# Patient Record
Sex: FEMALE | Race: WHITE | NOT HISPANIC OR LATINO | Employment: UNEMPLOYED | URBAN - METROPOLITAN AREA
[De-identification: names, ages, dates, MRNs, and addresses within clinical notes are randomized per-mention and may not be internally consistent; named-entity substitution may affect disease eponyms.]

---

## 2018-02-13 ENCOUNTER — APPOINTMENT (OUTPATIENT)
Dept: RADIOLOGY | Facility: MEDICAL CENTER | Age: 58
DRG: 853 | End: 2018-02-13
Attending: EMERGENCY MEDICINE
Payer: MEDICARE

## 2018-02-13 ENCOUNTER — RESOLUTE PROFESSIONAL BILLING HOSPITAL PROF FEE (OUTPATIENT)
Dept: HOSPITALIST | Facility: MEDICAL CENTER | Age: 58
End: 2018-02-13
Payer: COMMERCIAL

## 2018-02-13 ENCOUNTER — HOSPITAL ENCOUNTER (INPATIENT)
Facility: MEDICAL CENTER | Age: 58
LOS: 14 days | DRG: 853 | End: 2018-02-27
Attending: EMERGENCY MEDICINE | Admitting: HOSPITALIST
Payer: MEDICARE

## 2018-02-13 ENCOUNTER — APPOINTMENT (OUTPATIENT)
Dept: RADIOLOGY | Facility: MEDICAL CENTER | Age: 58
DRG: 853 | End: 2018-02-13
Attending: UROLOGY
Payer: MEDICARE

## 2018-02-13 DIAGNOSIS — A41.9 SEPTIC SHOCK (HCC): ICD-10-CM

## 2018-02-13 DIAGNOSIS — R65.21 SEPTIC SHOCK (HCC): ICD-10-CM

## 2018-02-13 DIAGNOSIS — N20.1 URETERAL STONE: ICD-10-CM

## 2018-02-13 DIAGNOSIS — N12 PYELONEPHRITIS: ICD-10-CM

## 2018-02-13 PROBLEM — E87.20 METABOLIC ACIDOSIS: Status: ACTIVE | Noted: 2018-02-13

## 2018-02-13 PROBLEM — M79.89 ARM SWELLING: Status: ACTIVE | Noted: 2018-02-13

## 2018-02-13 PROBLEM — N17.9 AKI (ACUTE KIDNEY INJURY) (HCC): Status: ACTIVE | Noted: 2018-02-13

## 2018-02-13 PROBLEM — N13.2 HYDRONEPHROSIS WITH URINARY OBSTRUCTION DUE TO RENAL CALCULUS: Status: ACTIVE | Noted: 2018-02-13

## 2018-02-13 PROBLEM — G93.40 ENCEPHALOPATHY ACUTE: Status: ACTIVE | Noted: 2018-02-13

## 2018-02-13 PROBLEM — E87.1 HYPONATREMIA: Status: ACTIVE | Noted: 2018-02-13

## 2018-02-13 LAB
ALBUMIN SERPL BCP-MCNC: 2.5 G/DL (ref 3.2–4.9)
ALBUMIN/GLOB SERPL: 0.7 G/DL
ALP SERPL-CCNC: 116 U/L (ref 30–99)
ALT SERPL-CCNC: 18 U/L (ref 2–50)
AMORPH CRY #/AREA URNS HPF: PRESENT /HPF
ANION GAP SERPL CALC-SCNC: 12 MMOL/L (ref 0–11.9)
APPEARANCE UR: CLEAR
AST SERPL-CCNC: 40 U/L (ref 12–45)
BACTERIA #/AREA URNS HPF: NEGATIVE /HPF
BASE EXCESS BLDA CALC-SCNC: -12 MMOL/L (ref -4–3)
BASOPHILS # BLD AUTO: 0 % (ref 0–1.8)
BASOPHILS # BLD: 0 K/UL (ref 0–0.12)
BILIRUB SERPL-MCNC: 0.5 MG/DL (ref 0.1–1.5)
BILIRUB UR QL STRIP.AUTO: NEGATIVE
BODY TEMPERATURE: ABNORMAL CENTIGRADE
BUN SERPL-MCNC: 26 MG/DL (ref 8–22)
CALCIUM SERPL-MCNC: 7.1 MG/DL (ref 8.5–10.5)
CHLORIDE SERPL-SCNC: 110 MMOL/L (ref 96–112)
CO2 SERPL-SCNC: 17 MMOL/L (ref 20–33)
COLOR UR: ABNORMAL
CREAT SERPL-MCNC: 1.56 MG/DL (ref 0.5–1.4)
EOSINOPHIL # BLD AUTO: 0 K/UL (ref 0–0.51)
EOSINOPHIL NFR BLD: 0 % (ref 0–6.9)
EPI CELLS #/AREA URNS HPF: ABNORMAL /HPF
ERYTHROCYTE [DISTWIDTH] IN BLOOD BY AUTOMATED COUNT: 46.5 FL (ref 35.9–50)
GLOBULIN SER CALC-MCNC: 3.4 G/DL (ref 1.9–3.5)
GLUCOSE SERPL-MCNC: 84 MG/DL (ref 65–99)
GLUCOSE UR STRIP.AUTO-MCNC: NEGATIVE MG/DL
HCO3 BLDA-SCNC: 14 MMOL/L (ref 17–25)
HCT VFR BLD AUTO: 31.7 % (ref 37–47)
HGB BLD-MCNC: 10.9 G/DL (ref 12–16)
HYALINE CASTS #/AREA URNS LPF: ABNORMAL /LPF
INHALED O2 FLOW RATE: 5 L/MIN (ref 2–10)
KETONES UR STRIP.AUTO-MCNC: ABNORMAL MG/DL
LACTATE BLD-SCNC: 1.8 MMOL/L (ref 0.5–2)
LACTATE BLD-SCNC: 2.4 MMOL/L (ref 0.5–2)
LEUKOCYTE ESTERASE UR QL STRIP.AUTO: ABNORMAL
LYMPHOCYTES # BLD AUTO: 1.69 K/UL (ref 1–4.8)
LYMPHOCYTES NFR BLD: 7 % (ref 22–41)
MANUAL DIFF BLD: ABNORMAL
MCH RBC QN AUTO: 29.7 PG (ref 27–33)
MCHC RBC AUTO-ENTMCNC: 34.4 G/DL (ref 33.6–35)
MCV RBC AUTO: 86.4 FL (ref 81.4–97.8)
METAMYELOCYTES NFR BLD MANUAL: 9.6 %
MICRO URNS: ABNORMAL
MONOCYTES # BLD AUTO: 1.04 K/UL (ref 0–0.85)
MONOCYTES NFR BLD AUTO: 4.3 % (ref 0–13.4)
MORPHOLOGY BLD-IMP: NORMAL
MYELOCYTES NFR BLD MANUAL: 2.6 %
NEUTROPHILS # BLD AUTO: 18.03 K/UL (ref 2–7.15)
NEUTROPHILS NFR BLD: 64.4 % (ref 44–72)
NEUTS BAND NFR BLD MANUAL: 10.4 % (ref 0–10)
NITRITE UR QL STRIP.AUTO: NEGATIVE
NRBC # BLD AUTO: 0 K/UL
NRBC BLD-RTO: 0 /100 WBC
PCO2 BLDA: 30.9 MMHG (ref 26–37)
PH BLDA: 7.26 [PH] (ref 7.4–7.5)
PH UR STRIP.AUTO: 5 [PH]
PLATELET # BLD AUTO: 185 K/UL (ref 164–446)
PLATELET BLD QL SMEAR: NORMAL
PMV BLD AUTO: 11.6 FL (ref 9–12.9)
PO2 BLDA: 60.9 MMHG (ref 64–87)
POTASSIUM SERPL-SCNC: 2.8 MMOL/L (ref 3.6–5.5)
PROMYELOCYTES NFR BLD MANUAL: 1.7 %
PROT SERPL-MCNC: 5.9 G/DL (ref 6–8.2)
PROT UR QL STRIP: 100 MG/DL
RBC # BLD AUTO: 3.67 M/UL (ref 4.2–5.4)
RBC # URNS HPF: ABNORMAL /HPF
RBC BLD AUTO: PRESENT
RBC UR QL AUTO: ABNORMAL
SAO2 % BLDA: 87.4 % (ref 93–99)
SODIUM SERPL-SCNC: 139 MMOL/L (ref 135–145)
SP GR UR STRIP.AUTO: 1.02
TOXIC GRANULES BLD QL SMEAR: SLIGHT
UROBILINOGEN UR STRIP.AUTO-MCNC: 1 MG/DL
WBC # BLD AUTO: 24.1 K/UL (ref 4.8–10.8)
WBC #/AREA URNS HPF: ABNORMAL /HPF

## 2018-02-13 PROCEDURE — 83605 ASSAY OF LACTIC ACID: CPT

## 2018-02-13 PROCEDURE — 99291 CRITICAL CARE FIRST HOUR: CPT

## 2018-02-13 PROCEDURE — 87040 BLOOD CULTURE FOR BACTERIA: CPT

## 2018-02-13 PROCEDURE — 700101 HCHG RX REV CODE 250

## 2018-02-13 PROCEDURE — C1769 GUIDE WIRE: HCPCS | Performed by: UROLOGY

## 2018-02-13 PROCEDURE — 160028 HCHG SURGERY MINUTES - 1ST 30 MINS LEVEL 3: Performed by: UROLOGY

## 2018-02-13 PROCEDURE — 96375 TX/PRO/DX INJ NEW DRUG ADDON: CPT

## 2018-02-13 PROCEDURE — 74176 CT ABD & PELVIS W/O CONTRAST: CPT

## 2018-02-13 PROCEDURE — 70450 CT HEAD/BRAIN W/O DYE: CPT

## 2018-02-13 PROCEDURE — C1751 CATH, INF, PER/CENT/MIDLINE: HCPCS

## 2018-02-13 PROCEDURE — 3E043XZ INTRODUCTION OF VASOPRESSOR INTO CENTRAL VEIN, PERCUTANEOUS APPROACH: ICD-10-PCS | Performed by: EMERGENCY MEDICINE

## 2018-02-13 PROCEDURE — 81001 URINALYSIS AUTO W/SCOPE: CPT

## 2018-02-13 PROCEDURE — 99223 1ST HOSP IP/OBS HIGH 75: CPT | Performed by: HOSPITALIST

## 2018-02-13 PROCEDURE — BT1DYZZ FLUOROSCOPY OF RIGHT KIDNEY, URETER AND BLADDER USING OTHER CONTRAST: ICD-10-PCS | Performed by: UROLOGY

## 2018-02-13 PROCEDURE — C2617 STENT, NON-COR, TEM W/O DEL: HCPCS | Performed by: UROLOGY

## 2018-02-13 PROCEDURE — 96366 THER/PROPH/DIAG IV INF ADDON: CPT

## 2018-02-13 PROCEDURE — 500880 HCHG PACK, CYSTO W/SEP LEGGINGS: Performed by: UROLOGY

## 2018-02-13 PROCEDURE — 93971 EXTREMITY STUDY: CPT

## 2018-02-13 PROCEDURE — 700111 HCHG RX REV CODE 636 W/ 250 OVERRIDE (IP): Performed by: EMERGENCY MEDICINE

## 2018-02-13 PROCEDURE — 82803 BLOOD GASES ANY COMBINATION: CPT

## 2018-02-13 PROCEDURE — 96365 THER/PROPH/DIAG IV INF INIT: CPT

## 2018-02-13 PROCEDURE — 36556 INSERT NON-TUNNEL CV CATH: CPT

## 2018-02-13 PROCEDURE — A9270 NON-COVERED ITEM OR SERVICE: HCPCS | Performed by: HOSPITALIST

## 2018-02-13 PROCEDURE — 501329 HCHG SET, CYSTO IRRIG Y TUR: Performed by: UROLOGY

## 2018-02-13 PROCEDURE — 160009 HCHG ANES TIME/MIN: Performed by: UROLOGY

## 2018-02-13 PROCEDURE — 96368 THER/DIAG CONCURRENT INF: CPT

## 2018-02-13 PROCEDURE — 160035 HCHG PACU - 1ST 60 MINS PHASE I: Performed by: UROLOGY

## 2018-02-13 PROCEDURE — 87077 CULTURE AEROBIC IDENTIFY: CPT | Mod: 91

## 2018-02-13 PROCEDURE — 700102 HCHG RX REV CODE 250 W/ 637 OVERRIDE(OP): Performed by: HOSPITALIST

## 2018-02-13 PROCEDURE — B548ZZA ULTRASONOGRAPHY OF SUPERIOR VENA CAVA, GUIDANCE: ICD-10-PCS | Performed by: EMERGENCY MEDICINE

## 2018-02-13 PROCEDURE — 0T768DZ DILATION OF RIGHT URETER WITH INTRALUMINAL DEVICE, VIA NATURAL OR ARTIFICIAL OPENING ENDOSCOPIC: ICD-10-PCS | Performed by: UROLOGY

## 2018-02-13 PROCEDURE — 85027 COMPLETE CBC AUTOMATED: CPT

## 2018-02-13 PROCEDURE — 02HV33Z INSERTION OF INFUSION DEVICE INTO SUPERIOR VENA CAVA, PERCUTANEOUS APPROACH: ICD-10-PCS | Performed by: EMERGENCY MEDICINE

## 2018-02-13 PROCEDURE — 36415 COLL VENOUS BLD VENIPUNCTURE: CPT

## 2018-02-13 PROCEDURE — 87186 SC STD MICRODIL/AGAR DIL: CPT

## 2018-02-13 PROCEDURE — 71045 X-RAY EXAM CHEST 1 VIEW: CPT

## 2018-02-13 PROCEDURE — 770022 HCHG ROOM/CARE - ICU (200)

## 2018-02-13 PROCEDURE — 700111 HCHG RX REV CODE 636 W/ 250 OVERRIDE (IP): Performed by: HOSPITALIST

## 2018-02-13 PROCEDURE — 700105 HCHG RX REV CODE 258: Performed by: EMERGENCY MEDICINE

## 2018-02-13 PROCEDURE — 700111 HCHG RX REV CODE 636 W/ 250 OVERRIDE (IP)

## 2018-02-13 PROCEDURE — 304561 HCHG STAT O2

## 2018-02-13 PROCEDURE — 160002 HCHG RECOVERY MINUTES (STAT): Performed by: UROLOGY

## 2018-02-13 PROCEDURE — 87086 URINE CULTURE/COLONY COUNT: CPT

## 2018-02-13 PROCEDURE — 80053 COMPREHEN METABOLIC PANEL: CPT

## 2018-02-13 PROCEDURE — 160039 HCHG SURGERY MINUTES - EA ADDL 1 MIN LEVEL 3: Performed by: UROLOGY

## 2018-02-13 PROCEDURE — 85007 BL SMEAR W/DIFF WBC COUNT: CPT

## 2018-02-13 PROCEDURE — 700101 HCHG RX REV CODE 250: Performed by: EMERGENCY MEDICINE

## 2018-02-13 PROCEDURE — 160048 HCHG OR STATISTICAL LEVEL 1-5: Performed by: UROLOGY

## 2018-02-13 PROCEDURE — 700105 HCHG RX REV CODE 258: Performed by: HOSPITALIST

## 2018-02-13 DEVICE — STENT UROLOGICAL POLARIS 6X24  ULTRA: Type: IMPLANTABLE DEVICE | Status: FUNCTIONAL

## 2018-02-13 RX ORDER — FENTANYL 100 UG/1
1 PATCH TRANSDERMAL
Status: ON HOLD | COMMUNITY
End: 2019-07-05

## 2018-02-13 RX ORDER — SODIUM CHLORIDE 9 MG/ML
INJECTION, SOLUTION INTRAVENOUS CONTINUOUS
Status: DISCONTINUED | OUTPATIENT
Start: 2018-02-13 | End: 2018-02-13

## 2018-02-13 RX ORDER — FENTANYL 100 UG/1
1 PATCH TRANSDERMAL
Status: DISCONTINUED | OUTPATIENT
Start: 2018-02-13 | End: 2018-02-13

## 2018-02-13 RX ORDER — FOLIC ACID 1 MG/1
2 TABLET ORAL DAILY
Status: DISCONTINUED | OUTPATIENT
Start: 2018-02-13 | End: 2018-02-27 | Stop reason: HOSPADM

## 2018-02-13 RX ORDER — PREGABALIN 150 MG/1
150 CAPSULE ORAL 3 TIMES DAILY
COMMUNITY

## 2018-02-13 RX ORDER — MAGNESIUM HYDROXIDE 1200 MG/15ML
LIQUID ORAL
Status: DISCONTINUED | OUTPATIENT
Start: 2018-02-13 | End: 2018-02-13 | Stop reason: HOSPADM

## 2018-02-13 RX ORDER — HYDROCODONE BITARTRATE AND ACETAMINOPHEN 10; 325 MG/1; MG/1
1 TABLET ORAL EVERY 4 HOURS PRN
Status: ON HOLD | COMMUNITY
End: 2018-02-27

## 2018-02-13 RX ORDER — MORPHINE SULFATE 4 MG/ML
2 INJECTION, SOLUTION INTRAMUSCULAR; INTRAVENOUS
Status: DISCONTINUED | OUTPATIENT
Start: 2018-02-13 | End: 2018-02-15

## 2018-02-13 RX ORDER — DULOXETIN HYDROCHLORIDE 60 MG/1
60 CAPSULE, DELAYED RELEASE ORAL 2 TIMES DAILY
COMMUNITY

## 2018-02-13 RX ORDER — DULOXETIN HYDROCHLORIDE 30 MG/1
60 CAPSULE, DELAYED RELEASE ORAL 2 TIMES DAILY
Status: DISCONTINUED | OUTPATIENT
Start: 2018-02-13 | End: 2018-02-27 | Stop reason: HOSPADM

## 2018-02-13 RX ORDER — PREGABALIN 150 MG/1
150 CAPSULE ORAL 2 TIMES DAILY
Status: DISCONTINUED | OUTPATIENT
Start: 2018-02-13 | End: 2018-02-27 | Stop reason: HOSPADM

## 2018-02-13 RX ORDER — FLUOXETINE HYDROCHLORIDE 20 MG/1
20 CAPSULE ORAL DAILY
Status: ON HOLD | COMMUNITY
End: 2024-01-26

## 2018-02-13 RX ORDER — SODIUM CHLORIDE 9 MG/ML
500 INJECTION, SOLUTION INTRAVENOUS
Status: DISCONTINUED | OUTPATIENT
Start: 2018-02-13 | End: 2018-02-20

## 2018-02-13 RX ORDER — POLYETHYLENE GLYCOL 3350 17 G/17G
1 POWDER, FOR SOLUTION ORAL
Status: DISCONTINUED | OUTPATIENT
Start: 2018-02-13 | End: 2018-02-18

## 2018-02-13 RX ORDER — FOLIC ACID 1 MG/1
2 TABLET ORAL DAILY
Status: ON HOLD | COMMUNITY
End: 2024-01-26

## 2018-02-13 RX ORDER — PROMETHAZINE HYDROCHLORIDE 25 MG/1
12.5-25 SUPPOSITORY RECTAL EVERY 4 HOURS PRN
Status: DISCONTINUED | OUTPATIENT
Start: 2018-02-13 | End: 2018-02-27 | Stop reason: HOSPADM

## 2018-02-13 RX ORDER — SODIUM CHLORIDE 9 MG/ML
INJECTION, SOLUTION INTRAVENOUS CONTINUOUS
Status: DISCONTINUED | OUTPATIENT
Start: 2018-02-13 | End: 2018-02-18 | Stop reason: ALTCHOICE

## 2018-02-13 RX ORDER — ONDANSETRON 2 MG/ML
4 INJECTION INTRAMUSCULAR; INTRAVENOUS EVERY 4 HOURS PRN
Status: DISCONTINUED | OUTPATIENT
Start: 2018-02-13 | End: 2018-02-27 | Stop reason: HOSPADM

## 2018-02-13 RX ORDER — FLUOXETINE HYDROCHLORIDE 20 MG/1
20 CAPSULE ORAL DAILY
Status: DISCONTINUED | OUTPATIENT
Start: 2018-02-13 | End: 2018-02-27 | Stop reason: HOSPADM

## 2018-02-13 RX ORDER — AMOXICILLIN 250 MG
2 CAPSULE ORAL 2 TIMES DAILY
Status: DISCONTINUED | OUTPATIENT
Start: 2018-02-13 | End: 2018-02-18

## 2018-02-13 RX ORDER — BISACODYL 10 MG
10 SUPPOSITORY, RECTAL RECTAL
Status: DISCONTINUED | OUTPATIENT
Start: 2018-02-13 | End: 2018-02-18

## 2018-02-13 RX ORDER — POTASSIUM CHLORIDE 14.9 MG/ML
20 INJECTION INTRAVENOUS ONCE
Status: COMPLETED | OUTPATIENT
Start: 2018-02-13 | End: 2018-02-13

## 2018-02-13 RX ORDER — GABAPENTIN 300 MG/1
300 CAPSULE ORAL 3 TIMES DAILY
Status: ON HOLD | COMMUNITY
End: 2018-02-27

## 2018-02-13 RX ORDER — PROMETHAZINE HYDROCHLORIDE 25 MG/1
12.5-25 TABLET ORAL EVERY 4 HOURS PRN
Status: DISCONTINUED | OUTPATIENT
Start: 2018-02-13 | End: 2018-02-27 | Stop reason: HOSPADM

## 2018-02-13 RX ORDER — ONDANSETRON 4 MG/1
4 TABLET, ORALLY DISINTEGRATING ORAL EVERY 4 HOURS PRN
Status: DISCONTINUED | OUTPATIENT
Start: 2018-02-13 | End: 2018-02-27 | Stop reason: HOSPADM

## 2018-02-13 RX ADMIN — NOREPINEPHRINE BITARTRATE 4 MCG/MIN: 1 INJECTION INTRAVENOUS at 05:00

## 2018-02-13 RX ADMIN — VANCOMYCIN HYDROCHLORIDE 2600 MG: 100 INJECTION, POWDER, LYOPHILIZED, FOR SOLUTION INTRAVENOUS at 05:05

## 2018-02-13 RX ADMIN — VANCOMYCIN HYDROCHLORIDE 1600 MG: 100 INJECTION, POWDER, LYOPHILIZED, FOR SOLUTION INTRAVENOUS at 16:48

## 2018-02-13 RX ADMIN — PREGABALIN 150 MG: 150 CAPSULE ORAL at 21:01

## 2018-02-13 RX ADMIN — POTASSIUM CHLORIDE 20 MEQ: 200 INJECTION, SOLUTION INTRAVENOUS at 09:55

## 2018-02-13 RX ADMIN — TAZOBACTAM SODIUM AND PIPERACILLIN SODIUM 4.5 G: 500; 4 INJECTION, SOLUTION INTRAVENOUS at 05:01

## 2018-02-13 RX ADMIN — STANDARDIZED SENNA CONCENTRATE AND DOCUSATE SODIUM 2 TABLET: 8.6; 5 TABLET, FILM COATED ORAL at 21:01

## 2018-02-13 RX ADMIN — MORPHINE SULFATE 2 MG: 4 INJECTION INTRAVENOUS at 21:24

## 2018-02-13 RX ADMIN — DULOXETINE HYDROCHLORIDE 60 MG: 60 CAPSULE, DELAYED RELEASE ORAL at 21:01

## 2018-02-13 RX ADMIN — SODIUM CHLORIDE: 9 INJECTION, SOLUTION INTRAVENOUS at 06:22

## 2018-02-13 RX ADMIN — TAZOBACTAM SODIUM AND PIPERACILLIN SODIUM 3.38 G: 375; 3 INJECTION, SOLUTION INTRAVENOUS at 10:44

## 2018-02-13 RX ADMIN — SODIUM CHLORIDE: 9 INJECTION, SOLUTION INTRAVENOUS at 11:15

## 2018-02-13 RX ADMIN — SODIUM CHLORIDE: 9 INJECTION, SOLUTION INTRAVENOUS at 09:54

## 2018-02-13 RX ADMIN — FENTANYL CITRATE 50 MCG: 50 INJECTION, SOLUTION INTRAMUSCULAR; INTRAVENOUS at 08:51

## 2018-02-13 RX ADMIN — FENTANYL CITRATE 50 MCG: 50 INJECTION, SOLUTION INTRAMUSCULAR; INTRAVENOUS at 09:04

## 2018-02-13 RX ADMIN — ONDANSETRON 4 MG: 2 INJECTION INTRAMUSCULAR; INTRAVENOUS at 21:52

## 2018-02-13 ASSESSMENT — LIFESTYLE VARIABLES
EVER_SMOKED: YES
EVER HAD A DRINK FIRST THING IN THE MORNING TO STEADY YOUR NERVES TO GET RID OF A HANGOVER: NO
DO YOU DRINK ALCOHOL: NO
TOTAL SCORE: 0
EVER FELT BAD OR GUILTY ABOUT YOUR DRINKING: NO
ALCOHOL_USE: YES
HAVE YOU EVER FELT YOU SHOULD CUT DOWN ON YOUR DRINKING: NO
AVERAGE NUMBER OF DAYS PER WEEK YOU HAVE A DRINK CONTAINING ALCOHOL: 0
EVER_SMOKED: NEVER
TOTAL SCORE: 0
ON A TYPICAL DAY WHEN YOU DRINK ALCOHOL HOW MANY DRINKS DO YOU HAVE: .5
CONSUMPTION TOTAL: NEGATIVE
HOW MANY TIMES IN THE PAST YEAR HAVE YOU HAD 5 OR MORE DRINKS IN A DAY: 0
HAVE PEOPLE ANNOYED YOU BY CRITICIZING YOUR DRINKING: NO
TOTAL SCORE: 0

## 2018-02-13 ASSESSMENT — PAIN SCALES - GENERAL
PAINLEVEL_OUTOF10: 1
PAINLEVEL_OUTOF10: ASSUMED PAIN PRESENT
PAINLEVEL_OUTOF10: 0
PAINLEVEL_OUTOF10: ASSUMED PAIN PRESENT
PAINLEVEL_OUTOF10: 0
PAINLEVEL_OUTOF10: 4
PAINLEVEL_OUTOF10: 0

## 2018-02-13 ASSESSMENT — COPD QUESTIONNAIRES
DURING THE PAST 4 WEEKS HOW MUCH DID YOU FEEL SHORT OF BREATH: NONE/LITTLE OF THE TIME
COPD SCREENING SCORE: 1
HAVE YOU SMOKED AT LEAST 100 CIGARETTES IN YOUR ENTIRE LIFE: NO/DON'T KNOW
DO YOU EVER COUGH UP ANY MUCUS OR PHLEGM?: NO/ONLY WITH OCCASIONAL COLDS OR INFECTIONS

## 2018-02-13 ASSESSMENT — PATIENT HEALTH QUESTIONNAIRE - PHQ9
1. LITTLE INTEREST OR PLEASURE IN DOING THINGS: NOT AT ALL
2. FEELING DOWN, DEPRESSED, IRRITABLE, OR HOPELESS: NOT AT ALL
SUM OF ALL RESPONSES TO PHQ QUESTIONS 1-9: 0
SUM OF ALL RESPONSES TO PHQ9 QUESTIONS 1 AND 2: 0

## 2018-02-13 NOTE — ED NOTES
Pt remains with aloc, no change since in er. Pt drowsy,  at bedside. Olmedo draining without difficulty. ivs infusing without difficulty. No redness or swelling noted to iv sites. resp equal and unlabored. Mild resp distress improved and pt remains on 4L o2 nc at this time. Pt and family deny further needs. Will continue to monitor. Vs improving.

## 2018-02-13 NOTE — CONSULTS
UROLOGY Consult Note:    Monico Killian  Date & Time note created:    2/13/2018   7:40 AM     Referring MD:  Dr. Quezada    Patient ID:   Name:             Nydia Finch     YOB: 1960  Age:                 57 y.o.  female   MRN:               [id number]                                                             Reason for Consult:      Sepsis, right ureteral stone, severe right hydronephrosis and pyuria.    History of Present Illness:    Patient was admitted to an outside hospital over the weekend. She was diagnosed as having constipation and was treated for that. She was discharged home. He became increasingly ill at home and developed abdominal pain. She was eventually transferred to the [facility] ER. An abdominal CT demonstrated a 9 mm mid right ureteral obstructing stone with significant right hydronephrosis. Urinalysis demonstrates pyuria. She is hypotensive and currently on pressors. Presumably the urine is the source of her sepsis, complicated by an obstructing ureteral calculus.    Review of Systems:      Constitutional: Denies fevers, Denies weight changes  Eyes: Denies changes in vision, no eye pain  Ears/Nose/Throat/Mouth: Denies nasal congestion or sore throat   Cardiovascular: no chest pain, no palpitations   Respiratory: no shortness of breath , Denies cough  Gastrointestinal/Hepatic: + abdominal pain, No nausea, vomiting, diarrhea, constipation or GI bleeding   Genitourinary: See CC  Musculoskeletal/Rheum: Denies  joint pain and swelling, no edema  Skin: Denies rash  Neurological: Denies headache, confusion, memory loss or focal weakness/parasthesias  Psychiatric: denies mood disorder   Endocrine: Coleen thyroid problems  Heme/Oncology/Lymph Nodes: Denies enlarged lymph nodes, denies brusing or known bleeding disorder  All other systems were reviewed and are negative (AMA/CMS criteria)                Past Medical History:   Past Medical History:   Diagnosis Date   • Brain aneurysm    •  "Chronic pain    • St[place] ([facility]      Active Hospital Problems    Diagnosis   • Septic shock (CMS-HCC) [A41.9, R65.21]   • Pyelonephritis [N12]   • Ureteral stone [N20.1]   • Hydronephrosis with urinary obstruction due to renal calculus [N13.2]   • Arm swelling [M79.89]   • Hyponatremia [E87.1]   • CHERYL (acute kidney injury) (CMS-HCC) [N17.9]   • Metabolic acidosis [E87.2]   • Encephalopathy acute [G93.40]       Past Surgical History:  Past Surgical History:   Procedure Laterality Date   • OTHER NEUROLOGICAL SURG         Hospital Medications:    Current Facility-Administered Medications:   •  NS infusion, , Intravenous, Continuous, Jay Quezada M.D., Last Rate: 150 mL/hr at 02/13/18 0622  •  vancomycin 2,600 mg in  mL IVPB, 25 mg/kg, Intravenous, Once, Jay Quezada M.D., Last Rate: 166.7 mL/hr at 02/13/18 0505, 2,600 mg at 02/13/18 0505  •  potassium chloride in water (KCL) ivpb **Administer in ICU only** 20 mEq, 20 mEq, Intravenous, Once, Jay Quezada M.D.    Current Outpatient Medications:  No prescriptions prior to admission.       Medication Allergy:  No Known Allergies    Family History:  History reviewed. No pertinent family history.    Social History:  Social History     Social History   • Marital status:      Spouse name: N/A   • Number of children: N/A   • Years of education: N/A     Occupational History   • Not on file.     Social History Main Topics   • Smoking status: Never Smoker   • Smokeless tobacco: Never Used   • Alcohol use No   • Drug use: No   • Sexual activity: Not on file     Other Topics Concern   • Not on file     Social History Narrative   • No narrative on file         Physical Exam:  Vitals/ General Appearance:   Weight/BMI: Body mass index is 34.97 kg/m².  Blood pressure 131/77, pulse (!) 104, temperature 37.3 °C (99.1 °F), resp. rate 16, height 1.727 m (5' 8\"), weight 104.3 kg (230 lb), SpO2 99 %.  Vitals:    02/13/18 0555 02/13/18 0625 02/13/18 0645 02/13/18 " 0737   BP:    131/77   Pulse:  98 (!) 102 (!) 104   Resp: 17 15 14 16   Temp:    37.3 °C (99.1 °F)   SpO2:  96% 99% 99%   Weight:       Height:         Oxygen Therapy:  Pulse Oximetry: 99 %, O2 (LPM): 4, O2 Delivery: Nasal Cannula    Constitutional:   Obese, Well developed, Well nourished, + acute distress  HENMT:  Normocephalic, Atraumatic, Oropharynx moist mucous membranes, No oral exudates, Nose normal.  No thyromegaly.  Eyes:  EOMI, Conjunctiva normal, No discharge.  Neck:  Normal range of motion, No cervical tenderness,  no JVD.  Cardiovascular:  Normal heart rate, Normal rhythm, No murmurs, No rubs, No gallops.   Extremitites with intact distal pulses, no cyanosis, or edema.  Lungs:  Normal breath sounds, breath sounds clear to auscultation bilaterally,  no crackles, no wheezing.   Abdomen: Bowel sounds normal, Soft, No tenderness, No guarding, No rebound, No masses, No hepatosplenomegaly.  : Deferred  Skin: Warm, Dry, No erythema, No rash, no induration.  Neurologic: Alert & oriented x 3, Rt hemiparesis, cranial nerves II through X are grossly intact.  Psychiatric: Affect normal, Judgment normal, Mood normal.      MDM (Data Review):     Records reviewed and summarized in current documentation    Lab Data Review:  Recent Results (from the past 24 hour(s))   Lactic acid (lactate)    Collection Time: 02/13/18  4:12 AM   Result Value Ref Range    Lactic Acid 2.4 (H) 0.5 - 2.0 mmol/L   CBC WITH DIFFERENTIAL    Collection Time: 02/13/18  4:12 AM   Result Value Ref Range    WBC 24.1 (H) 4.8 - 10.8 K/uL    RBC 3.67 (L) 4.20 - 5.40 M/uL    Hemoglobin 10.9 (L) 12.0 - 16.0 g/dL    Hematocrit 31.7 (L) 37.0 - 47.0 %    MCV 86.4 81.4 - 97.8 fL    MCH 29.7 27.0 - 33.0 pg    MCHC 34.4 33.6 - 35.0 g/dL    RDW 46.5 35.9 - 50.0 fL    Platelet Count 185 164 - 446 K/uL    MPV 11.6 9.0 - 12.9 fL    Neutrophils-Polys 64.40 44.00 - 72.00 %    Lymphocytes 7.00 (L) 22.00 - 41.00 %    Monocytes 4.30 0.00 - 13.40 %    Eosinophils 0.00  0.00 - 6.90 %    Basophils 0.00 0.00 - 1.80 %    Nucleated RBC 0.00 /100 WBC    Neutrophils (Absolute) 18.03 (H) 2.00 - 7.15 K/uL    Lymphs (Absolute) 1.69 1.00 - 4.80 K/uL    Monos (Absolute) 1.04 (H) 0.00 - 0.85 K/uL    Eos (Absolute) 0.00 0.00 - 0.51 K/uL    Baso (Absolute) 0.00 0.00 - 0.12 K/uL    NRBC (Absolute) 0.00 K/uL   COMP METABOLIC PANEL    Collection Time: 02/13/18  4:12 AM   Result Value Ref Range    Sodium 139 135 - 145 mmol/L    Potassium 2.8 (L) 3.6 - 5.5 mmol/L    Chloride 110 96 - 112 mmol/L    Co2 17 (L) 20 - 33 mmol/L    Anion Gap 12.0 (H) 0.0 - 11.9    Glucose 84 65 - 99 mg/dL    Bun 26 (H) 8 - 22 mg/dL    Creatinine 1.56 (H) 0.50 - 1.40 mg/dL    Calcium 7.1 (L) 8.5 - 10.5 mg/dL    AST(SGOT) 40 12 - 45 U/L    ALT(SGPT) 18 2 - 50 U/L    Alkaline Phosphatase 116 (H) 30 - 99 U/L    Total Bilirubin 0.5 0.1 - 1.5 mg/dL    Albumin 2.5 (L) 3.2 - 4.9 g/dL    Total Protein 5.9 (L) 6.0 - 8.2 g/dL    Globulin 3.4 1.9 - 3.5 g/dL    A-G Ratio 0.7 g/dL   ESTIMATED GFR    Collection Time: 02/13/18  4:12 AM   Result Value Ref Range    GFR If  41 (A) >60 mL/min/1.73 m 2    GFR If Non  34 (A) >60 mL/min/1.73 m 2   DIFFERENTIAL MANUAL    Collection Time: 02/13/18  4:12 AM   Result Value Ref Range    Bands-Stabs 10.40 (H) 0.00 - 10.00 %    Metamyelocytes 9.60 %    Myelocytes 2.60 %    Progranulocytes 1.70 %    Manual Diff Status PERFORMED    PERIPHERAL SMEAR REVIEW    Collection Time: 02/13/18  4:12 AM   Result Value Ref Range    Peripheral Smear Review see below    PLATELET ESTIMATE    Collection Time: 02/13/18  4:12 AM   Result Value Ref Range    Plt Estimation Normal    MORPHOLOGY    Collection Time: 02/13/18  4:12 AM   Result Value Ref Range    RBC Morphology Present     Toxic Gran Slight    URINALYSIS    Collection Time: 02/13/18  5:10 AM   Result Value Ref Range    Color DK Yellow     Character Clear     Specific Gravity 1.020 <1.035    Ph 5.0 5.0 - 8.0    Glucose Negative  Negative mg/dL    Ketones Trace (A) Negative mg/dL    Protein 100 (A) Negative mg/dL    Bilirubin Negative Negative    Urobilinogen, Urine 1.0 Negative    Nitrite Negative Negative    Leukocyte Esterase Trace (A) Negative    Occult Blood Small (A) Negative    Micro Urine Req Microscopic    URINE MICROSCOPIC (W/UA)    Collection Time: 02/13/18  5:10 AM   Result Value Ref Range    WBC 2-5 /hpf    RBC 2-5 (A) /hpf    Bacteria Negative None /hpf    Epithelial Cells Few /hpf    Amorphous Crystal Present /hpf    Hyaline Cast 6-10 (A) /lpf   Lactic acid (lactate)    Collection Time: 02/13/18  6:20 AM   Result Value Ref Range    Lactic Acid 1.8 0.5 - 2.0 mmol/L       Imaging/Procedures Review:    Reviewed    MDM (Assessment and Plan):     Active Hospital Problems    Diagnosis   • Septic shock (CMS-HCC) [A41.9, R65.21]   • Pyelonephritis [N12]   • Ureteral stone [N20.1]   • Hydronephrosis with urinary obstruction due to renal calculus [N13.2]   • Arm swelling [M79.89]   • Hyponatremia [E87.1]   • CHERYL (acute kidney injury) (CMS-HCC) [N17.9]   • Metabolic acidosis [E87.2]   • Encephalopathy acute [G93.40]     Plan  Emergent placement of a right ureteral stent

## 2018-02-13 NOTE — ASSESSMENT & PLAN NOTE
Resolved with placement of stent.  Monitor. Due to stent, does have a longer course of antibiotic therapy for pyelonephritis.

## 2018-02-13 NOTE — PROGRESS NOTES
"Pharmacy Kinetics 57 y.o. female on vancomycin day # 1 2018    Vancomycin New Start   Received vancomycin 2600 mg iv x 1 this morning at 0505  Other antibiotics: piperacillin/tazobactam 10.125 g CIVI    Indication for Treatment: Pyelonephritis    Pertinent history per medical record: Admitted on 2018 from [facility] for ALOC.  She was diagnosed with UTI at the [facility] and given levofloxacin.  Transferred to [facility] for management of sepsis.  She has a right ureteral stone causing obstructive pyelonephritis.  Urology was consulted and emergently placed a right ureteral sten on  with noted purulent drainage from the stent.  She received Zosyn and vancomycin in the ED, which have been continued on admit.      Allergies: Patient has no known allergies.     List concerns for renal function: obesity (BMI 34.97 kg/m 2), elevated BUN/SCr, SIRS with elevated lactic acid on admit (improved), low albumin    Pertinent cultures to date:   Blood and urine cultures in process    Imagin/13 CT-abd/pelvis:  Obstructing 7 mm calculus in the mid right ureter with severe right hydronephrosis.    Recent Labs      18   0412   WBC  24.1*   NEUTSPOLYS  64.40   BANDSSTABS  10.40*     Recent Labs      18   0412   BUN  26*   CREATININE  1.56*   ALBUMIN  2.5*     Intake/Output Summary (Last 24 hours) at 18 0956  Last data filed at 18 0930   Gross per 24 hour   Intake             1000 ml   Output              500 ml   Net              500 ml      Blood pressure 131/77, pulse (!) 120, temperature 37.2 °C (99 °F), resp. rate (!) 24, height 1.727 m (5' 8\"), weight 104.3 kg (230 lb), SpO2 100 %. Temp (24hrs), Av.9 °C (98.5 °F), Min:36.2 °C (97.2 °F), Max:37.3 °C (99.1 °F)      A/P   1. Vancomycin dose change: vancomycin 1600 mg iv x 1 at 1700  2. Next vancomycin level:  at 0500  3. Goal trough: 12-16 mcg/mL  4. Assessment: suspect renal function to begin to improve with placement of the stent and " antibiotic therapy.  Patient is at risk for accumulation.  5. Plan:  Will continue pulse dosing.  Ordered a 15 mg/kg pulse dose to be given 12-hrs after the loading dose with a 12-hr follow up level to assess distribution and clearance.    Darlyn Mosqueda, Pharm.D., BCPS

## 2018-02-13 NOTE — PROGRESS NOTES
"I examined the patient 2/13/2018 11:45 AM  Vital Signs:/77   Pulse (!) 107   Temp 37.2 °C (99 °F)   Resp 15   Ht 1.727 m (5' 8\")   Wt 104.3 kg (230 lb)   SpO2 100%   BMI 34.97 kg/m²   Cardiac examination significant for Tachycardia  Pulmonary examination significant for Clear lung fileds  Capillary refill is brisk  Peripheral Pulse is 2+   Skin is pale   Patient is now in ICU she is s/p right ureter stent placement as per report there was pus coming out after stent placement will continue ivf, she is off levophed now, continue iv atb f/u culture report. Discussed with ICU nurse and informed ICU hospitalist. Continue close monitoring lactic acid is back to normal will recheck labs.   "

## 2018-02-13 NOTE — PROGRESS NOTES
Pt arrived to S124 from PACU. Pt lethargic, AOx1. 5L oxymask, VSS, levophed paused. Report received from PACU RN.

## 2018-02-13 NOTE — DISCHARGE PLANNING
Care Transition Team Assessment    SW met at bedside with pt's spouse (Marbin) to obtain the information used in this assessment. Pt's spouse stated that they live at [address] [place] [address] in [place], [place] in a single story home. [facility] in [place]. Pt's  assists pt with getting dressed, bathing and cooking her meals. Pt is ambulatory around the house but uses an electric wheelchair when she leaves the home. Pt's spouse stated that a couple of months ago his 91 year old father moved in and he has been care taking for him as well. He stated that pt and himself have family that live nearby that are good support and are able to assist. Pt's  stated that pt had an aneurysm and a stroke in 1994 and has had at least six surgeries since then. Pt's  stated that pt is a DNR. SW provided emotional support to pt's spouse as he stated that he has a lot on his plate right now and is trying to work full time. He has no concerns about paying for medications and denies any mh or substance use for pt.    Plan: Pt's d/c plan is unknown at this time. SW to remain available to assist.     Information Source  Orientation : Disoriented to Event, Disoriented to Time  Information Given By: (P) Spouse  Informant's Name: (BRICE) Marbin  Who is responsible for making decisions for patient? : Legal next of kin  Name(s) of Primary Decision Maker:  (Marbin)    Readmission Evaluation  Is this a readmission?: No    Elopement Risk  Legal Hold: No  Ambulatory or Self Mobile in Wheelchair: No-Not an Elopement Risk    Interdisciplinary Discharge Planning  Does Admitting Nurse Feel This Could be a Complex Discharge?: (P) No  Primary Care Physician: (P) Dr. Griffin  Lives with - Patient's Self Care Capacity: (P) Spouse  Patient or legal guardian wants to designate a caregiver (see row info): (P) No  Support Systems: (P) Spouse / Significant Other, Family Member(s)  Housing / Facility: (P) 1 Story  House  Do You Take your Prescribed Medications Regularly: (P) Yes  Able to Return to Previous ADL's: (P) Yes  Mobility Issues: (P) Yes  Prior Services: (P) Home With Outpatient Therapy, Skilled Home Health Services  Patient Expects to be Discharged to:: (P) home  Assistance Needed: (P) No  Durable Medical Equipment: (P)  (cane/wheelchair)    Discharge Preparedness  What is your plan after discharge?: Uncertain - pending medical team collaboration, Home with help, Skilled nursing facility, Home health care  What are your discharge supports?: Child, Sibling, Spouse  Prior Functional Level: Needs Assist with Activities of Daily Living, Independent with Medication Management, Uses Wheelchair  Difficulity with ADLs: Bathing, Toileting, Walking  Difficulity with IADLs: Cooking, Driving, Laundry, Shopping    Functional Assesment  Prior Functional Level: Needs Assist with Activities of Daily Living, Independent with Medication Management, Uses Wheelchair    Finances  Financial Barriers to Discharge: No  Prescription Coverage: Yes    Vision / Hearing Impairment  Vision Impairment : (P) No  Hearing Impairment : (P) No     Advance Directive  Advance Directive?: POLST     Psychological Assessment  History of Substance Abuse: None  History of Psychiatric Problems: No  Non-compliant with Treatment: No  Newly Diagnosed Illness: No    Discharge Risks or Barriers  Discharge risks or barriers?: No    Anticipated Discharge Information  Anticipated discharge disposition: Discharge needs currently unknown, HHC, Home, SNF

## 2018-02-13 NOTE — ED PROVIDER NOTES
"ED Provider Note    CHIEF COMPLAINT  Chief Complaint   Patient presents with   • ALOC       HPI  Nydia Finch is a 57 y.o. female who presents to the emergency room with altered mental status. Arrives as a transfer from outside hospital. Patient presented to the other hospital yesterday with abdominal pain. She was diagnosed with constipation. Observed in the hospital overnight and given enemas. And felt improved upon discharge. After going home yesterday she became confused and felt warm. She wasn't acting like herself and therefore the  took her back to the hospital. She was found to have a fever of 103. She was hypotensive and given 5 L of fluid. She was diagnosed with UTI and given Levaquin intravenously. She was transferred here for higher level of care. She is unable to provide much history. She has not had cough, congestion or runny nose. No vomiting. She has had bowel movements since enemas administered    REVIEW OF SYSTEMS  As per HPI, otherwise a 10 point review of systems is negative    PAST MEDICAL HISTORY  Chronic right-sided neurologic deficits, constipation, chronic pain, brain aneurysm status post rupture    SOCIAL HISTORY  Social History   Substance Use Topics   • Smoking status: Never Smoker   • Smokeless tobacco: Never Used   • Alcohol use No       SURGICAL HISTORY  Cholecystitis, appendicitis, gastric bypass, knee surgeries    ALLERGIES  No Known Allergies    PHYSICAL EXAM  VITAL SIGNS: BP (!) 83/52   Pulse 98   Temp 37.1 °C (98.8 °F)   Resp 15   Ht 1.727 m (5' 8\")   Wt 104.3 kg (230 lb)   SpO2 96%   BMI 34.97 kg/m²    Constitutional: She is somnolent but arousable. Mildly confused  HENT:  Atraumatic, Normocephalic.Oropharynx dry mucus membranes, Nose normal inspection.   Eyes: Normal inspection  Neck: Supple  Cardiovascular: Tachycardic heart rate, Normal rhythm.  Symmetric peripheral pulses.   Thorax & Lungs: No respiratory distress, crackles at both bases  Abdomen: Bowel sounds " normal, soft, non-distended, nontender, no mass  Skin: Mild redness over the lateral right arm  Back: No tenderness, No CVA tenderness.   Extremities: Pedal edema bilaterally. Slight edema of the right arm. Spasticity of the right upper and right lower extremity.  Neurologic: Confused, but arousable. Follows commands. Answers simple questions. Right-sided neurologic deficits reported chronic by the .      RADIOLOGY/PROCEDURES  CT-ABDOMEN-PELVIS W/O   Final Result         1. Obstructing 7 mm calculus in the mid right ureter with severe right hydronephrosis.      2. Postsurgical change from gastric bypass. Hiatal hernia.      3. Small right pleural effusion. Bibasilar opacities, likely atelectasis.      CT-HEAD W/O   Final Result         1. No evidence of acute intracranial hemorrhage or mass lesion.      2. Postsurgical change and encephalomalacia in the left frontal lobe.         DX-CHEST-PORTABLE (1 VIEW)   Final Result         Right central venous catheter with tip in the right atrium.      DX-CHEST-PORTABLE (1 VIEW)   Final Result         1. Mild diffuse interstitial prominence could relate to pulmonary edema or viral infection.         Imaging is interpreted by radiologist    Central Line Placement Procedure Note  Indication: vascular access    Consent: The patient provided verbal consent for this procedure.    Procedure: The patient was positioned appropriately and the skin over the right internal jugular vein was prepped with chlorhexidine. Local anesthesia was obtained by infiltration using 1% Lidocaine without epinephrine.  Ultrasound was used to identify the internal jugular vein. A large bore needle was inserted.  A guide wire was then inserted into the vein through the needle. A triple lumen catheter was then inserted into the vessel over the guide wire using the Seldinger technique.  All ports showed good, free flowing blood return and were flushed with saline solution.  The catheter was then  securely fastened to the skin with sutures and covered with a sterile dressing.  A post procedure X-ray was ordered and showed good line position.    The patient tolerated the procedure well.    Complications: None          Labs:  Results for orders placed or performed during the hospital encounter of 02/13/18   Lactic acid (lactate)   Result Value Ref Range    Lactic Acid 2.4 (H) 0.5 - 2.0 mmol/L   Lactic acid (lactate)   Result Value Ref Range    Lactic Acid 1.8 0.5 - 2.0 mmol/L   CBC WITH DIFFERENTIAL   Result Value Ref Range    WBC 24.1 (H) 4.8 - 10.8 K/uL    RBC 3.67 (L) 4.20 - 5.40 M/uL    Hemoglobin 10.9 (L) 12.0 - 16.0 g/dL    Hematocrit 31.7 (L) 37.0 - 47.0 %    MCV 86.4 81.4 - 97.8 fL    MCH 29.7 27.0 - 33.0 pg    MCHC 34.4 33.6 - 35.0 g/dL    RDW 46.5 35.9 - 50.0 fL    Platelet Count 185 164 - 446 K/uL    MPV 11.6 9.0 - 12.9 fL    Neutrophils-Polys 64.40 44.00 - 72.00 %    Lymphocytes 7.00 (L) 22.00 - 41.00 %    Monocytes 4.30 0.00 - 13.40 %    Eosinophils 0.00 0.00 - 6.90 %    Basophils 0.00 0.00 - 1.80 %    Nucleated RBC 0.00 /100 WBC    Neutrophils (Absolute) 18.03 (H) 2.00 - 7.15 K/uL    Lymphs (Absolute) 1.69 1.00 - 4.80 K/uL    Monos (Absolute) 1.04 (H) 0.00 - 0.85 K/uL    Eos (Absolute) 0.00 0.00 - 0.51 K/uL    Baso (Absolute) 0.00 0.00 - 0.12 K/uL    NRBC (Absolute) 0.00 K/uL   COMP METABOLIC PANEL   Result Value Ref Range    Sodium 139 135 - 145 mmol/L    Potassium 2.8 (L) 3.6 - 5.5 mmol/L    Chloride 110 96 - 112 mmol/L    Co2 17 (L) 20 - 33 mmol/L    Anion Gap 12.0 (H) 0.0 - 11.9    Glucose 84 65 - 99 mg/dL    Bun 26 (H) 8 - 22 mg/dL    Creatinine 1.56 (H) 0.50 - 1.40 mg/dL    Calcium 7.1 (L) 8.5 - 10.5 mg/dL    AST(SGOT) 40 12 - 45 U/L    ALT(SGPT) 18 2 - 50 U/L    Alkaline Phosphatase 116 (H) 30 - 99 U/L    Total Bilirubin 0.5 0.1 - 1.5 mg/dL    Albumin 2.5 (L) 3.2 - 4.9 g/dL    Total Protein 5.9 (L) 6.0 - 8.2 g/dL    Globulin 3.4 1.9 - 3.5 g/dL    A-G Ratio 0.7 g/dL   URINALYSIS   Result  Value Ref Range    Color DK Yellow     Character Clear     Specific Gravity 1.020 <1.035    Ph 5.0 5.0 - 8.0    Glucose Negative Negative mg/dL    Ketones Trace (A) Negative mg/dL    Protein 100 (A) Negative mg/dL    Bilirubin Negative Negative    Urobilinogen, Urine 1.0 Negative    Nitrite Negative Negative    Leukocyte Esterase Trace (A) Negative    Occult Blood Small (A) Negative    Micro Urine Req Microscopic    ESTIMATED GFR   Result Value Ref Range    GFR If  41 (A) >60 mL/min/1.73 m 2    GFR If Non  34 (A) >60 mL/min/1.73 m 2   URINE MICROSCOPIC (W/UA)   Result Value Ref Range    WBC 2-5 /hpf    RBC 2-5 (A) /hpf    Bacteria Negative None /hpf    Epithelial Cells Few /hpf    Amorphous Crystal Present /hpf    Hyaline Cast 6-10 (A) /lpf   DIFFERENTIAL MANUAL   Result Value Ref Range    Bands-Stabs 10.40 (H) 0.00 - 10.00 %    Metamyelocytes 9.60 %    Myelocytes 2.60 %    Progranulocytes 1.70 %    Manual Diff Status PERFORMED    PERIPHERAL SMEAR REVIEW   Result Value Ref Range    Peripheral Smear Review see below    PLATELET ESTIMATE   Result Value Ref Range    Plt Estimation Normal    MORPHOLOGY   Result Value Ref Range    RBC Morphology Present     Toxic Gran Slight            COURSE & MEDICAL DECISION MAKING  Patient presents to the ER hypotensive, tachycardic, febrile with possible urinary tract infection. She is confused. Symptoms complex consistent with septic shock. Data was reviewed from outside facility. Obtained repeat data. She has received 5 L of fluid and Levaquin prior to arrival. Started on saline infusion. I ordered Levophed. Given reports of complaints of abdominal pain with constipation previously. Differential includes intra-abdominal infection, perforated viscus, urinary tract infection with pyelonephritis, cellulitis right upper extremity. Empirically ordered vancomycin and Zosyn. Ordered CT scans. Placed a right internal jugular central line.    Data returned  with leukocytosis and bandemia. Has renal injury. Has electrolyte disturbances. Urinalysis is not terribly impressive, but she does have a 7 mm ureteral stone. I'm worried about obstructive pyelonephritis.    Consulted Dr. Killian, urology will see the patient. Plan is to go to the OR for stent.    Page the hospitalist for admission at 7 AM.    Patient is critically ill.    Patient referred to primary provider for blood pressure management    FINAL IMPRESSION  1. Septic shock  2. Ureteral stone, suspected obstructive pyelonephritis  3. Right internal jugular central venous catheter insertion by me    CRITICAL CARE TIME 40 minutes  There was a very real possibility of deterioration of the patient's condition.  This patient required the highest level of care.  I provided critical care services which included: review of the medical record, treatment orders, ordering and reviewing test results, frequent reevaluation of the patient's condition and response to treatment, as well as discussing the case with appropriate personnel and various consultants. The critical care time associated with the care of this patient is exclusive of any procedures or specific interventions.        This dictation was created using voice recognition software. The accuracy of the dictation is limited to the abilities of the software.  The nursing notes were reviewed and certain aspects of this information were incorporated into this note.      Electronically signed by: Jay Quezada, 2/13/2018 6:47 AM

## 2018-02-13 NOTE — H&P
Hospital Medicine History and Physical    Date of Service  2/13/2018    Chief Complaint  Chief Complaint   Patient presents with   • ALOC       History of Presenting Illness  56 yo female with pmh of brain aneurysm, chronic pain and CVA is coming today from OSH due to AMS she was transferred from [facility] ER patient was doing ok until Sunday as per , she went to OSH yesterday c/o abdominal pain was diagnosed with constipation kept for the night and since she was doing better she was d/c'ed home at home she started to get confused and with fever, she went back to hospital where she was found to be febrile 103 and hypotensive as per records she has received 5L of ivf bolus was diagnosed with UTI received levaquin and was transferred to [facility] for further treatment at this time she is still confused and most of the history comes from  ERP and records.   Here in the ER she had CT head that did not show any acute finding, her bp was in the 83/52 after bolus she got central line placed and she is started on iv levophed,  had CT abdomen that showed right ureter stone 7mm she was started on iv zosyn and [facility] urologist was consulted and patient is to go to OR for possible stent placement/stone removal, patient c/o right sided swelling, she has pascal cath, her bp is improved, still a bit tachy, no fever here.     Primary Care Physician  Pcp Pt States None    Consultants  urologist    Code Status  Full code    Review of Systems  Review of Systems   Unable to perform ROS: Acuity of condition          Past Medical History  Past Medical History:   Diagnosis Date   • Brain aneurysm    • Chronic pain    • Stroke (CMS-HCC)        Surgical History  Past Surgical History:   Procedure Laterality Date   • OTHER NEUROLOGICAL SURG         Medications  Duloxetine  Fentanyl patch  Gabapentin  Hydrocodone/acetaminophen    Family History  History reviewed. No pertinent family history.     Social History  Social History    Substance Use Topics   • Smoking status: Never Smoker   • Smokeless tobacco: Never Used   • Alcohol use No       Allergies  No Known Allergies     Physical Exam  Laboratory   Hemodynamics  Temp (24hrs), Av.2 °C (99 °F), Min:37.1 °C (98.8 °F), Max:37.3 °C (99.1 °F)   Temperature: 37.3 °C (99.1 °F), Monitored Temp: 37.2 °C (99 °F)  Pulse  Av.6  Min: 98  Max: 115 Heart Rate (Monitored): (!) 107  Blood Pressure: 131/77, NIBP: 117/71      Respiratory      Respiration: 16, Pulse Oximetry: 99 %     Work Of Breathing / Effort: Mild;Increased Work of Breathing       Physical Exam   Constitutional: She appears distressed.   HENT:   Head: Normocephalic.   Mouth/Throat: No oropharyngeal exudate.   Eyes: Conjunctivae are normal. No scleral icterus.   Neck: Neck supple. No JVD present.   Cardiovascular: Regular rhythm and normal heart sounds.    No murmur heard.  tachy   Pulmonary/Chest: Effort normal and breath sounds normal. No stridor. No respiratory distress. She has no wheezes.   diminished breaths sounds.    Abdominal: Soft. Bowel sounds are normal. She exhibits no distension. There is no tenderness. There is no rebound.   Musculoskeletal: Normal range of motion. She exhibits edema (right arm swelling. left arm stable. ). She exhibits no tenderness.   Lymphadenopathy:     She has no cervical adenopathy.   Neurological:   Awake.  Lethargic  Follows some commands  Left sided weakness old.   Left hand is constricted. Old.     Skin: She is diaphoretic. No erythema.   Psychiatric:   Can't eval due to acute illness.    Nursing note and vitals reviewed.      Recent Labs      18   0412   WBC  24.1*   RBC  3.67*   HEMOGLOBIN  10.9*   HEMATOCRIT  31.7*   MCV  86.4   MCH  29.7   MCHC  34.4   RDW  46.5   PLATELETCT  185   MPV  11.6     Recent Labs      18   0412   SODIUM  139   POTASSIUM  2.8*   CHLORIDE  110   CO2  17*   GLUCOSE  84   BUN  26*   CREATININE  1.56*   CALCIUM  7.1*     Recent Labs      18    0412   ALTSGPT  18   ASTSGOT  40   ALKPHOSPHAT  116*   TBILIRUBIN  0.5   GLUCOSE  84                 No results found for: TROPONINI    Imaging  CT head no acute findings  CT abdomen 7 mm right side ureter stone.    Assessment/Plan     I anticipate this patient will require at least two midnights for appropriate medical management, necessitating inpatient admission.    * Septic shock (CMS-HCC)- (present on admission)   Assessment & Plan    Patient with possible septic shock she was started on levophed in the ER with 150cc/h ivf started on broad spectrum iv atb zosyn and vanco, patient will be taken to OR for cystoscopy and stone removal/stent placement by urologist admit to ICU, f/u urine, blood cx. Lactic acid is trending down.         Encephalopathy acute- (present on admission)   Assessment & Plan    Metabolic/toxic encephalopathy due to dehydration and sepsis, CT head is neg, continue treatment for sepsis and dehydration.         Metabolic acidosis- (present on admission)   Assessment & Plan    Due to sepsis, mild lactic acid elevation continue trending lactic acid levels.         CHERYL (acute kidney injury) (CMS-HCC)- (present on admission)   Assessment & Plan    Due to dehydration and sepsis continue ivf and f/u bmp strict I&O'S.         Hyponatremia- (present on admission)   Assessment & Plan    Possible due to dehydration continue ivf for now.         Arm swelling- (present on admission)   Assessment & Plan    Right, will get US to assess for DVT.         Hydronephrosis with urinary obstruction due to renal calculus- (present on admission)   Assessment & Plan    7mm right sided ureter stone, OR for stent placement/stone removal. Urologist consulted.         Ureteral stone- (present on admission)   Assessment & Plan    Going to OR for stone removal/stent placement urologist consulted by ERP.         Pyelonephritis- (present on admission)   Assessment & Plan    Has leukocytosis of 24,000, CT abdomen showed  severe hydronephrosis with obstructive right sided ureter stone. On zosyn and vanco for now.             VTE prophylaxis: heparin.

## 2018-02-13 NOTE — OR SURGEON
Immediate Post OP Note    PreOp Diagnosis: Sepsis, right hydronephrosis, 7 mm obstructing right midureteral stone    PostOp Diagnosis: Same and pyonephrosis right kidney     Procedure(s):  CYSTOSCOPY STENT PLACEMENT - Wound Class: Dirty or Infected    Surgeon(s):  Monico Killian M.D.    Anesthesiologist/Type of Anesthesia:  Anesthesiologist: Steve Hanna M.D./General    Surgical Staff:  Circulator: Madhavi Garcia R.N.  Scrub Person: Lubna Bridges    Specimens:  * No specimens in log *    Estimated Blood Loss: 0    Findings: Pus draining from the right kidney post stent placement.    Complications: none        2/13/2018 8:40 AM Monico Killian

## 2018-02-13 NOTE — ED TRIAGE NOTES
Pt transferred from [facility] ER with reports of ALOC since Sunday. Pt sent with possible sepis. Pt dx'd with UTI tonight, not tx'd. Pt reportedly febrile, tachy, tachypenic, and ALOC. Redness and swelling noted to r arm as well. Pt has hx of cva. Last known well Sunday at 1300 per  at bedside. Pt given 5L ns bolus pta. Pt still hypotensive on arrival.

## 2018-02-13 NOTE — ED NOTES
Pt report to francesca godinez at this time in surgery. Pt to go to or for cystoscopy and kidney stone removal. Surgery will send transport.

## 2018-02-13 NOTE — CARE PLAN
Problem: Infection  Goal: Will remain free from infection  Outcome: PROGRESSING SLOWER THAN EXPECTED  Pt on continuous Zosyn for UTI    Problem: Skin Integrity  Goal: Risk for impaired skin integrity will decrease  Outcome: PROGRESSING AS EXPECTED  Pt repositioned Q2 hours. Skin assessed every shift. Pillows used to float heels.

## 2018-02-13 NOTE — PROGRESS NOTES
Med rec partially comp[lete per  bedside.   cannot recall 2 missing medications as well as strengths of other medications.   I will contact [facility] when they open  Allergies reviewed

## 2018-02-14 PROBLEM — D72.829 LEUKOCYTOSIS: Status: ACTIVE | Noted: 2018-02-14

## 2018-02-14 PROBLEM — N20.1 URETERAL STONE: Status: RESOLVED | Noted: 2018-02-13 | Resolved: 2018-02-14

## 2018-02-14 PROBLEM — R78.81 GRAM-NEGATIVE BACTEREMIA: Status: ACTIVE | Noted: 2018-02-14

## 2018-02-14 PROBLEM — G89.29 CHRONIC PAIN: Status: ACTIVE | Noted: 2018-02-14

## 2018-02-14 PROBLEM — D69.6 THROMBOCYTOPENIA (HCC): Status: ACTIVE | Noted: 2018-02-14

## 2018-02-14 PROBLEM — Z86.73 HISTORY OF COMPLETED STROKE: Status: ACTIVE | Noted: 2018-02-14

## 2018-02-14 LAB
ANION GAP SERPL CALC-SCNC: 9 MMOL/L (ref 0–11.9)
BUN SERPL-MCNC: 21 MG/DL (ref 8–22)
CALCIUM SERPL-MCNC: 7.9 MG/DL (ref 8.5–10.5)
CHLORIDE SERPL-SCNC: 116 MMOL/L (ref 96–112)
CHOLEST SERPL-MCNC: 110 MG/DL (ref 100–199)
CO2 SERPL-SCNC: 18 MMOL/L (ref 20–33)
CREAT SERPL-MCNC: 0.88 MG/DL (ref 0.5–1.4)
EKG IMPRESSION: NORMAL
ERYTHROCYTE [DISTWIDTH] IN BLOOD BY AUTOMATED COUNT: 50.3 FL (ref 35.9–50)
EST. AVERAGE GLUCOSE BLD GHB EST-MCNC: 131 MG/DL
GLUCOSE SERPL-MCNC: 86 MG/DL (ref 65–99)
HBA1C MFR BLD: 6.2 % (ref 0–5.6)
HCT VFR BLD AUTO: 34.2 % (ref 37–47)
HDLC SERPL-MCNC: 6 MG/DL
HGB BLD-MCNC: 11.4 G/DL (ref 12–16)
LDLC SERPL CALC-MCNC: 70 MG/DL
MAGNESIUM SERPL-MCNC: 1.8 MG/DL (ref 1.5–2.5)
MCH RBC QN AUTO: 29.2 PG (ref 27–33)
MCHC RBC AUTO-ENTMCNC: 33.3 G/DL (ref 33.6–35)
MCV RBC AUTO: 87.5 FL (ref 81.4–97.8)
PLATELET # BLD AUTO: 150 K/UL (ref 164–446)
PMV BLD AUTO: 11.7 FL (ref 9–12.9)
POTASSIUM SERPL-SCNC: 3.6 MMOL/L (ref 3.6–5.5)
RBC # BLD AUTO: 3.91 M/UL (ref 4.2–5.4)
SODIUM SERPL-SCNC: 143 MMOL/L (ref 135–145)
TRIGL SERPL-MCNC: 171 MG/DL (ref 0–149)
VANCOMYCIN SERPL-MCNC: 26.8 UG/ML
WBC # BLD AUTO: 32.4 K/UL (ref 4.8–10.8)

## 2018-02-14 PROCEDURE — G8988 SELF CARE GOAL STATUS: HCPCS | Mod: CK

## 2018-02-14 PROCEDURE — 97162 PT EVAL MOD COMPLEX 30 MIN: CPT

## 2018-02-14 PROCEDURE — 83036 HEMOGLOBIN GLYCOSYLATED A1C: CPT

## 2018-02-14 PROCEDURE — 97167 OT EVAL HIGH COMPLEX 60 MIN: CPT

## 2018-02-14 PROCEDURE — 92610 EVALUATE SWALLOWING FUNCTION: CPT

## 2018-02-14 PROCEDURE — G8987 SELF CARE CURRENT STATUS: HCPCS | Mod: CL

## 2018-02-14 PROCEDURE — 99233 SBSQ HOSP IP/OBS HIGH 50: CPT | Performed by: HOSPITALIST

## 2018-02-14 PROCEDURE — G8979 MOBILITY GOAL STATUS: HCPCS | Mod: CJ

## 2018-02-14 PROCEDURE — 700111 HCHG RX REV CODE 636 W/ 250 OVERRIDE (IP): Performed by: INTERNAL MEDICINE

## 2018-02-14 PROCEDURE — G8996 SWALLOW CURRENT STATUS: HCPCS | Mod: CL

## 2018-02-14 PROCEDURE — 85027 COMPLETE CBC AUTOMATED: CPT

## 2018-02-14 PROCEDURE — A6250 SKIN SEAL PROTECT MOISTURIZR: HCPCS | Performed by: HOSPITALIST

## 2018-02-14 PROCEDURE — 83735 ASSAY OF MAGNESIUM: CPT

## 2018-02-14 PROCEDURE — 93005 ELECTROCARDIOGRAM TRACING: CPT | Performed by: INTERNAL MEDICINE

## 2018-02-14 PROCEDURE — 700111 HCHG RX REV CODE 636 W/ 250 OVERRIDE (IP): Performed by: HOSPITALIST

## 2018-02-14 PROCEDURE — 700105 HCHG RX REV CODE 258: Performed by: HOSPITALIST

## 2018-02-14 PROCEDURE — 770022 HCHG ROOM/CARE - ICU (200)

## 2018-02-14 PROCEDURE — G8997 SWALLOW GOAL STATUS: HCPCS | Mod: CJ

## 2018-02-14 PROCEDURE — 80048 BASIC METABOLIC PNL TOTAL CA: CPT

## 2018-02-14 PROCEDURE — 80061 LIPID PANEL: CPT

## 2018-02-14 PROCEDURE — 80202 ASSAY OF VANCOMYCIN: CPT

## 2018-02-14 PROCEDURE — G8978 MOBILITY CURRENT STATUS: HCPCS | Mod: CM

## 2018-02-14 PROCEDURE — 93010 ELECTROCARDIOGRAM REPORT: CPT | Performed by: INTERNAL MEDICINE

## 2018-02-14 RX ORDER — GABAPENTIN 300 MG/1
300 CAPSULE ORAL 3 TIMES DAILY
Status: DISCONTINUED | OUTPATIENT
Start: 2018-02-14 | End: 2018-02-23

## 2018-02-14 RX ORDER — MAGNESIUM SULFATE HEPTAHYDRATE 40 MG/ML
2 INJECTION, SOLUTION INTRAVENOUS ONCE
Status: COMPLETED | OUTPATIENT
Start: 2018-02-14 | End: 2018-02-14

## 2018-02-14 RX ADMIN — ENOXAPARIN SODIUM 40 MG: 100 INJECTION SUBCUTANEOUS at 08:57

## 2018-02-14 RX ADMIN — MAGNESIUM SULFATE IN WATER 2 G: 40 INJECTION, SOLUTION INTRAVENOUS at 08:57

## 2018-02-14 RX ADMIN — MORPHINE SULFATE 2 MG: 4 INJECTION INTRAVENOUS at 19:37

## 2018-02-14 RX ADMIN — SODIUM CHLORIDE: 9 INJECTION, SOLUTION INTRAVENOUS at 21:27

## 2018-02-14 RX ADMIN — SODIUM CHLORIDE: 9 INJECTION, SOLUTION INTRAVENOUS at 08:53

## 2018-02-14 RX ADMIN — MORPHINE SULFATE 2 MG: 4 INJECTION INTRAVENOUS at 10:08

## 2018-02-14 RX ADMIN — SODIUM CHLORIDE: 9 INJECTION, SOLUTION INTRAVENOUS at 10:50

## 2018-02-14 ASSESSMENT — ACTIVITIES OF DAILY LIVING (ADL): TOILETING: UNABLE TO DETERMINE AT THIS TIME

## 2018-02-14 ASSESSMENT — COGNITIVE AND FUNCTIONAL STATUS - GENERAL
SUGGESTED CMS G CODE MODIFIER MOBILITY: CN
EATING MEALS: A LITTLE
DRESSING REGULAR UPPER BODY CLOTHING: A LOT
SUGGESTED CMS G CODE MODIFIER DAILY ACTIVITY: CL
TURNING FROM BACK TO SIDE WHILE IN FLAT BAD: UNABLE
STANDING UP FROM CHAIR USING ARMS: TOTAL
MOVING FROM LYING ON BACK TO SITTING ON SIDE OF FLAT BED: UNABLE
WALKING IN HOSPITAL ROOM: TOTAL
CLIMB 3 TO 5 STEPS WITH RAILING: TOTAL
TOILETING: A LOT
DAILY ACTIVITIY SCORE: 13
DRESSING REGULAR LOWER BODY CLOTHING: TOTAL
MOBILITY SCORE: 6
HELP NEEDED FOR BATHING: A LOT
MOVING TO AND FROM BED TO CHAIR: UNABLE
PERSONAL GROOMING: A LITTLE

## 2018-02-14 ASSESSMENT — PAIN SCALES - GENERAL
PAINLEVEL_OUTOF10: 0

## 2018-02-14 ASSESSMENT — GAIT ASSESSMENTS: GAIT LEVEL OF ASSIST: UNABLE TO PARTICIPATE

## 2018-02-14 ASSESSMENT — PATIENT HEALTH QUESTIONNAIRE - PHQ9
SUM OF ALL RESPONSES TO PHQ9 QUESTIONS 1 AND 2: 0
1. LITTLE INTEREST OR PLEASURE IN DOING THINGS: NOT AT ALL
2. FEELING DOWN, DEPRESSED, IRRITABLE, OR HOPELESS: NOT AT ALL
SUM OF ALL RESPONSES TO PHQ QUESTIONS 1-9: 0

## 2018-02-14 NOTE — THERAPY
"Physical Therapy Evaluation completed.   Bed Mobility:  Supine to Sit: Maximal Assist x2  Transfers: Sit to Stand: Unable to Participate  Gait: Level Of Assist: Unable to Participate with No Equipment Needed       Plan of Care: Will benefit from Physical Therapy 3 times per week  Discharge Recommendations: Equipment: Will Continue to Assess for Equipment Needs. Post-acute therapy Discharge to a transitional care facility for continued skilled therapy services.    Pt presents with decreased functional mobility most related to R sided deficits from old CVA including hypotonic R LE/UE. She was able to maintain upright, unsupported sitting at SBA but duration was limited by onset of pain in neck. Pt inconsistently followed single step commands and appeared to struggle with open-ended questions. She will benefit from further acute skilled PT services to improve functional mobility and may require placement pending her progression and determination of prior level.     See \"Rehab Therapy-Acute\" Patient Summary Report for complete documentation.     "

## 2018-02-14 NOTE — PROGRESS NOTES
Brent from [facility] called with a positive blood culture for a gram negative rods.  Gram negative rods repeated back to Brent.  Night shift RN informed of positive results.

## 2018-02-14 NOTE — THERAPY
"Speech Language Therapy Clinical Swallow Evaluation completed.    Functional Status: Patient was seen on this date for a clinical swallow evaluation following PT/OT evaluation. Per RN, family reporting patient's current mentation is not baseline. Upon entry, patient grimacing and but was unable to specify pain level. RN was notified. Patient was AAO to self only. Speech whispered in quality and unable to phonate with cue. She followed most directives for oral motor examination which revealed reduced lingual and labial coordination. Volitional cough was weak. PO trials of ice chips, NTL (via tsp and cup), and 5 small tastes of pudding (2/2 oral aversion) were given this session. Patient presented with s/sx consistent with moderate oropharyngeal dysphagia as seen by oral holding/delayed initiation of swallow of up to 5 seconds with NTL and up to 22 seconds with pureed textures and 2-3 swallows to clear bolus. Minimal amounts of PO were given 2/2 patient declining further trials. Patient intermittently closing eyes during session requiring verbal cues x4 to maintain wakefulness.     Recommendations - Diet: At this time, patient is presenting with s/sx consistent with moderate oropharyngeal dysphagia and is at high risk for aspiration 2/2 AMS and lethargy. There is also concern patient would meet nutritional needs via strict PO diet given limited participation this session. Recommend continue NPO with reassessment tomorrow, if appropriate. OK for 3-5 single ice chips an hour to reduce xerostomia.                             Strategies: To be determined                             Medication Administration: Non-oral source     Plan of Care: Will benefit from Speech Therapy 3 times per week    Post-Acute Therapy: Discharge to a transitional care facility for continued skilled therapy services.    See \"Rehab Therapy-Acute\" Patient Summary Report for complete documentation. Thank you for the consult.         "

## 2018-02-14 NOTE — ASSESSMENT & PLAN NOTE
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  Chronic and stable.  PT ongoing.  SLP evaluation for dietary changes.

## 2018-02-14 NOTE — THERAPY
"Occupational Therapy Evaluation completed.   Functional Status:  Max A for supine<>Sit, Min A for grooming while seated, limited awareness of R side   Plan of Care: Will benefit from Occupational Therapy 3 times per week  Discharge Recommendations:  Equipment: Will Continue to Assess for Equipment Needs. Post-acute therapy: See assessment below.     See \"Rehab Therapy-Acute\" Patient Summary Report for complete documentation.    57 y.o. Female admitted for sepsis PMH includes previous brain aneurysm with hemiplegia in 1994 and chronic pain. Pt. Was unable to provide database at this time, unclear what PLOF was prior to admission. Will continue to monitor and work with discharge planning team as pt progresses.          "

## 2018-02-14 NOTE — CARE PLAN
"Problem: Pain Management  Goal: Pain level will decrease to patient's comfort goal  Outcome: PROGRESSING AS EXPECTED  Used nurse pain scale, patient shook head \"no\" when asked if in pain, non pharm measures in place, will medicate per MAR/orders  if needed.     Problem: Safety  Goal: Will remain free from injury  Outcome: PROGRESSING AS EXPECTED  Bed alarm on, bed in lowest, locked position, call light within reach, educated on fall risk and interventions.     Problem: Venous Thromboembolism (VTW)/Deep Vein Thrombosis (DVT) Prevention:  Goal: Patient will participate in Venous Thrombosis (VTE)/Deep Vein Thrombosis (DVT)Prevention Measures  Outcome: PROGRESSING AS EXPECTED  scds on/in use, vte risk documented, ROM performed, will assist with mobility.       "

## 2018-02-14 NOTE — CONSULTS
Pulmonary & Critical Care Consult Note    DATE OF CONSULTATION:  2/13/2018     REFERRING PHYSICIAN:  Jairo Silva M.D.     CONSULTANT:  David Pillai DO     REASON FOR CONSULTATION:  Septic shock     HISTORY OF PRESENT ILLNESS: Ms. Finch is a 57-year-old female with past medical history brain aneurysm, stroke, chronic pain who was admitted on 2/13/18 from an outside hospital for a urinary tract infection and septic shock. A CT of her abdomen demonstrated a 7 mm right ureteral calculus with severe obstructing hydro-nephrosis. She was taken to the OR for cystoscopy with stent placement, intraoperatively was found to have a large amount of purulent fluid draining from the right kidney and was hypotensive requiring vasopressors. She arrives in the ICU on vasopressors and critically ill. At this time she complains of only flank pain however is somewhat confused.     PAST MEDICAL HISTORY:  Past Medical History:   Diagnosis Date   • Brain aneurysm    • Chronic pain    • Stroke (CMS-HCC)         PAST SURGICAL HISTORY:   Past Surgical History:   Procedure Laterality Date   • CYSTOSCOPY STENT PLACEMENT  2/13/2018    Procedure: CYSTOSCOPY STENT PLACEMENT;  Surgeon: Monico Killian M.D.;  Location: SURGERY [facility];  Service: Urology   • OTHER NEUROLOGICAL SURG          ALLERGIES:  Patient has no known allergies.     MEDICATIONS PRIOR TO ADMISSION:  No current facility-administered medications on file prior to encounter.      No current outpatient prescriptions on file prior to encounter.       SOCIAL HISTORY:   Social History     Social History   • Marital status:      Spouse name: N/A   • Number of children: N/A   • Years of education: N/A     Occupational History   • Not on file.     Social History Main Topics   • Smoking status: Never Smoker   • Smokeless tobacco: Never Used   • Alcohol use No   • Drug use: No   • Sexual activity: Not on file     Other Topics Concern   • Not on file     Social History  "Narrative   • No narrative on file       FAMILY HISTORY:  History reviewed. No pertinent family history.     REVIEW OF SYSTEMS:   Limited secondary to confusion and acuity of condition     PHYSICAL EXAMINATION:  /77   Pulse 83   Temp 37.2 °C (99 °F)   Resp 14   Ht 1.727 m (5' 8\")   Wt 98.6 kg (217 lb 6 oz)   SpO2 96%   Breastfeeding? No   BMI 33.05 kg/m²   GENERAL: Well-nourished, well-developed, age-appropriate appearing female in obvious distress  HEENT: Normocephalic, atraumatic, PERRL, EOMI, external ears normal, external nose normal, dry mucous membranes.  NECK: Supple, no JVD  PULM: Clear to auscultation bilaterally  CVS: Regular rate and rhythm  ABDOMEN: Soft, mildly tender, bowel sounds normal  EXTREMITIES: Left upper extremity contracture, bilateral upper extremity edema.  SKIN: Warm, pink, diaphoretic  NEURO: Somnolent, confused    LABORATORY DATA:    Lab Results   Component Value Date/Time    WBC 24.1 (H) 02/13/2018 04:12 AM    RBC 3.67 (L) 02/13/2018 04:12 AM    HEMOGLOBIN 10.9 (L) 02/13/2018 04:12 AM    HEMATOCRIT 31.7 (L) 02/13/2018 04:12 AM    MCV 86.4 02/13/2018 04:12 AM    MCH 29.7 02/13/2018 04:12 AM    MCHC 34.4 02/13/2018 04:12 AM    MPV 11.6 02/13/2018 04:12 AM    NEUTSPOLYS 64.40 02/13/2018 04:12 AM    LYMPHOCYTES 7.00 (L) 02/13/2018 04:12 AM    MONOCYTES 4.30 02/13/2018 04:12 AM    EOSINOPHILS 0.00 02/13/2018 04:12 AM    BASOPHILS 0.00 02/13/2018 04:12 AM      Lab Results   Component Value Date/Time    SODIUM 139 02/13/2018 04:12 AM    POTASSIUM 2.8 (L) 02/13/2018 04:12 AM    CHLORIDE 110 02/13/2018 04:12 AM    CO2 17 (L) 02/13/2018 04:12 AM    GLUCOSE 84 02/13/2018 04:12 AM    BUN 26 (H) 02/13/2018 04:12 AM    CREATININE 1.56 (H) 02/13/2018 04:12 AM      No results found for: PROTHROMBTM, INR      IMAGING:   CXR (personally reviewed)  UE VENOUS DUPLEX         DX-PORTABLE FLUOROSCOPY < 1 HOUR Is the patient pregnant? No   Final Result      Intraoperative image as above described. "      CT-ABDOMEN-PELVIS W/O   Final Result         1. Obstructing 7 mm calculus in the mid right ureter with severe right hydronephrosis.      2. Postsurgical change from gastric bypass. Hiatal hernia.      3. Small right pleural effusion. Bibasilar opacities, likely atelectasis.      CT-HEAD W/O   Final Result         1. No evidence of acute intracranial hemorrhage or mass lesion.      2. Postsurgical change and encephalomalacia in the left frontal lobe.         DX-CHEST-PORTABLE (1 VIEW)   Final Result         Right central venous catheter with tip in the right atrium.      DX-CHEST-PORTABLE (1 VIEW)   Final Result         1. Mild diffuse interstitial prominence could relate to pulmonary edema or viral infection.         ASSESSMENT/PLAN:  Septic shock   - sepsis protocol   - source targeted antibiotics: zosyn, vancomycin. source controlled by urology   - 30 mL/kg crystalloid bolus provided   - PRN IVF bolus to maintain MAP >65 mmHg   - Pressors if needed to maintain MAP >65 mmHg   - blood, respiratory and urine cultures   - lactate every 4 hours until normalized or downtrending    Gram-negative ever Bacteremia   - Urinary source, continue antibiotics    Obstructing right ureteral calculus   - Status post stent placement by urology   - monitor renal function    Pyelonephritis   - Continue antibiotics    Acute encephalopathy   - Likely toxic metabolic given sepsis   - CT of the brain negative    Acute injury   - Center to pyelonephritis and obstructive uropathy   - IVF   - renal dose meds, avoid nephrotoxins    Lactic acidosis/anion gap metabolic acidosis   - Continue crystalloid fluid resuscitation    Anemia   - Mild, monitor    Mild Protein calorie malnutrition   - RD consult    Prophylaxis: SCDs    Patient is critically ill at this time.  I have spent 40 minutes examining this patient, all lab data, x-ray, and discussion with RN, RT, hospitalist. Critical care time: 40 min. No time overlap. Procedures not included  in time. Thank you for asking me to consult on the patient.  I appreciate the opportunity to assist in their care and will follow along closely with you.    David Pillai, DO  Critical Care Medicine    This dictation was created using voice recognition software. The accuracy of the dictation is limited to the abilities of the software. Errors of grammar and possibly content are to be expected.

## 2018-02-14 NOTE — CARE PLAN
Problem: Pain Management  Goal: Pain level will decrease to patient's comfort goal    Intervention: Follow pain managment plan developed in collaboration with patient and Interdisciplinary Team  Patient pain assessed Qhour and as needed. Patient repositioned and medicated per Dr order. Will continue to monitor.      Problem: Mobility  Goal: Risk for activity intolerance will decrease    Intervention: Assess and monitor signs of activity intolerance  Pt has right sided weakness. PT/OT worked with her today for mobility. Pain management needed for mobility.

## 2018-02-14 NOTE — PROGRESS NOTES
[facility] Hospitalist Progress Note    Date of Service: 2018    Chief Complaint  57 y.o. female admitted 2018 with altered mental status    Interval Problem Update  Ms. Finch has a history of brain aneurysm with hemiplegia since  that was found to have a fever of 103 and UTI with hydronephrosis. She was transferred from [facility] to [facility] and underwent cystoscopy and ureteral stent placement by Dr. Killian. She has been admitted to the ICU for IV fluids and IV antibiotics. Her IV pressors have been turned off. Her  is at bedside and we discussed her condition including her home situation. Her nurse notes that she had a short run of tachycardia 150-200 range that was asymptomatic. She had a swallow eval and did not pass.  Rhythm strips reviewed.  Consultants/Specialty  Critical Care. I discussed her condition with Dr. Pillai today.    Disposition  ICU        Review of Systems   Unable to perform ROS: Mental acuity      Physical Exam  Laboratory/Imaging   Hemodynamics  No data recorded.   Monitored Temp: 36.2 °C (97.2 °F)  Pulse  Av.4  Min: 65  Max: 121 Heart Rate (Monitored): 86  Arterial BP: 138/113, NIBP: 145/89      Respiratory      Respiration: (!) 11, Pulse Oximetry: 92 %, O2 Daily Delivery Respiratory : Room Air with O2 Available     Work Of Breathing / Effort: Mild;Shallow       Fluids    Intake/Output Summary (Last 24 hours) at 18 1312  Last data filed at 18 1200   Gross per 24 hour   Intake          3445.45 ml   Output             2100 ml   Net          1345.45 ml       Nutrition  Orders Placed This Encounter   Procedures   • DIET NPO     Standing Status:   Standing     Number of Occurrences:   1     Order Specific Question:   Restrict to:     Answer:   Strict [1]     Comments:   pending swallow eval     Physical Exam   Constitutional: No distress.   HENT:   Head: Normocephalic and atraumatic.   Eyes: No scleral icterus.   Neck:   Right IJ central line   Cardiovascular:  Normal rate and regular rhythm.    No murmur heard.  Pulmonary/Chest: Effort normal. No respiratory distress. She has no wheezes.   Abdominal: Soft. She exhibits no distension.   Genitourinary:   Genitourinary Comments: Olmedo catheter   Musculoskeletal: She exhibits edema.   Right arm swelling   Neurological:   Right upper arm paralysis. Slight movement of the right foot.   Skin: Skin is warm and dry. She is not diaphoretic. There is pallor.   Nursing note and vitals reviewed.      Recent Labs      02/13/18 0412  02/14/18   0445   WBC  24.1*  32.4*   RBC  3.67*  3.91*   HEMOGLOBIN  10.9*  11.4*   HEMATOCRIT  31.7*  34.2*   MCV  86.4  87.5   MCH  29.7  29.2   MCHC  34.4  33.3*   RDW  46.5  50.3*   PLATELETCT  185  150*   MPV  11.6  11.7     Recent Labs      02/13/18   0412  02/14/18   0445   SODIUM  139  143   POTASSIUM  2.8*  3.6   CHLORIDE  110  116*   CO2  17*  18*   GLUCOSE  84  86   BUN  26*  21   CREATININE  1.56*  0.88   CALCIUM  7.1*  7.9*             Recent Labs      02/14/18   0445   TRIGLYCERIDE  171*   HDL  6*   LDL  70          Assessment/Plan     * Septic shock (CMS-HCC)- (present on admission)   Assessment & Plan    The source of the septic shock is pyelonephritis.  Aggressive IV fluids and required IV Levophed    Blood cultures are positive for gram negative rods.  The organ system failure associated with this disease process is the cardiovascular system as is evidenced by the need for intravenous pressors.        Hydronephrosis with urinary obstruction due to renal calculus- (present on admission)   Assessment & Plan    7mm right sided ureter stone s/p removal 2/13         Pyelonephritis- (present on admission)   Assessment & Plan    Has leukocytosis of 24,000 on admit and up to 32 today  CT abdomen showed severe hydronephrosis with obstructive right sided ureter stone. On zosyn and vanco for now.         History of completed stroke- (present on admission)   Assessment & Plan    Hx brain aneurysm  in 1994 with resultant right hemiparesis        Chronic pain- (present on admission)   Assessment & Plan    Restarted home meds.        Encephalopathy acute- (present on admission)   Assessment & Plan    Metabolic/toxic encephalopathy due to dehydration and sepsis, CT head is neg, continue treatment for sepsis and dehydration.         Metabolic acidosis- (present on admission)   Assessment & Plan    Due to sepsis, mild lactic acid elevation continue trending lactic acid levels.         CHERYL (acute kidney injury) (CMS-HCC)- (present on admission)   Assessment & Plan    Due to dehydration and sepsis   IV fluids  BMP for the morning ordered.        Hyponatremia- (present on admission)   Assessment & Plan    IV fluids        Arm swelling- (present on admission)   Assessment & Plan    U/s negative for DVT.           Quality-Core Measures   Reviewed items::  Labs reviewed and Medications reviewed  Olmedo catheter::  Urinary Tract Retention or Urinary Tract Obstruction  DVT prophylaxis pharmacological::  Enoxaparin (Lovenox)

## 2018-02-14 NOTE — PROGRESS NOTES
"Pulmonary Critical Care Progress Note        Chief Complaint: sepsis    History of Present Illness: \"Ms. Finch is a 57-year-old female with past medical history brain aneurysm, stroke, chronic pain who was admitted on 2/13/18 from an outside hospital for a urinary tract infection and septic shock. A CT of her abdomen demonstrated a 7 mm right ureteral calculus with severe obstructing hydro-nephrosis. She was taken to the OR for cystoscopy with stent placement, intraoperatively was found to have a large amount of purulent fluid draining from the right kidney and was hypotensive requiring vasopressors. She arrives in the ICU on vasopressors and critically ill. At this time she complains of only flank pain however is somewhat confused.\"     Review of Systems   Unable to perform ROS: Mental acuity       Interval Events:  24 hour interval history reviewed   - no acute events overnight   - Neuro: somnolent and repeatative   - HR: 80s-90s   - BP: levo off   - GI: Speech pending   - UOP: 900 mL overnight   - Olmedo: yes   - Tm: 38   - Lines: CVC, PIV   - PPx: GI not indicated, DVT SCDs    PFSH:  No change.    Physical Exam   Constitutional: She is well-developed, well-nourished, and in no distress. No distress.   HENT:   Head: Normocephalic and atraumatic.   Right Ear: External ear normal.   Left Ear: External ear normal.   Mouth/Throat: Oropharynx is clear and moist.   Eyes: Conjunctivae and EOM are normal. Pupils are equal, round, and reactive to light.   Neck: Neck supple. No JVD present. No tracheal deviation present.   Cardiovascular: Normal rate, regular rhythm and normal heart sounds.    No murmur heard.  Pulmonary/Chest: Effort normal and breath sounds normal. No respiratory distress.   Abdominal: Soft. Bowel sounds are normal. She exhibits no distension. There is no tenderness.   Musculoskeletal: Normal range of motion. She exhibits no edema or tenderness.   contractured LUE   Neurological: She is alert. No cranial " nerve deficit. She exhibits normal muscle tone.   Confused, repetitively asks questions   Skin: Skin is warm and dry. No rash noted.   Psychiatric: Affect and judgment normal.   Nursing note and vitals reviewed.      Respiratory:     Pulse Oximetry: 93 %  Chest Tube Drains:  ImagingAvailable data reviewed   Recent Labs      02/13/18   0904   [id number]  7.26*   [id number]  30.9   [id number]  60.9*   [id number]  87.4*   ARTHCO3  14*   ARTBE  -12*       HemoDynamics:  Pulse: 84, Heart Rate (Monitored): 84  Arterial BP: (!) 252/250, NIBP: 131/74     Imaging: Available data reviewed        Neuro:  GCS Total [place] Coma Score: 13  Imaging: Available data reviewed    Fluids:  Intake/Output       02/12/18 0700 - 02/13/18 0659 (Not Admitted) 02/13/18 0700 - 02/14/18 0659 02/14/18 0700 - 02/15/18 0659      0271-7661 4728-9082 Total 0372-9939 8765-4284 Total 6660-9486 0129-5427 Total       Intake    P.O.  --  -- --  120  100 220  --  -- --    P.O. -- -- -- 120 100 220 -- -- --    I.V.  --  -- --  2391.9  1449.6 3841.5  --  -- --    Crystalloid Intake -- -- -- 800 -- 800 -- -- --    Norepinephrine Volume -- -- -- 96.3 -- 96.3 -- -- --    IV Piggyback Volume (IV Piggyback) -- -- -- 350 -- 350 -- -- --    IV Volume (IV Lactated Ringers) -- -- -- 200 -- 200 -- -- --    IV Volume (Zosyn) -- -- -- 145.6 249.6 395.2 -- -- --    IV Volume (NS) -- -- -- 800 1200 2000 -- -- --    Total Intake -- -- -- 2511.9 1549.6 4061.5 -- -- --       Output    Urine  --  -- --  [phone]  --  -- --    Indwelling Cathether -- -- -- 9886 562 2317 -- -- --    Total Output -- -- -- 9232 386 7293 -- -- --       Net I/O     -- -- -- 786.9 699.6 1486.5 -- -- --        Weight: 99 kg (218 lb 4.1 oz)  Recent Labs      02/13/18   0412  02/14/18   0445   SODIUM  139  143   POTASSIUM  2.8*  3.6   CHLORIDE  110  116*   CO2  17*  18*   BUN  26*  21   CREATININE  1.56*  0.88   MAGNESIUM   --   1.8   CALCIUM  7.1*  7.9*       GI/Nutrition:  Imaging: Available data  reviewed  taking PO  Liver Function  Recent Labs      18   ALTSGPT  18   --    ASTSGOT  40   --    ALKPHOSPHAT  116*   --    TBILIRUBIN  0.5   --    GLUCOSE  84  86       Heme:  Recent Labs      18   RBC  3.67*  3.91*   HEMOGLOBIN  10.9*  11.4*   HEMATOCRIT  31.7*  34.2*   PLATELETCT  185  150*       Infectious Disease:  Monitored Temp  Av.8 °C (98.3 °F)  Min: 36.3 °C (97.3 °F)  Max: 38 °C (100.4 °F)  Temp  Av.7 °C (98.1 °F)  Min: 36.2 °C (97.2 °F)  Max: 37.2 °C (99 °F)  Micro: antibiotics reviewed and cultures reviewed  Recent Labs      18   WBC  24.1*  32.4*   NEUTSPOLYS  64.40   --    LYMPHOCYTES  7.00*   --    MONOCYTES  4.30   --    EOSINOPHILS  0.00   --    BASOPHILS  0.00   --    ASTSGOT  40   --    ALTSGPT  18   --    ALKPHOSPHAT  116*   --    TBILIRUBIN  0.5   --      Current Facility-Administered Medications   Medication Dose Frequency Provider Last Rate Last Dose   • folic acid (FOLVITE) tablet 2 mg  2 mg DAILY Jairo Silva M.D.   Stopped at 18   • pregabalin (LYRICA) capsule 150 mg  150 mg BID Jairo Silva M.D.   150 mg at 18   • senna-docusate (PERICOLACE or SENOKOT S) 8.6-50 MG per tablet 2 Tab  2 Tab BID Jairo Silva M.D.   2 Tab at 18    And   • polyethylene glycol/lytes (MIRALAX) PACKET 1 Packet  1 Packet QDAY PRN Jairo Silva M.D.        And   • magnesium hydroxide (MILK OF MAGNESIA) suspension 30 mL  30 mL QDAY PRN Jairo Silva M.D.        And   • bisacodyl (DULCOLAX) suppository 10 mg  10 mg QDAY PRN Jairo Silva M.D.       • Respiratory Care per Protocol   Continuous RT Jairo Silva M.D.       • NS (BOLUS) infusion 500 mL  500 mL Once PRN Jairo Silva M.D.       • NS infusion   Continuous Jairo Silva M.D. 100 mL/hr at 18 0954     • ondansetron (ZOFRAN) syringe/vial injection 4  mg  4 mg Q4HRS PRN Jairo Silva M.D.   4 mg at 02/13/18 2152   • ondansetron (ZOFRAN ODT) dispertab 4 mg  4 mg Q4HRS PRN Jairo Silva M.D.       • promethazine (PHENERGAN) tablet 12.5-25 mg  12.5-25 mg Q4HRS PRN Jairo Silva M.D.       • promethazine (PHENERGAN) suppository 12.5-25 mg  12.5-25 mg Q4HRS PRN Jairo Silva M.D.       • prochlorperazine (COMPAZINE) injection 5-10 mg  5-10 mg Q4HRS PRN Jairo Silva M.D.       • FLUoxetine (PROZAC) capsule 20 mg  20 mg DAILY Jairo Silva M.D.   Stopped at 02/13/18 0945   • DULoxetine (CYMBALTA) capsule 60 mg  60 mg BID Jairo Silva M.D.   60 mg at 02/13/18 2101   • piperacillin-tazobactam (ZOSYN) 10.125 g in  mL infusion   Continuous Abx Jairo Silva M.D. 20.83 mL/hr at 02/13/18 1115     • MD ALERT... vancomycin per pharmacy protocol   pharmacy to dose Jairo Silva M.D.       • morphine (pf) 4 mg/ml injection 2 mg  2 mg Q3HRS PRN Jairo Silva M.D.   2 mg at 02/13/18 2124   • menthol (HALLS) lozenge 1 Lozenge  1 Lozenge Q HOUR PRN David Pillai Jr., D.O.       • sore throat spray (CHLORASEPTIC) 1 Spray  1 Spray Q HOUR PRN David Pillai Jr., D.O.         Last reviewed on 2/13/2018  8:37 AM by Jesus Alberto Diehl    Quality  Measures:  Core Measures & Quality Metrics    Problems/Plan:  Septic shock              - GNR - urinary source              - source targeted antibiotics: zosyn. source controlled by urology              - Pressors if needed to maintain MAP >65 mmHg              - blood, respiratory and urine cultures     Gram-negative ever Bacteremia              - Urinary source, continue antibiotics   - pending sensitivity     Obstructing right ureteral calculus              - Status post stent placement by urology              - monitor renal function     Pyelonephritis              - Continue antibiotics     Acute encephalopathy              - Likely toxic metabolic  given sepsis              - CT of the brain negative     Acute injury              - Secondary to pyelonephritis and obstructive uropathy              - resolved     Lactic acidosis/anion gap metabolic acidosis              - resolved     Anemia              - Mild, monitor     Mild Protein calorie malnutrition              - RD consult     Prophylaxis: SCDs    Discussed patient condition and risk of morbidity and/or mortality with Family, RN, Pharmacy, Charge nurse / hot rounds, Patient and hospitalist.    The patient remains critically ill.  Critical care time = 32 minutes in directly providing and coordinating critical care and extensive data review.  No time overlap and excludes procedures.

## 2018-02-15 ENCOUNTER — APPOINTMENT (OUTPATIENT)
Dept: RADIOLOGY | Facility: MEDICAL CENTER | Age: 58
DRG: 853 | End: 2018-02-15
Attending: INTERNAL MEDICINE
Payer: MEDICARE

## 2018-02-15 LAB
ANION GAP SERPL CALC-SCNC: 11 MMOL/L (ref 0–11.9)
BACTERIA BLD CULT: ABNORMAL
BACTERIA UR CULT: NORMAL
BASOPHILS # BLD AUTO: 0 % (ref 0–1.8)
BASOPHILS # BLD: 0 K/UL (ref 0–0.12)
BUN SERPL-MCNC: 18 MG/DL (ref 8–22)
CALCIUM SERPL-MCNC: 8.1 MG/DL (ref 8.5–10.5)
CHLORIDE SERPL-SCNC: 117 MMOL/L (ref 96–112)
CO2 SERPL-SCNC: 16 MMOL/L (ref 20–33)
CREAT SERPL-MCNC: 0.53 MG/DL (ref 0.5–1.4)
EOSINOPHIL # BLD AUTO: 0 K/UL (ref 0–0.51)
EOSINOPHIL NFR BLD: 0 % (ref 0–6.9)
ERYTHROCYTE [DISTWIDTH] IN BLOOD BY AUTOMATED COUNT: 49.9 FL (ref 35.9–50)
GLUCOSE SERPL-MCNC: 83 MG/DL (ref 65–99)
HCT VFR BLD AUTO: 31.6 % (ref 37–47)
HGB BLD-MCNC: 11.1 G/DL (ref 12–16)
LYMPHOCYTES # BLD AUTO: 0.86 K/UL (ref 1–4.8)
LYMPHOCYTES NFR BLD: 3.5 % (ref 22–41)
MANUAL DIFF BLD: NORMAL
MCH RBC QN AUTO: 30.5 PG (ref 27–33)
MCHC RBC AUTO-ENTMCNC: 35.1 G/DL (ref 33.6–35)
MCV RBC AUTO: 86.8 FL (ref 81.4–97.8)
MONOCYTES # BLD AUTO: 0.22 K/UL (ref 0–0.85)
MONOCYTES NFR BLD AUTO: 0.9 % (ref 0–13.4)
MORPHOLOGY BLD-IMP: NORMAL
NEUTROPHILS # BLD AUTO: 23.52 K/UL (ref 2–7.15)
NEUTROPHILS NFR BLD: 93 % (ref 44–72)
NEUTS BAND NFR BLD MANUAL: 2.6 % (ref 0–10)
NRBC # BLD AUTO: 0.02 K/UL
NRBC BLD-RTO: 0.1 /100 WBC
PLATELET # BLD AUTO: 131 K/UL (ref 164–446)
PLATELET BLD QL SMEAR: NORMAL
PMV BLD AUTO: 11.9 FL (ref 9–12.9)
POTASSIUM SERPL-SCNC: 3.4 MMOL/L (ref 3.6–5.5)
RBC # BLD AUTO: 3.64 M/UL (ref 4.2–5.4)
RBC BLD AUTO: NORMAL
SIGNIFICANT IND 70042: ABNORMAL
SIGNIFICANT IND 70042: ABNORMAL
SIGNIFICANT IND 70042: NORMAL
SITE SITE: ABNORMAL
SITE SITE: ABNORMAL
SITE SITE: NORMAL
SODIUM SERPL-SCNC: 144 MMOL/L (ref 135–145)
SOURCE SOURCE: ABNORMAL
SOURCE SOURCE: ABNORMAL
SOURCE SOURCE: NORMAL
WBC # BLD AUTO: 24.6 K/UL (ref 4.8–10.8)

## 2018-02-15 PROCEDURE — 770020 HCHG ROOM/CARE - TELE (206)

## 2018-02-15 PROCEDURE — 700111 HCHG RX REV CODE 636 W/ 250 OVERRIDE (IP): Performed by: INTERNAL MEDICINE

## 2018-02-15 PROCEDURE — 80048 BASIC METABOLIC PNL TOTAL CA: CPT

## 2018-02-15 PROCEDURE — 700105 HCHG RX REV CODE 258: Performed by: HOSPITALIST

## 2018-02-15 PROCEDURE — 92526 ORAL FUNCTION THERAPY: CPT

## 2018-02-15 PROCEDURE — 700111 HCHG RX REV CODE 636 W/ 250 OVERRIDE (IP): Performed by: HOSPITALIST

## 2018-02-15 PROCEDURE — A9270 NON-COVERED ITEM OR SERVICE: HCPCS | Performed by: INTERNAL MEDICINE

## 2018-02-15 PROCEDURE — 700105 HCHG RX REV CODE 258: Performed by: INTERNAL MEDICINE

## 2018-02-15 PROCEDURE — 700101 HCHG RX REV CODE 250: Performed by: INTERNAL MEDICINE

## 2018-02-15 PROCEDURE — 85027 COMPLETE CBC AUTOMATED: CPT

## 2018-02-15 PROCEDURE — 99233 SBSQ HOSP IP/OBS HIGH 50: CPT | Performed by: HOSPITALIST

## 2018-02-15 PROCEDURE — 85007 BL SMEAR W/DIFF WBC COUNT: CPT

## 2018-02-15 PROCEDURE — 700102 HCHG RX REV CODE 250 W/ 637 OVERRIDE(OP): Performed by: INTERNAL MEDICINE

## 2018-02-15 RX ORDER — MIDAZOLAM HYDROCHLORIDE 1 MG/ML
2 INJECTION INTRAMUSCULAR; INTRAVENOUS ONCE
Status: COMPLETED | OUTPATIENT
Start: 2018-02-15 | End: 2018-02-15

## 2018-02-15 RX ORDER — OXYCODONE HYDROCHLORIDE 5 MG/1
5 TABLET ORAL EVERY 4 HOURS PRN
Status: DISCONTINUED | OUTPATIENT
Start: 2018-02-15 | End: 2018-02-27 | Stop reason: HOSPADM

## 2018-02-15 RX ORDER — MORPHINE SULFATE 10 MG/ML
2-6 INJECTION, SOLUTION INTRAMUSCULAR; INTRAVENOUS
Status: DISCONTINUED | OUTPATIENT
Start: 2018-02-15 | End: 2018-02-17

## 2018-02-15 RX ORDER — HYDRALAZINE HYDROCHLORIDE 20 MG/ML
10 INJECTION INTRAMUSCULAR; INTRAVENOUS EVERY 4 HOURS PRN
Status: DISCONTINUED | OUTPATIENT
Start: 2018-02-15 | End: 2018-02-27 | Stop reason: HOSPADM

## 2018-02-15 RX ORDER — FENTANYL 100 UG/1
1 PATCH TRANSDERMAL
Status: DISCONTINUED | OUTPATIENT
Start: 2018-02-15 | End: 2018-02-27 | Stop reason: HOSPADM

## 2018-02-15 RX ORDER — LABETALOL HYDROCHLORIDE 5 MG/ML
10 INJECTION, SOLUTION INTRAVENOUS EVERY 4 HOURS PRN
Status: DISCONTINUED | OUTPATIENT
Start: 2018-02-15 | End: 2018-02-27 | Stop reason: HOSPADM

## 2018-02-15 RX ORDER — MORPHINE SULFATE 4 MG/ML
2-4 INJECTION, SOLUTION INTRAMUSCULAR; INTRAVENOUS
Status: DISCONTINUED | OUTPATIENT
Start: 2018-02-15 | End: 2018-02-15

## 2018-02-15 RX ADMIN — FENTANYL 1 PATCH: 100 PATCH, EXTENDED RELEASE TRANSDERMAL at 21:00

## 2018-02-15 RX ADMIN — MORPHINE SULFATE 2 MG: 4 INJECTION INTRAVENOUS at 02:38

## 2018-02-15 RX ADMIN — SODIUM CHLORIDE: 9 INJECTION, SOLUTION INTRAVENOUS at 12:33

## 2018-02-15 RX ADMIN — POTASSIUM CHLORIDE 40 MEQ: 2 INJECTION, SOLUTION, CONCENTRATE INTRAVENOUS at 12:31

## 2018-02-15 RX ADMIN — CEFTRIAXONE 2 G: 2 INJECTION, POWDER, FOR SOLUTION INTRAMUSCULAR; INTRAVENOUS at 12:32

## 2018-02-15 RX ADMIN — MORPHINE SULFATE 2 MG: 4 INJECTION INTRAVENOUS at 10:15

## 2018-02-15 RX ADMIN — MORPHINE SULFATE 6 MG: 10 INJECTION INTRAVENOUS at 23:01

## 2018-02-15 RX ADMIN — MORPHINE SULFATE 2 MG: 4 INJECTION INTRAVENOUS at 07:25

## 2018-02-15 RX ADMIN — MORPHINE SULFATE 6 MG: 10 INJECTION INTRAVENOUS at 17:00

## 2018-02-15 RX ADMIN — LABETALOL HYDROCHLORIDE 10 MG: 5 INJECTION, SOLUTION INTRAVENOUS at 22:15

## 2018-02-15 RX ADMIN — MIDAZOLAM HYDROCHLORIDE 2 MG: 1 INJECTION, SOLUTION INTRAMUSCULAR; INTRAVENOUS at 12:35

## 2018-02-15 RX ADMIN — MIDAZOLAM HYDROCHLORIDE 2 MG: 1 INJECTION, SOLUTION INTRAMUSCULAR; INTRAVENOUS at 11:00

## 2018-02-15 RX ADMIN — MORPHINE SULFATE 6 MG: 10 INJECTION INTRAVENOUS at 20:20

## 2018-02-15 ASSESSMENT — PAIN SCALES - GENERAL
PAINLEVEL_OUTOF10: 9
PAINLEVEL_OUTOF10: 9
PAINLEVEL_OUTOF10: 6
PAINLEVEL_OUTOF10: 4
PAINLEVEL_OUTOF10: 5
PAINLEVEL_OUTOF10: 8
PAINLEVEL_OUTOF10: 8

## 2018-02-15 NOTE — CARE PLAN
Problem: Respiratory:  Goal: Respiratory status will improve  Outcome: PROGRESSING AS EXPECTED      Problem: Fluid Volume:  Goal: Will maintain balanced intake and output  Outcome: PROGRESSING AS EXPECTED

## 2018-02-15 NOTE — THERAPY
"Speech Language Therapy dysphagia treatment completed.     Functional Status: Vocal quality declined compared to initial swallow evaluation which is concerning for adequate adduction of VF for functional swallow. She was AAOx0, unable to state name. SO at bedside who reported current mentation/status is not baseline for patient. Per SO, patient was on a regular diet prior to this hospital admit. Presentation of PO trials were given and consisted of 3 ice chips, 3 oz NTL, and 2 oz pudding. Patient presented with s/sx consistent with moderate oropharyngeal dysphagia as seen by delayed swallow trigger of 4-8 seconds, tongue pumping/protrusion with laryngeal bobbing, 2-3 swallows to clear bolus, intermittent grimacing when swallowing, and weak laryngeal elevation and hyolaryngeal excursion. S/sx concerning for aspiration were observed and were characterized by: increase WOB in 100% of all trials, immediate coughing x1 with pudding, and immediate and persistent coughing in 1/3 trials of ice chips.     Recommendations: At this time, patient remains at high risk for aspiration given s/sx concerning for aspiration, AMS, generalized weakness, and compromised respiratory status with PO. Recommend NPO with alternative source of nutrition. SO at bedside verbalized understanding and was agreeable to frantz, at this time.     Plan of Care: Will benefit from Speech Therapy 3 times per week    Post-Acute Therapy: Discharge to a transitional care facility for continued skilled therapy services.    See \"Rehab Therapy-Acute\" Patient Summary Report for complete documentation.     "

## 2018-02-15 NOTE — PROGRESS NOTES
"Pulmonary Critical Care Progress Note        Chief Complaint: sepsis    History of Present Illness: \"Ms. Finch is a 57-year-old female with past medical history brain aneurysm, stroke, chronic pain who was admitted on 2/13/18 from an outside hospital for a urinary tract infection and septic shock. A CT of her abdomen demonstrated a 7 mm right ureteral calculus with severe obstructing hydro-nephrosis. She was taken to the OR for cystoscopy with stent placement, intraoperatively was found to have a large amount of purulent fluid draining from the right kidney and was hypotensive requiring vasopressors. She arrives in the ICU on vasopressors and critically ill. At this time she complains of only flank pain however is somewhat confused.\"     Review of Systems   Unable to perform ROS: Mental acuity     Interval Events:  24 hour interval history reviewed   - No acute events overnight, art line not functioning   - Neuro: Still confused, more alert   - HR: 60s-90s systolic, PACs   - BP: 140s-160s systolic   - GI: failed swallow, needs cortrak   - UOP: adequate   - Olmedo: yes   - Tm: 37   - Lines: RIJ CVC   - PPx: GI not indicated, DVT SCDs   - off Oxygen    Yesterdays Events   - no acute events overnight   - Neuro: somnolent and repeatative   - HR: 80s-90s   - BP: levo off   - GI: Speech pending   - UOP: 900 mL overnight   - Olmedo: yes   - Tm: 38   - Lines: CVC, PIV   - PPx: GI not indicated, DVT SCDs    PFSH:  No change.    Physical Exam   Constitutional: She is well-developed, well-nourished, and in no distress. No distress.   HENT:   Head: Normocephalic and atraumatic.   Right Ear: External ear normal.   Left Ear: External ear normal.   Mouth/Throat: Oropharynx is clear and moist.   Eyes: Conjunctivae and EOM are normal. Pupils are equal, round, and reactive to light.   Neck: Neck supple. No JVD present. No tracheal deviation present.   Cardiovascular: Normal rate, regular rhythm and normal heart sounds.    No murmur " heard.  Pulmonary/Chest: Effort normal and breath sounds normal. No respiratory distress.   Abdominal: Soft. Bowel sounds are normal. She exhibits no distension. There is no tenderness.   Musculoskeletal: Normal range of motion. She exhibits no edema or tenderness.   contractured LUE   Neurological: She is alert. No cranial nerve deficit. She exhibits normal muscle tone.   Confused, repetitively asks questions,  provides most information   Skin: Skin is warm and dry. No rash noted.   Psychiatric: Affect and judgment normal.   Nursing note and vitals reviewed.      Respiratory:     Pulse Oximetry: 95 %  Chest Tube Drains:  ImagingAvailable data reviewed   Recent Labs      02/13/18   0904   [id number]  7.26*   [id number]  30.9   [id number]  60.9*   [id number]  87.4*   ARTHCO3  14*   ARTBE  -12*       HemoDynamics:  Pulse: 85, Heart Rate (Monitored): 87  Arterial BP: 138/72, NIBP: (!) 169/79 (falsely elevated-pt. only allows BP to be taken on RLE)     Imaging: Available data reviewed        Neuro:  GCS Total Tanana Coma Score: 14  Imaging: Available data reviewed    Fluids:  Intake/Output       02/13/18 0700 - 02/14/18 0659 02/14/18 0700 - 02/15/18 0659 02/15/18 0700 - 02/16/18 0659      1176-4867 7971-3427 Total 0007-6956 2771-1336 Total 7234-1162 2011-6045 Total       Intake    P.O.  120  100 220  0  -- 0  --  -- --    P.O. 120 100 220 0 -- 0 -- -- --    I.V.  2391.9  1449.6 3841.5  1525.9  1208 2733.9  --  -- --    Crystalloid Intake 800 -- 800 -- -- -- -- -- --    Magnesium Sulfate Volume -- -- -- 76.3 -- 76.3 -- -- --    Norepinephrine Volume 96.3 -- 96.3 -- -- -- -- -- --    IV Piggyback Volume (IV Piggyback) 350 -- 350 -- -- -- -- -- --    IV Volume (IV Lactated Ringers) 200 -- 200 -- -- -- -- -- --    IV Volume (Zosyn) 145.6 249.6 395.2 249.6 208 457.6 -- -- --    IV Volume (NS) 800 1200 2000 1200 1000 2200 -- -- --    Total Intake 2511.9 1549.6 4061.5 1525.9 1208 2733.9 -- -- --       Output    Urine  1725   850 2575  1000  740 1740  --  -- --    Indwelling Cathether 4730 202 0179 8571 021 0087 -- -- --    Total Output 3990 311 1718 6998 688 6623 -- -- --       Net I/O     786.9 699.6 1486.5 525.9 468 993.9 -- -- --           Recent Labs      18   SODIUM  139  143   POTASSIUM  2.8*  3.6   CHLORIDE  110  116*   CO2  17*  18*   BUN  26*  21   CREATININE  1.56*  0.88   MAGNESIUM   --   1.8   CALCIUM  7.1*  7.9*       GI/Nutrition:  Imaging: Available data reviewed  taking PO  Liver Function  Recent Labs      18   ALTSGPT  18   --    ASTSGOT  40   --    ALKPHOSPHAT  116*   --    TBILIRUBIN  0.5   --    GLUCOSE  84  86       Heme:  Recent Labs      02/13/18   0412  02/14/18   0445  02/15/18   0522   RBC  3.67*  3.91*  3.64*   HEMOGLOBIN  10.9*  11.4*  11.1*   HEMATOCRIT  31.7*  34.2*  31.6*   PLATELETCT  185  150*  131*       Infectious Disease:  Monitored Temp  Av.6 °C (97.8 °F)  Min: 36.2 °C (97.2 °F)  Max: 36.8 °C (98.2 °F)  Micro: antibiotics reviewed and cultures reviewed  Recent Labs      02/13/18   0412  02/14/18   0445  02/15/18   0522   WBC  24.1*  32.4*  24.6*   NEUTSPOLYS  64.40   --    --    LYMPHOCYTES  7.00*   --    --    MONOCYTES  4.30   --    --    EOSINOPHILS  0.00   --    --    BASOPHILS  0.00   --    --    ASTSGOT  40   --    --    ALTSGPT  18   --    --    ALKPHOSPHAT  116*   --    --    TBILIRUBIN  0.5   --    --      Current Facility-Administered Medications   Medication Dose Frequency Provider Last Rate Last Dose   • enoxaparin (LOVENOX) inj 40 mg  40 mg DAILY David Pillai Jr., D.O.   40 mg at 18 0857   • gabapentin (NEURONTIN) capsule 300 mg  300 mg TID Alberto Singh M.D.   Stopped at 18 1500   • folic acid (FOLVITE) tablet 2 mg  2 mg DAILY Jairo Silva M.D.   Stopped at 18 0945   • pregabalin (LYRICA) capsule 150 mg  150 mg BID Jairo Silva M.D.   Stopped at 18 0900   • senna-docusate  (PERICOLACE or SENOKOT S) 8.6-50 MG per tablet 2 Tab  2 Tab BID Jairo Silva M.D.   Stopped at 02/14/18 0900    And   • polyethylene glycol/lytes (MIRALAX) PACKET 1 Packet  1 Packet QDAY PRN Jairo Silva M.D.        And   • magnesium hydroxide (MILK OF MAGNESIA) suspension 30 mL  30 mL QDAY PRN Jairo Silva M.D.        And   • bisacodyl (DULCOLAX) suppository 10 mg  10 mg QDAY PRN Jairo Silva M.D.       • Respiratory Care per Protocol   Continuous RT Jairo Silva M.D.       • NS (BOLUS) infusion 500 mL  500 mL Once PRN Jairo Silva M.D.       • NS infusion   Continuous Jairo Silva M.D. 100 mL/hr at 02/14/18 2127     • ondansetron (ZOFRAN) syringe/vial injection 4 mg  4 mg Q4HRS PRN Jairo Silva M.D.   4 mg at 02/13/18 2152   • ondansetron (ZOFRAN ODT) dispertab 4 mg  4 mg Q4HRS PRN Jairo Silva M.D.       • promethazine (PHENERGAN) tablet 12.5-25 mg  12.5-25 mg Q4HRS PRN Jairo Silva M.D.       • promethazine (PHENERGAN) suppository 12.5-25 mg  12.5-25 mg Q4HRS PRN Jairo Silva M.D.       • prochlorperazine (COMPAZINE) injection 5-10 mg  5-10 mg Q4HRS PRN Jairo Silva M.D.       • FLUoxetine (PROZAC) capsule 20 mg  20 mg DAILY Jairo Silva M.D.   Stopped at 02/13/18 0945   • DULoxetine (CYMBALTA) capsule 60 mg  60 mg BID Jairo Silva M.D.   Stopped at 02/14/18 0900   • piperacillin-tazobactam (ZOSYN) 10.125 g in  mL infusion   Continuous Abx Jairo Silva M.D. 20.83 mL/hr at 02/14/18 1900     • morphine (pf) 4 mg/ml injection 2 mg  2 mg Q3HRS PRN Jairo Silva M.D.   2 mg at 02/15/18 0238   • menthol (HALLS) lozenge 1 Lozenge  1 Lozenge Q HOUR PRN David Pillai Jr., D.O.       • sore throat spray (CHLORASEPTIC) 1 Spray  1 Spray Q HOUR PRN David Pillai Jr., D.O.         Last reviewed on 2/13/2018  8:37 AM by Jesus Alberto Diehl    Quality  Measures:  Core  Measures & Quality Metrics    Problems/Plan:  Septic shock              - GNR - urinary source              - source targeted antibiotics: zosyn. source controlled by urology              - Pressors if needed to maintain MAP >65 mmHg              - blood, respiratory and urine cultures     Gram-negative ever Bacteremia              - Urinary source, continue antibiotics   - pending sensitivity     Obstructing right ureteral calculus              - Status post stent placement by urology              - monitor renal function     Pyelonephritis              - Continue antibiotics     Acute encephalopathy              - Likely toxic metabolic given sepsis   - improving              - CT of the brain negative     Acute injury              - Secondary to pyelonephritis and obstructive uropathy              - resolved     Lactic acidosis/anion gap metabolic acidosis              - resolved     Anemia              - Mild, monitor     Chronic pain   - restart home meds   - no enteric access currently, PRN IV opiates    Mild Protein calorie malnutrition              - RD consult   - Need enteric access - GI consult     Prophylaxis: SCDs    Discussed patient condition and risk of morbidity and/or mortality with Family, RN, Pharmacy, Charge nurse / hot rounds, Patient and hospitalist.    Stable to transfer patient out of ICU today. [facility] will sign off at transfer. Please call with questions.

## 2018-02-15 NOTE — CARE PLAN
Problem: Pain Management  Goal: Pain level will decrease to patient's comfort goal    Intervention: Follow pain managment plan developed in collaboration with patient and Interdisciplinary Team  Pt complaining of 8/10 pain, medicated as per MD orders (see MAR). Provided relaxation techniques, rest, and repositioning the pt.       Problem: Knowledge Deficit  Goal: Knowledge of disease process/condition, treatment plan, diagnostic tests, and medications will improve    Intervention: Assess knowledge level of disease process/condition, treatment plan, diagnostic tests, and medications  Pt educated on POC and goals. Pt verbalized understanding, answered all questions, no further needs at this time, pt resting comfortably.

## 2018-02-16 PROBLEM — R13.10 DYSPHAGIA: Status: ACTIVE | Noted: 2018-02-16

## 2018-02-16 LAB
ANION GAP SERPL CALC-SCNC: 14 MMOL/L (ref 0–11.9)
BASOPHILS # BLD AUTO: 0.8 % (ref 0–1.8)
BASOPHILS # BLD: 0.15 K/UL (ref 0–0.12)
BUN SERPL-MCNC: 13 MG/DL (ref 8–22)
CALCIUM SERPL-MCNC: 8.5 MG/DL (ref 8.5–10.5)
CHLORIDE SERPL-SCNC: 111 MMOL/L (ref 96–112)
CO2 SERPL-SCNC: 16 MMOL/L (ref 20–33)
CREAT SERPL-MCNC: 0.49 MG/DL (ref 0.5–1.4)
EOSINOPHIL # BLD AUTO: 0.01 K/UL (ref 0–0.51)
EOSINOPHIL NFR BLD: 0.1 % (ref 0–6.9)
ERYTHROCYTE [DISTWIDTH] IN BLOOD BY AUTOMATED COUNT: 47.5 FL (ref 35.9–50)
GLUCOSE SERPL-MCNC: 126 MG/DL (ref 65–99)
HCT VFR BLD AUTO: 35.1 % (ref 37–47)
HGB BLD-MCNC: 11.8 G/DL (ref 12–16)
IMM GRANULOCYTES # BLD AUTO: 0.31 K/UL (ref 0–0.11)
IMM GRANULOCYTES NFR BLD AUTO: 1.7 % (ref 0–0.9)
LYMPHOCYTES # BLD AUTO: 1.47 K/UL (ref 1–4.8)
LYMPHOCYTES NFR BLD: 8.2 % (ref 22–41)
MAGNESIUM SERPL-MCNC: 1.4 MG/DL (ref 1.5–2.5)
MAGNESIUM SERPL-MCNC: 1.4 MG/DL (ref 1.5–2.5)
MCH RBC QN AUTO: 28.7 PG (ref 27–33)
MCHC RBC AUTO-ENTMCNC: 33.6 G/DL (ref 33.6–35)
MCV RBC AUTO: 85.4 FL (ref 81.4–97.8)
MONOCYTES # BLD AUTO: 1.07 K/UL (ref 0–0.85)
MONOCYTES NFR BLD AUTO: 6 % (ref 0–13.4)
NEUTROPHILS # BLD AUTO: 14.81 K/UL (ref 2–7.15)
NEUTROPHILS NFR BLD: 83.2 % (ref 44–72)
NRBC # BLD AUTO: 0.02 K/UL
NRBC BLD-RTO: 0.1 /100 WBC
PHOSPHATE SERPL-MCNC: 1.4 MG/DL (ref 2.5–4.5)
PHOSPHATE SERPL-MCNC: 2.4 MG/DL (ref 2.5–4.5)
PLATELET # BLD AUTO: 167 K/UL (ref 164–446)
PMV BLD AUTO: 12.1 FL (ref 9–12.9)
POTASSIUM SERPL-SCNC: 2.6 MMOL/L (ref 3.6–5.5)
POTASSIUM SERPL-SCNC: 2.7 MMOL/L (ref 3.6–5.5)
POTASSIUM SERPL-SCNC: 2.9 MMOL/L (ref 3.6–5.5)
RBC # BLD AUTO: 4.11 M/UL (ref 4.2–5.4)
SODIUM SERPL-SCNC: 141 MMOL/L (ref 135–145)
WBC # BLD AUTO: 17.8 K/UL (ref 4.8–10.8)

## 2018-02-16 PROCEDURE — 700105 HCHG RX REV CODE 258: Performed by: HOSPITALIST

## 2018-02-16 PROCEDURE — 700105 HCHG RX REV CODE 258

## 2018-02-16 PROCEDURE — 302146: Performed by: INTERNAL MEDICINE

## 2018-02-16 PROCEDURE — 85025 COMPLETE CBC W/AUTO DIFF WBC: CPT

## 2018-02-16 PROCEDURE — 84100 ASSAY OF PHOSPHORUS: CPT

## 2018-02-16 PROCEDURE — 770020 HCHG ROOM/CARE - TELE (206)

## 2018-02-16 PROCEDURE — 84132 ASSAY OF SERUM POTASSIUM: CPT | Mod: 91

## 2018-02-16 PROCEDURE — 83735 ASSAY OF MAGNESIUM: CPT | Mod: 91

## 2018-02-16 PROCEDURE — 99233 SBSQ HOSP IP/OBS HIGH 50: CPT | Performed by: HOSPITALIST

## 2018-02-16 PROCEDURE — 92526 ORAL FUNCTION THERAPY: CPT

## 2018-02-16 PROCEDURE — 700105 HCHG RX REV CODE 258: Performed by: INTERNAL MEDICINE

## 2018-02-16 PROCEDURE — 700101 HCHG RX REV CODE 250: Performed by: INTERNAL MEDICINE

## 2018-02-16 PROCEDURE — 36415 COLL VENOUS BLD VENIPUNCTURE: CPT

## 2018-02-16 PROCEDURE — 700111 HCHG RX REV CODE 636 W/ 250 OVERRIDE (IP): Performed by: INTERNAL MEDICINE

## 2018-02-16 PROCEDURE — 80048 BASIC METABOLIC PNL TOTAL CA: CPT

## 2018-02-16 RX ORDER — MAGNESIUM SULFATE HEPTAHYDRATE 40 MG/ML
4 INJECTION, SOLUTION INTRAVENOUS ONCE
Status: COMPLETED | OUTPATIENT
Start: 2018-02-16 | End: 2018-02-16

## 2018-02-16 RX ORDER — SODIUM CHLORIDE 9 MG/ML
INJECTION, SOLUTION INTRAVENOUS
Status: COMPLETED
Start: 2018-02-16 | End: 2018-02-16

## 2018-02-16 RX ADMIN — MORPHINE SULFATE 6 MG: 10 INJECTION INTRAVENOUS at 17:00

## 2018-02-16 RX ADMIN — SODIUM CHLORIDE: 9 INJECTION, SOLUTION INTRAVENOUS at 19:45

## 2018-02-16 RX ADMIN — MAGNESIUM SULFATE IN WATER 4 G: 40 INJECTION, SOLUTION INTRAVENOUS at 19:36

## 2018-02-16 RX ADMIN — MORPHINE SULFATE 6 MG: 10 INJECTION INTRAVENOUS at 12:41

## 2018-02-16 RX ADMIN — POTASSIUM CHLORIDE 40 MEQ: 2 INJECTION, SOLUTION, CONCENTRATE INTRAVENOUS at 11:46

## 2018-02-16 RX ADMIN — SODIUM CHLORIDE: 9 INJECTION, SOLUTION INTRAVENOUS at 03:30

## 2018-02-16 RX ADMIN — CEFTRIAXONE 2 G: 2 INJECTION, POWDER, FOR SOLUTION INTRAMUSCULAR; INTRAVENOUS at 08:43

## 2018-02-16 RX ADMIN — ENOXAPARIN SODIUM 40 MG: 100 INJECTION SUBCUTANEOUS at 08:42

## 2018-02-16 RX ADMIN — SODIUM CHLORIDE: 9 INJECTION, SOLUTION INTRAVENOUS at 12:41

## 2018-02-16 RX ADMIN — POTASSIUM PHOSPHATE, MONOBASIC AND POTASSIUM PHOSPHATE, DIBASIC 30 MMOL: 224; 236 INJECTION, SOLUTION INTRAVENOUS at 18:17

## 2018-02-16 RX ADMIN — MORPHINE SULFATE 6 MG: 10 INJECTION INTRAVENOUS at 02:54

## 2018-02-16 RX ADMIN — SODIUM CHLORIDE: 9 INJECTION, SOLUTION INTRAVENOUS at 20:06

## 2018-02-16 RX ADMIN — MORPHINE SULFATE 6 MG: 10 INJECTION INTRAVENOUS at 08:14

## 2018-02-16 ASSESSMENT — PAIN SCALES - GENERAL
PAINLEVEL_OUTOF10: 0
PAINLEVEL_OUTOF10: ASSUMED PAIN PRESENT
PAINLEVEL_OUTOF10: 0
PAINLEVEL_OUTOF10: ASSUMED PAIN PRESENT

## 2018-02-16 NOTE — PROGRESS NOTES
2 RN skin check  Skin breakdown from index finger pushing on palm of the hand.  Redness and flaky skin under right breast.  Redness and skin tear under right pannus.  IAD to peritoneum.

## 2018-02-16 NOTE — PROGRESS NOTES
Pt on unit. Tele monitor on. Patient c/o pain, will administer pain med. On 1 L O2 NC. Fall precautions in place. Family at bedside.

## 2018-02-16 NOTE — PROGRESS NOTES
[facility] Hospitalist Progress Note    Date of Service: 2018    Chief Complaint  57 y.o. female admitted 2018 with altered mental status    Interval Problem Update  Ms. Finch has a history of brain aneurysm with hemiplegia since  that was found to have a fever of 103 and UTI with hydronephrosis. She was transferred from [facility] to [facility] and underwent cystoscopy and ureteral stent placement by Dr. Killian. She has been admitted to the ICU for IV fluids and IV antibiotics. Her IV pressors have been turned off.     The patient has difficulty communicating and difficulty understanding her speech  Indicates generalized pain, not able to localize it  Deny shortness of breath, she is on a facemask  Attempts to place core trak were unsuccessful  Afebrile, tolerating antibiotics, no rrash  Blood pressure stable, has not required pressors    Consultants/Specialty  Critical care  Urology  Gastroenterology    Disposition  Okay to transfer out of ICU      Review of Systems   Unable to perform ROS: Mental acuity      Physical Exam  Laboratory/Imaging   Hemodynamics  Temp (24hrs), Av °C (98.6 °F), Min:36.8 °C (98.2 °F), Max:37.2 °C (99 °F)   Temperature: 37 °C (98.6 °F), Monitored Temp: 37.2 °C (99 °F)  Pulse  Av.7  Min: 53  Max: 121 Heart Rate (Monitored): 95  Arterial BP: 150/80, NIBP: 158/94      Respiratory      Respiration: (!) 28, Pulse Oximetry: 94 %, O2 Daily Delivery Respiratory : Silicone Nasal Cannula     Work Of Breathing / Effort: Mild;Shallow       Fluids    Intake/Output Summary (Last 24 hours) at 18 0857  Last data filed at 18 0600   Gross per 24 hour   Intake             2200 ml   Output              725 ml   Net             1475 ml       Nutrition  Orders Placed This Encounter   Procedures   • DIET NPO     Standing Status:   Standing     Number of Occurrences:   1     Order Specific Question:   Restrict to:     Answer:   Strict [1]     Comments:   pending swallow eval     Physical  Exam   Constitutional: She appears well-nourished. No distress.   HENT:   Head: Normocephalic and atraumatic.   Right Ear: External ear normal.   Left Ear: External ear normal.   Eyes: Conjunctivae and EOM are normal. No scleral icterus.   Neck: Normal range of motion. Neck supple.   Right IJ central line   Cardiovascular: Normal rate and regular rhythm.    No murmur heard.  Pulmonary/Chest: Effort normal. No respiratory distress. She has no wheezes.   Abdominal: Soft. She exhibits distension. There is no tenderness. There is no rebound and no guarding.   Genitourinary:   Genitourinary Comments: Olmedo catheter   Musculoskeletal: She exhibits edema.   Right arm swelling   Neurological:   Awake, difficult to understand her speech  Right side is weak   Skin: Skin is warm and dry. There is pallor.   Nursing note and vitals reviewed.      Recent Labs      02/14/18   0445  02/15/18   0522  02/16/18   0825   WBC  32.4*  24.6*  17.8*   RBC  3.91*  3.64*  4.11*   HEMOGLOBIN  11.4*  11.1*  11.8*   HEMATOCRIT  34.2*  31.6*  35.1*   MCV  87.5  86.8  85.4   MCH  29.2  30.5  28.7   MCHC  33.3*  35.1*  33.6   RDW  50.3*  49.9  47.5   PLATELETCT  150*  131*  167   MPV  11.7  11.9  12.1     Recent Labs      02/14/18   0445  02/15/18   0522   SODIUM  143  144   POTASSIUM  3.6  3.4*   CHLORIDE  116*  117*   CO2  18*  16*   GLUCOSE  86  83   BUN  21  18   CREATININE  0.88  0.53   CALCIUM  7.9*  8.1*             Recent Labs      02/14/18   0445   TRIGLYCERIDE  171*   HDL  6*   LDL  70          Assessment/Plan     * Pyelonephritis- (present on admission)   Assessment & Plan    Hemodynamics have improved, leukocytosis trending down  Continue ceftriaxone for E. coli pyelonephritis        Dysphagia- (present on admission)   Assessment & Plan    Speech reevaluation today  May need to have a feeding tube placed by endoscopy        Gram-negative bacteremia- (present on admission)   Assessment & Plan    E. coli, continue ceftriaxone         Hydronephrosis with urinary obstruction due to renal calculus- (present on admission)   Assessment & Plan    7mm right sided ureter stone s/p removal 2/13         History of completed stroke- (present on admission)   Assessment & Plan    Hx brain aneurysm in 1994 with resultant right hemiparesis        Chronic pain- (present on admission)   Assessment & Plan    Restarted home meds.        Encephalopathy acute- (present on admission)   Assessment & Plan    Metabolic/toxic encephalopathy due to dehydration and sepsis, CT head is neg, continue treatment for sepsis and dehydration.         Metabolic acidosis- (present on admission)   Assessment & Plan    Urosepsis, improved        CHERYL (acute kidney injury) (CMS-HCC)- (present on admission)   Assessment & Plan    Due to dehydration and sepsis   IV fluids  Renal function has recovered        Arm swelling- (present on admission)   Assessment & Plan    U/s negative for DVT.         Thrombocytopenia (CMS-HCC)- (present on admission)   Assessment & Plan    Likely sepsis, platelet count is recovered        Hyponatremia- (present on admission)   Assessment & Plan    Has resolved with IV fluids        Septic shock (CMS-HCC)- (present on admission)   Assessment & Plan    Present on admission  Septic shock is now resolved          Quality-Core Measures   Reviewed items::  Labs reviewed and Medications reviewed  Olmedo catheter::  Urinary Tract Retention or Urinary Tract Obstruction  DVT prophylaxis pharmacological::  Enoxaparin (Lovenox)  Antibiotics:  Treating active infection/contamination beyond 24 hours perioperative coverage  Assessed for rehabilitation services:  Patient was assess for and/or received rehabilitation services during this hospitalization

## 2018-02-16 NOTE — PROGRESS NOTES
"Pulmonary Critical Care Progress Note        Chief Complaint: sepsis    History of Present Illness: \"Ms. Finch is a 57-year-old female with past medical history brain aneurysm, stroke, chronic pain who was admitted on 2/13/18 from an outside hospital for a urinary tract infection and septic shock. A CT of her abdomen demonstrated a 7 mm right ureteral calculus with severe obstructing hydro-nephrosis. She was taken to the OR for cystoscopy with stent placement, intraoperatively was found to have a large amount of purulent fluid draining from the right kidney and was hypotensive requiring vasopressors. She arrives in the ICU on vasopressors and critically ill. At this time she complains of only flank pain however is somewhat confused.\"     Review of Systems   Unable to perform ROS: Mental acuity     Interval Events:  24 hour interval history reviewed   - No acute events overnight   - Neuro: AO to self, follows   - HR: 80-100s systolic   - BP: labetalol x1   - GI: NPO as cortrak unable to be placed   - UOP: incontinent   - Olmedo: no   - Tm: afeb   - Lines: PIV, CVC   - PPx: GI NPO, DVT lovenox   - RA    Yesterdays Events   - No acute events overnight, art line not functioning   - Neuro: Still confused, more alert   - HR: 60s-90s systolic, PACs   - BP: 140s-160s systolic   - GI: failed swallow, needs cortrak   - UOP: adequate   - Olmedo: yes   - Tm: 37   - Lines: RIJ CVC   - PPx: GI not indicated, DVT SCDs   - off Oxygen    PFSH:  No change.    Physical Exam   Constitutional: She is well-developed, well-nourished, and in no distress. No distress.   HENT:   Head: Normocephalic and atraumatic.   Right Ear: External ear normal.   Left Ear: External ear normal.   Mouth/Throat: Oropharynx is clear and moist.   Eyes: Conjunctivae and EOM are normal. Pupils are equal, round, and reactive to light.   Neck: Neck supple. No JVD present. No tracheal deviation present.   Cardiovascular: Normal rate, regular rhythm and normal heart " sounds.    No murmur heard.  Pulmonary/Chest: Effort normal and breath sounds normal. No respiratory distress.   Abdominal: Soft. Bowel sounds are normal. She exhibits no distension. There is no tenderness.   Musculoskeletal: Normal range of motion. She exhibits no edema or tenderness.   contractured LUE   Neurological: She is alert. No cranial nerve deficit. She exhibits normal muscle tone.   Confused, repetitively asks questions,  provides most information   Skin: Skin is warm and dry. No rash noted.   Psychiatric: Affect and judgment normal.   Nursing note and vitals reviewed.      Respiratory:     Pulse Oximetry: 93 %  Chest Tube Drains:  ImagingAvailable data reviewed   Recent Labs      02/13/18   0904   WQHIE08Z  7.26*   TQKUTT145E  30.9   TQVEM850B  60.9*   TJAJ0SOD  87.4*   ARTHCO3  14*   ARTBE  -12*       HemoDynamics:  Pulse: 75, Heart Rate (Monitored): 76  Arterial BP: 150/80, NIBP: 148/82     Imaging: Available data reviewed        Neuro:  GCS Total Caliente Coma Score: 14  Imaging: Available data reviewed    Fluids:  Intake/Output       02/14/18 0700 - 02/15/18 0659 02/15/18 0700 - 02/16/18 0659 02/16/18 0700 - 02/17/18 0659      2034-8447 1565-6495 Total 8641-8157 3890-0490 Total 3092-7652 6934-0914 Total       Intake    P.O.  0  -- 0  --  -- --  --  -- --    P.O. 0 -- 0 -- -- -- -- -- --    I.V.  1525.9  1449.6 2975.5  1200  1200 2400  --  -- --    Magnesium Sulfate Volume 76.3 -- 76.3 -- -- -- -- -- --    IV Volume (Zosyn) 249.6 249.6 499.2 -- -- -- -- -- --    IV Volume (NS) 1200 1200 2400 1200 1200 2400 -- -- --    Total Intake 1525.9 1449.6 2975.5 1200 1200 2400 -- -- --       Output    Urine  1000  1350 2350  875  -- 875  --  -- --    Number of Times Incontinent of Urine -- -- -- 3 x 5 x 8 x -- -- --    Indwelling Cathether 1000 1350 2350 875 -- 875 -- -- --    Stool  --  -- --  --  -- --  --  -- --    Number of Times Stooled -- -- -- 1 x -- 1 x -- -- --    Total Output 1000 1350 2350 87  -- 875 -- -- --       Net I/O     525.9 99.6 625.5 325 1200 1525 -- -- --        Weight: 100.1 kg (220 lb 10.9 oz)  Recent Labs      02/14/18   0445  02/15/18   0522   SODIUM  143  144   POTASSIUM  3.6  3.4*   CHLORIDE  116*  117*   CO2  18*  16*   BUN  21  18   CREATININE  0.88  0.53   MAGNESIUM  1.8   --    CALCIUM  7.9*  8.1*       GI/Nutrition:  Imaging: Available data reviewed  taking PO  Liver Function  Recent Labs      02/14/18   0445  02/15/18   0522   GLUCOSE  86  83       Heme:  Recent Labs      02/14/18   0445  02/15/18   0522   RBC  3.91*  3.64*   HEMOGLOBIN  11.4*  11.1*   HEMATOCRIT  34.2*  31.6*   PLATELETCT  150*  131*       Infectious Disease:  Monitored Temp  Av.3 °C (99.1 °F)  Min: 37.2 °C (99 °F)  Max: 37.4 °C (99.3 °F)  Temp  Av °C (98.6 °F)  Min: 36.8 °C (98.2 °F)  Max: 37.2 °C (99 °F)  Micro: antibiotics reviewed and cultures reviewed  Recent Labs      02/14/18   0445  02/15/18   0522   WBC  32.4*  24.6*   NEUTSPOLYS   --   93.00*   LYMPHOCYTES   --   3.50*   MONOCYTES   --   0.90   EOSINOPHILS   --   0.00   BASOPHILS   --   0.00     Current Facility-Administered Medications   Medication Dose Frequency Provider Last Rate Last Dose   • oxyCODONE immediate-release (ROXICODONE) tablet 5 mg  5 mg Q4HRS PRN Roly Mathew M.D.       • cefTRIAXone (ROCEPHIN) syringe 2 g  2 g Q24HRS David Pillai Jr., D.O.   2 g at 02/15/18 1232   • morphine (pf) 10 mg/ml 10 MG/ML injection 2-6 mg  2-6 mg Q2HRS PRN David Pillai Jr., D.O.   6 mg at 18 0254   • fentaNYL (DURAGESIC) 100 MCG/HR 1 Patch  1 Patch Q72HRS David Pillai Jr., D.O.   1 Patch at 02/15/18 2100   • labetalol (NORMODYNE,TRANDATE) injection 10 mg  10 mg Q4HRS PRN Cordell Owens M.D.   10 mg at 02/15/18 2215   • hydrALAZINE (APRESOLINE) injection 10 mg  10 mg Q4HRS PRN Cordell Owens M.D.       • enoxaparin (LOVENOX) inj 40 mg  40 mg DAILY David Pillai Jr., D.O.   Stopped at 02/15/18 0900   • gabapentin (NEURONTIN)  capsule 300 mg  300 mg TID Alberto Singh M.D.   Stopped at 02/14/18 1500   • folic acid (FOLVITE) tablet 2 mg  2 mg DAILY Jairo Silva M.D.   Stopped at 02/13/18 0945   • pregabalin (LYRICA) capsule 150 mg  150 mg BID Jairo Silva M.D.   Stopped at 02/14/18 0900   • senna-docusate (PERICOLACE or SENOKOT S) 8.6-50 MG per tablet 2 Tab  2 Tab BID Jairo Silva M.D.   Stopped at 02/14/18 0900    And   • polyethylene glycol/lytes (MIRALAX) PACKET 1 Packet  1 Packet QDAY PRN Jairo Silva M.D.        And   • magnesium hydroxide (MILK OF MAGNESIA) suspension 30 mL  30 mL QDAY PRN Jairo Silva M.D.        And   • bisacodyl (DULCOLAX) suppository 10 mg  10 mg QDAY PRN Jairo Silva M.D.       • Respiratory Care per Protocol   Continuous RT Jairo Silva M.D.       • NS (BOLUS) infusion 500 mL  500 mL Once PRN Jairo Silva M.D.       • NS infusion   Continuous Jairo Silva M.D. 100 mL/hr at 02/16/18 0330     • ondansetron (ZOFRAN) syringe/vial injection 4 mg  4 mg Q4HRS PRN Jairo Silva M.D.   4 mg at 02/13/18 2152   • ondansetron (ZOFRAN ODT) dispertab 4 mg  4 mg Q4HRS PRN Jairo Silva M.D.       • promethazine (PHENERGAN) tablet 12.5-25 mg  12.5-25 mg Q4HRS PRN Jairo Silva M.D.       • promethazine (PHENERGAN) suppository 12.5-25 mg  12.5-25 mg Q4HRS PRN Jairo Silva M.D.       • prochlorperazine (COMPAZINE) injection 5-10 mg  5-10 mg Q4HRS PRN Jairo Silva M.D.       • FLUoxetine (PROZAC) capsule 20 mg  20 mg DAILY Jairo Silva M.D.   Stopped at 02/13/18 0945   • DULoxetine (CYMBALTA) capsule 60 mg  60 mg BID Jairo Silva M.D.   Stopped at 02/14/18 0900   • menthol (HALLS) lozenge 1 Lozenge  1 Lozenge Q HOUR PRN David Pillai Jr., D.O.       • sore throat spray (CHLORASEPTIC) 1 Spray  1 Spray Q HOUR PRN David Pillai Jr., D.O.         Last reviewed on 2/13/2018  8:37 AM by  Lynette Chisholm [facility]    Quality  Measures:  Core Measures & Quality Metrics    Problems/Plan:  Septic shock              -pansensitive E. coli- urinary source              - source targeted antibiotics: zosyn   - source controlled by urology               - blood, respiratory and urine cultures     Gram-negative ever Bacteremia              - Urinary source, continue antibiotics   - pending sensitivity     Obstructing right ureteral calculus              - Status post stent placement by urology              - monitor renal function     Pyelonephritis              - Continue antibiotics     Acute encephalopathy              - Likely toxic metabolic given sepsis   - improving              - CT of the brain negative     Acute injury              - Secondary to pyelonephritis and obstructive uropathy              - resolved     Lactic acidosis/anion gap metabolic acidosis              - resolved     Anemia              - Mild, monitor     Chronic pain   - restart home meds   - no enteric access currently, PRN IV opiates    Dysphagia   - GI to place feeding access tube due to abnormal anatomy    Mild Protein calorie malnutrition              - RD consult   - Need enteric access - GI consult    Hypokalemia/hypomagnesemia   - Replete     Prophylaxis: SCDs    Discussed patient condition and risk of morbidity and/or mortality with Family, RN, Pharmacy, Charge nurse / hot rounds, Patient and hospitalist.    Stable to transfer patient out of ICU today. [facility] will sign off at transfer. Please call with questions.

## 2018-02-16 NOTE — PROGRESS NOTES
[facility] Hospitalist Progress Note    Date of Service: 2/15/2018    Chief Complaint  57 y.o. female admitted 2018 with altered mental status    Interval Problem Update  Ms. Finch has a history of brain aneurysm with hemiplegia since  that was found to have a fever of 103 and UTI with hydronephrosis. She was transferred from [facility] to [facility] and underwent cystoscopy and ureteral stent placement by Dr. Killian. She has been admitted to the ICU for IV fluids and IV antibiotics. Her IV pressors have been turned off.     The patient has difficulty communicating and difficulty understanding her speech  She indicates that she has generalized pain, so is not localized to her abdomen or flank  Pressures up and turn off her blood pressure is stable and she is actually hypertensive  Nothing by mouth, bedside RN to place Cordran      Consultants/Specialty  Critical care    Disposition  Okay to transfer out of ICU      Review of Systems   Unable to perform ROS: Mental acuity      Physical Exam  Laboratory/Imaging   Hemodynamics  No data recorded.   Monitored Temp: 37.2 °C (99 °F)  Pulse  Av.6  Min: 53  Max: 121 Heart Rate (Monitored): 90  Arterial BP: 150/80, NIBP: 147/92      Respiratory      Respiration: 20, Pulse Oximetry: 95 %, O2 Daily Delivery Respiratory : Silicone Nasal Cannula     Work Of Breathing / Effort: Mild;Shallow       Fluids    Intake/Output Summary (Last 24 hours) at 02/15/18 1817  Last data filed at 02/15/18 1600   Gross per 24 hour   Intake           2449.6 ml   Output             2225 ml   Net            224.6 ml       Nutrition  Orders Placed This Encounter   Procedures   • DIET NPO     Standing Status:   Standing     Number of Occurrences:   1     Order Specific Question:   Restrict to:     Answer:   Strict [1]     Comments:   pending swallow eval     Physical Exam   Constitutional: She appears well-nourished. No distress.   HENT:   Head: Normocephalic and atraumatic.   Eyes: Conjunctivae are  normal. Right eye exhibits no discharge. Left eye exhibits no discharge.   Neck:   Right IJ central line   Cardiovascular: Normal rate and regular rhythm.    No murmur heard.  Pulmonary/Chest: Effort normal. No respiratory distress.   Abdominal: Soft. She exhibits no distension. There is no tenderness. There is no rebound.   Genitourinary:   Genitourinary Comments: Olmedo catheter   Musculoskeletal: She exhibits edema.   Right arm swelling   Neurological:   Patient speaks a little, difficult to understand  Right upper arm paralysis. Slight movement of the right foot.   Skin: Skin is warm and dry. There is pallor.   Nursing note and vitals reviewed.      Recent Labs      02/13/18   0412  02/14/18   0445  02/15/18   0522   WBC  24.1*  32.4*  24.6*   RBC  3.67*  3.91*  3.64*   HEMOGLOBIN  10.9*  11.4*  11.1*   HEMATOCRIT  31.7*  34.2*  31.6*   MCV  86.4  87.5  86.8   MCH  29.7  29.2  30.5   MCHC  34.4  33.3*  35.1*   RDW  46.5  50.3*  49.9   PLATELETCT  185  150*  131*   MPV  11.6  11.7  11.9     Recent Labs      02/13/18   0412  02/14/18   0445  02/15/18   0522   SODIUM  139  143  144   POTASSIUM  2.8*  3.6  3.4*   CHLORIDE  110  116*  117*   CO2  17*  18*  16*   GLUCOSE  84  86  83   BUN  26*  21  18   CREATININE  1.56*  0.88  0.53   CALCIUM  7.1*  7.9*  8.1*             Recent Labs      02/14/18 0445   TRIGLYCERIDE  171*   HDL  6*   LDL  70          Assessment/Plan     * Pyelonephritis- (present on admission)   Assessment & Plan    Hemodynamics have improved, leukocytosis trending down  Continue ceftriaxone for E. coli pyelonephritis        Hydronephrosis with urinary obstruction due to renal calculus- (present on admission)   Assessment & Plan    7mm right sided ureter stone s/p removal 2/13         Septic shock (CMS-[facility])- (present on admission)   Assessment & Plan    Present on admission  The source of the septic shock is pyelonephritis.  No longer requires pressor support  Continue antibiotics         Gram-negative bacteremia- (present on admission)   Assessment & Plan    E. coli, continue ceftriaxone        History of completed stroke- (present on admission)   Assessment & Plan    Hx brain aneurysm in 1994 with resultant right hemiparesis        Chronic pain- (present on admission)   Assessment & Plan    Restarted home meds.        Encephalopathy acute- (present on admission)   Assessment & Plan    Metabolic/toxic encephalopathy due to dehydration and sepsis, CT head is neg, continue treatment for sepsis and dehydration.         Metabolic acidosis- (present on admission)   Assessment & Plan    Due to sepsis, mild lactic acid elevation continue trending lactic acid levels.         CHERYL (acute kidney injury) (CMS-HCC)- (present on admission)   Assessment & Plan    Due to dehydration and sepsis   IV fluids  Renal function has recovered        Hyponatremia- (present on admission)   Assessment & Plan    IV fluids        Arm swelling- (present on admission)   Assessment & Plan    U/s negative for DVT.           Quality-Core Measures   Reviewed items::  Labs reviewed and Medications reviewed  Olmedo catheter::  Urinary Tract Retention or Urinary Tract Obstruction  DVT prophylaxis pharmacological::  Enoxaparin (Lovenox)

## 2018-02-16 NOTE — CARE PLAN
Problem: Pain Management  Goal: Pain level will decrease to patient's comfort goal  Pharmacologic and nonpharmacologic interventions implemented.     Problem: Safety  Goal: Will remain free from injury  Safety precautions in place

## 2018-02-16 NOTE — PROGRESS NOTES
Urology Progress Note    Author: George Veras Date & Time created: 2018   1:46 PM     Interval Events:  Pt. Seen and examined.    Had stent placed for sepsis, 9 mm right ureteral obstructing stone.  Doing better. WBC improving.  Olmedo removed yesterday.  Incontinence, but is emptying bladder.      Review of Systems   Unable to perform ROS: Mental acuity     Hemodynamics:  Temp (24hrs), Av.8 °C (98.3 °F), Min:35.9 °C (96.7 °F), Max:37.2 °C (99 °F)  Temperature: 37.1 °C (98.7 °F), Monitored Temp: 37.2 °C (99 °F)  Pulse  Av.6  Min: 53  Max: 121Heart Rate (Monitored): 91  Blood Pressure: 156/104, NIBP: 155/82     Respiratory:    Respiration: (!) 39 (RN notified), Pulse Oximetry: 94 %     Work Of Breathing / Effort: Shallow;Mild;Tachypnea     Neuro:  GCS Total Marianne Coma Score: 14     Fluids:    Intake/Output Summary (Last 24 hours) at 18 1346  Last data filed at 18 1200   Gross per 24 hour   Intake             2750 ml   Output              400 ml   Net             2350 ml     Weight: 100.1 kg (220 lb 10.9 oz)  Current Diet Order   Procedures   • DIET NPO     Physical Exam  Labs:  Recent Results (from the past 24 hour(s))   CBC WITH DIFFERENTIAL    Collection Time: 18  8:25 AM   Result Value Ref Range    WBC 17.8 (H) 4.8 - 10.8 K/uL    RBC 4.11 (L) 4.20 - 5.40 M/uL    Hemoglobin 11.8 (L) 12.0 - 16.0 g/dL    Hematocrit 35.1 (L) 37.0 - 47.0 %    MCV 85.4 81.4 - 97.8 fL    MCH 28.7 27.0 - 33.0 pg    MCHC 33.6 33.6 - 35.0 g/dL    RDW 47.5 35.9 - 50.0 fL    Platelet Count 167 164 - 446 K/uL    MPV 12.1 9.0 - 12.9 fL    Neutrophils-Polys 83.20 (H) 44.00 - 72.00 %    Lymphocytes 8.20 (L) 22.00 - 41.00 %    Monocytes 6.00 0.00 - 13.40 %    Eosinophils 0.10 0.00 - 6.90 %    Basophils 0.80 0.00 - 1.80 %    Immature Granulocytes 1.70 (H) 0.00 - 0.90 %    Nucleated RBC 0.10 /100 WBC    Neutrophils (Absolute) 14.81 (H) 2.00 - 7.15 K/uL    Lymphs (Absolute) 1.47 1.00 - 4.80 K/uL    Monos (Absolute)  1.07 (H) 0.00 - 0.85 K/uL    Eos (Absolute) 0.01 0.00 - 0.51 K/uL    Baso (Absolute) 0.15 (H) 0.00 - 0.12 K/uL    Immature Granulocytes (abs) 0.31 (H) 0.00 - 0.11 K/uL    NRBC (Absolute) 0.02 K/uL   BASIC METABOLIC PANEL    Collection Time: 02/16/18  8:25 AM   Result Value Ref Range    Sodium 141 135 - 145 mmol/L    Potassium 2.7 (LL) 3.6 - 5.5 mmol/L    Chloride 111 96 - 112 mmol/L    Co2 16 (L) 20 - 33 mmol/L    Glucose 126 (H) 65 - 99 mg/dL    Bun 13 8 - 22 mg/dL    Creatinine 0.49 (L) 0.50 - 1.40 mg/dL    Calcium 8.5 8.5 - 10.5 mg/dL    Anion Gap 14.0 (H) 0.0 - 11.9   ESTIMATED GFR    Collection Time: 02/16/18  8:25 AM   Result Value Ref Range    GFR If African American >60 >60 mL/min/1.73 m 2    GFR If Non African American >60 >60 mL/min/1.73 m 2     Medical Decision Making, by Problem:  Active Hospital Problems    Diagnosis   • Dysphagia [R13.10]     Priority: High   • Gram-negative bacteremia [R78.81]     Priority: High   • Pyelonephritis [N12]     Priority: High   • Hydronephrosis with urinary obstruction due to renal calculus [N13.2]     Priority: High   • History of completed stroke [Z86.73]     Priority: Medium   • Thrombocytopenia (CMS-HCC) [D69.6]     Priority: Low   • Septic shock (CMS-HCC) [A41.9, R65.21]     Priority: Low   • Hyponatremia [E87.1]     Priority: Low   • Chronic pain [G89.29]   • Arm swelling [M79.89]   • CHERYL (acute kidney injury) (CMS-HCC) [N17.9]   • Metabolic acidosis [E87.2]   • Encephalopathy acute [G93.40]     Plan:  1) Monitor urinary output.  2) Monitor pain control.  3) Will plan for outpatient management of stone.  4) Urology signing off    Quality Measures:  Quality-Core Measures    Discussed patient condition with Family, RN and urology- Dr. Killian

## 2018-02-16 NOTE — THERAPY
"Speech Language Therapy dysphagia treatment completed.     Functional Status: Was notified by RN to see patient for dysphagia reassessment s/p failed attempts for cortrak placement. Patient has been with NO SOURCE OF NUTRITION FOR 3 DAYS. Patient presented similarly compared to yesterday's evaluation in regards to demeanor and vocal quality. However, swallow function declined. PO trials of ice chips, NTL, and 1/2 tsp of pudding resulted in moderate delay in swallow trigger, increase in WOB in 100% of trials, and immediate coughing/choking on 1/3 trials of ice chips, 1/4 of NTL, and 1/4 trials of pudding. No further trials were given d/t serious concern for aspiration.      Recommendations: At this time, patient is not safe for a PO diet and continues to be at high risk of aspiration given overt s/sx of aspiration, AMS, overall debility, and compromised respiratory status with PO. Recommend NPO with an alternative source of nutrition.    Plan of Care: Will benefit from Speech Therapy 3 times per week    Post-Acute Therapy: Discharge to a transitional care facility for continued skilled therapy services.    See \"Rehab Therapy-Acute\" Patient Summary Report for complete documentation.     "

## 2018-02-16 NOTE — PROGRESS NOTES
Call placed to Dr. Pillai. Pt's K 2.7. Order to given 40 mEq over 4 hours, and recheck potassium, mag, and phos in 4-6 hours when potassium done infusing. Orders implemented.   MD also aware that swallow eval has not been completed but they have been called. Ok to transfer patient and have that completed on the floor.

## 2018-02-16 NOTE — CARE PLAN
Problem: Fluid Volume:  Goal: Will maintain balanced intake and output  Outcome: PROGRESSING AS EXPECTED      Problem: Infection  Goal: Will remain free from infection  Outcome: PROGRESSING AS EXPECTED

## 2018-02-16 NOTE — PROGRESS NOTES
Lab called with critical result of K 2.7 at 0915. Critical lab result read back to Lab.   Dr. Pillai notified of critical lab result at 1025.  Critical lab result read back by Dr. Pillai.

## 2018-02-16 NOTE — CONSULTS
DATE OF SERVICE:  02/15/2018    REFERRING PHYSICIAN:  Dr. Manning.    CHIEF COMPLAINT:  We were asked to evaluate the patient by Dr. Manning for   further evaluation of oropharyngeal dysphagia and possible endoscopic   placement of a Dobbhoff feeding tube.    HISTORY OF PRESENT ILLNESS:  The patient is a 57-year-old female with history   of chronic pain, prior brain aneurysm with hemiplegia and prior gastric bypass   with a duodenal switch procedure in the past whom we were asked to evaluate   given oropharyngeal dysphagia and inability to place a Dobbhoff feeding tube   at the bedside.    She was eating well at home prior to her hospital admission.  She does have a   history of prior duodenal switch several years ago for weight loss purposes.    She developed abdominal pain and was ultimately found to have a UTI with   urosepsis and hydronephrosis secondary to an obstructing ureteral stone.  She   initially required IV fluid, pressors and antibiotics.  She underwent a   cystoscopy and removal of ureteral stone and a ureteral stent placement by   urology.  Her blood pressures have been improving as well as her leukocytosis.    However, she continues to have a slightly altered mental status and   lethargic overall.  She has been evaluated by speech therapy who feels that   she is too weak to swallow safely.  I did attempt to place Dobbhoff at the   bedside, but we were unable to successfully do this likely due to her altered   gastric anatomy.  She has not had any oral intake over the past 5 days.    Today, she remains slightly lethargic.  She complains of chronic pain and had   not been receiving her chronic pain medications, which she was on large doses   at home.  She is sill tachypneic, on a face mask for oxygen.    PAST MEDICAL HISTORY:  1.  Morbid obesity.  2.  Chronic pain.  3.  History of distant brain aneurysm resulting in hemiplegia.    PAST SURGICAL HISTORY:  1.  Prior duodenal switch.  2.  Appendectomy.  3.   Cholecystectomy.    CURRENT MEDICATIONS:  1.  Ceftriaxone.  2.  Cymbalta.  3.  Lovenox.  4.  Fentanyl patch.  5.  Prozac.  6.  Folic acid.  7.  Neurontin.  8.  Morphine.  9.  Lyrica.  10.  Oxycodone.    ALLERGIES:  No known drug allergies.    FAMILY HISTORY:  No GI malignancies.    SOCIAL HISTORY:  Lives with  in [place].    REVIEW OF SYSTEMS:  Unobtainable due to patient's altered mental status.    PHYSICAL EXAMINATION:  VITAL SIGNS:  Afebrile, heart rate 81, blood pressure was 170s/70s, satting   93% on 3 liters face mask.  GENERAL:  She opens eyes to voice, but does not answer questions.  She falls   asleep easily, unable to answer any orientation questions.  HEENT:  Eyes show anicteric sclerae.  Mouth shows normal oropharynx.  NECK:  Shows no lymphadenopathy.  Thyroid shows no thyromegaly.  LUNGS:  Clear to auscultation bilaterally, but she is tachypneic.  CARDIOVASCULAR:  Revealed regular rate and rhythm.  ABDOMEN:  Soft, nondistended with good bowel sounds.  No appreciable masses.    No appreciable hepatosplenomegaly.  She does have multiple abdominal scars.  EXTREMITIES:  Revealed no edema.  NEUROLOGIC:  She is alert and opens eyes to command, but does not answer   questions.  No other focal deficits.  SKIN:  Revealed no rashes.    LABORATORY DATA:  Showed a CBC with white blood cell count 24.6, hemoglobin   11.1 and platelet count 131.  Chemistry shows sodium 144, potassium 3.4, BUN   18, and creatinine 0.5.    IMPRESSION:  The patient is a 57-year-old female with history of prior   duodenal switch, presented for urosepsis and now has oropharyngeal dysphagia   and protein-calorie malnutrition.  They have been unable to place feeding tube   at the bedside, likely due to her altered anatomy.  They have asked us to   consider placement of a Dobbhoff feeding tube endoscopically.  This may be an   option; however, I do have certain concerns about endoscopic placement at this   time.  Her first concern  is the fact that she would likely require anesthesia   due to her issues of morbid obesity and chronic pain for placement.  This   does increase the risk of placement and currently she does have significant   tachypnea and would hesitate to proceed with endoscopic placement at this   time.  I would like to reevaluate her situation on a daily basis and if felt   to improve from a breathing standpoint, then consider endoscopic placement of   Dobbhoff at that time.    PROBLEMS:  1.  Oropharyngeal dysphagia.  2.  Mild protein-calorie malnutrition.  3.  Urosepsis.  4.  Altered mental status.  5.  History of duodenal switch bariatric surgery.  6.  Morbid obesity.  7.  Chronic pain.    PLAN:  1.  We will reassess tomorrow for safety of endoscopic placement of a Dobbhoff   feeding tube, which would require anesthesia.  2.  Repeat swallow evaluation with speech therapy tomorrow.       ____________________________________     MD KIRSTEN PHILLIPS / CHELO    DD:  02/15/2018 20:57:41  DT:  02/16/2018 02:22:36    D#:  [id number]  Job#:  [id number]

## 2018-02-17 LAB
ANION GAP SERPL CALC-SCNC: 12 MMOL/L (ref 0–11.9)
ANISOCYTOSIS BLD QL SMEAR: ABNORMAL
BASOPHILS # BLD AUTO: 0 % (ref 0–1.8)
BASOPHILS # BLD: 0 K/UL (ref 0–0.12)
BUN SERPL-MCNC: 11 MG/DL (ref 8–22)
CALCIUM SERPL-MCNC: 8.5 MG/DL (ref 8.5–10.5)
CHLORIDE SERPL-SCNC: 108 MMOL/L (ref 96–112)
CO2 SERPL-SCNC: 18 MMOL/L (ref 20–33)
CREAT SERPL-MCNC: 0.5 MG/DL (ref 0.5–1.4)
EOSINOPHIL # BLD AUTO: 0 K/UL (ref 0–0.51)
EOSINOPHIL NFR BLD: 0 % (ref 0–6.9)
ERYTHROCYTE [DISTWIDTH] IN BLOOD BY AUTOMATED COUNT: 45.9 FL (ref 35.9–50)
GLUCOSE SERPL-MCNC: 156 MG/DL (ref 65–99)
HCT VFR BLD AUTO: 38.8 % (ref 37–47)
HGB BLD-MCNC: 13.1 G/DL (ref 12–16)
LYMPHOCYTES # BLD AUTO: 1.95 K/UL (ref 1–4.8)
LYMPHOCYTES NFR BLD: 14 % (ref 22–41)
MACROCYTES BLD QL SMEAR: ABNORMAL
MAGNESIUM SERPL-MCNC: 2.1 MG/DL (ref 1.5–2.5)
MANUAL DIFF BLD: NORMAL
MCH RBC QN AUTO: 28.6 PG (ref 27–33)
MCHC RBC AUTO-ENTMCNC: 33.8 G/DL (ref 33.6–35)
MCV RBC AUTO: 84.7 FL (ref 81.4–97.8)
MONOCYTES # BLD AUTO: 0.74 K/UL (ref 0–0.85)
MONOCYTES NFR BLD AUTO: 5.3 % (ref 0–13.4)
MORPHOLOGY BLD-IMP: NORMAL
MYELOCYTES NFR BLD MANUAL: 0.9 %
NEUTROPHILS # BLD AUTO: 11.09 K/UL (ref 2–7.15)
NEUTROPHILS NFR BLD: 79.8 % (ref 44–72)
NRBC # BLD AUTO: 0 K/UL
NRBC BLD-RTO: 0 /100 WBC
PHOSPHATE SERPL-MCNC: 2.2 MG/DL (ref 2.5–4.5)
PLATELET # BLD AUTO: 178 K/UL (ref 164–446)
PLATELET BLD QL SMEAR: NORMAL
PMV BLD AUTO: 11.9 FL (ref 9–12.9)
POTASSIUM SERPL-SCNC: 2.6 MMOL/L (ref 3.6–5.5)
POTASSIUM SERPL-SCNC: 2.9 MMOL/L (ref 3.6–5.5)
RBC # BLD AUTO: 4.58 M/UL (ref 4.2–5.4)
RBC BLD AUTO: PRESENT
SODIUM SERPL-SCNC: 138 MMOL/L (ref 135–145)
TSH SERPL DL<=0.005 MIU/L-ACNC: 3.77 UIU/ML (ref 0.38–5.33)
WBC # BLD AUTO: 13.9 K/UL (ref 4.8–10.8)

## 2018-02-17 PROCEDURE — 700105 HCHG RX REV CODE 258: Performed by: INTERNAL MEDICINE

## 2018-02-17 PROCEDURE — 700111 HCHG RX REV CODE 636 W/ 250 OVERRIDE (IP): Performed by: HOSPITALIST

## 2018-02-17 PROCEDURE — 85027 COMPLETE CBC AUTOMATED: CPT | Mod: 91

## 2018-02-17 PROCEDURE — 84100 ASSAY OF PHOSPHORUS: CPT

## 2018-02-17 PROCEDURE — 700111 HCHG RX REV CODE 636 W/ 250 OVERRIDE (IP): Performed by: INTERNAL MEDICINE

## 2018-02-17 PROCEDURE — 84443 ASSAY THYROID STIM HORMONE: CPT

## 2018-02-17 PROCEDURE — 83735 ASSAY OF MAGNESIUM: CPT | Mod: 91

## 2018-02-17 PROCEDURE — 85007 BL SMEAR W/DIFF WBC COUNT: CPT

## 2018-02-17 PROCEDURE — 80048 BASIC METABOLIC PNL TOTAL CA: CPT | Mod: 91

## 2018-02-17 PROCEDURE — 84132 ASSAY OF SERUM POTASSIUM: CPT

## 2018-02-17 PROCEDURE — 700105 HCHG RX REV CODE 258: Performed by: HOSPITALIST

## 2018-02-17 PROCEDURE — 770020 HCHG ROOM/CARE - TELE (206)

## 2018-02-17 PROCEDURE — 700101 HCHG RX REV CODE 250: Performed by: INTERNAL MEDICINE

## 2018-02-17 PROCEDURE — 99232 SBSQ HOSP IP/OBS MODERATE 35: CPT | Performed by: INTERNAL MEDICINE

## 2018-02-17 PROCEDURE — 302146: Performed by: INTERNAL MEDICINE

## 2018-02-17 RX ORDER — MORPHINE SULFATE 4 MG/ML
1-3 INJECTION, SOLUTION INTRAMUSCULAR; INTRAVENOUS EVERY 4 HOURS PRN
Status: DISCONTINUED | OUTPATIENT
Start: 2018-02-17 | End: 2018-02-18

## 2018-02-17 RX ADMIN — CEFTRIAXONE 2 G: 2 INJECTION, POWDER, FOR SOLUTION INTRAMUSCULAR; INTRAVENOUS at 08:30

## 2018-02-17 RX ADMIN — HYDRALAZINE HYDROCHLORIDE 10 MG: 20 INJECTION INTRAMUSCULAR; INTRAVENOUS at 00:11

## 2018-02-17 RX ADMIN — LABETALOL HYDROCHLORIDE 10 MG: 5 INJECTION, SOLUTION INTRAVENOUS at 01:04

## 2018-02-17 RX ADMIN — SODIUM CHLORIDE: 9 INJECTION, SOLUTION INTRAVENOUS at 12:36

## 2018-02-17 RX ADMIN — POTASSIUM CHLORIDE 40 MEQ: 2 INJECTION, SOLUTION, CONCENTRATE INTRAVENOUS at 15:19

## 2018-02-17 RX ADMIN — MORPHINE SULFATE 6 MG: 10 INJECTION INTRAVENOUS at 14:19

## 2018-02-17 RX ADMIN — MORPHINE SULFATE 6 MG: 10 INJECTION INTRAVENOUS at 08:25

## 2018-02-17 RX ADMIN — POTASSIUM CHLORIDE 30 MEQ: 2 INJECTION, SOLUTION, CONCENTRATE INTRAVENOUS at 06:08

## 2018-02-17 RX ADMIN — ENOXAPARIN SODIUM 40 MG: 100 INJECTION SUBCUTANEOUS at 08:25

## 2018-02-17 ASSESSMENT — PAIN SCALES - GENERAL
PAINLEVEL_OUTOF10: 10
PAINLEVEL_OUTOF10: 7
PAINLEVEL_OUTOF10: 0
PAINLEVEL_OUTOF10: 0

## 2018-02-17 NOTE — PROGRESS NOTES
Report received at the bed side. No family at bedside. No signs of distress or SOB. Call light and belongings within reach. Bed alarm in place. Bed in lowest position.

## 2018-02-17 NOTE — PROGRESS NOTES
Lab called with critical result of Potassium at 2.6. Dr. Carrero notified of critical lab result at 500. Orders received for Potassium Chloride 30meq.

## 2018-02-17 NOTE — PROGRESS NOTES
paged regarding pt's 's need to sleep at hospital overnight.  informed of hospital policy of no spending the night in semi-private rooms. Pt is from out of town and  states he has insufficient funds for [facility].  paged RN back stating  is unable to provide pt's  with help at this time. Pt's  stated a bus ticket would be sufficient, however,  declined and stated they are unable to provide any services at this time. Pt was informed of policy and social work's inability to provide room. Social work is only to provide service to patients.

## 2018-02-17 NOTE — PROGRESS NOTES
[facility] Hospitalist Progress Note    Date of Service: 2018    Chief Complaint  57 y.o. female history of brain aneurysm with hemiplegia since  that was found to have a fever of 103 and UTI with hydronephrosis. She was transferred from [facility] to [facility] and underwent cystoscopy and ureteral stent placement by Dr. Killian. She has been admitted to the ICU for IV fluids and IV antibiotics. Her IV pressors have been turned off. Now on the floor , BCx also growing E.coli, ID has been consulted. GI also consulted for possible tube feeding placement through endoscopy     Interval Problem Update  Pt seen and examined, transferred from ICU overnight, prognosis is guarded,   Bcx positive for E.coli ID consulted appreciate rec.  Has failed swallow eval, GI has also bee consulted for tube feeding placement through endoscopy.    Consultants/Specialty  Critical care  Urology  Gastroenterology  Infection disease      Disposition  TBD       Review of Systems   Unable to perform ROS: Mental acuity      Physical Exam  Laboratory/Imaging   Hemodynamics  Temp (24hrs), Av.7 °C (98 °F), Min:36.2 °C (97.2 °F), Max:37 °C (98.6 °F)   Temperature: 36.4 °C (97.6 °F)  Pulse  Av.9  Min: 53  Max: 121    Blood Pressure: 148/82      Respiratory      Respiration: 18, Pulse Oximetry: 96 %     Work Of Breathing / Effort: Shallow;Mild;Tachypnea  RUL Breath Sounds: Fine Crackles, RML Breath Sounds: Fine Crackles, RLL Breath Sounds: Fine Crackles, EMIGDIO Breath Sounds: Fine Crackles, LLL Breath Sounds: Fine Crackles    Fluids    Intake/Output Summary (Last 24 hours) at 18 1506  Last data filed at 18 0600   Gross per 24 hour   Intake             1200 ml   Output                0 ml   Net             1200 ml       Nutrition  Orders Placed This Encounter   Procedures   • DIET NPO     Standing Status:   Standing     Number of Occurrences:   1     Order Specific Question:   Restrict to:     Answer:   Strict [1]     Comments:    pending swallow eval     Physical Exam   Constitutional: She appears well-nourished. No distress.   HENT:   Head: Normocephalic and atraumatic.   Right Ear: External ear normal.   Left Ear: External ear normal.   Eyes: Conjunctivae and EOM are normal. No scleral icterus.   Neck: Normal range of motion. Neck supple.   Right IJ central line   Cardiovascular: Normal rate and regular rhythm.    No murmur heard.  Pulmonary/Chest: Effort normal. No respiratory distress. She has no wheezes.   Abdominal: Soft. She exhibits distension. There is no tenderness. There is no rebound and no guarding.   Genitourinary:   Genitourinary Comments: Olmedo catheter   Musculoskeletal: She exhibits edema.   Right arm swelling   Neurological:   Awake, difficult to understand her speech  Right side is weak   Skin: Skin is warm and dry. There is pallor.   Nursing note and vitals reviewed.      Recent Labs      02/15/18   0522  02/16/18   0825  02/17/18   0328   WBC  24.6*  17.8*  13.9*   RBC  3.64*  4.11*  4.58   HEMOGLOBIN  11.1*  11.8*  13.1   HEMATOCRIT  31.6*  35.1*  38.8   MCV  86.8  85.4  84.7   MCH  30.5  28.7  28.6   MCHC  35.1*  33.6  33.8   RDW  49.9  47.5  45.9   PLATELETCT  131*  167  178   MPV  11.9  12.1  11.9     Recent Labs      02/15/18   0522  02/16/18   0825   02/16/18   2048  02/17/18   0328  02/17/18   1346   SODIUM  144  141   --    --   138   --    POTASSIUM  3.4*  2.7*   < >  2.6*  2.6*  2.9*   CHLORIDE  117*  111   --    --   108   --    CO2  16*  16*   --    --   18*   --    GLUCOSE  83  126*   --    --   156*   --    BUN  18  13   --    --   11   --    CREATININE  0.53  0.49*   --    --   0.50   --    CALCIUM  8.1*  8.5   --    --   8.5   --     < > = values in this interval not displayed.                      Assessment/Plan     * Pyelonephritis- (present on admission)   Assessment & Plan     leukocytosis trending down  Continue ceftriaxone for E. coli pyelonephritis  Had also nephrolithiasis with hydronephrosis,  s/p stent placement by urology  Also BCx growing E.coli so ID has been consulted         Dysphagia- (present on admission)   Assessment & Plan    Speech reevaluation today  May need to have a feeding tube placed by endoscopy        Gram-negative bacteremia- (present on admission)   Assessment & Plan    BCx growing E.coli  Consulted ID appreciate rec.         Hydronephrosis with urinary obstruction due to renal calculus- (present on admission)   Assessment & Plan    7mm right sided ureter stone s/p removal 2/13         Septic shock (CMS-HCC)- (present on admission)   Assessment & Plan    Present on admission  Septic shock has  now resolved        History of completed stroke- (present on admission)   Assessment & Plan    Hx brain aneurysm in 1994 with resultant right hemiparesis        Chronic pain- (present on admission)   Assessment & Plan    Restarted home meds.        Encephalopathy acute- (present on admission)   Assessment & Plan    Metabolic/toxic encephalopathy due to dehydration and sepsis, CT head is neg, continue treatment for sepsis and dehydration.         Metabolic acidosis- (present on admission)   Assessment & Plan    Urosepsis, improved        CHERYL (acute kidney injury) (CMS-HCC)- (present on admission)   Assessment & Plan    Due to dehydration and sepsis   IV fluids  Renal function has recovered        Arm swelling- (present on admission)   Assessment & Plan    U/s negative for DVT.         Thrombocytopenia (CMS-HCC)- (present on admission)   Assessment & Plan    Likely due to sepsis  Have trended up          Hyponatremia- (present on admission)   Assessment & Plan    Has resolved with IV fluids          Quality-Core Measures   Reviewed items::  Labs reviewed and Medications reviewed  Olmedo catheter::  Urinary Tract Retention or Urinary Tract Obstruction  DVT prophylaxis pharmacological::  Enoxaparin (Lovenox)  Antibiotics:  Treating active infection/contamination beyond 24 hours perioperative  coverage  Assessed for rehabilitation services:  Patient was assess for and/or received rehabilitation services during this hospitalization

## 2018-02-17 NOTE — PROGRESS NOTES
Pt resting in bed, even visible chest rise, no apparent signs of distress, declines pain, bed alarm on, call light in place, bed in lowest locked position.  at the bedside.

## 2018-02-17 NOTE — PROGRESS NOTES
Don - brother in law called and requested update about pt status. Received permission form pt's  to speak to Don about wife.

## 2018-02-17 NOTE — PROGRESS NOTES
Hourly rounding and q2 turns in place. Pt incontinent of urine, all bedding and gown changed, bowel movement documented, no needs at this time. Waffle mattress in place. For more information on initial assessment, please refer to nursing flow sheets.

## 2018-02-17 NOTE — CARE PLAN
Problem: Communication  Goal: The ability to communicate needs accurately and effectively will improve  Outcome: PROGRESSING SLOWER THAN EXPECTED  Collaborated with  and discussed placement or room and board for pt's .    Problem: Infection  Goal: Will remain free from infection  Outcome: PROGRESSING AS EXPECTED  Informed pt and family to foam in and out when entering and leaving room.    Problem: Venous Thromboembolism (VTW)/Deep Vein Thrombosis (DVT) Prevention:  Goal: Patient will participate in Venous Thrombosis (VTE)/Deep Vein Thrombosis (DVT)Prevention Measures  Outcome: PROGRESSING AS EXPECTED  Verified SCD machine and sleeves in place.    Problem: Skin Integrity  Goal: Risk for impaired skin integrity will decrease  Outcome: PROGRESSING AS EXPECTED  Initiated q2 turning to decrease skin breakdown.  Applied barrier paste applied to sacral area.  Placed waffle mattress under pt to decrease skin breakdown.

## 2018-02-17 NOTE — PROGRESS NOTES
Report received at beside. RN assumed care. Chart reviewed. Patient is resting in bed with family at bedside. Patient updated on plan of care.  Assessment completed. AO x 1 only to self. Pt reoriented to place, situation, and time. Pain 0/10. Patient has no concerns or complaints at this time. Telemetry box verified on. Bed alarm verified on. Call light within reach. Q2 turns in place. Bed in lowest position. Non slip socks on

## 2018-02-17 NOTE — CARE PLAN
Problem: Safety  Goal: Will remain free from injury  Pt mobility assessed at the beginning of the shift. Fall precautions in place, non-slip socks on, bed in lowest, locked position, and call light is within reach. Pt educated to call for assistance.    Problem: Knowledge Deficit  Goal: Knowledge of disease process/condition, treatment plan, diagnostic tests, and medications will improve  Educated pt and family on disease process, treatment plan, and reason for medications she is on.

## 2018-02-17 NOTE — PROGRESS NOTES
from Lab called with critical result of Potassium at 2045. Critical lab result read back to .   Dr. Yanez, Hospitalist, notified of critical lab result at 2200. Orders received for a recheck of labs Phosphorous and Magnesium after Magnesium Sulfate and Potassium Phosphate bags have completed.

## 2018-02-18 ENCOUNTER — APPOINTMENT (OUTPATIENT)
Dept: RADIOLOGY | Facility: MEDICAL CENTER | Age: 58
DRG: 853 | End: 2018-02-18
Attending: INTERNAL MEDICINE
Payer: MEDICARE

## 2018-02-18 PROBLEM — J96.00 ACUTE RESPIRATORY FAILURE (HCC): Status: ACTIVE | Noted: 2018-02-18

## 2018-02-18 LAB
ACTION RANGE TRIGGERED IACRT: NO
ANION GAP SERPL CALC-SCNC: 10 MMOL/L (ref 0–11.9)
ANION GAP SERPL CALC-SCNC: 13 MMOL/L (ref 0–11.9)
APPEARANCE UR: ABNORMAL
BACTERIA #/AREA URNS HPF: NEGATIVE /HPF
BASE EXCESS BLDA CALC-SCNC: -1 MMOL/L (ref -4–3)
BASOPHILS # BLD AUTO: 0 % (ref 0–1.8)
BASOPHILS # BLD: 0 K/UL (ref 0–0.12)
BILIRUB UR QL STRIP.AUTO: NEGATIVE
BODY TEMPERATURE: ABNORMAL DEGREES
BUN SERPL-MCNC: 11 MG/DL (ref 8–22)
BUN SERPL-MCNC: 11 MG/DL (ref 8–22)
CALCIUM SERPL-MCNC: 8.3 MG/DL (ref 8.5–10.5)
CALCIUM SERPL-MCNC: 8.5 MG/DL (ref 8.5–10.5)
CHLORIDE SERPL-SCNC: 111 MMOL/L (ref 96–112)
CHLORIDE SERPL-SCNC: 113 MMOL/L (ref 96–112)
CO2 BLDA-SCNC: 24 MMOL/L (ref 20–33)
CO2 SERPL-SCNC: 18 MMOL/L (ref 20–33)
CO2 SERPL-SCNC: 18 MMOL/L (ref 20–33)
COLOR UR: YELLOW
CREAT SERPL-MCNC: 0.37 MG/DL (ref 0.5–1.4)
CREAT SERPL-MCNC: 0.47 MG/DL (ref 0.5–1.4)
EOSINOPHIL # BLD AUTO: 0.1 K/UL (ref 0–0.51)
EOSINOPHIL NFR BLD: 0.9 % (ref 0–6.9)
EPI CELLS #/AREA URNS HPF: ABNORMAL /HPF
ERYTHROCYTE [DISTWIDTH] IN BLOOD BY AUTOMATED COUNT: 44 FL (ref 35.9–50)
ERYTHROCYTE [DISTWIDTH] IN BLOOD BY AUTOMATED COUNT: 45.2 FL (ref 35.9–50)
GIANT PLATELETS BLD QL SMEAR: NORMAL
GLUCOSE BLD-MCNC: 101 MG/DL (ref 65–99)
GLUCOSE BLD-MCNC: 137 MG/DL (ref 65–99)
GLUCOSE SERPL-MCNC: 131 MG/DL (ref 65–99)
GLUCOSE SERPL-MCNC: 225 MG/DL (ref 65–99)
GLUCOSE UR STRIP.AUTO-MCNC: NEGATIVE MG/DL
HCO3 BLDA-SCNC: 23.2 MMOL/L (ref 17–25)
HCT VFR BLD AUTO: 34.4 % (ref 37–47)
HCT VFR BLD AUTO: 37.9 % (ref 37–47)
HGB BLD-MCNC: 12 G/DL (ref 12–16)
HGB BLD-MCNC: 13.1 G/DL (ref 12–16)
HYALINE CASTS #/AREA URNS LPF: ABNORMAL /LPF
INST. QUALIFIED PATIENT IIQPT: YES
KETONES UR STRIP.AUTO-MCNC: 15 MG/DL
LACTATE BLD-SCNC: 1.5 MMOL/L (ref 0.5–2)
LACTATE BLD-SCNC: 3.1 MMOL/L (ref 0.5–2)
LEUKOCYTE ESTERASE UR QL STRIP.AUTO: ABNORMAL
LYMPHOCYTES # BLD AUTO: 2.45 K/UL (ref 1–4.8)
LYMPHOCYTES NFR BLD: 22.1 % (ref 22–41)
MAGNESIUM SERPL-MCNC: 1.8 MG/DL (ref 1.5–2.5)
MAGNESIUM SERPL-MCNC: 2.3 MG/DL (ref 1.5–2.5)
MANUAL DIFF BLD: NORMAL
MCH RBC QN AUTO: 29.1 PG (ref 27–33)
MCH RBC QN AUTO: 29.4 PG (ref 27–33)
MCHC RBC AUTO-ENTMCNC: 34.6 G/DL (ref 33.6–35)
MCHC RBC AUTO-ENTMCNC: 34.9 G/DL (ref 33.6–35)
MCV RBC AUTO: 83.3 FL (ref 81.4–97.8)
MCV RBC AUTO: 85 FL (ref 81.4–97.8)
METAMYELOCYTES NFR BLD MANUAL: 0.9 %
MICRO URNS: ABNORMAL
MONOCYTES # BLD AUTO: 0.2 K/UL (ref 0–0.85)
MONOCYTES NFR BLD AUTO: 1.8 % (ref 0–13.4)
MORPHOLOGY BLD-IMP: NORMAL
NEUTROPHILS # BLD AUTO: 8.25 K/UL (ref 2–7.15)
NEUTROPHILS NFR BLD: 70.8 % (ref 44–72)
NEUTS BAND NFR BLD MANUAL: 3.5 % (ref 0–10)
NITRITE UR QL STRIP.AUTO: NEGATIVE
NRBC # BLD AUTO: 0.03 K/UL
NRBC BLD-RTO: 0.3 /100 WBC
O2/TOTAL GAS SETTING VFR VENT: 90 %
PCO2 BLDA: 37.5 MMHG (ref 26–37)
PH BLDA: 7.4 [PH] (ref 7.4–7.5)
PH UR STRIP.AUTO: 5 [PH]
PLATELET # BLD AUTO: 189 K/UL (ref 164–446)
PLATELET # BLD AUTO: 243 K/UL (ref 164–446)
PLATELET BLD QL SMEAR: NORMAL
PMV BLD AUTO: 12.3 FL (ref 9–12.9)
PMV BLD AUTO: 12.4 FL (ref 9–12.9)
PO2 BLDA: 280 MMHG (ref 64–87)
POTASSIUM SERPL-SCNC: 2.8 MMOL/L (ref 3.6–5.5)
POTASSIUM SERPL-SCNC: 2.9 MMOL/L (ref 3.6–5.5)
POTASSIUM SERPL-SCNC: 3.3 MMOL/L (ref 3.6–5.5)
PROT UR QL STRIP: 300 MG/DL
RBC # BLD AUTO: 4.13 M/UL (ref 4.2–5.4)
RBC # BLD AUTO: 4.46 M/UL (ref 4.2–5.4)
RBC # URNS HPF: >150 /HPF
RBC BLD AUTO: PRESENT
RBC UR QL AUTO: ABNORMAL
SAO2 % BLDA: 100 % (ref 93–99)
SMUDGE CELLS BLD QL SMEAR: NORMAL
SODIUM SERPL-SCNC: 141 MMOL/L (ref 135–145)
SODIUM SERPL-SCNC: 142 MMOL/L (ref 135–145)
SP GR UR STRIP.AUTO: 1.01
SPECIMEN DRAWN FROM PATIENT: ABNORMAL
UROBILINOGEN UR STRIP.AUTO-MCNC: 0.2 MG/DL
WBC # BLD AUTO: 10.7 K/UL (ref 4.8–10.8)
WBC # BLD AUTO: 11.1 K/UL (ref 4.8–10.8)
WBC #/AREA URNS HPF: ABNORMAL /HPF
YEAST BUDDING URNS QL: PRESENT /HPF

## 2018-02-18 PROCEDURE — 0BH17EZ INSERTION OF ENDOTRACHEAL AIRWAY INTO TRACHEA, VIA NATURAL OR ARTIFICIAL OPENING: ICD-10-PCS | Performed by: INTERNAL MEDICINE

## 2018-02-18 PROCEDURE — 81001 URINALYSIS AUTO W/SCOPE: CPT

## 2018-02-18 PROCEDURE — 700111 HCHG RX REV CODE 636 W/ 250 OVERRIDE (IP): Performed by: INTERNAL MEDICINE

## 2018-02-18 PROCEDURE — 99291 CRITICAL CARE FIRST HOUR: CPT | Performed by: INTERNAL MEDICINE

## 2018-02-18 PROCEDURE — 770022 HCHG ROOM/CARE - ICU (200)

## 2018-02-18 PROCEDURE — 80048 BASIC METABOLIC PNL TOTAL CA: CPT

## 2018-02-18 PROCEDURE — 700111 HCHG RX REV CODE 636 W/ 250 OVERRIDE (IP): Performed by: HOSPITALIST

## 2018-02-18 PROCEDURE — 87116 MYCOBACTERIA CULTURE: CPT

## 2018-02-18 PROCEDURE — 93005 ELECTROCARDIOGRAM TRACING: CPT | Performed by: INTERNAL MEDICINE

## 2018-02-18 PROCEDURE — 83735 ASSAY OF MAGNESIUM: CPT

## 2018-02-18 PROCEDURE — 87205 SMEAR GRAM STAIN: CPT

## 2018-02-18 PROCEDURE — 0YHH33Z INSERTION OF INFUSION DEVICE INTO RIGHT LOWER LEG, PERCUTANEOUS APPROACH: ICD-10-PCS | Performed by: INTERNAL MEDICINE

## 2018-02-18 PROCEDURE — 71045 X-RAY EXAM CHEST 1 VIEW: CPT

## 2018-02-18 PROCEDURE — C1751 CATH, INF, PER/CENT/MIDLINE: HCPCS

## 2018-02-18 PROCEDURE — 94002 VENT MGMT INPAT INIT DAY: CPT

## 2018-02-18 PROCEDURE — 87015 SPECIMEN INFECT AGNT CONCNTJ: CPT

## 2018-02-18 PROCEDURE — 700102 HCHG RX REV CODE 250 W/ 637 OVERRIDE(OP): Performed by: INTERNAL MEDICINE

## 2018-02-18 PROCEDURE — 82962 GLUCOSE BLOOD TEST: CPT | Mod: 91

## 2018-02-18 PROCEDURE — 700105 HCHG RX REV CODE 258: Performed by: HOSPITALIST

## 2018-02-18 PROCEDURE — 85007 BL SMEAR W/DIFF WBC COUNT: CPT

## 2018-02-18 PROCEDURE — 05H533Z INSERTION OF INFUSION DEVICE INTO RIGHT SUBCLAVIAN VEIN, PERCUTANEOUS APPROACH: ICD-10-PCS | Performed by: INTERNAL MEDICINE

## 2018-02-18 PROCEDURE — 0BCB8ZZ EXTIRPATION OF MATTER FROM LEFT LOWER LOBE BRONCHUS, VIA NATURAL OR ARTIFICIAL OPENING ENDOSCOPIC: ICD-10-PCS | Performed by: INTERNAL MEDICINE

## 2018-02-18 PROCEDURE — 5A1945Z RESPIRATORY VENTILATION, 24-96 CONSECUTIVE HOURS: ICD-10-PCS | Performed by: INTERNAL MEDICINE

## 2018-02-18 PROCEDURE — 82803 BLOOD GASES ANY COMBINATION: CPT

## 2018-02-18 PROCEDURE — 700101 HCHG RX REV CODE 250: Performed by: INTERNAL MEDICINE

## 2018-02-18 PROCEDURE — 700105 HCHG RX REV CODE 258: Performed by: INTERNAL MEDICINE

## 2018-02-18 PROCEDURE — 700101 HCHG RX REV CODE 250: Performed by: HOSPITALIST

## 2018-02-18 PROCEDURE — 302146: Performed by: INTERNAL MEDICINE

## 2018-02-18 PROCEDURE — 87281 PNEUMOCYSTIS CARINII AG IF: CPT

## 2018-02-18 PROCEDURE — 85027 COMPLETE CBC AUTOMATED: CPT

## 2018-02-18 PROCEDURE — 0B9J8ZX DRAINAGE OF LEFT LOWER LUNG LOBE, VIA NATURAL OR ARTIFICIAL OPENING ENDOSCOPIC, DIAGNOSTIC: ICD-10-PCS | Performed by: INTERNAL MEDICINE

## 2018-02-18 PROCEDURE — 302214 INTUBATION BOX: Performed by: INTERNAL MEDICINE

## 2018-02-18 PROCEDURE — 700111 HCHG RX REV CODE 636 W/ 250 OVERRIDE (IP)

## 2018-02-18 PROCEDURE — B546ZZA ULTRASONOGRAPHY OF RIGHT SUBCLAVIAN VEIN, GUIDANCE: ICD-10-PCS | Performed by: INTERNAL MEDICINE

## 2018-02-18 PROCEDURE — 88305 TISSUE EXAM BY PATHOLOGIST: CPT

## 2018-02-18 PROCEDURE — A6250 SKIN SEAL PROTECT MOISTURIZR: HCPCS | Performed by: INTERNAL MEDICINE

## 2018-02-18 PROCEDURE — 87206 SMEAR FLUORESCENT/ACID STAI: CPT

## 2018-02-18 PROCEDURE — 93010 ELECTROCARDIOGRAM REPORT: CPT | Mod: 76 | Performed by: INTERNAL MEDICINE

## 2018-02-18 PROCEDURE — 36556 INSERT NON-TUNNEL CV CATH: CPT

## 2018-02-18 PROCEDURE — 83605 ASSAY OF LACTIC ACID: CPT | Mod: 91

## 2018-02-18 PROCEDURE — 36600 WITHDRAWAL OF ARTERIAL BLOOD: CPT

## 2018-02-18 PROCEDURE — 5A12012 PERFORMANCE OF CARDIAC OUTPUT, SINGLE, MANUAL: ICD-10-PCS | Performed by: INTERNAL MEDICINE

## 2018-02-18 PROCEDURE — 87086 URINE CULTURE/COLONY COUNT: CPT

## 2018-02-18 PROCEDURE — 84132 ASSAY OF SERUM POTASSIUM: CPT

## 2018-02-18 PROCEDURE — 87070 CULTURE OTHR SPECIMN AEROBIC: CPT

## 2018-02-18 PROCEDURE — 92950 HEART/LUNG RESUSCITATION CPR: CPT

## 2018-02-18 PROCEDURE — 87102 FUNGUS ISOLATION CULTURE: CPT

## 2018-02-18 PROCEDURE — 31500 INSERT EMERGENCY AIRWAY: CPT

## 2018-02-18 PROCEDURE — 87106 FUNGI IDENTIFICATION YEAST: CPT

## 2018-02-18 PROCEDURE — 302978 HCHG BRONCHOSCOPY-DIAGNOSTIC

## 2018-02-18 PROCEDURE — 0BJ08ZZ INSPECTION OF TRACHEOBRONCHIAL TREE, VIA NATURAL OR ARTIFICIAL OPENING ENDOSCOPIC: ICD-10-PCS | Performed by: INTERNAL MEDICINE

## 2018-02-18 PROCEDURE — A9270 NON-COVERED ITEM OR SERVICE: HCPCS | Performed by: INTERNAL MEDICINE

## 2018-02-18 PROCEDURE — 88112 CYTOPATH CELL ENHANCE TECH: CPT

## 2018-02-18 RX ORDER — BISACODYL 10 MG
10 SUPPOSITORY, RECTAL RECTAL
Status: DISCONTINUED | OUTPATIENT
Start: 2018-02-18 | End: 2018-02-27 | Stop reason: HOSPADM

## 2018-02-18 RX ORDER — CHLORHEXIDINE GLUCONATE ORAL RINSE 1.2 MG/ML
15 SOLUTION DENTAL 2 TIMES DAILY
Status: DISCONTINUED | OUTPATIENT
Start: 2018-02-18 | End: 2018-02-22

## 2018-02-18 RX ORDER — MORPHINE SULFATE 4 MG/ML
2 INJECTION, SOLUTION INTRAMUSCULAR; INTRAVENOUS
Status: DISCONTINUED | OUTPATIENT
Start: 2018-02-18 | End: 2018-02-18 | Stop reason: CLARIF

## 2018-02-18 RX ORDER — DEXTROSE MONOHYDRATE 25 G/50ML
25 INJECTION, SOLUTION INTRAVENOUS
Status: DISCONTINUED | OUTPATIENT
Start: 2018-02-18 | End: 2018-02-22

## 2018-02-18 RX ORDER — AMOXICILLIN 250 MG
2 CAPSULE ORAL 2 TIMES DAILY
Status: DISCONTINUED | OUTPATIENT
Start: 2018-02-18 | End: 2018-02-27 | Stop reason: HOSPADM

## 2018-02-18 RX ORDER — POLYETHYLENE GLYCOL 3350 17 G/17G
1 POWDER, FOR SOLUTION ORAL
Status: DISCONTINUED | OUTPATIENT
Start: 2018-02-18 | End: 2018-02-27 | Stop reason: HOSPADM

## 2018-02-18 RX ORDER — EPINEPHRINE 0.1 MG/ML
SYRINGE (ML) INJECTION
Status: COMPLETED | OUTPATIENT
Start: 2018-02-18 | End: 2018-02-18

## 2018-02-18 RX ORDER — POTASSIUM CHLORIDE 7.45 MG/ML
INJECTION INTRAVENOUS
Status: COMPLETED
Start: 2018-02-18 | End: 2018-02-18

## 2018-02-18 RX ORDER — MAGNESIUM SULFATE HEPTAHYDRATE 500 MG/ML
INJECTION, SOLUTION INTRAMUSCULAR; INTRAVENOUS
Status: COMPLETED | OUTPATIENT
Start: 2018-02-18 | End: 2018-02-18

## 2018-02-18 RX ORDER — SODIUM CHLORIDE AND POTASSIUM CHLORIDE 150; 900 MG/100ML; MG/100ML
INJECTION, SOLUTION INTRAVENOUS CONTINUOUS
Status: DISCONTINUED | OUTPATIENT
Start: 2018-02-18 | End: 2018-02-19

## 2018-02-18 RX ORDER — IPRATROPIUM BROMIDE AND ALBUTEROL SULFATE 2.5; .5 MG/3ML; MG/3ML
3 SOLUTION RESPIRATORY (INHALATION)
Status: DISCONTINUED | OUTPATIENT
Start: 2018-02-18 | End: 2018-02-27 | Stop reason: HOSPADM

## 2018-02-18 RX ORDER — MIDAZOLAM HYDROCHLORIDE 1 MG/ML
INJECTION INTRAMUSCULAR; INTRAVENOUS
Status: COMPLETED
Start: 2018-02-18 | End: 2018-02-18

## 2018-02-18 RX ORDER — ROCURONIUM BROMIDE 10 MG/ML
INJECTION, SOLUTION INTRAVENOUS
Status: COMPLETED | OUTPATIENT
Start: 2018-02-18 | End: 2018-02-18

## 2018-02-18 RX ORDER — POTASSIUM CHLORIDE 29.8 MG/ML
40 INJECTION INTRAVENOUS ONCE
Status: COMPLETED | OUTPATIENT
Start: 2018-02-19 | End: 2018-02-19

## 2018-02-18 RX ORDER — FAMOTIDINE 20 MG/1
20 TABLET, FILM COATED ORAL EVERY 12 HOURS
Status: DISCONTINUED | OUTPATIENT
Start: 2018-02-18 | End: 2018-02-22

## 2018-02-18 RX ORDER — SODIUM CHLORIDE 9 MG/ML
1000 INJECTION, SOLUTION INTRAVENOUS ONCE
Status: COMPLETED | OUTPATIENT
Start: 2018-02-18 | End: 2018-02-18

## 2018-02-18 RX ORDER — LIDOCAINE HYDROCHLORIDE 10 MG/ML
1-2 INJECTION, SOLUTION INFILTRATION; PERINEURAL
Status: DISCONTINUED | OUTPATIENT
Start: 2018-02-18 | End: 2018-02-22

## 2018-02-18 RX ORDER — MORPHINE SULFATE 4 MG/ML
1-2 INJECTION, SOLUTION INTRAMUSCULAR; INTRAVENOUS EVERY 4 HOURS PRN
Status: DISCONTINUED | OUTPATIENT
Start: 2018-02-18 | End: 2018-02-18

## 2018-02-18 RX ORDER — SODIUM CHLORIDE 9 MG/ML
INJECTION, SOLUTION INTRAVENOUS
Status: ACTIVE
Start: 2018-02-18 | End: 2018-02-18

## 2018-02-18 RX ADMIN — PROPOFOL 20 MCG/KG/MIN: 10 INJECTION, EMULSION INTRAVENOUS at 23:00

## 2018-02-18 RX ADMIN — LABETALOL HYDROCHLORIDE 10 MG: 5 INJECTION, SOLUTION INTRAVENOUS at 04:10

## 2018-02-18 RX ADMIN — LABETALOL HYDROCHLORIDE 10 MG: 5 INJECTION, SOLUTION INTRAVENOUS at 08:40

## 2018-02-18 RX ADMIN — POTASSIUM CHLORIDE 10 MEQ: 7.46 INJECTION, SOLUTION INTRAVENOUS at 12:05

## 2018-02-18 RX ADMIN — POTASSIUM CHLORIDE 40 MEQ: 2 INJECTION, SOLUTION, CONCENTRATE INTRAVENOUS at 02:41

## 2018-02-18 RX ADMIN — POTASSIUM CHLORIDE 40 MEQ: 2 INJECTION, SOLUTION, CONCENTRATE INTRAVENOUS at 14:50

## 2018-02-18 RX ADMIN — POTASSIUM CHLORIDE AND SODIUM CHLORIDE: 900; 150 INJECTION, SOLUTION INTRAVENOUS at 20:25

## 2018-02-18 RX ADMIN — CEFTRIAXONE 2 G: 2 INJECTION, POWDER, FOR SOLUTION INTRAMUSCULAR; INTRAVENOUS at 08:40

## 2018-02-18 RX ADMIN — FAMOTIDINE 20 MG: 10 INJECTION, SOLUTION INTRAVENOUS at 20:25

## 2018-02-18 RX ADMIN — MORPHINE SULFATE 2 MG: 4 INJECTION INTRAVENOUS at 11:09

## 2018-02-18 RX ADMIN — PHENYLEPHRINE HYDROCHLORIDE 50 MCG/MIN: 10 INJECTION INTRAVENOUS at 15:36

## 2018-02-18 RX ADMIN — EPINEPHRINE 1 MG: 0.1 INJECTION, SOLUTION ENDOTRACHEAL; INTRACARDIAC; INTRAVENOUS at 11:42

## 2018-02-18 RX ADMIN — MORPHINE SULFATE 3 MG: 4 INJECTION INTRAVENOUS at 06:34

## 2018-02-18 RX ADMIN — CHLORHEXIDINE GLUCONATE 15 ML: 1.2 RINSE ORAL at 14:50

## 2018-02-18 RX ADMIN — HYDRALAZINE HYDROCHLORIDE 10 MG: 20 INJECTION INTRAMUSCULAR; INTRAVENOUS at 11:09

## 2018-02-18 RX ADMIN — FENTANYL 1 PATCH: 100 PATCH, EXTENDED RELEASE TRANSDERMAL at 21:23

## 2018-02-18 RX ADMIN — HYDRALAZINE HYDROCHLORIDE 10 MG: 20 INJECTION INTRAMUSCULAR; INTRAVENOUS at 05:49

## 2018-02-18 RX ADMIN — ROCURONIUM BROMIDE 50 MG: 10 INJECTION, SOLUTION INTRAVENOUS at 11:40

## 2018-02-18 RX ADMIN — HYDRALAZINE HYDROCHLORIDE 10 MG: 20 INJECTION INTRAMUSCULAR; INTRAVENOUS at 00:05

## 2018-02-18 RX ADMIN — MORPHINE SULFATE 1 MG: 4 INJECTION INTRAVENOUS at 00:05

## 2018-02-18 RX ADMIN — SODIUM CHLORIDE 1000 ML: 9 INJECTION, SOLUTION INTRAVENOUS at 12:45

## 2018-02-18 RX ADMIN — POTASSIUM CHLORIDE AND SODIUM CHLORIDE: 900; 150 INJECTION, SOLUTION INTRAVENOUS at 01:57

## 2018-02-18 RX ADMIN — MAGNESIUM SULFATE HEPTAHYDRATE 2 G: 500 INJECTION, SOLUTION INTRAMUSCULAR; INTRAVENOUS at 11:45

## 2018-02-18 RX ADMIN — MIDAZOLAM 5 MG: 1 INJECTION INTRAMUSCULAR; INTRAVENOUS at 12:00

## 2018-02-18 RX ADMIN — CHLORHEXIDINE GLUCONATE 15 ML: 1.2 RINSE ORAL at 20:26

## 2018-02-18 RX ADMIN — ENOXAPARIN SODIUM 40 MG: 100 INJECTION SUBCUTANEOUS at 08:41

## 2018-02-18 RX ADMIN — PROPOFOL 10 MCG/KG/MIN: 10 INJECTION, EMULSION INTRAVENOUS at 13:00

## 2018-02-18 ASSESSMENT — ENCOUNTER SYMPTOMS
VOMITING: 0
NAUSEA: 0
ABDOMINAL PAIN: 1
FEVER: 0

## 2018-02-18 ASSESSMENT — PAIN SCALES - GENERAL
PAINLEVEL_OUTOF10: 10
PAINLEVEL_OUTOF10: ASSUMED PAIN PRESENT

## 2018-02-18 NOTE — PROGRESS NOTES
1130: pt arrived to unit via bed on 10L oxymask with rapid response team. Pt appears to be in respiratory distress (extreme accessory muscle use, very red in the face, diaphoretic). Pt follows commands on the left side (previous right side deficit). Pt nodding head appropriately to questions.     1135: Dr. Pillai at bedside to assess pt. MD discussed pt's wishes with pts . Per  pt wishes to be a full code. New orders for intubation received. At this time pt appears to be purple in the face and is sturglling    1140: 50mg of Rocuronium given per MD for intubation. Pt O2 saturation 48%, pt went into a junctional rhythm and then lost pulses. Code blue initiated, CPR started. Pt being bagged.     1142: 1mg Epi given    1144: pulse returned. Pt tachycardic and hypertensive.    1145: 2g Mg given per MD.    1146: I/O placed for access by Dr. Gonda.    1149: pt intubated.     1155: emergency bronch started    1210: time out called for central line placement.    1213: R IJ triple lumen placed.    1225: xray at bedside. Dr. Singh at bedside. MD confirmed placement of ETT and central line.    1245: pt arousing. Pt able to squeeze hands on the left side.

## 2018-02-18 NOTE — PROGRESS NOTES
"Pulmonary/Critical Care Medicine   Progress Note    Date of service: 2/18/2018  Time: 11:35 to bedside    Patient arrived for floor for respiratory distress, I saw the patient with  (Florencio) at the bedside. Code status was confirmed as \"Full\". On arrival the patient was interactive however hypoxic with oxygen saturation in the 80s. Supplies were gathered for intubation however prior to their arrival the patient became more hypoxic requiring bag valve mask ventilation, then became bradycardic and had a junctional arrest. Rocuronium was given and CPR was begun at 11:40 the code team was lead by myself, she was given epinephrine at 11:42 and at pulse check at 11:44 the patient was confirmed to have return of spontaneous circulation with sinus tach and hypertension. The patient was subsequently intubated with a 7.5 tube. Large amounts of thick, sticky, brown mucus was present over her glottis obstructing adequate ventilation, this was removed with suction prior to placement of ET tube. After ET tube was inserted emergent bronchoscopy revealed a large left mainstem mucous plug. A central line was inserted and the patient was sedated on the ventilator.    I spent 45 min in reviewing the patient's condition, physical examination, laboratory and imaging data, prior documentation, in discussion with RT, RN, , patient, pharmacist, hospitalist, and in formulating an assessment/plan.    Critical Care time: 45 min. No time overlap, procedures not included in time.  [id number]    "

## 2018-02-18 NOTE — PROGRESS NOTES
Received patient from night shift nurse. Assessment complete. A&Ox0. Denies pain. Pt is mostly non verbal, but can answer yes/no. , Florencio, at bedside. Call light within reach. All needs attended to at this time. Bed in low position, SCDs in place. Pt's [id number], medicated per MAR. Spouse is inquiring about Endo to place Cortrak and would like to speak to MD for POC info. Informed pt this RN would update pt and family as soon as more information is available.

## 2018-02-18 NOTE — CARE PLAN
"Problem: Respiratory:  Goal: Respiratory status will improve  Outcome: PROGRESSING SLOWER THAN EXPECTED  Encouraged pt to cough, pt refused and stated \"no.\" Educated pt that coughing will help expand lungs and clear lungs. Auscultation of lungs showed crackles. Reinforcement needed.    Problem: Venous Thromboembolism (VTW)/Deep Vein Thrombosis (DVT) Prevention:  Goal: Patient will participate in Venous Thrombosis (VTE)/Deep Vein Thrombosis (DVT)Prevention Measures  Outcome: PROGRESSING AS EXPECTED  Verified SCDs were in place.    Problem: Knowledge Deficit  Goal: Knowledge of disease process/condition, treatment plan, diagnostic tests, and medications will improve  Outcome: PROGRESSING AS EXPECTED  Reminded pt of strict NPO status. Pt understands that a cortrak may be placed tomorrow by endoscopy using general anesthesia. Pending orders.    Problem: Skin Integrity  Goal: Risk for impaired skin integrity will decrease  Outcome: PROGRESSING AS EXPECTED  Implemented q2 turns throughout the night.  Implemented q4 oral care throughout the night to decrease risk of dry mouth or breakdown of oral mucosa. (Oral care is provided by brushing mouth and lips with a swab and mouthwash as well as using moisturizer on lips)  Placed heel float boots on pt to decrease skin breakdown on heels.  Verified waffle mattress placement to decrease skin breakdown on bony prominences.  Verified elbows and arms are placed on pillows.      "

## 2018-02-18 NOTE — PROCEDURES
Procedure Note    Date: 2/18/2018  Time: 12:13 PM    Procedure: Central Venous Line placement    Site: RIJ vein    Indication: shock, need for pressors    Consent: Emergent/Implied    Procedure: After obtaining consent, a time-out was performed. Appropriate site confirmed with ultrasound and patient positioned, prepped, and draped in sterile fashion. All those present wearing cap and mask and those physically participating remained adhering to sterile fashion with cap, mask, gloves, and gown. 0 mL of local anesthetic injected (1% lidocaine without epinephrine) achieving appropriate comfort level for patient. Vein localized and accessed using continuous ultrasound guidance and a 7 Fr triple lumen catheter placed using Seldinger technique. Able to aspirate dark, non-pulsatile blood through each lumen and sterile saline flushed easily before capping. Line secured and dressed in sterile fashion. Patient tolerated procedure well without any difficulties and remains in care of bedside nurse. CXR will be performed to confirm appropriate placement for internal jugular or subclavian CVLs.    EBL: minimal  Complications: none apparent  CXR: pending    David Pillai DO  Critical Care Medicine

## 2018-02-18 NOTE — PROGRESS NOTES
GI Progress Note:    Alex Hahn  Date & Time note created:    2/18/2018   1:08 PM       Patient ID:   Name:             Nydia Finch     YOB: 1960  Age:                 57 y.o.  female   MRN:               [id number]                                                             CC; Oropharyngeal dysphagia    Subjective:   Pt transferred to ICU with respiratory distress        Past Medical History:   Past Medical History:   Diagnosis Date   • Brain aneurysm    • Chronic pain    • Stroke (CMS-HCC)        Past Surgical History:  Past Surgical History:   Procedure Laterality Date   • CYSTOSCOPY STENT PLACEMENT  2/13/2018    Procedure: CYSTOSCOPY STENT PLACEMENT;  Surgeon: Monico Killian M.D.;  Location: SURGERY [facility];  Service: Urology   • GASTRIC RESECTION  2012    Duodenal Switch   • OTHER     • OTHER NEUROLOGICAL SURG         Hospital Medications:    Current Facility-Administered Medications:   •  0.9 % NaCl with KCl 20 mEq infusion, , Intravenous, Continuous, Phan Yanez M.D., Last Rate: 100 mL/hr at 02/18/18 0157  •  morphine (pf) 4 mg/ml injection 1-2 mg, 1-2 mg, Intravenous, Q4HRS PRN, Leatha Vasquez M.D., 2 mg at 02/18/18 1109  •  SODIUM CHLORIDE 0.9 % IV SOLN, , , ,   •  Action is required: Protocol 452 Propofol Critical Care has been implemented, , , CONTINUOUS **AND** propofol (DIPRIVAN) injection, 0-80 mcg/kg/min, Intravenous, Continuous **AND** Propofol Critical Care Protocol - Patient Must Have an Advanced Airway with Mechanical Ventilation in Use., , , CONTINUOUS **AND** Propofol Critical Care Protocol - Spontaneous breathing trials may be attempted while receiving propofol, , , CONTINUOUS **AND** Propofol Critical Care Protocol - If patient meets extubation criteria, propofol MUST be discontinued prior to extubation, , , CONTINUOUS **AND** Propofol Critical Care Protocol - No allergy to propofol, soybean oil, or eggs, , , CONTINUOUS **AND** Propofol Critical Care  Protocol - Do not administer this medication to children under the age of 12, , , CONTINUOUS **AND** Propofol Critical Care Protocol - Dedicated IV line for administration (vehicle supports rapid growth of microorganisms and incompatibilities cannot be detected), , , CONTINUOUS **AND** Propofol Critical Care Protocol - Administration tubing and any unused emulsion must be changed out and discarded after 12 hours from spiking vial, , , CONTINUOUS **AND** Propofol Critical Care Protocol - RASS guildelines, , , CONTINUOUS **AND** Propofol Critical Care Protocol - Wake-up assessment as ordered by MD, , , CONTINUOUS **AND** Propofol Critical Care Protocol - Propofol is not an analgesic, concurrent analgesia (if patient experiencing pain) should continue while patient is on propofol, , , CONTINUOUS **AND** Triglycerides Starting now and then Every 3 Days, , , Every 3 Days (0300) **AND** Propofol Critical Care Protocol - Notify MD prior to exceeding 80 mcg/kg/min and obtain new order for higher limit, , , CONTINUOUS, David Pillai Jr., D.O.  •  phenylephrine (RADHA-SYNEPHRINE) 40,000 mcg in  mL Infusion, 0-300 mcg/min, Intravenous, Continuous, David Pillai Jr. D.O.  •  NS (BOLUS) infusion 1,000 mL, 1,000 mL, Intravenous, Once, SEVEN Ferrer Jr..O.  •  oxyCODONE immediate-release (ROXICODONE) tablet 5 mg, 5 mg, Oral, Q4HRS PRN, Roly Mathew M.D.  •  cefTRIAXone (ROCEPHIN) syringe 2 g, 2 g, Intravenous, Q24HRS, David Pillai Jr. D.O., 2 g at 02/18/18 0840  •  fentaNYL (DURAGESIC) 100 MCG/HR 1 Patch, 1 Patch, Transdermal, Q72HRS, SEVEN Ferrer Jr..OBrittany, 1 Patch at 02/15/18 2100  •  labetalol (NORMODYNE,TRANDATE) injection 10 mg, 10 mg, Intravenous, Q4HRS PRN, Cordell Owens M.D., 10 mg at 02/18/18 0840  •  hydrALAZINE (APRESOLINE) injection 10 mg, 10 mg, Intravenous, Q4HRS PRN, Cordell Owens M.D., 10 mg at 02/18/18 1109  •  enoxaparin (LOVENOX) inj 40 mg, 40 mg, Subcutaneous, DAILY, David Pillai  CLAUDE HannaOBrittany, 40 mg at 02/18/18 0841  •  gabapentin (NEURONTIN) capsule 300 mg, 300 mg, Oral, TID, Alberto Singh M.D., Stopped at 02/14/18 1500  •  folic acid (FOLVITE) tablet 2 mg, 2 mg, Oral, DAILY, Jairo Silva M.D., Stopped at 02/13/18 0945  •  pregabalin (LYRICA) capsule 150 mg, 150 mg, Oral, BID, Jairo Silva M.D., Stopped at 02/14/18 0900  •  senna-docusate (PERICOLACE or SENOKOT S) 8.6-50 MG per tablet 2 Tab, 2 Tab, Oral, BID, Stopped at 02/14/18 0900 **AND** polyethylene glycol/lytes (MIRALAX) PACKET 1 Packet, 1 Packet, Oral, QDAY PRN **AND** magnesium hydroxide (MILK OF MAGNESIA) suspension 30 mL, 30 mL, Oral, QDAY PRN **AND** bisacodyl (DULCOLAX) suppository 10 mg, 10 mg, Rectal, QDAY PRN, Jairo Silva M.D.  •  Respiratory Care per Protocol, , Nebulization, Continuous RT, Jairo Silva M.D.  •  NS (BOLUS) infusion 500 mL, 500 mL, Intravenous, Once PRN, Jairo Silva M.D.  •  ondansetron (ZOFRAN) syringe/vial injection 4 mg, 4 mg, Intravenous, Q4HRS PRN, Jairo Silva M.D., 4 mg at 02/13/18 2152  •  ondansetron (ZOFRAN ODT) dispertab 4 mg, 4 mg, Oral, Q4HRS PRN, Jairo Silva M.D.  •  promethazine (PHENERGAN) tablet 12.5-25 mg, 12.5-25 mg, Oral, Q4HRS PRN, Jairo Silva M.D.  •  promethazine (PHENERGAN) suppository 12.5-25 mg, 12.5-25 mg, Rectal, Q4HRS PRN, Jairo Silva M.D.  •  prochlorperazine (COMPAZINE) injection 5-10 mg, 5-10 mg, Intravenous, Q4HRS PRN, Jairo Silva M.D.  •  FLUoxetine (PROZAC) capsule 20 mg, 20 mg, Oral, DAILY, Jairo Silva M.D., Stopped at 02/13/18 0945  •  DULoxetine (CYMBALTA) capsule 60 mg, 60 mg, Oral, BID, Jairo Silva M.D., Stopped at 02/14/18 0900  •  menthol (HALLS) lozenge 1 Lozenge, 1 Lozenge, Oral, Q HOUR PRN, David Pillai Jr., D.O.  •  sore throat spray (CHLORASEPTIC) 1 Spray, 1 Spray, Mouth/Throat, Q HOUR PRN, David Pillai Jr., D.O.    Medication Allergy:  No Known  "Allergies    ROS: not obtainable    Physical Exam:  Vitals/ General Appearance:   Weight/BMI: Body mass index is 33.59 kg/m².  Blood pressure (!) 99/64, pulse (!) 161, temperature 36.8 °C (98.3 °F), resp. rate 20, height 1.727 m (5' 8\"), weight 100.2 kg (220 lb 14.4 oz), SpO2 97 %, not currently breastfeeding.  Vitals:    02/18/18 1113 02/18/18 1114 02/18/18 1202 02/18/18 1244   BP: 149/95 137/90 (!) 99/64    Pulse: (!) 140 (!) 129 (!) 161    Resp: (!) 25 (!) 24 20    Temp:       SpO2:  96% 95% 97%   Weight:       Height:           Heart: RRR  Lungs: Diminished breath sounds with rhonchi  Abdomen: +BS, distended, non tender      Lab Data Review:  Recent Labs      02/17/18   0328 02/17/18   2340  02/18/18   1126   WBC  13.9*  10.7  11.1*   RBC  4.58  4.13*  4.46   HEMOGLOBIN  13.1  12.0  13.1   HEMATOCRIT  38.8  34.4*  37.9   MCV  84.7  83.3  85.0   MCH  28.6  29.1  29.4   MCHC  33.8  34.9  34.6   RDW  45.9  44.0  45.2   PLATELETCT  178  189  243   MPV  11.9  12.3  12.4     Recent Labs      02/17/18   0328  02/17/18   1346  02/17/18   2339  02/18/18   1125   SODIUM  138   --   141  142   POTASSIUM  2.6*  2.9*  2.8*  2.9*   CHLORIDE  108   --   113*  111   CO2  18*   --   18*  18*   GLUCOSE  156*   --   131*  225*   BUN  11   --   11  11   CREATININE  0.50   --   0.37*  0.47*   CALCIUM  8.5   --   8.3*  8.5                 Recent Labs      02/17/18   0328  02/17/18   1346  02/17/18   2339  02/18/18   1125   SODIUM  138   --   141  142   POTASSIUM  2.6*  2.9*  2.8*  2.9*   CHLORIDE  108   --   113*  111   CO2  18*   --   18*  18*   GLUCOSE  156*   --   131*  225*   BUN  11   --   11  11     Recent Labs      02/16/18   1415  02/16/18   2048  02/17/18   0328  02/17/18   1346  02/17/18   2339  02/18/18   1125   SODIUM   --    --   138   --   141  142   POTASSIUM  2.9*  2.6*  2.6*  2.9*  2.8*  2.9*   CHLORIDE   --    --   108   --   113*  111   CO2   --    --   18*   --   18*  18*   BUN   --    --   11   --   11  11 "   CREATININE   --    --   0.50   --   0.37*  0.47*   MAGNESIUM  1.4*  1.4*  2.1   --   1.8   --    PHOSPHORUS  1.4*  2.4*  2.2*   --    --    --    CALCIUM   --    --   8.5   --   8.3*  8.5              Imaging/Procedures Review:        Assessment and plan:  Pt with respiratory distress and not a candidate for EGD due to tenuous respiratory status. Can consider endoscopic placement of nasoenteric feeding tube if she recovers. Will sign off, call if can help

## 2018-02-18 NOTE — PROGRESS NOTES
Pt tach up to 140's and sustaining. Hosp RN and RN at bedside. Applied 10l O2 as pt now sats at 85%. Rapid response called. Dr Vasquez at bedside. New IV established. Pt's SBP greater than 200. Pt c/o pain. Pt medicated per MAR. Rapid response team at bedside. Pt transfer to [facility] 637. Pts BP now 149/95.

## 2018-02-18 NOTE — PROGRESS NOTES
Hospitalist returned returned page and was updated on pt's Potassium of 2.8. Multiple orders received.

## 2018-02-18 NOTE — PROCEDURES
Procedure Note    Date: 2/18/2018  Time: 11:49 AM    Procedure: Intubation    Indication: Acute respiratory failure, hypoxia    Consent: Emergency/implied    Procedure: A time-out was performed. Airway assessed and patient found to have Mallampati class 3.  Equipment prepared and RT/RN at bedside. Patient pre-oxygenated with 100% FiO2.  After medication delivery, a GVL 3 blade used to acheive direct visualization and a grade 1 view. A 7.5 ETT was placed with 1 attempt(s) into the trachea directly through the vocal cords. Appropriate placement confirmed by direct visualization, bilateral chest and epigastric auscultation, misting in the ETT, and ETCO2 detector. ETT secured in place at 24 cm at the teeth and patient connected to ventilator. Sedation/analgesia continued as appropriate. Patient tolerated procedure well without any difficulties and remains in care of bedside nurse and respiratory therapy. CXR will be performed to confirm appropriate placement of ETT.    Medications: rocuronium   Complications: Right mainstem intubation  CXR: pending    David Pillai DO  Critical Care Medicine

## 2018-02-18 NOTE — PROGRESS NOTES
"Report received at beside. RN assumed care. Chart reviewed. Patient is resting in bed. Patient and  updated on plan of care regarding possible cortrak placement tomorrow using endoscopy, code status change, and electrolyte imbalance.  Assessment completed. AO x 0, pt reoriented to person, place, time, and event. Pain 0/10, when asked, pt was able to say \"no\". Patient has no concerns or complaints at this time. Telemetry box verified on. Bed alarm verified on. Call light within reach. Bed in lowest position. Non slip socks on.  "

## 2018-02-18 NOTE — PROGRESS NOTES
"Infectious Disease Progress Note    Author: Nataliya Jackson M.D. Date & Time of service: 2018  9:28 PM    Chief Complaint:  UTI with sepsis    Interval History:  57-year-old white female admitted with fever, altered mental status and abdominal pain due to obstructive nephrolithiasis with associated pyelonephritis   AF WBC 11.1 c/o \"hunger pains\" in stomach-has not eaten in 9 days per . Failed mult swallow evals  Labs Reviewed, Medications Reviewed and Radiology Reviewed.    Review of Systems:  Review of Systems   Constitutional: Negative for fever.   Gastrointestinal: Positive for abdominal pain. Negative for nausea and vomiting.   Genitourinary: Negative for dysuria.   All other systems reviewed and are negative.      Hemodynamics:  Temp (24hrs), Av.6 °C (97.8 °F), Min:36.3 °C (97.3 °F), Max:36.8 °C (98.3 °F)  Temperature: 36.8 °C (98.3 °F)  Pulse  Av.7  Min: 53  Max: 161Heart Rate (Monitored): (!) 146  Blood Pressure: (!) 99/64       Physical Exam:  Physical Exam   Constitutional: She appears well-developed. She appears distressed.   Obese, morbid   HENT:   Head: Normocephalic and atraumatic.   edentulous   Eyes: EOM are normal. Pupils are equal, round, and reactive to light.   Neck: Neck supple.   Cardiovascular: Normal rate.    Pulmonary/Chest: Effort normal.   Abdominal: Soft. She exhibits no distension. There is tenderness. There is no rebound and no guarding.   Musculoskeletal: She exhibits edema.   Neurological: She is alert.   Nursing note and vitals reviewed.      Meds:    Current Facility-Administered Medications:   •  0.9 % NaCl with KCl 20 mEq 1,000 mL  •  morphine injection  •  NS  •  Action is required: Protocol 452 Propofol Critical Care has been implemented **AND** propofol **AND** Propofol Critical Care Protocol - Patient Must Have an Advanced Airway with Mechanical Ventilation in Use. **AND** Propofol Critical Care Protocol - Spontaneous breathing trials may be " attempted while receiving propofol **AND** Propofol Critical Care Protocol - If patient meets extubation criteria, propofol MUST be discontinued prior to extubation **AND** Propofol Critical Care Protocol - No allergy to propofol, soybean oil, or eggs **AND** Propofol Critical Care Protocol - Do not administer this medication to children under the age of 12 **AND** Propofol Critical Care Protocol - Dedicated IV line for administration (vehicle supports rapid growth of microorganisms and incompatibilities cannot be detected) **AND** Propofol Critical Care Protocol - Administration tubing and any unused emulsion must be changed out and discarded after 12 hours from spiking vial **AND** Propofol Critical Care Protocol - RASS guildelines **AND** Propofol Critical Care Protocol - Wake-up assessment as ordered by MD **AND** Propofol Critical Care Protocol - Propofol is not an analgesic, concurrent analgesia (if patient experiencing pain) should continue while patient is on propofol **AND** Triglycerides Starting now and then Every 3 Days **AND** Propofol Critical Care Protocol - Notify MD prior to exceeding 80 mcg/kg/min and obtain new order for higher limit  •  Phenylephrine infusion  •  Respiratory Care per Protocol  •  senna-docusate **AND** polyethylene glycol/lytes **AND** magnesium hydroxide **AND** bisacodyl  •  chlorhexidine  •  lidocaine  •  MD ALERT...Adult ICU Electrolyte Replacement per Pharmacy Protocol  •  famotidine **OR** famotidine  •  ipratropium-albuterol  •  insulin regular **AND** Accu-Chek Q6 if NPO **AND** NOTIFY MD and PharmD **AND** glucose 4 g **AND** dextrose 50%  •  fentaNYL **OR** fentaNYL **OR** fentaNYL  •  oxyCODONE immediate-release  •  cefTRIAXone (ROCEPHIN) IV  •  fentaNYL  •  labetalol  •  hydrALAZINE  •  enoxaparin (LOVENOX) injection  •  gabapentin  •  folic acid  •  pregabalin  •  NS  •  ondansetron  •  ondansetron  •  promethazine  •  promethazine  •  prochlorperazine  •   FLUoxetine  •  DULoxetine  •  menthol  •  sore throat spray    Labs:  Recent Labs      02/16/18   0825  02/17/18   0328  02/17/18   2340  02/18/18   1126   WBC  17.8*  13.9*  10.7  11.1*   RBC  4.11*  4.58  4.13*  4.46   HEMOGLOBIN  11.8*  13.1  12.0  13.1   HEMATOCRIT  35.1*  38.8  34.4*  37.9   MCV  85.4  84.7  83.3  85.0   MCH  28.7  28.6  29.1  29.4   RDW  47.5  45.9  44.0  45.2   PLATELETCT  167  178  189  243   MPV  12.1  11.9  12.3  12.4   NEUTSPOLYS  83.20*  79.80*   --   70.80   LYMPHOCYTES  8.20*  14.00*   --   22.10   MONOCYTES  6.00  5.30   --   1.80   EOSINOPHILS  0.10  0.00   --   0.90   BASOPHILS  0.80  0.00   --   0.00   RBCMORPHOLO   --   Present   --   Present     Recent Labs      02/17/18   0328  02/17/18   1346  02/17/18   2339  02/18/18   1125   SODIUM  138   --   141  142   POTASSIUM  2.6*  2.9*  2.8*  2.9*   CHLORIDE  108   --   113*  111   CO2  18*   --   18*  18*   GLUCOSE  156*   --   131*  225*   BUN  11   --   11  11     Recent Labs      02/17/18   0328  02/17/18   2339  02/18/18   1125   CREATININE  0.50  0.37*  0.47*       Imaging:  Ct-abdomen-pelvis W/o    Result Date: 2/13/2018 2/13/2018 5:15 AM HISTORY/REASON FOR EXAM:  Pain Sepsis. UTI. TECHNIQUE/EXAM DESCRIPTION: CT scan of the abdomen and pelvis without contrast. Noncontrast helical scanning was obtained from the diaphragmatic domes through the pubic symphysis. Low dose optimization technique was utilized for this CT exam including automated exposure control and adjustment of the mA and/or kV according to patient size. COMPARISON: None. FINDINGS: Lung Bases: Small right pleural effusion. Bibasilar opacities. Hiatal hernia. Abdomen: Within the limits of noncontrast technique, the liver, spleen, pancreas, and adrenal glands are unremarkable in appearance. Postsurgical change in the stomach There is an obstructing 7 mm calculus in the mid right ureter with severe upstream collecting system dilatation. No intrarenal calculus. The  gallbladder is surgically absent. and there is no biliary dilatation. There is no bowel wall thickening or abnormal dilatation. The abdominal aorta is normal in caliber. Pelvis: The urinary bladder is decompressed by Olmedo catheter. Small fat-containing bilateral inguinal hernias. No lymph node enlargement. No free fluid, or free air in the abdomen or pelvis. No aggressive bone lesions are seen. Mild anterolisthesis of L5 on S1 ________________________________    1. Obstructing 7 mm calculus in the mid right ureter with severe right hydronephrosis. 2. Postsurgical change from gastric bypass. Hiatal hernia. 3. Small right pleural effusion. Bibasilar opacities, likely atelectasis.    Ct-head W/o    Result Date: 2/13/2018 2/13/2018 5:15 AM HISTORY/REASON FOR EXAM:  Altered Mental Status TECHNIQUE/EXAM DESCRIPTION AND NUMBER OF VIEWS: CT of the head without contrast. The study was performed on a helical multidetector CT scanner. Contiguous 2.5 mm axial sections were obtained from the skull base through the vertex. Up to date radiation dose reduction adjustments have been utilized to meet ALARA standards for radiation dose reduction. COMPARISON:  None available FINDINGS: There is no evidence of acute intracranial hemorrhage, mass, mass-effect or shift of midline structures. Encephalomalacia in the left frontal lobe with ex vacuo dilatation of the left lateral ventricle. Left frontal surgical clips. Ventricle size and brain parenchymal volume are appropriate for this patient's stated age. The basal cisterns are patent. There are no abnormal extra-axial fluid collections. No depressed or widely  calvarial fracture is seen. Postsurgical change in the left frontal calvarium. The visualized paranasal sinuses and temporal bone structures are aerated. ___________________________________     1. No evidence of acute intracranial hemorrhage or mass lesion. 2. Postsurgical change and encephalomalacia in the left frontal  lobe.     Dx-chest-portable (1 View)    Result Date: 2/18/2018 2/18/2018 12:22 PM HISTORY/REASON FOR EXAM:  Central line readjustment. TECHNIQUE/EXAM DESCRIPTION AND NUMBER OF VIEWS: Single portable view of the chest. COMPARISON: 2/13/2018 FINDINGS: Right central venous catheter with tip in the lower SVC. Endotracheal tube with tip in the mid thoracic trachea. Diffuse interstitial prominence, left more than right. Mild bibasilar opacity. No pleural effusion. No pneumothorax. Stable cardiopericardial silhouette.     Interval intubation. Right central venous catheter was retracted with tip in the lower SVC    Dx-chest-portable (1 View)    Result Date: 2/13/2018 2/13/2018 5:01 AM HISTORY/REASON FOR EXAM:  Central line placement TECHNIQUE/EXAM DESCRIPTION AND NUMBER OF VIEWS: Single portable view of the chest. COMPARISON: 2/13/2018 4:26 AM FINDINGS: Right central venous catheter with tip in the right atrium. Mild diffuse interstitial prominence. Mild bibasilar opacities. No pleural effusion. No pneumothorax. Stable cardiopericardial silhouette.     Right central venous catheter with tip in the right atrium.    Dx-chest-portable (1 View)    Result Date: 2/13/2018 2/13/2018 4:19 AM HISTORY/REASON FOR EXAM:  Sepsis TECHNIQUE/EXAM DESCRIPTION AND NUMBER OF VIEWS: Single portable view of the chest. COMPARISON: None FINDINGS: Mild diffuse interstitial prominence. No pulmonary infiltrates or consolidations are noted. No pleural effusion. No pneumothorax. Normal cardiopericardial silhouette.     1. Mild diffuse interstitial prominence could relate to pulmonary edema or viral infection.    Dx-portable Fluoroscopy < 1 Hour Is The Patient Pregnant? No    Result Date: 2/13/2018 2/13/2018 8:00 AM HISTORY/REASON FOR EXAM:  Main OR Intraoperative evaluation TECHNIQUE/EXAM DESCRIPTION AND NUMBER OF VIEWS: Fluoroscopic imaging during stent placement. COMPARISON: CT from the same day FINDINGS: 1 image was obtained in the operating  room. Contrast is seen within a dilated renal collecting system. A ureteral stent is noted. A total of 12 seconds of fluoroscopy time utilized.     Intraoperative image as above described.    Ue Venous Duplex    Result Date: 2018   Upper Extremity  Venous Duplex Report  Vascular Laboratory  CONCLUSIONS  No DVT or SVT seen in the right upper extremity  ANGELINA ESCOBAR  Exam Date:     2018 12:52  Room #:     Inpatient  Priority:     Routine  Ht (in):             Wt (lb):  Ordering Physician:        ALEJANDRA WORTHY  Referring Physician:       [id number]  Sonographer:               Jeri Bear RVT  Study Type:                Complete Unilateral  Technical Quality:         Adequate  Age:    57    Gender:     F  MRN:    [id number]  :    1960      BSA:  Indications:     Swelling of Limb  CPT Codes:       31404  ICD Codes:       729.81  History:         PICC line in the internal jugular vein, with swelling of the                    arm.  Limitations:     Bandages from PICC line on right neck.  PROCEDURES:  Right upper extremity venous duplex imaging.  The following venous structures were evaluated: internal jugular,  subclavian, axillary, brachial, radial, ulnar, cephalic and basilic veins.  Serial compression, augmentation maneuvers,  color and spectral Doppler  flow evaluations were performed.  FINDINGS:  Right upper extremity.  Complete color filling and compressibility with normal venous flow dynamics  including spontaneous flow, response to augmentation maneuvers, and  respiratory phasicity.  No echogenic material visualized.  Flow was evaluated in the contralateral subclavian vein and normal venous  flow dynamics including spontaneous flow, respiratory phasic variation and  augmentation were demonstrated.  Doyle Jimenez MD  (Electronically Signed)  Final Date:      2018                    05:07    Dx-abdomen For Tube Placement    Result Date: 2/15/2018  2/15/2018 1:59 PM HISTORY/REASON FOR EXAM:  Line evaluation. TECHNIQUE/EXAM DESCRIPTION AND NUMBER OF VIEWS:  1 view(s) of the abdomen. COMPARISON:  None. FINDINGS: Enteric tube projects over the distal esophagus. There is a nonspecific bowel gas pattern. There is a right ureteral stent. Surgical clip is seen in the right upper quadrant. Degenerative changes are seen in the spine.     Enteric tube projects over the expected level of the distal esophagus. Recommend advancement.      Micro:  Results     Procedure Component Value Units Date/Time    URINE CULTURE(NEW) [[id number]] Collected:  02/18/18 1245    Order Status:  Completed Specimen:  Urine from Urine, Olmedo Cath Updated:  02/18/18 1326    Narrative:       Collected By:[id number] NILEMACKS YOVANA  Indication for culture:->Altered LOC  Indication for culture:->Temp Equal to,or Greater Than 101    URINALYSIS [[id number]] Collected:  02/18/18 1245    Order Status:  Completed Specimen:  Urine from Urine, Olmedo Cath Updated:  02/18/18 1326    Narrative:       Collected By:[id number] Bloggerce YOVANA  Indication for culture:->Altered LOC  Indication for culture:->Temp Equal to,or Greater Than 101    URINE CULTURE(NEW) [[id number]] Collected:  02/13/18 0510    Order Status:  Completed Specimen:  Urine Updated:  02/15/18 1303     Significant Indicator NEG     Source UR     Site --     Urine Culture Mixed skin hien 10-50,000 cfu/mL    Narrative:       Indication for culture:->Emergency Room Patient    BLOOD CULTURE [[id number]]  (Abnormal) Collected:  02/13/18 0510    Order Status:  Completed Specimen:  Blood from Peripheral Updated:  02/15/18 0740     Significant Indicator POS (POS)     Source BLD     Site PERIPHERAL     Blood Culture Growth detected by Bactec instrument. 02/13/2018  19:12 (A)     Blood Culture -- (A)     Escherichia coli  See previous culture for sensitivity report.      Narrative:     "   CALL  [place]  19 tel. [phone],  CALLED  19 tel. [phone] 02/13/2018, 19:24, RB PERF. RESULTS CALLED TO:RN  [id number]  Per Hospital Policy: Only change Specimen Src: to \"Line\" if  specified by physician order.    BLOOD CULTURE [[id number]]  (Abnormal)  (Susceptibility) Collected:  02/13/18 0412    Order Status:  Completed Specimen:  Blood from Peripheral Updated:  02/15/18 0739     Significant Indicator POS (POS)     Source BLD     Site PERIPHERAL     Blood Culture Growth detected by Bactec instrument. 02/13/2018  19:11 (A)     Blood Culture Escherichia coli (A)    Narrative:       CALL  [place]  19 tel. [phone],  CALLED  19 tel. [phone] 02/13/2018, 19:24, RB PERF. RESULTS CALLED TO:RN  [id number]  Per Hospital Policy: Only change Specimen Src: to \"Line\" if  specified by physician order.    Culture & Susceptibility     ESCHERICHIA COLI     Antibiotic Sensitivity Microscan Unit Status    Ampicillin Sensitive <=8 mcg/mL Final    Cefepime Sensitive <=8 mcg/mL Final    Cefotaxime Sensitive <=2 mcg/mL Final    Cefotetan Sensitive <=16 mcg/mL Final    Ceftazidime Sensitive <=1 mcg/mL Final    Ceftriaxone Sensitive <=8 mcg/mL Final    Cefuroxime Sensitive 8 mcg/mL Final    Ciprofloxacin Sensitive <=1 mcg/mL Final    Ertapenem Sensitive <=1 mcg/mL Final    Gentamicin Sensitive <=4 mcg/mL Final    Pip/Tazobactam Sensitive <=16 mcg/mL Final    Tigecycline Sensitive <=2 mcg/mL Final    Tobramycin Sensitive <=4 mcg/mL Final    Trimeth/Sulfa Sensitive <=2/38 mcg/mL Final                       URINALYSIS [[id number]]  (Abnormal) Collected:  02/13/18 0510    Order Status:  Completed Specimen:  Urine Updated:  02/13/18 0528     Color DK Yellow     Character Clear     Specific Gravity 1.020     Ph 5.0     Glucose Negative mg/dL      Ketones Trace (A) mg/dL      Protein 100 (A) mg/dL      Bilirubin Negative     Urobilinogen, Urine 1.0     Nitrite Negative     Leukocyte Esterase Trace (A)     Occult Blood Small (A)     Micro Urine Req " Microscopic    Narrative:       Indication for culture:->Emergency Room Patient          Assessment:  Active Hospital Problems    Diagnosis   • *Acute respiratory failure (CMS-HCC) [J96.00]   • Dysphagia [R13.10]   • Gram-negative bacteremia [R78.81]   • Septic shock (CMS-HCC) [A41.9, R65.21]   • Pyelonephritis [N12]   • Hydronephrosis with urinary obstruction due to renal calculus [N13.2]   • History of completed stroke [Z86.73]   • Thrombocytopenia (CMS-HCC) [D69.6]   • Hyponatremia [E87.1]   • Chronic pain [G89.29]   • Arm swelling [M79.89]   • CHERYL (acute kidney injury) (CMS-HCC) [N17.9]   • Metabolic acidosis [E87.2]   • Encephalopathy acute [G93.40]       Plan:  Pyelonephritis  Afebrile   Decreasing leukocytosis.   status post emergent stent placement  Urine and blood cxs Ecoli  Continue  IV ceftriaxone and she will require suppression as the stent   may be infected    Acute kidney injury, improved.  Dose adjust antibiotics as needed    Gram neg sepsis  Due to above  On abx as above    Aspiration risk  GI to see  Failed mult swallow eval  Prior gastric bypass so Cortrak placement problematic    Discussed with internal medicine Dr Vasquez

## 2018-02-18 NOTE — PROGRESS NOTES
[facility] Hospitalist Progress Note    Date of Service: 2018    Chief Complaint  57 y.o. female history of brain aneurysm with hemiplegia since  that was found to have a fever of 103 and UTI with hydronephrosis. She was transferred from [facility] to [facility] and underwent cystoscopy and ureteral stent placement by Dr. Killian. She has been admitted to the ICU for IV fluids and IV antibiotics. Her IV pressors have been turned off. Now on the floor , BCx also growing E.coli, ID has been consulted. GI also consulted for possible tube feeding placement through endoscopy     Interval Problem Update  18-Pt seen and examined, transferred from ICU overnight, prognosis is guarded,   Bcx positive for E.coli ID consulted appreciate rec.  Has failed swallow eval, GI has also bee consulted for tube feeding placement through endoscopy.    18 - Pt wanted to change her code status yesterday to full, to day a rapid was called, pt was found to be in acute respiratory distress with increased O2 requirements. ABG and CXR has been ordered, pt transferring to ICU for high level of care. PMA has been consulted.     Consultants/Specialty  Critical care  Urology  Gastroenterology  Infection disease      Disposition  TBD       Review of Systems   Unable to perform ROS: Mental acuity      Physical Exam  Laboratory/Imaging   Hemodynamics  Temp (24hrs), Av.6 °C (97.8 °F), Min:36.3 °C (97.3 °F), Max:36.8 °C (98.3 °F)   Temperature: 36.8 °C (98.3 °F)  Pulse  Av.7  Min: 53  Max: 161    Blood Pressure: (!) 99/64      Respiratory      Respiration: 20 (vent), Pulse Oximetry: 95 %     Work Of Breathing / Effort: Shallow;Moderate;Tachypnea  RUL Breath Sounds: Rhonchi, RML Breath Sounds: Rhonchi, RLL Breath Sounds: Diminished, EMIGDIO Breath Sounds: Rhonchi, LLL Breath Sounds: Diminished    Fluids    Intake/Output Summary (Last 24 hours) at 18 1244  Last data filed at 18 0600   Gross per 24 hour   Intake             2500 ml    Output                0 ml   Net             2500 ml       Nutrition  Orders Placed This Encounter   Procedures   • DIET NPO     Standing Status:   Standing     Number of Occurrences:   1     Order Specific Question:   Restrict to:     Answer:   Strict [1]     Comments:   pending swallow eval     Physical Exam   Constitutional: She appears well-nourished. No distress.   HENT:   Head: Normocephalic and atraumatic.   Right Ear: External ear normal.   Left Ear: External ear normal.   Eyes: Conjunctivae and EOM are normal. No scleral icterus.   Neck: Normal range of motion. Neck supple.   Right IJ central line   Cardiovascular: Normal rate and regular rhythm.    No murmur heard.  Pulmonary/Chest: Effort normal. No respiratory distress. She has no wheezes.   Abdominal: Soft. She exhibits distension. There is no tenderness. There is no rebound and no guarding.   Genitourinary:   Genitourinary Comments: Olmedo catheter   Musculoskeletal: She exhibits edema.   Right arm swelling   Neurological:   Awake, difficult to understand her speech  Right side is weak   Skin: Skin is warm and dry. There is pallor.   Nursing note and vitals reviewed.      Recent Labs      02/17/18   0328  02/17/18   2340  02/18/18   1126   WBC  13.9*  10.7  11.1*   RBC  4.58  4.13*  4.46   HEMOGLOBIN  13.1  12.0  13.1   HEMATOCRIT  38.8  34.4*  37.9   MCV  84.7  83.3  85.0   MCH  28.6  29.1  29.4   MCHC  33.8  34.9  34.6   RDW  45.9  44.0  45.2   PLATELETCT  178  189  243   MPV  11.9  12.3  12.4     Recent Labs      02/17/18   0328  02/17/18   1346  02/17/18   2339  02/18/18   1125   SODIUM  138   --   141  142   POTASSIUM  2.6*  2.9*  2.8*  2.9*   CHLORIDE  108   --   113*  111   CO2  18*   --   18*  18*   GLUCOSE  156*   --   131*  225*   BUN  11   --   11  11   CREATININE  0.50   --   0.37*  0.47*   CALCIUM  8.5   --   8.3*  8.5                      Assessment/Plan     * Pyelonephritis- (present on admission)   Assessment & Plan     leukocytosis  trending down  Continue ceftriaxone for E. coli pyelonephritis  Had also nephrolithiasis with hydronephrosis, s/p stent placement by urology  Also BCx growing E.coli so ID has been consulted         Dysphagia- (present on admission)   Assessment & Plan    Speech reevaluation today  May need to have a feeding tube placed by endoscopy        Gram-negative bacteremia- (present on admission)   Assessment & Plan    BCx growing E.coli  Consulted ID appreciate rec.         Hydronephrosis with urinary obstruction due to renal calculus- (present on admission)   Assessment & Plan    7mm right sided ureter stone s/p removal 2/13         Septic shock (CMS-HCC)- (present on admission)   Assessment & Plan    Present on admission  Septic shock has  now resolved        History of completed stroke- (present on admission)   Assessment & Plan    Hx brain aneurysm in 1994 with resultant right hemiparesis        Chronic pain- (present on admission)   Assessment & Plan    Restarted home meds.        Encephalopathy acute- (present on admission)   Assessment & Plan    Metabolic/toxic encephalopathy due to dehydration and sepsis, CT head is neg, continue treatment for sepsis and dehydration.         Metabolic acidosis- (present on admission)   Assessment & Plan    Urosepsis, improved        CHERYL (acute kidney injury) (CMS-HCC)- (present on admission)   Assessment & Plan    Due to dehydration and sepsis   IV fluids  Renal function has recovered        Arm swelling- (present on admission)   Assessment & Plan    U/s negative for DVT.         Thrombocytopenia (CMS-HCC)- (present on admission)   Assessment & Plan    Likely due to sepsis  Have trended up          Hyponatremia- (present on admission)   Assessment & Plan    Has resolved with IV fluids          Quality-Core Measures   Reviewed items::  Labs reviewed and Medications reviewed  Olmedo catheter::  Urinary Tract Retention or Urinary Tract Obstruction  DVT prophylaxis pharmacological::   Enoxaparin (Lovenox)  Antibiotics:  Treating active infection/contamination beyond 24 hours perioperative coverage  Assessed for rehabilitation services:  Patient was assess for and/or received rehabilitation services during this hospitalization      Pt is critically ill , more then 60 min were spent to take care of this pt.

## 2018-02-18 NOTE — DISCHARGE PLANNING
Medical Social Work    Referral: Code Blue    Intervention: Code blue was called on pt. No family at bedside. Per [employer], spouse Marbin([phone]) is contact. SW called this number and someone named Jaspal answered and states that he is Ryan son. He states that he is driving from [place] and has [place] phone and is bringing it to him and says that Marbin should be at bedside.  SW called the  and security to ask if Marbin could be paged but they state that they cannot page family members. SW checked lobby and went downstairs to [facility] and [facility] area and was unable to find Marbin.     Plan: Continue to try to reach Marbin. Pt is now intubated.

## 2018-02-18 NOTE — PROGRESS NOTES
GI Progress Note:    Alex Hahn  Date & Time note created:    2/17/2018   5:29 PM       Patient ID:   Name:             Nydia Finch     YOB: 1960  Age:                 57 y.o.  female   MRN:               [id number]    CC: Jaimie-pharyngeal dysphagia                                                             Subjective:   More alert today and pt has changed her code status and rescinded her DNR status        Past Medical History:   Past Medical History:   Diagnosis Date   • Brain aneurysm    • Chronic pain    • Stroke (CMS-HCC)        Past Surgical History:  Past Surgical History:   Procedure Laterality Date   • CYSTOSCOPY STENT PLACEMENT  2/13/2018    Procedure: CYSTOSCOPY STENT PLACEMENT;  Surgeon: Monico Killian M.D.;  Location: SURGERY [facility];  Service: Urology   • GASTRIC RESECTION  2012    Duodenal Switch   • OTHER     • OTHER NEUROLOGICAL SURG         Hospital Medications:    Current Facility-Administered Medications:   •  potassium chloride 40 mEq in  mL IVPB, 40 mEq, Intravenous, Once, Leatha Vasquez M.D., Last Rate: 125 mL/hr at 02/17/18 1519, 40 mEq at 02/17/18 1519  •  morphine (pf) 4 mg/ml injection 1-3 mg, 1-3 mg, Intravenous, Q4HRS PRN, Leatha Vasquez M.D.  •  oxyCODONE immediate-release (ROXICODONE) tablet 5 mg, 5 mg, Oral, Q4HRS PRN, Roly Mathew M.D.  •  cefTRIAXone (ROCEPHIN) syringe 2 g, 2 g, Intravenous, Q24HRS, David Pillai Jr., D.O., 2 g at 02/17/18 0830  •  fentaNYL (DURAGESIC) 100 MCG/HR 1 Patch, 1 Patch, Transdermal, Q72HRS, David Pillai Jr., D.O., 1 Patch at 02/15/18 2100  •  labetalol (NORMODYNE,TRANDATE) injection 10 mg, 10 mg, Intravenous, Q4HRS PRN, Cordell Owens M.D., 10 mg at 02/17/18 0104  •  hydrALAZINE (APRESOLINE) injection 10 mg, 10 mg, Intravenous, Q4HRS PRN, Cordell Owens M.D., 10 mg at 02/17/18 0011  •  enoxaparin (LOVENOX) inj 40 mg, 40 mg, Subcutaneous, DAILY, David Pillai Jr., D.O., 40 mg at 02/17/18 0825  •  gabapentin  (NEURONTIN) capsule 300 mg, 300 mg, Oral, TID, Alberto Singh M.D., Stopped at 02/14/18 1500  •  folic acid (FOLVITE) tablet 2 mg, 2 mg, Oral, DAILY, Jairo Silva M.D., Stopped at 02/13/18 0945  •  pregabalin (LYRICA) capsule 150 mg, 150 mg, Oral, BID, Jairo Silva M.D., Stopped at 02/14/18 0900  •  senna-docusate (PERICOLACE or SENOKOT S) 8.6-50 MG per tablet 2 Tab, 2 Tab, Oral, BID, Stopped at 02/14/18 0900 **AND** polyethylene glycol/lytes (MIRALAX) PACKET 1 Packet, 1 Packet, Oral, QDAY PRN **AND** magnesium hydroxide (MILK OF MAGNESIA) suspension 30 mL, 30 mL, Oral, QDAY PRN **AND** bisacodyl (DULCOLAX) suppository 10 mg, 10 mg, Rectal, QDAY PRN, Jairo Silva M.D.  •  Respiratory Care per Protocol, , Nebulization, Continuous RT, Jairo Silva M.D.  •  NS (BOLUS) infusion 500 mL, 500 mL, Intravenous, Once PRN, Jairo Silva M.D.  •  NS infusion, , Intravenous, Continuous, Jairo Silva M.D., Last Rate: 100 mL/hr at 02/17/18 1236  •  ondansetron (ZOFRAN) syringe/vial injection 4 mg, 4 mg, Intravenous, Q4HRS PRN, Jairo Silva M.D., 4 mg at 02/13/18 2152  •  ondansetron (ZOFRAN ODT) dispertab 4 mg, 4 mg, Oral, Q4HRS PRN, Jairo Silva M.D.  •  promethazine (PHENERGAN) tablet 12.5-25 mg, 12.5-25 mg, Oral, Q4HRS PRN, Jairo Silva M.D.  •  promethazine (PHENERGAN) suppository 12.5-25 mg, 12.5-25 mg, Rectal, Q4HRS PRN, Jairo Silva M.D.  •  prochlorperazine (COMPAZINE) injection 5-10 mg, 5-10 mg, Intravenous, Q4HRS PRN, Jairo Silva M.D.  •  FLUoxetine (PROZAC) capsule 20 mg, 20 mg, Oral, DAILY, Jairo Silva M.D., Stopped at 02/13/18 0945  •  DULoxetine (CYMBALTA) capsule 60 mg, 60 mg, Oral, BID, Jairo Silva M.D., Stopped at 02/14/18 0900  •  menthol (HALLS) lozenge 1 Lozenge, 1 Lozenge, Oral, Q HOUR PRN, David Pillai Jr., D.O.  •  sore throat spray (CHLORASEPTIC) 1 Spray, 1 Spray, Mouth/Throat, Q HOUR  "AMANDA, David Pillai Jr., D.O.    Medication Allergy:  No Known Allergies    ROS: not obtainable    Physical Exam:  Vitals/ General Appearance:   Weight/BMI: Body mass index is 33.55 kg/m².  Blood pressure 154/91, pulse 80, temperature 36.3 °C (97.3 °F), resp. rate 20, height 1.727 m (5' 8\"), weight 100.1 kg (220 lb 10.9 oz), SpO2 97 %, not currently breastfeeding.  Vitals:    02/17/18 0400 02/17/18 0814 02/17/18 1135 02/17/18 1600   BP: 143/87 160/96 148/82 154/91   Pulse: 88 88 87 80   Resp: (!) 30 20 18 20   Temp: 36.7 °C (98 °F) 36.8 °C (98.3 °F) 36.4 °C (97.6 °F) 36.3 °C (97.3 °F)   SpO2: 94% 98% 96% 97%   Weight:       Height:           Heart: RRR  Lungs: trace bibasilar rales  Abdomen: +BS, non tender      Lab Data Review:  Recent Labs      02/15/18   0522  02/16/18   0825  02/17/18   0328   WBC  24.6*  17.8*  13.9*   RBC  3.64*  4.11*  4.58   HEMOGLOBIN  11.1*  11.8*  13.1   HEMATOCRIT  31.6*  35.1*  38.8   MCV  86.8  85.4  84.7   MCH  30.5  28.7  28.6   MCHC  35.1*  33.6  33.8   RDW  49.9  47.5  45.9   PLATELETCT  131*  167  178   MPV  11.9  12.1  11.9     Recent Labs      02/15/18   0522  02/16/18   0825   02/16/18   2048  02/17/18   0328  02/17/18   1346   SODIUM  144  141   --    --   138   --    POTASSIUM  3.4*  2.7*   < >  2.6*  2.6*  2.9*   CHLORIDE  117*  111   --    --   108   --    CO2  16*  16*   --    --   18*   --    GLUCOSE  83  126*   --    --   156*   --    BUN  18  13   --    --   11   --    CREATININE  0.53  0.49*   --    --   0.50   --    CALCIUM  8.1*  8.5   --    --   8.5   --     < > = values in this interval not displayed.                 Recent Labs      02/15/18   0522  02/16/18   0825   02/16/18 2048  02/17/18   0328  02/17/18   1346   SODIUM  144  141   --    --   138   --    POTASSIUM  3.4*  2.7*   < >  2.6*  2.6*  2.9*   CHLORIDE  117*  111   --    --   108   --    CO2  16*  16*   --    --   18*   --    GLUCOSE  83  126*   --    --   156*   --    BUN  18  13   --    --   11   " --     < > = values in this interval not displayed.     Recent Labs      02/15/18   0522  02/16/18   0825  02/16/18   1415  02/16/18   2048  02/17/18   0328  02/17/18   1346   SODIUM  144  141   --    --   138   --    POTASSIUM  3.4*  2.7*  2.9*  2.6*  2.6*  2.9*   CHLORIDE  117*  111   --    --   108   --    CO2  16*  16*   --    --   18*   --    BUN  18  13   --    --   11   --    CREATININE  0.53  0.49*   --    --   0.50   --    MAGNESIUM   --    --   1.4*  1.4*  2.1   --    PHOSPHORUS   --    --   1.4*  2.4*  2.2*   --    CALCIUM  8.1*  8.5   --    --   8.5   --               Imaging/Procedures Review:      Assessment and plan:  Discussed with  about risk of endoscopy to place a feeding tube which would require anesthesiology assistance to do and pt becoming more alert today now that they are decreasing her morphine. Recovering slowly from her urosepsis and on antibiotics and was just transferred from ICU yesterday. At this point will defer per conversation with  and reevaluate tomorrow

## 2018-02-18 NOTE — PROGRESS NOTES
Pt called appropriately using call light. Pt was repositioned. When asked if pt is in pain, pt nodded with tears in eyes. Pt provided 4 ice packs and 1mg Morphine IV.

## 2018-02-18 NOTE — RESPIRATORY CARE
Respiratory Rapid Response Note    Symptoms: respiratory distress     Breath Sounds  RUL Breath Sounds: Coarse Crackles (02/18/18 1417)  RML Breath Sounds: Coarse Crackles (02/18/18 1417)  RLL Breath Sounds: Coarse Crackles (02/18/18 1417)  EMIGDIO Breath Sounds: Coarse Crackles (02/18/18 1417)  LLL Breath Sounds: Coarse Crackles (02/18/18 1417)  Cough: Productive (02/18/18 1417)  Sputum Amount: Moderate (02/18/18 1417)  Sputum Color: White;Yellow (02/18/18 1417)  Sputum Consistency: Thick (02/18/18 1417)  ABG Results pending  O2 (FiO2): 32 (02/15/18 1015)  O2 (LPM): 0 (02/18/18 0800)  O2 Daily Delivery Respiratory : Silicone Nasal Cannula (02/15/18 1015)    Events/Summary/Plan: Pt intubated. (02/18/18 1140)  Transferred to ICU yes

## 2018-02-18 NOTE — DISCHARGE PLANNING
Pt's spouse Marbin came back up to room. He was emotional and reports that he is upset her code status was changed back to full code because he knows she would not want to be intubated. MYKE provided emotional support and assisted Marbin to call his son Jaspal. Marbin states that it is ok to give medical information to Jaspal, Alfonso Vang, and Marbin Byers who are he and pts adult children.  MYKE told Marbin to please let us know if he needs anything and his son Jaspal will be here soon.

## 2018-02-18 NOTE — PROGRESS NOTES
Pt's son called. Verified with pt that RN may give updates to son. Son informed of WBC count, vital signs, plan for possible feeding tube which depends on re-evaluation of pt status, and pt's pain level.

## 2018-02-18 NOTE — PROCEDURES
Procedure Note    Date: 2/18/2018  Time: 11:55 AM    Procedure: Bronchoscopy    Indication: Mucus plugging, hypoxia, high PEEP pressures  Consent: Emergent    Procedure: A time-out was performed. Respiratory therapy and nursing at bedside throughout procedure. Patient provided sedation and analgesia throughout the procedure. Placed on full ventilator support with an FiO2 of 100% throughout the procedure. Using a fiberoptic bronchoscope, trachea entered via 7.5 ETT.  0 mL of local anesthetic sprayed at the tina (2% lidocaine) achieving appropriate comfort level for patient. Airways visualized directly and the following intervention was performed: Suction of obstructing mucous plug and BAL. Findings as below. Patient tolerated procedure well without any difficulties and left in care of bedside nurse/RT.     Medications: Rocuronium, propofol  Findings: Upper airway - Not visualized as bronchoscope passed through ETT.        Trachea to tina - inflamed appearing mucosa without lesions or mass, airway was entered and the tip of the ET tube was found to be in the right mainstem, this was retracted by 4 cm until above the tina.        R proximal and distal airways - inflammed appearing mucosa without mass/lesion/anatomic variance, secretions: minimal tan, brown plugs in the right lower lobe and right middle lobe these plugs were irrigated and suctioned to clear.        L proximal and distal airways -inflamed appearing mucosa without mass/lesion/anatomic variance, secretions: Large plug completely obstructing the left main bronchus, this was suctioned and withdrawn with the bronchoscope out of the patient's ET tube. A left lower lobe BAL was obtained. All bronchi were sequentially irrigated and suctioned until clear.        Samples -left lower lobe BAL    Complications: None apparent  CXR (if applicable): Pending    David Pillai DO  Critical Care Medicine

## 2018-02-18 NOTE — CONSULTS
DATE OF SERVICE:  02/17/2018    REASON FOR CONSULTATION:  UTI with sepsis.    CONSULTING PHYSICIAN:  Dr. Vasquez.    HISTORY OF PRESENT ILLNESS:  Please note majority of history is obtained from   the chart as patient is obtunded.  This is a 57-year-old white female who was   originally admitted to the hospital on 2/13/2018 due to increasing abdominal   pain, constipation, altered mental status, and fever to 103.  By report, she   had associated hypotension.  She was given 5 liters of fluid.  She was   diagnosed with UTI, started on Levaquin and then transferred to [facility] for   further evaluation and management.  She did undergo a CT scan of the head that   showed no acute findings.  She does have a known history of brain aneurysm   and stroke.  In the ED, she was hypotensive with a systolic of _____ was given   fluids and vasopressor therapy.  She underwent a CT scan of the abdomen   showed a large ureteral stone.  Urology was consulted and she was taken   emergently to the OR and underwent cystoscopy with stent placement.  She was   found to have pus draining from the right kidney.  She was started on   vancomycin and Zosyn then later deescalated to ceftriaxone due to the finding   of the E. coli in 2/2 blood cultures and infectious disease is consulted for   antibiotic recommendations and management.    ALLERGIES:  She has no known antibiotic allergies.    REVIEW OF SYSTEMS:  Limited due to patient is obtunded, otherwise reviewed and   is negative.    PAST MEDICAL HISTORY:  Brain aneurysm, chronic pain, stroke.    FAMILY HISTORY:  Unknown.    SOCIAL HISTORY:  No reported smoking, alcohol or illicit drug use, recent   travel or new animal exposures.    PHYSICAL EXAMINATION:  GENERAL:  She is an obese white female, pale, appears to be in pain.  VITAL SIGNS:  She has been afebrile since admission, current temperature is   97.3, blood pressure 154/91, pulse of 80, respiratory rate 20 and oxygen   saturation 97% on half  liter.  She weighs 100 kilos.  HEENT:  Normocephalic, atraumatic.  Pupils are reactive.  Oropharynx is clear.  NECK:  Supple.  CARDIOVASCULAR:  Regular rate and rhythm.  CHEST:  Decreased breath sounds bilaterally.  Respirations are labored.  ABDOMEN:  Obese, soft, nontender.  There is no rebound or guarding.  EXTREMITIES:  Show no cyanosis or clubbing.  She does have dependent edema.  NEUROLOGIC:  She is lethargic and obtunded, not following commands or   answering questions.    LABORATORY DATA:  Current labs show white blood cell count 13.9, down from   admission of 32.4, H and H of 13 and 38.8, and platelets of 178.  Sodium 138,   potassium is 2.6, chloride 108, bicarbonate 18, glucose 156, BUN 11 and   creatinine 0.5.  Glycosylated hemoglobin was 6.2.  Blood cultures x2 were   showing a pan susceptible E. coli.    DIAGNOSTIC DATA:  CT scan of the abdomen and pelvis shows an obstructive 7 mm   calculus in the right mid ureter with severe right hydronephrosis,   post-surgical changes from gastric bypass, hiatal hernia, small right pleural   effusion.  Chest x-ray, with right venous catheter.    ASSESSMENT AND PLAN:  A 57-year-old white female admitted with fever, altered   mental status and abdominal pain due to obstructive nephrolithiasis with   associated pyelonephritis.  She is currently afebrile with decreasing   leukocytosis.  She is status post emergent stent placement.  She is   hemodynamically stable now and has been transferred out of the ICU.  We will   continue her on IV ceftriaxone and she may require suppression as the stent   may be infected.  She did have initial acute kidney injury, this has improved.    We will dose adjust antibiotics as needed.  Further recommendations per   culture results and clinical course.  Discussed with internal medicine.    Thank you and we will follow with you.       ____________________________________     MD MICHAEL GELLER / CHELO    DD:  02/17/2018  19:38:48  DT:  02/17/2018 23:30:50    D#:  [id number]  Job#:  [id number]

## 2018-02-19 ENCOUNTER — APPOINTMENT (OUTPATIENT)
Dept: RADIOLOGY | Facility: MEDICAL CENTER | Age: 58
DRG: 853 | End: 2018-02-19
Attending: INTERNAL MEDICINE
Payer: MEDICARE

## 2018-02-19 PROBLEM — R78.81 GRAM-NEGATIVE BACTEREMIA: Status: RESOLVED | Noted: 2018-02-14 | Resolved: 2018-02-19

## 2018-02-19 LAB
ACTION RANGE TRIGGERED IACRT: NO
ALBUMIN SERPL BCP-MCNC: 2.5 G/DL (ref 3.2–4.9)
ALBUMIN/GLOB SERPL: 0.7 G/DL
ALP SERPL-CCNC: 77 U/L (ref 30–99)
ALT SERPL-CCNC: 16 U/L (ref 2–50)
ANION GAP SERPL CALC-SCNC: 7 MMOL/L (ref 0–11.9)
AST SERPL-CCNC: 17 U/L (ref 12–45)
BASE EXCESS BLDA CALC-SCNC: -2 MMOL/L (ref -4–3)
BILIRUB SERPL-MCNC: 0.4 MG/DL (ref 0.1–1.5)
BODY TEMPERATURE: ABNORMAL DEGREES
BUN SERPL-MCNC: 17 MG/DL (ref 8–22)
CALCIUM SERPL-MCNC: 8.3 MG/DL (ref 8.5–10.5)
CHLORIDE SERPL-SCNC: 118 MMOL/L (ref 96–112)
CO2 BLDA-SCNC: 22 MMOL/L (ref 20–33)
CO2 SERPL-SCNC: 21 MMOL/L (ref 20–33)
CREAT SERPL-MCNC: 0.37 MG/DL (ref 0.5–1.4)
EKG IMPRESSION: NORMAL
EKG IMPRESSION: NORMAL
ERYTHROCYTE [DISTWIDTH] IN BLOOD BY AUTOMATED COUNT: 47.8 FL (ref 35.9–50)
GLOBULIN SER CALC-MCNC: 3.5 G/DL (ref 1.9–3.5)
GLUCOSE BLD-MCNC: 100 MG/DL (ref 65–99)
GLUCOSE BLD-MCNC: 108 MG/DL (ref 65–99)
GLUCOSE BLD-MCNC: 93 MG/DL (ref 65–99)
GLUCOSE BLD-MCNC: 97 MG/DL (ref 65–99)
GLUCOSE SERPL-MCNC: 101 MG/DL (ref 65–99)
GRAM STN SPEC: NORMAL
HCO3 BLDA-SCNC: 21.4 MMOL/L (ref 17–25)
HCT VFR BLD AUTO: 31.4 % (ref 37–47)
HGB BLD-MCNC: 10.7 G/DL (ref 12–16)
INST. QUALIFIED PATIENT IIQPT: YES
LV EJECT FRACT  99904: 65
LV EJECT FRACT MOD 2C 99903: 50.64
LV EJECT FRACT MOD 4C 99902: 54.19
LV EJECT FRACT MOD BP 99901: 50.13
MAGNESIUM SERPL-MCNC: 2.2 MG/DL (ref 1.5–2.5)
MCH RBC QN AUTO: 29.5 PG (ref 27–33)
MCHC RBC AUTO-ENTMCNC: 34.1 G/DL (ref 33.6–35)
MCV RBC AUTO: 86.5 FL (ref 81.4–97.8)
O2/TOTAL GAS SETTING VFR VENT: 40 %
P JIROVECII AG SPEC QL IF: NORMAL
P JIROVECII AG SPEC QL IF: NORMAL
PCO2 BLDA: 31.5 MMHG (ref 26–37)
PCO2 TEMP ADJ BLDA: 31.1 MMHG (ref 26–37)
PH BLDA: 7.44 [PH] (ref 7.4–7.5)
PH TEMP ADJ BLDA: 7.44 [PH] (ref 7.4–7.5)
PHOSPHATE SERPL-MCNC: 2.1 MG/DL (ref 2.5–4.5)
PLATELET # BLD AUTO: 217 K/UL (ref 164–446)
PMV BLD AUTO: 12.8 FL (ref 9–12.9)
PO2 BLDA: 145 MMHG (ref 64–87)
PO2 TEMP ADJ BLDA: 144 MMHG (ref 64–87)
POTASSIUM SERPL-SCNC: 3.7 MMOL/L (ref 3.6–5.5)
PROT SERPL-MCNC: 6 G/DL (ref 6–8.2)
RBC # BLD AUTO: 3.63 M/UL (ref 4.2–5.4)
RHODAMINE-AURAMINE STN SPEC: NORMAL
SAO2 % BLDA: 99 % (ref 93–99)
SIGNIFICANT IND 70042: NORMAL
SITE SITE: NORMAL
SODIUM SERPL-SCNC: 146 MMOL/L (ref 135–145)
SOURCE SOURCE: NORMAL
SPECIMEN DRAWN FROM PATIENT: ABNORMAL
WBC # BLD AUTO: 10.7 K/UL (ref 4.8–10.8)

## 2018-02-19 PROCEDURE — 700101 HCHG RX REV CODE 250: Performed by: INTERNAL MEDICINE

## 2018-02-19 PROCEDURE — 94003 VENT MGMT INPAT SUBQ DAY: CPT

## 2018-02-19 PROCEDURE — 770022 HCHG ROOM/CARE - ICU (200)

## 2018-02-19 PROCEDURE — 82962 GLUCOSE BLOOD TEST: CPT | Mod: 91

## 2018-02-19 PROCEDURE — 700105 HCHG RX REV CODE 258: Performed by: INTERNAL MEDICINE

## 2018-02-19 PROCEDURE — 700111 HCHG RX REV CODE 636 W/ 250 OVERRIDE (IP): Performed by: INTERNAL MEDICINE

## 2018-02-19 PROCEDURE — 93306 TTE W/DOPPLER COMPLETE: CPT | Mod: 26 | Performed by: INTERNAL MEDICINE

## 2018-02-19 PROCEDURE — 84100 ASSAY OF PHOSPHORUS: CPT

## 2018-02-19 PROCEDURE — 700102 HCHG RX REV CODE 250 W/ 637 OVERRIDE(OP): Performed by: INTERNAL MEDICINE

## 2018-02-19 PROCEDURE — 83735 ASSAY OF MAGNESIUM: CPT

## 2018-02-19 PROCEDURE — 85027 COMPLETE CBC AUTOMATED: CPT

## 2018-02-19 PROCEDURE — 94669 MECHANICAL CHEST WALL OSCILL: CPT

## 2018-02-19 PROCEDURE — 93306 TTE W/DOPPLER COMPLETE: CPT

## 2018-02-19 PROCEDURE — 82803 BLOOD GASES ANY COMBINATION: CPT

## 2018-02-19 PROCEDURE — 87040 BLOOD CULTURE FOR BACTERIA: CPT

## 2018-02-19 PROCEDURE — 99233 SBSQ HOSP IP/OBS HIGH 50: CPT | Performed by: HOSPITALIST

## 2018-02-19 PROCEDURE — 71045 X-RAY EXAM CHEST 1 VIEW: CPT

## 2018-02-19 PROCEDURE — 80053 COMPREHEN METABOLIC PANEL: CPT

## 2018-02-19 PROCEDURE — 700101 HCHG RX REV CODE 250: Performed by: HOSPITALIST

## 2018-02-19 PROCEDURE — A9270 NON-COVERED ITEM OR SERVICE: HCPCS | Performed by: INTERNAL MEDICINE

## 2018-02-19 RX ORDER — SODIUM CHLORIDE, SODIUM LACTATE, POTASSIUM CHLORIDE, CALCIUM CHLORIDE 600; 310; 30; 20 MG/100ML; MG/100ML; MG/100ML; MG/100ML
INJECTION, SOLUTION INTRAVENOUS CONTINUOUS
Status: DISCONTINUED | OUTPATIENT
Start: 2018-02-19 | End: 2018-02-20

## 2018-02-19 RX ADMIN — FAMOTIDINE 20 MG: 10 INJECTION, SOLUTION INTRAVENOUS at 08:43

## 2018-02-19 RX ADMIN — POTASSIUM PHOSPHATE, MONOBASIC AND POTASSIUM PHOSPHATE, DIBASIC 15 MMOL: 224; 236 INJECTION, SOLUTION INTRAVENOUS at 08:42

## 2018-02-19 RX ADMIN — SODIUM CHLORIDE, POTASSIUM CHLORIDE, SODIUM LACTATE AND CALCIUM CHLORIDE: 600; 310; 30; 20 INJECTION, SOLUTION INTRAVENOUS at 21:24

## 2018-02-19 RX ADMIN — CHLORHEXIDINE GLUCONATE 15 ML: 1.2 RINSE ORAL at 08:43

## 2018-02-19 RX ADMIN — CHLORHEXIDINE GLUCONATE 15 ML: 1.2 RINSE ORAL at 20:37

## 2018-02-19 RX ADMIN — POTASSIUM CHLORIDE 40 MEQ: 400 INJECTION, SOLUTION INTRAVENOUS at 00:30

## 2018-02-19 RX ADMIN — PROPOFOL 20 MCG/KG/MIN: 10 INJECTION, EMULSION INTRAVENOUS at 06:05

## 2018-02-19 RX ADMIN — PROPOFOL 20 MCG/KG/MIN: 10 INJECTION, EMULSION INTRAVENOUS at 22:31

## 2018-02-19 RX ADMIN — CEFTRIAXONE 2 G: 2 INJECTION, POWDER, FOR SOLUTION INTRAMUSCULAR; INTRAVENOUS at 08:43

## 2018-02-19 RX ADMIN — FAMOTIDINE 20 MG: 10 INJECTION, SOLUTION INTRAVENOUS at 20:37

## 2018-02-19 RX ADMIN — PROPOFOL 20 MCG/KG/MIN: 10 INJECTION, EMULSION INTRAVENOUS at 16:12

## 2018-02-19 RX ADMIN — SODIUM CHLORIDE, POTASSIUM CHLORIDE, SODIUM LACTATE AND CALCIUM CHLORIDE: 600; 310; 30; 20 INJECTION, SOLUTION INTRAVENOUS at 12:52

## 2018-02-19 RX ADMIN — POTASSIUM CHLORIDE AND SODIUM CHLORIDE: 900; 150 INJECTION, SOLUTION INTRAVENOUS at 10:51

## 2018-02-19 RX ADMIN — ENOXAPARIN SODIUM 40 MG: 100 INJECTION SUBCUTANEOUS at 08:43

## 2018-02-19 NOTE — CARE PLAN
Problem: Respiratory:  Goal: Respiratory status will improve  Outcome: PROGRESSING SLOWER THAN EXPECTED  Pt required mechanical ventilation and emergent bronchoscopy today. Pt tolerating vent well.     Problem: Safety  Goal: Will remain free from injury  Outcome: PROGRESSING AS EXPECTED  Pt has remained free from injury. Bed locked and in low position with bed alarm on.

## 2018-02-19 NOTE — PROGRESS NOTES
"Pulmonary Critical Care Progress Note        DOS:  2/19/2018, admit 2/13/2018    Chief Complaint: sepsis    History of Present Illness: \"Ms. Finch is a 57-year-old female with past medical history brain aneurysm, stroke, chronic pain who was admitted on 2/13/18 from an outside hospital for a urinary tract infection and septic shock. A CT of her abdomen demonstrated a 7 mm right ureteral calculus with severe obstructing hydro-nephrosis. She was taken to the OR for cystoscopy with stent placement, intraoperatively was found to have a large amount of purulent fluid draining from the right kidney and was hypotensive requiring vasopressors. She arrives in the ICU on vasopressors and critically ill. At this time she complains of only flank pain however is somewhat confused.\"     Review of Systems   Unable to perform ROS: Intubated     Interval Events:  24 hour interval history reviewed     Follows well on sedation  pleuritic CP in sternum - 4 min CPR yesterday  R trudi old, moves R foot and R shoulder - sensation intact  Propofol 20  RADHA started yesterday post code - off this AM before rounds  SR 70-90s  SBp 130-140s   Tm   WBC 10.7  H/o G-Bypass  UO adequate - clearing  CVC R IJ  /hr  CXR ET ok, min atelectasis, pulm edema better  PEEP 12  - 40% - full vent  - no SBT  Na 146  Renal function normal  Phos 2.1/K 3.7    D/w  at bedside at length at bedside about last 24 hrs events  Wife is responding to him!    F/u eval - sats holding with dropping PEEP to 10 - hemodynamics no change - still ok Bp Off RADHA drip this AM     YESTERDAY  Patient was transferred back to the ICU today for increasing respiratory failure. In speaking with her  apparently yesterday she was alert and oriented back at baseline and today and she became more somnolent and dyspneic. And RRT was called and the patient was brought to the ICU where shortly after she was found to have mucus plugging causing worsening hypoxia and a hypoxic " cardiac arrest. ROSC was briskly obtained and the patient was placed on the ventilator.    PFSH:  No change.    Physical Exam   Constitutional: She appears unhealthy. She has a sickly appearance. No distress. She is intubated.   HENT:   Head: Normocephalic and atraumatic.   Right Ear: External ear normal.   Left Ear: External ear normal.   Mouth/Throat: Oropharynx is clear and moist.   Eyes: Conjunctivae and EOM are normal. Pupils are equal, round, and reactive to light.   Neck: Neck supple. No JVD present. No tracheal deviation present.   Cardiovascular: Normal rate, regular rhythm and normal heart sounds.    No murmur heard.  Pulmonary/Chest: Effort normal. She is intubated. No respiratory distress. She has decreased breath sounds. She exhibits tenderness.   Abdominal: Soft. Bowel sounds are normal. She exhibits no distension. There is no tenderness. There is no guarding.   obese   Genitourinary:   Genitourinary Comments: No discharge from [facility] today   Musculoskeletal: Normal range of motion. She exhibits no edema or tenderness.   contractured LUE   Neurological: She is alert. No cranial nerve deficit. She exhibits abnormal muscle tone.   Patient follows commands and interacts after sedation wean.  R hemiplegia, RUE weaker than RLE   Skin: Skin is warm and dry. No rash noted. No erythema. Nails show no clubbing.   Nursing note and vitals reviewed.      Respiratory:  Sosa Vent Mode: APVCMV, Rate (breaths/min): 20, Vt Target (mL): 380, PEEP/CPAP: 12, FiO2: 40, Static Compliance (ml / cm H2O): 79, Control VTE (exp VT): 494  Pulse Oximetry: 100 %    Ventilator mechanics and waveforms are reviewed at the bedside with RT  Secretions    ImagingAvailable data reviewed   Recent Labs      02/18/18   1408   ISTATAPH  7.400   ISTATAPCO2  37.5*   ISTATAPO2  280*   ISTATATCO2  24   [id number]  100*   ISTATARTHCO3  23.2   ISTATARTBE  -1   ISTATTEMP  see below   ISTATFIO2  90   ISTATSPEC  Arterial        HemoDynamics:  Pulse: 79, Heart Rate (Monitored): 75  Blood Pressure: (!) 99/64, NIBP: (!) 96/64  CVP (mm Hg): (!) 256 MM HG  Imaging: Available data reviewed        Neuro:  GCS Total Marianne Coma Score: 10  Imaging: Available data reviewed    Fluids:  Intake/Output       02/17/18 0700 - 02/18/18 0659 02/18/18 0700 - 02/19/18 0659 02/19/18 0700 - 02/20/18 0659      0700-1859 1900-0659 Total 0700-1859 1900-0659 Total 0700-1859 1900-0659 Total       Intake    I.V.  1700  1300 3000  1574.6  743.7 2318.2  --  -- --    Phenylephrine Volume -- -- -- 35.2 97.7 132.8 -- -- --    Propofol Volume -- -- -- 39.4 96 135.4 -- -- --    IV Piggyback Volume (40meq KCl in NS) -- 500 500 -- -- -- -- -- --    IV Piggyback Volume (IV Piggyback) 500 -- 500 -- -- -- -- -- --    IV Volume (NS w/ 20meq KCl) -- 100 100 -- -- -- -- -- --    IV Volume (NS + 20K) -- -- -- -- 550 550 -- -- --    IV Volume (NS) 8228 941 5578 1500 -- 1500 -- -- --    Total Intake 1700 1300 3000 1574.6 743.7 2318.2 -- -- --       Output    Urine  --  -- --  220  75 295  --  -- --    Number of Times Voided -- 5 x 5 x -- -- -- -- -- --    Number of Times Incontinent of Urine 6 x 6 x 12 x -- -- -- -- -- --    Indwelling Cathether -- -- -- 220 75 295 -- -- --    Stool  --  -- --  --  -- --  --  -- --    Number of Times Stooled -- 0 x 0 x -- 0 x 0 x -- -- --    Total Output -- -- -- 220 75 295 -- -- --       Net I/O     1700 1300 3000 1354.6 668.7 2023.2 -- -- --           Recent Labs      02/16/18 2048  02/17/18   0328   02/17/18   2339  02/18/18   1125  02/18/18 2010 02/19/18   0400   SODIUM   --   138   --   141  142   --    --    POTASSIUM  2.6*  2.6*   < >  2.8*  2.9*  3.3*   --    CHLORIDE   --   108   --   113*  111   --    --    CO2   --   18*   --   18*  18*   --    --    BUN   --   11   --   11  11   --    --    CREATININE   --   0.50   --   0.37*  0.47*   --    --    MAGNESIUM  1.4*  2.1   --   1.8   --   2.3  2.2   PHOSPHORUS  2.4*  2.2*   --     --    --    --   2.1*   CALCIUM   --   8.5   --   8.3*  8.5   --    --     < > = values in this interval not displayed.       GI/Nutrition:  Imaging: Available data reviewed  taking PO  Liver Function  Recent Labs      18   2339  18   1125   GLUCOSE  156*  131*  225*       Heme:  Recent Labs      18   2340  18   1126  18   0400   RBC  4.13*  4.46  3.63*   HEMOGLOBIN  12.0  13.1  10.7*   HEMATOCRIT  34.4*  37.9  31.4*   PLATELETCT  189  243  217       Infectious Disease:  Temp  Av.8 °C (98.3 °F)  Min: 36.8 °C (98.3 °F)  Max: 36.8 °C (98.3 °F)  Micro: antibiotics reviewed and cultures reviewed  Recent Labs      18   0825  18   23418   1126  18   0400   WBC  17.8*  13.9*  10.7  11.1*  10.7   NEUTSPOLYS  83.20*  79.80*   --   70.80   --    LYMPHOCYTES  8.20*  14.00*   --   22.10   --    MONOCYTES  6.00  5.30   --   1.80   --    EOSINOPHILS  0.10  0.00   --   0.90   --    BASOPHILS  0.80  0.00   --   0.00   --      Current Facility-Administered Medications   Medication Dose Frequency Provider Last Rate Last Dose   • 0.9 % NaCl with KCl 20 mEq infusion   Continuous Phan Yanez M.D. 100 mL/hr at 18     • propofol (DIPRIVAN) injection  0-80 mcg/kg/min Continuous David Pillai Jr., D.O. 12 mL/hr at 18 20 mcg/kg/min at 18   • phenylephrine (RADHA-SYNEPHRINE) 40,000 mcg in  mL Infusion  0-300 mcg/min Continuous David Pillai Jr., D.O. 11.3 mL/hr at 18 30 mcg/min at 18   • Respiratory Care per Protocol   Continuous RT SEVEN Ferrer Jr..O.       • senna-docusate (PERICOLACE or SENOKOT S) 8.6-50 MG per tablet 2 Tab  2 Tab BID David Pillai Jr., D.O.   Stopped at 18 2100    And   • polyethylene glycol/lytes (MIRALAX) PACKET 1 Packet  1 Packet QDAY PRN David Pillai Jr., D.O.        And   • magnesium hydroxide (MILK OF MAGNESIA) suspension 30 mL  30  mL QDAY PRN David Pillai Jr., D.O.        And   • bisacodyl (DULCOLAX) suppository 10 mg  10 mg QDAY PRN SEVEN Ferrer Jr..O.       • chlorhexidine (PERIDEX) 0.12 % solution 15 mL  15 mL BID SEVEN Ferrer Jr..OBrittany   15 mL at 02/18/18 2026   • lidocaine (XYLOCAINE) 1%  injection  1-2 mL Q30 MIN PRN SEVEN Ferrer Jr..O.       • MD ALERT...Adult ICU Electrolyte Replacement per Pharmacy Protocol   pharmacy to dose SEVEN Ferrer Jr..O.       • famotidine (PEPCID) tablet 20 mg  20 mg Q12HRS SEVEN Ferrer Jr..O.        Or   • famotidine (PEPCID) injection 20 mg  20 mg Q12HRS SEVEN Ferrer Jr..OBrittany   20 mg at 02/18/18 2025   • ipratropium-albuterol (DUONEB) nebulizer solution 3 mL  3 mL Q2HRS PRN (RT) SEVEN Ferrer Jr..O.       • insulin regular (HUMULIN R) injection 1-6 Units  1-6 Units Q6HRS SEVEN Ferrer Jr..OBrittany   Stopped at 02/18/18 1445    And   • glucose 4 g chewable tablet 16 g  16 g Q15 MIN PRN David Pillai Jr., D.O.        And   • dextrose 50% (D50W) injection 25 mL  25 mL Q15 MIN PRN SEVEN Ferrer Jr..O.       • fentaNYL (SUBLIMAZE) injection 25 mcg  25 mcg Q HOUR PRN SEVEN Ferrer Jr..OBrittany        Or   • fentaNYL (SUBLIMAZE) injection 50 mcg  50 mcg Q HOUR PRN SEVEN Ferrer Jr..OBrittany        Or   • fentaNYL (SUBLIMAZE) injection 100 mcg  100 mcg Q HOUR PRN SEVEN Ferrer Jr..O.       • guaiFENesin (ROBITUSSIN) 100 MG/5ML solution 200 mg  10 mL Q6HRS SEVEN Ferrer Jr..O.   Stopped at 02/18/18 2015   • oxyCODONE immediate-release (ROXICODONE) tablet 5 mg  5 mg Q4HRS PRN Roly Mathew M.D.       • cefTRIAXone (ROCEPHIN) syringe 2 g  2 g Q24HRS David Pillai Jr., D.O.   2 g at 02/18/18 0840   • fentaNYL (DURAGESIC) 100 MCG/HR 1 Patch  1 Patch Q72HRS David Pillai Jr., D.O.   1 Patch at 02/18/18 2123   • labetalol (NORMODYNE,TRANDATE) injection 10 mg  10 mg Q4HRS PRN Cordell Owens M.D.   10 mg at 02/18/18 0840   • hydrALAZINE (APRESOLINE) injection 10 mg   10 mg Q4HRS PRN Cordell Owens M.D.   10 mg at 02/18/18 1109   • enoxaparin (LOVENOX) inj 40 mg  40 mg DAILY David Pillai Jr., D.O.   40 mg at 02/18/18 0841   • gabapentin (NEURONTIN) capsule 300 mg  300 mg TID Alberto Singh M.D.   Stopped at 02/14/18 1500   • folic acid (FOLVITE) tablet 2 mg  2 mg DAILY Jairo Silva M.D.   Stopped at 02/13/18 0945   • pregabalin (LYRICA) capsule 150 mg  150 mg BID Jairo Silva M.D.   Stopped at 02/14/18 0900   • NS (BOLUS) infusion 500 mL  500 mL Once PRN Jairo Silva M.D.       • ondansetron (ZOFRAN) syringe/vial injection 4 mg  4 mg Q4HRS PRN Jairo Silva M.D.   4 mg at 02/13/18 2152   • ondansetron (ZOFRAN ODT) dispertab 4 mg  4 mg Q4HRS PRN Jairo Silva M.D.       • promethazine (PHENERGAN) tablet 12.5-25 mg  12.5-25 mg Q4HRS PRN Jairo Silva M.D.       • promethazine (PHENERGAN) suppository 12.5-25 mg  12.5-25 mg Q4HRS PRN Jairo Silva M.D.       • prochlorperazine (COMPAZINE) injection 5-10 mg  5-10 mg Q4HRS PRN Jairo Silva M.D.       • FLUoxetine (PROZAC) capsule 20 mg  20 mg DAILY Jairo Silva M.D.   Stopped at 02/13/18 0945   • DULoxetine (CYMBALTA) capsule 60 mg  60 mg BID Jairo Silva M.D.   Stopped at 02/14/18 0900   • menthol (HALLS) lozenge 1 Lozenge  1 Lozenge Q HOUR PRN David Pillai Jr., D.O.         Last reviewed on 2/13/2018  8:37 AM by Lynette Chisholm [facility]    Quality  Measures:  Core Measures & Quality Metrics    Problems/Plan:  Acute hypoxic respiratory failure   - Intubated to 2/18   - RT/O2 protocols   - Daily and PRN ABGs   - Titration of ventilator therapy based on ABGs and patient's status   - Sedation as tolerated/indicated   - Daily CXR   - HOB >30 degrees and peridex for VAP prevention   - Pepcid for GI prophylaxis   - SAT/SBT when able (ABCDEF Bundle)   - Early mobility  Cardiac arrest 2/18   - Secondary to hypoxia/mucous plugs   - Echo completed 2/19  - interp still pending Cards review   - Correctable electrolytes   - GCS 11T  Sternal pain   - s/p 4 min CPR with code blue 2/18 - sternal/rib bruise/Fx   - analgesia - may limit weaning - already had issue with secretion management issues pre-code  Mucous plugging   - CPT, guaifenesin, consider bicarbonate/mucomyst PRN   - Tx FOB PRN - serial CXR  Septic shock - Bp has returned to normal  - off pressors              - pansensitive E. coli- urinary source              - source targeted antibiotics: Rocephin  Started 2/15  - off Vanco/Zosyn s/p 2 days Rx 2/13-14   - source controlled by urology               - blood, respiratory and urine cultures noted - Escherichia coli  +BC x 2 - pansensitive  Gram-negative ever Bacteremia - Escherichia coli               - Urinary source, continue antibiotics   - reviewed sensitivities - pansensitive  Obstructing right ureteral calculus              - Status post stent placement by urology - 2/13              - monitor renal function  Pyelonephritis              - Continue antibiotics - min 7 day course IV - monitor for need to extend  Acute encephalopathy - improved after sepsis and code blue!!!              - Likely toxic metabolic given sepsis initially   - improving              - CT of the brain negative   - Fortunately short code bluew 2/18 - 4 min CPR - following today on Propofol  Acute kidney injury              - Secondary to pyelonephritis and obstructive uropathy              - resolved   - avoid nephrotoxins  Lactic acidosis/anion gap metabolic acidosis              - resolved  Anemia              - Mild, monitor    - conservative transfusion policy  Chronic pain   - restart home meds   - no enteric access currently, PRN IV opiates  Dysphagia   - May need GI to place feeding tube due to abnormal anatomy   - For now will continue IV fluids  Mild Protein calorie malnutrition              - RD consult   - Need enteric access - GI consult if needed to place feeding  tube  Hypokalemia/hypomagnesemia   - Replete per ERP  Enteral nutrition - Cortrak into gastric remenant - dietary eval when access obtained  Prophylaxis: SCDs    Prognosis very guarded.  Critically ill with unstable cardio-pulmonary status on full vent support, code blue yesterday and just off pressors this AM with possible chest-wall injury after CPR and still on high PEEP at 12.  High risk of deterioration and worsening vital organ dysfunction and death without the above critical care interventions.    Discussed patient condition and risk of morbidity and/or mortality with Family, RN, RT, Pharmacy, Dietary, , Charge nurse / hot rounds, Patient and hospitalist.    The patient remains critically ill.  Critical care time = 40 minutes in directly providing and coordinating critical care and extensive data review.  No time overlap and excludes procedures

## 2018-02-19 NOTE — PROGRESS NOTES
[facility] Hospitalist Progress Note    Date of Service: 2018    Chief Complaint  57 y.o. female admitted 2018 with altered mental status    Interval Problem Update  Ms. Finch has a history of brain aneurysm with hemiplegia since  that was found to have a fever of 103 and UTI with hydronephrosis. She was transferred from [facility] to [facility] and underwent cystoscopy and ureteral stent placement by Dr. Killian. She has been admitted to the ICU for IV fluids, pressors, and IV antibiotics. Her IV pressors have been turned off. On  she developed progressive hypoxia and was not protecting her airway thus required intubation followed by bronchoscopy. Propofol at 20.     Consultants/Specialty  Critical Care. I discussed her condition with Dr. Brand today on ICU Hot Rounds.  Infectious disease  Urology   Disposition  ICU        Review of Systems   Unable to perform ROS: Intubated      Physical Exam  Laboratory/Imaging   Hemodynamics  Temp (24hrs), Av.8 °C (98.3 °F), Min:36.8 °C (98.3 °F), Max:36.8 °C (98.3 °F)   Temperature: 36.8 °C (98.3 °F)  Pulse  Av.3  Min: 53  Max: 161 Heart Rate (Monitored): 77  Blood Pressure: (!) 99/64, NIBP: 144/88 CVP (mm Hg): (!) 256 MM HG    Respiratory  Sosa Vent Mode: APVCMV, Rate (breaths/min): 20, PEEP/CPAP: 12, PEEP/CPAP: 12, FiO2: 40, P Peak (PIP): 22, P MEAN: 13   Respiration: (!) 0, Pulse Oximetry: 100 %     Work Of Breathing / Effort: Vented  RUL Breath Sounds: Clear, RML Breath Sounds: Clear, RLL Breath Sounds: Diminished, EMIGDIO Breath Sounds: Clear, LLL Breath Sounds: Diminished    Fluids    Intake/Output Summary (Last 24 hours) at 18 0739  Last data filed at 18 0600   Gross per 24 hour   Intake          2811.43 ml   Output              545 ml   Net          2266.43 ml       Nutrition  Orders Placed This Encounter   Procedures   • Diet NPO     Standing Status:   Standing     Number of Occurrences:   1     Order Specific Question:   Type:      Answer:   Now [1]     Order Specific Question:   Restrict to:     Answer:   Strict [1]     Physical Exam   Constitutional: No distress.   HENT:   Head: Normocephalic and atraumatic.   Eyes: No scleral icterus.   Neck:   Right IJ central line   Cardiovascular: Normal rate and regular rhythm.    No murmur heard.  Pulmonary/Chest:   Vent, good air movement.  Few crackles.  No wheezing     Abdominal: Soft. She exhibits no distension.   Genitourinary:   Genitourinary Comments: [facility]o catheter   Musculoskeletal: She exhibits edema.   Right arm swelling   Neurological:   Right upper arm paralysis.  She is sedated though moves the left arm spontaneously.   Skin: Skin is warm and dry. She is not diaphoretic. There is pallor.   Nursing note and vitals reviewed.      Recent Labs      02/17/18   2340  02/18/18   1126  02/19/18   0400   WBC  10.7  11.1*  10.7   RBC  4.13*  4.46  3.63*   HEMOGLOBIN  12.0  13.1  10.7*   HEMATOCRIT  34.4*  37.9  31.4*   MCV  83.3  85.0  86.5   MCH  29.1  29.4  29.5   MCHC  34.9  34.6  34.1   RDW  44.0  45.2  47.8   PLATELETCT  189  243  217   MPV  12.3  12.4  12.8     Recent Labs      02/17/18   2339  02/18/18   1125  02/18/18 2010 02/19/18   0400   SODIUM  141  142   --   146*   POTASSIUM  2.8*  2.9*  3.3*  3.7   CHLORIDE  113*  111   --   118*   CO2  18*  18*   --   21   GLUCOSE  131*  225*   --   101*   BUN  11  11   --   17   CREATININE  0.37*  0.47*   --   0.37*   CALCIUM  8.3*  8.5   --   8.3*                      Assessment/Plan     * Septic shock (CMS-HCC)- (present on admission)   Assessment & Plan    Present on admission  Source was pyelonephritis  S/p IV fluids and pressors.        Acute respiratory failure (CMS-HCC)   Assessment & Plan    With hypoxia   She was not able to protect her airway  She required intubation on 2/18.  PEEP is high at 12  Critical care consulted.        History of completed stroke- (present on admission)   Assessment & Plan    Hx brain aneurysm in 1994 with  resultant right hemiparesis        Hydronephrosis with urinary obstruction due to renal calculus- (present on admission)   Assessment & Plan    7mm right sided ureter stone s/p removal 2/13         Pyelonephritis- (present on admission)   Assessment & Plan    Blood cultures grew pan-sensitive E. coli   Had also nephrolithiasis with hydronephrosis, s/p stent placement by urology  IV rocephin.  ID consulted.           Chronic pain- (present on admission)   Assessment & Plan    Restarted home meds.        Encephalopathy acute- (present on admission)   Assessment & Plan    Metabolic/toxic encephalopathy due to dehydration and sepsis, CT head is neg, continue treatment for sepsis and dehydration.         Metabolic acidosis- (present on admission)   Assessment & Plan    Urosepsis, improved        CHERYL (acute kidney injury) (CMS-HCC)- (present on admission)   Assessment & Plan    Due to dehydration and sepsis   IV fluids  Renal function has recovered        Arm swelling- (present on admission)   Assessment & Plan    U/s negative for DVT.         Dysphagia- (present on admission)   Assessment & Plan    She had been followed by speech path        Thrombocytopenia (CMS-HCC)- (present on admission)   Assessment & Plan    Likely due to sepsis  Have trended up          Hyponatremia- (present on admission)   Assessment & Plan    Has resolved with IV fluids          Quality-Core Measures   Reviewed items::  Labs reviewed and Medications reviewed  [facility] catheter::  Unconscious / Sedated Patient on a Ventilator  DVT prophylaxis pharmacological::  Enoxaparin (Lovenox)

## 2018-02-19 NOTE — PROCEDURES
Procedure Note    Date: 2/18/2018  Time: 1215    Procedure: Intraosseous Line Placement  Location: Right proximal/anterior tibia    Indication: Multiple unsuccessful attempts at peripheral IV placement, code situation  Consent: Emergent/Implied    · Procedure: Appropriate site confirmed with landmarks, prepped, and draped in sterile fashion. The Right proximal tibia was cannulated with a 18 gauge IO [place]/[employer] needle. Minimal bone marrow/blood able to be aspirated and catheter connected to IVFs and secured/dressed in place. The patient tolerated the procedure well.    EBL: minimal  Complications: none    Jeremy Gonda, MD  Critical Care Medicine

## 2018-02-19 NOTE — CARE PLAN
Problem: Respiratory:  Goal: Respiratory status will improve  Outcome: PROGRESSING AS EXPECTED    Intervention: Assess and monitor pulmonary status   02/18/18 0800 02/18/18 1923 02/19/18 0000   Vitals   Respiration --  --  --    Pulse Oximetry --  --  --    OTHER   O2 (LPM) 0 --  --    Respiratory Pattern --  --  Vented   Work Of Breathing / Effort --  --  Vented   Pre/Post Intervention --  Post Intervention Assessment --    RUL Breath Sounds --  --  Clear   RML Breath Sounds --  --  Clear   RLL Breath Sounds --  --  Diminished   EMIGDIO Breath Sounds --  --  Clear   LLL Breath Sounds --  --  Diminished   Respiratory   Cough --  Productive --     02/19/18 0100   Vitals   Respiration 20   Pulse Oximetry 98 %   OTHER   O2 (LPM) --    Respiratory Pattern --    Work Of Breathing / Effort --    Pre/Post Intervention --    RUL Breath Sounds --    RML Breath Sounds --    RLL Breath Sounds --    EMIGDIO Breath Sounds --    LLL Breath Sounds --    Respiratory   Cough --          Problem: Safety  Goal: Will remain free from falls  Outcome: PROGRESSING AS EXPECTED      Problem: Skin Integrity  Goal: Risk for impaired skin integrity will decrease  Outcome: PROGRESSING AS EXPECTED    Intervention: Implement precautions to protect skin integrity in collaboration with the interdisciplinary team   02/18/18 2000 02/19/18 0200   OTHER   Skin Preventative Measures --  Pillows in Use to Float Heels;Pillows in Use for Support / Positioning   Bed Types --  Low Air Loss Mattress   Friction Interventions Draw Sheet / Pad Used for Repositioning --    Moisturizers --  Barrier Wipes   PT / OT Involved in Care Physical Therapy Involved --    Activity  Bed --    Patient Turns / Repositioning --  Right Side   Assistance / Tolerance for Turning/Repositioning --  Assistance of Two or More   Patient is Receiving Nutrition Nothing by Mouth --    Nutrition Consult Ordered No, Consult has Already been Placed --    Vitamin Therapy in Use Yes --

## 2018-02-19 NOTE — THERAPY
OT tx attempted.  Pt had change in medical status, transferred to CICU & is now intubated.  Will monitor & check for appropriateness to participate in OOB ADL's.

## 2018-02-19 NOTE — CARE PLAN
Problem: Ventilation Defect:  Goal: Ability to achieve and maintain unassisted ventilation or tolerate decreased levels of ventilator support    Intervention: Support and monitor invasive and noninvasive mechanical ventilation  Adult Ventilation Update    Total Vent Days: 2    Patient Lines/Drains/Airways Status    Active Airway     Name: Placement date: Placement time: Site: Days:    Airway Group ET Tube 7.5 02/18/18   1149      2                    Plateau Pressure (Q Shift): 19 (02/19/18 0605)  Static Compliance (ml / cm H2O): 74 (02/19/18 1402)    Patient failed trials because of Barriers to Wean: No Order (02/19/18 0800)  Barriers to SBT    Length of Weaning Trial Length of Weaning Trial (Hours): 0 (02/19/18 0800)          Cough: Productive (02/19/18 1402)  Sputum Amount: Small (02/19/18 1402)  Sputum Color: Yellow (02/19/18 1402)  Sputum Consistency: Thick (02/19/18 1402)    Mobility Group  Activity Performed: Other (comment) (Pt turned side to side every 2 hours) (02/17/18 2000)  Time Activity Tolerated: 5 min (02/18/18 0800)  Distance Per Occurrence (ft.): 0 feet (02/18/18 0800)  # of Times Distance was Traveled: 0 (02/18/18 0800)  Assistance / Tolerance: Assistance of Two or More (02/18/18 0800)  Pt Calls for Assistance: Yes (02/18/18 0800)  Staff Present for Mobilization: RN (02/18/18 0800)  Assistive Devices: Hand held assist (02/18/18 0800)  Reason Not Mobilized: Bed rest;Unstable condition (02/19/18 0800)  Mobilization Comments: pt. in pain (02/14/18 2000)    Events/Summary/Plan: Pt peep lower to 8 cm per Dr. Brand, SBT in am (02/19/18 1402)

## 2018-02-19 NOTE — CARE PLAN
Problem: Ventilation Defect:  Goal: Ability to achieve and maintain unassisted ventilation or tolerate decreased levels of ventilator support  Pt intubated today d/t resp failure. Intubated with 7.5 ETT 21 cm sekou. Pt on APV 20x380+12 60%.

## 2018-02-19 NOTE — DISCHARGE PLANNING
Medical SW    Referral: Sw attended IDT rounds.    Intervention: altered mental status, UTI, septic, follows commands, right sided paralysis,     Plan: Sw to assist w/ d/c planning as needed.

## 2018-02-19 NOTE — PROGRESS NOTES
"Infectious Disease Progress Note    Author: Pauline Pittman M.D. Date & Time of service: 2018  9:28 PM    Chief Complaint:  UTI with sepsis    Interval History:  57-year-old white female admitted with fever, altered mental status and abdominal pain due to obstructive nephrolithiasis with associated pyelonephritis   AF WBC 11.1 c/o \"hunger pains\" in stomach-has not eaten in 9 days per . Failed mult swallow evals   AF, WBC 10.7, transferred to the ICU for increased work of breathing and intubated yesterday and started on pressors overnight, opens eyes to voice  Labs Reviewed, Medications Reviewed and Radiology Reviewed.    Review of Systems:  Review of Systems   Unable to perform ROS: Intubated       Hemodynamics:  Temp (24hrs), Av.7 °C (98.1 °F), Min:36.7 °C (98.1 °F), Max:36.7 °C (98.1 °F)  Temperature: 36.7 °C (98.1 °F)  Pulse  Av.9  Min: 53  Max: 161Heart Rate (Monitored): 79  Blood Pressure: (!) 99/64, NIBP: 133/71  CVP (mm Hg): 2 MM HG    Physical Exam:  Physical Exam   Constitutional: She appears well-developed. No distress.   Obese, morbid   HENT:   Head: Normocephalic and atraumatic.   edentulous   Eyes: EOM are normal. Pupils are equal, round, and reactive to light.   Neck: Neck supple.   R IJ catheter   Cardiovascular: Normal rate.    Pulmonary/Chest: Effort normal.   Abdominal: Soft. She exhibits no distension. There is tenderness. There is no rebound and no guarding.   Musculoskeletal: She exhibits edema.   Neurological: She is alert.   Nursing note and vitals reviewed.      Meds:    Current Facility-Administered Medications:   •  potassium phosphate ivpb  •  LR  •  Action is required: Protocol 452 Propofol Critical Care has been implemented **AND** propofol **AND** Propofol Critical Care Protocol - Patient Must Have an Advanced Airway with Mechanical Ventilation in Use. **AND** Propofol Critical Care Protocol - Spontaneous breathing trials may be attempted while receiving " propofol **AND** Propofol Critical Care Protocol - If patient meets extubation criteria, propofol MUST be discontinued prior to extubation **AND** Propofol Critical Care Protocol - No allergy to propofol, soybean oil, or eggs **AND** Propofol Critical Care Protocol - Do not administer this medication to children under the age of 12 **AND** Propofol Critical Care Protocol - Dedicated IV line for administration (vehicle supports rapid growth of microorganisms and incompatibilities cannot be detected) **AND** Propofol Critical Care Protocol - Administration tubing and any unused emulsion must be changed out and discarded after 12 hours from spiking vial **AND** Propofol Critical Care Protocol - RASS guildelines **AND** Propofol Critical Care Protocol - Wake-up assessment as ordered by MD **AND** Propofol Critical Care Protocol - Propofol is not an analgesic, concurrent analgesia (if patient experiencing pain) should continue while patient is on propofol **AND** Triglycerides Starting now and then Every 3 Days **AND** Propofol Critical Care Protocol - Notify MD prior to exceeding 80 mcg/kg/min and obtain new order for higher limit  •  Phenylephrine infusion  •  Respiratory Care per Protocol  •  senna-docusate **AND** polyethylene glycol/lytes **AND** magnesium hydroxide **AND** bisacodyl  •  chlorhexidine  •  lidocaine  •  MD ALERT...Adult ICU Electrolyte Replacement per Pharmacy Protocol  •  famotidine **OR** famotidine  •  ipratropium-albuterol  •  insulin regular **AND** Accu-Chek Q6 if NPO **AND** NOTIFY MD and PharmD **AND** glucose 4 g **AND** dextrose 50%  •  fentaNYL **OR** fentaNYL **OR** fentaNYL  •  guaiFENesin  •  oxyCODONE immediate-release  •  cefTRIAXone (ROCEPHIN) IV  •  fentaNYL  •  labetalol  •  hydrALAZINE  •  enoxaparin (LOVENOX) injection  •  gabapentin  •  folic acid  •  pregabalin  •  NS  •  ondansetron  •  ondansetron  •  promethazine  •  promethazine  •  prochlorperazine  •  FLUoxetine  •   DULoxetine  •  menthol    Labs:  Recent Labs      02/17/18   0328  02/17/18   2340  02/18/18   1126  02/19/18   0400   WBC  13.9*  10.7  11.1*  10.7   RBC  4.58  4.13*  4.46  3.63*   HEMOGLOBIN  13.1  12.0  13.1  10.7*   HEMATOCRIT  38.8  34.4*  37.9  31.4*   MCV  84.7  83.3  85.0  86.5   MCH  28.6  29.1  29.4  29.5   RDW  45.9  44.0  45.2  47.8   PLATELETCT  178  189  243  217   MPV  11.9  12.3  12.4  12.8   NEUTSPOLYS  79.80*   --   70.80   --    LYMPHOCYTES  14.00*   --   22.10   --    MONOCYTES  5.30   --   1.80   --    EOSINOPHILS  0.00   --   0.90   --    BASOPHILS  0.00   --   0.00   --    RBCMORPHOLO  Present   --   Present   --      Recent Labs      02/17/18   2339 02/18/18   1125 02/18/18 2010 02/19/18   0400   SODIUM  141  142   --   146*   POTASSIUM  2.8*  2.9*  3.3*  3.7   CHLORIDE  113*  111   --   118*   CO2  18*  18*   --   21   GLUCOSE  131*  225*   --   101*   BUN  11  11   --   17     Recent Labs      02/17/18   2339 02/18/18   1125  02/19/18   0400   ALBUMIN   --    --   2.5*   TBILIRUBIN   --    --   0.4   ALKPHOSPHAT   --    --   77   TOTPROTEIN   --    --   6.0   ALTSGPT   --    --   16   ASTSGOT   --    --   17   CREATININE  0.37*  0.47*  0.37*       Imaging:  Ct-abdomen-pelvis W/o    Result Date: 2/13/2018 2/13/2018 5:15 AM HISTORY/REASON FOR EXAM:  Pain Sepsis. UTI. TECHNIQUE/EXAM DESCRIPTION: CT scan of the abdomen and pelvis without contrast. Noncontrast helical scanning was obtained from the diaphragmatic domes through the pubic symphysis. Low dose optimization technique was utilized for this CT exam including automated exposure control and adjustment of the mA and/or kV according to patient size. COMPARISON: None. FINDINGS: Lung Bases: Small right pleural effusion. Bibasilar opacities. Hiatal hernia. Abdomen: Within the limits of noncontrast technique, the liver, spleen, pancreas, and adrenal glands are unremarkable in appearance. Postsurgical change in the stomach There is an  obstructing 7 mm calculus in the mid right ureter with severe upstream collecting system dilatation. No intrarenal calculus. The gallbladder is surgically absent. and there is no biliary dilatation. There is no bowel wall thickening or abnormal dilatation. The abdominal aorta is normal in caliber. Pelvis: The urinary bladder is decompressed by Olmedo catheter. Small fat-containing bilateral inguinal hernias. No lymph node enlargement. No free fluid, or free air in the abdomen or pelvis. No aggressive bone lesions are seen. Mild anterolisthesis of L5 on S1 ________________________________    1. Obstructing 7 mm calculus in the mid right ureter with severe right hydronephrosis. 2. Postsurgical change from gastric bypass. Hiatal hernia. 3. Small right pleural effusion. Bibasilar opacities, likely atelectasis.    Ct-head W/o    Result Date: 2/13/2018 2/13/2018 5:15 AM HISTORY/REASON FOR EXAM:  Altered Mental Status TECHNIQUE/EXAM DESCRIPTION AND NUMBER OF VIEWS: CT of the head without contrast. The study was performed on a helical multidetector CT scanner. Contiguous 2.5 mm axial sections were obtained from the skull base through the vertex. Up to date radiation dose reduction adjustments have been utilized to meet ALARA standards for radiation dose reduction. COMPARISON:  None available FINDINGS: There is no evidence of acute intracranial hemorrhage, mass, mass-effect or shift of midline structures. Encephalomalacia in the left frontal lobe with ex vacuo dilatation of the left lateral ventricle. Left frontal surgical clips. Ventricle size and brain parenchymal volume are appropriate for this patient's stated age. The basal cisterns are patent. There are no abnormal extra-axial fluid collections. No depressed or widely  calvarial fracture is seen. Postsurgical change in the left frontal calvarium. The visualized paranasal sinuses and temporal bone structures are aerated. ___________________________________      1. No evidence of acute intracranial hemorrhage or mass lesion. 2. Postsurgical change and encephalomalacia in the left frontal lobe.     Dx-chest-portable (1 View)    Result Date: 2/18/2018 2/18/2018 12:22 PM HISTORY/REASON FOR EXAM:  Central line readjustment. TECHNIQUE/EXAM DESCRIPTION AND NUMBER OF VIEWS: Single portable view of the chest. COMPARISON: 2/13/2018 FINDINGS: Right central venous catheter with tip in the lower SVC. Endotracheal tube with tip in the mid thoracic trachea. Diffuse interstitial prominence, left more than right. Mild bibasilar opacity. No pleural effusion. No pneumothorax. Stable cardiopericardial silhouette.     Interval intubation. Right central venous catheter was retracted with tip in the lower SVC    Dx-chest-portable (1 View)    Result Date: 2/13/2018 2/13/2018 5:01 AM HISTORY/REASON FOR EXAM:  Central line placement TECHNIQUE/EXAM DESCRIPTION AND NUMBER OF VIEWS: Single portable view of the chest. COMPARISON: 2/13/2018 4:26 AM FINDINGS: Right central venous catheter with tip in the right atrium. Mild diffuse interstitial prominence. Mild bibasilar opacities. No pleural effusion. No pneumothorax. Stable cardiopericardial silhouette.     Right central venous catheter with tip in the right atrium.    Dx-chest-portable (1 View)    Result Date: 2/13/2018 2/13/2018 4:19 AM HISTORY/REASON FOR EXAM:  Sepsis TECHNIQUE/EXAM DESCRIPTION AND NUMBER OF VIEWS: Single portable view of the chest. COMPARISON: None FINDINGS: Mild diffuse interstitial prominence. No pulmonary infiltrates or consolidations are noted. No pleural effusion. No pneumothorax. Normal cardiopericardial silhouette.     1. Mild diffuse interstitial prominence could relate to pulmonary edema or viral infection.    Dx-portable Fluoroscopy < 1 Hour Is The Patient Pregnant? No    Result Date: 2/13/2018 2/13/2018 8:00 AM HISTORY/REASON FOR EXAM:  Main OR Intraoperative evaluation TECHNIQUE/EXAM DESCRIPTION AND NUMBER OF  VIEWS: Fluoroscopic imaging during stent placement. COMPARISON: CT from the same day FINDINGS: 1 image was obtained in the operating room. Contrast is seen within a dilated renal collecting system. A ureteral stent is noted. A total of 12 seconds of fluoroscopy time utilized.     Intraoperative image as above described.    Ue Venous Duplex    Result Date: 2018   Upper Extremity  Venous Duplex Report  Vascular Laboratory  CONCLUSIONS  No DVT or SVT seen in the right upper extremity  ANGELINA ESCOBAR  Exam Date:     2018 12:52  Room #:     Inpatient  Priority:     Routine  Ht (in):             Wt (lb):  Ordering Physician:        ALEJANDRA WORTHY  Referring Physician:       [id number]  Sonographer:               Jeri Bear RVT  Study Type:                Complete Unilateral  Technical Quality:         Adequate  Age:    57    Gender:     F  MRN:    [id number]  :    1960      BSA:  Indications:     Swelling of Limb  CPT Codes:       27906  ICD Codes:       729.81  History:         PICC line in the internal jugular vein, with swelling of the                    arm.  Limitations:     Bandages from PICC line on right neck.  PROCEDURES:  Right upper extremity venous duplex imaging.  The following venous structures were evaluated: internal jugular,  subclavian, axillary, brachial, radial, ulnar, cephalic and basilic veins.  Serial compression, augmentation maneuvers,  color and spectral Doppler  flow evaluations were performed.  FINDINGS:  Right upper extremity.  Complete color filling and compressibility with normal venous flow dynamics  including spontaneous flow, response to augmentation maneuvers, and  respiratory phasicity.  No echogenic material visualized.  Flow was evaluated in the contralateral subclavian vein and normal venous  flow dynamics including spontaneous flow, respiratory phasic  variation and  augmentation were demonstrated.  Doyle Jimenez MD  (Electronically Signed)  Final Date:      14 February 2018                   05:07    Dx-abdomen For Tube Placement    Result Date: 2/15/2018  2/15/2018 1:59 PM HISTORY/REASON FOR EXAM:  Line evaluation. TECHNIQUE/EXAM DESCRIPTION AND NUMBER OF VIEWS:  1 view(s) of the abdomen. COMPARISON:  None. FINDINGS: Enteric tube projects over the distal esophagus. There is a nonspecific bowel gas pattern. There is a right ureteral stent. Surgical clip is seen in the right upper quadrant. Degenerative changes are seen in the spine.     Enteric tube projects over the expected level of the distal esophagus. Recommend advancement.      Micro:  Results     Procedure Component Value Units Date/Time    GRAM STAIN [[id number]] Collected:  02/18/18 1150    Order Status:  Completed Specimen:  Respirate Updated:  02/19/18 0950     Significant Indicator .     Source RESP     Site Quantitative BAL RLL     Gram Stain Result --     Few WBCs.  Moderate epithelial cells.  Rare Yeast.  <5% intracellular organisms.      BLOOD CULTURE [[id number]]     Order Status:  No result Specimen:  Blood from Peripheral     BLOOD CULTURE [[id number]]     Order Status:  No result Specimen:  Blood from Peripheral     QUANT BRONCHIAL WASHING [[id number]] Collected:  02/18/18 1150    Order Status:  Completed Specimen:  Other Updated:  02/18/18 1525    FUNGAL CULTURE [[id number]] Collected:  02/18/18 1150    Order Status:  Completed Specimen:  Other Updated:  02/18/18 1525    AFB CULTURE [[id number]] Collected:  02/18/18 1150    Order Status:  Completed Specimen:  Other Updated:  02/18/18 1525    PNEUMOCYSTIS DFA [[id number]] Collected:  02/18/18 1150    Order Status:  Completed Specimen:  Other Updated:  02/18/18 1525    URINALYSIS [[id number]]  (Abnormal) Collected:  02/18/18 1245    Order Status:  Completed Specimen:  Urine from Urine, Olmedo Cath Updated:  02/18/18 1342     Color Yellow      "Character Cloudy (A)     Specific Gravity 1.015     Ph 5.0     Glucose Negative mg/dL      Ketones 15 (A) mg/dL      Protein 300 (A) mg/dL      Bilirubin Negative     Urobilinogen, Urine 0.2     Nitrite Negative     Leukocyte Esterase Small (A)     Occult Blood Large (A)     Micro Urine Req Microscopic    Narrative:       Collected By:[id number] Aktino  Indication for culture:->Altered LOC  Indication for culture:->Temp Equal to,or Greater Than 101    URINE CULTURE(NEW) [[id number]] Collected:  02/18/18 1245    Order Status:  Completed Specimen:  Urine from Urine, Olmedo Cath Updated:  02/18/18 1326    Narrative:       Collected By:[id number] Aktino  Indication for culture:->Altered LOC  Indication for culture:->Temp Equal to,or Greater Than 101    URINE CULTURE(NEW) [[id number]] Collected:  02/13/18 0510    Order Status:  Completed Specimen:  Urine Updated:  02/15/18 1303     Significant Indicator NEG     Source UR     Site --     Urine Culture Mixed skin hien 10-50,000 cfu/mL    Narrative:       Indication for culture:->Emergency Room Patient    BLOOD CULTURE [[id number]]  (Abnormal) Collected:  02/13/18 0510    Order Status:  Completed Specimen:  Blood from Peripheral Updated:  02/15/18 0740     Significant Indicator POS (POS)     Source BLD     Site PERIPHERAL     Blood Culture Growth detected by Bactec instrument. 02/13/2018  19:12 (A)     Blood Culture -- (A)     Escherichia coli  See previous culture for sensitivity report.      Narrative:       CALL  [place]  19 tel. [phone],  CALLED  19 tel. [phone] 02/13/2018, 19:24, RB PERF. RESULTS CALLED TO:RN  [id number]  Per Hospital Policy: Only change Specimen Src: to \"Line\" if  specified by physician order.    BLOOD CULTURE [[id number]]  (Abnormal)  (Susceptibility) Collected:  02/13/18 0412    Order Status:  Completed Specimen:  Blood from Peripheral Updated:  02/15/18 0739     Significant Indicator POS (POS)     Source BLD     Site PERIPHERAL     Blood " "Culture Growth detected by Bactec instrument. 02/13/2018  19:11 (A)     Blood Culture Escherichia coli (A)    Narrative:       CALL  [place]  19 tel. [phone],  CALLED  19 tel. [phone] 02/13/2018, 19:24, RB PERF. RESULTS CALLED TO:FERNANDO  [id number]  Per Hospital Policy: Only change Specimen Src: to \"Line\" if  specified by physician order.    Culture & Susceptibility     ESCHERICHIA COLI     Antibiotic Sensitivity Microscan Unit Status    Ampicillin Sensitive <=8 mcg/mL Final    Cefepime Sensitive <=8 mcg/mL Final    Cefotaxime Sensitive <=2 mcg/mL Final    Cefotetan Sensitive <=16 mcg/mL Final    Ceftazidime Sensitive <=1 mcg/mL Final    Ceftriaxone Sensitive <=8 mcg/mL Final    Cefuroxime Sensitive 8 mcg/mL Final    Ciprofloxacin Sensitive <=1 mcg/mL Final    Ertapenem Sensitive <=1 mcg/mL Final    Gentamicin Sensitive <=4 mcg/mL Final    Pip/Tazobactam Sensitive <=16 mcg/mL Final    Tigecycline Sensitive <=2 mcg/mL Final    Tobramycin Sensitive <=4 mcg/mL Final    Trimeth/Sulfa Sensitive <=2/38 mcg/mL Final                       URINALYSIS [[id number]]  (Abnormal) Collected:  02/13/18 0510    Order Status:  Completed Specimen:  Urine Updated:  02/13/18 0528     Color DK Yellow     Character Clear     Specific Gravity 1.020     Ph 5.0     Glucose Negative mg/dL      Ketones Trace (A) mg/dL      Protein 100 (A) mg/dL      Bilirubin Negative     Urobilinogen, Urine 1.0     Nitrite Negative     Leukocyte Esterase Trace (A)     Occult Blood Small (A)     Micro Urine Req Microscopic    Narrative:       Indication for culture:->Emergency Room Patient          Assessment:  Active Hospital Problems    Diagnosis   • *Acute respiratory failure (CMS-HCC) [J96.00]   • Dysphagia [R13.10]   • Gram-negative bacteremia [R78.81]   • Septic shock (CMS-HCC) [A41.9, R65.21]   • Pyelonephritis [N12]   • Hydronephrosis with urinary obstruction due to renal calculus [N13.2]   • History of completed stroke [Z86.73]   • Thrombocytopenia (CMS-HCC) " [D69.6]   • Hyponatremia [E87.1]   • Chronic pain [G89.29]   • Arm swelling [M79.89]   • CHERYL (acute kidney injury) (CMS-HCC) [N17.9]   • Metabolic acidosis [E87.2]   • Encephalopathy acute [G93.40]       Plan:  Vent dependent respiratory failure  2/2 pulm edema vs mucous plugging  Intubated  Pressors weaned off this am  S/p bronch with BAL 2/18  BAL cultures pending  CXR 2/19 with improvement in edema per my read    Pyelonephritis - remains bacteremic  Afebrile   Leukocytosis resolved  status post emergent stent placement  Urine and  Blood cxs 2/13 Ecoli  Repeat BCx ordered today - need to document clearance  Continue  IV ceftriaxone and she will require suppression as the stent   may be infected    Acute kidney injury, resolved.    Gram neg sepsis  Due to above  On abx as above    Aspiration risk  GI to see  Failed mult swallow eval  Prior gastric bypass so Cortrak placement problematic    Discussed with internal medicine

## 2018-02-19 NOTE — PROGRESS NOTES
"Pulmonary Critical Care Progress Note        Chief Complaint: sepsis    History of Present Illness: \"Ms. Finch is a 57-year-old female with past medical history brain aneurysm, stroke, chronic pain who was admitted on 2/13/18 from an outside hospital for a urinary tract infection and septic shock. A CT of her abdomen demonstrated a 7 mm right ureteral calculus with severe obstructing hydro-nephrosis. She was taken to the OR for cystoscopy with stent placement, intraoperatively was found to have a large amount of purulent fluid draining from the right kidney and was hypotensive requiring vasopressors. She arrives in the ICU on vasopressors and critically ill. At this time she complains of only flank pain however is somewhat confused.\"     Review of Systems   Unable to perform ROS: Mental acuity     Interval Events:  24 hour interval history reviewed  Patient was transferred back to the ICU today for increasing respiratory failure. In speaking with her  apparently yesterday she was alert and oriented back at baseline and today and she became more somnolent and dyspneic. And RRT was called and the patient was brought to the ICU where shortly after she was found to have mucus plugging causing worsening hypoxia and a hypoxic cardiac arrest. ROSC was briskly obtained and the patient was placed on the ventilator.    PFSH:  No change.    Physical Exam   Constitutional: She appears unhealthy. She has a sickly appearance. No distress. She is intubated.   HENT:   Head: Normocephalic and atraumatic.   Right Ear: External ear normal.   Left Ear: External ear normal.   Mouth/Throat: Oropharynx is clear and moist.   Eyes: Conjunctivae and EOM are normal. Pupils are equal, round, and reactive to light.   Neck: Neck supple. No JVD present. No tracheal deviation present.   Cardiovascular: Normal rate, regular rhythm and normal heart sounds.    No murmur heard.  Pulmonary/Chest: Effort normal. She is intubated. No respiratory " distress. She has decreased breath sounds.   Abdominal: Soft. Bowel sounds are normal. She exhibits no distension. There is no tenderness.   Genitourinary:   Genitourinary Comments: Purulent discharge from [place]   Musculoskeletal: Normal range of motion. She exhibits no edema or tenderness.   contractured LUE   Neurological: She is alert. No cranial nerve deficit.   Patient follows commands and interacts after sedation wean.   Skin: Skin is warm and dry. No rash noted.   Psychiatric: Affect and judgment normal.   Nursing note and vitals reviewed.      Respiratory:  Sosa Vent Mode: APVCMV, Rate (breaths/min): 20, Vt Target (mL): 380, PEEP/CPAP: 12, FiO2: 50, Static Compliance (ml / cm H2O): 58, Control VTE (exp VT): 357  Pulse Oximetry: 100 %  Chest Tube Drains:  ImagingAvailable data reviewed   Recent Labs      02/18/18   1408   ISTATAPH  7.400   ISTATAPCO2  37.5*   ISTATAPO2  280*   ISTATATCO2  24   [id number]  100*   ISTATARTHCO3  23.2   ISTATARTBE  -1   ISTATTEMP  see below   ISTATFIO2  90   ISTATSPEC  Arterial       HemoDynamics:  Pulse: 80, Heart Rate (Monitored): 82  Blood Pressure: (!) 99/64, NIBP: 109/66  CVP (mm Hg): 5 MM HG  Imaging: Available data reviewed        Neuro:  GCS Total [place] Coma Score: 10  Imaging: Available data reviewed    Fluids:  Intake/Output       02/16/18 0700 - 02/17/18 0659 02/17/18 0700 - 02/18/18 0659 02/18/18 0700 - 02/19/18 0659      [id number] 5468-6761 Total 8571-4791 4557-2639 Total 2175-4951 5582-9885 Total       Intake    I.V.  950  1200 2150  1700  1300 3000  1574.6  -- 1574.6    Phenylephrine Volume -- -- -- -- -- -- 35.2 -- 35.2    Propofol Volume -- -- -- -- -- -- 39.4 -- 39.4    IV Piggyback Volume (40meq KCl in NS) -- -- -- -- 500 500 -- -- --    IV Piggyback Volume (IV Piggyback) 550 -- 550 500 -- 500 -- -- --    IV Volume (NS w/ 20meq KCl) -- -- -- -- 100 100 -- -- --    IV Volume (NS) 400 1200 1600 3989 083 6444 1500 -- 1500    Total Intake 950 1200 2150 1700  1300 3000 1574.6 -- 1574.6       Output    Urine  --  -- --  --  -- --  220  -- 220    Number of Times Voided -- 2 x 2 x -- 5 x 5 x -- -- --    Number of Times Incontinent of Urine 8 x 2 x 10 x 6 x 6 x 12 x -- -- --    Indwelling Cathether -- -- -- -- -- -- 220 -- 220    Stool  --  -- --  --  -- --  --  -- --    Number of Times Stooled 1 x 1 x 2 x -- 0 x 0 x -- -- --    Total Output -- -- -- -- -- -- 220 -- 220       Net I/O     950 1200 2150 1700 1300 3000 1354.6 -- 1354.6        Weight: 100.2 kg (220 lb 14.4 oz)  Recent Labs      18   1415  18   2048  18   1346  18   2339  18   1125   SODIUM   --    --   138   --   141  142   POTASSIUM  2.9*  2.6*  2.6*  2.9*  2.8*  2.9*   CHLORIDE   --    --   108   --   113*  111   CO2   --    --   18*   --   18*  18*   BUN   --    --   11   --   11  11   CREATININE   --    --   0.50   --   0.37*  0.47*   MAGNESIUM  1.4*  1.4*  2.1   --   1.8   --    PHOSPHORUS  1.4*  2.4*  2.2*   --    --    --    CALCIUM   --    --   8.5   --   8.3*  8.5       GI/Nutrition:  Imaging: Available data reviewed  taking PO  Liver Function  Recent Labs      18   23318   1125   GLUCOSE  156*  131*  225*       Heme:  Recent Labs      18   2340  18   1126   RBC  4.58  4.13*  4.46   HEMOGLOBIN  13.1  12.0  13.1   HEMATOCRIT  38.8  34.4*  37.9   PLATELETCT  178  189  243       Infectious Disease:  Temp  Av.6 °C (97.9 °F)  Min: 36.4 °C (97.6 °F)  Max: 36.8 °C (98.3 °F)  Micro: antibiotics reviewed and cultures reviewed  Recent Labs      18   0825  18   0328  18   2340  18   1126   WBC  17.8*  13.9*  10.7  11.1*   NEUTSPOLYS  83.20*  79.80*   --   70.80   LYMPHOCYTES  8.20*  14.00*   --   22.10   MONOCYTES  6.00  5.30   --   1.80   EOSINOPHILS  0.10  0.00   --   0.90   BASOPHILS  0.80  0.00   --   0.00     Current Facility-Administered Medications   Medication Dose  Frequency Provider Last Rate Last Dose   • 0.9 % NaCl with KCl 20 mEq infusion   Continuous Phan Yanez M.D. 100 mL/hr at 02/18/18 0157     • SODIUM CHLORIDE 0.9 % IV SOLN           • propofol (DIPRIVAN) injection  0-80 mcg/kg/min Continuous SEVEN Ferrer Jr..O. 12 mL/hr at 02/18/18 1706 20 mcg/kg/min at 02/18/18 1706   • phenylephrine (RADHA-SYNEPHRINE) 40,000 mcg in  mL Infusion  0-300 mcg/min Continuous SEVEN Ferrer Jr..O. 13.1 mL/hr at 02/18/18 1941 35 mcg/min at 02/18/18 1941   • Respiratory Care per Protocol   Continuous RT SEVEN Ferrer Jr..O.       • senna-docusate (PERICOLACE or SENOKOT S) 8.6-50 MG per tablet 2 Tab  2 Tab BID SEVEN Ferrer Jr..MONSERRAT   Stopped at 02/18/18 2100    And   • polyethylene glycol/lytes (MIRALAX) PACKET 1 Packet  1 Packet QDAY PRN SEVEN Ferrer Jr..O.        And   • magnesium hydroxide (MILK OF MAGNESIA) suspension 30 mL  30 mL QDAY PRN SEVEN Ferrer Jr..OBrittany        And   • bisacodyl (DULCOLAX) suppository 10 mg  10 mg QDAY PRN SEVEN Ferrer Jr..O.       • chlorhexidine (PERIDEX) 0.12 % solution 15 mL  15 mL BID SEVEN Ferrer Jr..O.   15 mL at 02/18/18 1450   • lidocaine (XYLOCAINE) 1%  injection  1-2 mL Q30 MIN PRN SEVEN Ferrer Jr..O.       • MD ALERT...Adult ICU Electrolyte Replacement per Pharmacy Protocol   pharmacy to dose SEVEN Ferrer Jr..O.       • famotidine (PEPCID) tablet 20 mg  20 mg Q12HRS SEVEN Ferrer Jr..O.        Or   • famotidine (PEPCID) injection 20 mg  20 mg Q12HRS SEVEN Ferrer Jr..O.       • ipratropium-albuterol (DUONEB) nebulizer solution 3 mL  3 mL Q2HRS PRN (RT) David Pillai Jr., D.O.       • insulin regular (HUMULIN R) injection 1-6 Units  1-6 Units Q6HRS David Pillai Jr., D.O.   Stopped at 02/18/18 1445    And   • glucose 4 g chewable tablet 16 g  16 g Q15 MIN PRN David Pillai Jr., D.O.        And   • dextrose 50% (D50W) injection 25 mL  25 mL Q15 MIN PRN Davdi Pillai  , SEVEN.O.       • fentaNYL (SUBLIMAZE) injection 25 mcg  25 mcg Q HOUR PRN SEVEN Ferrer Jr..O.        Or   • fentaNYL (SUBLIMAZE) injection 50 mcg  50 mcg Q HOUR PRN SEVEN Ferrer Jr..O.        Or   • fentaNYL (SUBLIMAZE) injection 100 mcg  100 mcg Q HOUR PRN David Pillai Jr. D.O.       • oxyCODONE immediate-release (ROXICODONE) tablet 5 mg  5 mg Q4HRS PRN Roly Mathew M.D.       • cefTRIAXone (ROCEPHIN) syringe 2 g  2 g Q24HRS David Pillai Jr. D.O.   2 g at 02/18/18 0840   • fentaNYL (DURAGESIC) 100 MCG/HR 1 Patch  1 Patch Q72HRS SEVEN Ferrer Jr..O.   1 Patch at 02/15/18 2100   • labetalol (NORMODYNE,TRANDATE) injection 10 mg  10 mg Q4HRS PRN Cordell Owens M.D.   10 mg at 02/18/18 0840   • hydrALAZINE (APRESOLINE) injection 10 mg  10 mg Q4HRS PRN Cordell Owens M.D.   10 mg at 02/18/18 1109   • enoxaparin (LOVENOX) inj 40 mg  40 mg DAILY David Pillai Jr. D.O.   40 mg at 02/18/18 0841   • gabapentin (NEURONTIN) capsule 300 mg  300 mg TID Alberto Singh M.D.   Stopped at 02/14/18 1500   • folic acid (FOLVITE) tablet 2 mg  2 mg DAILY Jairo Silva M.D.   Stopped at 02/13/18 0945   • pregabalin (LYRICA) capsule 150 mg  150 mg BID Jairo Silva M.D.   Stopped at 02/14/18 0900   • NS (BOLUS) infusion 500 mL  500 mL Once PRN Jairo Silva M.D.       • ondansetron (ZOFRAN) syringe/vial injection 4 mg  4 mg Q4HRS PRN Jairo Silva M.D.   4 mg at 02/13/18 2152   • ondansetron (ZOFRAN ODT) dispertab 4 mg  4 mg Q4HRS PRN Jairo Silva M.D.       • promethazine (PHENERGAN) tablet 12.5-25 mg  12.5-25 mg Q4HRS PRN Jairo Silva M.D.       • promethazine (PHENERGAN) suppository 12.5-25 mg  12.5-25 mg Q4HRS PRN Jairo Silva M.D.       • prochlorperazine (COMPAZINE) injection 5-10 mg  5-10 mg Q4HRS PRN Jairo Silva M.D.       • FLUoxetine (PROZAC) capsule 20 mg  20 mg DAILY Jairo Silva M.D.   Stopped at 02/13/18 0945   •  DULoxetine (CYMBALTA) capsule 60 mg  60 mg BID Jairo Silva M.D.   Stopped at 02/14/18 0900   • menthol (HALLS) lozenge 1 Lozenge  1 Lozenge Q HOUR PRN David Pillai Jr., D.O.         Last reviewed on 2/13/2018  8:37 AM by Lynette Chisholm, [facility]    Quality  Measures:  Core Measures & Quality Metrics    Problems/Plan:  Acute hypoxic respiratory failure   - Intubated to 1818   - RT/O2 protocols   - Daily and PRN ABGs   - Titration of ventilator therapy based on ABGs and patient's status   - Sedation as tolerated/indicated   - Daily CXR   - HOB >30 degrees and peridex for VAP prevention   - Pepcid for GI prophylaxis   - SAT/SBT when able (ABCDEF Bundle)   - Early mobility    Cardiac arrest   - Secondary to hypoxia   - Echo   - Correctable electrolytes   - GCS 11T    Mucous plugging   - CPT, guaifenesin, consider bicarbonate    Septic shock              - pansensitive E. coli- urinary source              - source targeted antibiotics: Rocephin   - source controlled by urology               - blood, respiratory and urine cultures    Gram-negative ever Bacteremia              - Urinary source, continue antibiotics   - pending sensitivity     Obstructing right ureteral calculus              - Status post stent placement by urology              - monitor renal function     Pyelonephritis              - Continue antibiotics     Acute encephalopathy              - Likely toxic metabolic given sepsis   - improving              - CT of the brain negative     Acute kidney injury              - Secondary to pyelonephritis and obstructive uropathy              - resolved     Lactic acidosis/anion gap metabolic acidosis              - resolved     Anemia              - Mild, monitor     Chronic pain   - restart home meds   - no enteric access currently, PRN IV opiates    Dysphagia   - GI will need to place feeding tube due to abnormal anatomy   - For now will continue IV fluids    Mild Protein calorie malnutrition               - RD consult   - Need enteric access - GI consult    Hypokalemia/hypomagnesemia   - Replete     Prophylaxis: SCDs    Discussed patient condition and risk of morbidity and/or mortality with Family, RN, Pharmacy, Charge nurse / hot rounds, Patient and hospitalist.    The patient remains critically ill.  Critical care time = 48 minutes in directly providing and coordinating critical care and extensive data review.  No time overlap and excludes procedures

## 2018-02-20 ENCOUNTER — APPOINTMENT (OUTPATIENT)
Dept: RADIOLOGY | Facility: MEDICAL CENTER | Age: 58
DRG: 853 | End: 2018-02-20
Attending: INTERNAL MEDICINE
Payer: MEDICARE

## 2018-02-20 PROBLEM — E87.1 HYPONATREMIA: Status: RESOLVED | Noted: 2018-02-13 | Resolved: 2018-02-20

## 2018-02-20 PROBLEM — E87.0 HYPERNATREMIA: Status: ACTIVE | Noted: 2018-02-20

## 2018-02-20 LAB
ACTION RANGE TRIGGERED IACRT: NO
BACTERIA BRONCH AEROBE CULT: ABNORMAL
BACTERIA UR CULT: NORMAL
BASE EXCESS BLDA CALC-SCNC: 0 MMOL/L (ref -4–3)
BODY TEMPERATURE: ABNORMAL DEGREES
CO2 BLDA-SCNC: 25 MMOL/L (ref 20–33)
GLUCOSE BLD-MCNC: 70 MG/DL (ref 65–99)
GLUCOSE BLD-MCNC: 75 MG/DL (ref 65–99)
GLUCOSE BLD-MCNC: 81 MG/DL (ref 65–99)
GLUCOSE BLD-MCNC: 87 MG/DL (ref 65–99)
GRAM STN SPEC: ABNORMAL
HCO3 BLDA-SCNC: 23.8 MMOL/L (ref 17–25)
INST. QUALIFIED PATIENT IIQPT: YES
MAGNESIUM SERPL-MCNC: 1.8 MG/DL (ref 1.5–2.5)
O2/TOTAL GAS SETTING VFR VENT: 40 %
PCO2 BLDA: 32.1 MMHG (ref 26–37)
PCO2 TEMP ADJ BLDA: 31.9 MMHG (ref 26–37)
PH BLDA: 7.48 [PH] (ref 7.4–7.5)
PH TEMP ADJ BLDA: 7.48 [PH] (ref 7.4–7.5)
PHOSPHATE SERPL-MCNC: 2.4 MG/DL (ref 2.5–4.5)
PO2 BLDA: 118 MMHG (ref 64–87)
PO2 TEMP ADJ BLDA: 117 MMHG (ref 64–87)
SAO2 % BLDA: 99 % (ref 93–99)
SIGNIFICANT IND 70042: ABNORMAL
SIGNIFICANT IND 70042: NORMAL
SITE SITE: ABNORMAL
SITE SITE: NORMAL
SOURCE SOURCE: ABNORMAL
SOURCE SOURCE: NORMAL
SPECIMEN DRAWN FROM PATIENT: ABNORMAL
TRIGL SERPL-MCNC: 163 MG/DL (ref 0–149)

## 2018-02-20 PROCEDURE — 94669 MECHANICAL CHEST WALL OSCILL: CPT

## 2018-02-20 PROCEDURE — 94003 VENT MGMT INPAT SUBQ DAY: CPT

## 2018-02-20 PROCEDURE — 700111 HCHG RX REV CODE 636 W/ 250 OVERRIDE (IP): Performed by: INTERNAL MEDICINE

## 2018-02-20 PROCEDURE — 99233 SBSQ HOSP IP/OBS HIGH 50: CPT | Performed by: HOSPITALIST

## 2018-02-20 PROCEDURE — 700102 HCHG RX REV CODE 250 W/ 637 OVERRIDE(OP): Performed by: INTERNAL MEDICINE

## 2018-02-20 PROCEDURE — 71045 X-RAY EXAM CHEST 1 VIEW: CPT

## 2018-02-20 PROCEDURE — A9270 NON-COVERED ITEM OR SERVICE: HCPCS | Performed by: INTERNAL MEDICINE

## 2018-02-20 PROCEDURE — 36600 WITHDRAWAL OF ARTERIAL BLOOD: CPT

## 2018-02-20 PROCEDURE — 83735 ASSAY OF MAGNESIUM: CPT

## 2018-02-20 PROCEDURE — 770022 HCHG ROOM/CARE - ICU (200)

## 2018-02-20 PROCEDURE — 84478 ASSAY OF TRIGLYCERIDES: CPT

## 2018-02-20 PROCEDURE — 700101 HCHG RX REV CODE 250: Performed by: HOSPITALIST

## 2018-02-20 PROCEDURE — 84100 ASSAY OF PHOSPHORUS: CPT

## 2018-02-20 PROCEDURE — 700105 HCHG RX REV CODE 258: Performed by: INTERNAL MEDICINE

## 2018-02-20 PROCEDURE — 82803 BLOOD GASES ANY COMBINATION: CPT

## 2018-02-20 PROCEDURE — 82962 GLUCOSE BLOOD TEST: CPT

## 2018-02-20 RX ORDER — MAGNESIUM SULFATE HEPTAHYDRATE 40 MG/ML
2 INJECTION, SOLUTION INTRAVENOUS ONCE
Status: COMPLETED | OUTPATIENT
Start: 2018-02-20 | End: 2018-02-20

## 2018-02-20 RX ORDER — DEXTROSE MONOHYDRATE, SODIUM CHLORIDE, AND POTASSIUM CHLORIDE 50; 1.49; 4.5 G/1000ML; G/1000ML; G/1000ML
INJECTION, SOLUTION INTRAVENOUS CONTINUOUS
Status: DISCONTINUED | OUTPATIENT
Start: 2018-02-20 | End: 2018-02-27 | Stop reason: HOSPADM

## 2018-02-20 RX ADMIN — MAGNESIUM SULFATE IN WATER 2 G: 40 INJECTION, SOLUTION INTRAVENOUS at 09:40

## 2018-02-20 RX ADMIN — CEFTRIAXONE 2 G: 2 INJECTION, POWDER, FOR SOLUTION INTRAMUSCULAR; INTRAVENOUS at 08:35

## 2018-02-20 RX ADMIN — FENTANYL CITRATE 50 MCG: 50 INJECTION, SOLUTION INTRAMUSCULAR; INTRAVENOUS at 16:22

## 2018-02-20 RX ADMIN — CHLORHEXIDINE GLUCONATE 15 ML: 1.2 RINSE ORAL at 08:35

## 2018-02-20 RX ADMIN — CHLORHEXIDINE GLUCONATE 15 ML: 1.2 RINSE ORAL at 21:14

## 2018-02-20 RX ADMIN — PROPOFOL 20 MCG/KG/MIN: 10 INJECTION, EMULSION INTRAVENOUS at 15:18

## 2018-02-20 RX ADMIN — POTASSIUM CHLORIDE, DEXTROSE MONOHYDRATE AND SODIUM CHLORIDE: 150; 5; 450 INJECTION, SOLUTION INTRAVENOUS at 13:05

## 2018-02-20 RX ADMIN — ENOXAPARIN SODIUM 40 MG: 100 INJECTION SUBCUTANEOUS at 08:35

## 2018-02-20 RX ADMIN — FAMOTIDINE 20 MG: 10 INJECTION, SOLUTION INTRAVENOUS at 21:14

## 2018-02-20 RX ADMIN — FAMOTIDINE 20 MG: 10 INJECTION, SOLUTION INTRAVENOUS at 08:35

## 2018-02-20 RX ADMIN — PROPOFOL 20 MCG/KG/MIN: 10 INJECTION, EMULSION INTRAVENOUS at 05:45

## 2018-02-20 RX ADMIN — SODIUM CHLORIDE, POTASSIUM CHLORIDE, SODIUM LACTATE AND CALCIUM CHLORIDE: 600; 310; 30; 20 INJECTION, SOLUTION INTRAVENOUS at 08:36

## 2018-02-20 RX ADMIN — PROPOFOL 20 MCG/KG/MIN: 10 INJECTION, EMULSION INTRAVENOUS at 22:51

## 2018-02-20 NOTE — PROGRESS NOTES
Pulmonary Critical Care Progress Note        Chief Complaint: Septic shock    History of Present Illness: Ms. Finch is a 57-year-old female with past medical history brain aneurysm, stroke, chronic pain who was admitted on 2/13/18 from an outside hospital for a urinary tract infection and septic shock. A CT of her abdomen demonstrated a 7 mm right ureteral calculus with severe obstructing hydro-nephrosis. She was taken to the OR for cystoscopy with stent placement, intraoperatively was found to have a large amount of purulent fluid draining from the right kidney and was hypotensive requiring vasopressors. She arrives in the ICU on vasopressors and critically ill. At this time she complains of only flank pain however is somewhat confused.    Please note above history obtained on 2/13/2018 by Dr. Pillai.    ROS:  Respiratory: unable to perform due to the patient's inability to effectively communicate, Cardiac: unable to perform due to the patient's inability to effectively communicate, GI: unable to perform due to the patient's inability to effectively communicate.  All other systems negative.    Interval Events:  24 hour interval history reviewed    -Vent with respiratory distress.   -Hx of gastic bypass  -Mucous plugging  -Right CVA  -Respiratory arrest 2 days ago, without new residual anoxic brain injury.  -4 min of CPR.   -NGT to be placed.   -Vent day 3.   -Decrease RR to 18.   -SBT trial failied.   -8 of 10 days of antibiotics for pyelonephritis. Rocephin, ID following.   -Weaned off vasopressors.    PFSH:  No change.    Respiratory:  Sosa Vent Mode: APVCMV, Rate (breaths/min): 20, Vt Target (mL): 380, PEEP/CPAP: 8, FiO2: 40, Static Compliance (ml / cm H2O): 37, Weaning Trial Stopped due to:: Apnea > 30 seconds X 4, Length of Weaning Trial (Hours): 0, Control VTE (exp VT): 370  Pulse Oximetry: 100 %  Chest Tube Drains:          Exam: rhonchi bibasilar  ImagingCXR  I have personally reviewed the chest x-ray my  impression is : ET tube is in good position, there is a right IJ central catheter in good position. There is left basilar infiltrate with probable effusion.  Recent Labs      02/18/18   1408  02/19/18   0536  02/20/18   0432   ISTATAPH  7.400  7.440  7.479   ISTATAPCO2  37.5*  31.5  32.1   ISTATAPO2  280*  145*  118*   ISTATATCO2  24  22  25   [id number]  100*  99  99   ISTATARTHCO3  23.2  21.4  23.8   ISTATARTBE  -1  -2  0   ISTATTEMP  see below  36.7 C  36.9 C   ISTATFIO2  90  40  40   ISTATSPEC  Arterial  Arterial  Arterial   ISTATAPHTC   --   7.444  7.480   [id number]   --   144*  117*   Interpretation is slight hyperventilation on mechanical ventilation and respiratory rate has been decreased.    HemoDynamics:  Pulse: 78, Heart Rate (Monitored): 78  NIBP: 124/68  CVP (mm Hg): (!) 8 MM HG    Exam: regular rate and rhythm  Imaging: echo Reviewed      Echo heart exam performed on 2/19/2018:  CONCLUSIONS  No prior study is available for comparison.   Technically difficult study - adequate information is obtained.   Mild concentric left ventricular hypertrophy.  Normal regional wall motion.  Left ventricular ejection fraction is visually estimated to be 65%.  The left atrium is normal in size.  Structurally normal mitral valve without significant stenosis or   regurgitation.  Mild aortic sclerosis without stenosis.  No aortic insufficiency.  Estimated right ventricular systolic pressure  is 35 mmHg.  Normal pericardium without effusion.    Neuro:  GCS Total Marianne Coma Score: 11       Exam: Heavily sedated. The patient has chronic right upper extremity contracture with rigidity. She has evidence of mild right lower extremity weakness as well. This is chronic related to previous CVA.  Imaging: Ct Brain Reviewed  CT of the brain on 2/13/2018:    Impression         1. No evidence of acute intracranial hemorrhage or mass lesion.    2. Postsurgical change and encephalomalacia in the left frontal lobe.        Fluids:  Intake/Output       02/18/18 0700 - 02/19/18 0659 02/19/18 0700 - 02/20/18 0659 02/20/18 0700 - 02/21/18 0659      0700-1859 1900-0659 Total 0700-1859 1900-0659 Total 0700-1859 1900-0659 Total       Intake    I.V.  1574.6  1236.9 2811.4  1144.9  1344 2488.9  --  -- --    Phenylephrine Volume 35.2 142.9 178 0.9 -- 0.9 -- -- --    Propofol Volume 39.4 144 183.4 144 144 288 -- -- --    IV Volume (NS + 20K) -- 950 950 500 -- 500 -- -- --    IV Volume (Lactated Ringers) -- -- -- 500 1200 1700 -- -- --    IV Volume (NS) 1500 -- 1500 -- -- -- -- -- --    Total Intake 1574.6 1236.9 2811.4 1144.9 1344 2488.9 -- -- --       Output    Urine  220  325 545  505  485 990  --  -- --    Indwelling Cathether 220 325 545 -- 485 485 -- -- --    Void (ml) -- -- -- 505 -- 505 -- -- --    Stool  --  -- --  --  -- --  --  -- --    Number of Times Stooled -- 0 x 0 x 0 x -- 0 x -- -- --    Total Output 220 325 545 505 485 990 -- -- --       Net I/O     1354.6 911.9 2266.4 639.9 859 1498.9 -- -- --           Recent Labs      02/17/18   2339  02/18/18   1125  02/18/18 2010 02/19/18   0400  02/20/18   0540   SODIUM  141  142   --   146*   --    POTASSIUM  2.8*  2.9*  3.3*  3.7   --    CHLORIDE  113*  111   --   118*   --    CO2  18*  18*   --   21   --    BUN  11  11   --   17   --    CREATININE  0.37*  0.47*   --   0.37*   --    MAGNESIUM  1.8   --   2.3  2.2  1.8   PHOSPHORUS   --    --    --   2.1*  2.4*   CALCIUM  8.3*  8.5   --   8.3*   --        GI/Nutrition:  Exam: abdomen is soft and non-tender. Scars from previous abdominal surgery noted.  Imaging: None - Reviewed  tube feed Tolerated  Liver Function  Recent Labs      02/17/18   2339  02/18/18   1125  02/19/18   0400   ALTSGPT   --    --   16   ASTSGOT   --    --   17   ALKPHOSPHAT   --    --   77   TBILIRUBIN   --    --   0.4   GLUCOSE  131*  225*  101*       Heme:  Recent Labs      02/17/18   2340  02/18/18   1126  02/19/18   0400   RBC  4.13*  4.46  3.63*    HEMOGLOBIN  12.0  13.1  10.7*   HEMATOCRIT  34.4*  37.9  31.4*   PLATELETCT  189  243  217       Infectious Disease:  Temp  Av.7 °C (98.1 °F)  Min: 36.7 °C (98.1 °F)  Max: 36.7 °C (98.1 °F)  Micro: antibiotics reviewed and cultures pending. Candida glabrata is noted from patient's BAL on 2018 as well as yeast from Olmedo catheter from 2018. Patient remains on Rocephin for presumed obstructive pyelonephritis. Patient also had positive blood cultures for E. coli on 2018  Recent Labs      18   2340  18   1126  18   0400   WBC  10.7  11.1*  10.7   NEUTSPOLYS   --   70.80   --    LYMPHOCYTES   --   22.10   --    MONOCYTES   --   1.80   --    EOSINOPHILS   --   0.90   --    BASOPHILS   --   0.00   --    ASTSGOT   --    --   17   ALTSGPT   --    --   16   ALKPHOSPHAT   --    --   77   TBILIRUBIN   --    --   0.4     Current Facility-Administered Medications   Medication Dose Frequency Provider Last Rate Last Dose   • lactated ringers infusion   Continuous Roberth Brand M.D. 100 mL/hr at 184     • propofol (DIPRIVAN) injection  0-80 mcg/kg/min Continuous David Pillai Jr., D.O. 12 mL/hr at 18 0545 20 mcg/kg/min at 18 0545   • phenylephrine (RADHA-SYNEPHRINE) 40,000 mcg in  mL Infusion  0-300 mcg/min Continuous David Pillai Jr., D.O.   Stopped at 18 0627   • Respiratory Care per Protocol   Continuous RT David Pillai Jr., D.O.       • senna-docusate (PERICOLACE or SENOKOT S) 8.6-50 MG per tablet 2 Tab  2 Tab BID David Pillai Jr., D.O.   Stopped at 18 2100    And   • polyethylene glycol/lytes (MIRALAX) PACKET 1 Packet  1 Packet QDAY PRN David Pillai Jr., D.O.        And   • magnesium hydroxide (MILK OF MAGNESIA) suspension 30 mL  30 mL QDAY PRN David Pillai Jr., D.O.        And   • bisacodyl (DULCOLAX) suppository 10 mg  10 mg QDAY PRN David Pillai Jr., D.O.       • chlorhexidine (PERIDEX) 0.12 % solution 15 mL  15 mL BID  SEVEN Ferrer Jr..O.   15 mL at 02/19/18 2037   • lidocaine (XYLOCAINE) 1%  injection  1-2 mL Q30 MIN PRN David Pillai Jr., D.O.       • MD ALERT...Adult ICU Electrolyte Replacement per Pharmacy Protocol   pharmacy to dose SEVEN Ferrer Jr..O.       • famotidine (PEPCID) tablet 20 mg  20 mg Q12HRS SEVEN Ferrer Jr..O.        Or   • famotidine (PEPCID) injection 20 mg  20 mg Q12HRS SEVEN Ferrer Jr..O.   20 mg at 02/19/18 2037   • ipratropium-albuterol (DUONEB) nebulizer solution 3 mL  3 mL Q2HRS PRN (RT) SEVEN Ferrer Jr..O.       • insulin regular (HUMULIN R) injection 1-6 Units  1-6 Units Q6HRS SEVEN Ferrer Jr..OBrittany   Stopped at 02/18/18 1445    And   • glucose 4 g chewable tablet 16 g  16 g Q15 MIN PRN SEVEN Ferrer Jr..O.        And   • dextrose 50% (D50W) injection 25 mL  25 mL Q15 MIN PRN SEVEN Ferrer Jr..O.       • fentaNYL (SUBLIMAZE) injection 25 mcg  25 mcg Q HOUR PRN SEVEN Ferrer Jr..O.        Or   • fentaNYL (SUBLIMAZE) injection 50 mcg  50 mcg Q HOUR PRN SEVEN Ferrer Jr..O.        Or   • fentaNYL (SUBLIMAZE) injection 100 mcg  100 mcg Q HOUR PRN SEVEN Ferrer Jr..O.       • guaiFENesin (ROBITUSSIN) 100 MG/5ML solution 200 mg  10 mL Q6HRS SEVEN Ferrer Jr..O.   Stopped at 02/18/18 2015   • oxyCODONE immediate-release (ROXICODONE) tablet 5 mg  5 mg Q4HRS PRN Roly Mathew M.D.       • cefTRIAXone (ROCEPHIN) syringe 2 g  2 g Q24HRS SEVEN Ferrer Jr..O.   2 g at 02/19/18 0843   • fentaNYL (DURAGESIC) 100 MCG/HR 1 Patch  1 Patch Q72HRS David Pillai Jr., D.O.   1 Patch at 02/18/18 2123   • labetalol (NORMODYNE,TRANDATE) injection 10 mg  10 mg Q4HRS PRSOPHIA Owens M.D.   10 mg at 02/18/18 0840   • hydrALAZINE (APRESOLINE) injection 10 mg  10 mg Q4HRS PRN Cordell Owens M.D.   10 mg at 02/18/18 1109   • enoxaparin (LOVENOX) inj 40 mg  40 mg DAILY David Pillai Jr., D.O.   40 mg at 02/19/18 0843   • gabapentin (NEURONTIN) capsule  300 mg  300 mg TID Alberto Singh M.D.   Stopped at 02/14/18 1500   • folic acid (FOLVITE) tablet 2 mg  2 mg DAILY Jairo Silva M.D.   Stopped at 02/13/18 0945   • pregabalin (LYRICA) capsule 150 mg  150 mg BID Jairo Silva M.D.   Stopped at 02/14/18 0900   • NS (BOLUS) infusion 500 mL  500 mL Once PRN Jairo Silva M.D.       • ondansetron (ZOFRAN) syringe/vial injection 4 mg  4 mg Q4HRS PRN Jairo Silva M.D.   4 mg at 02/13/18 2152   • ondansetron (ZOFRAN ODT) dispertab 4 mg  4 mg Q4HRS PRN Jairo Silva M.D.       • promethazine (PHENERGAN) tablet 12.5-25 mg  12.5-25 mg Q4HRS PRN Jairo Silva M.D.       • promethazine (PHENERGAN) suppository 12.5-25 mg  12.5-25 mg Q4HRS PRN Jairo Silva M.D.       • prochlorperazine (COMPAZINE) injection 5-10 mg  5-10 mg Q4HRS PRN Jairo Silva M.D.       • FLUoxetine (PROZAC) capsule 20 mg  20 mg DAILY Jairo Silva M.D.   Stopped at 02/13/18 0945   • DULoxetine (CYMBALTA) capsule 60 mg  60 mg BID Jairo Silva M.D.   Stopped at 02/14/18 0900   • menthol (HALLS) lozenge 1 Lozenge  1 Lozenge Q HOUR PRN David Pillai Jr., D.O.         Last reviewed on 2/13/2018  8:37 AM by Lynette Chisholm AUDREY    Quality  Measures:  Labs reviewed, Medications reviewed and Radiology images reviewed  Olmedo catheter: Critically Ill - Requiring Accurate Measurement of Urinary Output  Central line in place: Sepsis and Shock    DVT Prophylaxis: Enoxaparin (Lovenox)  DVT prophylaxis - mechanical: SCDs          Assessment and plan:    1. Acute hypoxemic respiratory failure.     -There is evidence of mucous plugging and pulmonary edema.  -Agree with full ventilator support for now.  -Changes on mechanical ventilation included adjusting ventilator rate with PEEP.  -Continue bronchodilator therapy.    2. Septic shock.    -Patient remains on vasopressor support until early this morning.  -We'll adjust IV fluids to avoid  fluid overload  -Continue treat with Rocephin since the patient has a stent in place  -Continue to follow with infectious disease.    3. Pyelonephritis.    -Patient's status post stent placement with E. coli bacteremia. Initially diagnosed on 2/13/2018.  -Follow-up blood cultures ordered to ensure clearance of the bacteremia.  -Note that the stone was a 7 mm calculus in the right ureter causing severe right hydronephrosis.    4. History of CVA with right-sided weakness.    -Patient's also had aneurysmal clipping.    5.  History of gastric bypass.    -Patient continues to tolerate tube feeds at this time.  -No sign of obstructive pathology.    6. Mild pulmonary hypertension.    -In addition to aortic sclerosis, we'll be cautious with fluid management strategy.    Discussed patient condition and risk of morbidity and/or mortality with Charge nurse / hot rounds.  M.D. rounds.  The patient remains critically ill.  Critical care time = 40 minutes in directly providing and coordinating critical care and extensive data review.  No time overlap and excludes procedures.

## 2018-02-20 NOTE — CARE PLAN
Problem: Ventilation Defect:  Goal: Ability to achieve and maintain unassisted ventilation or tolerate decreased levels of ventilator support    Intervention: Support and monitor invasive and noninvasive mechanical ventilation  Adult Ventilation Update    Total Vent Days:3    Patient Lines/Drains/Airways Status    Active Airway     Name: Placement date: Placement time: Site: Days:    Airway Group ET Tube 7.5 02/18/18   1149      3                 Plateau Pressure (Q Shift): 14 (02/20/18 0625)  Static Compliance (ml / cm H2O): 40 (02/20/18 1515)    Patient failed trials because of Barriers to Wean: Other (Comments) (02/20/18 0625)  Barriers to SBT Weaning Trial Stopped due to:: Apnea > 30 seconds X 4 (02/20/18 0625)  Length of Weaning Trial Length of Weaning Trial (Hours): 0 (02/20/18 0625)    Cough: Non Productive (02/20/18 1515)  Sputum Amount: Small (02/20/18 1200)  Sputum Color: White;Clear (02/20/18 1200)  Sputum Consistency: Thick (02/20/18 1200)    Mobility Group  Activity Performed: Unable to mobilize (02/20/18 0800)  Time Activity Tolerated: 5 min (02/18/18 0800)  Distance Per Occurrence (ft.): 0 feet (02/18/18 0800)  # of Times Distance was Traveled: 0 (02/18/18 0800)  Assistance / Tolerance: Assistance of Two or More (02/18/18 0800)  Pt Calls for Assistance: Yes (02/18/18 0800)  Staff Present for Mobilization: RN (02/18/18 0800)  Assistive Devices: Hand held assist (02/18/18 0800)  Reason Not Mobilized: Unstable condition (hemiplegic patient on sedatives and ventilator) (02/20/18 0800)  Mobilization Comments: pt. in pain (02/14/18 2000)    Events/Summary/Plan: Per Dr. Khan, hold on anymore SBT's Rate dropped to 18 from 20.

## 2018-02-20 NOTE — PROGRESS NOTES
"Infectious Disease Progress Note    Author: Pauline Pittman M.D. Date & Time of service: 2018  9:28 PM    Chief Complaint:  UTI with sepsis    Interval History:  57-year-old white female admitted with fever, altered mental status and abdominal pain due to obstructive nephrolithiasis with associated pyelonephritis   AF WBC 11.1 c/o \"hunger pains\" in stomach-has not eaten in 9 days per . Failed mult swallow evals   AF, WBC 10.7, transferred to the ICU for increased work of breathing and intubated yesterday and started on pressors overnight, opens eyes to voice   AF, no BC, awake on vent, nods no to pain, plan for SBT today, FiO2 40%, family at bedside  Labs Reviewed, Medications Reviewed and Radiology Reviewed.    Review of Systems:  Review of Systems   Unable to perform ROS: Intubated       Hemodynamics:  No data recorded.     Pulse  Av.9  Min: 53  Max: 161Heart Rate (Monitored): 78  NIBP: 124/68  CVP (mm Hg): (!) 8 MM HG    Physical Exam:  Physical Exam   Constitutional: She appears well-developed. No distress.   Obese, morbid   HENT:   Head: Normocephalic and atraumatic.   edentulous   Eyes: EOM are normal. Pupils are equal, round, and reactive to light.   Neck: Neck supple.   R IJ catheter   Cardiovascular: Normal rate.    Pulmonary/Chest: Effort normal.   Abdominal: Soft. She exhibits no distension. There is tenderness. There is no rebound and no guarding.   Musculoskeletal: She exhibits edema.   Neurological: She is alert.   Nursing note and vitals reviewed.      Meds:    Current Facility-Administered Medications:   •  magnesium sulfate  •  LR  •  Action is required: Protocol 452 Propofol Critical Care has been implemented **AND** propofol **AND** Propofol Critical Care Protocol - Patient Must Have an Advanced Airway with Mechanical Ventilation in Use. **AND** Propofol Critical Care Protocol - Spontaneous breathing trials may be attempted while receiving propofol **AND** Propofol " Critical Care Protocol - If patient meets extubation criteria, propofol MUST be discontinued prior to extubation **AND** Propofol Critical Care Protocol - No allergy to propofol, soybean oil, or eggs **AND** Propofol Critical Care Protocol - Do not administer this medication to children under the age of 12 **AND** Propofol Critical Care Protocol - Dedicated IV line for administration (vehicle supports rapid growth of microorganisms and incompatibilities cannot be detected) **AND** Propofol Critical Care Protocol - Administration tubing and any unused emulsion must be changed out and discarded after 12 hours from spiking vial **AND** Propofol Critical Care Protocol - RASS guildelines **AND** Propofol Critical Care Protocol - Wake-up assessment as ordered by MD **AND** Propofol Critical Care Protocol - Propofol is not an analgesic, concurrent analgesia (if patient experiencing pain) should continue while patient is on propofol **AND** Triglycerides Starting now and then Every 3 Days **AND** Propofol Critical Care Protocol - Notify MD prior to exceeding 80 mcg/kg/min and obtain new order for higher limit  •  Phenylephrine infusion  •  Respiratory Care per Protocol  •  senna-docusate **AND** polyethylene glycol/lytes **AND** magnesium hydroxide **AND** bisacodyl  •  chlorhexidine  •  lidocaine  •  MD ALERT...Adult ICU Electrolyte Replacement per Pharmacy Protocol  •  famotidine **OR** famotidine  •  ipratropium-albuterol  •  insulin regular **AND** Accu-Chek Q6 if NPO **AND** NOTIFY MD and PharmD **AND** glucose 4 g **AND** dextrose 50%  •  fentaNYL **OR** fentaNYL **OR** fentaNYL  •  guaiFENesin  •  oxyCODONE immediate-release  •  cefTRIAXone (ROCEPHIN) IV  •  fentaNYL  •  labetalol  •  hydrALAZINE  •  enoxaparin (LOVENOX) injection  •  gabapentin  •  folic acid  •  pregabalin  •  NS  •  ondansetron  •  ondansetron  •  promethazine  •  promethazine  •  prochlorperazine  •  FLUoxetine  •  DULoxetine  •   menthol    Labs:  Recent Labs      02/17/18   2340  02/18/18   1126  02/19/18   0400   WBC  10.7  11.1*  10.7   RBC  4.13*  4.46  3.63*   HEMOGLOBIN  12.0  13.1  10.7*   HEMATOCRIT  34.4*  37.9  31.4*   MCV  83.3  85.0  86.5   MCH  29.1  29.4  29.5   RDW  44.0  45.2  47.8   PLATELETCT  189  243  217   MPV  12.3  12.4  12.8   NEUTSPOLYS   --   70.80   --    LYMPHOCYTES   --   22.10   --    MONOCYTES   --   1.80   --    EOSINOPHILS   --   0.90   --    BASOPHILS   --   0.00   --    RBCMORPHOLO   --   Present   --      Recent Labs      02/17/18   2339 02/18/18   1125 02/18/18 2010 02/19/18   0400   SODIUM  141  142   --   146*   POTASSIUM  2.8*  2.9*  3.3*  3.7   CHLORIDE  113*  111   --   118*   CO2  18*  18*   --   21   GLUCOSE  131*  225*   --   101*   BUN  11  11   --   17     Recent Labs      02/17/18   2339 02/18/18 1125 02/19/18   0400   ALBUMIN   --    --   2.5*   TBILIRUBIN   --    --   0.4   ALKPHOSPHAT   --    --   77   TOTPROTEIN   --    --   6.0   ALTSGPT   --    --   16   ASTSGOT   --    --   17   CREATININE  0.37*  0.47*  0.37*       Imaging:  Ct-abdomen-pelvis W/o    Result Date: 2/13/2018 2/13/2018 5:15 AM HISTORY/REASON FOR EXAM:  Pain Sepsis. UTI. TECHNIQUE/EXAM DESCRIPTION: CT scan of the abdomen and pelvis without contrast. Noncontrast helical scanning was obtained from the diaphragmatic domes through the pubic symphysis. Low dose optimization technique was utilized for this CT exam including automated exposure control and adjustment of the mA and/or kV according to patient size. COMPARISON: None. FINDINGS: Lung Bases: Small right pleural effusion. Bibasilar opacities. Hiatal hernia. Abdomen: Within the limits of noncontrast technique, the liver, spleen, pancreas, and adrenal glands are unremarkable in appearance. Postsurgical change in the stomach There is an obstructing 7 mm calculus in the mid right ureter with severe upstream collecting system dilatation. No intrarenal calculus. The  gallbladder is surgically absent. and there is no biliary dilatation. There is no bowel wall thickening or abnormal dilatation. The abdominal aorta is normal in caliber. Pelvis: The urinary bladder is decompressed by Olmedo catheter. Small fat-containing bilateral inguinal hernias. No lymph node enlargement. No free fluid, or free air in the abdomen or pelvis. No aggressive bone lesions are seen. Mild anterolisthesis of L5 on S1 ________________________________    1. Obstructing 7 mm calculus in the mid right ureter with severe right hydronephrosis. 2. Postsurgical change from gastric bypass. Hiatal hernia. 3. Small right pleural effusion. Bibasilar opacities, likely atelectasis.    Ct-head W/o    Result Date: 2/13/2018 2/13/2018 5:15 AM HISTORY/REASON FOR EXAM:  Altered Mental Status TECHNIQUE/EXAM DESCRIPTION AND NUMBER OF VIEWS: CT of the head without contrast. The study was performed on a helical multidetector CT scanner. Contiguous 2.5 mm axial sections were obtained from the skull base through the vertex. Up to date radiation dose reduction adjustments have been utilized to meet ALARA standards for radiation dose reduction. COMPARISON:  None available FINDINGS: There is no evidence of acute intracranial hemorrhage, mass, mass-effect or shift of midline structures. Encephalomalacia in the left frontal lobe with ex vacuo dilatation of the left lateral ventricle. Left frontal surgical clips. Ventricle size and brain parenchymal volume are appropriate for this patient's stated age. The basal cisterns are patent. There are no abnormal extra-axial fluid collections. No depressed or widely  calvarial fracture is seen. Postsurgical change in the left frontal calvarium. The visualized paranasal sinuses and temporal bone structures are aerated. ___________________________________     1. No evidence of acute intracranial hemorrhage or mass lesion. 2. Postsurgical change and encephalomalacia in the left frontal  lobe.     Dx-chest-portable (1 View)    Result Date: 2/18/2018 2/18/2018 12:22 PM HISTORY/REASON FOR EXAM:  Central line readjustment. TECHNIQUE/EXAM DESCRIPTION AND NUMBER OF VIEWS: Single portable view of the chest. COMPARISON: 2/13/2018 FINDINGS: Right central venous catheter with tip in the lower SVC. Endotracheal tube with tip in the mid thoracic trachea. Diffuse interstitial prominence, left more than right. Mild bibasilar opacity. No pleural effusion. No pneumothorax. Stable cardiopericardial silhouette.     Interval intubation. Right central venous catheter was retracted with tip in the lower SVC    Dx-chest-portable (1 View)    Result Date: 2/13/2018 2/13/2018 5:01 AM HISTORY/REASON FOR EXAM:  Central line placement TECHNIQUE/EXAM DESCRIPTION AND NUMBER OF VIEWS: Single portable view of the chest. COMPARISON: 2/13/2018 4:26 AM FINDINGS: Right central venous catheter with tip in the right atrium. Mild diffuse interstitial prominence. Mild bibasilar opacities. No pleural effusion. No pneumothorax. Stable cardiopericardial silhouette.     Right central venous catheter with tip in the right atrium.    Dx-chest-portable (1 View)    Result Date: 2/13/2018 2/13/2018 4:19 AM HISTORY/REASON FOR EXAM:  Sepsis TECHNIQUE/EXAM DESCRIPTION AND NUMBER OF VIEWS: Single portable view of the chest. COMPARISON: None FINDINGS: Mild diffuse interstitial prominence. No pulmonary infiltrates or consolidations are noted. No pleural effusion. No pneumothorax. Normal cardiopericardial silhouette.     1. Mild diffuse interstitial prominence could relate to pulmonary edema or viral infection.    Dx-portable Fluoroscopy < 1 Hour Is The Patient Pregnant? No    Result Date: 2/13/2018 2/13/2018 8:00 AM HISTORY/REASON FOR EXAM:  Main OR Intraoperative evaluation TECHNIQUE/EXAM DESCRIPTION AND NUMBER OF VIEWS: Fluoroscopic imaging during stent placement. COMPARISON: CT from the same day FINDINGS: 1 image was obtained in the operating  room. Contrast is seen within a dilated renal collecting system. A ureteral stent is noted. A total of 12 seconds of fluoroscopy time utilized.     Intraoperative image as above described.    Ue Venous Duplex    Result Date: 2018   Upper Extremity  Venous Duplex Report  Vascular Laboratory  CONCLUSIONS  No DVT or SVT seen in the right upper extremity  ANGELINA ESCOBAR  Exam Date:     2018 12:52  Room #:     Inpatient  Priority:     Routine  Ht (in):             Wt (lb):  Ordering Physician:        ALEJANDRA WORTHY  Referring Physician:       [id number]  Sonographer:               Jeri Bear RVT  Study Type:                Complete Unilateral  Technical Quality:         Adequate  Age:    57    Gender:     F  MRN:    [id number]  :    1960      BSA:  Indications:     Swelling of Limb  CPT Codes:       52432  ICD Codes:       729.81  History:         PICC line in the internal jugular vein, with swelling of the                    arm.  Limitations:     Bandages from PICC line on right neck.  PROCEDURES:  Right upper extremity venous duplex imaging.  The following venous structures were evaluated: internal jugular,  subclavian, axillary, brachial, radial, ulnar, cephalic and basilic veins.  Serial compression, augmentation maneuvers,  color and spectral Doppler  flow evaluations were performed.  FINDINGS:  Right upper extremity.  Complete color filling and compressibility with normal venous flow dynamics  including spontaneous flow, response to augmentation maneuvers, and  respiratory phasicity.  No echogenic material visualized.  Flow was evaluated in the contralateral subclavian vein and normal venous  flow dynamics including spontaneous flow, respiratory phasic variation and  augmentation were demonstrated.  Doyle Jimenez MD  (Electronically Signed)  Final Date:      2018          "          05:07    Dx-abdomen For Tube Placement    Result Date: 2/15/2018  2/15/2018 1:59 PM HISTORY/REASON FOR EXAM:  Line evaluation. TECHNIQUE/EXAM DESCRIPTION AND NUMBER OF VIEWS:  1 view(s) of the abdomen. COMPARISON:  None. FINDINGS: Enteric tube projects over the distal esophagus. There is a nonspecific bowel gas pattern. There is a right ureteral stent. Surgical clip is seen in the right upper quadrant. Degenerative changes are seen in the spine.     Enteric tube projects over the expected level of the distal esophagus. Recommend advancement.      Micro:  Results     Procedure Component Value Units Date/Time    BLOOD CULTURE [[id number]] Collected:  02/19/18 0430    Order Status:  Completed Specimen:  Blood from Peripheral Updated:  02/20/18 0841     Significant Indicator NEG     Source BLD     Site PERIPHERAL     Blood Culture --     No Growth    Note: Blood cultures are incubated for 5 days and  are monitored continuously.Positive blood cultures  are called to the RN and reported as soon as  they are identified.      Narrative:       Collected By:[id number] DONTAE BENITEZ  Per Hospital Policy: Only change Specimen Src: to \"Line\" if  specified by physician order.    BLOOD CULTURE [[id number]] Collected:  02/19/18 0400    Order Status:  Completed Specimen:  Blood from Peripheral Updated:  02/20/18 0841     Significant Indicator NEG     Source BLD     Site PERIPHERAL     Blood Culture --     No Growth    Note: Blood cultures are incubated for 5 days and  are monitored continuously.Positive blood cultures  are called to the RN and reported as soon as  they are identified.      Narrative:       Collected By:[id number] DONTAE BENITEZ  Per Hospital Policy: Only change Specimen Src: to \"Line\" if  specified by physician order.    QUANT BRONCHIAL WASHING [[id number]]  (Abnormal) Collected:  02/18/18 1150    Order Status:  Completed Specimen:  Respirate Updated:  02/20/18 0724     Gram Stain Result --     Few WBCs.  Moderate " epithelial cells.  Rare Yeast.  <5% intracellular organisms.       Significant Indicator POS (POS)     Source RESP     Site Quantitative BAL RLL     Quantitative Bronch Washing -- (A)     Quantitative Bronch Washing -- (A)     Yeast  4,000 cfu/mL  Probable Candida albicans       Quantitative Bronch Washing -- (A)     Yeast  10,000 cfu/mL  Probable Candida glabrata      FUNGAL CULTURE [[id number]] Collected:  02/18/18 1150    Order Status:  Completed Specimen:  Respirate Updated:  02/20/18 0724     Significant Indicator NEG     Source RESP     Site Quantitative BAL RLL     Fungal Culture Culture in progress.    AFB CULTURE [[id number]] Collected:  02/18/18 1150    Order Status:  Completed Specimen:  Respirate Updated:  02/20/18 0724     Significant Indicator NEG     Source RESP     Site Quantitative BAL RLL     AFB Culture Culture in progress.     AFB Smear Results No acid fast bacilli seen.    PNEUMOCYSTIS DFA [[id number]] Collected:  02/18/18 1150    Order Status:  Completed Specimen:  Respirate Updated:  02/20/18 0724     Significant Indicator NEG     Source RESP     Site Quantitative BAL RLL     Pneumocystis Stain No Pneumocystis jiroveci (P.carinii) detected by DFA.    URINE CULTURE(NEW) [[id number]] Collected:  02/18/18 1245    Order Status:  Completed Specimen:  Urine from Urine, Garcia Cath Updated:  02/20/18 0553     Significant Indicator NEG     Source UR     Site URINE, GARCIA CATH     Urine Culture No growth at 48 hours.    Narrative:       Collected By:[id number] AZAM YEN  Indication for culture:->Altered LOC  Indication for culture:->Temp Equal to,or Greater Than 101    GRAM STAIN [[id number]] Collected:  02/18/18 1150    Order Status:  Completed Specimen:  Respirate Updated:  02/19/18 1714     Significant Indicator .     Source RESP     Site Quantitative BAL RLL     Gram Stain Result --     Few WBCs.  Moderate epithelial cells.  Rare Yeast.  <5% intracellular organisms.      ACID FAST STAIN [[id number]]  "Collected:  02/18/18 1150    Order Status:  Completed Specimen:  Respirate Updated:  02/19/18 1714     Significant Indicator NEG     Source RESP     Site Quantitative BAL RLL     AFB Smear Results No acid fast bacilli seen.    URINALYSIS [[id number]]  (Abnormal) Collected:  02/18/18 1245    Order Status:  Completed Specimen:  Urine from Urine, Olmedo Cath Updated:  02/18/18 1342     Color Yellow     Character Cloudy (A)     Specific Gravity 1.015     Ph 5.0     Glucose Negative mg/dL      Ketones 15 (A) mg/dL      Protein 300 (A) mg/dL      Bilirubin Negative     Urobilinogen, Urine 0.2     Nitrite Negative     Leukocyte Esterase Small (A)     Occult Blood Large (A)     Micro Urine Req Microscopic    Narrative:       Collected By:[id number] AZAM YEN  Indication for culture:->Altered LOC  Indication for culture:->Temp Equal to,or Greater Than 101    URINE CULTURE(NEW) [[id number]] Collected:  02/13/18 0510    Order Status:  Completed Specimen:  Urine Updated:  02/15/18 1303     Significant Indicator NEG     Source UR     Site --     Urine Culture Mixed skin hien 10-50,000 cfu/mL    Narrative:       Indication for culture:->Emergency Room Patient    BLOOD CULTURE [[id number]]  (Abnormal) Collected:  02/13/18 0510    Order Status:  Completed Specimen:  Blood from Peripheral Updated:  02/15/18 0740     Significant Indicator POS (POS)     Source BLD     Site PERIPHERAL     Blood Culture Growth detected by Bactec instrument. 02/13/2018  19:12 (A)     Blood Culture -- (A)     Escherichia coli  See previous culture for sensitivity report.      Narrative:       CALL  [place]  19 tel. [phone],  CALLED  19 tel. [phone] 02/13/2018, 19:24, RB PERF. RESULTS CALLED TO:RN  [id number]  Per Hospital Policy: Only change Specimen Src: to \"Line\" if  specified by physician order.    BLOOD CULTURE [[id number]]  (Abnormal)  (Susceptibility) Collected:  02/13/18 0412    Order Status:  Completed Specimen:  Blood from Peripheral Updated:  " "02/15/18 0739     Significant Indicator POS (POS)     Source BLD     Site PERIPHERAL     Blood Culture Growth detected by Bactec instrument. 02/13/2018  19:11 (A)     Blood Culture Escherichia coli (A)    Narrative:       CALL  [place]  19 tel. [phone],  CALLED  19 tel. [phone] 02/13/2018, 19:24, RB PERF. RESULTS CALLED TO:FERNANDO  [id number]  Per Hospital Policy: Only change Specimen Src: to \"Line\" if  specified by physician order.    Culture & Susceptibility     ESCHERICHIA COLI     Antibiotic Sensitivity Microscan Unit Status    Ampicillin Sensitive <=8 mcg/mL Final    Cefepime Sensitive <=8 mcg/mL Final    Cefotaxime Sensitive <=2 mcg/mL Final    Cefotetan Sensitive <=16 mcg/mL Final    Ceftazidime Sensitive <=1 mcg/mL Final    Ceftriaxone Sensitive <=8 mcg/mL Final    Cefuroxime Sensitive 8 mcg/mL Final    Ciprofloxacin Sensitive <=1 mcg/mL Final    Ertapenem Sensitive <=1 mcg/mL Final    Gentamicin Sensitive <=4 mcg/mL Final    Pip/Tazobactam Sensitive <=16 mcg/mL Final    Tigecycline Sensitive <=2 mcg/mL Final    Tobramycin Sensitive <=4 mcg/mL Final    Trimeth/Sulfa Sensitive <=2/38 mcg/mL Final                             Assessment:  Active Hospital Problems    Diagnosis   • *Acute respiratory failure (CMS-HCC) [J96.00]   • Dysphagia [R13.10]   • Gram-negative bacteremia [R78.81]   • Septic shock (CMS-HCC) [A41.9, R65.21]   • Pyelonephritis [N12]   • Hydronephrosis with urinary obstruction due to renal calculus [N13.2]   • History of completed stroke [Z86.73]   • Thrombocytopenia (CMS-HCC) [D69.6]   • Hyponatremia [E87.1]   • Chronic pain [G89.29]   • Arm swelling [M79.89]   • CHERYL (acute kidney injury) (CMS-HCC) [N17.9]   • Metabolic acidosis [E87.2]   • Encephalopathy acute [G93.40]       Plan:  Vent dependent respiratory failure  2/2 pulm edema vs mucous plugging  Intubated  Off pressors  S/p bronch with BAL 2/18  BAL cultures yeast  CXR 2/20 with no changes in edema per my read  Plan for SBT today  Wean off " vent as tolerated    Pyelonephritis  Afebrile   Leukocytosis resolved  status post emergent stent placement  Urine and  Blood cxs 2/13 Ecoli  Repeat BCx 2/19 - NGTD  Continue  IV ceftriaxone and she will require suppression as the stent   may be infected  Anticipate 2 weeks of abx from date of 1st neg Bcx  PICC when able    Gram neg sepsis  Due to above  On abx as above    Aspiration risk  GI to see  Failed mult swallow eval  Prior gastric bypass so Cortrak placement problematic    Discussed with internal medicine

## 2018-02-20 NOTE — PROGRESS NOTES
Assumed pt care, pt sedated on Propofol gtt, intubated w/ 7.5 ETT, on vent w/ settings per Rt, BVM at bedside. Olmedo to dd w/ dark yellow urine. IVF infusing.  Pt opens eyes to voice, Fc, moves left extremities. VSS, on cardiac monitor, lungs CTA.  Spouse at bedside. Bed in low locked position w/ side rails up for safety. Will cont to monitor pt.

## 2018-02-20 NOTE — CARE PLAN
Problem: Respiratory:  Goal: Respiratory status will improve  Outcome: PROGRESSING AS EXPECTED      Problem: Skin Integrity  Goal: Risk for impaired skin integrity will decrease  Outcome: PROGRESSING AS EXPECTED

## 2018-02-21 ENCOUNTER — APPOINTMENT (OUTPATIENT)
Dept: RADIOLOGY | Facility: MEDICAL CENTER | Age: 58
DRG: 853 | End: 2018-02-21
Attending: INTERNAL MEDICINE
Payer: MEDICARE

## 2018-02-21 PROBLEM — D69.6 THROMBOCYTOPENIA (HCC): Status: RESOLVED | Noted: 2018-02-14 | Resolved: 2018-02-21

## 2018-02-21 PROBLEM — E87.6 HYPOKALEMIA: Status: ACTIVE | Noted: 2018-02-21

## 2018-02-21 PROBLEM — E87.20 METABOLIC ACIDOSIS: Status: RESOLVED | Noted: 2018-02-13 | Resolved: 2018-02-21

## 2018-02-21 LAB
ACTION RANGE TRIGGERED IACRT: NO
ANION GAP SERPL CALC-SCNC: 9 MMOL/L (ref 0–11.9)
BASE EXCESS BLDA CALC-SCNC: 1 MMOL/L (ref -4–3)
BASOPHILS # BLD AUTO: 0.2 % (ref 0–1.8)
BASOPHILS # BLD: 0.03 K/UL (ref 0–0.12)
BODY TEMPERATURE: ABNORMAL DEGREES
BUN SERPL-MCNC: 9 MG/DL (ref 8–22)
CALCIUM SERPL-MCNC: 8.2 MG/DL (ref 8.5–10.5)
CHLORIDE SERPL-SCNC: 110 MMOL/L (ref 96–112)
CO2 BLDA-SCNC: 25 MMOL/L (ref 20–33)
CO2 SERPL-SCNC: 22 MMOL/L (ref 20–33)
CREAT SERPL-MCNC: 0.29 MG/DL (ref 0.5–1.4)
EOSINOPHIL # BLD AUTO: 0.31 K/UL (ref 0–0.51)
EOSINOPHIL NFR BLD: 2.5 % (ref 0–6.9)
ERYTHROCYTE [DISTWIDTH] IN BLOOD BY AUTOMATED COUNT: 47.8 FL (ref 35.9–50)
GLUCOSE BLD-MCNC: 101 MG/DL (ref 65–99)
GLUCOSE BLD-MCNC: 93 MG/DL (ref 65–99)
GLUCOSE BLD-MCNC: 98 MG/DL (ref 65–99)
GLUCOSE SERPL-MCNC: 82 MG/DL (ref 65–99)
HCO3 BLDA-SCNC: 24.3 MMOL/L (ref 17–25)
HCT VFR BLD AUTO: 26.2 % (ref 37–47)
HGB BLD-MCNC: 8.8 G/DL (ref 12–16)
IMM GRANULOCYTES # BLD AUTO: 0.27 K/UL (ref 0–0.11)
IMM GRANULOCYTES NFR BLD AUTO: 2.2 % (ref 0–0.9)
INST. QUALIFIED PATIENT IIQPT: YES
LYMPHOCYTES # BLD AUTO: 1.86 K/UL (ref 1–4.8)
LYMPHOCYTES NFR BLD: 15 % (ref 22–41)
MAGNESIUM SERPL-MCNC: 1.9 MG/DL (ref 1.5–2.5)
MCH RBC QN AUTO: 29.1 PG (ref 27–33)
MCHC RBC AUTO-ENTMCNC: 33.6 G/DL (ref 33.6–35)
MCV RBC AUTO: 86.8 FL (ref 81.4–97.8)
MONOCYTES # BLD AUTO: 0.75 K/UL (ref 0–0.85)
MONOCYTES NFR BLD AUTO: 6.1 % (ref 0–13.4)
NEUTROPHILS # BLD AUTO: 9.14 K/UL (ref 2–7.15)
NEUTROPHILS NFR BLD: 74 % (ref 44–72)
NRBC # BLD AUTO: 0 K/UL
NRBC BLD-RTO: 0 /100 WBC
O2/TOTAL GAS SETTING VFR VENT: 40 %
PCO2 BLDA: 33.7 MMHG (ref 26–37)
PCO2 TEMP ADJ BLDA: 33.7 MMHG (ref 26–37)
PH BLDA: 7.47 [PH] (ref 7.4–7.5)
PH TEMP ADJ BLDA: 7.47 [PH] (ref 7.4–7.5)
PHOSPHATE SERPL-MCNC: 2.8 MG/DL (ref 2.5–4.5)
PLATELET # BLD AUTO: 233 K/UL (ref 164–446)
PMV BLD AUTO: 12.3 FL (ref 9–12.9)
PO2 BLDA: 146 MMHG (ref 64–87)
PO2 TEMP ADJ BLDA: 146 MMHG (ref 64–87)
POTASSIUM SERPL-SCNC: 3.2 MMOL/L (ref 3.6–5.5)
RBC # BLD AUTO: 3.02 M/UL (ref 4.2–5.4)
SAO2 % BLDA: 99 % (ref 93–99)
SODIUM SERPL-SCNC: 141 MMOL/L (ref 135–145)
SPECIMEN DRAWN FROM PATIENT: ABNORMAL
TROPONIN I SERPL-MCNC: 0.03 NG/ML (ref 0–0.04)
WBC # BLD AUTO: 12.4 K/UL (ref 4.8–10.8)

## 2018-02-21 PROCEDURE — 99233 SBSQ HOSP IP/OBS HIGH 50: CPT | Performed by: HOSPITALIST

## 2018-02-21 PROCEDURE — 80048 BASIC METABOLIC PNL TOTAL CA: CPT

## 2018-02-21 PROCEDURE — 84484 ASSAY OF TROPONIN QUANT: CPT

## 2018-02-21 PROCEDURE — 36600 WITHDRAWAL OF ARTERIAL BLOOD: CPT

## 2018-02-21 PROCEDURE — 700111 HCHG RX REV CODE 636 W/ 250 OVERRIDE (IP): Performed by: INTERNAL MEDICINE

## 2018-02-21 PROCEDURE — 85025 COMPLETE CBC W/AUTO DIFF WBC: CPT

## 2018-02-21 PROCEDURE — 83735 ASSAY OF MAGNESIUM: CPT

## 2018-02-21 PROCEDURE — 82962 GLUCOSE BLOOD TEST: CPT | Mod: 91

## 2018-02-21 PROCEDURE — 84100 ASSAY OF PHOSPHORUS: CPT

## 2018-02-21 PROCEDURE — A9270 NON-COVERED ITEM OR SERVICE: HCPCS | Performed by: INTERNAL MEDICINE

## 2018-02-21 PROCEDURE — 770022 HCHG ROOM/CARE - ICU (200)

## 2018-02-21 PROCEDURE — 82803 BLOOD GASES ANY COMBINATION: CPT

## 2018-02-21 PROCEDURE — 94150 VITAL CAPACITY TEST: CPT

## 2018-02-21 PROCEDURE — 71045 X-RAY EXAM CHEST 1 VIEW: CPT

## 2018-02-21 PROCEDURE — 700102 HCHG RX REV CODE 250 W/ 637 OVERRIDE(OP): Performed by: INTERNAL MEDICINE

## 2018-02-21 PROCEDURE — 94003 VENT MGMT INPAT SUBQ DAY: CPT

## 2018-02-21 PROCEDURE — 700101 HCHG RX REV CODE 250: Performed by: INTERNAL MEDICINE

## 2018-02-21 RX ORDER — MAGNESIUM SULFATE HEPTAHYDRATE 40 MG/ML
2 INJECTION, SOLUTION INTRAVENOUS ONCE
Status: COMPLETED | OUTPATIENT
Start: 2018-02-21 | End: 2018-02-21

## 2018-02-21 RX ORDER — FUROSEMIDE 10 MG/ML
20 INJECTION INTRAMUSCULAR; INTRAVENOUS
Status: DISCONTINUED | OUTPATIENT
Start: 2018-02-22 | End: 2018-02-22

## 2018-02-21 RX ORDER — FUROSEMIDE 10 MG/ML
20 INJECTION INTRAMUSCULAR; INTRAVENOUS EVERY 12 HOURS
Status: DISCONTINUED | OUTPATIENT
Start: 2018-02-21 | End: 2018-02-21

## 2018-02-21 RX ORDER — POTASSIUM CHLORIDE 29.8 MG/ML
40 INJECTION INTRAVENOUS ONCE
Status: COMPLETED | OUTPATIENT
Start: 2018-02-21 | End: 2018-02-21

## 2018-02-21 RX ADMIN — MAGNESIUM SULFATE HEPTAHYDRATE 2 G: 40 INJECTION, SOLUTION INTRAVENOUS at 08:40

## 2018-02-21 RX ADMIN — CEFTRIAXONE 2 G: 2 INJECTION, POWDER, FOR SOLUTION INTRAMUSCULAR; INTRAVENOUS at 08:42

## 2018-02-21 RX ADMIN — PROPOFOL 10 MCG/KG/MIN: 10 INJECTION, EMULSION INTRAVENOUS at 06:22

## 2018-02-21 RX ADMIN — FAMOTIDINE 20 MG: 10 INJECTION, SOLUTION INTRAVENOUS at 20:24

## 2018-02-21 RX ADMIN — FAMOTIDINE 20 MG: 10 INJECTION, SOLUTION INTRAVENOUS at 08:41

## 2018-02-21 RX ADMIN — CHLORHEXIDINE GLUCONATE 15 ML: 1.2 RINSE ORAL at 08:41

## 2018-02-21 RX ADMIN — CHLORHEXIDINE GLUCONATE 15 ML: 1.2 RINSE ORAL at 20:24

## 2018-02-21 RX ADMIN — FUROSEMIDE 20 MG: 10 INJECTION, SOLUTION INTRAMUSCULAR; INTRAVENOUS at 08:41

## 2018-02-21 RX ADMIN — POTASSIUM CHLORIDE 40 MEQ: 400 INJECTION, SOLUTION INTRAVENOUS at 13:59

## 2018-02-21 RX ADMIN — FENTANYL 1 PATCH: 100 PATCH, EXTENDED RELEASE TRANSDERMAL at 20:30

## 2018-02-21 RX ADMIN — POTASSIUM CHLORIDE, DEXTROSE MONOHYDRATE AND SODIUM CHLORIDE: 150; 5; 450 INJECTION, SOLUTION INTRAVENOUS at 08:55

## 2018-02-21 RX ADMIN — ENOXAPARIN SODIUM 40 MG: 100 INJECTION SUBCUTANEOUS at 08:42

## 2018-02-21 ASSESSMENT — PAIN SCALES - GENERAL
PAINLEVEL_OUTOF10: 0

## 2018-02-21 ASSESSMENT — PULMONARY FUNCTION TESTS: FVC: 1160

## 2018-02-21 NOTE — PROGRESS NOTES
"Infectious Disease Progress Note    Author: Pauline Pittman M.D. Date & Time of service: 2018  9:28 PM    Chief Complaint:  UTI with sepsis    Interval History:  57-year-old white female admitted with fever, altered mental status and abdominal pain due to obstructive nephrolithiasis with associated pyelonephritis   AF WBC 11.1 c/o \"hunger pains\" in stomach-has not eaten in 9 days per . Failed mult swallow evals   AF, WBC 10.7, transferred to the ICU for increased work of breathing and intubated yesterday and started on pressors overnight, opens eyes to voice   AF, no BC, awake on vent, nods no to pain, plan for SBT today, FiO2 40%, family at bedside   AF, WBC 12.4, failed SBT yesterday, repeat SBT today, on FiO2 30%, awake on the vent  Labs Reviewed, Medications Reviewed and Radiology Reviewed.    Review of Systems:  Review of Systems   Unable to perform ROS: Intubated       Hemodynamics:  No data recorded.     Pulse  Av.5  Min: 53  Max: 161Heart Rate (Monitored): 74  NIBP: 126/63  CVP (mm Hg): 6 MM HG    Physical Exam:  Physical Exam   Constitutional: She appears well-developed. No distress.   Obese, morbid   HENT:   Head: Normocephalic and atraumatic.   edentulous   Eyes: EOM are normal. Pupils are equal, round, and reactive to light.   Neck: Neck supple.   R IJ catheter   Cardiovascular: Normal rate.    Pulmonary/Chest: Effort normal.   Abdominal: Soft. She exhibits no distension. There is tenderness. There is no rebound and no guarding.   Musculoskeletal: She exhibits edema.   Neurological: She is alert.   Nursing note and vitals reviewed.      Meds:    Current Facility-Administered Medications:   •  magnesium sulfate  •  furosemide  •  dextrose 5 % and 0.45 % NaCl with KCl 20 mEq  •  Action is required: Protocol 452 Propofol Critical Care has been implemented **AND** propofol **AND** Propofol Critical Care Protocol - Patient Must Have an Advanced Airway with Mechanical " Ventilation in Use. **AND** Propofol Critical Care Protocol - Spontaneous breathing trials may be attempted while receiving propofol **AND** Propofol Critical Care Protocol - If patient meets extubation criteria, propofol MUST be discontinued prior to extubation **AND** Propofol Critical Care Protocol - No allergy to propofol, soybean oil, or eggs **AND** Propofol Critical Care Protocol - Do not administer this medication to children under the age of 12 **AND** Propofol Critical Care Protocol - Dedicated IV line for administration (vehicle supports rapid growth of microorganisms and incompatibilities cannot be detected) **AND** Propofol Critical Care Protocol - Administration tubing and any unused emulsion must be changed out and discarded after 12 hours from spiking vial **AND** Propofol Critical Care Protocol - RASS guildelines **AND** Propofol Critical Care Protocol - Wake-up assessment as ordered by MD **AND** Propofol Critical Care Protocol - Propofol is not an analgesic, concurrent analgesia (if patient experiencing pain) should continue while patient is on propofol **AND** Triglycerides Starting now and then Every 3 Days **AND** Propofol Critical Care Protocol - Notify MD prior to exceeding 80 mcg/kg/min and obtain new order for higher limit  •  Respiratory Care per Protocol  •  senna-docusate **AND** polyethylene glycol/lytes **AND** magnesium hydroxide **AND** bisacodyl  •  chlorhexidine  •  lidocaine  •  MD ALERT...Adult ICU Electrolyte Replacement per Pharmacy Protocol  •  famotidine **OR** famotidine  •  ipratropium-albuterol  •  insulin regular **AND** Accu-Chek Q6 if NPO **AND** NOTIFY MD and PharmD **AND** glucose 4 g **AND** dextrose 50%  •  fentaNYL **OR** fentaNYL **OR** fentaNYL  •  oxyCODONE immediate-release  •  cefTRIAXone (ROCEPHIN) IV  •  fentaNYL  •  labetalol  •  hydrALAZINE  •  enoxaparin (LOVENOX) injection  •  gabapentin  •  folic acid  •  pregabalin  •  ondansetron  •  ondansetron  •   promethazine  •  promethazine  •  prochlorperazine  •  FLUoxetine  •  DULoxetine    Labs:  Recent Labs      02/18/18   1126  02/19/18 0400  02/21/18   0456   WBC  11.1*  10.7  12.4*   RBC  4.46  3.63*  3.02*   HEMOGLOBIN  13.1  10.7*  8.8*   HEMATOCRIT  37.9  31.4*  26.2*   MCV  85.0  86.5  86.8   MCH  29.4  29.5  29.1   RDW  45.2  47.8  47.8   PLATELETCT  243  217  233   MPV  12.4  12.8  12.3   NEUTSPOLYS  70.80   --   74.00*   LYMPHOCYTES  22.10   --   15.00*   MONOCYTES  1.80   --   6.10   EOSINOPHILS  0.90   --   2.50   BASOPHILS  0.00   --   0.20   RBCMORPHOLO  Present   --    --      Recent Labs      02/18/18   1125  02/18/18 2010 02/19/18   0400   SODIUM  142   --   146*   POTASSIUM  2.9*  3.3*  3.7   CHLORIDE  111   --   118*   CO2  18*   --   21   GLUCOSE  225*   --   101*   BUN  11   --   17     Recent Labs      02/18/18   1125 02/19/18   0400   ALBUMIN   --   2.5*   TBILIRUBIN   --   0.4   ALKPHOSPHAT   --   77   TOTPROTEIN   --   6.0   ALTSGPT   --   16   ASTSGOT   --   17   CREATININE  0.47*  0.37*       Imaging:  Ct-abdomen-pelvis W/o    Result Date: 2/13/2018 2/13/2018 5:15 AM HISTORY/REASON FOR EXAM:  Pain Sepsis. UTI. TECHNIQUE/EXAM DESCRIPTION: CT scan of the abdomen and pelvis without contrast. Noncontrast helical scanning was obtained from the diaphragmatic domes through the pubic symphysis. Low dose optimization technique was utilized for this CT exam including automated exposure control and adjustment of the mA and/or kV according to patient size. COMPARISON: None. FINDINGS: Lung Bases: Small right pleural effusion. Bibasilar opacities. Hiatal hernia. Abdomen: Within the limits of noncontrast technique, the liver, spleen, pancreas, and adrenal glands are unremarkable in appearance. Postsurgical change in the stomach There is an obstructing 7 mm calculus in the mid right ureter with severe upstream collecting system dilatation. No intrarenal calculus. The gallbladder is surgically absent.  and there is no biliary dilatation. There is no bowel wall thickening or abnormal dilatation. The abdominal aorta is normal in caliber. Pelvis: The urinary bladder is decompressed by Olmedo catheter. Small fat-containing bilateral inguinal hernias. No lymph node enlargement. No free fluid, or free air in the abdomen or pelvis. No aggressive bone lesions are seen. Mild anterolisthesis of L5 on S1 ________________________________    1. Obstructing 7 mm calculus in the mid right ureter with severe right hydronephrosis. 2. Postsurgical change from gastric bypass. Hiatal hernia. 3. Small right pleural effusion. Bibasilar opacities, likely atelectasis.    Ct-head W/o    Result Date: 2/13/2018 2/13/2018 5:15 AM HISTORY/REASON FOR EXAM:  Altered Mental Status TECHNIQUE/EXAM DESCRIPTION AND NUMBER OF VIEWS: CT of the head without contrast. The study was performed on a helical multidetector CT scanner. Contiguous 2.5 mm axial sections were obtained from the skull base through the vertex. Up to date radiation dose reduction adjustments have been utilized to meet ALARA standards for radiation dose reduction. COMPARISON:  None available FINDINGS: There is no evidence of acute intracranial hemorrhage, mass, mass-effect or shift of midline structures. Encephalomalacia in the left frontal lobe with ex vacuo dilatation of the left lateral ventricle. Left frontal surgical clips. Ventricle size and brain parenchymal volume are appropriate for this patient's stated age. The basal cisterns are patent. There are no abnormal extra-axial fluid collections. No depressed or widely  calvarial fracture is seen. Postsurgical change in the left frontal calvarium. The visualized paranasal sinuses and temporal bone structures are aerated. ___________________________________     1. No evidence of acute intracranial hemorrhage or mass lesion. 2. Postsurgical change and encephalomalacia in the left frontal lobe.     Dx-chest-portable (1  View)    Result Date: 2/18/2018 2/18/2018 12:22 PM HISTORY/REASON FOR EXAM:  Central line readjustment. TECHNIQUE/EXAM DESCRIPTION AND NUMBER OF VIEWS: Single portable view of the chest. COMPARISON: 2/13/2018 FINDINGS: Right central venous catheter with tip in the lower SVC. Endotracheal tube with tip in the mid thoracic trachea. Diffuse interstitial prominence, left more than right. Mild bibasilar opacity. No pleural effusion. No pneumothorax. Stable cardiopericardial silhouette.     Interval intubation. Right central venous catheter was retracted with tip in the lower SVC    Dx-chest-portable (1 View)    Result Date: 2/13/2018 2/13/2018 5:01 AM HISTORY/REASON FOR EXAM:  Central line placement TECHNIQUE/EXAM DESCRIPTION AND NUMBER OF VIEWS: Single portable view of the chest. COMPARISON: 2/13/2018 4:26 AM FINDINGS: Right central venous catheter with tip in the right atrium. Mild diffuse interstitial prominence. Mild bibasilar opacities. No pleural effusion. No pneumothorax. Stable cardiopericardial silhouette.     Right central venous catheter with tip in the right atrium.    Dx-chest-portable (1 View)    Result Date: 2/13/2018 2/13/2018 4:19 AM HISTORY/REASON FOR EXAM:  Sepsis TECHNIQUE/EXAM DESCRIPTION AND NUMBER OF VIEWS: Single portable view of the chest. COMPARISON: None FINDINGS: Mild diffuse interstitial prominence. No pulmonary infiltrates or consolidations are noted. No pleural effusion. No pneumothorax. Normal cardiopericardial silhouette.     1. Mild diffuse interstitial prominence could relate to pulmonary edema or viral infection.    Dx-portable Fluoroscopy < 1 Hour Is The Patient Pregnant? No    Result Date: 2/13/2018 2/13/2018 8:00 AM HISTORY/REASON FOR EXAM:  Main OR Intraoperative evaluation TECHNIQUE/EXAM DESCRIPTION AND NUMBER OF VIEWS: Fluoroscopic imaging during stent placement. COMPARISON: CT from the same day FINDINGS: 1 image was obtained in the operating room. Contrast is seen within a  dilated renal collecting system. A ureteral stent is noted. A total of 12 seconds of fluoroscopy time utilized.     Intraoperative image as above described.    Ue Venous Duplex    Result Date: 2018   Upper Extremity  Venous Duplex Report  Vascular Laboratory  CONCLUSIONS  No DVT or SVT seen in the right upper extremity  ANGELINA ESCOBAR  Exam Date:     2018 12:52  Room #:     Inpatient  Priority:     Routine  Ht (in):             Wt (lb):  Ordering Physician:        ALEJANDRA WORTHY  Referring Physician:       [id number]  Sonographer:               Jeri Bear RVT  Study Type:                Complete Unilateral  Technical Quality:         Adequate  Age:    57    Gender:     F  MRN:    [id number]  :    1960      BSA:  Indications:     Swelling of Limb  CPT Codes:       17254  ICD Codes:       729.81  History:         PICC line in the internal jugular vein, with swelling of the                    arm.  Limitations:     Bandages from PICC line on right neck.  PROCEDURES:  Right upper extremity venous duplex imaging.  The following venous structures were evaluated: internal jugular,  subclavian, axillary, brachial, radial, ulnar, cephalic and basilic veins.  Serial compression, augmentation maneuvers,  color and spectral Doppler  flow evaluations were performed.  FINDINGS:  Right upper extremity.  Complete color filling and compressibility with normal venous flow dynamics  including spontaneous flow, response to augmentation maneuvers, and  respiratory phasicity.  No echogenic material visualized.  Flow was evaluated in the contralateral subclavian vein and normal venous  flow dynamics including spontaneous flow, respiratory phasic variation and  augmentation were demonstrated.  Doyle Jimenez MD  (Electronically Signed)  Final Date:      2018                   05:07    Dx-abdomen  "For Tube Placement    Result Date: 2/15/2018  2/15/2018 1:59 PM HISTORY/REASON FOR EXAM:  Line evaluation. TECHNIQUE/EXAM DESCRIPTION AND NUMBER OF VIEWS:  1 view(s) of the abdomen. COMPARISON:  None. FINDINGS: Enteric tube projects over the distal esophagus. There is a nonspecific bowel gas pattern. There is a right ureteral stent. Surgical clip is seen in the right upper quadrant. Degenerative changes are seen in the spine.     Enteric tube projects over the expected level of the distal esophagus. Recommend advancement.      Micro:  Results     Procedure Component Value Units Date/Time    BLOOD CULTURE [[id number]] Collected:  02/19/18 0430    Order Status:  Completed Specimen:  Blood from Peripheral Updated:  02/20/18 0841     Significant Indicator NEG     Source BLD     Site PERIPHERAL     Blood Culture --     No Growth    Note: Blood cultures are incubated for 5 days and  are monitored continuously.Positive blood cultures  are called to the RN and reported as soon as  they are identified.      Narrative:       Collected By:[id number] DONTAE BENITEZ  Per Hospital Policy: Only change Specimen Src: to \"Line\" if  specified by physician order.    BLOOD CULTURE [[id number]] Collected:  02/19/18 0400    Order Status:  Completed Specimen:  Blood from Peripheral Updated:  02/20/18 0841     Significant Indicator NEG     Source BLD     Site PERIPHERAL     Blood Culture --     No Growth    Note: Blood cultures are incubated for 5 days and  are monitored continuously.Positive blood cultures  are called to the RN and reported as soon as  they are identified.      Narrative:       Collected By:[id number] DONTAE BENITEZ  Per Hospital Policy: Only change Specimen Src: to \"Line\" if  specified by physician order.    QUANT BRONCHIAL WASHING [[id number]]  (Abnormal) Collected:  02/18/18 1150    Order Status:  Completed Specimen:  Respirate Updated:  02/20/18 0724     Gram Stain Result --     Few WBCs.  Moderate epithelial cells.  Rare " Yeast.  <5% intracellular organisms.       Significant Indicator POS (POS)     Source RESP     Site Quantitative BAL RLL     Quantitative Bronch Washing -- (A)     Quantitative Bronch Washing -- (A)     Yeast  4,000 cfu/mL  Probable Candida albicans       Quantitative Bronch Washing -- (A)     Yeast  10,000 cfu/mL  Probable Candida glabrata      FUNGAL CULTURE [[id number]] Collected:  02/18/18 1150    Order Status:  Completed Specimen:  Respirate Updated:  02/20/18 0724     Significant Indicator NEG     Source RESP     Site Quantitative BAL RLL     Fungal Culture Culture in progress.    AFB CULTURE [[id number]] Collected:  02/18/18 1150    Order Status:  Completed Specimen:  Respirate Updated:  02/20/18 0724     Significant Indicator NEG     Source RESP     Site Quantitative BAL RLL     AFB Culture Culture in progress.     AFB Smear Results No acid fast bacilli seen.    PNEUMOCYSTIS DFA [[id number]] Collected:  02/18/18 1150    Order Status:  Completed Specimen:  Respirate Updated:  02/20/18 0724     Significant Indicator NEG     Source RESP     Site Quantitative BAL RLL     Pneumocystis Stain No Pneumocystis jiroveci (P.carinii) detected by DFA.    URINE CULTURE(NEW) [[id number]] Collected:  02/18/18 1245    Order Status:  Completed Specimen:  Urine from Urine, Garcia Cath Updated:  02/20/18 0553     Significant Indicator NEG     Source UR     Site URINE, GARCIA CATH     Urine Culture No growth at 48 hours.    Narrative:       Collected By:[id number] AZAM YEN  Indication for culture:->Altered LOC  Indication for culture:->Temp Equal to,or Greater Than 101    GRAM STAIN [[id number]] Collected:  02/18/18 1150    Order Status:  Completed Specimen:  Respirate Updated:  02/19/18 1714     Significant Indicator .     Source RESP     Site Quantitative BAL RLL     Gram Stain Result --     Few WBCs.  Moderate epithelial cells.  Rare Yeast.  <5% intracellular organisms.      ACID FAST STAIN [[id number]] Collected:  02/18/18 1150  "   Order Status:  Completed Specimen:  Respirate Updated:  02/19/18 1714     Significant Indicator NEG     Source RESP     Site Quantitative BAL RLL     AFB Smear Results No acid fast bacilli seen.    URINALYSIS [[id number]]  (Abnormal) Collected:  02/18/18 1245    Order Status:  Completed Specimen:  Urine from Urine, Olmedo Cath Updated:  02/18/18 1342     Color Yellow     Character Cloudy (A)     Specific Gravity 1.015     Ph 5.0     Glucose Negative mg/dL      Ketones 15 (A) mg/dL      Protein 300 (A) mg/dL      Bilirubin Negative     Urobilinogen, Urine 0.2     Nitrite Negative     Leukocyte Esterase Small (A)     Occult Blood Large (A)     Micro Urine Req Microscopic    Narrative:       Collected By:[id number] AZAM YEN  Indication for culture:->Altered LOC  Indication for culture:->Temp Equal to,or Greater Than 101    URINE CULTURE(NEW) [[id number]] Collected:  02/13/18 0510    Order Status:  Completed Specimen:  Urine Updated:  02/15/18 1303     Significant Indicator NEG     Source UR     Site --     Urine Culture Mixed skin hien 10-50,000 cfu/mL    Narrative:       Indication for culture:->Emergency Room Patient    BLOOD CULTURE [[id number]]  (Abnormal) Collected:  02/13/18 0510    Order Status:  Completed Specimen:  Blood from Peripheral Updated:  02/15/18 0740     Significant Indicator POS (POS)     Source BLD     Site PERIPHERAL     Blood Culture Growth detected by Bactec instrument. 02/13/2018  19:12 (A)     Blood Culture -- (A)     Escherichia coli  See previous culture for sensitivity report.      Narrative:       CALL  [place]  19 tel. [phone],  CALLED  19 tel. [phone] 02/13/2018, 19:24, RB PERF. RESULTS CALLED TO:RN  [id number]  Per Hospital Policy: Only change Specimen Src: to \"Line\" if  specified by physician order.    BLOOD CULTURE [[id number]]  (Abnormal)  (Susceptibility) Collected:  02/13/18 0412    Order Status:  Completed Specimen:  Blood from Peripheral Updated:  02/15/18 0739     Significant " "Indicator POS (POS)     Source BLD     Site PERIPHERAL     Blood Culture Growth detected by Bactec instrument. 02/13/2018  19:11 (A)     Blood Culture Escherichia coli (A)    Narrative:       CALL  [place]  19 tel. [phone],  CALLED  19 tel. [phone] 02/13/2018, 19:24, RB PERF. RESULTS CALLED TO:FERNANDO  [id number]  Per Hospital Policy: Only change Specimen Src: to \"Line\" if  specified by physician order.    Culture & Susceptibility     ESCHERICHIA COLI     Antibiotic Sensitivity Microscan Unit Status    Ampicillin Sensitive <=8 mcg/mL Final    Cefepime Sensitive <=8 mcg/mL Final    Cefotaxime Sensitive <=2 mcg/mL Final    Cefotetan Sensitive <=16 mcg/mL Final    Ceftazidime Sensitive <=1 mcg/mL Final    Ceftriaxone Sensitive <=8 mcg/mL Final    Cefuroxime Sensitive 8 mcg/mL Final    Ciprofloxacin Sensitive <=1 mcg/mL Final    Ertapenem Sensitive <=1 mcg/mL Final    Gentamicin Sensitive <=4 mcg/mL Final    Pip/Tazobactam Sensitive <=16 mcg/mL Final    Tigecycline Sensitive <=2 mcg/mL Final    Tobramycin Sensitive <=4 mcg/mL Final    Trimeth/Sulfa Sensitive <=2/38 mcg/mL Final                             Assessment:  Active Hospital Problems    Diagnosis   • *Acute respiratory failure (CMS-HCC) [J96.00]   • Dysphagia [R13.10]   • Gram-negative bacteremia [R78.81]   • Septic shock (CMS-HCC) [A41.9, R65.21]   • Pyelonephritis [N12]   • Hydronephrosis with urinary obstruction due to renal calculus [N13.2]   • History of completed stroke [Z86.73]   • Thrombocytopenia (CMS-HCC) [D69.6]   • Hyponatremia [E87.1]   • Chronic pain [G89.29]   • Arm swelling [M79.89]   • CHERYL (acute kidney injury) (CMS-HCC) [N17.9]   • Metabolic acidosis [E87.2]   • Encephalopathy acute [G93.40]       Plan:  Vent dependent respiratory failure  2/2 pulm edema vs mucous plugging  Intubated  Off pressors  S/p bronch with BAL 2/18  BAL cultures yeast  Wean off vent as tolerated  O2 requirements decreasing - Fio2 30% today    Pyelonephritis  Afebrile "   Leukocytosis resolved  status post emergent stent placement  Urine and  Blood cxs 2/13 Ecoli  Repeat BCx 2/19 - NGTD  Continue  IV ceftriaxone and she will require suppression as the stent   may be infected  Anticipate 2 weeks of abx from date of 1st neg Bcx  PICC when able  Stop date 03/05/18    Gram neg sepsis  Due to above  On abx as above    Aspiration risk  GI to see  Failed mult swallow eval  Prior gastric bypass so Cortrak placement problematic    DW spouse

## 2018-02-21 NOTE — PROGRESS NOTES
Pulmonary Critical Care Progress Note        Date of admission: 2/13/2018  Date of service: 2/21/2018    Chief Complaint: Septic shock    History of Present Illness: Ms. Finch is a 57-year-old female with past medical history brain aneurysm, stroke, chronic pain who was admitted on 2/13/18 from an outside hospital for a urinary tract infection and septic shock. A CT of her abdomen demonstrated a 7 mm right ureteral calculus with severe obstructing hydro-nephrosis. She was taken to the OR for cystoscopy with stent placement, intraoperatively was found to have a large amount of purulent fluid draining from the right kidney and was hypotensive requiring vasopressors. She arrives in the ICU on vasopressors and critically ill. At this time she complains of only flank pain however is somewhat confused.    Please note above history obtained on 2/13/2018 by Dr. Pillai.    ROS:  Respiratory: unable to perform due to the patient's inability to effectively communicate, Cardiac: unable to perform due to the patient's inability to effectively communicate, GI: unable to perform due to the patient's inability to effectively communicate.  All other systems negative. No change today    Interval Events:  24 hour interval history reviewed   - Bcx 2/19 NGTD   - failed SBT yesterday but did very well today   - increase in WBC without fever, day 9 of rocephin per ID   - low Mag   - I/O (+) 13 L (1.2L in last 24 hrs)   - on CPAP this morning with RR 11-14, -700, minimal secretions   - lost FT, currently NPO, unable to get coretrak in due to coiling   - SBT with RSBI 15, NIF -40, FVC 1.2L   - follows commands, calm   - glucose better with D5 gtt    PFSH:  No change.    Respiratory:  Sosa Vent Mode: APVCMV, Rate (breaths/min): 18, Vt Target (mL): 370, PEEP/CPAP: 8, FiO2: 30, Static Compliance (ml / cm H2O): 47, Control VTE (exp VT): 368  Pulse Oximetry: 100 % PIP 14          Exam: unlabored respirations, no intercostal retractions  or accessory muscle use, rales bibasilar, left > right and diminished breath sounds mild  ImagingAvailable data reviewed (personally reviewed and compared to prior film): increasing BB atelectasis/effusion with mild overall edema pattern, ETT and R IJ in good position   Bedside chest US showing small effusion on L, not amenable to safe thoracentesis at this time  Recent Labs      02/19/18   0536  02/20/18   0432  02/21/18   0547   ISTATAPH  7.440  7.479  7.467   ISTATAPCO2  31.5  32.1  33.7   ISTATAPO2  145*  118*  146*   ISTATATCO2  22  25  25   BLKSNPD3AAR  99  99  99   ISTATARTHCO3  21.4  23.8  24.3   ISTATARTBE  -2  0  1   ISTATTEMP  36.7 C  36.9 C  37.0 C   ISTATFIO2  40  40  40   ISTATSPEC  Arterial  Arterial  Arterial   ISTATAPHTC  7.444  7.480  7.467   GAPHTPSD7ND  144*  117*  146*   ABG interpretation: mild respiratory alkalosis with adequate oxygenation    HemoDynamics:  Pulse: 73, Heart Rate (Monitored): 74  NIBP: 126/63  CVP (mm Hg): 6 MM HG CVP 4-7    Exam: no murmur, 1+ hand and LE edema, regular rate and rhythm, regular rhythm (Sinus)  Imaging: Available data reviewed    Echo 2/19: CONCLUSIONS  No prior study is available for comparison.   Technically difficult study - adequate information is obtained.   Mild concentric left ventricular hypertrophy.  Normal regional wall motion.  Left ventricular ejection fraction is visually estimated to be 65%.  The left atrium is normal in size.  Structurally normal mitral valve without significant stenosis or   regurgitation.  Mild aortic sclerosis without stenosis.  No aortic insufficiency.  Estimated right ventricular systolic pressure  is 35 mmHg.  Normal pericardium without effusion.  Recent Labs      02/21/18   0456   TROPONINI  0.03     Neuro:  GCS Total Radford Coma Score: 11 Propofol gtt       Exam: R side UE paralysis with RLE weak, follows briskly on left and sticks tongue out to command, calm mental status intact follows commands, raises two  fingers  Imaging: Ct Brain Reviewed  CT of the brain on 2/13/2018:    Impression         1. No evidence of acute intracranial hemorrhage or mass lesion.    2. Postsurgical change and encephalomalacia in the left frontal lobe.       Fluids:  Intake/Output       02/19/18 0700 - 02/20/18 0659 02/20/18 0700 - 02/21/18 0659 02/21/18 0700 - 02/22/18 0659      9898-1387 2155-8151 Total 4149-2436 6784-0377 Total 3507-9775 0908-0786 Total       Intake    I.V.  1144.9  1344 2488.9  1069  744 1813  --  -- --    Magnesium Sulfate Volume -- -- -- 100 -- 100 -- -- --    Phenylephrine Volume 0.9 -- 0.9 -- -- -- -- -- --    Propofol Volume 144 144 288 144 144 288 -- -- --    IV Volume (NS + 20K) 500 -- 500 -- -- -- -- -- --    IV Volume (Lactated Ringers) 500 1200 1700 500 -- 500 -- -- --    IV Volume (D5 1/2NS 20K+) -- -- -- 325 600 925 -- -- --    Total Intake 1144.9 1344 2488.9 5294 018 5700 -- -- --       Output    Urine  505  485 990  595  -- 595  --  -- --    Number of Times Incontinent of Urine -- -- -- -- 775 x 775 x -- -- --    Indwelling Cathether -- 485 485 -- -- -- -- -- --    Void (ml) 505 -- 505 595 -- 595 -- -- --    Stool  --  -- --  --  -- --  --  -- --    Number of Times Stooled 0 x -- 0 x -- -- -- -- -- --    Total Output 505 485 990 595 -- 595 -- -- --       Net I/O     639.9 859 1498.9  -- -- --           Recent Labs      02/18/18   1125   02/18/18 2010 02/19/18   0400  02/20/18   0540  02/21/18   0456   SODIUM  142   --    --   146*   --    --    POTASSIUM  2.9*   --   3.3*  3.7   --    --    CHLORIDE  111   --    --   118*   --    --    CO2  18*   --    --   21   --    --    BUN  11   --    --   17   --    --    CREATININE  0.47*   --    --   0.37*   --    --    MAGNESIUM   --    < >  2.3  2.2  1.8  1.9   PHOSPHORUS   --    --    --   2.1*  2.4*  2.8   CALCIUM  8.5   --    --   8.3*   --    --     < > = values in this interval not displayed.       GI/Nutrition:  Exam: abdomen is soft and  non-tender. Scars from previous abdominal surgery noted.  Imaging: Available data reviewed  NPO  Liver Function  Recent Labs      02/18/18   1125  02/19/18   0400   ALTSGPT   --   16   ASTSGOT   --   17   ALKPHOSPHAT   --   77   TBILIRUBIN   --   0.4   GLUCOSE  225*  101*       Heme:  Recent Labs      02/18/18   1126  02/19/18   0400  02/21/18   0456   RBC  4.46  3.63*  3.02*   HEMOGLOBIN  13.1  10.7*  8.8*   HEMATOCRIT  37.9  31.4*  26.2*   PLATELETCT  243  217  233       Infectious Disease:  No Data Recorded  Micro: antibiotics reviewed and cultures pending. Candida glabrata is noted from patient's BAL on 2/18/2018 as well as yeast from Olmedo catheter from 2/18/2018. Patient remains on Rocephin for presumed obstructive pyelonephritis. Patient also had positive blood cultures for E. coli on 2/13/2018. Repeat Bcx 2/19 NGTD  Recent Labs      02/18/18   1126  02/19/18   0400  02/21/18   0456   WBC  11.1*  10.7  12.4*   NEUTSPOLYS  70.80   --   74.00*   LYMPHOCYTES  22.10   --   15.00*   MONOCYTES  1.80   --   6.10   EOSINOPHILS  0.90   --   2.50   BASOPHILS  0.00   --   0.20   ASTSGOT   --   17   --    ALTSGPT   --   16   --    ALKPHOSPHAT   --   77   --    TBILIRUBIN   --   0.4   --      Current Facility-Administered Medications   Medication Dose Frequency Provider Last Rate Last Dose   • dextrose 5 % and 0.45 % NaCl with KCl 20 mEq   Continuous Manuel Quintero, D.O. 50 mL/hr at 02/20/18 1615     • propofol (DIPRIVAN) injection  0-80 mcg/kg/min Continuous SEVEN Ferrer Jr..O. 6 mL/hr at 02/21/18 0622 10 mcg/kg/min at 02/21/18 0622   • Respiratory Care per Protocol   Continuous RT David Pillai Jr., D.O.       • senna-docusate (PERICOLACE or SENOKOT S) 8.6-50 MG per tablet 2 Tab  2 Tab BID David Pillai Jr., D.O.   Stopped at 02/18/18 2100    And   • polyethylene glycol/lytes (MIRALAX) PACKET 1 Packet  1 Packet QDAY PRN David Pillai Jr., D.O.        And   • magnesium hydroxide (MILK OF MAGNESIA) suspension  30 mL  30 mL QDAY PRN SEVEN Ferrer Jr..OBrittany        And   • bisacodyl (DULCOLAX) suppository 10 mg  10 mg QDAY PRN SEVEN Ferrer Jr..O.       • chlorhexidine (PERIDEX) 0.12 % solution 15 mL  15 mL BID SEVEN Ferrer Jr..OBrittany   15 mL at 02/20/18 2114   • lidocaine (XYLOCAINE) 1%  injection  1-2 mL Q30 MIN PRN SEVEN Ferrer Jr..JOSUE.       • MD ALERT...Adult ICU Electrolyte Replacement per Pharmacy Protocol   pharmacy to dose SEVEN Ferrer Jr..O.       • famotidine (PEPCID) tablet 20 mg  20 mg Q12HRS SEVEN Ferrer Jr..O.        Or   • famotidine (PEPCID) injection 20 mg  20 mg Q12HRS SEVEN Ferrer Jr..O.   20 mg at 02/20/18 2114   • ipratropium-albuterol (DUONEB) nebulizer solution 3 mL  3 mL Q2HRS PRN (RT) SEVEN Ferrer Jr..O.       • insulin regular (HUMULIN R) injection 1-6 Units  1-6 Units Q6HRS SEVEN Ferrer Jr..O.   Stopped at 02/18/18 1445    And   • glucose 4 g chewable tablet 16 g  16 g Q15 MIN PRN SEVEN Ferrer Jr..OBrittany        And   • dextrose 50% (D50W) injection 25 mL  25 mL Q15 MIN PRN SEVEN Ferrer Jr..O.       • fentaNYL (SUBLIMAZE) injection 25 mcg  25 mcg Q HOUR PRN SEVEN Ferrer Jr..O.        Or   • fentaNYL (SUBLIMAZE) injection 50 mcg  50 mcg Q HOUR PRN SEVEN Ferrer Jr..O.   50 mcg at 02/20/18 1622    Or   • fentaNYL (SUBLIMAZE) injection 100 mcg  100 mcg Q HOUR PRN SEVEN Ferrer Jr..O.       • oxyCODONE immediate-release (ROXICODONE) tablet 5 mg  5 mg Q4HRS PRN Roly Mathew M.D.       • cefTRIAXone (ROCEPHIN) syringe 2 g  2 g Q24HRS SEVEN Ferrer Jr..O.   2 g at 02/20/18 0835   • fentaNYL (DURAGESIC) 100 MCG/HR 1 Patch  1 Patch Q72HRS SEVEN Ferrer Jr..O.   1 Patch at 02/18/18 2123   • labetalol (NORMODYNE,TRANDATE) injection 10 mg  10 mg Q4HRS PRSOPHIA Owens M.D.   10 mg at 02/18/18 0840   • hydrALAZINE (APRESOLINE) injection 10 mg  10 mg Q4HRS PRSOPHIA Owens M.D.   10 mg at 02/18/18 1109   • enoxaparin (LOVENOX)  inj 40 mg  40 mg DAILY David Pillai Jr., D.O.   40 mg at 02/20/18 0835   • gabapentin (NEURONTIN) capsule 300 mg  300 mg TID Alberto Singh M.D.   Stopped at 02/14/18 1500   • folic acid (FOLVITE) tablet 2 mg  2 mg DAILY Jairo Silva M.D.   Stopped at 02/13/18 0945   • pregabalin (LYRICA) capsule 150 mg  150 mg BID Jairo Silva M.D.   Stopped at 02/14/18 0900   • ondansetron (ZOFRAN) syringe/vial injection 4 mg  4 mg Q4HRS PRN Jairo Silva M.D.   4 mg at 02/13/18 2152   • ondansetron (ZOFRAN ODT) dispertab 4 mg  4 mg Q4HRS PRN Jairo Silva M.D.       • promethazine (PHENERGAN) tablet 12.5-25 mg  12.5-25 mg Q4HRS PRN Jairo Silva M.D.       • promethazine (PHENERGAN) suppository 12.5-25 mg  12.5-25 mg Q4HRS PRN Jairo Silva M.D.       • prochlorperazine (COMPAZINE) injection 5-10 mg  5-10 mg Q4HRS PRN Jairo Silva M.D.       • FLUoxetine (PROZAC) capsule 20 mg  20 mg DAILY Jairo Silva M.D.   Stopped at 02/13/18 0945   • DULoxetine (CYMBALTA) capsule 60 mg  60 mg BID Jairo Silva M.D.   Stopped at 02/14/18 0900     Last reviewed on 2/13/2018  8:37 AM by Jesus Alberto Diehl    Quality  Measures:   Labs reviewed, Medications reviewed and Radiology images reviewed   Olmedo catheter: Critically Ill - Requiring Accurate Measurement of Urinary Output and Unconscious / Sedated Patient on a Ventilator  Central line in place: Need for access     DVT Prophylaxis: Enoxaparin (Lovenox)   DVT prophylaxis - mechanical: SCDs   Ulcer prophylaxis: Yes   Antibiotics: Treating active infection/contamination beyond 24 hours perioperative coverage  Assessed for rehab: Patient was assess for and/or received rehabilitation services during this hospitalization    Assessment and plan:  Acute hypoxemic respiratory failure - intubated 2/18   - Extubation trial today, encourage IS/PEP   - RT/O2 protocols, limit sedatives   - Early mobilization  Acute  noncardiogenic pulmonary edema with small left greater than right pleural effusions   - Diuresis with potassium supplementation  Septic shock from urinary source and bacteremia sepsis   - S/P sepsis protocol, ongoing antibiotics   - Fluid resuscitated now hypervolemic  Acute Pyelonephritis with associated right ureteral calculus and hydronephrosis s/p cystoscopy with stent placement 2/13 (Dr. Killian)   - Continue antibiotics per infectious disease   - Urology follow-up as outpatient   - Monitor kidney function closely  E. coli bacteremia - antibiotics per ID  History of CVA with right-sided weakness - therapies   - ? Antiplatelet/statin  History of brain aneurysm S/P clipping  History of gastric bypass  Mild pulmonary hypertension  Hypomagnesemia - repletion  Acute blood loss anemia - monitor daily CBC, conservative transfusion strategy  Prophylaxis, SLP after extubation with hopeful avoidance of repeat core trak placement (May need GI consultation for placement)    Discussed patient condition and risk of morbidity and/or mortality with Family, RN, RT, Pharmacy, Charge nurse / hot rounds, Patient and hospitalist.   The patient remains critically ill.  Critical care time = 33 minutes in directly providing and coordinating critical care and extensive data review.  No time overlap and excludes procedures.

## 2018-02-21 NOTE — DISCHARGE PLANNING
Medical SW    Referral: Sw attended IDT rounds.    Intervention: admission for septic UTI, sedation reduced, responding and following commands, vent day 18,      Plan: Sw to assist w/ d/c planning as needed.

## 2018-02-21 NOTE — PROGRESS NOTES
[facility] Hospitalist Progress Note    Date of Service: 2018    Chief Complaint  57 y.o. female admitted 2018 with altered mental status  She has  a history of brain aneurysm with hemiplegia since  that was found to have a fever of 103 and UTI with hydronephrosis. She was [facility] from [facility] to [facility] and underwent cystoscopy and ureteral stent placement by Dr. Killian    Interval Problem Update  . Remains intubated, failed SBT this morning  Off pressors  Low BS    Consultants/Specialty  Critical Care. I discussed her condition with  today on ICU Hot Rounds.  Infectious disease  Urology   Disposition  ICU        Review of Systems   Unable to perform ROS: Intubated      Physical Exam  Laboratory/Imaging   Hemodynamics  No data recorded.      Pulse  Av  Min: 53  Max: 161 Heart Rate (Monitored): 77  NIBP: 113/50 CVP (mm Hg): (!) 7 MM HG    Respiratory  Sosa Vent Mode: APVCMV, Rate (breaths/min): 18, PEEP/CPAP: 8, PEEP/CPAP: 8, FiO2: 40, P Peak (PIP): 22, P MEAN: 12   Respiration: 18, Pulse Oximetry: 100 %     Work Of Breathing / Effort: Vented  RUL Breath Sounds: Crackles, RML Breath Sounds: Crackles, RLL Breath Sounds: Diminished, EMIGDIO Breath Sounds: Crackles, LLL Breath Sounds: Diminished    Fluids    Intake/Output Summary (Last 24 hours) at 18 1759  Last data filed at 18 1400   Gross per 24 hour   Intake             2339 ml   Output              995 ml   Net             1344 ml       Nutrition  Orders Placed This Encounter   Procedures   • Diet NPO     Standing Status:   Standing     Number of Occurrences:   1     Order Specific Question:   Type:     Answer:   Now [1]     Order Specific Question:   Restrict to:     Answer:   Strict [1]     Physical Exam   Constitutional: She appears well-developed.   HENT:   Head: Normocephalic and atraumatic.   Right Ear: External ear normal.   Left Ear: External ear normal.   Mouth/Throat: No oropharyngeal exudate.   Eyes: Conjunctivae  are normal. Right eye exhibits no discharge. Left eye exhibits no discharge. No scleral icterus.   Neck: No JVD present. No tracheal deviation present.   Right IJ central line   Cardiovascular: Normal rate and regular rhythm.  Exam reveals no gallop and no friction rub.    No murmur heard.  Pulmonary/Chest: She has rales. She exhibits no tenderness.   intubated   Abdominal: Soft. She exhibits no distension. There is no tenderness. There is no rebound.   Genitourinary:   Genitourinary Comments: Olmedo catheter   Musculoskeletal: She exhibits edema.   Right arm swelling   Neurological: She is alert. She exhibits abnormal muscle tone.   Rt hemiparesis   Skin: Skin is warm and dry. She is not diaphoretic. No cyanosis. There is pallor. Nails show no clubbing.   Psychiatric:   sedated   Nursing note and vitals reviewed.      Recent Labs      02/17/18   2340  02/18/18   1126  02/19/18   0400   WBC  10.7  11.1*  10.7   RBC  4.13*  4.46  3.63*   HEMOGLOBIN  12.0  13.1  10.7*   HEMATOCRIT  34.4*  37.9  31.4*   MCV  83.3  85.0  86.5   MCH  29.1  29.4  29.5   MCHC  34.9  34.6  34.1   RDW  44.0  45.2  47.8   PLATELETCT  189  243  217   MPV  12.3  12.4  12.8     Recent Labs      02/17/18   2339  02/18/18   1125  02/18/18 2010 02/19/18   0400   SODIUM  141  142   --   146*   POTASSIUM  2.8*  2.9*  3.3*  3.7   CHLORIDE  113*  111   --   118*   CO2  18*  18*   --   21   GLUCOSE  131*  225*   --   101*   BUN  11  11   --   17   CREATININE  0.37*  0.47*   --   0.37*   CALCIUM  8.3*  8.5   --   8.3*             Recent Labs      02/20/18   0540   TRIGLYCERIDE  163*          Assessment/Plan     * Septic shock (CMS-HCC)- (present on admission)   Assessment & Plan    Present on admission  Source was pyelonephritis  resolved        Acute respiratory failure (CMS-[facility])   Assessment & Plan    With hypoxia   Vent management per CC discussed with dr yin  Failed SBT        History of completed stroke- (present on admission)   Assessment &  Plan    Hx brain aneurysm in 1994 with resultant right hemiparesis        Hydronephrosis with urinary obstruction due to renal calculus- (present on admission)   Assessment & Plan    7mm right sided ureter stone s/p removal 2/13   Continue ceftriaxone per ID         Pyelonephritis- (present on admission)   Assessment & Plan    Blood cultures grew pan-sensitive E. coli   Had also nephrolithiasis with hydronephrosis, s/p stent placement by urology  IV rocephin per ID discussed with Dr Pittman           Hypernatremia   Assessment & Plan    D5W  Free water flushes after feeding tube placement        Chronic pain- (present on admission)   Assessment & Plan    Continue fentanyl patch neurontin cymbalta        Encephalopathy acute- (present on admission)   Assessment & Plan    Metabolic/toxic encephalopathy due to dehydration and sepsis  improved.         Metabolic acidosis- (present on admission)   Assessment & Plan    Urosepsis, improved        CHERYL (acute kidney injury) (CMS-HCC)- (present on admission)   Assessment & Plan    Due to dehydration and sepsis   Resolved monitor        Arm swelling- (present on admission)   Assessment & Plan    U/s negative for DVT.         Dysphagia- (present on admission)   Assessment & Plan    Attempt OG tube  D5W given hypoglycemia        Thrombocytopenia (CMS-HCC)- (present on admission)   Assessment & Plan    Likely due to sepsis  resolved            Quality-Core Measures   Reviewed items::  Labs reviewed and Medications reviewed  Olmedo catheter::  Unconscious / Sedated Patient on a Ventilator  DVT prophylaxis pharmacological::  Enoxaparin (Lovenox)

## 2018-02-21 NOTE — RESPIRATORY CARE
Extubation    Cuff leak noted yes  Stridor present No     O2 (FiO2): 32 (02/15/18 1015)  O2 (LPM): 0 (02/18/18 0800)  O2 Daily Delivery Respiratory : Silicone Nasal Cannula (02/15/18 1015)  Patient toleration good  RCP Complete? yes  Events/Summary/Plan: Pt extubated to 2L n/c (02/21/18 0950)

## 2018-02-21 NOTE — CARE PLAN
Problem: Nutritional:  Goal: Nutrition support tolerated and meeting greater than 85% of estimated needs  Outcome: NOT MET    Goal: Achieve adequate nutritional intake  Patient will safely tolerate PO diet and consume 50% of meals.  Outcome: NOT MET

## 2018-02-21 NOTE — DIETARY
"Nutrition Services: Inadequate nutrition since admit, day 8. Pt extubated this morning, but has been NPO due to dysphagia and failed Cortrak placements. Prior GI note on 2/18 stated, \"Can consider endoscopic placement of nasoenteric feeding tube if she recovers. Will sign off, call if can help.\" RD following for nutrition orders. Recommend TF or PO diet as soon as possible.     "

## 2018-02-22 ENCOUNTER — APPOINTMENT (OUTPATIENT)
Dept: RADIOLOGY | Facility: MEDICAL CENTER | Age: 58
DRG: 853 | End: 2018-02-22
Attending: INTERNAL MEDICINE
Payer: MEDICARE

## 2018-02-22 ENCOUNTER — APPOINTMENT (OUTPATIENT)
Dept: RADIOLOGY | Facility: MEDICAL CENTER | Age: 58
DRG: 853 | End: 2018-02-22
Attending: HOSPITALIST
Payer: MEDICARE

## 2018-02-22 PROBLEM — M79.89 ARM SWELLING: Status: RESOLVED | Noted: 2018-02-13 | Resolved: 2018-02-22

## 2018-02-22 PROBLEM — E87.0 HYPERNATREMIA: Status: RESOLVED | Noted: 2018-02-20 | Resolved: 2018-02-22

## 2018-02-22 LAB
ANION GAP SERPL CALC-SCNC: 4 MMOL/L (ref 0–11.9)
APTT PPP: 36.2 SEC (ref 24.7–36)
BASOPHILS # BLD AUTO: 0.4 % (ref 0–1.8)
BASOPHILS # BLD: 0.04 K/UL (ref 0–0.12)
BUN SERPL-MCNC: 7 MG/DL (ref 8–22)
CALCIUM SERPL-MCNC: 8.4 MG/DL (ref 8.5–10.5)
CHLORIDE SERPL-SCNC: 106 MMOL/L (ref 96–112)
CO2 SERPL-SCNC: 29 MMOL/L (ref 20–33)
CREAT SERPL-MCNC: 0.35 MG/DL (ref 0.5–1.4)
EOSINOPHIL # BLD AUTO: 0.26 K/UL (ref 0–0.51)
EOSINOPHIL NFR BLD: 2.3 % (ref 0–6.9)
ERYTHROCYTE [DISTWIDTH] IN BLOOD BY AUTOMATED COUNT: 48.1 FL (ref 35.9–50)
GLUCOSE BLD-MCNC: 149 MG/DL (ref 65–99)
GLUCOSE BLD-MCNC: 87 MG/DL (ref 65–99)
GLUCOSE BLD-MCNC: 90 MG/DL (ref 65–99)
GLUCOSE SERPL-MCNC: 95 MG/DL (ref 65–99)
HCT VFR BLD AUTO: 27.2 % (ref 37–47)
HGB BLD-MCNC: 9.1 G/DL (ref 12–16)
IMM GRANULOCYTES # BLD AUTO: 0.23 K/UL (ref 0–0.11)
IMM GRANULOCYTES NFR BLD AUTO: 2 % (ref 0–0.9)
INR PPP: 1.13 (ref 0.87–1.13)
LYMPHOCYTES # BLD AUTO: 1.58 K/UL (ref 1–4.8)
LYMPHOCYTES NFR BLD: 13.9 % (ref 22–41)
MAGNESIUM SERPL-MCNC: 1.9 MG/DL (ref 1.5–2.5)
MCH RBC QN AUTO: 29.2 PG (ref 27–33)
MCHC RBC AUTO-ENTMCNC: 33.5 G/DL (ref 33.6–35)
MCV RBC AUTO: 87.2 FL (ref 81.4–97.8)
MONOCYTES # BLD AUTO: 0.78 K/UL (ref 0–0.85)
MONOCYTES NFR BLD AUTO: 6.9 % (ref 0–13.4)
NEUTROPHILS # BLD AUTO: 8.49 K/UL (ref 2–7.15)
NEUTROPHILS NFR BLD: 74.5 % (ref 44–72)
NRBC # BLD AUTO: 0 K/UL
NRBC BLD-RTO: 0 /100 WBC
PHOSPHATE SERPL-MCNC: 3.1 MG/DL (ref 2.5–4.5)
PLATELET # BLD AUTO: 281 K/UL (ref 164–446)
PMV BLD AUTO: 12.1 FL (ref 9–12.9)
POTASSIUM SERPL-SCNC: 3.4 MMOL/L (ref 3.6–5.5)
PROTHROMBIN TIME: 14.2 SEC (ref 12–14.6)
RBC # BLD AUTO: 3.12 M/UL (ref 4.2–5.4)
SODIUM SERPL-SCNC: 139 MMOL/L (ref 135–145)
TROPONIN I SERPL-MCNC: 0.03 NG/ML (ref 0–0.04)
WBC # BLD AUTO: 11.4 K/UL (ref 4.8–10.8)

## 2018-02-22 PROCEDURE — A9270 NON-COVERED ITEM OR SERVICE: HCPCS | Performed by: HOSPITALIST

## 2018-02-22 PROCEDURE — 84100 ASSAY OF PHOSPHORUS: CPT

## 2018-02-22 PROCEDURE — 85025 COMPLETE CBC W/AUTO DIFF WBC: CPT

## 2018-02-22 PROCEDURE — 80048 BASIC METABOLIC PNL TOTAL CA: CPT

## 2018-02-22 PROCEDURE — 700102 HCHG RX REV CODE 250 W/ 637 OVERRIDE(OP): Performed by: HOSPITALIST

## 2018-02-22 PROCEDURE — 84484 ASSAY OF TROPONIN QUANT: CPT

## 2018-02-22 PROCEDURE — 99232 SBSQ HOSP IP/OBS MODERATE 35: CPT | Performed by: HOSPITALIST

## 2018-02-22 PROCEDURE — 94669 MECHANICAL CHEST WALL OSCILL: CPT

## 2018-02-22 PROCEDURE — 700111 HCHG RX REV CODE 636 W/ 250 OVERRIDE (IP): Performed by: INTERNAL MEDICINE

## 2018-02-22 PROCEDURE — 36569 INSJ PICC 5 YR+ W/O IMAGING: CPT

## 2018-02-22 PROCEDURE — A9270 NON-COVERED ITEM OR SERVICE: HCPCS | Performed by: INTERNAL MEDICINE

## 2018-02-22 PROCEDURE — 94668 MNPJ CHEST WALL SBSQ: CPT

## 2018-02-22 PROCEDURE — 700101 HCHG RX REV CODE 250: Performed by: INTERNAL MEDICINE

## 2018-02-22 PROCEDURE — 94640 AIRWAY INHALATION TREATMENT: CPT

## 2018-02-22 PROCEDURE — 770022 HCHG ROOM/CARE - ICU (200)

## 2018-02-22 PROCEDURE — 83735 ASSAY OF MAGNESIUM: CPT

## 2018-02-22 PROCEDURE — 82962 GLUCOSE BLOOD TEST: CPT | Mod: 91

## 2018-02-22 PROCEDURE — 71045 X-RAY EXAM CHEST 1 VIEW: CPT

## 2018-02-22 PROCEDURE — 85610 PROTHROMBIN TIME: CPT

## 2018-02-22 PROCEDURE — 700111 HCHG RX REV CODE 636 W/ 250 OVERRIDE (IP): Performed by: HOSPITALIST

## 2018-02-22 PROCEDURE — 92526 ORAL FUNCTION THERAPY: CPT

## 2018-02-22 PROCEDURE — 94667 MNPJ CHEST WALL 1ST: CPT

## 2018-02-22 PROCEDURE — 85730 THROMBOPLASTIN TIME PARTIAL: CPT

## 2018-02-22 PROCEDURE — 700102 HCHG RX REV CODE 250 W/ 637 OVERRIDE(OP): Performed by: INTERNAL MEDICINE

## 2018-02-22 RX ORDER — MAGNESIUM SULFATE HEPTAHYDRATE 40 MG/ML
2 INJECTION, SOLUTION INTRAVENOUS ONCE
Status: COMPLETED | OUTPATIENT
Start: 2018-02-22 | End: 2018-02-22

## 2018-02-22 RX ORDER — POTASSIUM CHLORIDE 20 MEQ/1
40 TABLET, EXTENDED RELEASE ORAL ONCE
Status: COMPLETED | OUTPATIENT
Start: 2018-02-22 | End: 2018-02-22

## 2018-02-22 RX ADMIN — GABAPENTIN 300 MG: 300 CAPSULE ORAL at 20:13

## 2018-02-22 RX ADMIN — CEFTRIAXONE 2 G: 2 INJECTION, POWDER, FOR SOLUTION INTRAMUSCULAR; INTRAVENOUS at 08:25

## 2018-02-22 RX ADMIN — POTASSIUM CHLORIDE, DEXTROSE MONOHYDRATE AND SODIUM CHLORIDE: 150; 5; 450 INJECTION, SOLUTION INTRAVENOUS at 05:33

## 2018-02-22 RX ADMIN — PREGABALIN 150 MG: 150 CAPSULE ORAL at 08:25

## 2018-02-22 RX ADMIN — PREGABALIN 150 MG: 150 CAPSULE ORAL at 20:13

## 2018-02-22 RX ADMIN — SODIUM BICARBONATE 3 ML: 84 INJECTION, SOLUTION INTRAVENOUS at 11:47

## 2018-02-22 RX ADMIN — GABAPENTIN 300 MG: 300 CAPSULE ORAL at 08:26

## 2018-02-22 RX ADMIN — ENOXAPARIN SODIUM 40 MG: 100 INJECTION SUBCUTANEOUS at 08:26

## 2018-02-22 RX ADMIN — SODIUM BICARBONATE 2 ML: 84 INJECTION, SOLUTION INTRAVENOUS at 19:13

## 2018-02-22 RX ADMIN — STANDARDIZED SENNA CONCENTRATE AND DOCUSATE SODIUM 2 TABLET: 8.6; 5 TABLET, FILM COATED ORAL at 08:25

## 2018-02-22 RX ADMIN — FLUOXETINE HYDROCHLORIDE 20 MG: 20 CAPSULE ORAL at 08:25

## 2018-02-22 RX ADMIN — STANDARDIZED SENNA CONCENTRATE AND DOCUSATE SODIUM 2 TABLET: 8.6; 5 TABLET, FILM COATED ORAL at 20:14

## 2018-02-22 RX ADMIN — DULOXETINE HYDROCHLORIDE 60 MG: 60 CAPSULE, DELAYED RELEASE ORAL at 08:26

## 2018-02-22 RX ADMIN — FUROSEMIDE 20 MG: 10 INJECTION, SOLUTION INTRAMUSCULAR; INTRAVENOUS at 08:26

## 2018-02-22 RX ADMIN — GABAPENTIN 300 MG: 300 CAPSULE ORAL at 15:03

## 2018-02-22 RX ADMIN — MAGNESIUM SULFATE IN WATER 2 G: 40 INJECTION, SOLUTION INTRAVENOUS at 09:14

## 2018-02-22 RX ADMIN — DULOXETINE HYDROCHLORIDE 60 MG: 60 CAPSULE, DELAYED RELEASE ORAL at 20:14

## 2018-02-22 RX ADMIN — FOLIC ACID 2 MG: 1 TABLET ORAL at 08:26

## 2018-02-22 RX ADMIN — POTASSIUM CHLORIDE 40 MEQ: 1500 TABLET, EXTENDED RELEASE ORAL at 09:14

## 2018-02-22 ASSESSMENT — PATIENT HEALTH QUESTIONNAIRE - PHQ9
2. FEELING DOWN, DEPRESSED, IRRITABLE, OR HOPELESS: NOT AT ALL
SUM OF ALL RESPONSES TO PHQ9 QUESTIONS 1 AND 2: 0
1. LITTLE INTEREST OR PLEASURE IN DOING THINGS: NOT AT ALL
SUM OF ALL RESPONSES TO PHQ QUESTIONS 1-9: 0

## 2018-02-22 ASSESSMENT — ENCOUNTER SYMPTOMS
CHILLS: 0
ABDOMINAL PAIN: 0
WEAKNESS: 1
VOMITING: 0
FEVER: 0
COUGH: 0
NAUSEA: 0

## 2018-02-22 ASSESSMENT — PAIN SCALES - GENERAL
PAINLEVEL_OUTOF10: 0

## 2018-02-22 NOTE — CARE PLAN
Problem: Respiratory:  Goal: Respiratory status will improve  Outcome: PROGRESSING AS EXPECTED  Extubate patient    Problem: Safety  Goal: Will remain free from injury  Outcome: PROGRESSING AS EXPECTED      Problem: Mobility  Goal: Risk for activity intolerance will decrease  Outcome: PROGRESSING AS EXPECTED  Sit at edge of bed

## 2018-02-22 NOTE — PROGRESS NOTES
PICC Insertion Procedure Note    Consents confirmed, vessel patency confirmed with ultrasound, real time ultrasound image printed and placed in patient chart. Risks and benefits of procedure explained to patient/family and education regarding central line associated bloodstream infections provided. Questions answered.     PICC placed in LUE per MD order with ultrasound guidance. 4 Fr, single lumen PICC placed in basilic vein after 1 attempt(s). 4 cc's of 1% lidocaine injected intradermally, 21 gauge microintroducer needle and modified Seldinger technique used. 42 cm total catheter length inserted with good blood return. Each lumen flushed without resistance with 10 mL 0.9% normal saline. PICC line secured with Biopatch and Tegaderm.    PICC tip location in the SVC confirmed by ECG technology. Pt tolerated procedure well.  Patient condition relayed to unit RN or ordering physician via this post procedure note in the EMR.     [employer] Power PICC ref # [id number], Lot # [id number]

## 2018-02-22 NOTE — DISCHARGE PLANNING
Received choice form from MYKE Lora for patients SNF services, referral has been sent to [facility] per patient request.

## 2018-02-22 NOTE — PROGRESS NOTES
"Infectious Disease Progress Note    Author: Pauline Pittman M.D. Date & Time of service: 2018  9:28 PM    Chief Complaint:  UTI with sepsis    Interval History:  57-year-old white female admitted with fever, altered mental status and abdominal pain due to obstructive nephrolithiasis with associated pyelonephritis   AF WBC 11.1 c/o \"hunger pains\" in stomach-has not eaten in 9 days per . Failed mult swallow evals   AF, WBC 10.7, transferred to the ICU for increased work of breathing and intubated yesterday and started on pressors overnight, opens eyes to voice   AF, no BC, awake on vent, nods no to pain, plan for SBT today, FiO2 40%, family at bedside   AF, WBC 12.4, failed SBT yesterday, repeat SBT today, on FiO2 30%, awake on the vent   AF, WBC 11.4, extubated yesterday, denies any pain or flank pain  Labs Reviewed, Medications Reviewed and Radiology Reviewed.    Review of Systems:  Review of Systems   Constitutional: Positive for malaise/fatigue. Negative for chills and fever.   Respiratory: Negative for cough.    Gastrointestinal: Negative for abdominal pain, nausea and vomiting.   Genitourinary: Negative for dysuria.   Neurological: Positive for weakness.       Hemodynamics:  No data recorded.     Pulse  Av.3  Min: 53  Max: 161Heart Rate (Monitored): 80  NIBP: 128/84  CVP (mm Hg): 6 MM HG    Physical Exam:  Physical Exam   Constitutional: She appears well-developed. No distress.   Obese, morbid   HENT:   Head: Normocephalic and atraumatic.   edentulous   Eyes: EOM are normal. Pupils are equal, round, and reactive to light.   Neck: Neck supple.   R IJ catheter   Cardiovascular: Normal rate.    Pulmonary/Chest: Effort normal.   Abdominal: Soft. She exhibits no distension. There is tenderness. There is no rebound and no guarding.   Musculoskeletal: She exhibits edema.   Neurological: She is alert.   Nursing note and vitals reviewed.      Meds:    Current Facility-Administered " Medications:   •  sodium bicarbonate  •  dextrose 5 % and 0.45 % NaCl with KCl 20 mEq  •  Respiratory Care per Protocol  •  senna-docusate **AND** polyethylene glycol/lytes **AND** magnesium hydroxide **AND** bisacodyl  •  MD ALERT...Adult ICU Electrolyte Replacement per Pharmacy Protocol  •  ipratropium-albuterol  •  oxyCODONE immediate-release  •  cefTRIAXone (ROCEPHIN) IV  •  fentaNYL  •  labetalol  •  hydrALAZINE  •  enoxaparin (LOVENOX) injection  •  gabapentin  •  folic acid  •  pregabalin  •  ondansetron  •  ondansetron  •  promethazine  •  promethazine  •  prochlorperazine  •  FLUoxetine  •  DULoxetine    Labs:  Recent Labs      02/21/18   0456  02/22/18   0540   WBC  12.4*  11.4*   RBC  3.02*  3.12*   HEMOGLOBIN  8.8*  9.1*   HEMATOCRIT  26.2*  27.2*   MCV  86.8  87.2   MCH  29.1  29.2   RDW  47.8  48.1   PLATELETCT  233  281   MPV  12.3  12.1   NEUTSPOLYS  74.00*  74.50*   LYMPHOCYTES  15.00*  13.90*   MONOCYTES  6.10  6.90   EOSINOPHILS  2.50  2.30   BASOPHILS  0.20  0.40     Recent Labs      02/21/18   0456  02/22/18   0540   SODIUM  141  139   POTASSIUM  3.2*  3.4*   CHLORIDE  110  106   CO2  22  29   GLUCOSE  82  95   BUN  9  7*     Recent Labs      02/21/18   0456  02/22/18   0540   CREATININE  0.29*  0.35*       Imaging:  Ct-abdomen-pelvis W/o    Result Date: 2/13/2018 2/13/2018 5:15 AM HISTORY/REASON FOR EXAM:  Pain Sepsis. UTI. TECHNIQUE/EXAM DESCRIPTION: CT scan of the abdomen and pelvis without contrast. Noncontrast helical scanning was obtained from the diaphragmatic domes through the pubic symphysis. Low dose optimization technique was utilized for this CT exam including automated exposure control and adjustment of the mA and/or kV according to patient size. COMPARISON: None. FINDINGS: Lung Bases: Small right pleural effusion. Bibasilar opacities. Hiatal hernia. Abdomen: Within the limits of noncontrast technique, the liver, spleen, pancreas, and adrenal glands are unremarkable in appearance.  Postsurgical change in the stomach There is an obstructing 7 mm calculus in the mid right ureter with severe upstream collecting system dilatation. No intrarenal calculus. The gallbladder is surgically absent. and there is no biliary dilatation. There is no bowel wall thickening or abnormal dilatation. The abdominal aorta is normal in caliber. Pelvis: The urinary bladder is decompressed by Olmedo catheter. Small fat-containing bilateral inguinal hernias. No lymph node enlargement. No free fluid, or free air in the abdomen or pelvis. No aggressive bone lesions are seen. Mild anterolisthesis of L5 on S1 ________________________________    1. Obstructing 7 mm calculus in the mid right ureter with severe right hydronephrosis. 2. Postsurgical change from gastric bypass. Hiatal hernia. 3. Small right pleural effusion. Bibasilar opacities, likely atelectasis.    Ct-head W/o    Result Date: 2/13/2018 2/13/2018 5:15 AM HISTORY/REASON FOR EXAM:  Altered Mental Status TECHNIQUE/EXAM DESCRIPTION AND NUMBER OF VIEWS: CT of the head without contrast. The study was performed on a helical multidetector CT scanner. Contiguous 2.5 mm axial sections were obtained from the skull base through the vertex. Up to date radiation dose reduction adjustments have been utilized to meet ALARA standards for radiation dose reduction. COMPARISON:  None available FINDINGS: There is no evidence of acute intracranial hemorrhage, mass, mass-effect or shift of midline structures. Encephalomalacia in the left frontal lobe with ex vacuo dilatation of the left lateral ventricle. Left frontal surgical clips. Ventricle size and brain parenchymal volume are appropriate for this patient's stated age. The basal cisterns are patent. There are no abnormal extra-axial fluid collections. No depressed or widely  calvarial fracture is seen. Postsurgical change in the left frontal calvarium. The visualized paranasal sinuses and temporal bone structures are  aerated. ___________________________________     1. No evidence of acute intracranial hemorrhage or mass lesion. 2. Postsurgical change and encephalomalacia in the left frontal lobe.     Dx-chest-portable (1 View)    Result Date: 2/18/2018 2/18/2018 12:22 PM HISTORY/REASON FOR EXAM:  Central line readjustment. TECHNIQUE/EXAM DESCRIPTION AND NUMBER OF VIEWS: Single portable view of the chest. COMPARISON: 2/13/2018 FINDINGS: Right central venous catheter with tip in the lower SVC. Endotracheal tube with tip in the mid thoracic trachea. Diffuse interstitial prominence, left more than right. Mild bibasilar opacity. No pleural effusion. No pneumothorax. Stable cardiopericardial silhouette.     Interval intubation. Right central venous catheter was retracted with tip in the lower SVC    Dx-chest-portable (1 View)    Result Date: 2/13/2018 2/13/2018 5:01 AM HISTORY/REASON FOR EXAM:  Central line placement TECHNIQUE/EXAM DESCRIPTION AND NUMBER OF VIEWS: Single portable view of the chest. COMPARISON: 2/13/2018 4:26 AM FINDINGS: Right central venous catheter with tip in the right atrium. Mild diffuse interstitial prominence. Mild bibasilar opacities. No pleural effusion. No pneumothorax. Stable cardiopericardial silhouette.     Right central venous catheter with tip in the right atrium.    Dx-chest-portable (1 View)    Result Date: 2/13/2018 2/13/2018 4:19 AM HISTORY/REASON FOR EXAM:  Sepsis TECHNIQUE/EXAM DESCRIPTION AND NUMBER OF VIEWS: Single portable view of the chest. COMPARISON: None FINDINGS: Mild diffuse interstitial prominence. No pulmonary infiltrates or consolidations are noted. No pleural effusion. No pneumothorax. Normal cardiopericardial silhouette.     1. Mild diffuse interstitial prominence could relate to pulmonary edema or viral infection.    Dx-portable Fluoroscopy < 1 Hour Is The Patient Pregnant? No    Result Date: 2/13/2018 2/13/2018 8:00 AM HISTORY/REASON FOR EXAM:  Main OR Intraoperative evaluation  TECHNIQUE/EXAM DESCRIPTION AND NUMBER OF VIEWS: Fluoroscopic imaging during stent placement. COMPARISON: CT from the same day FINDINGS: 1 image was obtained in the operating room. Contrast is seen within a dilated renal collecting system. A ureteral stent is noted. A total of 12 seconds of fluoroscopy time utilized.     Intraoperative image as above described.    Ue Venous Duplex    Result Date: 2018   Upper Extremity  Venous Duplex Report  Vascular Laboratory  CONCLUSIONS  No DVT or SVT seen in the right upper extremity  ANGELINA ESCOBAR  Exam Date:     2018 12:52  Room #:     Inpatient  Priority:     Routine  Ht (in):             Wt (lb):  Ordering Physician:        ALEJANDRA WORTHY  Referring Physician:       [id number]  Sonographer:               Jeri Bear RVT  Study Type:                Complete Unilateral  Technical Quality:         Adequate  Age:    57    Gender:     F  MRN:    [id number]  :    1960      BSA:  Indications:     Swelling of Limb  CPT Codes:       92112  ICD Codes:       729.81  History:         PICC line in the internal jugular vein, with swelling of the                    arm.  Limitations:     Bandages from PICC line on right neck.  PROCEDURES:  Right upper extremity venous duplex imaging.  The following venous structures were evaluated: internal jugular,  subclavian, axillary, brachial, radial, ulnar, cephalic and basilic veins.  Serial compression, augmentation maneuvers,  color and spectral Doppler  flow evaluations were performed.  FINDINGS:  Right upper extremity.  Complete color filling and compressibility with normal venous flow dynamics  including spontaneous flow, response to augmentation maneuvers, and  respiratory phasicity.  No echogenic material visualized.  Flow was evaluated in the contralateral subclavian vein and normal venous  flow dynamics  including spontaneous flow, respiratory phasic variation and  augmentation were demonstrated.  Doyle Jimenez MD  (Electronically Signed)  Final Date:      14 February 2018                   05:07    Dx-abdomen For Tube Placement    Result Date: 2/15/2018  2/15/2018 1:59 PM HISTORY/REASON FOR EXAM:  Line evaluation. TECHNIQUE/EXAM DESCRIPTION AND NUMBER OF VIEWS:  1 view(s) of the abdomen. COMPARISON:  None. FINDINGS: Enteric tube projects over the distal esophagus. There is a nonspecific bowel gas pattern. There is a right ureteral stent. Surgical clip is seen in the right upper quadrant. Degenerative changes are seen in the spine.     Enteric tube projects over the expected level of the distal esophagus. Recommend advancement.      Micro:  Results     Procedure Component Value Units Date/Time    QUANT BRONCHIAL WASHING [[id number]]  (Abnormal) Collected:  02/18/18 1150    Order Status:  Completed Specimen:  Respirate Updated:  02/21/18 1510     Gram Stain Result --     Few WBCs.  Moderate epithelial cells.  Rare Yeast.  <5% intracellular organisms.       Significant Indicator POS (POS)     Source RESP     Site Quantitative BAL RLL     Quantitative Bronch Washing -- (A)     Quantitative Bronch Washing -- (A)     Yeast  4,000 cfu/mL  Probable Candida albicans       Quantitative Bronch Washing -- (A)     Yeast  10,000 cfu/mL  Probable Candida glabrata      FUNGAL CULTURE [[id number]]  (Abnormal) Collected:  02/18/18 1150    Order Status:  Completed Specimen:  Respirate Updated:  02/21/18 1510     Significant Indicator POS (POS)     Source RESP     Site Quantitative BAL RLL     Fungal Culture -- (A)     Growth noted after further incubation, see below for  organism identification.       Fungal Culture -- (A)     Candida albicans  Heavy growth       Fungal Culture -- (A)     Yeast  Heavy growth  second colony type      AFB CULTURE [[id number]] Collected:  02/18/18 1150    Order Status:  Completed Specimen:  Respirate  "Updated:  02/21/18 1510     Significant Indicator NEG     Source RESP     Site Quantitative BAL RLL     AFB Culture Culture in progress.     AFB Smear Results No acid fast bacilli seen.    PNEUMOCYSTIS DFA [[id number]] Collected:  02/18/18 1150    Order Status:  Completed Specimen:  Respirate Updated:  02/21/18 1510     Significant Indicator NEG     Source RESP     Site Quantitative BAL RLL     Pneumocystis Stain No Pneumocystis jiroveci (P.carinii) detected by DFA.    BLOOD CULTURE [[id number]] Collected:  02/19/18 0430    Order Status:  Completed Specimen:  Blood from Peripheral Updated:  02/20/18 0841     Significant Indicator NEG     Source BLD     Site PERIPHERAL     Blood Culture --     No Growth    Note: Blood cultures are incubated for 5 days and  are monitored continuously.Positive blood cultures  are called to the RN and reported as soon as  they are identified.      Narrative:       Collected By:[id number] DONTAE BENITEZ  Per Hospital Policy: Only change Specimen Src: to \"Line\" if  specified by physician order.    BLOOD CULTURE [[id number]] Collected:  02/19/18 0400    Order Status:  Completed Specimen:  Blood from Peripheral Updated:  02/20/18 0841     Significant Indicator NEG     Source BLD     Site PERIPHERAL     Blood Culture --     No Growth    Note: Blood cultures are incubated for 5 days and  are monitored continuously.Positive blood cultures  are called to the RN and reported as soon as  they are identified.      Narrative:       Collected By:[id number] DONTAE BENITEZ  Per Hospital Policy: Only change Specimen Src: to \"Line\" if  specified by physician order.    URINE CULTURE(NEW) [[id number]] Collected:  02/18/18 1245    Order Status:  Completed Specimen:  Urine from Urine, Garcia Cath Updated:  02/20/18 0553     Significant Indicator NEG     Source UR     Site URINE, GARCIA CATH     Urine Culture No growth at 48 hours.    Narrative:       Collected By:[id number] AZAM YEN  Indication for " culture:->Altered LOC  Indication for culture:->Temp Equal to,or Greater Than 101    GRAM STAIN [[id number]] Collected:  02/18/18 1150    Order Status:  Completed Specimen:  Respirate Updated:  02/19/18 1714     Significant Indicator .     Source RESP     Site Quantitative BAL RLL     Gram Stain Result --     Few WBCs.  Moderate epithelial cells.  Rare Yeast.  <5% intracellular organisms.      ACID FAST STAIN [[id number]] Collected:  02/18/18 1150    Order Status:  Completed Specimen:  Respirate Updated:  02/19/18 1714     Significant Indicator NEG     Source RESP     Site Quantitative BAL RLL     AFB Smear Results No acid fast bacilli seen.    URINALYSIS [[id number]]  (Abnormal) Collected:  02/18/18 1245    Order Status:  Completed Specimen:  Urine from Urine, Olmedo Cath Updated:  02/18/18 1342     Color Yellow     Character Cloudy (A)     Specific Gravity 1.015     Ph 5.0     Glucose Negative mg/dL      Ketones 15 (A) mg/dL      Protein 300 (A) mg/dL      Bilirubin Negative     Urobilinogen, Urine 0.2     Nitrite Negative     Leukocyte Esterase Small (A)     Occult Blood Large (A)     Micro Urine Req Microscopic    Narrative:       Collected By:[id number] AZAM YEN  Indication for culture:->Altered LOC  Indication for culture:->Temp Equal to,or Greater Than 101    URINE CULTURE(NEW) [[id number]] Collected:  02/13/18 0510    Order Status:  Completed Specimen:  Urine Updated:  02/15/18 1303     Significant Indicator NEG     Source UR     Site --     Urine Culture Mixed skin hien 10-50,000 cfu/mL    Narrative:       Indication for culture:->Emergency Room Patient          Assessment:  Active Hospital Problems    Diagnosis   • *Acute respiratory failure (CMS-HCC) [J96.00]   • Dysphagia [R13.10]   • Gram-negative bacteremia [R78.81]   • Septic shock (CMS-HCC) [A41.9, R65.21]   • Pyelonephritis [N12]   • Hydronephrosis with urinary obstruction due to renal calculus [N13.2]   • History of completed stroke [Z86.73]   •  Thrombocytopenia (CMS-[facility]) [D69.6]   • Hyponatremia [E87.1]   • Chronic pain [G89.29]   • Arm swelling [M79.89]   • CHERYL (acute kidney injury) (CMS-[facility]) [N17.9]   • Metabolic acidosis [E87.2]   • Encephalopathy acute [G93.40]       Plan:  Vent dependent respiratory failure, improved  2/2 pulm edema vs mucous plugging  Intubated  Off pressors  S/p bronch with BAL 2/18  BAL cultures Cglabrata  Extubated 2/21    Pyelonephritis  Afebrile   Leukocytosis resolved  status post emergent stent placement  Urine and  Blood cxs 2/13 Ecoli  Repeat BCx 2/19 - NGTD  Continue  IV ceftriaxone and she will require PO suppression (augmentin) as the stent   may be infected  Anticipate 2 weeks of abx from date of 1st neg Bcx  PICC when able  Stop date 03/05/18    Gram neg sepsis  Due to above  On abx as above    Aspiration risk  GI to see  Failed mult swallow eval  Prior gastric bypass so Cortrak placement problematic

## 2018-02-22 NOTE — THERAPY
"Speech Language Therapy dysphagia treatment completed.   Functional Status:  Patient was seen for a dysphagia reassessment on this date. Upon arrival she was sleeping but easily aroused. Brother was at bedside during reassessment.  Patient was unable to state the date or where she was. Patient stated that she has a hard time finding the word to say sometimes following her previous stroke consistent with aphasia. PO trials consisted of nectars, purees, thin liquids, pudding, and soft solids. Laryngeal elevation was weak upon palpation.  Patient had delayed cough x1 with wet/gurgly voice with ~60% of the thin liquids, which is concerning for penetration/aspiration.  Patient reported she does not wear lower partial dentures, but uppers were in place for evaluation. Patient had prolonged mastication with soft solids not fully masticated. Min oral residue was present following trials of pudding , which cleared with a nectar thick liquid wash. She did not follow cues to a volitional swallow but followed cues for throat clear given a 1:1 model. The wet/gurgly voice was cleared with stated strategies.   Recommendations: At this time recommend initiation of a Dysphagia I/Nectar thick diet with DIRECT 1:1 supervision. SLP following.   Plan of Care: Will benefit from Speech Therapy 3 times per week  Post-Acute Therapy: To be determined.     See \"Rehab Therapy-Acute\" Patient Summary Report for complete documentation.     "

## 2018-02-22 NOTE — THERAPY
Received call from RN at this time regarding swallow evaluation. Per review of notes she has failed 3 previous swallow evaluations and was just extubated this am.  SLP services will see patient tomorrow for swallow reassessment.  Of note, patient has essentially had NO NUTRITION SOURCE since date of admit.

## 2018-02-22 NOTE — PROGRESS NOTES
[facility] Hospitalist Progress Note    Date of Service: 2018    Chief Complaint  57 y.o. female admitted 2018 with altered mental status  She has  a history of brain aneurysm with hemiplegia since  that was found to have a fever of 103 and UTI with hydronephrosis. She was transferred from [facility] to [facility] and underwent cystoscopy and ureteral stent placement by Dr. Killian    Interval Problem Update  Tolerated extubation this morning   at bedside reports patient with expressive aphasia at baseline  She was tolerating a regular diet at home    Consultants/Specialty  Critical Care. I discussed her condition with Dr. Gonda today on ICU Hot Rounds.  Infectious disease  Urology   Disposition  ICU        Review of Systems   Unable to perform ROS: Medical condition      Physical Exam  Laboratory/Imaging   Hemodynamics  No data recorded.      Pulse  Av.3  Min: 53  Max: 161 Heart Rate (Monitored): 77  NIBP: 128/63 CVP (mm Hg): 6 MM HG    Respiratory  Sosa Vent Mode: APVCMV, Rate (breaths/min): 18, PEEP/CPAP: 8, PEEP/CPAP: 8, FiO2: 30, P Peak (PIP): 20, P MEAN: 12   Respiration: 15, Pulse Oximetry: 97 %     Work Of Breathing / Effort: Vented  RUL Breath Sounds: Clear, RML Breath Sounds: Diminished, RLL Breath Sounds: Diminished, EMIGDIO Breath Sounds: Clear, LLL Breath Sounds: Crackles    Fluids    Intake/Output Summary (Last 24 hours) at 18 1647  Last data filed at 18 1200   Gross per 24 hour   Intake           1194.2 ml   Output             3045 ml   Net          -1850.8 ml       Nutrition  Orders Placed This Encounter   Procedures   • Diet NPO     Standing Status:   Standing     Number of Occurrences:   1     Order Specific Question:   Type:     Answer:   Now [1]     Order Specific Question:   Restrict to:     Answer:   Strict [1]     Physical Exam   Constitutional: She appears well-developed.   HENT:   Head: Normocephalic and atraumatic.   Mouth/Throat: No oropharyngeal exudate.   Eyes:  Right eye exhibits no discharge. Left eye exhibits no discharge. No scleral icterus.   Neck: Neck supple. No JVD present. No tracheal deviation present.   Right IJ central line   Cardiovascular: Normal rate and regular rhythm.  Exam reveals no gallop and no friction rub.    No murmur heard.  Pulmonary/Chest: No stridor. She has no wheezes. She has rales. She exhibits no tenderness.   Abdominal: Soft. She exhibits no distension. There is no tenderness. There is no rebound and no guarding.   Genitourinary:   Genitourinary Comments: Olmedo catheter   Musculoskeletal: She exhibits edema.   Right arm swelling   Neurological: She is alert. A cranial nerve deficit (expressive aphasia) is present. She exhibits abnormal muscle tone.   Rt hemiparesis at baseline per    Skin: Skin is warm and dry. She is not diaphoretic. No cyanosis or erythema. There is pallor. Nails show no clubbing.   Nursing note and vitals reviewed.      Recent Labs      02/19/18   0400  02/21/18   0456   WBC  10.7  12.4*   RBC  3.63*  3.02*   HEMOGLOBIN  10.7*  8.8*   HEMATOCRIT  31.4*  26.2*   MCV  86.5  86.8   MCH  29.5  29.1   MCHC  34.1  33.6   RDW  47.8  47.8   PLATELETCT  217  233   MPV  12.8  12.3     Recent Labs      02/18/18 2010 02/19/18   0400  02/21/18   0456   SODIUM   --   146*  141   POTASSIUM  3.3*  3.7  3.2*   CHLORIDE   --   118*  110   CO2   --   21  22   GLUCOSE   --   101*  82   BUN   --   17  9   CREATININE   --   0.37*  0.29*   CALCIUM   --   8.3*  8.2*             Recent Labs      02/20/18   0540   TRIGLYCERIDE  163*          Assessment/Plan     * Septic shock (CMS-HCC)- (present on admission)   Assessment & Plan    Present on admission  Source was pyelonephritis  resolved        Acute respiratory failure (CMS-HCC)   Assessment & Plan    With hypoxia   Tolerated extubation  RT protocol  Aspiration precautions        History of completed stroke- (present on admission)   Assessment & Plan    Hx brain aneurysm in 1994 with  resultant right hemiparesis and expressive aphasia        Hydronephrosis with urinary obstruction due to renal calculus- (present on admission)   Assessment & Plan    7mm right sided ureter stone s/p removal 2/13   Continue ceftriaxone per ID through March 5  Will order PICC line        Pyelonephritis- (present on admission)   Assessment & Plan    Blood cultures grew pan-sensitive E. coli   Had also nephrolithiasis with hydronephrosis, s/p stent placement by urology  IV rocephin per ID discussed with Dr Pittman           Hypokalemia   Assessment & Plan    Replete and monitor         Hypernatremia   Assessment & Plan    Resolved continue to monitor        Chronic pain- (present on admission)   Assessment & Plan    Continue fentanyl patch neurontin cymbalta        Encephalopathy acute- (present on admission)   Assessment & Plan    improved. Per  close to baseline        CHERYL (acute kidney injury) (CMS-HCC)- (present on admission)   Assessment & Plan    Due to dehydration and sepsis   Resolved monitor        Arm swelling- (present on admission)   Assessment & Plan    U/s negative for DVT.         Dysphagia- (present on admission)   Assessment & Plan    Unable to place feeding tube with history of gastric bypass  Since she is now extubated but will await course patient evaluation if she fails we'll need to consult GI for tube placement  Continue D5W           Quality-Core Measures   Reviewed items::  Labs reviewed and Medications reviewed  Olmedo catheter::  Critically Ill - Requiring Accurate Measurement of Urinary Output  DVT prophylaxis pharmacological::  Enoxaparin (Lovenox)

## 2018-02-22 NOTE — PROGRESS NOTES
"Pulmonary Critical Care Progress Note        Date of admission: 2/13/2018    Chief Complaint: Septic shock    History of Present Illness: \"Ms. Finch is a 57-year-old female with past medical history brain aneurysm, stroke, chronic pain who was admitted on 2/13/18 from an outside hospital for a urinary tract infection and septic shock. A CT of her abdomen demonstrated a 7 mm right ureteral calculus with severe obstructing hydro-nephrosis. She was taken to the OR for cystoscopy with stent placement, intraoperatively was found to have a large amount of purulent fluid draining from the right kidney and was hypotensive requiring vasopressors. She arrives in the ICU on vasopressors and critically ill. At this time she complains of only flank pain however is somewhat confused.\"    Review of Systems   Unable to perform ROS: Mental status change       Interval Events:  24 hour interval history reviewed   - No acute events overnight   - Neuro: confused, GCS: 14, improving   - HR: 60s-80s sinus   - BP: 110s-150s systolic   - GI: unable to start TF as tube pulled   - UOP: 4.7 L/24 hours   - Olmedo: yes   - Tm: 37.9   - Lines: CVC   - PPx: GI not indicated, DVT lovenox   - on 2L NC, IS 1000 mL   - CXR (compared to prior): improved bibasilar atx, edema    Yesterday's Events:   - Bcx 2/19 NGTD   - failed SBT yesterday but did very well today   - increase in WBC without fever, day 9 of rocephin per ID   - low Mag   - I/O (+) 13 L (1.2L in last 24 hrs)   - on CPAP this morning with RR 11-14, -700, minimal secretions   - lost FT, currently NPO, unable to get coretrak in due to coiling   - SBT with RSBI 15, NIF -40, FVC 1.2L   - follows commands, calm   - glucose better with D5 gtt    PFSH:  No change.    Physical Exam   Constitutional: She is well-developed, well-nourished, and in no distress.   HENT:   Head: Normocephalic and atraumatic.   Right Ear: External ear normal.   Left Ear: External ear normal.   Nose: Nose normal. "   Mouth/Throat: Oropharynx is clear and moist.   Eyes: Conjunctivae and EOM are normal.   Neck: Neck supple. No JVD present. No tracheal deviation present.   [facility] CVC   Cardiovascular: Normal rate, regular rhythm, normal heart sounds and intact distal pulses.    No murmur heard.  Pulmonary/Chest: Effort normal. No respiratory distress. She has decreased breath sounds. She has no wheezes. She has rales (minimal).   Abdominal: Bowel sounds are normal. She exhibits no distension. There is no tenderness.   Prior surgical scars, well healed   Musculoskeletal: She exhibits edema.   Neurological: She is alert. She is disoriented. She exhibits abnormal muscle tone.   Right sided deficits - stable. Confused, alert and oriented to self and place only   Nursing note and vitals reviewed.      Respiratory:     Pulse Oximetry: 99 % PIP 14       ImagingAvailable data reviewed As above    Recent Labs      02/20/18   0432  02/21/18   0547   ISTATAPH  7.479  7.467   ISTATAPCO2  32.1  33.7   ISTATAPO2  118*  146*   ISTATATCO2  25  25   ZADILKQ2BFE  99  99   ISTATARTHCO3  23.8  24.3   ISTATARTBE  0  1   ISTATTEMP  36.9 C  37.0 C   ISTATFIO2  40  40   ISTATSPEC  Arterial  Arterial   ISTATAPHTC  7.480  7.467   ETDABYYC3HE  117*  146*   ABG interpretation: mild respiratory alkalosis with adequate oxygenation    HemoDynamics:  Pulse: 61, Heart Rate (Monitored): 62  NIBP: 124/68  CVP (mm Hg): (!) -6 MM HG   Imaging: Available data reviewed    Echo 2/19: CONCLUSIONS  No prior study is available for comparison.   Technically difficult study - adequate information is obtained.   Mild concentric left ventricular hypertrophy.  Normal regional wall motion.  Left ventricular ejection fraction is visually estimated to be 65%.  The left atrium is normal in size.  Structurally normal mitral valve without significant stenosis or   regurgitation.  Mild aortic sclerosis without stenosis.  No aortic insufficiency.  Estimated right ventricular systolic  pressure  is 35 mmHg.  Normal pericardium without effusion.  Recent Labs      02/21/18 0456 02/22/18   0540   TROPONINI  0.03  0.03     Neuro:  GCS Total [place] Coma Score: 15  Imaging: Ct Brain Reviewed  CT of the brain on 2/13/2018:    Impression         1. No evidence of acute intracranial hemorrhage or mass lesion.    2. Postsurgical change and encephalomalacia in the left frontal lobe.       Fluids:  Intake/Output       02/20/18 0700 - 02/21/18 0659 02/21/18 0700 - 02/22/18 0659 02/22/18 0700 - 02/23/18 0659      9921-7543 1184-3794 Total 4449-4641 5445-9623 Total 3897-0388 0459-9700 Total       Intake    I.V.  1069  744 1813  526.2  600 1126.2  --  -- --    Magnesium Sulfate Volume 100 -- 100 -- -- -- -- -- --    Propofol Volume 144 144 288 26.2 -- 26.2 -- -- --    IV Volume (Lactated Ringers) 500 -- 500 -- -- -- -- -- --    IV Volume (D5 1/2NS 20K+) 325 600 925  -- -- --    Total Intake 6700 216 2535 526.2 600 1126.2 -- -- --       Output    Urine  595  -- 595  4110  625 4735  --  -- --    Number of Times Incontinent of Urine -- 775 x 775 x -- -- -- -- -- --    Indwelling Cathether -- -- -- -- 625 625 -- -- --    Void (ml) 595 -- 595 4110 -- 4110 -- -- --    Total Output 595 -- 595 4110 625 4735 -- -- --       Net I/O      -3583.8 -25 -3608.8 -- -- --           Recent Labs      02/20/18 0540  02/21/18 0456 02/22/18 0540   SODIUM   --   141  139   POTASSIUM   --   3.2*  3.4*   CHLORIDE   --   110  106   CO2   --   22  29   BUN   --   9  7*   CREATININE   --   0.29*  0.35*   MAGNESIUM  1.8  1.9  1.9   PHOSPHORUS  2.4*  2.8  3.1   CALCIUM   --   8.2*  8.4*       GI/Nutrition:  Imaging: Available data reviewed  NPO  Liver Function  Recent Labs      02/21/18   0456  02/22/18   0540   GLUCOSE  82  95       Heme:  Recent Labs      02/21/18   0456  02/22/18   0540   RBC  3.02*  3.12*   HEMOGLOBIN  8.8*  9.1*   HEMATOCRIT  26.2*  27.2*   PLATELETCT  233  281       Infectious  Disease:  No Data Recorded  Micro: antibiotics reviewed and cultures pending. Candida glabrata is noted from patient's BAL on 2/18/2018 as well as yeast from Olmedo catheter from 2/18/2018. Patient remains on Rocephin for presumed obstructive pyelonephritis. Patient also had positive blood cultures for E. coli on 2/13/2018. Repeat Bcx 2/19 NGTD  Recent Labs      02/21/18   0456  02/22/18   0540   WBC  12.4*  11.4*   NEUTSPOLYS  74.00*  74.50*   LYMPHOCYTES  15.00*  13.90*   MONOCYTES  6.10  6.90   EOSINOPHILS  2.50  2.30   BASOPHILS  0.20  0.40     Current Facility-Administered Medications   Medication Dose Frequency Provider Last Rate Last Dose   • furosemide (LASIX) injection 20 mg  20 mg Q DAY Chris Hoffmann M.D.       • dextrose 5 % and 0.45 % NaCl with KCl 20 mEq   Continuous Manuel Quintero D.O. 50 mL/hr at 02/22/18 0533     • propofol (DIPRIVAN) injection  0-80 mcg/kg/min Continuous David Pillai Jr. D.O.   Stopped at 02/21/18 1000   • Respiratory Care per Protocol   Continuous RT SEVEN Ferrer Jr..O.       • senna-docusate (PERICOLACE or SENOKOT S) 8.6-50 MG per tablet 2 Tab  2 Tab BID SEVEN Ferrer Jr..O.   Stopped at 02/18/18 2100    And   • polyethylene glycol/lytes (MIRALAX) PACKET 1 Packet  1 Packet QDAY PRN SEVEN Ferrer Jr..O.        And   • magnesium hydroxide (MILK OF MAGNESIA) suspension 30 mL  30 mL QDAY PRN SEVEN Ferrer Jr..O.        And   • bisacodyl (DULCOLAX) suppository 10 mg  10 mg QDAY PRN SEVEN Ferrer Jr..O.       • chlorhexidine (PERIDEX) 0.12 % solution 15 mL  15 mL BID SVEEN Ferrer Jr..O.   15 mL at 02/21/18 2024   • lidocaine (XYLOCAINE) 1%  injection  1-2 mL Q30 MIN PRN SEVEN Ferrer Jr..O.       • MD ALERT...Adult ICU Electrolyte Replacement per Pharmacy Protocol   pharmacy to dose David Pillai Jr., SEVEN.O.       • famotidine (PEPCID) tablet 20 mg  20 mg Q12HRS SEVEN Ferrer Jr..O.        Or   • famotidine (PEPCID) injection 20 mg   20 mg Q12HRS David Pillai Jr. D.O.   20 mg at 02/21/18 2024   • ipratropium-albuterol (DUONEB) nebulizer solution 3 mL  3 mL Q2HRS PRN (RT) SEVEN Ferrer Jr..O.       • insulin regular (HUMULIN R) injection 1-6 Units  1-6 Units Q6HRS SEVEN Ferrer Jr..O.   Stopped at 02/18/18 1445    And   • glucose 4 g chewable tablet 16 g  16 g Q15 MIN PRN SEVEN Ferrer Jr..O.        And   • dextrose 50% (D50W) injection 25 mL  25 mL Q15 MIN PRN SEVEN Ferrer Jr..O.       • fentaNYL (SUBLIMAZE) injection 25 mcg  25 mcg Q HOUR PRN SEVEN Ferrer Jr..O.        Or   • fentaNYL (SUBLIMAZE) injection 50 mcg  50 mcg Q HOUR PRN SEVEN Ferrer Jr..O.   50 mcg at 02/20/18 1622    Or   • fentaNYL (SUBLIMAZE) injection 100 mcg  100 mcg Q HOUR PRN SEVEN Ferrer Jr..O.       • oxyCODONE immediate-release (ROXICODONE) tablet 5 mg  5 mg Q4HRS PRN Roly Mathew M.D.       • cefTRIAXone (ROCEPHIN) syringe 2 g  2 g Q24HRS Pauline Pittman M.D.   2 g at 02/21/18 0842   • fentaNYL (DURAGESIC) 100 MCG/HR 1 Patch  1 Patch Q72HRS SEVEN Ferrer Jr..OBrittany   1 Patch at 02/21/18 2030   • labetalol (NORMODYNE,TRANDATE) injection 10 mg  10 mg Q4HRS PRN Cordell Owens M.D.   10 mg at 02/18/18 0840   • hydrALAZINE (APRESOLINE) injection 10 mg  10 mg Q4HRS PRN Cordell Owens M.D.   10 mg at 02/18/18 1109   • enoxaparin (LOVENOX) inj 40 mg  40 mg DAILY David Pillai Jr. D.O.   40 mg at 02/21/18 0842   • gabapentin (NEURONTIN) capsule 300 mg  300 mg TID Alberto M Francisco, M.D.   Stopped at 02/14/18 1500   • folic acid (FOLVITE) tablet 2 mg  2 mg DAILY Jairo Silva M.D.   Stopped at 02/13/18 0945   • pregabalin (LYRICA) capsule 150 mg  150 mg BID Jairo Silva M.D.   Stopped at 02/14/18 0900   • ondansetron (ZOFRAN) syringe/vial injection 4 mg  4 mg Q4HRS PRN Jairo Silva M.D.   4 mg at 02/13/18 2152   • ondansetron (ZOFRAN ODT) dispertab 4 mg  4 mg Q4HRS PRN Jairo Silva M.D.       •  promethazine (PHENERGAN) tablet 12.5-25 mg  12.5-25 mg Q4HRS PRN Jairo Silva M.D.       • promethazine (PHENERGAN) suppository 12.5-25 mg  12.5-25 mg Q4HRS PRN Jairo Silva M.D.       • prochlorperazine (COMPAZINE) injection 5-10 mg  5-10 mg Q4HRS PRN Jairo Silva M.D.       • FLUoxetine (PROZAC) capsule 20 mg  20 mg DAILY Jairo Silva M.D.   Stopped at 02/13/18 0945   • DULoxetine (CYMBALTA) capsule 60 mg  60 mg BID Jairo Silva M.D.   Stopped at 02/14/18 0900     Last reviewed on 2/13/2018  8:37 AM by Lynette Chisholm AUDREY    Quality  Measures:  Labs reviewed, Medications reviewed and Radiology images reviewed  Olmedo catheter: Critically Ill - Requiring Accurate Measurement of Urinary Output and Strict Intake and Output During Sepisis or Shock  Central line in place: Need for access    DVT Prophylaxis: Enoxaparin (Lovenox)  DVT prophylaxis - mechanical: SCDs  Ulcer prophylaxis: Yes  Antibiotics: Treating active infection/contamination beyond 24 hours perioperative coverage  Assessed for rehab: Patient was assess for and/or received rehabilitation services during this hospitalization    Assessment and plan:  Acute hypoxemic respiratory failure - intubated 2/18   - Liberated from vent 2/21/18, encourage IS/PEP   - RT/O2 protocols, limit sedatives   - Early mobilization   - Aggressive pulmonary toilet, IPV, CPT, bicarb nebs, NT suctioning as needed    Acute noncardiogenic pulmonary edema with small left greater than right pleural effusions   - improving; -3.6 L over 24 hours. Still +10L net   - allow to auto-diurese    Septic shock from urinary source and bacteremia sepsis   - S/P sepsis protocol, ongoing antibiotics   - Fluid resuscitated now hypervolemic    Acute Pyelonephritis with associated right ureteral calculus and hydronephrosis s/p cystoscopy with stent placement 2/13 (Dr. Killian)   - Continue antibiotics per infectious disease   - Urology follow-up as  outpatient   - Monitor kidney function closely    E. coli bacteremia    - antibiotics per ID    Dysphagia   - SLP evaluation today for hopeful avoidance of cortrak as GI would need to place    History of CVA with right-sided weakness - therapies   - ? Antiplatelet/statin    History of gastric bypass    Mild pulmonary hypertension   - volume control    Hypomagnesemia    - replete    Acute blood loss anemia    - monitor daily CBC   - conservative transfusion strategy    Prophylaxis: lovenox    Stable to transfer patient out of ICU today. [facility] will sign off at transfer. Please call with questions.    Discussed patient condition and risk of morbidity and/or mortality with Family, RN, RT, Pharmacy, Charge nurse / hot rounds, Patient and hospitalist.

## 2018-02-22 NOTE — PROGRESS NOTES
[facility] Hospitalist Progress Note    Date of Service: 2018    Chief Complaint  57 y.o. female admitted 2018 with altered mental status  She has  a history of brain aneurysm with hemiplegia since  that was found to have a fever of 103 and UTI with hydronephrosis. She was transferred from [facility] to [facility] and underwent cystoscopy and ureteral stent placement by Dr. Killian    Interval Problem Update    No overnight events, passed swallow eval this morning    Consultants/Specialty  Critical Care.   Infectious disease  Urology   Disposition  Transfer medical        Review of Systems   Unable to perform ROS: Medical condition      Physical Exam  Laboratory/Imaging   Hemodynamics  No data recorded.      Pulse  Av.4  Min: 53  Max: 161 Heart Rate (Monitored): 75  NIBP: 141/80 CVP (mm Hg): 6 MM HG    Respiratory      Respiration: 17, Pulse Oximetry: 96 %     Work Of Breathing / Effort: Vented  RUL Breath Sounds: Clear, RML Breath Sounds: Diminished, RLL Breath Sounds: Diminished, EMIGDIO Breath Sounds: Clear, LLL Breath Sounds: Crackles    Fluids    Intake/Output Summary (Last 24 hours) at 18 0921  Last data filed at 18 0800   Gross per 24 hour   Intake             1108 ml   Output             4655 ml   Net            -3547 ml       Nutrition  Orders Placed This Encounter   Procedures   • DIET ORDER     Standing Status:   Standing     Number of Occurrences:   1     Order Specific Question:   Diet:     Answer:   Regular [1]     Order Specific Question:   Texture/Fiber modifications:     Answer:   Dysphagia 1(Pureed)specify fluid consistency(question 6) [1]     Order Specific Question:   Consistency/Fluid modifications:     Answer:   Nectar Thick [2]     Order Specific Question:   Miscellaneous modifications:     Answer:   SLP - 1:1 Supervision by Nursing [21]     Order Specific Question:   Miscellaneous modifications:     Answer:   SLP - Deliver to Nursing Station [22]     Physical Exam    Constitutional: She appears well-developed.   HENT:   Head: Normocephalic and atraumatic.   Right Ear: External ear normal.   Left Ear: External ear normal.   Mouth/Throat: No oropharyngeal exudate.   Eyes: Conjunctivae are normal. Right eye exhibits no discharge. Left eye exhibits no discharge. No scleral icterus.   Neck: Neck supple. No JVD present. No tracheal deviation present.   Cardiovascular: Normal rate and regular rhythm.  Exam reveals no gallop and no friction rub.    No murmur heard.  Pulmonary/Chest: No respiratory distress. She has no wheezes. She has rales. She exhibits no tenderness.   Abdominal: Soft. She exhibits no distension. There is no tenderness. There is no rebound.   Musculoskeletal: She exhibits edema.   Right arm swelling   Neurological: She is alert. A cranial nerve deficit is present. She exhibits abnormal muscle tone.   Rt hemiparesis and expressive aphasia unchanged   Skin: Skin is warm and dry. She is not diaphoretic. No cyanosis or erythema. There is pallor. Nails show no clubbing.   Psychiatric: She has a normal mood and affect. Her behavior is normal.   Nursing note and vitals reviewed.      Recent Labs      02/21/18   0456  02/22/18   0540   WBC  12.4*  11.4*   RBC  3.02*  3.12*   HEMOGLOBIN  8.8*  9.1*   HEMATOCRIT  26.2*  27.2*   MCV  86.8  87.2   MCH  29.1  29.2   MCHC  33.6  33.5*   RDW  47.8  48.1   PLATELETCT  233  281   MPV  12.3  12.1     Recent Labs      02/21/18   0456  02/22/18   0540   SODIUM  141  139   POTASSIUM  3.2*  3.4*   CHLORIDE  110  106   CO2  22  29   GLUCOSE  82  95   BUN  9  7*   CREATININE  0.29*  0.35*   CALCIUM  8.2*  8.4*             Recent Labs      02/20/18   0540   TRIGLYCERIDE  163*          Assessment/Plan     * Septic shock (CMS-HCC)- (present on admission)   Assessment & Plan    Present on admission  Source was pyelonephritis  resolved        Acute respiratory failure (CMS-HCC)   Assessment & Plan    With hypoxia   Tolerated extubation on  2-21  RT protocol  Aspiration precautions        History of completed stroke- (present on admission)   Assessment & Plan    Hx brain aneurysm in 1994 with resultant right hemiparesis and expressive aphasia        Hydronephrosis with urinary obstruction due to renal calculus- (present on admission)   Assessment & Plan    7mm right sided ureter stone   s/p stenting  2/13   Continue ceftriaxone per ID through March 5   PICC line placement  Will need outpt f/u with urology        Pyelonephritis- (present on admission)   Assessment & Plan    Blood cultures grew pan-sensitive E. coli   Had also nephrolithiasis with hydronephrosis, s/p stent placement by urology  IV rocephin per ID discussed with Dr Pittman           Hypokalemia   Assessment & Plan    Replete and monitor         Chronic pain- (present on admission)   Assessment & Plan    stbale on home meds Continue fentanyl patch neurontin cymbalta        Encephalopathy acute- (present on admission)   Assessment & Plan    resolved        CHERYL (acute kidney injury) (CMS-HCC)- (present on admission)   Assessment & Plan    Due to dehydration and sepsis   Resolved renal function stable  Appears euvolemic d/c lasix, d/c IVF if tolerates diet        Dysphagia- (present on admission)   Assessment & Plan    Discussed with SLP passed swallow eval  Advance diet per ST          Quality-Core Measures   Reviewed items::  Labs reviewed and Medications reviewed  Olmedo catheter::  Critically Ill - Requiring Accurate Measurement of Urinary Output  DVT prophylaxis pharmacological::  Enoxaparin (Lovenox)      D/c central line

## 2018-02-22 NOTE — DISCHARGE PLANNING
Medical SW    Referral: Sw noted order for SNF.    Intervention: Sw met w/ pt at bedside. Pt requested Sw call her  for SNF choice.    Sw spoke to pt's  via telephone. He chose SNF at [facility] in [place], [place].    Sw completed choice form and faxed to [facility]. Sw received fax confirmation.  Sw called [facility].      Plan: Sw to assist w/ d/c planning as needed.

## 2018-02-23 ENCOUNTER — HOSPITAL ENCOUNTER (INPATIENT)
Facility: REHABILITATION | Age: 58
End: 2018-02-23
Attending: PHYSICAL MEDICINE & REHABILITATION | Admitting: PHYSICAL MEDICINE & REHABILITATION
Payer: COMMERCIAL

## 2018-02-23 ENCOUNTER — APPOINTMENT (OUTPATIENT)
Dept: RADIOLOGY | Facility: MEDICAL CENTER | Age: 58
DRG: 853 | End: 2018-02-23
Attending: INTERNAL MEDICINE
Payer: MEDICARE

## 2018-02-23 PROCEDURE — 94668 MNPJ CHEST WALL SBSQ: CPT

## 2018-02-23 PROCEDURE — 51798 US URINE CAPACITY MEASURE: CPT

## 2018-02-23 PROCEDURE — G8987 SELF CARE CURRENT STATUS: HCPCS | Mod: CK

## 2018-02-23 PROCEDURE — A9270 NON-COVERED ITEM OR SERVICE: HCPCS | Performed by: HOSPITALIST

## 2018-02-23 PROCEDURE — G8978 MOBILITY CURRENT STATUS: HCPCS | Mod: CL

## 2018-02-23 PROCEDURE — G8979 MOBILITY GOAL STATUS: HCPCS | Mod: CJ

## 2018-02-23 PROCEDURE — 700101 HCHG RX REV CODE 250: Performed by: INTERNAL MEDICINE

## 2018-02-23 PROCEDURE — 700102 HCHG RX REV CODE 250 W/ 637 OVERRIDE(OP): Performed by: HOSPITALIST

## 2018-02-23 PROCEDURE — 94669 MECHANICAL CHEST WALL OSCILL: CPT

## 2018-02-23 PROCEDURE — 770006 HCHG ROOM/CARE - MED/SURG/GYN SEMI*

## 2018-02-23 PROCEDURE — 94760 N-INVAS EAR/PLS OXIMETRY 1: CPT

## 2018-02-23 PROCEDURE — 700102 HCHG RX REV CODE 250 W/ 637 OVERRIDE(OP): Performed by: INTERNAL MEDICINE

## 2018-02-23 PROCEDURE — 302255 BARRIER CREAM MOISTURE BAZA PROTECT (ZINC) 5OZ: Performed by: HOSPITALIST

## 2018-02-23 PROCEDURE — 700111 HCHG RX REV CODE 636 W/ 250 OVERRIDE (IP): Performed by: INTERNAL MEDICINE

## 2018-02-23 PROCEDURE — 99232 SBSQ HOSP IP/OBS MODERATE 35: CPT | Performed by: HOSPITALIST

## 2018-02-23 PROCEDURE — A9270 NON-COVERED ITEM OR SERVICE: HCPCS | Performed by: INTERNAL MEDICINE

## 2018-02-23 PROCEDURE — 97535 SELF CARE MNGMENT TRAINING: CPT

## 2018-02-23 PROCEDURE — 97162 PT EVAL MOD COMPLEX 30 MIN: CPT

## 2018-02-23 PROCEDURE — G8988 SELF CARE GOAL STATUS: HCPCS | Mod: CJ

## 2018-02-23 RX ORDER — POTASSIUM CHLORIDE 20 MEQ/1
20 TABLET, EXTENDED RELEASE ORAL DAILY
Status: DISCONTINUED | OUTPATIENT
Start: 2018-02-23 | End: 2018-02-27 | Stop reason: HOSPADM

## 2018-02-23 RX ADMIN — PREGABALIN 150 MG: 150 CAPSULE ORAL at 08:52

## 2018-02-23 RX ADMIN — POTASSIUM CHLORIDE, DEXTROSE MONOHYDRATE AND SODIUM CHLORIDE: 150; 5; 450 INJECTION, SOLUTION INTRAVENOUS at 03:40

## 2018-02-23 RX ADMIN — POTASSIUM CHLORIDE 20 MEQ: 1500 TABLET, EXTENDED RELEASE ORAL at 11:21

## 2018-02-23 RX ADMIN — DULOXETINE HYDROCHLORIDE 60 MG: 60 CAPSULE, DELAYED RELEASE ORAL at 20:44

## 2018-02-23 RX ADMIN — DULOXETINE HYDROCHLORIDE 60 MG: 60 CAPSULE, DELAYED RELEASE ORAL at 08:53

## 2018-02-23 RX ADMIN — PREGABALIN 150 MG: 150 CAPSULE ORAL at 20:44

## 2018-02-23 RX ADMIN — GABAPENTIN 300 MG: 300 CAPSULE ORAL at 08:51

## 2018-02-23 RX ADMIN — OXYCODONE HYDROCHLORIDE 5 MG: 5 TABLET ORAL at 20:44

## 2018-02-23 RX ADMIN — OXYCODONE HYDROCHLORIDE 5 MG: 5 TABLET ORAL at 12:43

## 2018-02-23 RX ADMIN — STANDARDIZED SENNA CONCENTRATE AND DOCUSATE SODIUM 2 TABLET: 8.6; 5 TABLET, FILM COATED ORAL at 08:48

## 2018-02-23 RX ADMIN — ENOXAPARIN SODIUM 40 MG: 100 INJECTION SUBCUTANEOUS at 08:51

## 2018-02-23 RX ADMIN — FOLIC ACID 2 MG: 1 TABLET ORAL at 08:51

## 2018-02-23 RX ADMIN — STANDARDIZED SENNA CONCENTRATE AND DOCUSATE SODIUM 2 TABLET: 8.6; 5 TABLET, FILM COATED ORAL at 20:44

## 2018-02-23 RX ADMIN — OXYCODONE HYDROCHLORIDE 5 MG: 5 TABLET ORAL at 12:44

## 2018-02-23 RX ADMIN — CEFTRIAXONE 2 G: 2 INJECTION, POWDER, FOR SOLUTION INTRAMUSCULAR; INTRAVENOUS at 11:22

## 2018-02-23 RX ADMIN — FLUOXETINE HYDROCHLORIDE 20 MG: 20 CAPSULE ORAL at 08:48

## 2018-02-23 ASSESSMENT — ENCOUNTER SYMPTOMS
ABDOMINAL PAIN: 0
PALPITATIONS: 0
FEVER: 0
NAUSEA: 0
COUGH: 0
CONSTIPATION: 0
DIARRHEA: 0
SPUTUM PRODUCTION: 0
WEAKNESS: 1
HEADACHES: 0
VOMITING: 0
CHILLS: 0
BLURRED VISION: 0
SHORTNESS OF BREATH: 0
DOUBLE VISION: 0

## 2018-02-23 ASSESSMENT — COGNITIVE AND FUNCTIONAL STATUS - GENERAL
DRESSING REGULAR LOWER BODY CLOTHING: TOTAL
TOILETING: A LOT
WALKING IN HOSPITAL ROOM: A LOT
MOVING TO AND FROM BED TO CHAIR: UNABLE
DRESSING REGULAR UPPER BODY CLOTHING: A LITTLE
TURNING FROM BACK TO SIDE WHILE IN FLAT BAD: UNABLE
EATING MEALS: A LITTLE
DAILY ACTIVITIY SCORE: 14
CLIMB 3 TO 5 STEPS WITH RAILING: TOTAL
SUGGESTED CMS G CODE MODIFIER MOBILITY: CM
HELP NEEDED FOR BATHING: A LOT
MOVING FROM LYING ON BACK TO SITTING ON SIDE OF FLAT BED: UNABLE
PERSONAL GROOMING: A LITTLE
STANDING UP FROM CHAIR USING ARMS: A LOT
MOBILITY SCORE: 8
SUGGESTED CMS G CODE MODIFIER DAILY ACTIVITY: CK

## 2018-02-23 ASSESSMENT — PAIN SCALES - GENERAL
PAINLEVEL_OUTOF10: 8
PAINLEVEL_OUTOF10: 5
PAINLEVEL_OUTOF10: 0

## 2018-02-23 ASSESSMENT — GAIT ASSESSMENTS: GAIT LEVEL OF ASSIST: UNABLE TO PARTICIPATE

## 2018-02-23 NOTE — PROGRESS NOTES
"Report received from nurse Harper. Patient transported to room via Hospital bed. Patient appears calm and in no acute distress. Patient is A &Ox2, disoriented to time and situation. Patient oriented to room and call light. 2 RN skin check completed. Labs and orders reviewed. Assessment completed. Patient denies any pain at this time.  Plan of care discussed with patient; questions have been answered at this time. Patient needs have been met at this time. Patient educated to call when needing assistance. Call light within reach. Non-Skid socks in place. Bed locked and in the lowest position. Hourly rounding in place. BP (!) 99/64   Pulse 99   Temp 36.7 °C (98.1 °F)   Resp 18   Ht 1.727 m (5' 8\")   Wt 100.2 kg (220 lb 14.4 oz)   SpO2 94%   Breastfeeding? No   BMI 33.59 kg/m² .   "

## 2018-02-23 NOTE — PROGRESS NOTES
Report received from night shift RN to assume care. Pt resting in bed. Appears comfortable. Denies pain or discomfort. Introduced self to patient, plan of care discussed. Pt assisted to eat breakfast, assessment completed and AM medications administered. Hourly rounding in place. Bed in the lowest position, call light in reach. Will continue to monitor.

## 2018-02-23 NOTE — THERAPY
"Occupational Therapy Re-Evaluation completed.   Functional Status:  Mod A supine>sit EOB, pt is able to assist w/scooting and use of L-side body and LLE, fair sitting balance EOB, max A sit>stand 3x, then max A txf to chair (1 person assist to strong side) remained up in chair, able to use LUE to complete grooming tasks w/set up. Has expressive aphasia but able to communicate. Remained up in chair RN aware.   Plan of Care: Will benefit from Occupational Therapy 3 times per week  Discharge Recommendations:  Equipment: Will Continue to Assess for Equipment Needs. Post-acute therapy Discharge to a transitional care facility for continued skilled therapy services.    See \"Rehab Therapy-Acute\" Patient Summary Report for complete documentation.    57 yr old female admitted for ALOC, sepsis/UTI, hypotension, pt has had a complicated medical course d/t respiratory failure w/intubation. Pt is now demonstrating generalized weakness w/decreased strength in BLE, decreased postural control, endurance and activity tolerance. Per chart review pt was previously ambulatory w/ a SPC in her own and use of a power chair in the community. Her spouse assisted w/ADL's as needed d/t non-functional RUE from previous stroke in 94'. Currently recommend post acute placement prior to d/c home   "

## 2018-02-23 NOTE — CONSULTS
Physical Medicine and Rehabilitation Consultation    Date of Consultation: 2/23/2018  Consulting provider: Suresh Lopez M.D./Ph.D.  Reason for consultation: assess for acute inpatient rehab appropriateness  Chief complaint: decreased     HPI: The patient is a 57 y.o. female with a past medical history of brain aneurysm causing stroke in 1994 with right sided weakness and expressive aphasia, admitted on 2/13/2018  4:04 AM with fever and hypotension.  Patient was previously at OSH and was treated for constipation and then became febrile and had hypotension.  Patient was transferred to [facility] for higher level of care and found to be septic from urinary tract infection and hydroneprhosis.  Patient with right ureter stone of 7 mm and patient underwent stent and stone removal.  Patient's stay was complicated by AMS requiring transfer to ICU. Patient has since been extubated and back to the floor. Patient initially on broad spectrum antibiotics but has been narrowed down to Rocephin for E. Coli and will continue on antibiotics until 3/5/18.      Per report patient was previously independent in home with SPC and power chair for the community.  Her home is modified and her spouse assists with some ADLs.  Patient was evaluated by PT on 2/23/18 and found to be maxA for transfers and unable to perforrm walking.  Patient was evalluated by OT and also maxA. Patient started on dysphagia I/NTL diet by SLP.      The patient currently reports pain is controlled. She walked 3 steps with therapy today.  Per  they would like to go to SNF closer to home.  Not interested in IRF level of therapy as she will most likely not be able to do 3 hours of therapy daily quite yet.     ROS:   Comprehensive 12 point ROS was reviewed and all were negative except as noted elsewhere in this document.     PMH:  Past Medical History:   Diagnosis Date   • Brain aneurysm    • Chronic pain    • Stroke (CMS-HCC)        PSH:  Past Surgical  History:   Procedure Laterality Date   • CYSTOSCOPY STENT PLACEMENT  2/13/2018    Procedure: CYSTOSCOPY STENT PLACEMENT;  Surgeon: Monico Killian M.D.;  Location: SURGERY [facility];  Service: Urology   • GASTRIC RESECTION  2012    Duodenal Switch   • OTHER     • OTHER NEUROLOGICAL SURG         FHX:  History reviewed. No pertinent family history.    Medications:  Current Facility-Administered Medications   Medication Dose   • potassium chloride SA (Kdur) tablet 20 mEq  20 mEq   • sodium bicarbonate 8.4 % injection 2-3 mL  2-3 mL   • normal saline PF 10-20 mL  10-20 mL   • dextrose 5 % and 0.45 % NaCl with KCl 20 mEq     • Respiratory Care per Protocol     • senna-docusate (PERICOLACE or SENOKOT S) 8.6-50 MG per tablet 2 Tab  2 Tab    And   • polyethylene glycol/lytes (MIRALAX) PACKET 1 Packet  1 Packet    And   • magnesium hydroxide (MILK OF MAGNESIA) suspension 30 mL  30 mL    And   • bisacodyl (DULCOLAX) suppository 10 mg  10 mg   • ipratropium-albuterol (DUONEB) nebulizer solution 3 mL  3 mL   • oxyCODONE immediate-release (ROXICODONE) tablet 5 mg  5 mg   • cefTRIAXone (ROCEPHIN) syringe 2 g  2 g   • fentaNYL (DURAGESIC) 100 MCG/HR 1 Patch  1 Patch   • labetalol (NORMODYNE,TRANDATE) injection 10 mg  10 mg   • hydrALAZINE (APRESOLINE) injection 10 mg  10 mg   • enoxaparin (LOVENOX) inj 40 mg  40 mg   • gabapentin (NEURONTIN) capsule 300 mg  300 mg   • folic acid (FOLVITE) tablet 2 mg  2 mg   • pregabalin (LYRICA) capsule 150 mg  150 mg   • ondansetron (ZOFRAN) syringe/vial injection 4 mg  4 mg   • ondansetron (ZOFRAN ODT) dispertab 4 mg  4 mg   • promethazine (PHENERGAN) tablet 12.5-25 mg  12.5-25 mg   • promethazine (PHENERGAN) suppository 12.5-25 mg  12.5-25 mg   • prochlorperazine (COMPAZINE) injection 5-10 mg  5-10 mg   • FLUoxetine (PROZAC) capsule 20 mg  20 mg   • DULoxetine (CYMBALTA) capsule 60 mg  60 mg       Allergies:  No Known Allergies    Social Hx:  Pre-morbidly, this patient lived in a single  "level home with none steps to enter, with spouse.   Employment: Disabled  Tobacco: Denies  Drugs: Denies    Prior level of function:   Independent with SPC in home and powerchair in community    Current level of function:  The patient was evaluated by acute care Physical Therapy, Occupational Therapy and Speech Language Pathology; currently requiring maximal assistance for mobility and maximal assistance for ADLs, also with ongoing cognitive, speech and swallowing deficits.    Physical Exam:  Vitals: Blood pressure 109/66, pulse 100, temperature 37.1 °C (98.7 °F), resp. rate 20, height 1.727 m (5' 8\"), weight 100.2 kg (220 lb 14.4 oz), SpO2 95 %, not currently breastfeeding.  Gen: NAD  HEENT: NC/AT, PERRLA, moist mucous membranes  Cardio: RR mild tachycardia, no mumurs  Pulm: CTAB, with normal respiratory effort  Abd: Soft NTND, active bowel sounds  Ext: Bilateral peripheral edema, right foot drop     Mental status:  A&Ox4 (person, place, date, situation) answers questions appropriately  Speech: word finding and sometimes answer opposite    CRANIAL NERVES: CN 2-12 intact except right shoulder shrug 1/5    Motor:  LUE 4+/5, LLE 5/5   RUE 0/5, RLE 3/5 HF 3/5 KE, APF 2/5 and ADF 1/5      Sensory:   intact to light touch through out    Labs:  Recent Labs      02/21/18 0456  02/22/18   0540  02/22/18   0927   RBC  3.02*  3.12*   --    HEMOGLOBIN  8.8*  9.1*   --    HEMATOCRIT  26.2*  27.2*   --    PLATELETCT  233  281   --    PROTHROMBTM   --    --   14.2   APTT   --    --   36.2*   INR   --    --   1.13     Recent Labs      02/21/18   0456  02/22/18   0540   SODIUM  141  139   POTASSIUM  3.2*  3.4*   CHLORIDE  110  106   CO2  22  29   GLUCOSE  82  95   BUN  9  7*   CREATININE  0.29*  0.35*   CALCIUM  8.2*  8.4*     No results found for this or any previous visit (from the past 24 hour(s)).    ASSESSMENT:    Patient is a 57 y.o. female admitted with urosepsis now s/p stenting     Patient with multiple " co-morbidities(including but not limited to previous CVA due to aneurysm); with cognitive/swallowing/speech deficits and functional deficits in mobility/self-care, and Severe de-conditioning.     Pre-morbidly, this patient lived in a single level home with none steps to enter, with spouse. The patient was evaluated by acute care Physical Therapy, Occupational Therapy and Speech Language Pathology; currently requiring maximal assistance for mobility and maximal assistance for ADLs, also with ongoing speech and swallowing deficits.     After discussion of intensity of therapy (3 hours 6 days a week at IR[facility]), patient and  feel that SNF would be more appropriate and would prefer to be closer to home.   reports looking into the SNF in [place] and that will work well for them.       Thank you for allowing us to participate in her care. Please call with any questions regarding this recommendation.

## 2018-02-23 NOTE — DISCHARGE PLANNING
PMR referral from Dr. Mota.      Debility with exacerbation of residual deficit from prior stroke, ongoing medical management as well as therapy need. Anticipate post acute services to facilitate a successful transition to community home with family/outpatient support. Dr. Lopez to consult per protocol.

## 2018-02-23 NOTE — PROGRESS NOTES
[facility] Hospitalist Progress Note    Date of Service: 2018    Chief Complaint  Altered mentation    Interval Problem Update  57 year old female with history of brain aneurysm with resultant right hemiparesis and aphasia admitted for septic shock due to complicated urinary tract infection/pyelonephritis with placement of ureteral stent for nephrolithiasis.  Patient currently reports that she is feeling much better.  Denies any significant chest pain or shortness of breath and has no other complaints.      Consultants/Specialty  Pulmonary  Urology     Disposition  Skilled rehabilitation if appropriate.  Consultation placed        Review of Systems   Constitutional: Negative for chills and fever.   Eyes: Negative for blurred vision and double vision.   Respiratory: Negative for cough, sputum production and shortness of breath.    Cardiovascular: Negative for chest pain and palpitations.   Gastrointestinal: Negative for abdominal pain, constipation, diarrhea, nausea and vomiting.   Genitourinary: Negative for dysuria.   Neurological: Negative for headaches.      Physical Exam  Laboratory/Imaging   Hemodynamics  Temp (24hrs), Av.1 °C (98.8 °F), Min:37.1 °C (98.7 °F), Max:37.1 °C (98.8 °F)   Temperature: 37.1 °C (98.7 °F)  Pulse  Av.8  Min: 53  Max: 161 Heart Rate (Monitored): 94  Blood Pressure: 109/66, NIBP: 100/65 CVP (mm Hg): 5 MM HG    Respiratory      Respiration: 18, Pulse Oximetry: 94 %, O2 Daily Delivery Respiratory : Room Air with O2 Available     Given By:: Mouthpiece, PEP/CPT Method: Positive Airway Pressure Device, Work Of Breathing / Effort: Mild  RUL Breath Sounds: Clear, RML Breath Sounds: Diminished, RLL Breath Sounds: Diminished, EMIGDIO Breath Sounds: Clear, LLL Breath Sounds: Diminished    Fluids    Intake/Output Summary (Last 24 hours) at 18 0959  Last data filed at 18 0000   Gross per 24 hour   Intake              200 ml   Output             2445 ml   Net            -2245 ml        Nutrition  Orders Placed This Encounter   Procedures   • DIET ORDER     Standing Status:   Standing     Number of Occurrences:   1     Order Specific Question:   Diet:     Answer:   Regular [1]     Order Specific Question:   Texture/Fiber modifications:     Answer:   Dysphagia 1(Pureed)specify fluid consistency(question 6) [1]     Order Specific Question:   Consistency/Fluid modifications:     Answer:   Nectar Thick [2]     Order Specific Question:   Miscellaneous modifications:     Answer:   SLP - 1:1 Supervision by Nursing [21]     Order Specific Question:   Miscellaneous modifications:     Answer:   SLP - Deliver to Nursing Station [22]     Physical Exam   Constitutional: She is oriented to person, place, and time. She appears well-developed and well-nourished. No distress.   HENT:   Head: Normocephalic and atraumatic.   Eyes: Pupils are equal, round, and reactive to light.   Neck: Normal range of motion. Neck supple.   Cardiovascular: Normal rate, regular rhythm and normal heart sounds.  Exam reveals no gallop and no friction rub.    No murmur heard.  Pulmonary/Chest: Effort normal and breath sounds normal. No respiratory distress. She has no wheezes. She has no rales.   Abdominal: Soft. Bowel sounds are normal. She exhibits no distension. There is no tenderness. There is no rebound.   Musculoskeletal: Normal range of motion. She exhibits no edema.   Neurological: She is alert and oriented to person, place, and time. No cranial nerve deficit.   Baseline right hemiparesis.    Skin: Skin is warm and dry. She is not diaphoretic.       Recent Labs      02/21/18   0456  02/22/18   0540   WBC  12.4*  11.4*   RBC  3.02*  3.12*   HEMOGLOBIN  8.8*  9.1*   HEMATOCRIT  26.2*  27.2*   MCV  86.8  87.2   MCH  29.1  29.2   MCHC  33.6  33.5*   RDW  47.8  48.1   PLATELETCT  233  281   MPV  12.3  12.1     Recent Labs      02/21/18   0456  02/22/18   0540   SODIUM  141  139   POTASSIUM  3.2*  3.4*   CHLORIDE  110  106   CO2   22  29   GLUCOSE  82  95   BUN  9  7*   CREATININE  0.29*  0.35*   CALCIUM  8.2*  8.4*     Recent Labs      02/22/18   0927   APTT  36.2*   INR  1.13                  Assessment/Plan     * Septic shock (CMS-HCC)- (present on admission)   Assessment & Plan    Source of pyelonephritis and now resolved.         Acute respiratory failure (CMS-HCC)   Assessment & Plan    Improved.  Monitor.  Respiratory therapy as needed.         History of completed stroke- (present on admission)   Assessment & Plan    Hx brain aneurysm in 1994 with resultant right hemiparesis and expressive aphasia        Hydronephrosis with urinary obstruction due to renal calculus- (present on admission)   Assessment & Plan      Status post stent placement for nephrolithiasis.  Longer term antibiotics due to the same.  Will need outpatient urology follow-up        Pyelonephritis- (present on admission)   Assessment & Plan    E. Coli.  Plan for IV rocephin daily until 3/5/2018.             Hypokalemia   Assessment & Plan    Replace         Chronic pain- (present on admission)   Assessment & Plan    Stable on home medication regimen.         Encephalopathy acute- (present on admission)   Assessment & Plan    resolved        CHERYL (acute kidney injury) (CMS-HCC)- (present on admission)   Assessment & Plan    Due to dehydration and sepsis.  Resolved.  Monitor.         Dysphagia- (present on admission)   Assessment & Plan    Diet as per speech           Quality-Core Measures   Reviewed items::  Medications reviewed and Labs reviewed  Olmedo catheter::  No Olmedo  DVT prophylaxis pharmacological::  Enoxaparin (Lovenox)

## 2018-02-23 NOTE — PROGRESS NOTES
"Infectious Disease Progress Note    Author: Nataliya Jackson M.D. Date & Time of service: 2018  9:28 PM    Chief Complaint:  UTI with sepsis    Interval History:  57-year-old white female admitted with fever, altered mental status and abdominal pain due to obstructive nephrolithiasis with associated pyelonephritis   AF WBC 11.1 c/o \"hunger pains\" in stomach-has not eaten in 9 days per . Failed mult swallow evals   AF, WBC 10.7, transferred to the ICU for increased work of breathing and intubated yesterday and started on pressors overnight, opens eyes to voice   AF, no BC, awake on vent, nods no to pain, plan for SBT today, FiO2 40%, family at bedside   AF, WBC 12.4, failed SBT yesterday, repeat SBT today, on FiO2 30%, awake on the vent   AF, WBC 11.4, extubated yesterday, denies any pain or flank pain   AF no CBC somnolent transferred to floor  Labs Reviewed, Medications Reviewed and Radiology Reviewed.    Review of Systems:  Review of Systems   Constitutional: Positive for malaise/fatigue. Negative for chills and fever.   Respiratory: Negative for cough.    Gastrointestinal: Negative for abdominal pain, nausea and vomiting.   Genitourinary: Negative for dysuria.   Neurological: Positive for weakness.       Hemodynamics:  Temp (24hrs), Av.1 °C (98.8 °F), Min:37.1 °C (98.7 °F), Max:37.1 °C (98.8 °F)  Temperature: 37.1 °C (98.7 °F)  Pulse  Av.9  Min: 53  Max: 161Heart Rate (Monitored): 94  Blood Pressure: 109/66, NIBP: 100/65       Physical Exam:  Physical Exam   Constitutional: She appears well-developed. No distress.   Obese, morbid   HENT:   Head: Normocephalic and atraumatic.   edentulous   Eyes: Pupils are equal, round, and reactive to light.   Neck: Neck supple.   Cardiovascular: Normal rate.    Pulmonary/Chest: Effort normal. No respiratory distress.   Decreased BS bases   Abdominal: Soft. She exhibits no distension. There is tenderness. There is no rebound and no " guarding.   Musculoskeletal: She exhibits edema.   PICC   Neurological:   somnolent   Nursing note and vitals reviewed.      Meds:    Current Facility-Administered Medications:   •  potassium chloride SA  •  sodium bicarbonate  •  PICC Line Insertion has been implemented **AND** May use Lidocaine 1% not to exceed 3 mls for local at insertion site **AND** NOTIFY MD **AND** Tip to dwell in the superior vena cava **AND** Blood draws through PICC line; draws by RN only **AND** FLUSHING GUIDELINES WHEN IN USE **AND** normal saline PF **AND** FLUSHING GUIDELINES WHEN NOT IN USE **AND** DRESSING MAINTENANCE **AND** Change needleless pressure ports and IV tubing every 72 hours per hospital policy **AND** TUBING **AND** If there is an MD order to remove the PICC line, any RN may remove the PICC line **AND** [] PATIENT EDUCATION MATERIALS **AND** NURSING COMMUNICATION  •  dextrose 5 % and 0.45 % NaCl with KCl 20 mEq  •  Respiratory Care per Protocol  •  senna-docusate **AND** polyethylene glycol/lytes **AND** magnesium hydroxide **AND** bisacodyl  •  ipratropium-albuterol  •  oxyCODONE immediate-release  •  cefTRIAXone (ROCEPHIN) IV  •  fentaNYL  •  labetalol  •  hydrALAZINE  •  enoxaparin (LOVENOX) injection  •  gabapentin  •  folic acid  •  pregabalin  •  ondansetron  •  ondansetron  •  promethazine  •  promethazine  •  prochlorperazine  •  FLUoxetine  •  DULoxetine    Labs:  Recent Labs      18   0456  18   0540   WBC  12.4*  11.4*   RBC  3.02*  3.12*   HEMOGLOBIN  8.8*  9.1*   HEMATOCRIT  26.2*  27.2*   MCV  86.8  87.2   MCH  29.1  29.2   RDW  47.8  48.1   PLATELETCT  233  281   MPV  12.3  12.1   NEUTSPOLYS  74.00*  74.50*   LYMPHOCYTES  15.00*  13.90*   MONOCYTES  6.10  6.90   EOSINOPHILS  2.50  2.30   BASOPHILS  0.20  0.40     Recent Labs      18   0456  18   0540   SODIUM  141  139   POTASSIUM  3.2*  3.4*   CHLORIDE  110  106   CO2  22  29   GLUCOSE  82  95   BUN  9  7*     Recent Labs       02/21/18   0456  02/22/18   0540   CREATININE  0.29*  0.35*       Imaging:  Ct-abdomen-pelvis W/o    Result Date: 2/13/2018 2/13/2018 5:15 AM HISTORY/REASON FOR EXAM:  Pain Sepsis. UTI. TECHNIQUE/EXAM DESCRIPTION: CT scan of the abdomen and pelvis without contrast. Noncontrast helical scanning was obtained from the diaphragmatic domes through the pubic symphysis. Low dose optimization technique was utilized for this CT exam including automated exposure control and adjustment of the mA and/or kV according to patient size. COMPARISON: None. FINDINGS: Lung Bases: Small right pleural effusion. Bibasilar opacities. Hiatal hernia. Abdomen: Within the limits of noncontrast technique, the liver, spleen, pancreas, and adrenal glands are unremarkable in appearance. Postsurgical change in the stomach There is an obstructing 7 mm calculus in the mid right ureter with severe upstream collecting system dilatation. No intrarenal calculus. The gallbladder is surgically absent. and there is no biliary dilatation. There is no bowel wall thickening or abnormal dilatation. The abdominal aorta is normal in caliber. Pelvis: The urinary bladder is decompressed by Olmedo catheter. Small fat-containing bilateral inguinal hernias. No lymph node enlargement. No free fluid, or free air in the abdomen or pelvis. No aggressive bone lesions are seen. Mild anterolisthesis of L5 on S1 ________________________________    1. Obstructing 7 mm calculus in the mid right ureter with severe right hydronephrosis. 2. Postsurgical change from gastric bypass. Hiatal hernia. 3. Small right pleural effusion. Bibasilar opacities, likely atelectasis.    Ct-head W/o    Result Date: 2/13/2018 2/13/2018 5:15 AM HISTORY/REASON FOR EXAM:  Altered Mental Status TECHNIQUE/EXAM DESCRIPTION AND NUMBER OF VIEWS: CT of the head without contrast. The study was performed on a helical multidetector CT scanner. Contiguous 2.5 mm axial sections were obtained from the skull  base through the vertex. Up to date radiation dose reduction adjustments have been utilized to meet ALARA standards for radiation dose reduction. COMPARISON:  None available FINDINGS: There is no evidence of acute intracranial hemorrhage, mass, mass-effect or shift of midline structures. Encephalomalacia in the left frontal lobe with ex vacuo dilatation of the left lateral ventricle. Left frontal surgical clips. Ventricle size and brain parenchymal volume are appropriate for this patient's stated age. The basal cisterns are patent. There are no abnormal extra-axial fluid collections. No depressed or widely  calvarial fracture is seen. Postsurgical change in the left frontal calvarium. The visualized paranasal sinuses and temporal bone structures are aerated. ___________________________________     1. No evidence of acute intracranial hemorrhage or mass lesion. 2. Postsurgical change and encephalomalacia in the left frontal lobe.     Dx-chest-portable (1 View)    Result Date: 2/18/2018 2/18/2018 12:22 PM HISTORY/REASON FOR EXAM:  Central line readjustment. TECHNIQUE/EXAM DESCRIPTION AND NUMBER OF VIEWS: Single portable view of the chest. COMPARISON: 2/13/2018 FINDINGS: Right central venous catheter with tip in the lower SVC. Endotracheal tube with tip in the mid thoracic trachea. Diffuse interstitial prominence, left more than right. Mild bibasilar opacity. No pleural effusion. No pneumothorax. Stable cardiopericardial silhouette.     Interval intubation. Right central venous catheter was retracted with tip in the lower SVC    Dx-chest-portable (1 View)    Result Date: 2/13/2018 2/13/2018 5:01 AM HISTORY/REASON FOR EXAM:  Central line placement TECHNIQUE/EXAM DESCRIPTION AND NUMBER OF VIEWS: Single portable view of the chest. COMPARISON: 2/13/2018 4:26 AM FINDINGS: Right central venous catheter with tip in the right atrium. Mild diffuse interstitial prominence. Mild bibasilar opacities. No pleural  effusion. No pneumothorax. Stable cardiopericardial silhouette.     Right central venous catheter with tip in the right atrium.    Dx-chest-portable (1 View)    Result Date: 2018 4:19 AM HISTORY/REASON FOR EXAM:  Sepsis TECHNIQUE/EXAM DESCRIPTION AND NUMBER OF VIEWS: Single portable view of the chest. COMPARISON: None FINDINGS: Mild diffuse interstitial prominence. No pulmonary infiltrates or consolidations are noted. No pleural effusion. No pneumothorax. Normal cardiopericardial silhouette.     1. Mild diffuse interstitial prominence could relate to pulmonary edema or viral infection.    Dx-portable Fluoroscopy < 1 Hour Is The Patient Pregnant? No    Result Date: 2018 8:00 AM HISTORY/REASON FOR EXAM:  Main OR Intraoperative evaluation TECHNIQUE/EXAM DESCRIPTION AND NUMBER OF VIEWS: Fluoroscopic imaging during stent placement. COMPARISON: CT from the same day FINDINGS: 1 image was obtained in the operating room. Contrast is seen within a dilated renal collecting system. A ureteral stent is noted. A total of 12 seconds of fluoroscopy time utilized.     Intraoperative image as above described.    Ue Venous Duplex    Result Date: 2018   Upper Extremity  Venous Duplex Report  Vascular Laboratory  CONCLUSIONS  No DVT or SVT seen in the right upper extremity  ANGELINA ESCOBAR  Exam Date:     2018 12:52  Room #:     Inpatient  Priority:     Routine  Ht (in):             Wt (lb):  Ordering Physician:        ALEJANDRA WORTHY  Referring Physician:       [id number]  Sonographer:               Jeri Bear RVT  Study Type:                Complete Unilateral  Technical Quality:         Adequate  Age:    57    Gender:     F  MRN:    [id number]  :    1960      BSA:  Indications:     Swelling of Limb  CPT Codes:       08717  ICD Codes:       729.81  History:         PICC line  in the internal jugular vein, with swelling of the                    arm.  Limitations:     Bandages from PICC line on right neck.  PROCEDURES:  Right upper extremity venous duplex imaging.  The following venous structures were evaluated: internal jugular,  subclavian, axillary, brachial, radial, ulnar, cephalic and basilic veins.  Serial compression, augmentation maneuvers,  color and spectral Doppler  flow evaluations were performed.  FINDINGS:  Right upper extremity.  Complete color filling and compressibility with normal venous flow dynamics  including spontaneous flow, response to augmentation maneuvers, and  respiratory phasicity.  No echogenic material visualized.  Flow was evaluated in the contralateral subclavian vein and normal venous  flow dynamics including spontaneous flow, respiratory phasic variation and  augmentation were demonstrated.  Doyle Jimenez MD  (Electronically Signed)  Final Date:      14 February 2018                   05:07    Dx-abdomen For Tube Placement    Result Date: 2/15/2018  2/15/2018 1:59 PM HISTORY/REASON FOR EXAM:  Line evaluation. TECHNIQUE/EXAM DESCRIPTION AND NUMBER OF VIEWS:  1 view(s) of the abdomen. COMPARISON:  None. FINDINGS: Enteric tube projects over the distal esophagus. There is a nonspecific bowel gas pattern. There is a right ureteral stent. Surgical clip is seen in the right upper quadrant. Degenerative changes are seen in the spine.     Enteric tube projects over the expected level of the distal esophagus. Recommend advancement.      Micro:  Results     Procedure Component Value Units Date/Time    QUANT BRONCHIAL WASHING [[id number]]  (Abnormal) Collected:  02/18/18 1150    Order Status:  Completed Specimen:  Respirate Updated:  02/21/18 1510     Gram Stain Result --     Few WBCs.  Moderate epithelial cells.  Rare Yeast.  <5% intracellular organisms.       Significant Indicator POS (POS)     Source RESP     Site Quantitative BAL RLL     Quantitative Bronch  "Washing -- (A)     Quantitative Bronch Washing -- (A)     Yeast  4,000 cfu/mL  Probable Candida albicans       Quantitative Bronch Washing -- (A)     Yeast  10,000 cfu/mL  Probable Candida glabrata      FUNGAL CULTURE [[id number]]  (Abnormal) Collected:  02/18/18 1150    Order Status:  Completed Specimen:  Respirate Updated:  02/21/18 1510     Significant Indicator POS (POS)     Source RESP     Site Quantitative BAL RLL     Fungal Culture -- (A)     Growth noted after further incubation, see below for  organism identification.       Fungal Culture -- (A)     Candida albicans  Heavy growth       Fungal Culture -- (A)     Yeast  Heavy growth  second colony type      AFB CULTURE [[id number]] Collected:  02/18/18 1150    Order Status:  Completed Specimen:  Respirate Updated:  02/21/18 1510     Significant Indicator NEG     Source RESP     Site Quantitative BAL RLL     AFB Culture Culture in progress.     AFB Smear Results No acid fast bacilli seen.    PNEUMOCYSTIS DFA [[id number]] Collected:  02/18/18 1150    Order Status:  Completed Specimen:  Respirate Updated:  02/21/18 1510     Significant Indicator NEG     Source RESP     Site Quantitative BAL RLL     Pneumocystis Stain No Pneumocystis jiroveci (P.carinii) detected by DFA.    BLOOD CULTURE [[id number]] Collected:  02/19/18 0430    Order Status:  Completed Specimen:  Blood from Peripheral Updated:  02/20/18 0841     Significant Indicator NEG     Source BLD     Site PERIPHERAL     Blood Culture --     No Growth    Note: Blood cultures are incubated for 5 days and  are monitored continuously.Positive blood cultures  are called to the RN and reported as soon as  they are identified.      Narrative:       Collected By:[id number] DONTAE BENITEZ  Per Hospital Policy: Only change Specimen Src: to \"Line\" if  specified by physician order.    BLOOD CULTURE [[id number]] Collected:  02/19/18 0400    Order Status:  Completed Specimen:  Blood from Peripheral Updated:  02/20/18 0841     " "Significant Indicator NEG     Source BLD     Site PERIPHERAL     Blood Culture --     No Growth    Note: Blood cultures are incubated for 5 days and  are monitored continuously.Positive blood cultures  are called to the RN and reported as soon as  they are identified.      Narrative:       Collected By:[id number] DONTAE BENITEZ  Per Hospital Policy: Only change Specimen Src: to \"Line\" if  specified by physician order.    URINE CULTURE(NEW) [[id number]] Collected:  02/18/18 1245    Order Status:  Completed Specimen:  Urine from Urine, Garcia Cath Updated:  02/20/18 0553     Significant Indicator NEG     Source UR     Site URINE, GARCIA CATH     Urine Culture No growth at 48 hours.    Narrative:       Collected By:[id number] AZAM YEN  Indication for culture:->Altered LOC  Indication for culture:->Temp Equal to,or Greater Than 101    GRAM STAIN [[id number]] Collected:  02/18/18 1150    Order Status:  Completed Specimen:  Respirate Updated:  02/19/18 1714     Significant Indicator .     Source RESP     Site Quantitative BAL RLL     Gram Stain Result --     Few WBCs.  Moderate epithelial cells.  Rare Yeast.  <5% intracellular organisms.      ACID FAST STAIN [[id number]] Collected:  02/18/18 1150    Order Status:  Completed Specimen:  Respirate Updated:  02/19/18 1714     Significant Indicator NEG     Source RESP     Site Quantitative BAL RLL     AFB Smear Results No acid fast bacilli seen.    URINALYSIS [[id number]]  (Abnormal) Collected:  02/18/18 1245    Order Status:  Completed Specimen:  Urine from Urine, Garcia Cath Updated:  02/18/18 1342     Color Yellow     Character Cloudy (A)     Specific Gravity 1.015     Ph 5.0     Glucose Negative mg/dL      Ketones 15 (A) mg/dL      Protein 300 (A) mg/dL      Bilirubin Negative     Urobilinogen, Urine 0.2     Nitrite Negative     Leukocyte Esterase Small (A)     Occult Blood Large (A)     Micro Urine Req Microscopic    Narrative:       Collected By:[id number] AZAM" YOVANA  Indication for culture:->Altered LOC  Indication for culture:->Temp Equal to,or Greater Than 101          Assessment:  Active Hospital Problems    Diagnosis   • *Acute respiratory failure (CMS-HCC) [J96.00]   • Dysphagia [R13.10]   • Gram-negative bacteremia [R78.81]   • Septic shock (CMS-HCC) [A41.9, R65.21]   • Pyelonephritis [N12]   • Hydronephrosis with urinary obstruction due to renal calculus [N13.2]   • History of completed stroke [Z86.73]   • Thrombocytopenia (CMS-HCC) [D69.6]   • Hyponatremia [E87.1]   • Chronic pain [G89.29]   • Arm swelling [M79.89]   • CHERYL (acute kidney injury) (CMS-HCC) [N17.9]   • Metabolic acidosis [E87.2]   • Encephalopathy acute [G93.40]       Plan:  Pneumonitis  2/2 pulm edema vs mucous plugging vs aspiration  Extubated 2/21  Off pressors  S/p bronch with BAL 2/18  BAL cultures Cglabrata  Aspiration precautions    Pyelonephritis  Afebrile   Leukocytosis resolved  status post emergent stent placement  Urine and  Blood cxs 2/13 Ecoli  Repeat BCx 2/19 - NGTD  Continue  IV ceftriaxone and she will require PO suppression (cefdinir) as the stent may be infected  Anticipate 2 weeks of abx from date of 1st neg Bcx  PICC when able  Stop date 03/05/18    Gram neg sepsis  Due to above  On abx as above    Aspiration risk  GI to see  Failed mult swallow eval  Prior gastric bypass so Cortrak placement problematic    DW IM Dr Avalos

## 2018-02-23 NOTE — CARE PLAN
Problem: Oxygenation:  Goal: Maintain adequate oxygenation dependent on patient condition  Outcome: PROGRESSING AS EXPECTED  IPV, chest physiotherapy ordered with sodium bicarb SVN QID. Pt has a dry cough at this time. O2 titrated to RA.

## 2018-02-23 NOTE — PROGRESS NOTES
2 RN skin check completed. Redness noted to Perineum area. Swelling to BLE noted. Skin is intact. No pressure ulcers noted. No wound noted. PICC line to left arm noted. No pascal present. Waffle cushion overlay in place. Q2 turns in place. Pillows in use for support and repositioning. DCIKSON cream provided.

## 2018-02-23 NOTE — DISCHARGE PLANNING
note:  Pt agreeable togo to IRF.  MD ordered for physiatry consult.    Addendum:  IMM letter given. Explained to pt, pt signed, copy to pt and to chart.     told me that they are thinking of pt going to [facility] for rehab. However, they are willing to reconsider after talking to MD and discussing POC.  Notified Sw.

## 2018-02-23 NOTE — THERAPY
"Physical Therapy Evaluation completed.   Bed Mobility:  Supine to Sit: Moderate Assist  Transfers: Sit to Stand: Maximal Assist; multiple stand pivot transfers with max A to/from EOB to recliner  Gait: Level Of Assist: Unable to Participate       Plan of Care: Will benefit from Physical Therapy 3 times per week  Discharge Recommendations: Equipment: Will Continue to Assess for Equipment Needs. See below    Pt presents to PT for re-evaluation after recent decline in medical status while in hospital. She is able to demonstrate bed mobiltiy with mod A, EOB acitivty unsupported and sit<>stands and transfers with max A. She has old R sided deficits 2' to prior CVA (RUE more affected than RLE). However given her recent hospital stay and deconditioning her prior CVA symptoms with regards to strength and motor control are exacerbated. She will benefit from continued skilled acute PT while here with strong recommendation of continued skilled PT/placement prior to medical d/c to home given current objective findings, I PLOF with re: ambulation and general mobility, current co-morbidities and recent decline in function, and limited ability of social supports to assist with current level needed. Note that pt's home is well equipped/setup and spouse is available to assist at time of eventual d/c to home. Will continue to visit.     See \"Rehab Therapy-Acute\" Patient Summary Report for complete documentation.     "

## 2018-02-23 NOTE — CARE PLAN
Problem: Safety  Goal: Will remain free from falls    Intervention: Assess risk factors for falls  Environment is clutter free. Personal possession and bedside table near patient. Bed locked and in the lowest position. Patient educated to call when needing assistance. Call light within reach. Non-skid socks in place. Hourly rounding in place.       Problem: Skin Integrity  Goal: Risk for impaired skin integrity will decrease    Intervention: Assess risk factors for impaired skin integrity and/or pressure ulcers  Assessing and monitoring patient skin integrity. Waffle cushion overlay in place. Q2 turns in place. Pillows in use for support and repositioning.

## 2018-02-24 ENCOUNTER — APPOINTMENT (OUTPATIENT)
Dept: RADIOLOGY | Facility: MEDICAL CENTER | Age: 58
DRG: 853 | End: 2018-02-24
Attending: INTERNAL MEDICINE
Payer: MEDICARE

## 2018-02-24 LAB
ANION GAP SERPL CALC-SCNC: 4 MMOL/L (ref 0–11.9)
BACTERIA BLD CULT: NORMAL
BACTERIA BLD CULT: NORMAL
BASOPHILS # BLD AUTO: 0.4 % (ref 0–1.8)
BASOPHILS # BLD: 0.04 K/UL (ref 0–0.12)
BUN SERPL-MCNC: 9 MG/DL (ref 8–22)
CALCIUM SERPL-MCNC: 8.5 MG/DL (ref 8.5–10.5)
CHLORIDE SERPL-SCNC: 102 MMOL/L (ref 96–112)
CO2 SERPL-SCNC: 28 MMOL/L (ref 20–33)
CREAT SERPL-MCNC: 0.47 MG/DL (ref 0.5–1.4)
EOSINOPHIL # BLD AUTO: 0.31 K/UL (ref 0–0.51)
EOSINOPHIL NFR BLD: 3 % (ref 0–6.9)
ERYTHROCYTE [DISTWIDTH] IN BLOOD BY AUTOMATED COUNT: 48.5 FL (ref 35.9–50)
GLUCOSE SERPL-MCNC: 94 MG/DL (ref 65–99)
HCT VFR BLD AUTO: 23.4 % (ref 37–47)
HGB BLD-MCNC: 7.6 G/DL (ref 12–16)
IMM GRANULOCYTES # BLD AUTO: 0.13 K/UL (ref 0–0.11)
IMM GRANULOCYTES NFR BLD AUTO: 1.3 % (ref 0–0.9)
LYMPHOCYTES # BLD AUTO: 2.4 K/UL (ref 1–4.8)
LYMPHOCYTES NFR BLD: 23.4 % (ref 22–41)
MAGNESIUM SERPL-MCNC: 2 MG/DL (ref 1.5–2.5)
MCH RBC QN AUTO: 29 PG (ref 27–33)
MCHC RBC AUTO-ENTMCNC: 32.5 G/DL (ref 33.6–35)
MCV RBC AUTO: 89.3 FL (ref 81.4–97.8)
MONOCYTES # BLD AUTO: 1.2 K/UL (ref 0–0.85)
MONOCYTES NFR BLD AUTO: 11.7 % (ref 0–13.4)
NEUTROPHILS # BLD AUTO: 6.19 K/UL (ref 2–7.15)
NEUTROPHILS NFR BLD: 60.2 % (ref 44–72)
NRBC # BLD AUTO: 0 K/UL
NRBC BLD-RTO: 0 /100 WBC
PLATELET # BLD AUTO: 366 K/UL (ref 164–446)
PMV BLD AUTO: 11.9 FL (ref 9–12.9)
POTASSIUM SERPL-SCNC: 4 MMOL/L (ref 3.6–5.5)
RBC # BLD AUTO: 2.62 M/UL (ref 4.2–5.4)
SIGNIFICANT IND 70042: NORMAL
SIGNIFICANT IND 70042: NORMAL
SITE SITE: NORMAL
SITE SITE: NORMAL
SODIUM SERPL-SCNC: 134 MMOL/L (ref 135–145)
SOURCE SOURCE: NORMAL
SOURCE SOURCE: NORMAL
TROPONIN I SERPL-MCNC: 0.02 NG/ML (ref 0–0.04)
WBC # BLD AUTO: 10.3 K/UL (ref 4.8–10.8)

## 2018-02-24 PROCEDURE — A9270 NON-COVERED ITEM OR SERVICE: HCPCS | Performed by: INTERNAL MEDICINE

## 2018-02-24 PROCEDURE — 700102 HCHG RX REV CODE 250 W/ 637 OVERRIDE(OP): Performed by: HOSPITALIST

## 2018-02-24 PROCEDURE — 83735 ASSAY OF MAGNESIUM: CPT

## 2018-02-24 PROCEDURE — 99232 SBSQ HOSP IP/OBS MODERATE 35: CPT | Performed by: HOSPITALIST

## 2018-02-24 PROCEDURE — 700111 HCHG RX REV CODE 636 W/ 250 OVERRIDE (IP): Performed by: INTERNAL MEDICINE

## 2018-02-24 PROCEDURE — 94760 N-INVAS EAR/PLS OXIMETRY 1: CPT

## 2018-02-24 PROCEDURE — A9270 NON-COVERED ITEM OR SERVICE: HCPCS | Performed by: HOSPITALIST

## 2018-02-24 PROCEDURE — 85025 COMPLETE CBC W/AUTO DIFF WBC: CPT

## 2018-02-24 PROCEDURE — 84484 ASSAY OF TROPONIN QUANT: CPT

## 2018-02-24 PROCEDURE — 700102 HCHG RX REV CODE 250 W/ 637 OVERRIDE(OP): Performed by: INTERNAL MEDICINE

## 2018-02-24 PROCEDURE — 770006 HCHG ROOM/CARE - MED/SURG/GYN SEMI*

## 2018-02-24 PROCEDURE — 80048 BASIC METABOLIC PNL TOTAL CA: CPT

## 2018-02-24 PROCEDURE — 94668 MNPJ CHEST WALL SBSQ: CPT

## 2018-02-24 PROCEDURE — 700101 HCHG RX REV CODE 250: Performed by: INTERNAL MEDICINE

## 2018-02-24 RX ADMIN — DULOXETINE HYDROCHLORIDE 60 MG: 60 CAPSULE, DELAYED RELEASE ORAL at 20:57

## 2018-02-24 RX ADMIN — FENTANYL 1 PATCH: 100 PATCH, EXTENDED RELEASE TRANSDERMAL at 21:21

## 2018-02-24 RX ADMIN — PREGABALIN 150 MG: 150 CAPSULE ORAL at 08:17

## 2018-02-24 RX ADMIN — ENOXAPARIN SODIUM 40 MG: 100 INJECTION SUBCUTANEOUS at 08:15

## 2018-02-24 RX ADMIN — FLUOXETINE HYDROCHLORIDE 20 MG: 20 CAPSULE ORAL at 08:17

## 2018-02-24 RX ADMIN — POTASSIUM CHLORIDE 20 MEQ: 1500 TABLET, EXTENDED RELEASE ORAL at 08:17

## 2018-02-24 RX ADMIN — POTASSIUM CHLORIDE, DEXTROSE MONOHYDRATE AND SODIUM CHLORIDE: 150; 5; 450 INJECTION, SOLUTION INTRAVENOUS at 04:57

## 2018-02-24 RX ADMIN — CEFTRIAXONE 2 G: 2 INJECTION, POWDER, FOR SOLUTION INTRAMUSCULAR; INTRAVENOUS at 08:17

## 2018-02-24 RX ADMIN — PREGABALIN 150 MG: 150 CAPSULE ORAL at 20:58

## 2018-02-24 RX ADMIN — DULOXETINE HYDROCHLORIDE 60 MG: 60 CAPSULE, DELAYED RELEASE ORAL at 08:17

## 2018-02-24 RX ADMIN — STANDARDIZED SENNA CONCENTRATE AND DOCUSATE SODIUM 2 TABLET: 8.6; 5 TABLET, FILM COATED ORAL at 08:17

## 2018-02-24 RX ADMIN — FOLIC ACID 2 MG: 1 TABLET ORAL at 08:17

## 2018-02-24 ASSESSMENT — ENCOUNTER SYMPTOMS
FEVER: 0
CHILLS: 0
COUGH: 0
PALPITATIONS: 0
NAUSEA: 0
ABDOMINAL PAIN: 0
BLURRED VISION: 0
DIARRHEA: 0
DOUBLE VISION: 0
HEADACHES: 0
SHORTNESS OF BREATH: 0
SPUTUM PRODUCTION: 0
VOMITING: 0
WEAKNESS: 1
CONSTIPATION: 0

## 2018-02-24 ASSESSMENT — PAIN SCALES - GENERAL
PAINLEVEL_OUTOF10: 1
PAINLEVEL_OUTOF10: 1
PAINLEVEL_OUTOF10: 0

## 2018-02-24 NOTE — PROGRESS NOTES
Report received. Assumed care, assessment complete. Pt is A & O x 4 with expressive aphasia.  Pt medicated for pain per MAR. Fall precautions and appropriate signs in place.  RN extension number provided. Pt educated regarding fall precautions. Bed alarm refused with education. Pt denies any additional needs at this time. Call light within reach.

## 2018-02-24 NOTE — CARE PLAN
Problem: Respiratory:  Goal: Respiratory status will improve    Intervention: Administer and titrate oxygen therapy  Pt titrated to RA,  in place to monitor saturations.      Problem: Venous Thromboembolism (VTW)/Deep Vein Thrombosis (DVT) Prevention:  Goal: Patient will participate in Venous Thrombosis (VTE)/Deep Vein Thrombosis (DVT)Prevention Measures    Intervention: Assess and monitor for anticoagulation complications  Lovenox administered per MAR.

## 2018-02-24 NOTE — DISCHARGE PLANNING
Per Dr. Lopez consult:    After discussion of intensity of therapy (3 hours 6 days a week at IRF), patient and  feel that SNF would be more appropriate and would prefer to be closer to home.   reports looking into the SNF in [place] and that will work well for them.

## 2018-02-24 NOTE — CARE PLAN
Problem: Nutritional:  Goal: Achieve adequate nutritional intake  Patient will safely tolerate PO diet and consume 50% of meals.   Outcome: PROGRESSING AS EXPECTED  Patient stated she is taking approximately 50% of meals, but wants solid foods. She stated she understands why she is on current diet for aspiration precautions.

## 2018-02-24 NOTE — CARE PLAN
Problem: Discharge Barriers/Planning  Goal: Patient's continuum of care needs will be met    Intervention: Collaborate with Transitional Care Team and Interdisciplinary Team to meet discharge needs  PT expected to DC to Rehab, pt prefers rehab in [place]       Problem: Mobility  Goal: Risk for activity intolerance will decrease    Intervention: Encourage patient to increase activity level in collaboration with Interdisciplinary Team  PT/OT working with pt, pt able to transfer to chair with 1-2 person assist

## 2018-02-24 NOTE — CARE PLAN
Problem: Skin Integrity  Goal: Risk for impaired skin integrity will decrease    Intervention: Implement precautions to protect skin integrity in collaboration with the interdisciplinary team  Assess pt's skin Q shift. Provide lotion for dry skin. Encourage nutrition.

## 2018-02-24 NOTE — CARE PLAN
Problem: Oxygenation:  Goal: Maintain adequate oxygenation dependent on patient condition    Intervention: Levels of oxygenation will improve to baseline  Respiratory Therapy Update    Interdisciplinary Plan of Care-Goals (Indications)  Obstructive Ventilatory Defect or Pulmonary Disease without Obvious Obstruction: History / Diagnosis (02/22/18 1926)  Bronchopulmonary Hygiene Indications: Evidence of Retained Secretions (02/23/18 1949)  Hyperinflation Protocol Indications: Restrictive Lung Disorder / Consolidation (02/23/18 1949)  Interdisciplinary Plan of Care-Outcomes   Bronchopulmonary Hygiene Outcome: Resolution / Improvement in Recent Chest X-Ray;Patient at Stable Baseline (02/23/18 1949)  Hyperinflation Protocol Goals/Outcome: Improvement in Repeat CXR;Greater Than 60% of Predicted I.S. Volume x 24 hrs (02/23/18 1949)    #PEP/CPT (Manual) Initial: Initial (02/22/18 1150)     #SVN Performed: Yes (02/22/18 1926)    Cough: Non Productive (02/23/18 1633)  Sputum Amount: Moderate (02/23/18 1110)  Sputum Color: Unable to Evaluate (02/22/18 1926)  Sputum Consistency: Unable to Evaluate (02/22/18 1926)               O2 (FiO2): 21 (02/22/18 1926)  O2 (LPM): 1 (02/23/18 1949)  O2 Daily Delivery Respiratory : Nasal Cannula (02/23/18 1949)    Breath Sounds  Pre/Post Intervention: Pre Intervention Assessment (02/23/18 1949)  RUL Breath Sounds: Clear;Diminished (02/23/18 1949)  RML Breath Sounds: Diminished (02/23/18 1949)  RLL Breath Sounds: Diminished (02/23/18 1949)  EMIGDIO Breath Sounds: Clear;Diminished (02/23/18 1949)  LLL Breath Sounds: Diminished (02/23/18 1949)    Therapy changed to pt remains on pep, svn tx wasn't given meds not up from pharmacy   Events/Summary/Plan: pep and IS done pt doing well  (02/23/18 1949)

## 2018-02-24 NOTE — PROGRESS NOTES
"Infectious Disease Progress Note    Author: Pauline Ptitman M.D. Date & Time of service: 2018  9:28 PM    Chief Complaint:  UTI with sepsis    Interval History:  57-year-old white female admitted with fever, altered mental status and abdominal pain due to obstructive nephrolithiasis with associated pyelonephritis   AF WBC 11.1 c/o \"hunger pains\" in stomach-has not eaten in 9 days per . Failed mult swallow evals   AF, WBC 10.7, transferred to the ICU for increased work of breathing and intubated yesterday and started on pressors overnight, opens eyes to voice   AF, no BC, awake on vent, nods no to pain, plan for SBT today, FiO2 40%, family at bedside   AF, WBC 12.4, failed SBT yesterday, repeat SBT today, on FiO2 30%, awake on the vent   AF, WBC 11.4, extubated yesterday, denies any pain or flank pain   AF no CBC somnolent transferred to floor   AF, WBC 10.3, feeling better, had a BM this am, tolerating abx without issues  Labs Reviewed, Medications Reviewed and Radiology Reviewed.    Review of Systems:  Review of Systems   Constitutional: Positive for malaise/fatigue. Negative for chills and fever.   Respiratory: Negative for cough.    Gastrointestinal: Negative for abdominal pain, nausea and vomiting.   Genitourinary: Negative for dysuria.   Neurological: Positive for weakness.       Hemodynamics:  Temp (24hrs), Av.7 °C (98 °F), Min:36.4 °C (97.6 °F), Max:37 °C (98.6 °F)  Temperature: 36.4 °C (97.6 °F)  Pulse  Av  Min: 53  Max: 161Heart Rate (Monitored): 96  Blood Pressure: 108/58       Physical Exam:  Physical Exam   Constitutional: She appears well-developed. No distress.   Obese, morbid   HENT:   Head: Normocephalic and atraumatic.   edentulous   Eyes: Pupils are equal, round, and reactive to light.   Neck: Neck supple.   Cardiovascular: Normal rate.    Pulmonary/Chest: Effort normal. No respiratory distress.   Decreased BS bases   Abdominal: Soft. She exhibits " no distension. There is tenderness. There is no rebound and no guarding.   Musculoskeletal: She exhibits edema.   LUE PICC   Neurological: She is alert.   Nursing note and vitals reviewed.      Meds:    Current Facility-Administered Medications:   •  potassium chloride SA  •  sodium bicarbonate  •  PICC Line Insertion has been implemented **AND** May use Lidocaine 1% not to exceed 3 mls for local at insertion site **AND** NOTIFY MD **AND** Tip to dwell in the superior vena cava **AND** Blood draws through PICC line; draws by RN only **AND** FLUSHING GUIDELINES WHEN IN USE **AND** normal saline PF **AND** FLUSHING GUIDELINES WHEN NOT IN USE **AND** DRESSING MAINTENANCE **AND** Change needleless pressure ports and IV tubing every 72 hours per hospital policy **AND** TUBING **AND** If there is an MD order to remove the PICC line, any RN may remove the PICC line **AND** [] PATIENT EDUCATION MATERIALS **AND** NURSING COMMUNICATION  •  dextrose 5 % and 0.45 % NaCl with KCl 20 mEq  •  Respiratory Care per Protocol  •  senna-docusate **AND** polyethylene glycol/lytes **AND** magnesium hydroxide **AND** bisacodyl  •  ipratropium-albuterol  •  oxyCODONE immediate-release  •  cefTRIAXone (ROCEPHIN) IV  •  fentaNYL  •  labetalol  •  hydrALAZINE  •  enoxaparin (LOVENOX) injection  •  folic acid  •  pregabalin  •  ondansetron  •  ondansetron  •  promethazine  •  promethazine  •  prochlorperazine  •  FLUoxetine  •  DULoxetine    Labs:  Recent Labs      18   0540  18   0500   WBC  11.4*  10.3   RBC  3.12*  2.62*   HEMOGLOBIN  9.1*  7.6*   HEMATOCRIT  27.2*  23.4*   MCV  87.2  89.3   MCH  29.2  29.0   RDW  48.1  48.5   PLATELETCT  281  366   MPV  12.1  11.9   NEUTSPOLYS  74.50*  60.20   LYMPHOCYTES  13.90*  23.40   MONOCYTES  6.90  11.70   EOSINOPHILS  2.30  3.00   BASOPHILS  0.40  0.40     Recent Labs      18   0540  18   0500   SODIUM  139  134*   POTASSIUM  3.4*  4.0   CHLORIDE  106  102   CO2  29   28   GLUCOSE  95  94   BUN  7*  9     Recent Labs      02/22/18   0540  02/24/18   0500   CREATININE  0.35*  0.47*       Imaging:  Ct-abdomen-pelvis W/o    Result Date: 2/13/2018 2/13/2018 5:15 AM HISTORY/REASON FOR EXAM:  Pain Sepsis. UTI. TECHNIQUE/EXAM DESCRIPTION: CT scan of the abdomen and pelvis without contrast. Noncontrast helical scanning was obtained from the diaphragmatic domes through the pubic symphysis. Low dose optimization technique was utilized for this CT exam including automated exposure control and adjustment of the mA and/or kV according to patient size. COMPARISON: None. FINDINGS: Lung Bases: Small right pleural effusion. Bibasilar opacities. Hiatal hernia. Abdomen: Within the limits of noncontrast technique, the liver, spleen, pancreas, and adrenal glands are unremarkable in appearance. Postsurgical change in the stomach There is an obstructing 7 mm calculus in the mid right ureter with severe upstream collecting system dilatation. No intrarenal calculus. The gallbladder is surgically absent. and there is no biliary dilatation. There is no bowel wall thickening or abnormal dilatation. The abdominal aorta is normal in caliber. Pelvis: The urinary bladder is decompressed by Olmedo catheter. Small fat-containing bilateral inguinal hernias. No lymph node enlargement. No free fluid, or free air in the abdomen or pelvis. No aggressive bone lesions are seen. Mild anterolisthesis of L5 on S1 ________________________________    1. Obstructing 7 mm calculus in the mid right ureter with severe right hydronephrosis. 2. Postsurgical change from gastric bypass. Hiatal hernia. 3. Small right pleural effusion. Bibasilar opacities, likely atelectasis.    Ct-head W/o    Result Date: 2/13/2018 2/13/2018 5:15 AM HISTORY/REASON FOR EXAM:  Altered Mental Status TECHNIQUE/EXAM DESCRIPTION AND NUMBER OF VIEWS: CT of the head without contrast. The study was performed on a helical multidetector CT scanner. Contiguous  2.5 mm axial sections were obtained from the skull base through the vertex. Up to date radiation dose reduction adjustments have been utilized to meet ALARA standards for radiation dose reduction. COMPARISON:  None available FINDINGS: There is no evidence of acute intracranial hemorrhage, mass, mass-effect or shift of midline structures. Encephalomalacia in the left frontal lobe with ex vacuo dilatation of the left lateral ventricle. Left frontal surgical clips. Ventricle size and brain parenchymal volume are appropriate for this patient's stated age. The basal cisterns are patent. There are no abnormal extra-axial fluid collections. No depressed or widely  calvarial fracture is seen. Postsurgical change in the left frontal calvarium. The visualized paranasal sinuses and temporal bone structures are aerated. ___________________________________     1. No evidence of acute intracranial hemorrhage or mass lesion. 2. Postsurgical change and encephalomalacia in the left frontal lobe.     Dx-chest-portable (1 View)    Result Date: 2/18/2018 2/18/2018 12:22 PM HISTORY/REASON FOR EXAM:  Central line readjustment. TECHNIQUE/EXAM DESCRIPTION AND NUMBER OF VIEWS: Single portable view of the chest. COMPARISON: 2/13/2018 FINDINGS: Right central venous catheter with tip in the lower SVC. Endotracheal tube with tip in the mid thoracic trachea. Diffuse interstitial prominence, left more than right. Mild bibasilar opacity. No pleural effusion. No pneumothorax. Stable cardiopericardial silhouette.     Interval intubation. Right central venous catheter was retracted with tip in the lower SVC    Dx-chest-portable (1 View)    Result Date: 2/13/2018 2/13/2018 5:01 AM HISTORY/REASON FOR EXAM:  Central line placement TECHNIQUE/EXAM DESCRIPTION AND NUMBER OF VIEWS: Single portable view of the chest. COMPARISON: 2/13/2018 4:26 AM FINDINGS: Right central venous catheter with tip in the right atrium. Mild diffuse interstitial  prominence. Mild bibasilar opacities. No pleural effusion. No pneumothorax. Stable cardiopericardial silhouette.     Right central venous catheter with tip in the right atrium.    Dx-chest-portable (1 View)    Result Date: 2018 4:19 AM HISTORY/REASON FOR EXAM:  Sepsis TECHNIQUE/EXAM DESCRIPTION AND NUMBER OF VIEWS: Single portable view of the chest. COMPARISON: None FINDINGS: Mild diffuse interstitial prominence. No pulmonary infiltrates or consolidations are noted. No pleural effusion. No pneumothorax. Normal cardiopericardial silhouette.     1. Mild diffuse interstitial prominence could relate to pulmonary edema or viral infection.    Dx-portable Fluoroscopy < 1 Hour Is The Patient Pregnant? No    Result Date: 2018 8:00 AM HISTORY/REASON FOR EXAM:  Main OR Intraoperative evaluation TECHNIQUE/EXAM DESCRIPTION AND NUMBER OF VIEWS: Fluoroscopic imaging during stent placement. COMPARISON: CT from the same day FINDINGS: 1 image was obtained in the operating room. Contrast is seen within a dilated renal collecting system. A ureteral stent is noted. A total of 12 seconds of fluoroscopy time utilized.     Intraoperative image as above described.    Ue Venous Duplex    Result Date: 2018   Upper Extremity  Venous Duplex Report  Vascular Laboratory  CONCLUSIONS  No DVT or SVT seen in the right upper extremity  ANGELINA ESCOBAR  Exam Date:     2018 12:52  Room #:     Inpatient  Priority:     Routine  Ht (in):             Wt (lb):  Ordering Physician:        ALEJANDRA WORTHY  Referring Physician:       [id number]  Sonographer:               Jeri Bear RVT  Study Type:                Complete Unilateral  Technical Quality:         Adequate  Age:    57    Gender:     F  MRN:    [id number]  :    1960      BSA:  Indications:     Swelling of Limb  CPT Codes:       83719  ICD  Codes:       729.81  History:         PICC line in the internal jugular vein, with swelling of the                    arm.  Limitations:     Bandages from PICC line on right neck.  PROCEDURES:  Right upper extremity venous duplex imaging.  The following venous structures were evaluated: internal jugular,  subclavian, axillary, brachial, radial, ulnar, cephalic and basilic veins.  Serial compression, augmentation maneuvers,  color and spectral Doppler  flow evaluations were performed.  FINDINGS:  Right upper extremity.  Complete color filling and compressibility with normal venous flow dynamics  including spontaneous flow, response to augmentation maneuvers, and  respiratory phasicity.  No echogenic material visualized.  Flow was evaluated in the contralateral subclavian vein and normal venous  flow dynamics including spontaneous flow, respiratory phasic variation and  augmentation were demonstrated.  Doyle Jimenez MD  (Electronically Signed)  Final Date:      14 February 2018                   05:07    Dx-abdomen For Tube Placement    Result Date: 2/15/2018  2/15/2018 1:59 PM HISTORY/REASON FOR EXAM:  Line evaluation. TECHNIQUE/EXAM DESCRIPTION AND NUMBER OF VIEWS:  1 view(s) of the abdomen. COMPARISON:  None. FINDINGS: Enteric tube projects over the distal esophagus. There is a nonspecific bowel gas pattern. There is a right ureteral stent. Surgical clip is seen in the right upper quadrant. Degenerative changes are seen in the spine.     Enteric tube projects over the expected level of the distal esophagus. Recommend advancement.      Micro:  Results     Procedure Component Value Units Date/Time    QUANT BRONCHIAL WASHING [[id number]]  (Abnormal) Collected:  02/18/18 1150    Order Status:  Completed Specimen:  Respirate Updated:  02/21/18 1510     Gram Stain Result --     Few WBCs.  Moderate epithelial cells.  Rare Yeast.  <5% intracellular organisms.       Significant Indicator POS (POS)     Source RESP     Site  "Quantitative BAL RLL     Quantitative Bronch Washing -- (A)     Quantitative Bronch Washing -- (A)     Yeast  4,000 cfu/mL  Probable Candida albicans       Quantitative Bronch Washing -- (A)     Yeast  10,000 cfu/mL  Probable Candida glabrata      FUNGAL CULTURE [[id number]]  (Abnormal) Collected:  02/18/18 1150    Order Status:  Completed Specimen:  Respirate Updated:  02/21/18 1510     Significant Indicator POS (POS)     Source RESP     Site Quantitative BAL RLL     Fungal Culture -- (A)     Growth noted after further incubation, see below for  organism identification.       Fungal Culture -- (A)     Candida albicans  Heavy growth       Fungal Culture -- (A)     Yeast  Heavy growth  second colony type      AFB CULTURE [[id number]] Collected:  02/18/18 1150    Order Status:  Completed Specimen:  Respirate Updated:  02/21/18 1510     Significant Indicator NEG     Source RESP     Site Quantitative BAL RLL     AFB Culture Culture in progress.     AFB Smear Results No acid fast bacilli seen.    PNEUMOCYSTIS DFA [[id number]] Collected:  02/18/18 1150    Order Status:  Completed Specimen:  Respirate Updated:  02/21/18 1510     Significant Indicator NEG     Source RESP     Site Quantitative BAL RLL     Pneumocystis Stain No Pneumocystis jiroveci (P.carinii) detected by DFA.    BLOOD CULTURE [[id number]] Collected:  02/19/18 0430    Order Status:  Completed Specimen:  Blood from Peripheral Updated:  02/20/18 0841     Significant Indicator NEG     Source BLD     Site PERIPHERAL     Blood Culture --     No Growth    Note: Blood cultures are incubated for 5 days and  are monitored continuously.Positive blood cultures  are called to the RN and reported as soon as  they are identified.      Narrative:       Collected By:[id number] DONTAE BENITEZ  Per Hospital Policy: Only change Specimen Src: to \"Line\" if  specified by physician order.    BLOOD CULTURE [[id number]] Collected:  02/19/18 0400    Order Status:  Completed Specimen:  Blood " "from Peripheral Updated:  02/20/18 0841     Significant Indicator NEG     Source BLD     Site PERIPHERAL     Blood Culture --     No Growth    Note: Blood cultures are incubated for 5 days and  are monitored continuously.Positive blood cultures  are called to the RN and reported as soon as  they are identified.      Narrative:       Collected By:[id number] DONTAE BENITEZ  Per Hospital Policy: Only change Specimen Src: to \"Line\" if  specified by physician order.    URINE CULTURE(NEW) [[id number]] Collected:  02/18/18 1245    Order Status:  Completed Specimen:  Urine from Urine, Garcia Cath Updated:  02/20/18 0553     Significant Indicator NEG     Source UR     Site URINE, GARCIA CATH     Urine Culture No growth at 48 hours.    Narrative:       Collected By:[id number] AZAM YEN  Indication for culture:->Altered LOC  Indication for culture:->Temp Equal to,or Greater Than 101    GRAM STAIN [[id number]] Collected:  02/18/18 1150    Order Status:  Completed Specimen:  Respirate Updated:  02/19/18 1714     Significant Indicator .     Source RESP     Site Quantitative BAL RLL     Gram Stain Result --     Few WBCs.  Moderate epithelial cells.  Rare Yeast.  <5% intracellular organisms.      ACID FAST STAIN [[id number]] Collected:  02/18/18 1150    Order Status:  Completed Specimen:  Respirate Updated:  02/19/18 1714     Significant Indicator NEG     Source RESP     Site Quantitative BAL RLL     AFB Smear Results No acid fast bacilli seen.    URINALYSIS [[id number]]  (Abnormal) Collected:  02/18/18 1245    Order Status:  Completed Specimen:  Urine from Urine, Garcia Cath Updated:  02/18/18 1342     Color Yellow     Character Cloudy (A)     Specific Gravity 1.015     Ph 5.0     Glucose Negative mg/dL      Ketones 15 (A) mg/dL      Protein 300 (A) mg/dL      Bilirubin Negative     Urobilinogen, Urine 0.2     Nitrite Negative     Leukocyte Esterase Small (A)     Occult Blood Large (A)     Micro Urine Req Microscopic    Narrative:       " Collected By:[id number] AZAM YEN  Indication for culture:->Altered LOC  Indication for culture:->Temp Equal to,or Greater Than 101          Assessment:  Active Hospital Problems    Diagnosis   • *Acute respiratory failure (CMS-HCC) [J96.00]   • Dysphagia [R13.10]   • Gram-negative bacteremia [R78.81]   • Septic shock (CMS-HCC) [A41.9, R65.21]   • Pyelonephritis [N12]   • Hydronephrosis with urinary obstruction due to renal calculus [N13.2]   • History of completed stroke [Z86.73]   • Thrombocytopenia (CMS-HCC) [D69.6]   • Hyponatremia [E87.1]   • Chronic pain [G89.29]   • Arm swelling [M79.89]   • CHERYL (acute kidney injury) (CMS-HCC) [N17.9]   • Metabolic acidosis [E87.2]   • Encephalopathy acute [G93.40]       Plan:  Pneumonitis  2/2 pulm edema vs mucous plugging vs aspiration  Extubated 2/21  Off pressors  S/p bronch with BAL 2/18  BAL cultures Cglabrata  Aspiration precautions    Pyelonephritis  Afebrile   Leukocytosis resolved  status post emergent stent placement  Urine and  Blood cxs 2/13 Ecoli  Repeat BCx 2/19 - NGTD  Continue  IV ceftriaxone and she will require PO suppression (cefdinir) as the stent may be infected  Anticipate 2 weeks of abx from date of 1st neg Bcx  PICC when able  Stop date 03/05/18 follow by PO cefdinir given retained stent    Gram neg sepsis  Due to above  On abx as above    Aspiration risk  GI to see  Failed mult swallow eval  Prior gastric bypass so Cortrak placement problematic    SNF placement    DW IM Dr Avalos

## 2018-02-25 ENCOUNTER — APPOINTMENT (OUTPATIENT)
Dept: RADIOLOGY | Facility: MEDICAL CENTER | Age: 58
DRG: 853 | End: 2018-02-25
Attending: INTERNAL MEDICINE
Payer: MEDICARE

## 2018-02-25 PROCEDURE — 700111 HCHG RX REV CODE 636 W/ 250 OVERRIDE (IP): Performed by: INTERNAL MEDICINE

## 2018-02-25 PROCEDURE — A9270 NON-COVERED ITEM OR SERVICE: HCPCS | Performed by: HOSPITALIST

## 2018-02-25 PROCEDURE — 700102 HCHG RX REV CODE 250 W/ 637 OVERRIDE(OP): Performed by: HOSPITALIST

## 2018-02-25 PROCEDURE — 770006 HCHG ROOM/CARE - MED/SURG/GYN SEMI*

## 2018-02-25 PROCEDURE — 99232 SBSQ HOSP IP/OBS MODERATE 35: CPT | Performed by: HOSPITALIST

## 2018-02-25 RX ADMIN — POTASSIUM CHLORIDE 20 MEQ: 1500 TABLET, EXTENDED RELEASE ORAL at 09:59

## 2018-02-25 RX ADMIN — FLUOXETINE HYDROCHLORIDE 20 MG: 20 CAPSULE ORAL at 09:58

## 2018-02-25 RX ADMIN — DULOXETINE HYDROCHLORIDE 60 MG: 60 CAPSULE, DELAYED RELEASE ORAL at 20:36

## 2018-02-25 RX ADMIN — FOLIC ACID 2 MG: 1 TABLET ORAL at 09:58

## 2018-02-25 RX ADMIN — DULOXETINE HYDROCHLORIDE 60 MG: 60 CAPSULE, DELAYED RELEASE ORAL at 09:58

## 2018-02-25 RX ADMIN — CEFTRIAXONE 2 G: 2 INJECTION, POWDER, FOR SOLUTION INTRAMUSCULAR; INTRAVENOUS at 09:58

## 2018-02-25 RX ADMIN — PREGABALIN 150 MG: 150 CAPSULE ORAL at 20:36

## 2018-02-25 RX ADMIN — PREGABALIN 150 MG: 150 CAPSULE ORAL at 09:58

## 2018-02-25 RX ADMIN — ENOXAPARIN SODIUM 40 MG: 100 INJECTION SUBCUTANEOUS at 09:59

## 2018-02-25 ASSESSMENT — ENCOUNTER SYMPTOMS
VOMITING: 0
PALPITATIONS: 0
SPUTUM PRODUCTION: 0
SHORTNESS OF BREATH: 0
BLURRED VISION: 0
DOUBLE VISION: 0
FEVER: 0
CONSTIPATION: 0
CHILLS: 0
DIARRHEA: 0
COUGH: 0
NAUSEA: 0
HEADACHES: 0
ABDOMINAL PAIN: 0

## 2018-02-25 ASSESSMENT — PAIN SCALES - GENERAL
PAINLEVEL_OUTOF10: 0
PAINLEVEL_OUTOF10: 0

## 2018-02-25 NOTE — PROGRESS NOTES
[facility] Hospitalist Progress Note    Date of Service: 2018    Chief Complaint  Altered mentation    Interval Problem Update  57 year old female with history of brain aneurysm with resultant right hemiparesis and aphasia admitted for septic shock due to complicated urinary tract infection/pyelonephritis with placement of ureteral stent for nephrolithiasis.  Patient reports feeling fine today.  No significant chest pain or shortness of breath.  No other complaints.      Consultants/Specialty  Pulmonary  Urology     Disposition  Skilled rehabilitation if appropriate.  Consultation placed        Review of Systems   Constitutional: Negative for chills and fever.   Eyes: Negative for blurred vision and double vision.   Respiratory: Negative for cough, sputum production and shortness of breath.    Cardiovascular: Negative for chest pain and palpitations.   Gastrointestinal: Negative for abdominal pain, constipation, diarrhea, nausea and vomiting.   Genitourinary: Negative for dysuria.   Neurological: Negative for headaches.      Physical Exam  Laboratory/Imaging   Hemodynamics  Temp (24hrs), Av.9 °C (98.4 °F), Min:36.2 °C (97.1 °F), Max:37.5 °C (99.5 °F)   Temperature: 36.2 °C (97.1 °F)  Pulse  Av.2  Min: 53  Max: 161    Blood Pressure: 112/58      Respiratory      Respiration: 17, Pulse Oximetry: 91 %, O2 Daily Delivery Respiratory : Silicone Nasal Cannula     PEP/CPT Method: Positive Airway Pressure Device, Work Of Breathing / Effort: Mild  RUL Breath Sounds: Clear, RML Breath Sounds: Diminished, RLL Breath Sounds: Diminished, EMIGDIO Breath Sounds: Clear, LLL Breath Sounds: Diminished    Fluids    Intake/Output Summary (Last 24 hours) at 18 0651  Last data filed at 18 0600   Gross per 24 hour   Intake              720 ml   Output              600 ml   Net              120 ml       Nutrition  Orders Placed This Encounter   Procedures   • DIET ORDER     Standing Status:   Standing     Number of  Occurrences:   1     Order Specific Question:   Diet:     Answer:   Regular [1]     Order Specific Question:   Texture/Fiber modifications:     Answer:   Dysphagia 1(Pureed)specify fluid consistency(question 6) [1]     Order Specific Question:   Consistency/Fluid modifications:     Answer:   Nectar Thick [2]     Order Specific Question:   Miscellaneous modifications:     Answer:   SLP - 1:1 Supervision by Nursing [21]     Order Specific Question:   Miscellaneous modifications:     Answer:   SLP - Deliver to Nursing Station [22]     Physical Exam   Constitutional: She is oriented to person, place, and time. She appears well-developed and well-nourished. No distress.   HENT:   Head: Normocephalic and atraumatic.   Eyes: Pupils are equal, round, and reactive to light.   Neck: Normal range of motion. Neck supple.   Cardiovascular: Normal rate, regular rhythm and normal heart sounds.  Exam reveals no gallop and no friction rub.    No murmur heard.  Pulmonary/Chest: Effort normal and breath sounds normal. No respiratory distress. She has no wheezes. She has no rales.   Abdominal: Soft. Bowel sounds are normal. She exhibits no distension. There is no tenderness. There is no rebound.   Musculoskeletal: Normal range of motion. She exhibits no edema.   Neurological: She is alert and oriented to person, place, and time. No cranial nerve deficit.   Baseline right hemiparesis.    Skin: Skin is warm and dry. She is not diaphoretic.       Recent Labs      02/24/18   0500   WBC  10.3   RBC  2.62*   HEMOGLOBIN  7.6*   HEMATOCRIT  23.4*   MCV  89.3   MCH  29.0   MCHC  32.5*   RDW  48.5   PLATELETCT  366   MPV  11.9     Recent Labs      02/24/18   0500   SODIUM  134*   POTASSIUM  4.0   CHLORIDE  102   CO2  28   GLUCOSE  94   BUN  9   CREATININE  0.47*   CALCIUM  8.5     Recent Labs      02/22/18   0927   APTT  36.2*   INR  1.13                  Assessment/Plan     * Septic shock (CMS-HCC)- (present on admission)   Assessment & Plan     Resolved.  Status post treatment of pyelonephritis.         Acute respiratory failure (CMS-HCC)   Assessment & Plan    Resolve and stable.  Monitor.         History of completed stroke- (present on admission)   Assessment & Plan    Hx brain aneurysm in 1994 with resultant right hemiparesis and expressive aphasia.  STable.  PT and OT for the same.         Hydronephrosis with urinary obstruction due to renal calculus- (present on admission)   Assessment & Plan    Resolved with placement of stent.  Monitor. Due to stent, does have a longer course of antibiotic therapy for pyelonephritis.         Pyelonephritis- (present on admission)   Assessment & Plan    E. Coli.  Rocephin daily until 3/5/2018.  Will benefit from SNF placement for this, as she also has significant debility.  SW for this.            Hypokalemia   Assessment & Plan    Resolved after replacement        Chronic pain- (present on admission)   Assessment & Plan    Controlled with current medication regimen.          Encephalopathy acute- (present on admission)   Assessment & Plan    resolved        CHERYL (acute kidney injury) (CMS-HCC)- (present on admission)   Assessment & Plan    Due to dehydration and sepsis.  Resolved.  Monitor.         Dysphagia- (present on admission)   Assessment & Plan    Diet as per speech           Quality-Core Measures   Reviewed items::  Medications reviewed and Labs reviewed  Olmedo catheter::  No Olmedo  DVT prophylaxis pharmacological::  Enoxaparin (Lovenox)

## 2018-02-25 NOTE — CARE PLAN
Problem: Discharge Barriers/Planning  Goal: Patient's continuum of care needs will be met  Patient prefers to go to a SNF in [place] to be closer to home.    Problem: Skin Integrity  Goal: Risk for impaired skin integrity will decrease  Outcome: PROGRESSING AS EXPECTED    Intervention: Implement precautions to protect skin integrity in collaboration with the interdisciplinary team  Waffle overlay in place. Q2 hour turns implemented.

## 2018-02-25 NOTE — PROGRESS NOTES
[facility] Hospitalist Progress Note    Date of Service: 2018    Chief Complaint  Altered mentation    Interval Problem Update  57 year old female with history of brain aneurysm with resultant right hemiparesis and aphasia admitted for septic shock due to complicated urinary tract infection/pyelonephritis with placement of ureteral stent for nephrolithiasis.  Patient states that she is feeling better today.  No current chest pain or shortness of breath.    Consultants/Specialty  Pulmonary  Urology     Disposition  Skilled rehabilitation if appropriate.  Consultation placed        Review of Systems   Constitutional: Negative for chills and fever.   Eyes: Negative for blurred vision and double vision.   Respiratory: Negative for cough, sputum production and shortness of breath.    Cardiovascular: Negative for chest pain and palpitations.   Gastrointestinal: Negative for abdominal pain, constipation, diarrhea, nausea and vomiting.   Genitourinary: Negative for dysuria.   Neurological: Negative for headaches.      Physical Exam  Laboratory/Imaging   Hemodynamics  Temp (24hrs), Av.8 °C (98.3 °F), Min:36.4 °C (97.6 °F), Max:37 °C (98.6 °F)   Temperature: 36.8 °C (98.2 °F)  Pulse  Av.1  Min: 53  Max: 161 Heart Rate (Monitored): 96  Blood Pressure: 116/67      Respiratory      Respiration: 16, Pulse Oximetry: 98 %, O2 Daily Delivery Respiratory : Silicone Nasal Cannula     PEP/CPT Method: Positive Airway Pressure Device, Work Of Breathing / Effort: Mild  RUL Breath Sounds: Clear, RML Breath Sounds: Clear;Diminished, RLL Breath Sounds: Diminished, EMIGDIO Breath Sounds: Clear, LLL Breath Sounds: Diminished    Fluids    Intake/Output Summary (Last 24 hours) at 18 1631  Last data filed at 18 1007   Gross per 24 hour   Intake              600 ml   Output              600 ml   Net                0 ml       Nutrition  Orders Placed This Encounter   Procedures   • DIET ORDER     Standing Status:   Standing      Number of Occurrences:   1     Order Specific Question:   Diet:     Answer:   Regular [1]     Order Specific Question:   Texture/Fiber modifications:     Answer:   Dysphagia 1(Pureed)specify fluid consistency(question 6) [1]     Order Specific Question:   Consistency/Fluid modifications:     Answer:   Nectar Thick [2]     Order Specific Question:   Miscellaneous modifications:     Answer:   SLP - 1:1 Supervision by Nursing [21]     Order Specific Question:   Miscellaneous modifications:     Answer:   SLP - Deliver to Nursing Station [22]     Physical Exam   Constitutional: She is oriented to person, place, and time. She appears well-developed and well-nourished. No distress.   HENT:   Head: Normocephalic and atraumatic.   Eyes: Pupils are equal, round, and reactive to light.   Neck: Normal range of motion. Neck supple.   Cardiovascular: Normal rate, regular rhythm and normal heart sounds.  Exam reveals no gallop and no friction rub.    No murmur heard.  Pulmonary/Chest: Effort normal and breath sounds normal. No respiratory distress. She has no wheezes. She has no rales.   Abdominal: Soft. Bowel sounds are normal. She exhibits no distension. There is no tenderness. There is no rebound.   Musculoskeletal: Normal range of motion. She exhibits no edema.   Neurological: She is alert and oriented to person, place, and time. No cranial nerve deficit.   Baseline right hemiparesis.    Skin: Skin is warm and dry. She is not diaphoretic.       Recent Labs      02/22/18   0540  02/24/18   0500   WBC  11.4*  10.3   RBC  3.12*  2.62*   HEMOGLOBIN  9.1*  7.6*   HEMATOCRIT  27.2*  23.4*   MCV  87.2  89.3   MCH  29.2  29.0   MCHC  33.5*  32.5*   RDW  48.1  48.5   PLATELETCT  281  366   MPV  12.1  11.9     Recent Labs      02/22/18   0540  02/24/18   0500   SODIUM  139  134*   POTASSIUM  3.4*  4.0   CHLORIDE  106  102   CO2  29  28   GLUCOSE  95  94   BUN  7*  9   CREATININE  0.35*  0.47*   CALCIUM  8.4*  8.5     Recent Labs       02/22/18   0927   APTT  36.2*   INR  1.13                  Assessment/Plan     * Septic shock (CMS-HCC)- (present on admission)   Assessment & Plan    Resolved.  Status post treatment of pyelonephritis.         Acute respiratory failure (CMS-HCC)   Assessment & Plan    Improved.  Monitor.  Respiratory therapy as needed.         History of completed stroke- (present on admission)   Assessment & Plan    Hx brain aneurysm in 1994 with resultant right hemiparesis and expressive aphasia        Hydronephrosis with urinary obstruction due to renal calculus- (present on admission)   Assessment & Plan      Status post stent placement for nephrolithiasis.  Continue antibiotics.  Outpatient urology follow-up         Pyelonephritis- (present on admission)   Assessment & Plan    E. Coli.  Plan for IV rocephin daily until 3/5/2018.             Hypokalemia   Assessment & Plan    Resolved after replacement        Chronic pain- (present on admission)   Assessment & Plan    Stable on home medication regimen.         Encephalopathy acute- (present on admission)   Assessment & Plan    resolved        CHERYL (acute kidney injury) (CMS-HCC)- (present on admission)   Assessment & Plan    Due to dehydration and sepsis.  Resolved.  Monitor.         Dysphagia- (present on admission)   Assessment & Plan    Diet as per speech           Quality-Core Measures   Reviewed items::  Medications reviewed and Labs reviewed  Olmedo catheter::  No Olmedo  DVT prophylaxis pharmacological::  Enoxaparin (Lovenox)

## 2018-02-25 NOTE — PROGRESS NOTES
Assumed care of patient this AM. Report received from night RN.  Assessment completed. Patient is awake, alert, and oriented x 4.  Denies pain at this time.  Patient is a two person assist for turns and transfers.  All needs met. Bed in lowest, locked position; strip alarm and tread socks on. Call light within reach.

## 2018-02-26 ENCOUNTER — APPOINTMENT (OUTPATIENT)
Dept: RADIOLOGY | Facility: MEDICAL CENTER | Age: 58
DRG: 853 | End: 2018-02-26
Attending: INTERNAL MEDICINE
Payer: MEDICARE

## 2018-02-26 LAB
ALBUMIN SERPL BCP-MCNC: 2.9 G/DL (ref 3.2–4.9)
ALBUMIN/GLOB SERPL: 0.7 G/DL
ALP SERPL-CCNC: 85 U/L (ref 30–99)
ALT SERPL-CCNC: 9 U/L (ref 2–50)
ANION GAP SERPL CALC-SCNC: 6 MMOL/L (ref 0–11.9)
AST SERPL-CCNC: 12 U/L (ref 12–45)
BASOPHILS # BLD AUTO: 1.2 % (ref 0–1.8)
BASOPHILS # BLD: 0.11 K/UL (ref 0–0.12)
BILIRUB SERPL-MCNC: 0.2 MG/DL (ref 0.1–1.5)
BUN SERPL-MCNC: 7 MG/DL (ref 8–22)
CALCIUM SERPL-MCNC: 8.5 MG/DL (ref 8.5–10.5)
CHLORIDE SERPL-SCNC: 101 MMOL/L (ref 96–112)
CO2 SERPL-SCNC: 27 MMOL/L (ref 20–33)
CREAT SERPL-MCNC: 0.46 MG/DL (ref 0.5–1.4)
EOSINOPHIL # BLD AUTO: 0.22 K/UL (ref 0–0.51)
EOSINOPHIL NFR BLD: 2.4 % (ref 0–6.9)
ERYTHROCYTE [DISTWIDTH] IN BLOOD BY AUTOMATED COUNT: 47.8 FL (ref 35.9–50)
GLOBULIN SER CALC-MCNC: 4 G/DL (ref 1.9–3.5)
GLUCOSE SERPL-MCNC: 112 MG/DL (ref 65–99)
HCT VFR BLD AUTO: 29.8 % (ref 37–47)
HGB BLD-MCNC: 9.5 G/DL (ref 12–16)
IMM GRANULOCYTES # BLD AUTO: 0.07 K/UL (ref 0–0.11)
IMM GRANULOCYTES NFR BLD AUTO: 0.8 % (ref 0–0.9)
LYMPHOCYTES # BLD AUTO: 2.33 K/UL (ref 1–4.8)
LYMPHOCYTES NFR BLD: 25.6 % (ref 22–41)
MAGNESIUM SERPL-MCNC: 1.8 MG/DL (ref 1.5–2.5)
MCH RBC QN AUTO: 28.7 PG (ref 27–33)
MCHC RBC AUTO-ENTMCNC: 31.9 G/DL (ref 33.6–35)
MCV RBC AUTO: 90 FL (ref 81.4–97.8)
MONOCYTES # BLD AUTO: 1.18 K/UL (ref 0–0.85)
MONOCYTES NFR BLD AUTO: 13 % (ref 0–13.4)
NEUTROPHILS # BLD AUTO: 5.19 K/UL (ref 2–7.15)
NEUTROPHILS NFR BLD: 57 % (ref 44–72)
NRBC # BLD AUTO: 0 K/UL
NRBC BLD-RTO: 0 /100 WBC
PLATELET # BLD AUTO: 457 K/UL (ref 164–446)
PMV BLD AUTO: 11.8 FL (ref 9–12.9)
POTASSIUM SERPL-SCNC: 3.9 MMOL/L (ref 3.6–5.5)
PROT SERPL-MCNC: 6.9 G/DL (ref 6–8.2)
RBC # BLD AUTO: 3.31 M/UL (ref 4.2–5.4)
SODIUM SERPL-SCNC: 134 MMOL/L (ref 135–145)
TROPONIN I SERPL-MCNC: <0.01 NG/ML (ref 0–0.04)
WBC # BLD AUTO: 9.1 K/UL (ref 4.8–10.8)

## 2018-02-26 PROCEDURE — 770006 HCHG ROOM/CARE - MED/SURG/GYN SEMI*

## 2018-02-26 PROCEDURE — 84484 ASSAY OF TROPONIN QUANT: CPT

## 2018-02-26 PROCEDURE — 80053 COMPREHEN METABOLIC PANEL: CPT

## 2018-02-26 PROCEDURE — 700111 HCHG RX REV CODE 636 W/ 250 OVERRIDE (IP): Performed by: INTERNAL MEDICINE

## 2018-02-26 PROCEDURE — 700102 HCHG RX REV CODE 250 W/ 637 OVERRIDE(OP): Performed by: HOSPITALIST

## 2018-02-26 PROCEDURE — 83735 ASSAY OF MAGNESIUM: CPT

## 2018-02-26 PROCEDURE — 97530 THERAPEUTIC ACTIVITIES: CPT

## 2018-02-26 PROCEDURE — A9270 NON-COVERED ITEM OR SERVICE: HCPCS | Performed by: INTERNAL MEDICINE

## 2018-02-26 PROCEDURE — 99232 SBSQ HOSP IP/OBS MODERATE 35: CPT | Performed by: HOSPITALIST

## 2018-02-26 PROCEDURE — 700101 HCHG RX REV CODE 250: Performed by: INTERNAL MEDICINE

## 2018-02-26 PROCEDURE — 92526 ORAL FUNCTION THERAPY: CPT

## 2018-02-26 PROCEDURE — A9270 NON-COVERED ITEM OR SERVICE: HCPCS | Performed by: HOSPITALIST

## 2018-02-26 PROCEDURE — 700102 HCHG RX REV CODE 250 W/ 637 OVERRIDE(OP): Performed by: INTERNAL MEDICINE

## 2018-02-26 PROCEDURE — 85025 COMPLETE CBC W/AUTO DIFF WBC: CPT

## 2018-02-26 RX ADMIN — FLUOXETINE HYDROCHLORIDE 20 MG: 20 CAPSULE ORAL at 08:29

## 2018-02-26 RX ADMIN — DULOXETINE HYDROCHLORIDE 60 MG: 60 CAPSULE, DELAYED RELEASE ORAL at 08:28

## 2018-02-26 RX ADMIN — PREGABALIN 150 MG: 150 CAPSULE ORAL at 08:28

## 2018-02-26 RX ADMIN — POTASSIUM CHLORIDE 20 MEQ: 1500 TABLET, EXTENDED RELEASE ORAL at 08:29

## 2018-02-26 RX ADMIN — ENOXAPARIN SODIUM 40 MG: 100 INJECTION SUBCUTANEOUS at 08:27

## 2018-02-26 RX ADMIN — STANDARDIZED SENNA CONCENTRATE AND DOCUSATE SODIUM 1 TABLET: 8.6; 5 TABLET, FILM COATED ORAL at 19:50

## 2018-02-26 RX ADMIN — PREGABALIN 150 MG: 150 CAPSULE ORAL at 19:49

## 2018-02-26 RX ADMIN — DULOXETINE HYDROCHLORIDE 60 MG: 60 CAPSULE, DELAYED RELEASE ORAL at 19:50

## 2018-02-26 RX ADMIN — FOLIC ACID 2 MG: 1 TABLET ORAL at 08:29

## 2018-02-26 RX ADMIN — POTASSIUM CHLORIDE, DEXTROSE MONOHYDRATE AND SODIUM CHLORIDE: 150; 5; 450 INJECTION, SOLUTION INTRAVENOUS at 07:22

## 2018-02-26 RX ADMIN — CEFTRIAXONE 2 G: 2 INJECTION, POWDER, FOR SOLUTION INTRAMUSCULAR; INTRAVENOUS at 08:30

## 2018-02-26 ASSESSMENT — GAIT ASSESSMENTS: GAIT LEVEL OF ASSIST: UNABLE TO PARTICIPATE

## 2018-02-26 ASSESSMENT — COGNITIVE AND FUNCTIONAL STATUS - GENERAL
STANDING UP FROM CHAIR USING ARMS: A LOT
SUGGESTED CMS G CODE MODIFIER MOBILITY: CM
MOVING TO AND FROM BED TO CHAIR: UNABLE
WALKING IN HOSPITAL ROOM: TOTAL
CLIMB 3 TO 5 STEPS WITH RAILING: TOTAL
TURNING FROM BACK TO SIDE WHILE IN FLAT BAD: UNABLE
MOVING FROM LYING ON BACK TO SITTING ON SIDE OF FLAT BED: UNABLE
MOBILITY SCORE: 7

## 2018-02-26 ASSESSMENT — ENCOUNTER SYMPTOMS
FEVER: 0
HEADACHES: 0
WEAKNESS: 1
NAUSEA: 0
SPUTUM PRODUCTION: 0
CONSTIPATION: 0
DIARRHEA: 0
COUGH: 0
ABDOMINAL PAIN: 0
BLURRED VISION: 0
PALPITATIONS: 0
DOUBLE VISION: 0
CHILLS: 0
VOMITING: 0
SHORTNESS OF BREATH: 0

## 2018-02-26 ASSESSMENT — PAIN SCALES - GENERAL
PAINLEVEL_OUTOF10: 1
PAINLEVEL_OUTOF10: 0
PAINLEVEL_OUTOF10: 1
PAINLEVEL_OUTOF10: 0

## 2018-02-26 NOTE — DISCHARGE PLANNING
note:  Per Anthony, [facility] does not have ST and may not be appropriate for SNF at [facility]. Pt apparently have been declined.     Talked to pt and .  feels they can forgoe the ST.    and pt do not want to go to any rehab facility here in [place] since they want to go home.  said that someone at [facility] said that they will accept pt. They talked to Nikky Underwood but no extention number.     Pt is on Dysphagia I with nectar thick fluid.  Will ask ST to reeval. Paged.     Talked to Ayla at [facility]. They are still evaluating and will  call me with determination.  They have swing beds and pt is being evaluated for that.      said that the goal is for pt to get back on her feet. They do not feel that pt will be able to regain her deficits since the CVA was in 1994.     Per Rakel PEÑA, she is having a difficult time faxing or emailing the referral but she is trying again.     Addendum:  Ayla at [facility] confirmed that she received the referral. She will forward the referral to Pratima Underwood at [facility] for the swing rehab bed. They will screen pt for admission and then call me back for determination.

## 2018-02-26 NOTE — PROGRESS NOTES
[facility] Hospitalist Progress Note    Date of Service: 2018    Chief Complaint  Altered mentation    Interval Problem Update  57 year old female with history of brain aneurysm with resultant right hemiparesis and aphasia admitted for septic shock due to complicated urinary tract infection/pyelonephritis with placement of ureteral stent for nephrolithiasis.  Patient states that she is feeling okay today.  No acute complaints.  No chest pain or shortness of breath.  No other complaints reported.     Consultants/Specialty  Pulmonary  Urology     Disposition  Skilled rehabilitation if appropriate.  Consultation placed        Review of Systems   Constitutional: Negative for chills and fever.   Eyes: Negative for blurred vision and double vision.   Respiratory: Negative for cough, sputum production and shortness of breath.    Cardiovascular: Negative for chest pain and palpitations.   Gastrointestinal: Negative for abdominal pain, constipation, diarrhea, nausea and vomiting.   Genitourinary: Negative for dysuria.   Neurological: Negative for headaches.      Physical Exam  Laboratory/Imaging   Hemodynamics  Temp (24hrs), Av.1 °C (98.7 °F), Min:36.7 °C (98 °F), Max:37.4 °C (99.3 °F)   Temperature: 37.1 °C (98.8 °F)  Pulse  Av.4  Min: 53  Max: 161    Blood Pressure: 110/70      Respiratory      Respiration: 17, Pulse Oximetry: 95 %     Work Of Breathing / Effort: Mild  RUL Breath Sounds: Clear, RML Breath Sounds: Diminished, RLL Breath Sounds: Diminished, EMIGDIO Breath Sounds: Clear, LLL Breath Sounds: Diminished    Fluids    Intake/Output Summary (Last 24 hours) at 18 1034  Last data filed at 18 1000   Gross per 24 hour   Intake                0 ml   Output              580 ml   Net             -580 ml       Nutrition  Orders Placed This Encounter   Procedures   • DIET ORDER     Standing Status:   Standing     Number of Occurrences:   1     Order Specific Question:   Diet:     Answer:   Regular [1]      Order Specific Question:   Texture/Fiber modifications:     Answer:   Dysphagia 1(Pureed)specify fluid consistency(question 6) [1]     Order Specific Question:   Consistency/Fluid modifications:     Answer:   Nectar Thick [2]     Order Specific Question:   Miscellaneous modifications:     Answer:   SLP - 1:1 Supervision by Nursing [21]     Order Specific Question:   Miscellaneous modifications:     Answer:   SLP - Deliver to Nursing Station [22]     Physical Exam   Constitutional: She is oriented to person, place, and time. She appears well-developed and well-nourished. No distress.   HENT:   Head: Normocephalic and atraumatic.   Eyes: Pupils are equal, round, and reactive to light.   Neck: Normal range of motion. Neck supple.   Cardiovascular: Normal rate, regular rhythm and normal heart sounds.  Exam reveals no gallop and no friction rub.    No murmur heard.  Pulmonary/Chest: Effort normal and breath sounds normal. No respiratory distress. She has no wheezes. She has no rales.   Abdominal: Soft. Bowel sounds are normal. She exhibits no distension. There is no tenderness. There is no rebound.   Musculoskeletal: Normal range of motion. She exhibits no edema.   Neurological: She is alert and oriented to person, place, and time. No cranial nerve deficit.   Baseline right hemiparesis.    Skin: Skin is warm and dry. She is not diaphoretic.       Recent Labs      02/24/18   0500  02/26/18   0400   WBC  10.3  9.1   RBC  2.62*  3.31*   HEMOGLOBIN  7.6*  9.5*   HEMATOCRIT  23.4*  29.8*   MCV  89.3  90.0   MCH  29.0  28.7   MCHC  32.5*  31.9*   RDW  48.5  47.8   PLATELETCT  366  457*   MPV  11.9  11.8     Recent Labs      02/24/18   0500  02/26/18   0400   SODIUM  134*  134*   POTASSIUM  4.0  3.9   CHLORIDE  102  101   CO2  28  27   GLUCOSE  94  112*   BUN  9  7*   CREATININE  0.47*  0.46*   CALCIUM  8.5  8.5                      Assessment/Plan     * Septic shock (CMS-[facility])- (present on admission)   Assessment & Plan     Resolved.  Status post treatment of pyelonephritis.         Acute respiratory failure (CMS-HCC)   Assessment & Plan    Resolve and stable.  Monitor.         History of completed stroke- (present on admission)   Assessment & Plan    Hx brain aneurysm in 1994 with resultant right hemiparesis and expressive aphasia.  Chronic and stable.  PT ongoing.  SLP evaluation for dietary changes.         Hydronephrosis with urinary obstruction due to renal calculus- (present on admission)   Assessment & Plan    Resolved with placement of stent.  Monitor. Due to stent, does have a longer course of antibiotic therapy for pyelonephritis.         Pyelonephritis- (present on admission)   Assessment & Plan    E. Coli.  Rocephin daily until 3/5/2018.  Awaiting SNF availability in [place].         Hypokalemia   Assessment & Plan    Resolved after replacement        Chronic pain- (present on admission)   Assessment & Plan    Controlled with current medication regimen.          Encephalopathy acute- (present on admission)   Assessment & Plan    resolved        CHERYL (acute kidney injury) (CMS-HCC)- (present on admission)   Assessment & Plan    Resolved, avoid nephrotoxins.         Dysphagia- (present on admission)   Assessment & Plan    Diet as per speech           Quality-Core Measures   Reviewed items::  Medications reviewed and Labs reviewed  Olmedo catheter::  No Olmedo  DVT prophylaxis pharmacological::  Enoxaparin (Lovenox)

## 2018-02-26 NOTE — DISCHARGE PLANNING
Kenneth Aguila at [facility], they did not receive referral.  Referral re faxed to Ayla at [phone].  Ayla is aware patient is ready for discharge today.

## 2018-02-26 NOTE — CARE PLAN
Problem: Safety  Goal: Will remain free from falls  Outcome: PROGRESSING AS EXPECTED  Pt educated on fall risks, refuses bed alarm. Pt family at bedside. Pt calls appropriately. Bed in lowest and locked position, call light within reach.     Problem: Skin Integrity  Goal: Risk for impaired skin integrity will decrease  Outcome: PROGRESSING AS EXPECTED  Pt turns self in bed. Waffle overlay in place, barrier cream used.

## 2018-02-26 NOTE — DISCHARGE PLANNING
Spoke with Ayla at [facility], she will have the DON of there swing bed review referral, and she will advise.  Per Ayla they do not have speech therapy.

## 2018-02-26 NOTE — DISCHARGE PLANNING
note:  Updates given to  and pt. They were appreciative.    I talked to ST who said that pt will probably resolve swallowing problem in a week. ST feels that pt will be ok without any ST at the SNF in [place].     Addendum: @1400  LVM for Ayla at [facility] for their determination.     Addendum: @1607  No call back from her. I called Ayla again and she said the swing bed coordinator is still reviewing. I LVM for Nikky Underwood, swing bed coordinator.

## 2018-02-26 NOTE — PROGRESS NOTES
"Infectious Disease Progress Note    Author: Pauline Pittman M.D. Date & Time of service: 2018  9:28 PM    Chief Complaint:  UTI with sepsis    Interval History:  57-year-old white female admitted with fever, altered mental status and abdominal pain due to obstructive nephrolithiasis with associated pyelonephritis   AF WBC 11.1 c/o \"hunger pains\" in stomach-has not eaten in 9 days per . Failed mult swallow evals   AF, WBC 10.7, transferred to the ICU for increased work of breathing and intubated yesterday and started on pressors overnight, opens eyes to voice   AF, no BC, awake on vent, nods no to pain, plan for SBT today, FiO2 40%, family at bedside   AF, WBC 12.4, failed SBT yesterday, repeat SBT today, on FiO2 30%, awake on the vent   AF, WBC 11.4, extubated yesterday, denies any pain or flank pain   AF no CBC somnolent transferred to floor   AF, WBC 10.3, feeling better, had a BM this am, tolerating abx without issues   AF, no CBC, feels good, on RA, hoping to transfer today, no new issues  Labs Reviewed, Medications Reviewed and Radiology Reviewed.    Review of Systems:  Review of Systems   Constitutional: Positive for malaise/fatigue. Negative for chills and fever.   Respiratory: Negative for cough.    Gastrointestinal: Negative for abdominal pain, nausea and vomiting.   Genitourinary: Negative for dysuria.   Neurological: Positive for weakness.       Hemodynamics:  Temp (24hrs), Av.1 °C (98.7 °F), Min:36.7 °C (98 °F), Max:37.4 °C (99.3 °F)  Temperature: 37.1 °C (98.8 °F)  Pulse  Av.4  Min: 53  Max: 161   Blood Pressure: 110/70       Physical Exam:  Physical Exam   Constitutional: She appears well-developed. No distress.   Obese, morbid   HENT:   Head: Normocephalic and atraumatic.   Eyes: Pupils are equal, round, and reactive to light.   Neck: Neck supple.   Cardiovascular: Normal rate.    Pulmonary/Chest: Effort normal. No respiratory distress. "   Decreased BS bases   Abdominal: Soft. She exhibits no distension. There is tenderness. There is no rebound and no guarding.   Musculoskeletal: She exhibits edema.   LUE PICC   Neurological: She is alert.   Nursing note and vitals reviewed.      Meds:    Current Facility-Administered Medications:   •  potassium chloride SA  •  PICC Line Insertion has been implemented **AND** May use Lidocaine 1% not to exceed 3 mls for local at insertion site **AND** NOTIFY MD **AND** Tip to dwell in the superior vena cava **AND** Blood draws through PICC line; draws by RN only **AND** FLUSHING GUIDELINES WHEN IN USE **AND** normal saline PF **AND** FLUSHING GUIDELINES WHEN NOT IN USE **AND** DRESSING MAINTENANCE **AND** Change needleless pressure ports and IV tubing every 72 hours per hospital policy **AND** TUBING **AND** If there is an MD order to remove the PICC line, any RN may remove the PICC line **AND** [] PATIENT EDUCATION MATERIALS **AND** NURSING COMMUNICATION  •  dextrose 5 % and 0.45 % NaCl with KCl 20 mEq  •  Respiratory Care per Protocol  •  senna-docusate **AND** polyethylene glycol/lytes **AND** magnesium hydroxide **AND** bisacodyl  •  ipratropium-albuterol  •  oxyCODONE immediate-release  •  cefTRIAXone (ROCEPHIN) IV  •  fentaNYL  •  labetalol  •  hydrALAZINE  •  enoxaparin (LOVENOX) injection  •  folic acid  •  pregabalin  •  ondansetron  •  ondansetron  •  promethazine  •  promethazine  •  prochlorperazine  •  FLUoxetine  •  DULoxetine    Labs:  Recent Labs      18   0500   WBC  10.3   RBC  2.62*   HEMOGLOBIN  7.6*   HEMATOCRIT  23.4*   MCV  89.3   MCH  29.0   RDW  48.5   PLATELETCT  366   MPV  11.9   NEUTSPOLYS  60.20   LYMPHOCYTES  23.40   MONOCYTES  11.70   EOSINOPHILS  3.00   BASOPHILS  0.40     Recent Labs      18   0500  18   0400   SODIUM  134*  134*   POTASSIUM  4.0  3.9   CHLORIDE  102  101   CO2  28  27   GLUCOSE  94  112*   BUN  9  7*     Recent Labs      18   0500   02/26/18   0400   ALBUMIN   --   2.9*   TBILIRUBIN   --   0.2   ALKPHOSPHAT   --   85   TOTPROTEIN   --   6.9   ALTSGPT   --   9   ASTSGOT   --   12   CREATININE  0.47*  0.46*       Imaging:  Ct-abdomen-pelvis W/o    Result Date: 2/13/2018 2/13/2018 5:15 AM HISTORY/REASON FOR EXAM:  Pain Sepsis. UTI. TECHNIQUE/EXAM DESCRIPTION: CT scan of the abdomen and pelvis without contrast. Noncontrast helical scanning was obtained from the diaphragmatic domes through the pubic symphysis. Low dose optimization technique was utilized for this CT exam including automated exposure control and adjustment of the mA and/or kV according to patient size. COMPARISON: None. FINDINGS: Lung Bases: Small right pleural effusion. Bibasilar opacities. Hiatal hernia. Abdomen: Within the limits of noncontrast technique, the liver, spleen, pancreas, and adrenal glands are unremarkable in appearance. Postsurgical change in the stomach There is an obstructing 7 mm calculus in the mid right ureter with severe upstream collecting system dilatation. No intrarenal calculus. The gallbladder is surgically absent. and there is no biliary dilatation. There is no bowel wall thickening or abnormal dilatation. The abdominal aorta is normal in caliber. Pelvis: The urinary bladder is decompressed by Olmedo catheter. Small fat-containing bilateral inguinal hernias. No lymph node enlargement. No free fluid, or free air in the abdomen or pelvis. No aggressive bone lesions are seen. Mild anterolisthesis of L5 on S1 ________________________________    1. Obstructing 7 mm calculus in the mid right ureter with severe right hydronephrosis. 2. Postsurgical change from gastric bypass. Hiatal hernia. 3. Small right pleural effusion. Bibasilar opacities, likely atelectasis.    Ct-head W/o    Result Date: 2/13/2018 2/13/2018 5:15 AM HISTORY/REASON FOR EXAM:  Altered Mental Status TECHNIQUE/EXAM DESCRIPTION AND NUMBER OF VIEWS: CT of the head without contrast. The study was  performed on a helical multidetector CT scanner. Contiguous 2.5 mm axial sections were obtained from the skull base through the vertex. Up to date radiation dose reduction adjustments have been utilized to meet ALARA standards for radiation dose reduction. COMPARISON:  None available FINDINGS: There is no evidence of acute intracranial hemorrhage, mass, mass-effect or shift of midline structures. Encephalomalacia in the left frontal lobe with ex vacuo dilatation of the left lateral ventricle. Left frontal surgical clips. Ventricle size and brain parenchymal volume are appropriate for this patient's stated age. The basal cisterns are patent. There are no abnormal extra-axial fluid collections. No depressed or widely  calvarial fracture is seen. Postsurgical change in the left frontal calvarium. The visualized paranasal sinuses and temporal bone structures are aerated. ___________________________________     1. No evidence of acute intracranial hemorrhage or mass lesion. 2. Postsurgical change and encephalomalacia in the left frontal lobe.     Dx-chest-portable (1 View)    Result Date: 2/18/2018 2/18/2018 12:22 PM HISTORY/REASON FOR EXAM:  Central line readjustment. TECHNIQUE/EXAM DESCRIPTION AND NUMBER OF VIEWS: Single portable view of the chest. COMPARISON: 2/13/2018 FINDINGS: Right central venous catheter with tip in the lower SVC. Endotracheal tube with tip in the mid thoracic trachea. Diffuse interstitial prominence, left more than right. Mild bibasilar opacity. No pleural effusion. No pneumothorax. Stable cardiopericardial silhouette.     Interval intubation. Right central venous catheter was retracted with tip in the lower SVC    Dx-chest-portable (1 View)    Result Date: 2/13/2018 2/13/2018 5:01 AM HISTORY/REASON FOR EXAM:  Central line placement TECHNIQUE/EXAM DESCRIPTION AND NUMBER OF VIEWS: Single portable view of the chest. COMPARISON: 2/13/2018 4:26 AM FINDINGS: Right central venous catheter  with tip in the right atrium. Mild diffuse interstitial prominence. Mild bibasilar opacities. No pleural effusion. No pneumothorax. Stable cardiopericardial silhouette.     Right central venous catheter with tip in the right atrium.    Dx-chest-portable (1 View)    Result Date: 2018 4:19 AM HISTORY/REASON FOR EXAM:  Sepsis TECHNIQUE/EXAM DESCRIPTION AND NUMBER OF VIEWS: Single portable view of the chest. COMPARISON: None FINDINGS: Mild diffuse interstitial prominence. No pulmonary infiltrates or consolidations are noted. No pleural effusion. No pneumothorax. Normal cardiopericardial silhouette.     1. Mild diffuse interstitial prominence could relate to pulmonary edema or viral infection.    Dx-portable Fluoroscopy < 1 Hour Is The Patient Pregnant? No    Result Date: 2018 8:00 AM HISTORY/REASON FOR EXAM:  Main OR Intraoperative evaluation TECHNIQUE/EXAM DESCRIPTION AND NUMBER OF VIEWS: Fluoroscopic imaging during stent placement. COMPARISON: CT from the same day FINDINGS: 1 image was obtained in the operating room. Contrast is seen within a dilated renal collecting system. A ureteral stent is noted. A total of 12 seconds of fluoroscopy time utilized.     Intraoperative image as above described.    Ue Venous Duplex    Result Date: 2018   Upper Extremity  Venous Duplex Report  Vascular Laboratory  CONCLUSIONS  No DVT or SVT seen in the right upper extremity  ANGELINA ESCOBAR  Exam Date:     2018 12:52  Room #:     Inpatient  Priority:     Routine  Ht (in):             Wt (lb):  Ordering Physician:        ALEJANDRA WORTHY  Referring Physician:       [id number]  Sonographer:               Jeri Bear RVT  Study Type:                Complete Unilateral  Technical Quality:         Adequate  Age:    57    Gender:     F  MRN:    [id number]  :    1960      BSA:   Indications:     Swelling of Limb  CPT Codes:       95471  ICD Codes:       729.81  History:         PICC line in the internal jugular vein, with swelling of the                    arm.  Limitations:     Bandages from PICC line on right neck.  PROCEDURES:  Right upper extremity venous duplex imaging.  The following venous structures were evaluated: internal jugular,  subclavian, axillary, brachial, radial, ulnar, cephalic and basilic veins.  Serial compression, augmentation maneuvers,  color and spectral Doppler  flow evaluations were performed.  FINDINGS:  Right upper extremity.  Complete color filling and compressibility with normal venous flow dynamics  including spontaneous flow, response to augmentation maneuvers, and  respiratory phasicity.  No echogenic material visualized.  Flow was evaluated in the contralateral subclavian vein and normal venous  flow dynamics including spontaneous flow, respiratory phasic variation and  augmentation were demonstrated.  Doyle Jimenez MD  (Electronically Signed)  Final Date:      14 February 2018                   05:07    Dx-abdomen For Tube Placement    Result Date: 2/15/2018  2/15/2018 1:59 PM HISTORY/REASON FOR EXAM:  Line evaluation. TECHNIQUE/EXAM DESCRIPTION AND NUMBER OF VIEWS:  1 view(s) of the abdomen. COMPARISON:  None. FINDINGS: Enteric tube projects over the distal esophagus. There is a nonspecific bowel gas pattern. There is a right ureteral stent. Surgical clip is seen in the right upper quadrant. Degenerative changes are seen in the spine.     Enteric tube projects over the expected level of the distal esophagus. Recommend advancement.      Micro:  Results     Procedure Component Value Units Date/Time    BLOOD CULTURE [[id number]] Collected:  02/19/18 0400    Order Status:  Completed Specimen:  Blood from Peripheral Updated:  02/24/18 2300     Significant Indicator NEG     Source BLD     Site PERIPHERAL     Blood Culture No growth after 5 days of  "incubation.    Narrative:       Collected By:[id number] DONTAE BENITEZ  Per Hospital Policy: Only change Specimen Src: to \"Line\" if  specified by physician order.    BLOOD CULTURE [[id number]] Collected:  02/19/18 0430    Order Status:  Completed Specimen:  Blood from Peripheral Updated:  02/24/18 2300     Significant Indicator NEG     Source BLD     Site PERIPHERAL     Blood Culture No growth after 5 days of incubation.    Narrative:       Collected By:[id number] DONTAE BENITEZ  Per Hospital Policy: Only change Specimen Src: to \"Line\" if  specified by physician order.    QUANT BRONCHIAL WASHING [[id number]]  (Abnormal) Collected:  02/18/18 1150    Order Status:  Completed Specimen:  Respirate Updated:  02/21/18 1510     Gram Stain Result --     Few WBCs.  Moderate epithelial cells.  Rare Yeast.  <5% intracellular organisms.       Significant Indicator POS (POS)     Source RESP     Site Quantitative BAL RLL     Quantitative Bronch Washing -- (A)     Quantitative Bronch Washing -- (A)     Yeast  4,000 cfu/mL  Probable Candida albicans       Quantitative Bronch Washing -- (A)     Yeast  10,000 cfu/mL  Probable Candida glabrata      FUNGAL CULTURE [[id number]]  (Abnormal) Collected:  02/18/18 1150    Order Status:  Completed Specimen:  Respirate Updated:  02/21/18 1510     Significant Indicator POS (POS)     Source RESP     Site Quantitative BAL RLL     Fungal Culture -- (A)     Growth noted after further incubation, see below for  organism identification.       Fungal Culture -- (A)     Candida albicans  Heavy growth       Fungal Culture -- (A)     Yeast  Heavy growth  second colony type      AFB CULTURE [[id number]] Collected:  02/18/18 1150    Order Status:  Completed Specimen:  Respirate Updated:  02/21/18 1510     Significant Indicator NEG     Source RESP     Site Quantitative BAL RLL     AFB Culture Culture in progress.     AFB Smear Results No acid fast bacilli seen.    PNEUMOCYSTIS DFA [[id number]] Collected:  02/18/18 " 1150    Order Status:  Completed Specimen:  Respirate Updated:  02/21/18 1510     Significant Indicator NEG     Source RESP     Site Quantitative BAL RLL     Pneumocystis Stain No Pneumocystis jiroveci (P.carinii) detected by DFA.    URINE CULTURE(NEW) [[id number]] Collected:  02/18/18 1245    Order Status:  Completed Specimen:  Urine from Urine, Garcia Cath Updated:  02/20/18 0553     Significant Indicator NEG     Source UR     Site URINE, GARCIA CATH     Urine Culture No growth at 48 hours.    Narrative:       Collected By:[id number] AZAM YEN  Indication for culture:->Altered LOC  Indication for culture:->Temp Equal to,or Greater Than 101    GRAM STAIN [[id number]] Collected:  02/18/18 1150    Order Status:  Completed Specimen:  Respirate Updated:  02/19/18 1714     Significant Indicator .     Source RESP     Site Quantitative BAL RLL     Gram Stain Result --     Few WBCs.  Moderate epithelial cells.  Rare Yeast.  <5% intracellular organisms.      ACID FAST STAIN [[id number]] Collected:  02/18/18 1150    Order Status:  Completed Specimen:  Respirate Updated:  02/19/18 1714     Significant Indicator NEG     Source RESP     Site Quantitative BAL RLL     AFB Smear Results No acid fast bacilli seen.          Assessment:  Active Hospital Problems    Diagnosis   • *Acute respiratory failure (CMS-HCC) [J96.00]   • Dysphagia [R13.10]   • Gram-negative bacteremia [R78.81]   • Septic shock (CMS-HCC) [A41.9, R65.21]   • Pyelonephritis [N12]   • Hydronephrosis with urinary obstruction due to renal calculus [N13.2]   • History of completed stroke [Z86.73]   • Thrombocytopenia (CMS-HCC) [D69.6]   • Hyponatremia [E87.1]   • Chronic pain [G89.29]   • Arm swelling [M79.89]   • CHERYL (acute kidney injury) (CMS-HCC) [N17.9]   • Metabolic acidosis [E87.2]   • Encephalopathy acute [G93.40]       Plan:  Pneumonitis  2/2 pulm edema vs mucous plugging vs aspiration  Extubated 2/21  Off pressors  S/p bronch with BAL 2/18  BAL cultures  Cglabrata  Aspiration precautions    Pyelonephritis  Afebrile   Leukocytosis resolved  status post emergent stent placement  Urine and  Blood cxs 2/13 Ecoli  Repeat BCx 2/19 - NGTD  Continue  IV ceftriaxone and she will require PO suppression (cefdinir) as the stent may be infected  Anticipate 2 weeks of abx from date of 1st neg Bcx  PICC when able  Stop date 03/05/18 follow by PO cefdinir given retained stent    Gram neg sepsis  Due to above  On abx as above    Aspiration risk  GI to see  Failed mult swallow eval  Prior gastric bypass so Cortrak placement problematic    SNF placement - ok to transfer from ID standpoint    DW IM Dr Avalos

## 2018-02-26 NOTE — DISCHARGE PLANNING
Spoke with Peter at [facility], message left for Ayla admission manager on status of referral.

## 2018-02-26 NOTE — PROGRESS NOTES
Assumed care of pt at 0700. Received report from RN. Pt A&Ox4. Assessment complete. Labs reviewed. Pt denies pain this AM.  at bedside and actively involved and helping with patient care. Pt incontinent x 2 at times. Barbara cream in use at bedside for redness on patient sacrum. Waffle overlay in place. Pt right sided weakness d/t history of CVA. Pt weakness in right leg but can move, pt right arm flaccid and unable to move.  Pt and RN discussed plan of care. Pt questions answered. Pt needs are met at this time. Bed in lowest and locked position. Call light within reach. Upper bed rails up. Hourly rounding in place.

## 2018-02-26 NOTE — PROGRESS NOTES
Assumed care of pt after receiving report from dayskindra RN. Bedside rounding completed w/ lesly RN. Pt resting in bed A&OX4 w/ no complaints of pain or discomfort,  @ bedside actively involved in pt care, pt medicated per MAR, pt is incontinent of bowel and bladder at times,  helps in care, applied tessa cream applied to redness on sacrum. Fall measures in place, call light within reach, personal belongings nearby, bed in lowest position, and hourly rounding in place. Will continue to monitor pt.

## 2018-02-26 NOTE — THERAPY
"Physical Therapy Treatment completed.   Bed Mobility:  Supine to Sit: Moderate Assist  Transfers: Sit to Stand: Maximal Assist; max A for stand pivot transfer to L side  Gait: Level Of Assist: Unable to Participate      Plan of Care: Will benefit from Physical Therapy 3 times per week  Discharge Recommendations: Equipment: Will Continue to Assess for Equipment Needs. See below    Pt progressing as expected. She was able to demonstrate bed mobility with mod/max A, sit<>stands with max A , and transfers with max A. SHe was primarily limited 2' to increased fatigue with  minimal activity and general deconditoining. She will benefit from continued skilled acute PT while here with strong recommendation of continued skilled PT/placement prior to medical d/c to home given current objective findings, I PLOF with re: ambulation and general mobility, current co-morbidities and recent decline in function, and limited ability of social supports to assist with current level needed. Note that pt's home is well equipped/setup and spouse is available to assist at time of eventual d/c to home.  In addtion, spouse reports pt has been relying on sit<>stand chair prior to admit as well. WIll continue to visit.     See \"Rehab Therapy-Acute\" Patient Summary Report for complete documentation.       "

## 2018-02-26 NOTE — THERAPY
"Speech Language Therapy dysphagia treatment completed.   Functional Status:  Fxnl for diet upgrade; continues to require modified textures for optimal safety.  Ok for sips of unthickened water btwn meals, via SPOON; expect to further upgrade within the week.  Baseline: R weak, aphasia from remote CVA. **Per SW, pt to d/c to facility that does not offer SLP.  SW to check to see if OT covers swallowing at receiving facility.  SLP to continue work with pt until d/c.   Recommendations: Upgrade to D2/NTL w/ ok for sips of unthickened water btwn meals via spoon only; expect further upgrade this week.  Strategies posted at \[facility]\"".   Plan of Care: Will benefit from Speech Therapy 3 times per week  Post-Acute Therapy: Discharge to a transitional care facility for continued skilled therapy services.    See \"Rehab Therapy-Acute\" Patient Summary Report for complete documentation.     "

## 2018-02-26 NOTE — CARE PLAN
Problem: Nutritional:  Goal: Achieve adequate nutritional intake  Patient will safely tolerate PO diet and consume 50% of meals.   Outcome: MET Date Met: 02/26/18

## 2018-02-27 ENCOUNTER — PATIENT OUTREACH (OUTPATIENT)
Dept: HEALTH INFORMATION MANAGEMENT | Facility: OTHER | Age: 58
End: 2018-02-27

## 2018-02-27 ENCOUNTER — APPOINTMENT (OUTPATIENT)
Dept: RADIOLOGY | Facility: MEDICAL CENTER | Age: 58
DRG: 853 | End: 2018-02-27
Attending: INTERNAL MEDICINE
Payer: MEDICARE

## 2018-02-27 VITALS
HEIGHT: 68 IN | BODY MASS INDEX: 32.91 KG/M2 | TEMPERATURE: 98.5 F | WEIGHT: 217.15 LBS | DIASTOLIC BLOOD PRESSURE: 76 MMHG | SYSTOLIC BLOOD PRESSURE: 115 MMHG | HEART RATE: 103 BPM | RESPIRATION RATE: 16 BRPM | OXYGEN SATURATION: 93 %

## 2018-02-27 PROCEDURE — 700102 HCHG RX REV CODE 250 W/ 637 OVERRIDE(OP): Performed by: HOSPITALIST

## 2018-02-27 PROCEDURE — 700102 HCHG RX REV CODE 250 W/ 637 OVERRIDE(OP): Performed by: INTERNAL MEDICINE

## 2018-02-27 PROCEDURE — A9270 NON-COVERED ITEM OR SERVICE: HCPCS | Performed by: HOSPITALIST

## 2018-02-27 PROCEDURE — 700101 HCHG RX REV CODE 250: Performed by: INTERNAL MEDICINE

## 2018-02-27 PROCEDURE — 700111 HCHG RX REV CODE 636 W/ 250 OVERRIDE (IP): Performed by: INTERNAL MEDICINE

## 2018-02-27 PROCEDURE — 99239 HOSP IP/OBS DSCHRG MGMT >30: CPT | Performed by: INTERNAL MEDICINE

## 2018-02-27 PROCEDURE — A9270 NON-COVERED ITEM OR SERVICE: HCPCS | Performed by: INTERNAL MEDICINE

## 2018-02-27 RX ORDER — IPRATROPIUM BROMIDE AND ALBUTEROL SULFATE 2.5; .5 MG/3ML; MG/3ML
3 SOLUTION RESPIRATORY (INHALATION) EVERY 4 HOURS PRN
Qty: 30 BULLET | Status: ON HOLD
Start: 2018-02-27 | End: 2024-01-26

## 2018-02-27 RX ORDER — OXYCODONE HYDROCHLORIDE 5 MG/1
5 TABLET ORAL EVERY 6 HOURS PRN
Qty: 12 TAB | Refills: 0 | Status: SHIPPED | OUTPATIENT
Start: 2018-02-27 | End: 2018-03-02

## 2018-02-27 RX ADMIN — ENOXAPARIN SODIUM 40 MG: 100 INJECTION SUBCUTANEOUS at 08:29

## 2018-02-27 RX ADMIN — STANDARDIZED SENNA CONCENTRATE AND DOCUSATE SODIUM 2 TABLET: 8.6; 5 TABLET, FILM COATED ORAL at 08:30

## 2018-02-27 RX ADMIN — DULOXETINE HYDROCHLORIDE 60 MG: 60 CAPSULE, DELAYED RELEASE ORAL at 08:30

## 2018-02-27 RX ADMIN — FLUOXETINE HYDROCHLORIDE 20 MG: 20 CAPSULE ORAL at 08:29

## 2018-02-27 RX ADMIN — PREGABALIN 150 MG: 150 CAPSULE ORAL at 08:30

## 2018-02-27 RX ADMIN — POTASSIUM CHLORIDE 20 MEQ: 1500 TABLET, EXTENDED RELEASE ORAL at 08:30

## 2018-02-27 RX ADMIN — FOLIC ACID 2 MG: 1 TABLET ORAL at 08:30

## 2018-02-27 RX ADMIN — CEFTRIAXONE 2 G: 2 INJECTION, POWDER, FOR SOLUTION INTRAMUSCULAR; INTRAVENOUS at 09:34

## 2018-02-27 RX ADMIN — POTASSIUM CHLORIDE, DEXTROSE MONOHYDRATE AND SODIUM CHLORIDE: 150; 5; 450 INJECTION, SOLUTION INTRAVENOUS at 01:55

## 2018-02-27 ASSESSMENT — ENCOUNTER SYMPTOMS
NAUSEA: 0
DIARRHEA: 0
VOMITING: 0
CHILLS: 0
COUGH: 0
FEVER: 0
SHORTNESS OF BREATH: 0
ABDOMINAL PAIN: 0
WEAKNESS: 1

## 2018-02-27 NOTE — DISCHARGE PLANNING
note:   chose [facility].   [facility] declined per Sw.    Addendum:  IMM letter given. Explained to ,  signed, copy to pt and to chart.

## 2018-02-27 NOTE — PROGRESS NOTES
"Rehab Progress Note     Encounter date: 2/27/2018     Chief Complaint:  Septic shock (CMS-HCC) , reevaluate for rehabilitation potential    Interval Events (subjective)  Please see Dr. Lopez consult on 2/23 for full details.    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  She was admitted for sepsis due to UTI and hydronephrosis.  She is status post stone removal and stenting.    She was last seen by physical therapy on 2/26 and she continues to require max assist for functional mobility.  She has not been able to walk. She continues to be limited by fatigue with minimal activity.   She was last seen by OT on 2/23 and was max assist for functional mobility and was limited by impaired endurance. She was seen by SLP on 2/26 and she was upgraded to dysphagia 2 with nectar thick liquids.    Today, she reports that she is doing well and making progress. She states that yesterday, she was able to work with therapy on sitting in the bedside chair for one hour. However, this was all of the activity she was able to tolerate for the day. She still does not feel she would be able to do 3 active hours of therapy per day. She reports improved strength in her bilateral lower limbs. She is very confident that she will eventually be able to resume her baseline activity level of household ambulation. Both the patient and her spouse are aware that they will likely need to discharge to skilled nursing facility in the area.      Objective:  VITAL SIGNS: /70   Pulse 99   Temp 37.1 °C (98.8 °F)   Resp 16   Ht 1.727 m (5' 8\")   Wt 98.5 kg (217 lb 2.5 oz)   SpO2 96%   Breastfeeding? No   BMI 33.02 kg/m²     Recent Results (from the past 72 hour(s))   Comp Metabolic Panel (CMP) - Every Monday    Collection Time: 02/26/18  4:00 AM   Result Value Ref Range    Sodium 134 (L) 135 - 145 mmol/L    Potassium 3.9 3.6 - 5.5 mmol/L    Chloride 101 " 96 - 112 mmol/L    Co2 27 20 - 33 mmol/L    Anion Gap 6.0 0.0 - 11.9    Glucose 112 (H) 65 - 99 mg/dL    Bun 7 (L) 8 - 22 mg/dL    Creatinine 0.46 (L) 0.50 - 1.40 mg/dL    Calcium 8.5 8.5 - 10.5 mg/dL    AST(SGOT) 12 12 - 45 U/L    ALT(SGPT) 9 2 - 50 U/L    Alkaline Phosphatase 85 30 - 99 U/L    Total Bilirubin 0.2 0.1 - 1.5 mg/dL    Albumin 2.9 (L) 3.2 - 4.9 g/dL    Total Protein 6.9 6.0 - 8.2 g/dL    Globulin 4.0 (H) 1.9 - 3.5 g/dL    A-G Ratio 0.7 g/dL   CBC WITH DIFFERENTIAL    Collection Time: 02/26/18  4:00 AM   Result Value Ref Range    WBC 9.1 4.8 - 10.8 K/uL    RBC 3.31 (L) 4.20 - 5.40 M/uL    Hemoglobin 9.5 (L) 12.0 - 16.0 g/dL    Hematocrit 29.8 (L) 37.0 - 47.0 %    MCV 90.0 81.4 - 97.8 fL    MCH 28.7 27.0 - 33.0 pg    MCHC 31.9 (L) 33.6 - 35.0 g/dL    RDW 47.8 35.9 - 50.0 fL    Platelet Count 457 (H) 164 - 446 K/uL    MPV 11.8 9.0 - 12.9 fL    Neutrophils-Polys 57.00 44.00 - 72.00 %    Lymphocytes 25.60 22.00 - 41.00 %    Monocytes 13.00 0.00 - 13.40 %    Eosinophils 2.40 0.00 - 6.90 %    Basophils 1.20 0.00 - 1.80 %    Immature Granulocytes 0.80 0.00 - 0.90 %    Nucleated RBC 0.00 /100 WBC    Neutrophils (Absolute) 5.19 2.00 - 7.15 K/uL    Lymphs (Absolute) 2.33 1.00 - 4.80 K/uL    Monos (Absolute) 1.18 (H) 0.00 - 0.85 K/uL    Eos (Absolute) 0.22 0.00 - 0.51 K/uL    Baso (Absolute) 0.11 0.00 - 0.12 K/uL    Immature Granulocytes (abs) 0.07 0.00 - 0.11 K/uL    NRBC (Absolute) 0.00 K/uL   MAGNESIUM    Collection Time: 02/26/18  4:00 AM   Result Value Ref Range    Magnesium 1.8 1.5 - 2.5 mg/dL   TROPONIN    Collection Time: 02/26/18  4:00 AM   Result Value Ref Range    Troponin I <0.01 0.00 - 0.04 ng/mL   ESTIMATED GFR    Collection Time: 02/26/18  4:00 AM   Result Value Ref Range    GFR If African American >60 >60 mL/min/1.73 m 2    GFR If Non African American >60 >60 mL/min/1.73 m 2       Current Facility-Administered Medications   Medication Frequency   • potassium chloride SA (Kdur) tablet 20 mEq DAILY    • normal saline PF 10-20 mL PRN   • dextrose 5 % and 0.45 % NaCl with KCl 20 mEq Continuous   • Respiratory Care per Protocol Continuous RT   • senna-docusate (PERICOLACE or SENOKOT S) 8.6-50 MG per tablet 2 Tab BID    And   • polyethylene glycol/lytes (MIRALAX) PACKET 1 Packet QDAY PRN    And   • magnesium hydroxide (MILK OF MAGNESIA) suspension 30 mL QDAY PRN    And   • bisacodyl (DULCOLAX) suppository 10 mg QDAY PRN   • ipratropium-albuterol (DUONEB) nebulizer solution 3 mL Q2HRS PRN (RT)   • oxyCODONE immediate-release (ROXICODONE) tablet 5 mg Q4HRS PRN   • cefTRIAXone (ROCEPHIN) syringe 2 g Q24HRS   • fentaNYL (DURAGESIC) 100 MCG/HR 1 Patch Q72HRS   • labetalol (NORMODYNE,TRANDATE) injection 10 mg Q4HRS PRN   • hydrALAZINE (APRESOLINE) injection 10 mg Q4HRS PRN   • enoxaparin (LOVENOX) inj 40 mg DAILY   • folic acid (FOLVITE) tablet 2 mg DAILY   • pregabalin (LYRICA) capsule 150 mg BID   • ondansetron (ZOFRAN) syringe/vial injection 4 mg Q4HRS PRN   • ondansetron (ZOFRAN ODT) dispertab 4 mg Q4HRS PRN   • promethazine (PHENERGAN) tablet 12.5-25 mg Q4HRS PRN   • promethazine (PHENERGAN) suppository 12.5-25 mg Q4HRS PRN   • prochlorperazine (COMPAZINE) injection 5-10 mg Q4HRS PRN   • FLUoxetine (PROZAC) capsule 20 mg DAILY   • DULoxetine (CYMBALTA) capsule 60 mg BID       Orders Placed This Encounter   Procedures   • DIET ORDER     Standing Status:   Standing     Number of Occurrences:   1     Order Specific Question:   Diet:     Answer:   Regular [1]     Order Specific Question:   Texture/Fiber modifications:     Answer:   Dysphagia 2(Pureed/Chopped)specify fluid consistency(question 6) [2]     Order Specific Question:   Consistency/Fluid modifications:     Answer:   Nectar Thick [2]     Comments:   Spoonful of regular water between meals ok per SLP     Order Specific Question:   Miscellaneous modifications:     Answer:   SLP - 1:1 Supervision by Nursing [21]     Order Specific Question:   Miscellaneous  modifications:     Answer:   SLP - Deliver to Nursing Station [22]       General:  Awake, alert, oriented, no acute distress.  Laying supine in bed  Cardiovascular: Regular rate and rhythm  Lungs: Clear to auscultation bilaterally without signs of respiratory distress or accessory muscle use   Abdomen: Soft, nontender, nondistended, bowel sounds present and normoactive.   Neuro: Expressive aphasia, but she is able to articulate insight into her medical condition. Bilateral lower limbs show 3+/5 strength with hip flexors and knee extensors. Right upper limb remains plegic.    Assessment:  Active Hospital Problems    Diagnosis   • *Septic shock (CMS-HCC)   • Acute respiratory failure (CMS-formerly [facility])   • History of completed stroke   • Pyelonephritis   • Hydronephrosis with urinary obstruction due to renal calculus   • Dysphagia   • Hypokalemia   • Chronic pain   • CHERYL (acute kidney injury) (CMS-formerly [facility])   • Encephalopathy acute       Medical Decision Making and Plan:  The patient is a 57-year-old female with a history of aneurysm in 1994 resulting in right hemiparesis and aphasia. She is currently admitted for sepsis and nephrolithiasis.    I followed up with the patient and her  today in the room. As previously documented by Dr. Lopez on February 23, this patient does not meet criteria for inpatient rehabilitation hospitalization due to impaired activity tolerance. She is unable to tolerate 3 hours of aggressive therapy per day.    Discharged to a skilled nursing facility would be the most appropriate given the patient's significant endurance limitation at this time.    Total time:  25 minutes.  I spent greater than 50% of the time for patient care, counseling, and coordination on this date, including unit/floor time, and face-to-face time with the patient as per assessment and plan above.     Haider Oquendo M.D.  2/27/2018

## 2018-02-27 NOTE — PROGRESS NOTES
"Assumed care at 1900. Received report from RN. Patient is AOx4. Patient is sitting up in bed and appears calm and in no distress.  is at the bedside. Patient has right sided weakness and aphasia. Assessment complete. Labs reviewed. Patient and RN discussed plan of care. Patient questions answered. Patient needs are met at this time. Bed in lowest and locked position. Call light is within reach. Hourly rounding in place. /70   Pulse (!) 109   Temp 37 °C (98.6 °F)   Resp 18   Ht 1.727 m (5' 8\")   Wt 98.5 kg (217 lb 2.5 oz)   SpO2 94%   Breastfeeding? No   BMI 33.02 kg/m²       "

## 2018-02-27 NOTE — DISCHARGE PLANNING
Care Transition Team Final Discharge Disposition    Actual Discharge Information  Actual Discharge Date: 02/27/18  Care Transitions Team Assisting with Transportation: Yes  Method of Transportation: Van  Scheduled Transportation Date: 02/27/18  Scheduled Transportation Time: 1630  Van Company: Other (comment) ([facility])    Care Transition Team Discharge Planning    Anticipated Discharge Information  Anticipated discharge disposition: Discharge needs currently unknown, HHC, Home, SNF    Care Transition Team Interventions  Were Resources Provided?: Yes  Medical Referrals: SNF referral  Does patient have qualifying stay?: In progress    PASRR  PASRR Date Submitted: 02/27/18    Discharge Plan:  Per pt and  they would like pt go to [facility].  This  verified that pt can sit in Wheelchair Van and [facility] will  pt at 1630. PASRR completed for pt.  PASRR #[id number].

## 2018-02-27 NOTE — DISCHARGE PLANNING
Received request from [facility] for re-assessment by Physiatry for IRF potential. Dr. Haider Oquendo to review. Will follow for Dr. Oquendo's recommendation.

## 2018-02-27 NOTE — PROGRESS NOTES
"Infectious Disease Progress Note    Author: Pauline Pitmtan M.D. Date & Time of service: 2018  9:28 PM    Chief Complaint:  UTI with sepsis    Interval History:  57-year-old white female admitted with fever, altered mental status and abdominal pain due to obstructive nephrolithiasis with associated pyelonephritis   AF WBC 11.1 c/o \"hunger pains\" in stomach-has not eaten in 9 days per . Failed mult swallow evals   AF, WBC 10.7, transferred to the ICU for increased work of breathing and intubated yesterday and started on pressors overnight, opens eyes to voice   AF, no BC, awake on vent, nods no to pain, plan for SBT today, FiO2 40%, family at bedside   AF, WBC 12.4, failed SBT yesterday, repeat SBT today, on FiO2 30%, awake on the vent   AF, WBC 11.4, extubated yesterday, denies any pain or flank pain   AF no CBC somnolent transferred to floor   AF, WBC 10.3, feeling better, had a BM this am, tolerating abx without issues   AF, no CBC, feels good, on RA, hoping to transfer today, no new issues   AF,denied skilled near home in [place], looking in to local SNFs, feels well without any new issues  Labs Reviewed, Medications Reviewed and Radiology Reviewed.    Review of Systems:  Review of Systems   Constitutional: Positive for malaise/fatigue. Negative for chills and fever.   Respiratory: Negative for cough and shortness of breath.    Gastrointestinal: Negative for abdominal pain, diarrhea, nausea and vomiting.   Genitourinary: Negative for dysuria.   Neurological: Positive for weakness.       Hemodynamics:  Temp (24hrs), Av.9 °C (98.4 °F), Min:36.7 °C (98.1 °F), Max:37.1 °C (98.8 °F)  Temperature: 37.1 °C (98.8 °F)  Pulse  Av.6  Min: 53  Max: 161   Blood Pressure: 118/70       Physical Exam:  Physical Exam   Constitutional: She appears well-developed. No distress.   Obese, morbid   HENT:   Head: Normocephalic and atraumatic.   Eyes: Pupils are equal, round, and " reactive to light.   Neck: Neck supple.   Cardiovascular: Normal rate.    Pulmonary/Chest: Effort normal. No respiratory distress.   Decreased BS bases   Abdominal: Soft. She exhibits no distension. There is tenderness. There is no rebound and no guarding.   Decreased   Musculoskeletal: She exhibits edema.   LUE PICC   Neurological: She is alert.   Nursing note and vitals reviewed.      Meds:    Current Facility-Administered Medications:   •  potassium chloride SA  •  PICC Line Insertion has been implemented **AND** May use Lidocaine 1% not to exceed 3 mls for local at insertion site **AND** NOTIFY MD **AND** Tip to dwell in the superior vena cava **AND** Blood draws through PICC line; draws by RN only **AND** FLUSHING GUIDELINES WHEN IN USE **AND** normal saline PF **AND** FLUSHING GUIDELINES WHEN NOT IN USE **AND** DRESSING MAINTENANCE **AND** Change needleless pressure ports and IV tubing every 72 hours per hospital policy **AND** TUBING **AND** If there is an MD order to remove the PICC line, any RN may remove the PICC line **AND** [] PATIENT EDUCATION MATERIALS **AND** NURSING COMMUNICATION  •  dextrose 5 % and 0.45 % NaCl with KCl 20 mEq  •  Respiratory Care per Protocol  •  senna-docusate **AND** polyethylene glycol/lytes **AND** magnesium hydroxide **AND** bisacodyl  •  ipratropium-albuterol  •  oxyCODONE immediate-release  •  cefTRIAXone (ROCEPHIN) IV  •  fentaNYL  •  labetalol  •  hydrALAZINE  •  enoxaparin (LOVENOX) injection  •  folic acid  •  pregabalin  •  ondansetron  •  ondansetron  •  promethazine  •  promethazine  •  prochlorperazine  •  FLUoxetine  •  DULoxetine    Labs:  Recent Labs      18   0400   WBC  9.1   RBC  3.31*   HEMOGLOBIN  9.5*   HEMATOCRIT  29.8*   MCV  90.0   MCH  28.7   RDW  47.8   PLATELETCT  457*   MPV  11.8   NEUTSPOLYS  57.00   LYMPHOCYTES  25.60   MONOCYTES  13.00   EOSINOPHILS  2.40   BASOPHILS  1.20     Recent Labs      18   0400   SODIUM  134*   POTASSIUM   3.9   CHLORIDE  101   CO2  27   GLUCOSE  112*   BUN  7*     Recent Labs      02/26/18   0400   ALBUMIN  2.9*   TBILIRUBIN  0.2   ALKPHOSPHAT  85   TOTPROTEIN  6.9   ALTSGPT  9   ASTSGOT  12   CREATININE  0.46*       Imaging:  Ct-abdomen-pelvis W/o    Result Date: 2/13/2018 2/13/2018 5:15 AM HISTORY/REASON FOR EXAM:  Pain Sepsis. UTI. TECHNIQUE/EXAM DESCRIPTION: CT scan of the abdomen and pelvis without contrast. Noncontrast helical scanning was obtained from the diaphragmatic domes through the pubic symphysis. Low dose optimization technique was utilized for this CT exam including automated exposure control and adjustment of the mA and/or kV according to patient size. COMPARISON: None. FINDINGS: Lung Bases: Small right pleural effusion. Bibasilar opacities. Hiatal hernia. Abdomen: Within the limits of noncontrast technique, the liver, spleen, pancreas, and adrenal glands are unremarkable in appearance. Postsurgical change in the stomach There is an obstructing 7 mm calculus in the mid right ureter with severe upstream collecting system dilatation. No intrarenal calculus. The gallbladder is surgically absent. and there is no biliary dilatation. There is no bowel wall thickening or abnormal dilatation. The abdominal aorta is normal in caliber. Pelvis: The urinary bladder is decompressed by Olmedo catheter. Small fat-containing bilateral inguinal hernias. No lymph node enlargement. No free fluid, or free air in the abdomen or pelvis. No aggressive bone lesions are seen. Mild anterolisthesis of L5 on S1 ________________________________    1. Obstructing 7 mm calculus in the mid right ureter with severe right hydronephrosis. 2. Postsurgical change from gastric bypass. Hiatal hernia. 3. Small right pleural effusion. Bibasilar opacities, likely atelectasis.    Ct-head W/o    Result Date: 2/13/2018 2/13/2018 5:15 AM HISTORY/REASON FOR EXAM:  Altered Mental Status TECHNIQUE/EXAM DESCRIPTION AND NUMBER OF VIEWS: CT of the  head without contrast. The study was performed on a helical multidetector CT scanner. Contiguous 2.5 mm axial sections were obtained from the skull base through the vertex. Up to date radiation dose reduction adjustments have been utilized to meet ALARA standards for radiation dose reduction. COMPARISON:  None available FINDINGS: There is no evidence of acute intracranial hemorrhage, mass, mass-effect or shift of midline structures. Encephalomalacia in the left frontal lobe with ex vacuo dilatation of the left lateral ventricle. Left frontal surgical clips. Ventricle size and brain parenchymal volume are appropriate for this patient's stated age. The basal cisterns are patent. There are no abnormal extra-axial fluid collections. No depressed or widely  calvarial fracture is seen. Postsurgical change in the left frontal calvarium. The visualized paranasal sinuses and temporal bone structures are aerated. ___________________________________     1. No evidence of acute intracranial hemorrhage or mass lesion. 2. Postsurgical change and encephalomalacia in the left frontal lobe.     Dx-chest-portable (1 View)    Result Date: 2/18/2018 2/18/2018 12:22 PM HISTORY/REASON FOR EXAM:  Central line readjustment. TECHNIQUE/EXAM DESCRIPTION AND NUMBER OF VIEWS: Single portable view of the chest. COMPARISON: 2/13/2018 FINDINGS: Right central venous catheter with tip in the lower SVC. Endotracheal tube with tip in the mid thoracic trachea. Diffuse interstitial prominence, left more than right. Mild bibasilar opacity. No pleural effusion. No pneumothorax. Stable cardiopericardial silhouette.     Interval intubation. Right central venous catheter was retracted with tip in the lower SVC    Dx-chest-portable (1 View)    Result Date: 2/13/2018 2/13/2018 5:01 AM HISTORY/REASON FOR EXAM:  Central line placement TECHNIQUE/EXAM DESCRIPTION AND NUMBER OF VIEWS: Single portable view of the chest. COMPARISON: 2/13/2018 4:26 AM  FINDINGS: Right central venous catheter with tip in the right atrium. Mild diffuse interstitial prominence. Mild bibasilar opacities. No pleural effusion. No pneumothorax. Stable cardiopericardial silhouette.     Right central venous catheter with tip in the right atrium.    Dx-chest-portable (1 View)    Result Date: 2/13/2018 2/13/2018 4:19 AM HISTORY/REASON FOR EXAM:  Sepsis TECHNIQUE/EXAM DESCRIPTION AND NUMBER OF VIEWS: Single portable view of the chest. COMPARISON: None FINDINGS: Mild diffuse interstitial prominence. No pulmonary infiltrates or consolidations are noted. No pleural effusion. No pneumothorax. Normal cardiopericardial silhouette.     1. Mild diffuse interstitial prominence could relate to pulmonary edema or viral infection.    Dx-portable Fluoroscopy < 1 Hour Is The Patient Pregnant? No    Result Date: 2/13/2018 2/13/2018 8:00 AM HISTORY/REASON FOR EXAM:  Main OR Intraoperative evaluation TECHNIQUE/EXAM DESCRIPTION AND NUMBER OF VIEWS: Fluoroscopic imaging during stent placement. COMPARISON: CT from the same day FINDINGS: 1 image was obtained in the operating room. Contrast is seen within a dilated renal collecting system. A ureteral stent is noted. A total of 12 seconds of fluoroscopy time utilized.     Intraoperative image as above described.    Ue Venous Duplex    Result Date: 2/14/2018   Upper Extremity  Venous Duplex Report  Vascular Laboratory  CONCLUSIONS  No DVT or SVT seen in the right upper extremity  ANGELINA ESCOBAR  Exam Date:     02/13/2018 12:52  Room #:     Inpatient  Priority:     Routine  Ht (in):             Wt (lb):  Ordering Physician:        ALEJANDRA WORTHY  Referring Physician:       [id number]  Sonographer:               Jeri Bear RVT  Study Type:                Complete Unilateral  Technical Quality:         Adequate  Age:    57    Gender:     F  MRN:     [id number]  :    1960      BSA:  Indications:     Swelling of Limb  CPT Codes:       90284  ICD Codes:       729.81  History:         PICC line in the internal jugular vein, with swelling of the                    arm.  Limitations:     Bandages from PICC line on right neck.  PROCEDURES:  Right upper extremity venous duplex imaging.  The following venous structures were evaluated: internal jugular,  subclavian, axillary, brachial, radial, ulnar, cephalic and basilic veins.  Serial compression, augmentation maneuvers,  color and spectral Doppler  flow evaluations were performed.  FINDINGS:  Right upper extremity.  Complete color filling and compressibility with normal venous flow dynamics  including spontaneous flow, response to augmentation maneuvers, and  respiratory phasicity.  No echogenic material visualized.  Flow was evaluated in the contralateral subclavian vein and normal venous  flow dynamics including spontaneous flow, respiratory phasic variation and  augmentation were demonstrated.  Doyle Jimenez MD  (Electronically Signed)  Final Date:      2018                   05:07    Dx-abdomen For Tube Placement    Result Date: 2/15/2018  2/15/2018 1:59 PM HISTORY/REASON FOR EXAM:  Line evaluation. TECHNIQUE/EXAM DESCRIPTION AND NUMBER OF VIEWS:  1 view(s) of the abdomen. COMPARISON:  None. FINDINGS: Enteric tube projects over the distal esophagus. There is a nonspecific bowel gas pattern. There is a right ureteral stent. Surgical clip is seen in the right upper quadrant. Degenerative changes are seen in the spine.     Enteric tube projects over the expected level of the distal esophagus. Recommend advancement.      Micro:  Results     Procedure Component Value Units Date/Time    BLOOD CULTURE [[id number]] Collected:  18 0400    Order Status:  Completed Specimen:  Blood from Peripheral Updated:  18 2300     Significant Indicator NEG     Source BLD     Site PERIPHERAL     Blood  "Culture No growth after 5 days of incubation.    Narrative:       Collected By:[id number] DONTAE BENITEZ  Per Hospital Policy: Only change Specimen Src: to \"Line\" if  specified by physician order.    BLOOD CULTURE [[id number]] Collected:  02/19/18 0430    Order Status:  Completed Specimen:  Blood from Peripheral Updated:  02/24/18 2300     Significant Indicator NEG     Source BLD     Site PERIPHERAL     Blood Culture No growth after 5 days of incubation.    Narrative:       Collected By:[id number] DONTAE BENITEZ  Per Hospital Policy: Only change Specimen Src: to \"Line\" if  specified by physician order.    QUANT BRONCHIAL WASHING [[id number]]  (Abnormal) Collected:  02/18/18 1150    Order Status:  Completed Specimen:  Respirate Updated:  02/21/18 1510     Gram Stain Result --     Few WBCs.  Moderate epithelial cells.  Rare Yeast.  <5% intracellular organisms.       Significant Indicator POS (POS)     Source RESP     Site Quantitative BAL RLL     Quantitative Bronch Washing -- (A)     Quantitative Bronch Washing -- (A)     Yeast  4,000 cfu/mL  Probable Candida albicans       Quantitative Bronch Washing -- (A)     Yeast  10,000 cfu/mL  Probable Candida glabrata      FUNGAL CULTURE [[id number]]  (Abnormal) Collected:  02/18/18 1150    Order Status:  Completed Specimen:  Respirate Updated:  02/21/18 1510     Significant Indicator POS (POS)     Source RESP     Site Quantitative BAL RLL     Fungal Culture -- (A)     Growth noted after further incubation, see below for  organism identification.       Fungal Culture -- (A)     Candida albicans  Heavy growth       Fungal Culture -- (A)     Yeast  Heavy growth  second colony type      AFB CULTURE [[id number]] Collected:  02/18/18 1150    Order Status:  Completed Specimen:  Respirate Updated:  02/21/18 1510     Significant Indicator NEG     Source RESP     Site Quantitative BAL RLL     AFB Culture Culture in progress.     AFB Smear Results No acid fast bacilli seen.    PNEUMOCYSTIS DFA " [[id number]] Collected:  02/18/18 1150    Order Status:  Completed Specimen:  Respirate Updated:  02/21/18 1510     Significant Indicator NEG     Source RESP     Site Quantitative BAL RLL     Pneumocystis Stain No Pneumocystis jiroveci (P.carinii) detected by DFA.          Assessment:  Active Hospital Problems    Diagnosis   • *Acute respiratory failure (CMS-HCC) [J96.00]   • Dysphagia [R13.10]   • Gram-negative bacteremia [R78.81]   • Septic shock (CMS-HCC) [A41.9, R65.21]   • Pyelonephritis [N12]   • Hydronephrosis with urinary obstruction due to renal calculus [N13.2]   • History of completed stroke [Z86.73]   • Thrombocytopenia (CMS-HCC) [D69.6]   • Hyponatremia [E87.1]   • Chronic pain [G89.29]   • Arm swelling [M79.89]   • CHERYL (acute kidney injury) (CMS-HCC) [N17.9]   • Metabolic acidosis [E87.2]   • Encephalopathy acute [G93.40]       Plan:  Pneumonitis  2/2 pulm edema vs mucous plugging vs aspiration  Extubated 2/21  Off pressors  S/p bronch with BAL 2/18  BAL cultures Cglabrata  Aspiration precautions    Pyelonephritis  Afebrile   Leukocytosis resolved  status post emergent stent placement  Urine and  Blood cxs 2/13 Ecoli  Repeat BCx 2/19 - NGTD  Continue  IV ceftriaxone and she will require PO suppression (cefdinir) as the stent may be infected  Anticipate 2 weeks of abx from date of 1st neg Bcx  PICC when able  Stop date 03/05/18 follow by PO cefdinir given retained stent    Gram neg sepsis  Due to above  On abx as above    Aspiration risk  GI to see  Failed mult swallow eval  Prior gastric bypass so Cortrak placement problematic    SNF placement - ok to transfer from ID standpoint    WILDER David

## 2018-02-27 NOTE — DISCHARGE PLANNING
Transport arranged with [facility]. Patient will be leaving today @1630 via [facility] going to [facility]. MYKE Rosas notified.

## 2018-02-27 NOTE — CARE PLAN
Problem: Infection  Goal: Will remain free from infection  Outcome: PROGRESSING AS EXPECTED    Intervention: Assess signs and symptoms of infection  No s&s of infection noted at PICC line site.       Problem: Skin Integrity  Goal: Risk for impaired skin integrity will decrease  Outcome: PROGRESSING AS EXPECTED    Intervention: Assess risk factors for impaired skin integrity and/or pressure ulcers  IAD on sacral area. Barrier cream at the bedside. Generalized bruising noted BLE.

## 2018-02-27 NOTE — PROGRESS NOTES
Patient a+o x 4, expressive aphasia, right sided weakness 2/2 hx of cva, deniessob/lightheadedness/numbness/tingling/dizziness/chest pain/n/v/d. Denies pain throughout shift.  Tolerating diet with no s&s of aspiration. Vss, afebrile, iv abx admin a/o, picc line present to lue.  present this am. Fall precautions/hourly rounding maintained, call light within reach and functioning, all items within reach.  Patient encouraged to call for assistance, poc reviewed with patient and , ?'s/concerns answered. Patient states she is ready to be discharged to rehab ([facility]).

## 2018-02-27 NOTE — DISCHARGE PLANNING
Physiatry Dr. Oquendo re-consult today recommending skilled nursing for post acute care. Voice message to MYKE Rosas [phone].

## 2018-02-27 NOTE — DISCHARGE SUMMARY
CHIEF COMPLAINT ON ADMISSION  Chief Complaint   Patient presents with   • ALOC       CODE STATUS  Full Code    HPI & HOSPITAL COURSE  This is a 57 y.o. year old female with pmh of brain aneurysm, chronic pain and CVA was admitted on 2/13/18 after presenting to ER as a transfer from outside facility for ALOC, and also having some abdominal pain. Pt was found to have septic shock and also pyelonephritis and also hydronephrosis and nephrolithiasis. Pt was admitted to critical care unit, and was started on pressor and also on ceftriaxone. Urology was consulted and pt had cystoscopy and stent placed. Pt septic shock slowly improved and pressors were d/c and she was transferred to medical floor on 2/17/18. Her blood culture grew e.coli, so infection disease was consulted.  On 2/18/18 pt had acute respiratory failure due to possible not able to clear her secretion and possible mucus plug. Pt was transferred to ICU, and required intubation, she also had a bronchoscopy done on 2/18/18 showing large plug completely obstructing the left main bronchus, this was suctioned and withdrawn with the bronchoscope out of the patient's ET tube.  Pt symptoms, gradually improved, she was extubated on 2/21/18.  Pt has now improved, she was also seen by physical therapy and skilled has been recommended.   Per Infection disease recommendation she will need to be on IV ceftriaxone till 3/5/18 and after that will need to be on po cefdinir, since she has a ureteral stent.  Pt will be discharged to skilled today, she will need to follow up with urology as outpt.      DISCHARGE PROBLEM LIST  Principal Problem:    Septic shock (CMS-[facility]) POA: Yes    Acute respiratory failure (CMS-HCC) POA: Unknown    Pyelonephritis POA: Yes    Hydronephrosis with urinary obstruction due to renal calculus POA: Yes    History of completed stroke POA: Yes    Dysphagia POA: Yes    CHERYL (acute kidney injury) (CMS-HCC) POA: Yes    Encephalopathy acute POA: Yes    Chronic  pain POA: Yes    Hypokalemia POA: Unknown    Gram-negative bacteremia POA: Yes    Hyponatremia POA: Yes    Thrombocytopenia (CMS-HCC) POA: Yes    Ureteral stone POA: Yes    Arm swelling POA: Yes    Metabolic acidosis POA: Yes    Hypernatremia POA: Unknown      FOLLOW UP  No future appointments.  Monico Killian M.D.  [address]  [address]  [place] [address]  [phone]      The hospital  spoke to the office regarding a follow up appointment. They are waiting to hear from the doctor before scheduling an appointment. They will be calling you directly to discuss this.    [facility] ([facility])  [address]  [facility] [address]  [phone]          MEDICATIONS ON DISCHARGE   Nydia Finch   Home Medication Instructions MARIO:[id number]    Printed on:02/27/18 3788   Medication Information                      cefTRIAXone (ROCEPHIN)  20 mL by Intravenous route every 24 hours for 5 days.             Docusate Calcium (STOOL SOFTENER PO)  Take 2 Tabs by mouth every day.             DULoxetine (CYMBALTA) 60 MG Cap DR Particles delayed-release capsule  Take 60 mg by mouth 2 times a day.             fentaNYL (DURAGESIC) 100 MCG/HR PATCH 72 HR  Apply 1 Patch to skin as directed every 48 hours.             FLUoxetine (PROZAC) 20 MG Cap  Take 20 mg by mouth every day.             folic acid (FOLVITE) 1 MG Tab  Take 2 mg by mouth every day.             ipratropium-albuterol (DUONEB) 0.5-2.5 (3) MG/3ML nebulizer solution  3 mL by Nebulization route every four hours as needed for Shortness of Breath.             oxyCODONE immediate-release (ROXICODONE) 5 MG Tab  Take 1 Tab by mouth every 6 hours as needed for up to 3 days.             pregabalin (LYRICA) 150 MG Cap  Take 150 mg by mouth 2 times a day.                 DIET  Orders Placed This Encounter   Procedures   • DIET ORDER     Standing Status:   Standing     Number of Occurrences:   1     Order Specific Question:   Diet:     Answer:   Regular [1]     Order Specific  Question:   Texture/Fiber modifications:     Answer:   Dysphagia 2(Pureed/Chopped)specify fluid consistency(question 6) [2]     Order Specific Question:   Consistency/Fluid modifications:     Answer:   Nectar Thick [2]     Comments:   Spoonful of regular water between meals ok per SLP     Order Specific Question:   Miscellaneous modifications:     Answer:   SLP - 1:1 Supervision by Nursing [21]     Order Specific Question:   Miscellaneous modifications:     Answer:   SLP - Deliver to Nursing Station [22]       ACTIVITY  As tolerated.      LINES, DRAINS, AND WOUNDS  This is an automated list. Peripheral IVs will be removed prior to discharge.  Central Line Group Left;Basilic Single Lumen;PICC;Power Injection Catheter 4 (Active)   Line Length (cm) 42 cm 2/22/2018  1:35 PM   Line Secured Securing Device;Securement Dressing;Transparent 2/27/2018  8:00 AM   Patency and Function Check Performed at Beginning of Shift 2/27/2018  8:00 AM   Line Necessity Assessed Antibiotic Therapy Greater than 7 Days 2/27/2018  8:00 AM   Closed Tubing Set Up Yes 2/27/2018  8:00 AM   Hand Washing / Gloves Prior to Every Access Yes 2/27/2018  8:00 AM   Next Daily Chlorhexidine Bath Due (Regional ONLY) 02/26/18 2/26/2018  7:50 PM   Port Access  Scrub the Hub Prior to Access 2/27/2018  8:00 AM   Site Condition / Description Assessed;Patent;Clean;Dry;Intact 2/27/2018  8:00 AM   Signs and Symptoms of Infection None Apparent at this Time 2/27/2018  8:00 AM   Dressing Type / Description Antimicrobial Patch (BioPatch) 2/27/2018  8:00 AM   Dressing Status Observed 2/27/2018  8:00 AM   Next Dressing Change  03/03/18 2/27/2018  8:00 AM   Date Primary Tubing Changed 02/27/18 2/27/2018  4:00 AM   Date Secondary Tubing Changed 02/23/18 2/23/2018  8:00 PM   NEXT Primary Tubing Change  03/03/18 2/27/2018  4:00 AM   NEXT Secondary Tubing Change  02/24/18 2/23/2018  8:00 PM   Date IV Connector(s) Changed 02/24/18 2/25/2018  8:30 PM   NEXT IV Connector(s)  Change Date 02/27/18 2/25/2018  8:30 PM       Incontinence Associated Dermatitis Posterior Perineum (Active)   Drainage  None 2/27/2018  8:57 AM   Periwound Skin Pink;Red 2/27/2018  8:57 AM   Cleansing Dimethecone Wipes 2/27/2018  8:57 AM   Periwound Protectant Barrier Paste 2/27/2018  8:57 AM                  MENTAL STATUS ON TRANSFER  Level of Consciousness: Alert  Orientation : Oriented x 4  Speech: Expressive Aphasia    CONSULTATIONS  Infection disease: Dr. Jackson  Pulmonology: Dr. Pillai   Urology: Dr. Killian    PROCEDURES  Intubation  Central line  Bronchoscopy     LABORATORY  Lab Results   Component Value Date/Time    SODIUM 134 (L) 02/26/2018 04:00 AM    POTASSIUM 3.9 02/26/2018 04:00 AM    CHLORIDE 101 02/26/2018 04:00 AM    CO2 27 02/26/2018 04:00 AM    GLUCOSE 112 (H) 02/26/2018 04:00 AM    BUN 7 (L) 02/26/2018 04:00 AM    CREATININE 0.46 (L) 02/26/2018 04:00 AM        Lab Results   Component Value Date/Time    WBC 9.1 02/26/2018 04:00 AM    HEMOGLOBIN 9.5 (L) 02/26/2018 04:00 AM    HEMATOCRIT 29.8 (L) 02/26/2018 04:00 AM    PLATELETCT 457 (H) 02/26/2018 04:00 AM        Total time of the discharge process exceeds 45 minutes.

## 2018-02-27 NOTE — DISCHARGE PLANNING
note:  M for Nikky Underwood  swing bed coordinator .   Talked to Ayla Mercado at [facility], she said that Nikky is the person we are waiting for to decide on acceptance.     Addendum:  Ayla Mercado at [facility] who said that Nikky Underwood will not be in today. However, they have decided to decline accepting pt because they cannot meet her needs.    Addendum:   initially said that he wanted to take pt home. He was upset that [facility] declined. He said that he has called around before and there is no homehealth in their area. Then he approached MD and I to tell us that he would want to transfer pt to an SNF here in [place] or the inpatient acute rehab. He was agreeable to leave wife here in [place] for the STR and be back for her on his days off.  [facility] referral was ok. SW to follow up with IRF.

## 2018-02-28 NOTE — DISCHARGE INSTRUCTIONS
Discharge Instructions    Discharged to other by [facility] with escort. Discharged via wheelchair, hospital escort: Yes.  Special equipment needed: Not Applicable    Be sure to schedule a follow-up appointment with your primary care doctor or any specialists as instructed.     Discharge Plan:   Influenza Vaccine Indication: Patient Refuses    I understand that a diet low in cholesterol, fat, and sodium is recommended for good health. Unless I have been given specific instructions below for another diet, I accept this instruction as my diet prescription.   Other diet: dysphagia 2 diet with nectar think liquids    Special Instructions: Sepsis, Adult  Sepsis is a serious infection of your blood or tissues that affects your whole body. The infection that causes sepsis may be bacterial, viral, fungal, or parasitic. Sepsis may be life threatening. Sepsis can cause your blood pressure to drop. This may result in shock. Shock causes your central nervous system and your organs to stop working correctly.   RISK FACTORS  Sepsis can happen in anyone, but it is more likely to happen in people who have weakened immune systems.  SIGNS AND SYMPTOMS   Symptoms of sepsis can include:  · Fever or low body temperature (hypothermia).  · Rapid breathing (hyperventilation).  · Chills.  · Rapid heartbeat (tachycardia).  · Confusion or light-headedness.  · Trouble breathing.  · Urinating much less than usual.  · Cool, clammy skin or red, flushed skin.  · Other problems with the heart, kidneys, or brain.  DIAGNOSIS   Your health care provider will likely do tests to look for an infection, to see if the infection has spread to your blood, and to see how serious your condition is. Tests can include:  · Blood tests, including cultures of your blood.  · Cultures of other fluids from your body, such as:  ¨ Urine.  ¨ Pus from wounds.  ¨ Mucus coughed up from your lungs.  · Urine tests other than cultures.  · X-ray exams or other imaging  tests.  TREATMENT   Treatment will begin with elimination of the source of infection. If your sepsis is likely caused by a bacterial or fungal infection, you will be given antibiotic or antifungal medicines.  You may also receive:  · Oxygen.  · Fluids through an IV tube.  · Medicines to increase your blood pressure.  · A machine to clean your blood (dialysis) if your kidneys fail.  · A machine to help you breathe if your lungs fail.  SEEK IMMEDIATE MEDICAL CARE IF:  You get an infection or develop any of the signs and symptoms of sepsis after surgery or a hospitalization.     This information is not intended to replace advice given to you by your health care provider. Make sure you discuss any questions you have with your health care provider.     Document Released: 09/15/2004 Document Revised: 05/03/2016 Document Reviewed: 08/25/2014  [employer] Interactive Patient Education ©2016 [employer]      · Is patient discharged on Warfarin / Coumadin?   No     Depression / Suicide Risk    As you are discharged from this [facility] facility, it is important to learn how to keep safe from harming yourself.    Recognize the warning signs:  · Abrupt changes in personality, positive or negative- including increase in energy   · Giving away possessions  · Change in eating patterns- significant weight changes-  positive or negative  · Change in sleeping patterns- unable to sleep or sleeping all the time   · Unwillingness or inability to communicate  · Depression  · Unusual sadness, discouragement and loneliness  · Talk of wanting to die  · Neglect of personal appearance   · Rebelliousness- reckless behavior  · Withdrawal from people/activities they love  · Confusion- inability to concentrate     If you or a loved one observes any of these behaviors or has concerns about self-harm, here's what you can do:  · Talk about it- your feelings and reasons for harming yourself  · Remove any means that you might use to hurt yourself  (examples: pills, rope, extension cords, firearm)  · Get professional help from the community (Mental Health, Substance Abuse, psychological counseling)  · Do not be alone:Call your Safe Contact- someone whom you trust who will be there for you.  · Call your local CRISIS HOTLINE 374-5580 or 900-673-3897  · Call your local Children's Mobile Crisis Response Team [place] (922) 930-4348 or www.[employer]  · Call the toll free National Suicide Prevention Hotlines   · National Suicide Prevention Lifeline 050-614-SZTG (3885)  · National Hope Line Network 800-SUICIDE (895-5747)

## 2018-03-14 LAB
FUNGUS SPEC CULT: ABNORMAL
SIGNIFICANT IND 70042: ABNORMAL
SITE SITE: ABNORMAL
SOURCE SOURCE: ABNORMAL

## 2018-03-28 NOTE — OP REPORT
Preoperative diagnosis-critically ill patient in sepsis, presumably from a urinary source. Market right hydronephrosis secondary to a 7 mm obstructing mid right ureteral calculus.  Postoperative diagnosis-same as preoperative diagnosis and pyonephrosis right kidney.  Procedure performed-cystoscopy, right retrograde pyelogram and placement of a right double-J ureteral stent.  Surgeon-Monico Killian M.D.  Anesthesiologist Steve Hanna M.D.  Anesthesia-type general anesthesia  Specimen-none  Drains-right double-J ureteral stent and a 16 Tanzanian Gauge Olmedo catheter.    Procedure in detail:  Patient's brought into the operating room and transferred to a portable OR table. She same given excellent general anesthetic by Dr. Hanna. She simply placed into the dorsal lithotomy position. She is prepped and draped in usual fashion. A timeout was then done to confirm patient identification, procedure to be performed, site of procedure to be performed, review patient allergies and review preoperative antibiotics. Once timeout was completed and agreed upon the case was started.    A 21 Tanzanian cystoscope with a 30° angled lens and an introducing obturator is white balanced and lubricated. It was then passed through the urethra and into the urinary bladder without difficulty. No lesions are noted in the urethra. The bladder shows no tumor stones or diverticula. Ureteral orifices are orthotopic in position. The right ureteral orifice is cannulated with the 035 sensor wire. Under fluoroscopic control the wire seen passing up near the region of the right kidney. Purulent fluid is draining out of the right ureter after placement of the wire. I then passed a 4.8 Tanzanian open-ended ureteral catheter over the guidewire. Once up to the level of the kidney and guidewires removed. 50% diluted Conray isn't injected through the ureteral catheter showing that is in excellent position within the right renal pelvis. The right kidney is markedly  hydronephrotic.    The guidewires were placed into the ureteral catheter. The ureteral catheters and removed. A 6 Iraqi 24 cm length double-J stent was then passed over the guidewire. It isn't pushed up to level the renal pelvis. The guidewire was pulled back partially allowing the upper curl of the stent engaged within the urinary collecting system of the right kidney. The guidewires and completely removed allowing the lower end of the stent to engaged within the urinary bladder.    At that point the scope was removed. A 16 Iraqi temperature gauge catheters and lubricated passed through the urethra and bladder. 10 mL replacement balloon is set up to gravity drainage. The prep was then cleaned off the patient. Taken out of the dorsal lithotomy position. She was transferred back to recovery room. She is in critical condition at this point.   mother, residents, nursing

## 2018-04-15 LAB
MYCOBACTERIUM SPEC CULT: NORMAL
RHODAMINE-AURAMINE STN SPEC: NORMAL
RHODAMINE-AURAMINE STN SPEC: NORMAL
SIGNIFICANT IND 70042: NORMAL
SITE SITE: NORMAL
SOURCE SOURCE: NORMAL

## 2019-07-04 ENCOUNTER — APPOINTMENT (OUTPATIENT)
Dept: RADIOLOGY | Facility: MEDICAL CENTER | Age: 59
DRG: 856 | End: 2019-07-04
Attending: UROLOGY
Payer: MEDICARE

## 2019-07-04 ENCOUNTER — HOSPITAL ENCOUNTER (OUTPATIENT)
Facility: MEDICAL CENTER | Age: 59
DRG: 856 | End: 2019-07-04
Admitting: INTERNAL MEDICINE
Payer: MEDICARE

## 2019-07-04 ENCOUNTER — ANESTHESIA EVENT (OUTPATIENT)
Dept: SURGERY | Facility: MEDICAL CENTER | Age: 59
DRG: 856 | End: 2019-07-04
Payer: MEDICARE

## 2019-07-04 ENCOUNTER — HOSPITAL ENCOUNTER (INPATIENT)
Facility: MEDICAL CENTER | Age: 59
LOS: 2 days | DRG: 856 | End: 2019-07-06
Attending: INTERNAL MEDICINE | Admitting: INTERNAL MEDICINE
Payer: MEDICARE

## 2019-07-04 ENCOUNTER — ANESTHESIA (OUTPATIENT)
Dept: SURGERY | Facility: MEDICAL CENTER | Age: 59
DRG: 856 | End: 2019-07-04
Payer: MEDICARE

## 2019-07-04 PROBLEM — N39.0 SEPSIS DUE TO GRAM-NEGATIVE UTI (HCC): Status: ACTIVE | Noted: 2019-07-04

## 2019-07-04 PROBLEM — N13.9 OBSTRUCTIVE UROPATHY: Status: ACTIVE | Noted: 2019-07-04

## 2019-07-04 PROBLEM — J44.9 COPD (CHRONIC OBSTRUCTIVE PULMONARY DISEASE) (HCC): Status: ACTIVE | Noted: 2019-07-04

## 2019-07-04 PROBLEM — A41.50 SEPSIS DUE TO GRAM-NEGATIVE UTI (HCC): Status: ACTIVE | Noted: 2019-07-04

## 2019-07-04 LAB
ALBUMIN SERPL BCP-MCNC: 2.5 G/DL (ref 3.2–4.9)
APTT PPP: 39.9 SEC (ref 24.7–36)
BUN SERPL-MCNC: 15 MG/DL (ref 8–22)
CALCIUM SERPL-MCNC: 8 MG/DL (ref 8.5–10.5)
CHLORIDE SERPL-SCNC: 106 MMOL/L (ref 96–112)
CO2 SERPL-SCNC: 17 MMOL/L (ref 20–33)
CREAT SERPL-MCNC: 1.02 MG/DL (ref 0.5–1.4)
ERYTHROCYTE [DISTWIDTH] IN BLOOD BY AUTOMATED COUNT: 45.3 FL (ref 35.9–50)
GLUCOSE SERPL-MCNC: 105 MG/DL (ref 65–99)
HCT VFR BLD AUTO: 25.3 % (ref 37–47)
HGB BLD-MCNC: 8.3 G/DL (ref 12–16)
INR PPP: 1.27 (ref 0.87–1.13)
LACTATE BLD-SCNC: 0.8 MMOL/L (ref 0.5–2)
MCH RBC QN AUTO: 28.3 PG (ref 27–33)
MCHC RBC AUTO-ENTMCNC: 32.8 G/DL (ref 33.6–35)
MCV RBC AUTO: 86.3 FL (ref 81.4–97.8)
PHOSPHATE SERPL-MCNC: 2.1 MG/DL (ref 2.5–4.5)
PLATELET # BLD AUTO: 347 K/UL (ref 164–446)
PMV BLD AUTO: 10.3 FL (ref 9–12.9)
POTASSIUM SERPL-SCNC: 3.8 MMOL/L (ref 3.6–5.5)
PROTHROMBIN TIME: 16.2 SEC (ref 12–14.6)
RBC # BLD AUTO: 2.93 M/UL (ref 4.2–5.4)
SODIUM SERPL-SCNC: 130 MMOL/L (ref 135–145)
WBC # BLD AUTO: 25.6 K/UL (ref 4.8–10.8)

## 2019-07-04 PROCEDURE — 700102 HCHG RX REV CODE 250 W/ 637 OVERRIDE(OP): Performed by: INTERNAL MEDICINE

## 2019-07-04 PROCEDURE — 700117 HCHG RX CONTRAST REV CODE 255: Performed by: UROLOGY

## 2019-07-04 PROCEDURE — 85730 THROMBOPLASTIN TIME PARTIAL: CPT

## 2019-07-04 PROCEDURE — C1758 CATHETER, URETERAL: HCPCS | Performed by: UROLOGY

## 2019-07-04 PROCEDURE — 700105 HCHG RX REV CODE 258: Performed by: INTERNAL MEDICINE

## 2019-07-04 PROCEDURE — 770020 HCHG ROOM/CARE - TELE (206)

## 2019-07-04 PROCEDURE — 0TCB8ZZ EXTIRPATION OF MATTER FROM BLADDER, VIA NATURAL OR ARTIFICIAL OPENING ENDOSCOPIC: ICD-10-PCS | Performed by: UROLOGY

## 2019-07-04 PROCEDURE — 501329 HCHG SET, CYSTO IRRIG Y TUR: Performed by: UROLOGY

## 2019-07-04 PROCEDURE — C1769 GUIDE WIRE: HCPCS | Performed by: UROLOGY

## 2019-07-04 PROCEDURE — 160036 HCHG PACU - EA ADDL 30 MINS PHASE I: Performed by: UROLOGY

## 2019-07-04 PROCEDURE — A9270 NON-COVERED ITEM OR SERVICE: HCPCS | Performed by: INTERNAL MEDICINE

## 2019-07-04 PROCEDURE — 500042 HCHG BAG, URINARY DRAINAGE (CLOSED): Performed by: UROLOGY

## 2019-07-04 PROCEDURE — 83605 ASSAY OF LACTIC ACID: CPT

## 2019-07-04 PROCEDURE — 160039 HCHG SURGERY MINUTES - EA ADDL 1 MIN LEVEL 3: Performed by: UROLOGY

## 2019-07-04 PROCEDURE — 700111 HCHG RX REV CODE 636 W/ 250 OVERRIDE (IP): Performed by: ANESTHESIOLOGY

## 2019-07-04 PROCEDURE — 85610 PROTHROMBIN TIME: CPT

## 2019-07-04 PROCEDURE — A4338 INDWELLING CATHETER LATEX: HCPCS | Performed by: UROLOGY

## 2019-07-04 PROCEDURE — C2617 STENT, NON-COR, TEM W/O DEL: HCPCS | Performed by: UROLOGY

## 2019-07-04 PROCEDURE — 160009 HCHG ANES TIME/MIN: Performed by: UROLOGY

## 2019-07-04 PROCEDURE — 80069 RENAL FUNCTION PANEL: CPT

## 2019-07-04 PROCEDURE — 700105 HCHG RX REV CODE 258: Performed by: ANESTHESIOLOGY

## 2019-07-04 PROCEDURE — 160028 HCHG SURGERY MINUTES - 1ST 30 MINS LEVEL 3: Performed by: UROLOGY

## 2019-07-04 PROCEDURE — 500880 HCHG PACK, CYSTO W/SEP LEGGINGS: Performed by: UROLOGY

## 2019-07-04 PROCEDURE — 700101 HCHG RX REV CODE 250: Performed by: ANESTHESIOLOGY

## 2019-07-04 PROCEDURE — 85027 COMPLETE CBC AUTOMATED: CPT

## 2019-07-04 PROCEDURE — 0T738DZ DILATION OF RIGHT KIDNEY PELVIS WITH INTRALUMINAL DEVICE, VIA NATURAL OR ARTIFICIAL OPENING ENDOSCOPIC: ICD-10-PCS | Performed by: UROLOGY

## 2019-07-04 PROCEDURE — 160002 HCHG RECOVERY MINUTES (STAT): Performed by: UROLOGY

## 2019-07-04 PROCEDURE — 160048 HCHG OR STATISTICAL LEVEL 1-5: Performed by: UROLOGY

## 2019-07-04 PROCEDURE — 99223 1ST HOSP IP/OBS HIGH 75: CPT | Performed by: INTERNAL MEDICINE

## 2019-07-04 PROCEDURE — 160035 HCHG PACU - 1ST 60 MINS PHASE I: Performed by: UROLOGY

## 2019-07-04 PROCEDURE — BT1D1ZZ FLUOROSCOPY OF RIGHT KIDNEY, URETER AND BLADDER USING LOW OSMOLAR CONTRAST: ICD-10-PCS | Performed by: UROLOGY

## 2019-07-04 PROCEDURE — 36415 COLL VENOUS BLD VENIPUNCTURE: CPT

## 2019-07-04 DEVICE — STENT UROLOGICAL POLARIS 6X24  ULTRA: Type: IMPLANTABLE DEVICE | Status: FUNCTIONAL

## 2019-07-04 RX ORDER — PROMETHAZINE HYDROCHLORIDE 25 MG/1
12.5-25 TABLET ORAL EVERY 4 HOURS PRN
Status: DISCONTINUED | OUTPATIENT
Start: 2019-07-04 | End: 2019-07-06 | Stop reason: HOSPADM

## 2019-07-04 RX ORDER — HYDRALAZINE HYDROCHLORIDE 20 MG/ML
5 INJECTION INTRAMUSCULAR; INTRAVENOUS
Status: DISCONTINUED | OUTPATIENT
Start: 2019-07-04 | End: 2019-07-04 | Stop reason: HOSPADM

## 2019-07-04 RX ORDER — PHENYLEPHRINE HYDROCHLORIDE 10 MG/ML
INJECTION, SOLUTION INTRAMUSCULAR; INTRAVENOUS; SUBCUTANEOUS PRN
Status: DISCONTINUED | OUTPATIENT
Start: 2019-07-04 | End: 2019-07-04 | Stop reason: SURG

## 2019-07-04 RX ORDER — LACTOBACILLUS RHAMNOSUS GG 10B CELL
1 CAPSULE ORAL
Status: DISCONTINUED | OUTPATIENT
Start: 2019-07-05 | End: 2019-07-06 | Stop reason: HOSPADM

## 2019-07-04 RX ORDER — IPRATROPIUM BROMIDE AND ALBUTEROL SULFATE 2.5; .5 MG/3ML; MG/3ML
3 SOLUTION RESPIRATORY (INHALATION) EVERY 4 HOURS PRN
Status: DISCONTINUED | OUTPATIENT
Start: 2019-07-04 | End: 2019-07-06 | Stop reason: HOSPADM

## 2019-07-04 RX ORDER — BISACODYL 10 MG
10 SUPPOSITORY, RECTAL RECTAL
Status: DISCONTINUED | OUTPATIENT
Start: 2019-07-04 | End: 2019-07-05 | Stop reason: HOSPADM

## 2019-07-04 RX ORDER — SODIUM CHLORIDE, SODIUM LACTATE, POTASSIUM CHLORIDE, CALCIUM CHLORIDE 600; 310; 30; 20 MG/100ML; MG/100ML; MG/100ML; MG/100ML
INJECTION, SOLUTION INTRAVENOUS
Status: DISCONTINUED | OUTPATIENT
Start: 2019-07-04 | End: 2019-07-04 | Stop reason: SURG

## 2019-07-04 RX ORDER — SODIUM CHLORIDE 9 MG/ML
500 INJECTION, SOLUTION INTRAVENOUS
Status: DISCONTINUED | OUTPATIENT
Start: 2019-07-04 | End: 2019-07-06 | Stop reason: HOSPADM

## 2019-07-04 RX ORDER — OXYCODONE HYDROCHLORIDE 10 MG/1
10 TABLET ORAL
Status: DISCONTINUED | OUTPATIENT
Start: 2019-07-04 | End: 2019-07-06 | Stop reason: HOSPADM

## 2019-07-04 RX ORDER — DEXAMETHASONE SODIUM PHOSPHATE 4 MG/ML
INJECTION, SOLUTION INTRA-ARTICULAR; INTRALESIONAL; INTRAMUSCULAR; INTRAVENOUS; SOFT TISSUE PRN
Status: DISCONTINUED | OUTPATIENT
Start: 2019-07-04 | End: 2019-07-04 | Stop reason: SURG

## 2019-07-04 RX ORDER — DIPHENHYDRAMINE HYDROCHLORIDE 50 MG/ML
12.5 INJECTION INTRAMUSCULAR; INTRAVENOUS
Status: DISCONTINUED | OUTPATIENT
Start: 2019-07-04 | End: 2019-07-04 | Stop reason: HOSPADM

## 2019-07-04 RX ORDER — ONDANSETRON 2 MG/ML
INJECTION INTRAMUSCULAR; INTRAVENOUS PRN
Status: DISCONTINUED | OUTPATIENT
Start: 2019-07-04 | End: 2019-07-04 | Stop reason: SURG

## 2019-07-04 RX ORDER — CEFTRIAXONE 1 G/1
INJECTION, POWDER, FOR SOLUTION INTRAMUSCULAR; INTRAVENOUS PRN
Status: DISCONTINUED | OUTPATIENT
Start: 2019-07-04 | End: 2019-07-04 | Stop reason: SURG

## 2019-07-04 RX ORDER — HALOPERIDOL 5 MG/ML
1 INJECTION INTRAMUSCULAR
Status: DISCONTINUED | OUTPATIENT
Start: 2019-07-04 | End: 2019-07-04 | Stop reason: HOSPADM

## 2019-07-04 RX ORDER — MEPERIDINE HYDROCHLORIDE 25 MG/ML
12.5 INJECTION INTRAMUSCULAR; INTRAVENOUS; SUBCUTANEOUS
Status: DISCONTINUED | OUTPATIENT
Start: 2019-07-04 | End: 2019-07-04 | Stop reason: HOSPADM

## 2019-07-04 RX ORDER — DULOXETIN HYDROCHLORIDE 60 MG/1
60 CAPSULE, DELAYED RELEASE ORAL 2 TIMES DAILY
Status: DISCONTINUED | OUTPATIENT
Start: 2019-07-04 | End: 2019-07-06 | Stop reason: HOSPADM

## 2019-07-04 RX ORDER — PREGABALIN 150 MG/1
150 CAPSULE ORAL 2 TIMES DAILY
Status: DISCONTINUED | OUTPATIENT
Start: 2019-07-04 | End: 2019-07-06 | Stop reason: HOSPADM

## 2019-07-04 RX ORDER — ONDANSETRON 4 MG/1
4 TABLET, ORALLY DISINTEGRATING ORAL EVERY 4 HOURS PRN
Status: DISCONTINUED | OUTPATIENT
Start: 2019-07-04 | End: 2019-07-06 | Stop reason: HOSPADM

## 2019-07-04 RX ORDER — OXYCODONE HCL 5 MG/5 ML
10 SOLUTION, ORAL ORAL
Status: DISCONTINUED | OUTPATIENT
Start: 2019-07-04 | End: 2019-07-04 | Stop reason: HOSPADM

## 2019-07-04 RX ORDER — SODIUM CHLORIDE 9 MG/ML
INJECTION, SOLUTION INTRAVENOUS CONTINUOUS
Status: DISCONTINUED | OUTPATIENT
Start: 2019-07-04 | End: 2019-07-06 | Stop reason: HOSPADM

## 2019-07-04 RX ORDER — ONDANSETRON 2 MG/ML
4 INJECTION INTRAMUSCULAR; INTRAVENOUS
Status: DISCONTINUED | OUTPATIENT
Start: 2019-07-04 | End: 2019-07-04 | Stop reason: HOSPADM

## 2019-07-04 RX ORDER — MIDAZOLAM HYDROCHLORIDE 1 MG/ML
1 INJECTION INTRAMUSCULAR; INTRAVENOUS
Status: DISCONTINUED | OUTPATIENT
Start: 2019-07-04 | End: 2019-07-04 | Stop reason: HOSPADM

## 2019-07-04 RX ORDER — OXYCODONE HYDROCHLORIDE 5 MG/1
5 TABLET ORAL
Status: DISCONTINUED | OUTPATIENT
Start: 2019-07-04 | End: 2019-07-06 | Stop reason: HOSPADM

## 2019-07-04 RX ORDER — FLUOXETINE HYDROCHLORIDE 20 MG/1
20 CAPSULE ORAL DAILY
Status: DISCONTINUED | OUTPATIENT
Start: 2019-07-05 | End: 2019-07-06 | Stop reason: HOSPADM

## 2019-07-04 RX ORDER — PROMETHAZINE HYDROCHLORIDE 25 MG/1
12.5-25 SUPPOSITORY RECTAL EVERY 4 HOURS PRN
Status: DISCONTINUED | OUTPATIENT
Start: 2019-07-04 | End: 2019-07-06 | Stop reason: HOSPADM

## 2019-07-04 RX ORDER — AMOXICILLIN 250 MG
2 CAPSULE ORAL 2 TIMES DAILY
Status: DISCONTINUED | OUTPATIENT
Start: 2019-07-04 | End: 2019-07-05 | Stop reason: HOSPADM

## 2019-07-04 RX ORDER — SODIUM CHLORIDE, SODIUM LACTATE, POTASSIUM CHLORIDE, CALCIUM CHLORIDE 600; 310; 30; 20 MG/100ML; MG/100ML; MG/100ML; MG/100ML
INJECTION, SOLUTION INTRAVENOUS CONTINUOUS
Status: DISCONTINUED | OUTPATIENT
Start: 2019-07-04 | End: 2019-07-04 | Stop reason: HOSPADM

## 2019-07-04 RX ORDER — HYDROMORPHONE HYDROCHLORIDE 1 MG/ML
0.5 INJECTION, SOLUTION INTRAMUSCULAR; INTRAVENOUS; SUBCUTANEOUS
Status: DISCONTINUED | OUTPATIENT
Start: 2019-07-04 | End: 2019-07-06 | Stop reason: HOSPADM

## 2019-07-04 RX ORDER — ONDANSETRON 2 MG/ML
4 INJECTION INTRAMUSCULAR; INTRAVENOUS EVERY 4 HOURS PRN
Status: DISCONTINUED | OUTPATIENT
Start: 2019-07-04 | End: 2019-07-06 | Stop reason: HOSPADM

## 2019-07-04 RX ORDER — ACETAMINOPHEN 325 MG/1
650 TABLET ORAL EVERY 6 HOURS PRN
Status: DISCONTINUED | OUTPATIENT
Start: 2019-07-04 | End: 2019-07-06 | Stop reason: HOSPADM

## 2019-07-04 RX ORDER — SODIUM CHLORIDE 9 MG/ML
INJECTION, SOLUTION INTRAVENOUS CONTINUOUS
Status: DISCONTINUED | OUTPATIENT
Start: 2019-07-04 | End: 2019-07-05 | Stop reason: HOSPADM

## 2019-07-04 RX ORDER — FOLIC ACID 1 MG/1
2 TABLET ORAL DAILY
Status: DISCONTINUED | OUTPATIENT
Start: 2019-07-05 | End: 2019-07-06 | Stop reason: HOSPADM

## 2019-07-04 RX ORDER — POLYETHYLENE GLYCOL 3350 17 G/17G
1 POWDER, FOR SOLUTION ORAL
Status: DISCONTINUED | OUTPATIENT
Start: 2019-07-04 | End: 2019-07-05 | Stop reason: HOSPADM

## 2019-07-04 RX ORDER — OXYCODONE HCL 5 MG/5 ML
5 SOLUTION, ORAL ORAL
Status: DISCONTINUED | OUTPATIENT
Start: 2019-07-04 | End: 2019-07-04 | Stop reason: HOSPADM

## 2019-07-04 RX ADMIN — PROPOFOL 130 MG: 10 INJECTION, EMULSION INTRAVENOUS at 20:58

## 2019-07-04 RX ADMIN — DULOXETINE HYDROCHLORIDE 60 MG: 60 CAPSULE, DELAYED RELEASE ORAL at 23:36

## 2019-07-04 RX ADMIN — EPHEDRINE SULFATE 10 MG: 50 INJECTION INTRAMUSCULAR; INTRAVENOUS; SUBCUTANEOUS at 21:22

## 2019-07-04 RX ADMIN — FENTANYL CITRATE 50 MCG: 0.05 INJECTION, SOLUTION INTRAMUSCULAR; INTRAVENOUS at 22:23

## 2019-07-04 RX ADMIN — FENTANYL CITRATE 50 MCG: 50 INJECTION, SOLUTION INTRAMUSCULAR; INTRAVENOUS at 20:55

## 2019-07-04 RX ADMIN — ONDANSETRON 4 MG: 2 INJECTION INTRAMUSCULAR; INTRAVENOUS at 21:33

## 2019-07-04 RX ADMIN — LIDOCAINE HYDROCHLORIDE 40 MG: 20 INJECTION, SOLUTION INTRAVENOUS at 20:58

## 2019-07-04 RX ADMIN — DEXAMETHASONE SODIUM PHOSPHATE 8 MG: 4 INJECTION, SOLUTION INTRA-ARTICULAR; INTRALESIONAL; INTRAMUSCULAR; INTRAVENOUS; SOFT TISSUE at 21:23

## 2019-07-04 RX ADMIN — SODIUM CHLORIDE, POTASSIUM CHLORIDE, SODIUM LACTATE AND CALCIUM CHLORIDE: 600; 310; 30; 20 INJECTION, SOLUTION INTRAVENOUS at 20:34

## 2019-07-04 RX ADMIN — PHENYLEPHRINE HYDROCHLORIDE 100 MCG: 10 INJECTION INTRAVENOUS at 21:05

## 2019-07-04 RX ADMIN — PREGABALIN 150 MG: 150 CAPSULE ORAL at 23:36

## 2019-07-04 RX ADMIN — PHENYLEPHRINE HYDROCHLORIDE 100 MCG: 10 INJECTION INTRAVENOUS at 21:08

## 2019-07-04 RX ADMIN — CEFTRIAXONE SODIUM 2 G: 1 INJECTION, POWDER, FOR SOLUTION INTRAMUSCULAR; INTRAVENOUS at 21:12

## 2019-07-04 RX ADMIN — EPHEDRINE SULFATE 10 MG: 50 INJECTION INTRAMUSCULAR; INTRAVENOUS; SUBCUTANEOUS at 21:18

## 2019-07-04 RX ADMIN — SODIUM CHLORIDE: 9 INJECTION, SOLUTION INTRAVENOUS at 23:36

## 2019-07-04 RX ADMIN — EPHEDRINE SULFATE 10 MG: 50 INJECTION INTRAMUSCULAR; INTRAVENOUS; SUBCUTANEOUS at 21:14

## 2019-07-04 RX ADMIN — FENTANYL CITRATE 50 MCG: 50 INJECTION, SOLUTION INTRAMUSCULAR; INTRAVENOUS at 21:25

## 2019-07-04 RX ADMIN — PHENYLEPHRINE HYDROCHLORIDE 100 MCG: 10 INJECTION INTRAVENOUS at 21:11

## 2019-07-04 RX ADMIN — PHENYLEPHRINE HYDROCHLORIDE 100 MCG: 10 INJECTION INTRAVENOUS at 21:13

## 2019-07-04 ASSESSMENT — ENCOUNTER SYMPTOMS
VOMITING: 0
BLOOD IN STOOL: 0
WHEEZING: 0
NERVOUS/ANXIOUS: 0
WEAKNESS: 1
FLANK PAIN: 1
SHORTNESS OF BREATH: 0
FOCAL WEAKNESS: 1
HEMOPTYSIS: 0
DIARRHEA: 0
EYE PAIN: 0
ABDOMINAL PAIN: 0
FEVER: 1
HEADACHES: 0
COUGH: 0
CHILLS: 1
FALLS: 0
DIZZINESS: 0
CONSTIPATION: 0
EYE REDNESS: 0
PALPITATIONS: 0
TREMORS: 0
INSOMNIA: 0
NAUSEA: 0
MYALGIAS: 0
LOSS OF CONSCIOUSNESS: 0
SEIZURES: 0

## 2019-07-04 ASSESSMENT — COGNITIVE AND FUNCTIONAL STATUS - GENERAL
MOBILITY SCORE: 12
HELP NEEDED FOR BATHING: TOTAL
MOVING TO AND FROM BED TO CHAIR: A LOT
WALKING IN HOSPITAL ROOM: A LOT
CLIMB 3 TO 5 STEPS WITH RAILING: TOTAL
DAILY ACTIVITIY SCORE: 8
MOVING FROM LYING ON BACK TO SITTING ON SIDE OF FLAT BED: A LOT
SUGGESTED CMS G CODE MODIFIER MOBILITY: CL
DRESSING REGULAR LOWER BODY CLOTHING: TOTAL
SUGGESTED CMS G CODE MODIFIER DAILY ACTIVITY: CM
TURNING FROM BACK TO SIDE WHILE IN FLAT BAD: A LITTLE
DRESSING REGULAR UPPER BODY CLOTHING: TOTAL
STANDING UP FROM CHAIR USING ARMS: A LOT
PERSONAL GROOMING: TOTAL
EATING MEALS: A LITTLE
TOILETING: TOTAL

## 2019-07-04 ASSESSMENT — LIFESTYLE VARIABLES
EVER_SMOKED: YES
ALCOHOL_USE: NO

## 2019-07-04 ASSESSMENT — PAIN SCALES - GENERAL: PAIN_LEVEL: 2

## 2019-07-04 ASSESSMENT — PATIENT HEALTH QUESTIONNAIRE - PHQ9
SUM OF ALL RESPONSES TO PHQ9 QUESTIONS 1 AND 2: 0
1. LITTLE INTEREST OR PLEASURE IN DOING THINGS: NOT AT ALL
2. FEELING DOWN, DEPRESSED, IRRITABLE, OR HOPELESS: NOT AT ALL

## 2019-07-04 NOTE — PROGRESS NOTES
Direct admit from [facility], DARIA Sierra for Dr. Reddy, [phone].  Accepted by Dr. Killian for Sepsis and Pyelonephritis r/o abscess.  ADT signed & held @ 4611, needs to be released upon pt arrival.  No written orders received.  Pt coming by airflight.

## 2019-07-04 NOTE — PROGRESS NOTES
Transfer Center Note    Outlying facility and contact information: [facility]  Transferring provider: Julian HUFF  Reason for direct admission: Pyelonephritis, possible abscess vs hematoma, need for Urology consultation  Transferring patient's code status: full code  Transferring provider's HnP:   History of kidney stones, followed by Urology, at one point had cystoscopy and stent  More recently per EDPA, was seen at Urology for lithotripsy. Doing well for a week until last night she noted gross hematuria. Was at their ED at that point was afebrile, normotensive but tachycardic. No leukocytosis then. CT scan done showing R kidney enlargement with stranding and possible abscess. Early morning today still at their EDP, she was slightly febrile, tachycardic and hypotensive. Her WBC is now 34K. She was started already on Rocephin and received a shot of ertapenam.   Urine cultures grwoin GNB and blood cultures so far negative.     She has a history of stroke and has residual hemiparesis. She is essentially wheelchair bound but can do transfers. Was a nursing home resident at one point, was discharged to home with  taking care of her.    Vitals and transferring provider's exam prior to transfer: 137/80 hr 100 temp 99.1 not hypoxic  Imaging/EKG and labs: Pther than above, Cr- 1.3, K 3.1. Replaced  Transferring provider's evaluation: Sepsis, pyelkonephritis possible abscess  Transferring provider's management prior to transfer: IV antibiotics provided, IV fluids, blood pressure stabilized. Telemetry level admission.  Accepting consultants: Dr. Killian, Urology  Assessment and recommendations upon direct admission:  Contact Dr. Killian, Urology to alert patient is here. May need IR as well if drainage of abscess.  Currently NPO in case she needs surgical intervention or drainage  Nursing to input weight and medications. Expect IV Abx, pain control, bowel regimen, IV fluids to be ordered  Ordered STAT labs and  sepsis work-up.

## 2019-07-04 NOTE — PROGRESS NOTES
Medical records received from [facility]:  Transfer summary report, H&P, Radiology report, and Demographics.  Scanned into Media tab.

## 2019-07-05 PROBLEM — N13.9 OBSTRUCTIVE UROPATHY: Status: RESOLVED | Noted: 2019-07-04 | Resolved: 2019-07-05

## 2019-07-05 PROBLEM — N17.9 AKI (ACUTE KIDNEY INJURY) (HCC): Status: RESOLVED | Noted: 2018-02-13 | Resolved: 2019-07-05

## 2019-07-05 LAB
ANION GAP SERPL CALC-SCNC: 7 MMOL/L (ref 0–11.9)
APPEARANCE UR: ABNORMAL
BACTERIA #/AREA URNS HPF: ABNORMAL /HPF
BILIRUB UR QL STRIP.AUTO: ABNORMAL
BUN SERPL-MCNC: 14 MG/DL (ref 8–22)
CALCIUM SERPL-MCNC: 8.1 MG/DL (ref 8.5–10.5)
CHLORIDE SERPL-SCNC: 108 MMOL/L (ref 96–112)
CO2 SERPL-SCNC: 18 MMOL/L (ref 20–33)
COLOR UR: ABNORMAL
CREAT SERPL-MCNC: 0.88 MG/DL (ref 0.5–1.4)
EPI CELLS #/AREA URNS HPF: ABNORMAL /HPF
ERYTHROCYTE [DISTWIDTH] IN BLOOD BY AUTOMATED COUNT: 46.6 FL (ref 35.9–50)
GLUCOSE SERPL-MCNC: 127 MG/DL (ref 65–99)
GLUCOSE UR STRIP.AUTO-MCNC: NEGATIVE MG/DL
HCT VFR BLD AUTO: 25.2 % (ref 37–47)
HGB BLD-MCNC: 8 G/DL (ref 12–16)
KETONES UR STRIP.AUTO-MCNC: ABNORMAL MG/DL
LEUKOCYTE ESTERASE UR QL STRIP.AUTO: ABNORMAL
MCH RBC QN AUTO: 28.1 PG (ref 27–33)
MCHC RBC AUTO-ENTMCNC: 31.7 G/DL (ref 33.6–35)
MCV RBC AUTO: 88.4 FL (ref 81.4–97.8)
MICRO URNS: ABNORMAL
NITRITE UR QL STRIP.AUTO: POSITIVE
PH UR STRIP.AUTO: 6.5 [PH]
PLATELET # BLD AUTO: 349 K/UL (ref 164–446)
PMV BLD AUTO: 11.2 FL (ref 9–12.9)
POTASSIUM SERPL-SCNC: 3.7 MMOL/L (ref 3.6–5.5)
PROT UR QL STRIP: >=300 MG/DL
RBC # BLD AUTO: 2.85 M/UL (ref 4.2–5.4)
RBC # URNS HPF: >150 /HPF
RBC UR QL AUTO: ABNORMAL
SODIUM SERPL-SCNC: 133 MMOL/L (ref 135–145)
SP GR UR STRIP.AUTO: 1.01
UROBILINOGEN UR STRIP.AUTO-MCNC: 2 MG/DL
WBC # BLD AUTO: 23.5 K/UL (ref 4.8–10.8)
WBC #/AREA URNS HPF: ABNORMAL /HPF

## 2019-07-05 PROCEDURE — 97161 PT EVAL LOW COMPLEX 20 MIN: CPT

## 2019-07-05 PROCEDURE — 770001 HCHG ROOM/CARE - MED/SURG/GYN PRIV*

## 2019-07-05 PROCEDURE — 700111 HCHG RX REV CODE 636 W/ 250 OVERRIDE (IP): Performed by: INTERNAL MEDICINE

## 2019-07-05 PROCEDURE — 97165 OT EVAL LOW COMPLEX 30 MIN: CPT

## 2019-07-05 PROCEDURE — 99233 SBSQ HOSP IP/OBS HIGH 50: CPT | Performed by: HOSPITALIST

## 2019-07-05 PROCEDURE — 87186 SC STD MICRODIL/AGAR DIL: CPT

## 2019-07-05 PROCEDURE — 81001 URINALYSIS AUTO W/SCOPE: CPT

## 2019-07-05 PROCEDURE — 700102 HCHG RX REV CODE 250 W/ 637 OVERRIDE(OP): Performed by: INTERNAL MEDICINE

## 2019-07-05 PROCEDURE — 80048 BASIC METABOLIC PNL TOTAL CA: CPT

## 2019-07-05 PROCEDURE — 85027 COMPLETE CBC AUTOMATED: CPT

## 2019-07-05 PROCEDURE — 36415 COLL VENOUS BLD VENIPUNCTURE: CPT

## 2019-07-05 PROCEDURE — 87077 CULTURE AEROBIC IDENTIFY: CPT

## 2019-07-05 PROCEDURE — 87040 BLOOD CULTURE FOR BACTERIA: CPT | Mod: 91

## 2019-07-05 PROCEDURE — 87086 URINE CULTURE/COLONY COUNT: CPT

## 2019-07-05 PROCEDURE — A9270 NON-COVERED ITEM OR SERVICE: HCPCS | Performed by: INTERNAL MEDICINE

## 2019-07-05 PROCEDURE — 700105 HCHG RX REV CODE 258: Performed by: INTERNAL MEDICINE

## 2019-07-05 RX ADMIN — FOLIC ACID 2 MG: 1 TABLET ORAL at 05:35

## 2019-07-05 RX ADMIN — OXYCODONE HYDROCHLORIDE 10 MG: 10 TABLET ORAL at 01:15

## 2019-07-05 RX ADMIN — DULOXETINE HYDROCHLORIDE 60 MG: 60 CAPSULE, DELAYED RELEASE ORAL at 17:37

## 2019-07-05 RX ADMIN — FLUOXETINE HYDROCHLORIDE 20 MG: 20 CAPSULE ORAL at 05:36

## 2019-07-05 RX ADMIN — OXYCODONE HYDROCHLORIDE 10 MG: 10 TABLET ORAL at 23:05

## 2019-07-05 RX ADMIN — CEFTRIAXONE SODIUM 2 G: 2 INJECTION, POWDER, FOR SOLUTION INTRAMUSCULAR; INTRAVENOUS at 20:13

## 2019-07-05 RX ADMIN — DULOXETINE HYDROCHLORIDE 60 MG: 60 CAPSULE, DELAYED RELEASE ORAL at 05:36

## 2019-07-05 RX ADMIN — PREGABALIN 150 MG: 150 CAPSULE ORAL at 17:37

## 2019-07-05 RX ADMIN — Medication 1 CAPSULE: at 08:02

## 2019-07-05 RX ADMIN — SODIUM CHLORIDE: 9 INJECTION, SOLUTION INTRAVENOUS at 05:41

## 2019-07-05 RX ADMIN — SODIUM CHLORIDE: 9 INJECTION, SOLUTION INTRAVENOUS at 21:50

## 2019-07-05 RX ADMIN — PREGABALIN 150 MG: 150 CAPSULE ORAL at 05:36

## 2019-07-05 ASSESSMENT — ENCOUNTER SYMPTOMS
EYE DISCHARGE: 0
ABDOMINAL PAIN: 0
PALPITATIONS: 0
FOCAL WEAKNESS: 0
INSOMNIA: 0
TINGLING: 0
DEPRESSION: 0
FEVER: 0
DIZZINESS: 0
CLAUDICATION: 0
HEMOPTYSIS: 0
WEAKNESS: 0
MYALGIAS: 0
NAUSEA: 0
SHORTNESS OF BREATH: 0
NERVOUS/ANXIOUS: 0
PHOTOPHOBIA: 0
SINUS PAIN: 0

## 2019-07-05 ASSESSMENT — COGNITIVE AND FUNCTIONAL STATUS - GENERAL
WALKING IN HOSPITAL ROOM: A LOT
TURNING FROM BACK TO SIDE WHILE IN FLAT BAD: A LOT
MOVING FROM LYING ON BACK TO SITTING ON SIDE OF FLAT BED: A LOT
SUGGESTED CMS G CODE MODIFIER MOBILITY: CL
MOBILITY SCORE: 11
DAILY ACTIVITIY SCORE: 11
DRESSING REGULAR LOWER BODY CLOTHING: TOTAL
EATING MEALS: A LITTLE
CLIMB 3 TO 5 STEPS WITH RAILING: TOTAL
HELP NEEDED FOR BATHING: A LOT
MOVING TO AND FROM BED TO CHAIR: A LOT
PERSONAL GROOMING: A LOT
DRESSING REGULAR UPPER BODY CLOTHING: TOTAL
STANDING UP FROM CHAIR USING ARMS: A LOT
SUGGESTED CMS G CODE MODIFIER DAILY ACTIVITY: CL
TOILETING: A LOT

## 2019-07-05 ASSESSMENT — GAIT ASSESSMENTS
DISTANCE (FEET): 2
GAIT LEVEL OF ASSIST: MODERATE ASSIST
DEVIATION: DECREASED BASE OF SUPPORT
ASSISTIVE DEVICE: HAND HELD ASSIST

## 2019-07-05 ASSESSMENT — ACTIVITIES OF DAILY LIVING (ADL): TOILETING: DEPENDENT

## 2019-07-05 NOTE — CARE PLAN
Problem: Safety  Goal: Will remain free from falls  Outcome: PROGRESSING AS EXPECTED  Pt will let staff know when wishing to transfer to w/c. Pt and  verbalized agreement. Call light within reach. Bed locked and in lowest position. Hourly rounding in place.     Problem: Infection  Goal: Will remain free from infection  Outcome: PROGRESSING AS EXPECTED  Staff and family will perform hand hygiene upon entering and exiting pt room and before and after contact with pt. Pt and  verbalized agreement.

## 2019-07-05 NOTE — PROGRESS NOTES
Pt resting comfortably in bed speaking with . On RA. A&Ox4. No complaints at this time. Will monitor.

## 2019-07-05 NOTE — PROGRESS NOTES
2 RN skin check complete FERNANDO Barlow.   Devices in place telemetry box.  Skin assessed under devices - yes. Skin intact.   Confirmed pressure ulcers found on - none.  New potential pressure ulcers noted on R ischium. Site is open. Surrounding skin is pink and balnchable. Wound consult placed yes. Pictures taken and uploaded.   Redness noted under bilateral breasts. Both blanchable. Redness noted under skin folds on patients lower abdomen. Site is blanchable. Also, some redness on perineal area.   The following interventions in place pressure redistribution mattress, mepilex, waffle mattress, q2 turns, and pillows provided for support and repositioning.

## 2019-07-05 NOTE — THERAPY
"Occupational Therapy Evaluation completed.   Functional Status:  MaxA for ADLs, Rogerio for ADL transfers, Rogerio for functional mobility with HHA. At baseline of functional independence.   Plan of Care: Patient with no further skilled OT needs in the acute care setting at this time  Discharge Recommendations:  Equipment: No Equipment Needed. Post-acute therapy Anticipate that the patient will have no further occupational therapy needs after discharge from the hospital.     See \"Rehab Therapy-Acute\" Patient Summary Report for complete documentation.    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 Pt supportive , Florencio,  present for OT eval, provided PLOF information. Florencio is pts full time caregiver, assists with all ADLs, ADL transfers, and IADLs since her stroke. Pt  demonstrated his strategy to assist pt with safe functional bed>chair transfer. Pt  reports they have all DME and equipment to ensure pt safety at home. Pt is at her baseline of functional independence therefore acute OT intervention is not indicated at this time,  is willing and able to assist with every aspect of pts care. Once medically stable anticipate pt to return home with /caregiver support.   "

## 2019-07-05 NOTE — PROGRESS NOTES
[facility] Daily Progress Note    Date of Service  7/5/2019    Chief Complaint  59 y.o. female admitted 7/4/2019 with     Hospital Course    59 y.o. female who presented 7/4/2019 Direct admit for possible pyelonephritic abscess.  History of aneurysm complicated by bleeding stroke in 1990s, now with residual aphasia and R sided hemiparesis. She is not on any blood thinner or antiplatelets. Despite aphasia she has no dysphagia per . Her mobility is such that she uses the wheelchair, does transfers with assistance from the  who is now her fulltime caretaker. She had been in the [facility] prior.  History of kidney stones, followed by Urology. About 1-2 weeks ago underwent cystoscopic lithotripsy and stent for kidney stones in [place], followed by removal of the stent 4 days after with the same urologist. She tolerated the procedure then. More recently per EDPA, was seen at Urology for lithotripsy.  Nonhistorian because of aphasia. She can nod her head. Her  gave the history.   Yesterday night she noted gross hematuria. She was also febrile. She did complain of dysuria and flank pain. Admitted to outside ED yesterday and at that point was afebrile, normotensive but tachycardic. No leukocytosis then. Early morning today however, she was slightly febrile, tachycardic and hypotensive. Their CT scan resulted this morning done showing R kidney enlargement with stranding and possible abscess.  Her WBC is now 34K. There was also report of hydronephrosis. Urine cultures growing GNB and blood cultures so far negative. She was started already on Rocephin and received a shot of ertapenam.   At telemetry she is in no acute distress. Aphasia. R hemiparesis, her R leg stronger than the R arm.  at bedsids  S/p urologic consultation and stent placement  ucx pending.             Interval Problem Update  Seen and examined,  is frustrated at urology team for not given exact information. I  discussed with him in detail about all the tests and procedures that took place and plan of care  Patient is not that verbal , this is her baseline, she has had a previous stroke with residual symptoms  Patient is feeling a little better, slight pain, however not nauseous and no vomiting, afebrile    S/p Procedure(s):  CYSTOSCOPY  STENT PLACEMENT  URETEROSCOPY  Consultants/Specialty  urology- signed off    Code Status  full    Disposition  Dc in 24-48hours    Review of Systems  Review of Systems   Constitutional: Negative for fever and malaise/fatigue.   HENT: Negative for ear pain, hearing loss and sinus pain.    Eyes: Negative for photophobia and discharge.   Respiratory: Negative for hemoptysis and shortness of breath.    Cardiovascular: Negative for palpitations and claudication.   Gastrointestinal: Negative for abdominal pain and nausea.   Genitourinary: Negative for hematuria and urgency.   Musculoskeletal: Negative for joint pain and myalgias.   Skin: Negative for itching and rash.   Neurological: Negative for dizziness, tingling, focal weakness and weakness.   Psychiatric/Behavioral: Negative for depression. The patient is not nervous/anxious and does not have insomnia.         Physical Exam  Temp:  [36.1 °C (97 °F)-37 °C (98.6 °F)] 36.6 °C (97.8 °F)  Pulse:  [] 78  Resp:  [14-23] 16  BP: ()/(55-84) 133/79  SpO2:  [91 %-100 %] 98 %    Physical Exam   Constitutional: She appears well-developed and well-nourished.   HENT:   Head: Normocephalic and atraumatic.   Cardiovascular: Normal rate and regular rhythm.    No murmur heard.      Ill appearing  Older appearing     Pulmonary/Chest: Effort normal and breath sounds normal. No respiratory distress. She has no wheezes. She has no rales.   Abdominal: Soft. Bowel sounds are normal. She exhibits no distension. There is no tenderness. There is no rebound and no guarding.   Genitourinary:   Genitourinary Comments: Flank tenderness (improved)      Musculoskeletal: She exhibits no edema or tenderness.   Neurological: She is alert.   Chronic R hemiparesis. R arm is pretty much 1/5, R leg 2/5.  Aphasia  Cognitive deficits probably because of that   Skin: Skin is warm.   Psychiatric: She has a normal mood and affect. Her behavior is normal.   Not agitated           Fluids    Intake/Output Summary (Last 24 hours) at 07/05/19 1524  Last data filed at 07/05/19 0541   Gross per 24 hour   Intake          2313.04 ml   Output             1050 ml   Net          1263.04 ml       Laboratory  Recent Labs      07/04/19 1958 07/05/19   0006   WBC  25.6*  23.5*   RBC  2.93*  2.85*   HEMOGLOBIN  8.3*  8.0*   HEMATOCRIT  25.3*  25.2*   MCV  86.3  88.4   MCH  28.3  28.1   MCHC  32.8*  31.7*   RDW  45.3  46.6   PLATELETCT  347  349   MPV  10.3  11.2     Recent Labs      07/04/19 1958 07/05/19 0006   SODIUM  130*  133*   POTASSIUM  3.8  3.7   CHLORIDE  106  108   CO2  17*  18*   GLUCOSE  105*  127*   BUN  15  14   CREATININE  1.02  0.88   CALCIUM  8.0*  8.1*     Recent Labs      07/04/19 1958   APTT  39.9*   INR  1.27*               Imaging  DX-CYSTO FLUORO > 1 HOUR   Final Result      Dilated RIGHT intrarenal collecting system with apparent debris or stones present and possible extravasation of contrast.           Assessment/Plan  * Sepsis due to gram-negative pyelonephritis with possible perinephric abscess ([facility])   Assessment & Plan    This is sepsis (without associated acute organ dysfunction).   Leukocytosis significant 2/2 uti  Hypotension resolved at the outside ED  CT showed possible perinephric abscess  Continue IV antibiotics, IV fluids    Recultured  Dr. Killian Urology consulted.s/p stent placement- fu for removal in 1 week       History of completed stroke- (present on admission)   Assessment & Plan    Hemorrhagic from aneurysm rupture  Hence not on blood thinners  Not hypertensive.  Wheelchair, transfers with assistance  PT/OT ordered     COPD (chronic  obstructive pulmonary disease) (HCC)   Assessment & Plan    Not exacerbating  Resp and O2 per protocol     Leukocytosis- (present on admission)   Assessment & Plan    2/2 acute urosepsis  Will need to obtain OSH urine cultures records  Will recultures here too  S/p stent per urology  Continue abx at this time          VTE prophylaxis: ozzy,      I spent a total of 45 minutes of time during this clinical encounter of which > 50% was devoted to counseling and coordinating care including review of records, pertinent lab data and studies, as well as discussing diagnostic evaluation and work up, planned therapeutic interventions and future disposition of care. Where indicated, the assessment and plan reflect discussion of patient with consultants, other healthcare providers, family members, and additional research needed to obtain further information in formulating the plan of care of this patient. This time includes no procedures or overlap with other providers.

## 2019-07-05 NOTE — OR NURSING
Pt bed changed ad coccyx area with dry clean dressing over it.  Pt incontinent of smear of stool and some urine on padding and blankets.  All new linen placed.

## 2019-07-05 NOTE — RESPIRATORY CARE
COPD EDUCATION by COPD CLINICAL EDUCATOR  7/5/2019 at 6:19 AM by Nydia Hart     Patient's chart reviewed by COPD education team. Patient does not have an order for Respiratory Care Protocol, therefore the COPD education team cannot treat.

## 2019-07-05 NOTE — PROGRESS NOTES
Patient care flighted to me from OSH. Assumed care at 1935. Received report from care flight nurse. This pt is AOx4, patient is wheel chair bound, voiding via pascal catheter - present on admission, 0/10 pain. Patient and RN discussed plan of care: questions answered. Labs drawn, assessment complete, patient NPO for ureter stent placement. Tele box in place. Pt is on room air. Call light in place, fall precautions in place, patient educated on importance of calling for assistance. No additional needs at this time. VSS

## 2019-07-05 NOTE — OP REPORT
Preop diagnosis-urosepsis with right-sided hydronephrosis  Postop diagnosis same as preop diagnosis  Procedure performed cystoscopy right retrograde pyelogram and placement of the right double-J ureteral stent  Surgeon-Monico Killian MD  Anesthesiologist Dominick Lucero MD  Anesthesia-type general anesthesia  Drains-right double-J ureteral stent  Specimen-none  Estimated blood loss 0 mL  Complications-none    Procedure in detail the patient is transferred to the OR on a hospital bed and then transferred from the bed to the OR table.  She is then given an excellent general anesthetic by Dr. Lucero.  She is then placed in the dorsal lithotomy position.  She is prepped and draped in usual fashion.  Timeout was done to confirm patient identification, procedure to be performed, side procedure, review patient allergies and review preoperative antibiotics.  Once timeout was completed and agreed upon the case was started a 22 rigid cystoscope with a 30 degree angle lenses irrigated white balanced and passed through the urethra and into the urinary bladder there was blood clot in the urinary bladder.  After examination of the trigone area the right ureter orifice is identified and there is a thick bloody drainage coming from the right ureteral orifice.  Is cannulated with an 035 sensor guidewire passes up to the level of the right kidney.  I then passed an open-ended ureteral catheter over the guidewire up to the level of the kidney.  The guidewire was removed and 50% dilute Conray is injected through the ureteral catheter.  This produces an excellent retrograde pyelogram.  I then remove the ureteral catheter after replacing the guidewire.  I then passed a 6 Azerbaijani 24 cm length double-J stent over the guidewire pushing up to level the renal pelvis where the guidewires pulled back allowing the upper curl the stent to engage within the right renal pelvis.  The guidewire was removed completely in the lower curl the stent engages  within the urinary bladder.  The cystoscope was then removed.  A 20 Monegasque Olmedo catheter was lubricated passed through the urethra and in the urinary bladder and the blood clots in the bladder irrigated out with a cath tip syringe.  The prep was then cleaned off the patient.  She is taken out of the lithotomy position and transferred back to her hospital bed to the recovery room in stable condition.

## 2019-07-05 NOTE — H&P
Hospital Medicine History & Physical Note    Date of Service  7/4/2019    Primary Care Physician  Pcp Pt States None    Consultants  Dr. Killian Urology    Code Status  full    Chief Complaint  Direct admit for possible pyelonephritic abscess      History of Presenting Illness  59 y.o. female who presented 7/4/2019 Direct admit for possible pyelonephritic abscess.  History of aneurysm complicated by bleeding stroke in 1990s, now with residual aphasia and R sided hemiparesis. She is not on any blood thinner or antiplatelets. Despite aphasia she has no dysphagia per . Her mobility is such that she uses the wheelchair, does transfers with assistance from the  who is now her fulltime caretaker. She had been in the [facility] prior.  History of kidney stones, followed by Urology. About 1-2 weeks ago underwent cystoscopic lithotripsy and stent for kidney stones in [place], followed by removal of the stent 4 days after with the same urologist. She tolerated the procedure then. More recently per EDPA, was seen at Urology for lithotripsy.  Nonhistorian because of aphasia. She can nod her head. Her  gave the history.   Yesterday night she noted gross hematuria. She was also febrile. She did complain of dysuria and flank pain. Admitted to outside ED yesterday and at that point was afebrile, normotensive but tachycardic. No leukocytosis then. Early morning today however, she was slightly febrile, tachycardic and hypotensive. Their CT scan resulted this morning done showing R kidney enlargement with stranding and possible abscess.  Her WBC is now 34K. There was also report of hydronephrosis. Urine cultures growing GNB and blood cultures so far negative. She was started already on Rocephin and received a shot of ertapenam.   At telemetry she is in no acute distress. Aphasia. R hemiparesis, her R leg stronger than the R arm.  at bedside/ Dr. Killian at bedside. Plan for the OR tonight.    Review of  Systems  Review of Systems   Constitutional: Positive for chills, fever and malaise/fatigue.   HENT: Negative for congestion, hearing loss and nosebleeds.    Eyes: Negative for pain and redness.   Respiratory: Negative for cough, hemoptysis, shortness of breath and wheezing.    Cardiovascular: Negative for chest pain and palpitations.   Gastrointestinal: Negative for abdominal pain, blood in stool, constipation, diarrhea, nausea and vomiting.   Genitourinary: Positive for dysuria and flank pain. Negative for frequency and hematuria.   Musculoskeletal: Negative for falls, joint pain and myalgias.   Skin: Negative for rash.   Neurological: Positive for focal weakness and weakness. Negative for dizziness, tremors, seizures, loss of consciousness and headaches.   Psychiatric/Behavioral: The patient is not nervous/anxious and does not have insomnia.    All other systems reviewed and are negative.      Past Medical History   has a past medical history of Brain aneurysm; Chronic pain; and Stroke (CMS-HCC) (HCC).    Surgical History   has a past surgical history that includes other neurological surg; cystoscopy stent placement (2/13/2018); gastric resection (2012); and other.     Family History  family history is not on file.   History of CAD in the father  Social History   reports that she has never smoked. She has never used smokeless tobacco. She reports that she does not drink alcohol or use drugs.    Allergies  No Known Allergies    Medications  Prior to Admission Medications   Prescriptions Last Dose Informant Patient Reported? Taking?   DULoxetine (CYMBALTA) 60 MG Cap DR Particles delayed-release capsule 7/4/2019 at Unknown time Patient's Home Pharmacy Yes No   Sig: Take 60 mg by mouth 2 times a day.   Docusate Calcium (STOOL SOFTENER PO) 7/4/2019 at Unknown time Family Member Yes No   Sig: Take 2 Tabs by mouth every day.   FLUoxetine (PROZAC) 20 MG Cap  Patient's Home Pharmacy Yes No   Sig: Take 20 mg by mouth every  day.   fentaNYL (DURAGESIC) 100 MCG/HR PATCH 72 HR  Patient's Home Pharmacy Yes No   Sig: Apply 1 Patch to skin as directed every 48 hours.   folic acid (FOLVITE) 1 MG Tab  Patient's Home Pharmacy Yes No   Sig: Take 2 mg by mouth every day.   ipratropium-albuterol (DUONEB) 0.5-2.5 (3) MG/3ML nebulizer solution   No No   Sig: 3 mL by Nebulization route every four hours as needed for Shortness of Breath.   pregabalin (LYRICA) 150 MG Cap  Patient's Home Pharmacy Yes No   Sig: Take 150 mg by mouth 2 times a day.      Facility-Administered Medications: None       Physical Exam  Temp:  [36.9 °C (98.5 °F)] 36.9 °C (98.5 °F)  Pulse:  [104] 104  Resp:  [18] 18  BP: (111)/(60) 111/60  SpO2:  [98 %] 98 %    Physical Exam   Constitutional: She appears well-developed.   Ill appearing  Older appearing   HENT:   Head: Normocephalic and atraumatic.   Eyes: Conjunctivae are normal. No scleral icterus.   Neck: Normal range of motion. Neck supple.   Cardiovascular: Normal rate and regular rhythm.  Exam reveals no gallop and no friction rub.    No murmur heard.  Pulmonary/Chest: Effort normal and breath sounds normal. No respiratory distress. She has no wheezes. She has no rales.   Abdominal: Soft. Bowel sounds are normal. She exhibits no distension. There is no tenderness. There is no rebound and no guarding.   Genitourinary:   Genitourinary Comments: Flank tenderness     Musculoskeletal: She exhibits no edema or tenderness.   Neurological: She is alert.   Chronic R hemiparesis. R arm is pretty much 1/5, R leg 2/5.  Aphasia  Cognitive deficits probably because of that   Skin: Skin is warm.   Psychiatric: She has a normal mood and affect. Her behavior is normal.   Not agitated       Laboratory:          No results for input(s): ALTSGPT, ASTSGOT, ALKPHOSPHAT, TBILIRUBIN, DBILIRUBIN, GAMMAGT, AMYLASE, LIPASE, ALB, PREALBUMIN, GLUCOSE in the last 72 hours.              No results for input(s): TROPONINI in the last 72  hours.    Urinalysis:    No results found     Imaging:  DX-CYSTO FLUORO > 1 HOUR    (Results Pending)         Assessment/Plan:  I anticipate this patient will require at least two midnights for appropriate medical management, necessitating inpatient admission.    * Sepsis due to gram-negative pyelonephritis with possible perinephric abscess ([facility])   Assessment & Plan    This is sepsis (without associated acute organ dysfunction).   Leukocytosis  Hypotension resolved at the outside ED  CT showed possible perinephric abscess  Ordered IV antibiotics, IV fluids  Ordered STAT labs  Recultured  Dr. Killian Urology consulted.       History of completed stroke- (present on admission)   Assessment & Plan    Hemorrhagic from aneurysm rupture  Hence not on blood thinners  Not hypertensive.  Wheelchair, transfers with assistance  PT/OT ordered     COPD (chronic obstructive pulmonary disease) ([facility])   Assessment & Plan    Not exacerbating  Resp and O2 per protocol     Obstructive uropathy   Assessment & Plan    Dr. Killian consulted  Treat infection  Plan for cystoscopy and stent placement     CHERYL (acute kidney injury) ([facility])- (present on admission)   Assessment & Plan    Per outside hospital provider  Related to hydronephrosis and pyelonephritis  Dr. Killian consulted  Plan to put stent in tonight.         VTE prophylaxis: SCD  Reviewed vitals, labs, imaging, staff notes.  Discussed assessment and plan with Nydia Finch and   Discussed with BEATRIZ Mcwilliams at outside hospital.

## 2019-07-05 NOTE — ANESTHESIA TIME REPORT
Anesthesia Start and Stop Event Times     Date Time Event    7/4/2019 2053 Anesthesia Start     2144 Anesthesia Stop        Responsible Staff  07/04/19    Name Role Begin End    Dominick Lucero M.D. Anesth 2053 2144        Preop Diagnosis (Free Text):  Pre-op Diagnosis             Preop Diagnosis (Codes):  Diagnosis Information     Diagnosis Code(s):         Post op Diagnosis  Hydronephrosis      Premium Reason  F. Holiday    Comments:

## 2019-07-05 NOTE — CONSULTS
UROLOGY Consult Note:    Monico Killian  Date & Time note created:    7/4/2019   8:00 PM     Referring MD:  Dr. Killian    Patient ID:   Name:             Nydia Finch     YOB: 1960  Age:                 59 y.o.  female   MRN:               [id number]                                                             Reason for Consult:      59-year-old woman transferred from [facility] in [place], [place].  She apparently had surgery for stent removal and stone treatment recently.  She was readmitted to the hospital with signs of sepsis elevated white count and infected urine.  The urologist did the surgery is unavailable so she is transferred to [facility].    History of Present Illness:    Right hydronephrosis, elevated white cell count and a urinary tract infection    Review of Systems:      Constitutional: Denies fevers, Denies weight changes  Eyes: Denies changes in vision, no eye pain  Ears/Nose/Throat/Mouth: Denies nasal congestion or sore throat   Cardiovascular: no chest pain, no palpitations   Respiratory: no shortness of breath , Denies cough  Gastrointestinal/Hepatic: Denies abdominal pain, nausea, vomiting, diarrhea, constipation or GI bleeding   Genitourinary: Denies hematuria, dysuria or frequency  Musculoskeletal/Rheum: Denies  joint pain and swelling, no edema  Skin: Denies rash  Neurological: Denies headache, confusion, memory loss or focal weakness/parasthesias  Psychiatric: denies mood disorder   Endocrine: Coleen thyroid problems  Heme/Oncology/Lymph Nodes: Denies enlarged lymph nodes, denies brusing or known bleeding disorder  All other systems were reviewed and are negative (AMA/CMS criteria)                Past Medical History:   Past Medical History:   Diagnosis Date   • Brain aneurysm    • Chronic pain    • Stroke (CMS-HCC) (HCC)      There are no active hospital problems to display for this patient.      Past Surgical History:  Past Surgical History:   Procedure Laterality  Date   • CYSTOSCOPY STENT PLACEMENT  2/13/2018    Procedure: CYSTOSCOPY STENT PLACEMENT;  Surgeon: Monico Killian M.D.;  Location: SURGERY [facility];  Service: Urology   • GASTRIC RESECTION  2012    Duodenal Switch   • OTHER     • OTHER NEUROLOGICAL SURG         Hospital Medications:    Current Facility-Administered Medications:   •  DULoxetine (CYMBALTA) capsule 60 mg, 60 mg, Oral, BID, Bbi Killian M.D.  •  [START ON 7/5/2019] FLUoxetine (PROZAC) capsule 20 mg, 20 mg, Oral, DAILY, Bib Killian M.D.  •  [START ON 7/5/2019] folic acid (FOLVITE) tablet 2 mg, 2 mg, Oral, DAILY, Bib Killian M.D.  •  ipratropium-albuterol (DUONEB) nebulizer solution, 3 mL, Nebulization, Q4HRS PRN, Bib Killian M.D.  •  pregabalin (LYRICA) capsule 150 mg, 150 mg, Oral, BID, Bib Killian M.D.  •  NS infusion, , Intravenous, Continuous, Bib Killian M.D.  •  acetaminophen (TYLENOL) tablet 650 mg, 650 mg, Oral, Q6HRS PRN, Bib Killian M.D.  •  Notify provider if pain remains uncontrolled, , , CONTINUOUS **AND** Use the numeric rating scale (NRS-11) on regular floors and Critical-Care Pain Observation Tool (CPOT) on ICUs/Trauma to assess pain, , , CONTINUOUS **AND** Pulse Ox (Oximetry), , , CONTINUOUS **AND** Pharmacy Consult Request ...Pain Management Review 1 Each, 1 Each, Other, PHARMACY TO DOSE **AND** If patient difficult to arouse and/or has respiratory depression, stop any opiates that are currently infusing and call a Rapid Response., , , CONTINUOUS **AND** oxyCODONE immediate-release (ROXICODONE) tablet 5 mg, 5 mg, Oral, Q3HRS PRN **AND** oxyCODONE immediate release (ROXICODONE) tablet 10 mg, 10 mg, Oral, Q3HRS PRN **AND** HYDROmorphone pf (DILAUDID) injection 0.5 mg, 0.5 mg, Intravenous, Q3HRS PRN, Bib Killian M.D.  •  ondansetron (ZOFRAN) syringe/vial injection 4 mg, 4 mg, Intravenous, Q4HRS PRN, Bib Killian M.D.  •  ondansetron (ZOFRAN ODT) dispertab 4 mg, 4 mg, Oral, Q4HRS  PRN, Bib Killian M.D.  •  promethazine (PHENERGAN) tablet 12.5-25 mg, 12.5-25 mg, Oral, Q4HRS PRN, Bib Killian M.D.  •  promethazine (PHENERGAN) suppository 12.5-25 mg, 12.5-25 mg, Rectal, Q4HRS PRN, Bib Killian M.D.  •  prochlorperazine (COMPAZINE) injection 5-10 mg, 5-10 mg, Intravenous, Q4HRS PRN, Bib Killian M.D.  •  NS (BOLUS) infusion 500 mL, 500 mL, Intravenous, Once PRN, Bib Killian M.D.  •  cefTRIAXone (ROCEPHIN) 2 g in  mL IVPB, 2 g, Intravenous, Q24HRS, Bib Killian M.D.  •  [START ON 7/5/2019] lactobacillus rhamnosus (CULTURELLE) capsule 1 Cap, 1 Cap, Oral, QDAY with Breakfast, Bib Killian M.D.    Facility-Administered Medications Ordered in Other Encounters:   •  senna-docusate (PERICOLACE or SENOKOT S) 8.6-50 MG per tablet 2 Tab, 2 Tab, Oral, BID **AND** polyethylene glycol/lytes (MIRALAX) PACKET 1 Packet, 1 Packet, Oral, QDAY PRN **AND** magnesium hydroxide (MILK OF MAGNESIA) suspension 30 mL, 30 mL, Oral, QDAY PRN **AND** bisacodyl (DULCOLAX) suppository 10 mg, 10 mg, Rectal, QDAY PRN, Bib Killian M.D.  •  Respiratory Care per Protocol, , Nebulization, Continuous RT, Bib Killian M.D.  •  NS infusion, , Intravenous, Continuous, Bib Killian M.D.    Current Outpatient Medications:  Prescriptions Prior to Admission   Medication Sig Dispense Refill Last Dose   • ipratropium-albuterol (DUONEB) 0.5-2.5 (3) MG/3ML nebulizer solution 3 mL by Nebulization route every four hours as needed for Shortness of Breath. 30 Bullet     • fentaNYL (DURAGESIC) 100 MCG/HR PATCH 72 HR Apply 1 Patch to skin as directed every 48 hours.   2/11/2018 at on 1700   • Docusate Calcium (STOOL SOFTENER PO) Take 2 Tabs by mouth every day.   7/4/2019 at Unknown time   • pregabalin (LYRICA) 150 MG Cap Take 150 mg by mouth 2 times a day.   7/1/2019   • DULoxetine (CYMBALTA) 60 MG Cap DR Particles delayed-release capsule Take 60 mg by mouth 2 times a day.   7/4/2019 at  Unknown time   • FLUoxetine (PROZAC) 20 MG Cap Take 20 mg by mouth every day.   7/1/2019   • folic acid (FOLVITE) 1 MG Tab Take 2 mg by mouth every day.   7/1/2019       Medication Allergy:  No Known Allergies    Family History:  No family history on file.    Social History:  Social History     Social History   • Marital status:      Spouse name: N/A   • Number of children: N/A   • Years of education: N/A     Occupational History   • Not on file.     Social History Main Topics   • Smoking status: Never Smoker   • Smokeless tobacco: Never Used   • Alcohol use No   • Drug use: No   • Sexual activity: Not on file     Other Topics Concern   • Not on file     Social History Narrative   • No narrative on file         Physical Exam:  Vitals/ General Appearance:   Weight/BMI: Body mass index is 28.39 kg/m².  /60   Pulse (!) 104   Temp 36.9 °C (98.5 °F) (Temporal)   Resp 18   Wt 84.7 kg (186 lb 11.7 oz)   SpO2 98%   Vitals:    07/04/19 1935   BP: 111/60   Pulse: (!) 104   Resp: 18   Temp: 36.9 °C (98.5 °F)   TempSrc: Temporal   SpO2: 98%   Weight: 84.7 kg (186 lb 11.7 oz)     Oxygen Therapy:  Pulse Oximetry: 98 %, O2 Delivery: None (Room Air)    Constitutional:   Well developed, Well nourished, No acute distress, hemiparesis and aphasia  HENMT:  Normocephalic, Atraumatic, Oropharynx moist mucous membranes, No oral exudates, Nose normal.  No thyromegaly.  Eyes:  EOMI, Conjunctiva normal, No discharge.  Neck:  Normal range of motion, No cervical tenderness,  no JVD.  Cardiovascular:  Normal heart rate, Normal rhythm, No murmurs, No rubs, No gallops.   Extremitites with intact distal pulses, no cyanosis, or edema.  Lungs:  Normal breath sounds, breath sounds clear to auscultation bilaterally,  no crackles, no wheezing.   Abdomen: Bowel sounds normal, Soft, No tenderness, No guarding, No rebound, No masses, No hepatosplenomegaly.  : Deferred  Skin: Warm, Dry, No erythema, No rash, no induration.  Neurologic:  Alert & oriented x 3, No focal deficits noted, cranial nerves II through X are grossly intact.  Hemiparesis and aphasia  Psychiatric: Affect normal, Judgment normal, Mood normal.      MDM (Data Review):     Records reviewed and summarized in current documentation    Lab Data Review:  No results found for this or any previous visit (from the past 24 hour(s)).    Imaging/Procedures Review:    Reviewed    MDM (Assessment and Plan):     UTI with right hydronephrosis recent urologic surgery.  Plan  Cystoscopy and emergent placement of right double-J ureteral stent, broad spectrum antibiotics and Olmedo catheter drainage.

## 2019-07-05 NOTE — PROGRESS NOTES
Report called down to FERNANDO Jeffers in OR. Patient transferred to OR in stable condition with FERNANDO Barlow.

## 2019-07-05 NOTE — ANESTHESIA PREPROCEDURE EVALUATION
Relevant Problems   (+) COPD (chronic obstructive pulmonary disease) (HCC)       Physical Exam    Airway   Mallampati: II  TM distance: >3 FB  Neck ROM: full       Cardiovascular - normal exam  Rhythm: regular  Rate: abnormal  (-) murmur     Dental - normal exam  (+) lower dentures, upper dentures         Pulmonary - normal exam  (+) wheezes     Abdominal    Neurological - abnormal exam                 Anesthesia Plan    ASA 4 - emergent   ASA physical status 4 criteria: sepsisASA physical status emergent criteria: sepsis    Plan - general       Airway plan will be LMA        Induction: intravenous    Postoperative Plan: Postoperative administration of opioids is intended.    Pertinent diagnostic labs and testing reviewed    Informed Consent:    Anesthetic plan and risks discussed with patient.    Use of blood products discussed with: patient whom consented to blood products.

## 2019-07-05 NOTE — ASSESSMENT & PLAN NOTE
This is sepsis (without associated acute organ dysfunction).   Leukocytosis significant 2/2 uti, persistently elevated 20K this morning  Hypotension resolved at the outside ED  CT showed possible perinephric abscess  Dr. Killian Urology consulted.s/p stent placement- fu for removal in 1 week    - OSH urine cultures + E.coli- sensitivities noted  Start oral bactrim  Monitor daily cbc

## 2019-07-05 NOTE — PROGRESS NOTES
Bedside report received. A&Ox4.  at bedside On RA. Tele box on. No complaints at this time. Bed locked and in lowest position. Call light within reach. Hourly rounding in place.

## 2019-07-05 NOTE — ASSESSMENT & PLAN NOTE
Hemorrhagic from aneurysm rupture  Hence not on blood thinners  Not hypertensive.  Wheelchair, transfers with assistance  PT/OT ordered

## 2019-07-05 NOTE — ANESTHESIA POSTPROCEDURE EVALUATION
Patient: Nydia Finch    Procedure Summary     Date:  07/04/19 Room / Location:  [place] [address] / SURGERY [facility]    Anesthesia Start:  2053 Anesthesia Stop:  2144    Procedures:       CYSTOSCOPY (Right )      STENT PLACEMENT      URETEROSCOPY Diagnosis:      Surgeon:  Monico Killian M.D. Responsible Provider:  Dominick Lucero M.D.    Anesthesia Type:  general ASA Status:  4 - Emergent          Final Anesthesia Type: general  Last vitals  BP   Blood Pressure: 119/63    Temp   36.4 °C (97.6 °F)    Pulse   Pulse: (!) 103   Resp   20    SpO2   100 %      Anesthesia Post Evaluation    Patient location during evaluation: PACU  Patient participation: complete - patient participated  Level of consciousness: awake and alert  Pain score: 2    Airway patency: patent  Anesthetic complications: no  Cardiovascular status: hemodynamically stable  Respiratory status: acceptable  Hydration status: euvolemic    PONV: none

## 2019-07-05 NOTE — PROGRESS NOTES
Called and spoke to Edith in the lab at [facility], they are going to refax the urine culture results.

## 2019-07-05 NOTE — ASSESSMENT & PLAN NOTE
Per outside hospital provider  Related to hydronephrosis and pyelonephritis  Dr. Killian consulted  Plan to put stent in tonight.

## 2019-07-05 NOTE — PROGRESS NOTES
Report received from PACU [facility]. Patient transferred into [id number] in stable condition at this time. Patient has no complaints. Will continue to monitor hourly.

## 2019-07-05 NOTE — ANESTHESIA PROCEDURE NOTES
Airway  Date/Time: 7/4/2019 8:59 PM  Performed by: MARIELA WEBSTER  Authorized by: MARIELA WEBSTER     Location:  OR  Urgency:  Elective  Difficult Airway: No    Indications for Airway Management:  Anesthesia  Spontaneous Ventilation: absent    Sedation Level:  Deep  Preoxygenated: Yes    Final Airway Type:  Supraglottic airway  Final Supraglottic Airway:  Standard LMA  SGA Size:  3  Number of Attempts at Approach:  1

## 2019-07-05 NOTE — OR SURGEON
Immediate Post OP Note    PreOp Diagnosis: Urosepsis with right hydronephrosis  PostOp Diagnosis: Same as preop diagnosis    Procedure(s):  CYSTOSCOPY  STENT PLACEMENT  URETEROSCOPY    Surgeon(s):  Monico Killian M.D.    Anesthesiologist/Type of Anesthesia:  Anesthesiologist: Dominick Lucero M.D./* No anesthesia type entered *    Surgical Staff:  Circulator: Nickolas Gold R.N.  Scrub Person: Judy Raphael    Specimens removed if any:  * No specimens in log *    Estimated Blood Loss: 0 mL    Findings: Right hydronephrosis with purulent drainage after stent placement    Complications: None        7/4/2019 9:31 PM Monico Killian M.D.

## 2019-07-05 NOTE — PROGRESS NOTES
"Urology Progress Note    S/P cystoscopy, ureteroscopy, stent placement.    S: Seen and examined.  States she feels much better since procedure.  Pascal in place and draining well. Tolerating diet.  Denies pain.    O:   /79   Pulse 78   Temp 36.6 °C (97.8 °F) (Temporal)   Resp 16   Ht 1.702 m (5' 7\")   Wt 84.7 kg (186 lb 11.7 oz)   SpO2 98%   Recent Labs      07/04/19 1958 07/05/19   0006   SODIUM  130*  133*   POTASSIUM  3.8  3.7   CHLORIDE  106  108   CO2  17*  18*   GLUCOSE  105*  127*   BUN  15  14   CREATININE  1.02  0.88   CALCIUM  8.0*  8.1*     Recent Labs      07/04/19 1958 07/05/19   0006   WBC  25.6*  23.5*   RBC  2.93*  2.85*   HEMOGLOBIN  8.3*  8.0*   HEMATOCRIT  25.3*  25.2*   MCV  86.3  88.4   MCH  28.3  28.1   MCHC  32.8*  31.7*   RDW  45.3  46.6   PLATELETCT  347  349   MPV  10.3  11.2         Intake/Output Summary (Last 24 hours) at 07/05/19 1327  Last data filed at 07/05/19 0541   Gross per 24 hour   Intake          2313.04 ml   Output             1050 ml   Net          1263.04 ml       Exam:  Gen: NAD   Abd: Abdomen soft, no TTP.   Urine: Pascal in place with clear dark yellow output. No clots.    A/P:    Active Hospital Problems    Diagnosis   • Sepsis due to gram-negative pyelonephritis with possible perinephric abscess ([facility]) [A41.50, N39.0]     Priority: High   • History of completed stroke [Z86.73]     Priority: Medium   • Obstructive uropathy [N13.9]   • COPD (chronic obstructive pulmonary disease) ([facility]) [J44.9]   • CHERYL (acute kidney injury) ([facility]) [N17.9]     59 y.o. Female with sepsis, pyelonephritis, CHERYL; now S/P cystoscopy, ureteroscopy, stent placement.  Plan:  - monitor pain control  - monitor output via pascal  - will need stent removed as an outpatient.  Discussed with patient the importance of removal of stent. [place] will help facilitate this follow up  - urology signing off.    "

## 2019-07-05 NOTE — THERAPY
"Physical Therapy Evaluation completed.   Bed Mobility:  Supine to Sit: Moderate Assist  Transfers: Sit to Stand: Moderate Assist  Gait: Level Of Assist: Moderate Assist (did only stand-pivot to bs chair. ) with HHA  Plan of Care: Will benefit from Physical Therapy 3 times per week  Discharge Recommendations: Equipment: No Equipment Needed.  Pt presents with pyelonephritis, sepsis. Pt with  h/o R side weakness after prior strokes with expressive aphasia. Pt has full time caregiving from her spouse at home. On good days, per spouse, pt is able to ambulate around the house without an AD. Today, pt was mod assist for bed mobility and transfers and mod assist for stand pivot transfer to bs chair. Pt will benefit from inpt PT to progress back to her baseline ambulation and transfers. Pt will d/c back home with 24/7 family assist as is her baseline.     See \"Rehab Therapy-Acute\" Patient Summary Report for complete documentation.     "

## 2019-07-05 NOTE — ASSESSMENT & PLAN NOTE
2/2 acute urosepsis  20K this morning and persistently elevated  S/p stent per urology  Continue abx at this time

## 2019-07-06 ENCOUNTER — PATIENT OUTREACH (OUTPATIENT)
Dept: HEALTH INFORMATION MANAGEMENT | Facility: OTHER | Age: 59
End: 2019-07-06

## 2019-07-06 VITALS
BODY MASS INDEX: 30.17 KG/M2 | HEIGHT: 67 IN | SYSTOLIC BLOOD PRESSURE: 124 MMHG | WEIGHT: 192.24 LBS | TEMPERATURE: 99.7 F | RESPIRATION RATE: 12 BRPM | HEART RATE: 74 BPM | DIASTOLIC BLOOD PRESSURE: 74 MMHG | OXYGEN SATURATION: 94 %

## 2019-07-06 LAB
ANION GAP SERPL CALC-SCNC: 7 MMOL/L (ref 0–11.9)
BASOPHILS # BLD AUTO: 0.3 % (ref 0–1.8)
BASOPHILS # BLD: 0.06 K/UL (ref 0–0.12)
BUN SERPL-MCNC: 16 MG/DL (ref 8–22)
CALCIUM SERPL-MCNC: 8.3 MG/DL (ref 8.5–10.5)
CHLORIDE SERPL-SCNC: 113 MMOL/L (ref 96–112)
CO2 SERPL-SCNC: 20 MMOL/L (ref 20–33)
CREAT SERPL-MCNC: 0.96 MG/DL (ref 0.5–1.4)
EOSINOPHIL # BLD AUTO: 0.03 K/UL (ref 0–0.51)
EOSINOPHIL NFR BLD: 0.1 % (ref 0–6.9)
ERYTHROCYTE [DISTWIDTH] IN BLOOD BY AUTOMATED COUNT: 48.2 FL (ref 35.9–50)
GLUCOSE SERPL-MCNC: 79 MG/DL (ref 65–99)
HCT VFR BLD AUTO: 26.8 % (ref 37–47)
HGB BLD-MCNC: 8.1 G/DL (ref 12–16)
IMM GRANULOCYTES # BLD AUTO: 0.19 K/UL (ref 0–0.11)
IMM GRANULOCYTES NFR BLD AUTO: 0.9 % (ref 0–0.9)
LYMPHOCYTES # BLD AUTO: 2.34 K/UL (ref 1–4.8)
LYMPHOCYTES NFR BLD: 11.6 % (ref 22–41)
MCH RBC QN AUTO: 27.2 PG (ref 27–33)
MCHC RBC AUTO-ENTMCNC: 30.2 G/DL (ref 33.6–35)
MCV RBC AUTO: 89.9 FL (ref 81.4–97.8)
MONOCYTES # BLD AUTO: 0.52 K/UL (ref 0–0.85)
MONOCYTES NFR BLD AUTO: 2.6 % (ref 0–13.4)
NEUTROPHILS # BLD AUTO: 17.05 K/UL (ref 2–7.15)
NEUTROPHILS NFR BLD: 84.5 % (ref 44–72)
NRBC # BLD AUTO: 0 K/UL
NRBC BLD-RTO: 0 /100 WBC
PLATELET # BLD AUTO: 406 K/UL (ref 164–446)
PMV BLD AUTO: 10.6 FL (ref 9–12.9)
POTASSIUM SERPL-SCNC: 3.8 MMOL/L (ref 3.6–5.5)
RBC # BLD AUTO: 2.98 M/UL (ref 4.2–5.4)
SODIUM SERPL-SCNC: 140 MMOL/L (ref 135–145)
WBC # BLD AUTO: 20.2 K/UL (ref 4.8–10.8)

## 2019-07-06 PROCEDURE — 700102 HCHG RX REV CODE 250 W/ 637 OVERRIDE(OP): Performed by: HOSPITALIST

## 2019-07-06 PROCEDURE — 80048 BASIC METABOLIC PNL TOTAL CA: CPT

## 2019-07-06 PROCEDURE — 99239 HOSP IP/OBS DSCHRG MGMT >30: CPT | Performed by: HOSPITALIST

## 2019-07-06 PROCEDURE — 700102 HCHG RX REV CODE 250 W/ 637 OVERRIDE(OP): Performed by: INTERNAL MEDICINE

## 2019-07-06 PROCEDURE — A9270 NON-COVERED ITEM OR SERVICE: HCPCS | Performed by: HOSPITALIST

## 2019-07-06 PROCEDURE — 700105 HCHG RX REV CODE 258: Performed by: INTERNAL MEDICINE

## 2019-07-06 PROCEDURE — 85025 COMPLETE CBC W/AUTO DIFF WBC: CPT

## 2019-07-06 PROCEDURE — 36415 COLL VENOUS BLD VENIPUNCTURE: CPT

## 2019-07-06 PROCEDURE — A9270 NON-COVERED ITEM OR SERVICE: HCPCS | Performed by: INTERNAL MEDICINE

## 2019-07-06 RX ORDER — SULFAMETHOXAZOLE AND TRIMETHOPRIM 800; 160 MG/1; MG/1
1 TABLET ORAL EVERY 12 HOURS
Status: DISCONTINUED | OUTPATIENT
Start: 2019-07-06 | End: 2019-07-06 | Stop reason: HOSPADM

## 2019-07-06 RX ORDER — SULFAMETHOXAZOLE AND TRIMETHOPRIM 800; 160 MG/1; MG/1
1 TABLET ORAL EVERY 12 HOURS
Qty: 20 TAB | Refills: 0 | Status: SHIPPED | OUTPATIENT
Start: 2019-07-06 | End: 2019-07-16

## 2019-07-06 RX ADMIN — SULFAMETHOXAZOLE AND TRIMETHOPRIM 1 TABLET: 800; 160 TABLET ORAL at 17:27

## 2019-07-06 RX ADMIN — OXYCODONE HYDROCHLORIDE 5 MG: 5 TABLET ORAL at 17:27

## 2019-07-06 RX ADMIN — DULOXETINE HYDROCHLORIDE 60 MG: 60 CAPSULE, DELAYED RELEASE ORAL at 17:27

## 2019-07-06 RX ADMIN — SODIUM CHLORIDE: 9 INJECTION, SOLUTION INTRAVENOUS at 11:29

## 2019-07-06 RX ADMIN — FOLIC ACID 2 MG: 1 TABLET ORAL at 05:20

## 2019-07-06 RX ADMIN — FLUOXETINE HYDROCHLORIDE 20 MG: 20 CAPSULE ORAL at 05:20

## 2019-07-06 RX ADMIN — DULOXETINE HYDROCHLORIDE 60 MG: 60 CAPSULE, DELAYED RELEASE ORAL at 05:20

## 2019-07-06 RX ADMIN — SODIUM CHLORIDE: 9 INJECTION, SOLUTION INTRAVENOUS at 05:19

## 2019-07-06 RX ADMIN — PREGABALIN 150 MG: 150 CAPSULE ORAL at 17:27

## 2019-07-06 RX ADMIN — OXYCODONE HYDROCHLORIDE 5 MG: 5 TABLET ORAL at 11:52

## 2019-07-06 RX ADMIN — ACETAMINOPHEN 650 MG: 325 TABLET, FILM COATED ORAL at 17:27

## 2019-07-06 RX ADMIN — PREGABALIN 150 MG: 150 CAPSULE ORAL at 05:20

## 2019-07-06 RX ADMIN — Medication 1 CAPSULE: at 08:19

## 2019-07-06 ASSESSMENT — ENCOUNTER SYMPTOMS
ABDOMINAL PAIN: 0
TINGLING: 0
DEPRESSION: 0
NAUSEA: 0
SHORTNESS OF BREATH: 0
FOCAL WEAKNESS: 0
HEMOPTYSIS: 0
PHOTOPHOBIA: 0
PALPITATIONS: 0
NERVOUS/ANXIOUS: 0
DIZZINESS: 0
WEAKNESS: 0
EYE DISCHARGE: 0
FEVER: 0
SINUS PAIN: 0
INSOMNIA: 0
CLAUDICATION: 0
MYALGIAS: 0

## 2019-07-06 NOTE — PROGRESS NOTES
[facility] Medicine Daily Progress Note    Date of Service  7/6/2019    Chief Complaint  59 y.o. female admitted 7/4/2019 with     Hospital Course    59 y.o. female who presented 7/4/2019 Direct admit for possible pyelonephritic abscess.  History of aneurysm complicated by bleeding stroke in 1990s, now with residual aphasia and R sided hemiparesis. She is not on any blood thinner or antiplatelets. Despite aphasia she has no dysphagia per . Her mobility is such that she uses the wheelchair, does transfers with assistance from the  who is now her fulltime caretaker. She had been in the [facility] prior.  History of kidney stones, followed by Urology. About 1-2 weeks ago underwent cystoscopic lithotripsy and stent for kidney stones in [place], followed by removal of the stent 4 days after with the same urologist. She tolerated the procedure then. More recently per EDPA, was seen at Urology for lithotripsy.  Nonhistorian because of aphasia. She can nod her head. Her  gave the history.   Yesterday night she noted gross hematuria. She was also febrile. She did complain of dysuria and flank pain. Admitted to outside ED yesterday and at that point was afebrile, normotensive but tachycardic. No leukocytosis then. Early morning today however, she was slightly febrile, tachycardic and hypotensive. Their CT scan resulted this morning done showing R kidney enlargement with stranding and possible abscess.  Her WBC is now 34K. There was also report of hydronephrosis. Urine cultures growing GNB and blood cultures so far negative. She was started already on Rocephin and received a shot of ertapenam.   At telemetry she is in no acute distress. Aphasia. R hemiparesis, her R leg stronger than the R arm.  at bedsids  S/p urologic consultation and stent placement  ucx pending.             Interval Problem Update  Seen and examined,  is frustrated at urology team for not given exact information. I  discussed with him in detail about all the tests and procedures that took place and plan of care  Patient is not that verbal , this is her baseline, she has had a previous stroke with residual symptoms  Patient is feeling a little better, slight pain, however not nauseous and no vomiting, afebrile    S/p Procedure(s):  CYSTOSCOPY  STENT PLACEMENT  URETEROSCOPY    7/6/: seen and examined , patient in bed,  at bedside, doing ok, c/o 5/10 pain flank, otherwise no complaints  Urine is clearing up , still with some reddish urine draining  Consultants/Specialty  urology- signed off    Code Status  full    Disposition  Dc in 24-48hours    Review of Systems  Review of Systems   Constitutional: Negative for fever and malaise/fatigue.   HENT: Negative for ear pain, hearing loss and sinus pain.    Eyes: Negative for photophobia and discharge.   Respiratory: Negative for hemoptysis and shortness of breath.    Cardiovascular: Negative for palpitations and claudication.   Gastrointestinal: Negative for abdominal pain and nausea.   Genitourinary: Negative for hematuria and urgency.   Musculoskeletal: Negative for joint pain and myalgias.   Skin: Negative for itching and rash.   Neurological: Negative for dizziness, tingling, focal weakness and weakness.   Psychiatric/Behavioral: Negative for depression. The patient is not nervous/anxious and does not have insomnia.         Physical Exam  Temp:  [36.5 °C (97.7 °F)-37.4 °C (99.3 °F)] 37.1 °C (98.7 °F)  Pulse:  [68-96] 78  Resp:  [12-18] 12  BP: ()/(54-83) 117/68  SpO2:  [95 %-98 %] 98 %    Physical Exam   Constitutional: She is oriented to person, place, and time. She appears well-developed and well-nourished. No distress.   HENT:   Head: Normocephalic and atraumatic.   Mouth/Throat: No oropharyngeal exudate.   Eyes: Pupils are equal, round, and reactive to light. Conjunctivae and EOM are normal. Left eye exhibits no discharge. No scleral icterus.   Neck: Normal range  of motion. Neck supple. No JVD present. No thyromegaly present.   Cardiovascular: Normal rate and regular rhythm.    No murmur heard.  Pulmonary/Chest: Effort normal and breath sounds normal. No respiratory distress. She has no wheezes.   Abdominal: Soft. Bowel sounds are normal. She exhibits no distension. There is no tenderness.   Genitourinary:   Genitourinary Comments: Olmedo draining reddish urine   Musculoskeletal:   Wheelchair bound   Lymphadenopathy:     She has no cervical adenopathy.   Neurological: She is alert and oriented to person, place, and time. She exhibits normal muscle tone.   Chronic R hemiparesis. R arm is pretty much 1/5, R leg 2/5.  Aphasia  Cognitive deficits probably because of that    Skin: Skin is warm and dry. No rash noted. No erythema.   Psychiatric: She has a normal mood and affect. Her behavior is normal.       Fluids    Intake/Output Summary (Last 24 hours) at 07/06/19 1203  Last data filed at 07/06/19 0948   Gross per 24 hour   Intake              640 ml   Output             2050 ml   Net            -1410 ml       Laboratory  Recent Labs      07/04/19 1958 07/05/19 0006 07/06/19   0837   WBC  25.6*  23.5*  20.2*   RBC  2.93*  2.85*  2.98*   HEMOGLOBIN  8.3*  8.0*  8.1*   HEMATOCRIT  25.3*  25.2*  26.8*   MCV  86.3  88.4  89.9   MCH  28.3  28.1  27.2   MCHC  32.8*  31.7*  30.2*   RDW  45.3  46.6  48.2   PLATELETCT  347  349  406   MPV  10.3  11.2  10.6     Recent Labs      07/04/19 1958 07/05/19   0006  07/06/19   0837   SODIUM  130*  133*  140   POTASSIUM  3.8  3.7  3.8   CHLORIDE  106  108  113*   CO2  17*  18*  20   GLUCOSE  105*  127*  79   BUN  15  14  16   CREATININE  1.02  0.88  0.96   CALCIUM  8.0*  8.1*  8.3*     Recent Labs      07/04/19 1958   APTT  39.9*   INR  1.27*               Imaging  DX-CYSTO FLUORO > 1 HOUR   Final Result      Dilated RIGHT intrarenal collecting system with apparent debris or stones present and possible extravasation of contrast.            Assessment/Plan  * Sepsis due to gram-negative pyelonephritis with possible perinephric abscess ([facility])   Assessment & Plan    This is sepsis (without associated acute organ dysfunction).   Leukocytosis significant 2/2 uti, persistently elevated 20K this morning  Hypotension resolved at the outside ED  CT showed possible perinephric abscess  Dr. Killian Urology consulted.s/p stent placement- fu for removal in 1 week    - OSH urine cultures + E.coli- sensitivities noted  Start oral bactrim  Monitor daily cbc       History of completed stroke- (present on admission)   Assessment & Plan    Hemorrhagic from aneurysm rupture  Hence not on blood thinners  Not hypertensive.  Wheelchair, transfers with assistance  PT/OT ordered     COPD (chronic obstructive pulmonary disease) ([facility])   Assessment & Plan    Not exacerbating  Resp and O2 per protocol     Leukocytosis- (present on admission)   Assessment & Plan    2/2 acute urosepsis  20K this morning and persistently elevated  S/p stent per urology  Continue abx at this time          VTE prophylaxis: scds,    Discussed plan of care with patient, her , reviewed outside records; e.coli uti, s/p stent placement and has pascal  Anticipate dc in 24-48 hours

## 2019-07-06 NOTE — CARE PLAN
Problem: Knowledge Deficit  Goal: Knowledge of disease process/condition, treatment plan, diagnostic tests, and medications will improve  Outcome: PROGRESSING AS EXPECTED  Patient is educated of disease process and condition. Treatment team has included patient in plan of care. All medications indications and side effects are explained. Patient is encouraged to ask questions. Patient indicates understanding.    Problem: Pain Management  Goal: Pain level will decrease to patient's comfort goal  Patient educated to pain scale system. Patient encouraged to verbalize discomfort. Patient taught about non-pharmacological pain management. Patient is comfortable at this time without statements of discomfort or pain.

## 2019-07-06 NOTE — PROGRESS NOTES
2 RN skin check complete FERNANDO Sneed.   Devices in place telemetry box and SCDs.  Skin assessed under devices - yes. Skin intact.   Confirmed pressure ulcers found on - none.  New potential pressure ulcers noted on R ischium. Site is open. Surrounding skin is pink and blanchable. Wound consult placed yes. Pictures taken and uploaded.   Redness noted under bilateral breasts. Both blanchable. Redness noted under skin folds on patients lower abdomen. Site is blanchable. Also, some redness on perineal area.   The following interventions in place pressure redistribution mattress, mepilex, waffle mattress, q2 turns, barrier cream, and pillows provided for support and repositioning.

## 2019-07-06 NOTE — DISCHARGE INSTRUCTIONS
Discharge Instructions per Jeri Loza M.D.    Your were admitted for a severe urinary tract infection and swollen kidney, urology was consulted and you had a stent placed . You will need to continue antibiotics for your urine infection. Will also need to continue the pascal and fu with urology in 1 week to get stent removed.     DIET: as tolerated    ACTIVITY:as tolerated  DIAGNOSIS:urinary infection, kidney injury now S/P cystoscopy, ureteroscopy, stent placement.  Return to ER if you are having chest pain, shortness of breath, increase bleeding in the urinary pascal bag, speech problems aside from your baseline, facial droop,                 Ureteral Stent Implantation, Care After  Refer to this sheet in the next few weeks. These instructions provide you with information on caring for yourself after your procedure. Your health care provider may also give you more specific instructions. Your treatment has been planned according to current medical practices, but problems sometimes occur. Call your health care provider if you have any problems or questions after your procedure.  WHAT TO EXPECT AFTER THE PROCEDURE  You should be back to normal activity within 48 hours after the procedure. Nausea and vomiting may occur and are commonly the result of anesthesia.  It is common to experience sharp pain in the back or lower abdomen and penis with voiding. This is caused by movement of the ends of the stent with the act of urinating. It usually goes away within minutes after you have stopped urinating.  HOME CARE INSTRUCTIONS  Make sure to drink plenty of fluids. You may have small amounts of bleeding, causing your urine to be red. This is normal. Certain movements may trigger pain or a feeling that you need to urinate. You may be given medicines to prevent infection or bladder spasms. Be sure to take all medicines as directed. Only take over-the-counter or prescription medicines for pain, discomfort, or fever as  directed by your health care provider. Do not take aspirin, as this can make bleeding worse.  Your stent will be left in until the blockage is resolved. This may take 2 weeks or longer, depending on the reason for stent implantation. You may have an X-ray exam to make sure your ureter is open and that the stent has not moved out of position (migrated). The stent can be removed by your health care provider in the office. Medicines may be given for comfort while the stent is being removed. Be sure to keep all follow-up appointments so your health care provider can check that you are healing properly.  SEEK MEDICAL CARE IF:  · You experience increasing pain.  · Your pain medicine is not working.  SEEK IMMEDIATE MEDICAL CARE IF:  · Your urine is dark red or has blood clots.  · You are leaking urine (incontinent).  · You have a fever, chills, feeling sick to your stomach (nausea), or vomiting.  · Your pain is not relieved by pain medicine.  · The end of the stent comes out of the urethra.  · You are unable to urinate.     This information is not intended to replace advice given to you by your health care provider. Make sure you discuss any questions you have with your health care provider.     Document Released: 08/20/2014 Document Revised: 12/23/2014 Document Reviewed: 08/20/2014  [employer] Interactive Patient Education ©2016 [employer]    Olmedo Catheter Care, Adult  A Olmedo catheter is a soft, flexible tube that is placed into the bladder to drain urine. A Olmedo catheter may be inserted if:  · You leak urine or are not able to control when you urinate (urinary incontinence).  · You are not able to urinate when you need to (urinary retention).  · You had prostate surgery or surgery on the genitals.  · You have certain medical conditions, such as multiple sclerosis, dementia, or a spinal cord injury.  If you are going home with a Olmedo catheter in place, follow the instructions below.  TAKING CARE OF THE  CATHETER  1. Wash your hands with soap and water.  2. Using mild soap and warm water on a clean washcloth:  ¨ Clean the area on your body closest to the catheter insertion site using a circular motion, moving away from the catheter. Never wipe toward the catheter because this could sweep bacteria up into the urethra and cause infection.  ¨ Remove all traces of soap. Pat the area dry with a clean towel. For males, reposition the foreskin.  3. Attach the catheter to your leg so there is no tension on the catheter. Use adhesive tape or a leg strap. If you are using adhesive tape, remove any sticky residue left behind by the previous tape you used.  4. Keep the drainage bag below the level of the bladder, but keep it off the floor.  5. Check throughout the day to be sure the catheter is working and urine is draining freely. Make sure the tubing does not become kinked.  6. Do not pull on the catheter or try to remove it. Pulling could damage internal tissues.  TAKING CARE OF THE DRAINAGE BAGS  You will be given two drainage bags to take home. One is a large overnight drainage bag, and the other is a smaller leg bag that fits underneath clothing. You may wear the overnight bag at any time, but you should never wear the smaller leg bag at night. Follow the instructions below for how to empty, change, and clean your drainage bags.  Emptying the Drainage Bag  You must empty your drainage bag when it is  -½ full or at least 2-3 times a day.  1. Wash your hands with soap and water.  2. Keep the drainage bag below your hips, below the level of your bladder. This stops urine from going back into the tubing and into your bladder.  3. Hold the dirty bag over the toilet or a clean container.  4. Open the pour spout at the bottom of the bag and empty the urine into the toilet or container. Do not let the pour spout touch the toilet, container, or any other surface. Doing so can place bacteria on the bag, which can cause an  infection.  5. Clean the pour spout with a gauze pad or cotton ball that has rubbing alcohol on it.  6. Close the pour spout.  7. Attach the bag to your leg with adhesive tape or a leg strap.  8. Wash your hands well.  PREVENTING INFECTION  · Wash your hands before and after handling your catheter.  · Take showers daily and wash the area where the catheter enters your body. Do not take baths. Replace wet leg straps with dry ones, if this applies.  · Do not use powders, sprays, or lotions on the genital area. Only use creams, lotions, or ointments as directed by your caregiver.  · For females, wipe from front to back after each bowel movement.  · Drink enough fluids to keep your urine clear or pale yellow unless you have a fluid restriction.  · Do not let the drainage bag or tubing touch or lie on the floor.  · Wear cotton underwear to absorb moisture and to keep your .  SEEK MEDICAL CARE IF:   · Your urine is cloudy or smells unusually bad.  · Your catheter becomes clogged.  · You are not draining urine into the bag or your bladder feels full.  · Your catheter starts to leak.  SEEK IMMEDIATE MEDICAL CARE IF:   · You have pain, swelling, redness, or pus where the catheter enters the body.  · You have pain in the abdomen, legs, lower back, or bladder.  · You have a fever.  · You see blood fill the catheter, or your urine is pink or red.  · You have nausea, vomiting, or chills.  · Your catheter gets pulled out.  MAKE SURE YOU:   · Understand these instructions.  · Will watch your condition.  · Will get help right away if you are not doing well or get worse.     This information is not intended to replace advice given to you by your health care provider. Make sure you discuss any questions you have with your health care provider.     Document Released: 12/18/2006 Document Revised: 05/04/2015 Document Reviewed: 12/09/2013  [employer] Interactive Patient Education ©2016 [employer]      Discharge  Instructions    Discharged to home by car with relative. Discharged via wheelchair, hospital escort: Yes.  Special equipment needed: Wheelchair    Be sure to schedule a follow-up appointment with your primary care doctor or any specialists as instructed.     Discharge Plan:   Influenza Vaccine Indication: Patient Refuses    I understand that a diet low in cholesterol, fat, and sodium is recommended for good health. Unless I have been given specific instructions below for another diet, I accept this instruction as my diet prescription.   Other diet:     Special Instructions: None    · Is patient discharged on Warfarin / Coumadin?   No     Depression / Suicide Risk    As you are discharged from this [facility] facility, it is important to learn how to keep safe from harming yourself.    Recognize the warning signs:  · Abrupt changes in personality, positive or negative- including increase in energy   · Giving away possessions  · Change in eating patterns- significant weight changes-  positive or negative  · Change in sleeping patterns- unable to sleep or sleeping all the time   · Unwillingness or inability to communicate  · Depression  · Unusual sadness, discouragement and loneliness  · Talk of wanting to die  · Neglect of personal appearance   · Rebelliousness- reckless behavior  · Withdrawal from people/activities they love  · Confusion- inability to concentrate     If you or a loved one observes any of these behaviors or has concerns about self-harm, here's what you can do:  · Talk about it- your feelings and reasons for harming yourself  · Remove any means that you might use to hurt yourself (examples: pills, rope, extension cords, firearm)  · Get professional help from the community (Mental Health, Substance Abuse, psychological counseling)  · Do not be alone:Call your Safe Contact- someone whom you trust who will be there for you.  · Call your local CRISIS HOTLINE 229-7461 or 122-183-9540  · Call your local  Children's Mobile Crisis Response Team [place] (847) 553-2318 or www.[employer]  · Call the toll free National Suicide Prevention Hotlines   · National Suicide Prevention Lifeline 193-115-QXPP (4099)  · National Hope Line Network 800-SUICIDE (620-4590)

## 2019-07-06 NOTE — CARE PLAN
Problem: Fluid Volume:  Goal: Will maintain balanced intake and output  Outcome: PROGRESSING AS EXPECTED      Problem: Skin Integrity  Goal: Risk for impaired skin integrity will decrease  Outcome: PROGRESSING SLOWER THAN EXPECTED

## 2019-07-06 NOTE — PROGRESS NOTES
Report received from day shift nurse. Assessment complete. Patient A&Ox4. Patient denies any discomfort at this time. Call light and belongings within reach. Bed in low position and treaded slippers on patient. Bed alarm on and functioning. Patient resting comfortably in bed. Will continue to monitor hourly.

## 2019-07-06 NOTE — DISCHARGE SUMMARY
Discharge Summary    CHIEF COMPLAINT ON ADMISSION  No chief complaint on file.      Reason for Admission  Sepsis     Admission Date  7/4/2019    CODE STATUS  Full Code    HPI & HOSPITAL COURSE      59 y.o. female who presented 7/4/2019 Direct admit for possible pyelonephritic abscess.  History of aneurysm complicated by bleeding stroke in 1990s, now with residual aphasia and R sided hemiparesis. She is not on any blood thinner or antiplatelets. Despite aphasia she has no dysphagia per . Her mobility is such that she uses the wheelchair, does transfers with assistance from the  who is now her fulltime caretaker. She had been in the [facility] prior.  History of kidney stones, followed by Urology. About 1-2 weeks ago underwent cystoscopic lithotripsy and stent for kidney stones in [place], followed by removal of the stent 4 days after with the same urologist. She tolerated the procedure then. More recently per EDPA, was seen at Urology for lithotripsy.  Nonhistorian because of aphasia. She can nod her head. Her  gave the history.     She was admitted for pyelonephritis with hydronephrosis on imaging and she wasstarted on iv abx and urology was consulted, she was taken to the OR for Procedure(s):  CYSTOSCOPY  STENT PLACEMENT  URETEROSCOPY    She continues to have a pascal in place with pinkish urine now  White count monitored and has decreased from 34k to 20K  Bmp is noted at baseline  Urine cultures from OSH + E.coli and sensitive to bactrim  I started her on oral, she tolerated well and I will dc her on oral bactrim and have her fu with pcp and urology next week  In the mean time get CBC and BMP next week prior to the appointments, I will given her a script to get labs done    At this time she is medically stable for dc, her  is also agreeable and understands the above plan.      Therefore, she is discharged in good and stable condition to home with close outpatient follow-up.    The  patient recovered much more quickly than anticipated on admission.    Discharge Date  7/6/19    FOLLOW UP ITEMS POST DISCHARGE  pcp- repeat CBC and BMP in 4 days  Urology in 1 week to get stent removed    DISCHARGE DIAGNOSES  Principal Problem:    Sepsis due to gram-negative pyelonephritis with possible perinephric abscess ([facility]) POA: Unknown  Active Problems:    History of completed stroke POA: Yes    Leukocytosis POA: Yes    COPD (chronic obstructive pulmonary disease) ([facility]) POA: Unknown  Resolved Problems:    CHERYL (acute kidney injury) ([facility]) POA: Yes    Obstructive uropathy POA: Unknown      FOLLOW UP  No future appointments.  PRIMARY CARE PROVIDER      Please call your PCP office once back home to set up a follow up appointment. Thank you       MEDICATIONS ON DISCHARGE     Medication List      START taking these medications      Instructions   sulfamethoxazole-trimethoprim 800-160 MG tablet  Commonly known as:  BACTRIM DS   Take 1 Tab by mouth every 12 hours for 10 days.  Dose:  1 Tab        CONTINUE taking these medications      Instructions   DULoxetine 60 MG Cpep delayed-release capsule  Commonly known as:  CYMBALTA   Take 60 mg by mouth 2 times a day.  Dose:  60 mg     FLUoxetine 20 MG Caps  Commonly known as:  PROZAC   Take 20 mg by mouth every day.  Dose:  20 mg     folic acid 1 MG Tabs  Commonly known as:  FOLVITE   Take 2 mg by mouth every day.  Dose:  2 mg     ipratropium-albuterol 0.5-2.5 (3) MG/3ML nebulizer solution  Commonly known as:  DUONEB   3 mL by Nebulization route every four hours as needed for Shortness of Breath.  Dose:  3 mL     pregabalin 150 MG Caps  Commonly known as:  LYRICA   Take 150 mg by mouth 2 times a day.  Dose:  150 mg     STOOL SOFTENER PO   Take 2 Tabs by mouth every day.  Dose:  2 Tab        STOP taking these medications    fentaNYL 100 MCG/HR Pt72  Commonly known as:  DURAGESIC            Allergies  No Known Allergies    DIET  Orders Placed This Encounter   Procedures   •  Diet Order Regular     Standing Status:   Standing     Number of Occurrences:   1     Order Specific Question:   Diet:     Answer:   Regular [1]       ACTIVITY  As tolerated.  Weight bearing as tolerated    CONSULTATIONS  urology    PROCEDURES  Stent placement    LABORATORY  Lab Results   Component Value Date    SODIUM 140 07/06/2019    POTASSIUM 3.8 07/06/2019    CHLORIDE 113 (H) 07/06/2019    CO2 20 07/06/2019    GLUCOSE 79 07/06/2019    BUN 16 07/06/2019    CREATININE 0.96 07/06/2019        Lab Results   Component Value Date    WBC 20.2 (H) 07/06/2019    HEMOGLOBIN 8.1 (L) 07/06/2019    HEMATOCRIT 26.8 (L) 07/06/2019    PLATELETCT 406 07/06/2019        Total time of the discharge process exceeds 45 minutes.

## 2019-07-06 NOTE — PROGRESS NOTES
Assumed care of patient.Resting comfortably in bed, in no apparent distress. Call light and personal belongings within reach, bed in low position, side rails x2, bed alarm on.

## 2019-07-07 LAB
BACTERIA UR CULT: ABNORMAL
BACTERIA UR CULT: ABNORMAL
SIGNIFICANT IND 70042: ABNORMAL
SITE SITE: ABNORMAL
SOURCE SOURCE: ABNORMAL

## 2019-07-07 NOTE — PROGRESS NOTES
Reviewed discharge instructions with patient and spouse including medications, follow up, pascal catheter care, and symptoms that warrant MD notification or immediate medical attention. Awaiting ride home.

## 2019-07-09 NOTE — DOCUMENTATION QUERY
[facility]                                                                       Query Response Note      PATIENT:               ANGELINA ESCOBAR  ACCT #:                  [id number]  MRN:                     [id number]  :                      1960  ADMIT DATE:       2019 7:31 PM  DISCH DATE:        2019 7:45 PM  RESPONDING  PROVIDER #:        [id number]           QUERY TEXT:    Please clarify in documentation the relationship, if any, between pyelonephritis and recent urologic surgery    The patient's Clinical Indicators include:  Sepsis due to gram negative pyelonephritis with possible perinephric abscess   Pyelonephritis with hydronephrosis   about 1-2 weeks ago underwent cystoscopic lithotripsy and stent for kidney stones in [place], followed by removal of the stent 4 days after.  More recently was seen at urology for lithotripsy.  Per OP report: right hydronephrosis with purulent drainage after stent placement  Per Urology Consult: UTI with right hydronephrosis recent urologic surgery    Urine culture: Escherichia coli ESBL 10-50,000 cfu/mL  Risk Factors: recent urologic surgery   Treatment: rocephin, Bactrim, cystoscopy and emergent placement of right ureteral stent  Options provided:   -- pyelonephritis is due to or associated with recent urologic surgery   -- Unrelated to each other   -- Unable to determine      Query created by: Quan Dennis on 2019 11:59 AM    RESPONSE TEXT:    pyelonephritis is due to or associated with recent urologic surgery          Electronically signed by:  SCOTT PALOMO 2019 11:35 PM

## 2019-07-10 LAB
BACTERIA BLD CULT: NORMAL
BACTERIA BLD CULT: NORMAL
SIGNIFICANT IND 70042: NORMAL
SIGNIFICANT IND 70042: NORMAL
SITE SITE: NORMAL
SITE SITE: NORMAL
SOURCE SOURCE: NORMAL
SOURCE SOURCE: NORMAL

## 2019-07-12 ENCOUNTER — HOSPITAL ENCOUNTER (OUTPATIENT)
Dept: RADIOLOGY | Facility: MEDICAL CENTER | Age: 59
End: 2019-07-12

## 2019-07-17 NOTE — DOCUMENTATION QUERY
[facility]                                                                       Query Response Note      PATIENT:               ANGELINA ESCOBAR  ACCT #:                  [id number]  MRN:                     [id number]  :                      1960  ADMIT DATE:       2019 7:31 PM  DISCH DATE:        2019 7:45 PM  RESPONDING  PROVIDER #:        [id number]           QUERY TEXT:    Sepsis is documented in the medical record.  The  is unable to determine the cause and effect relationship between sepsis and acute kidney injury with the current provider documentation.    Please clarify in documentation the relationship, if any, between sepsis and acute kidney injury.    The patient's Clinical Indicators include:  Per H&P:    * Sepsis due to gram-negative pyelonephritis with possible perinephric abscess (HCC)  Assessment & Plan    This is sepsis (without associated acute organ dysfunction).   Leukocytosis  Hypotension resolved at the outside ED  CT showed possible perinephric abscess  Ordered IV antibiotics, IV fluids  Ordered STAT labs  Recultured  Dr. Killian Urology consulted.    CHERYL (acute kidney injury) (HCC)- (present on admission)  Assessment & Plan    Per outside hospital provider  Related to hydronephrosis and pyelonephritis  Dr. Killian consulted      per  assessment note:  Sepsis due to gram-negative pyelonephritis with possible perinephric abscess (HCC)    This is sepsis (without associated acute organ dysfunction).   Leukocytosis significant 2/2 uti, persistently elevated 20K this morning  Hypotension resolved at the outside ED      CHERYL (acute kidney injury) (HCC)    Hide copied text  Hover for attribution information  Per outside hospital provider  Related to hydronephrosis and pyelonephritis  Options provided:   -- Condition acute kidney injury is due to or associated with sepsis   -- Unrelated to each other   -- Unable to  determine      Query created by: Laura Joyce on 7/16/2019 11:17 AM    RESPONSE TEXT:    Condition acute kidney injury is due to or associated with sepsis          Electronically signed by:  SCOTT PALOMO 7/17/2019 3:27 PM

## 2019-10-18 DIAGNOSIS — I49.9 CARDIAC ARRHYTHMIA, UNSPECIFIED CARDIAC ARRHYTHMIA TYPE: Primary | ICD-10-CM

## 2019-10-18 LAB — EKG IMPRESSION: NORMAL

## 2022-06-07 NOTE — ANESTHESIA QCDR
2019 [facility] Clinical Data Registry (for Quality Improvement)     Postoperative nausea/vomiting risk protocol (Adult = 18 yrs and Pediatric 3-17 yrs)- (430 and 463)  General inhalation anesthetic (NOT TIVA) with PONV risk factors: Yes  Provision of anti-emetic therapy with at least 2 different classes of agents: Yes   Patient DID NOT receive anti-emetic therapy and reason is documented in Medical Record:  N/A    Multimodal Pain Management- (AQI59)  Patient undergoing Elective Surgery (i.e. Outpatient, or ASC, or Prescheduled Surgery prior to Hospital Admission): No  Use of Multimodal Pain Management, two or more drugs and/or interventions, NOT including systemic opioids: N/A  Exception: Documented allergy to multiple classes of analgesics: N/A    PACU assessment of acute postoperative pain prior to Anesthesia Care End- Applies to Patients Age = 18- (ABG7)  Initial PACU pain score is which of the following: < 7/10  Patient unable to report pain score: N/A    Post-anesthetic transfer of care checklist/protocol to PACU/ICU- (426 and 427)  Upon conclusion of case, patient transferred to which of the following locations: PACU/Non-ICU  Use of transfer checklist/protocol: Yes  Exclusion: Service Performed in Patient Hospital Room (and thus did not require transfer): N/A    PACU Reintubation- (AQI31)  General anesthesia requiring endotracheal intubation (ETT) along with subsequent extubation in OR or PACU: No  Required reintubation in the PACU: N/A  Extubation was a planned trial documented in the medical record prior to removal of the original airway device: N/A    Unplanned admission to ICU related to anesthesia service up through end of PACU care- (MD51)  Unplanned admission to ICU (not initially anticipated at anesthesia start time): No          Constitutional: Awake, Alert, non-toxic. NAD. Well appearing, well nourished.   HEAD: Normocephalic, atraumatic.   EYES: EOM intact, conjunctiva and sclera are clear bilaterally.   ENT: No rhinorrhea, patent, mucous membranes pink/moist, no drooling or stridor.   NECK: Supple, non-tender  RESPIRATORY: Normal respiratory effort  EXTREMITIES: Full passive and active ROM in all extremities; (+) right ankle lateral TTP, no swelling or ecchymosis, proximal tib/fib non-tender to palpation; distal pulses palpable and symmetric  SKIN: Warm, dry; good skin turgor, no apparent lesions or rashes, no ecchymosis, brisk capillary refill.  NEURO: A&O x3. Sensory and motor functions are grossly intact. Speech is normal. Appearance and judgement seem appropriate for gender and age.

## 2024-01-25 ENCOUNTER — HOSPITAL ENCOUNTER (OUTPATIENT)
Dept: RADIOLOGY | Facility: MEDICAL CENTER | Age: 64
End: 2024-01-25

## 2024-01-25 ENCOUNTER — HOSPITAL ENCOUNTER (INPATIENT)
Facility: MEDICAL CENTER | Age: 64
LOS: 2 days | DRG: 872 | End: 2024-01-27
Attending: HOSPITALIST | Admitting: HOSPITALIST
Payer: MEDICARE

## 2024-01-25 DIAGNOSIS — N13.30 HYDRONEPHROSIS OF RIGHT KIDNEY: ICD-10-CM

## 2024-01-25 PROBLEM — R65.10 SIRS (SYSTEMIC INFLAMMATORY RESPONSE SYNDROME) (HCC): Status: ACTIVE | Noted: 2024-01-25

## 2024-01-25 PROBLEM — L27.0 DRUG RASH: Status: ACTIVE | Noted: 2024-01-25

## 2024-01-25 PROBLEM — N12 PYELONEPHRITIS: Status: RESOLVED | Noted: 2018-02-13 | Resolved: 2024-01-25

## 2024-01-25 PROBLEM — R65.10 SIRS (SYSTEMIC INFLAMMATORY RESPONSE SYNDROME) (HCC): Status: RESOLVED | Noted: 2024-01-25 | Resolved: 2024-01-25

## 2024-01-25 PROCEDURE — 700105 HCHG RX REV CODE 258: Performed by: HOSPITALIST

## 2024-01-25 PROCEDURE — 700102 HCHG RX REV CODE 250 W/ 637 OVERRIDE(OP): Performed by: HOSPITALIST

## 2024-01-25 PROCEDURE — 700111 HCHG RX REV CODE 636 W/ 250 OVERRIDE (IP): Mod: JZ | Performed by: HOSPITALIST

## 2024-01-25 PROCEDURE — 99223 1ST HOSP IP/OBS HIGH 75: CPT | Performed by: HOSPITALIST

## 2024-01-25 PROCEDURE — 770020 HCHG ROOM/CARE - TELE (206)

## 2024-01-25 PROCEDURE — A9270 NON-COVERED ITEM OR SERVICE: HCPCS | Performed by: HOSPITALIST

## 2024-01-25 RX ORDER — DIPHENHYDRAMINE HYDROCHLORIDE 50 MG/ML
25 INJECTION INTRAMUSCULAR; INTRAVENOUS EVERY 6 HOURS PRN
Status: DISCONTINUED | OUTPATIENT
Start: 2024-01-25 | End: 2024-01-27 | Stop reason: HOSPADM

## 2024-01-25 RX ORDER — ONDANSETRON 2 MG/ML
4 INJECTION INTRAMUSCULAR; INTRAVENOUS EVERY 4 HOURS PRN
Status: DISCONTINUED | OUTPATIENT
Start: 2024-01-25 | End: 2024-01-27 | Stop reason: HOSPADM

## 2024-01-25 RX ORDER — PROMETHAZINE HYDROCHLORIDE 25 MG/1
12.5-25 SUPPOSITORY RECTAL EVERY 4 HOURS PRN
Status: DISCONTINUED | OUTPATIENT
Start: 2024-01-25 | End: 2024-01-27 | Stop reason: HOSPADM

## 2024-01-25 RX ORDER — AMOXICILLIN 250 MG
2 CAPSULE ORAL 2 TIMES DAILY
Status: DISCONTINUED | OUTPATIENT
Start: 2024-01-25 | End: 2024-01-27 | Stop reason: HOSPADM

## 2024-01-25 RX ORDER — PREGABALIN 75 MG/1
150 CAPSULE ORAL EVERY 8 HOURS
Status: DISCONTINUED | OUTPATIENT
Start: 2024-01-25 | End: 2024-01-27 | Stop reason: HOSPADM

## 2024-01-25 RX ORDER — FAMOTIDINE 20 MG/1
20 TABLET, FILM COATED ORAL 2 TIMES DAILY
Status: DISCONTINUED | OUTPATIENT
Start: 2024-01-25 | End: 2024-01-27 | Stop reason: HOSPADM

## 2024-01-25 RX ORDER — FERROUS SULFATE 325(65) MG
325 TABLET ORAL DAILY
Status: DISCONTINUED | OUTPATIENT
Start: 2024-01-26 | End: 2024-01-27 | Stop reason: HOSPADM

## 2024-01-25 RX ORDER — ONDANSETRON 4 MG/1
4 TABLET, ORALLY DISINTEGRATING ORAL EVERY 4 HOURS PRN
Status: DISCONTINUED | OUTPATIENT
Start: 2024-01-25 | End: 2024-01-27 | Stop reason: HOSPADM

## 2024-01-25 RX ORDER — DULOXETIN HYDROCHLORIDE 30 MG/1
60 CAPSULE, DELAYED RELEASE ORAL 2 TIMES DAILY
Status: DISCONTINUED | OUTPATIENT
Start: 2024-01-25 | End: 2024-01-27 | Stop reason: HOSPADM

## 2024-01-25 RX ORDER — FOLIC ACID 1 MG/1
2 TABLET ORAL DAILY
Status: DISCONTINUED | OUTPATIENT
Start: 2024-01-26 | End: 2024-01-27 | Stop reason: HOSPADM

## 2024-01-25 RX ORDER — METHYLPREDNISOLONE SODIUM SUCCINATE 40 MG/ML
20 INJECTION, POWDER, LYOPHILIZED, FOR SOLUTION INTRAMUSCULAR; INTRAVENOUS EVERY 8 HOURS
Status: DISCONTINUED | OUTPATIENT
Start: 2024-01-25 | End: 2024-01-27 | Stop reason: HOSPADM

## 2024-01-25 RX ORDER — IPRATROPIUM BROMIDE AND ALBUTEROL SULFATE 2.5; .5 MG/3ML; MG/3ML
3 SOLUTION RESPIRATORY (INHALATION) EVERY 4 HOURS PRN
Status: DISCONTINUED | OUTPATIENT
Start: 2024-01-25 | End: 2024-01-27 | Stop reason: HOSPADM

## 2024-01-25 RX ORDER — LABETALOL HYDROCHLORIDE 5 MG/ML
10 INJECTION, SOLUTION INTRAVENOUS EVERY 4 HOURS PRN
Status: DISCONTINUED | OUTPATIENT
Start: 2024-01-25 | End: 2024-01-27 | Stop reason: HOSPADM

## 2024-01-25 RX ORDER — PROMETHAZINE HYDROCHLORIDE 25 MG/1
12.5-25 TABLET ORAL EVERY 4 HOURS PRN
Status: DISCONTINUED | OUTPATIENT
Start: 2024-01-25 | End: 2024-01-27 | Stop reason: HOSPADM

## 2024-01-25 RX ORDER — ONDANSETRON 2 MG/ML
4 INJECTION INTRAMUSCULAR; INTRAVENOUS EVERY 4 HOURS PRN
Status: DISCONTINUED | OUTPATIENT
Start: 2024-01-25 | End: 2024-01-25

## 2024-01-25 RX ORDER — FLUOXETINE HYDROCHLORIDE 20 MG/1
20 CAPSULE ORAL DAILY
Status: DISCONTINUED | OUTPATIENT
Start: 2024-01-26 | End: 2024-01-25

## 2024-01-25 RX ORDER — FERROUS SULFATE 325(65) MG
325 TABLET ORAL DAILY
Status: ON HOLD | COMMUNITY
End: 2024-01-28

## 2024-01-25 RX ORDER — MORPHINE SULFATE 4 MG/ML
2 INJECTION INTRAVENOUS
Status: DISCONTINUED | OUTPATIENT
Start: 2024-01-25 | End: 2024-01-27 | Stop reason: HOSPADM

## 2024-01-25 RX ORDER — SODIUM CHLORIDE 9 MG/ML
INJECTION, SOLUTION INTRAVENOUS CONTINUOUS
Status: DISCONTINUED | OUTPATIENT
Start: 2024-01-25 | End: 2024-01-27 | Stop reason: HOSPADM

## 2024-01-25 RX ORDER — BISACODYL 10 MG
10 SUPPOSITORY, RECTAL RECTAL
Status: DISCONTINUED | OUTPATIENT
Start: 2024-01-25 | End: 2024-01-27 | Stop reason: HOSPADM

## 2024-01-25 RX ORDER — ACETAMINOPHEN 325 MG/1
650 TABLET ORAL EVERY 6 HOURS PRN
Status: DISCONTINUED | OUTPATIENT
Start: 2024-01-25 | End: 2024-01-25

## 2024-01-25 RX ORDER — POLYETHYLENE GLYCOL 3350 17 G/17G
1 POWDER, FOR SOLUTION ORAL
Status: DISCONTINUED | OUTPATIENT
Start: 2024-01-25 | End: 2024-01-27 | Stop reason: HOSPADM

## 2024-01-25 RX ORDER — OXYCODONE HYDROCHLORIDE 5 MG/1
5 TABLET ORAL
Status: DISCONTINUED | OUTPATIENT
Start: 2024-01-25 | End: 2024-01-27 | Stop reason: HOSPADM

## 2024-01-25 RX ORDER — LABETALOL HYDROCHLORIDE 5 MG/ML
10 INJECTION, SOLUTION INTRAVENOUS EVERY 4 HOURS PRN
Status: DISCONTINUED | OUTPATIENT
Start: 2024-01-25 | End: 2024-01-25

## 2024-01-25 RX ORDER — ACETAMINOPHEN 325 MG/1
650 TABLET ORAL EVERY 6 HOURS PRN
Status: DISCONTINUED | OUTPATIENT
Start: 2024-01-25 | End: 2024-01-27 | Stop reason: HOSPADM

## 2024-01-25 RX ORDER — OXYCODONE HYDROCHLORIDE 5 MG/1
2.5 TABLET ORAL
Status: DISCONTINUED | OUTPATIENT
Start: 2024-01-25 | End: 2024-01-27 | Stop reason: HOSPADM

## 2024-01-25 RX ORDER — PROCHLORPERAZINE EDISYLATE 5 MG/ML
5-10 INJECTION INTRAMUSCULAR; INTRAVENOUS EVERY 4 HOURS PRN
Status: DISCONTINUED | OUTPATIENT
Start: 2024-01-25 | End: 2024-01-27 | Stop reason: HOSPADM

## 2024-01-25 RX ADMIN — METHYLPREDNISOLONE SODIUM SUCCINATE 20 MG: 40 INJECTION, POWDER, FOR SOLUTION INTRAMUSCULAR; INTRAVENOUS at 21:32

## 2024-01-25 RX ADMIN — SODIUM CHLORIDE: 9 INJECTION, SOLUTION INTRAVENOUS at 21:44

## 2024-01-25 RX ADMIN — FAMOTIDINE 20 MG: 20 TABLET, FILM COATED ORAL at 21:31

## 2024-01-25 RX ADMIN — CEFTRIAXONE SODIUM 1000 MG: 1 INJECTION, POWDER, FOR SOLUTION INTRAMUSCULAR; INTRAVENOUS at 21:45

## 2024-01-25 RX ADMIN — PREGABALIN 150 MG: 75 CAPSULE ORAL at 21:31

## 2024-01-25 RX ADMIN — DULOXETINE HYDROCHLORIDE 60 MG: 30 CAPSULE, DELAYED RELEASE ORAL at 21:50

## 2024-01-25 ASSESSMENT — ENCOUNTER SYMPTOMS
SINUS PAIN: 0
EYE REDNESS: 0
ORTHOPNEA: 0
WEAKNESS: 1
CHILLS: 0
SHORTNESS OF BREATH: 0
DIAPHORESIS: 0
DOUBLE VISION: 0
DEPRESSION: 0
STRIDOR: 0
FALLS: 0
NAUSEA: 1
TINGLING: 0
HEARTBURN: 0
EYES NEGATIVE: 1
COUGH: 0
SEIZURES: 0
MYALGIAS: 0
FEVER: 1
BRUISES/BLEEDS EASILY: 0
PSYCHIATRIC NEGATIVE: 1
PND: 0
FOCAL WEAKNESS: 0
POLYDIPSIA: 0
WHEEZING: 0
BACK PAIN: 0
FLANK PAIN: 1
INSOMNIA: 0
RESPIRATORY NEGATIVE: 1
SENSORY CHANGE: 0
EYE DISCHARGE: 0
SPUTUM PRODUCTION: 0
BLOOD IN STOOL: 0
PHOTOPHOBIA: 0
CARDIOVASCULAR NEGATIVE: 1
ABDOMINAL PAIN: 1
DIZZINESS: 0

## 2024-01-25 ASSESSMENT — COGNITIVE AND FUNCTIONAL STATUS - GENERAL
DAILY ACTIVITIY SCORE: 12
DRESSING REGULAR LOWER BODY CLOTHING: TOTAL
PERSONAL GROOMING: A LITTLE
MOVING TO AND FROM BED TO CHAIR: UNABLE
MOBILITY SCORE: 7
TURNING FROM BACK TO SIDE WHILE IN FLAT BAD: A LOT
WALKING IN HOSPITAL ROOM: TOTAL
TOILETING: A LOT
SUGGESTED CMS G CODE MODIFIER MOBILITY: CM
STANDING UP FROM CHAIR USING ARMS: TOTAL
MOVING FROM LYING ON BACK TO SITTING ON SIDE OF FLAT BED: UNABLE
EATING MEALS: A LITTLE
DRESSING REGULAR UPPER BODY CLOTHING: TOTAL
CLIMB 3 TO 5 STEPS WITH RAILING: TOTAL
SUGGESTED CMS G CODE MODIFIER DAILY ACTIVITY: CL
HELP NEEDED FOR BATHING: A LOT

## 2024-01-25 ASSESSMENT — PAIN DESCRIPTION - PAIN TYPE: TYPE: ACUTE PAIN

## 2024-01-25 ASSESSMENT — LIFESTYLE VARIABLES
TOTAL SCORE: 0
ALCOHOL_USE: NO
TOTAL SCORE: 0
AVERAGE NUMBER OF DAYS PER WEEK YOU HAVE A DRINK CONTAINING ALCOHOL: 0
HOW MANY TIMES IN THE PAST YEAR HAVE YOU HAD 5 OR MORE DRINKS IN A DAY: 0
HAVE PEOPLE ANNOYED YOU BY CRITICIZING YOUR DRINKING: NO
EVER FELT BAD OR GUILTY ABOUT YOUR DRINKING: NO
ON A TYPICAL DAY WHEN YOU DRINK ALCOHOL HOW MANY DRINKS DO YOU HAVE: 0
CONSUMPTION TOTAL: NEGATIVE
SUBSTANCE_ABUSE: 0
EVER HAD A DRINK FIRST THING IN THE MORNING TO STEADY YOUR NERVES TO GET RID OF A HANGOVER: NO
TOTAL SCORE: 0
HAVE YOU EVER FELT YOU SHOULD CUT DOWN ON YOUR DRINKING: NO

## 2024-01-25 ASSESSMENT — VISUAL ACUITY: OU: 1

## 2024-01-25 ASSESSMENT — PATIENT HEALTH QUESTIONNAIRE - PHQ9
1. LITTLE INTEREST OR PLEASURE IN DOING THINGS: NOT AT ALL
2. FEELING DOWN, DEPRESSED, IRRITABLE, OR HOPELESS: NOT AT ALL
SUM OF ALL RESPONSES TO PHQ9 QUESTIONS 1 AND 2: 0

## 2024-01-25 NOTE — PROGRESS NOTES
Triage officer note    Transferring from [facility]  Transferring provider BRYANT Cash    Reason for transfer: Patient with acute pyelonephritis with E. coli positive cultures.  The patient has right-sided hydronephrosis.  Patient had CT with and without contrast showing minimal flow through the kidney.  It appears that the patient does have a stricture today.  I's talk to Dr. John Zaragoza who looked through his records and in 2019 the patient had a stone at which point in time she had a stent placed and was lost to follow-ups.  The patient since then now has developed significant hydronephrosis without a stone.  Patient will need most likely cystoscopy and stent placement.  Urology at this point has agreed to see the patient upon transfer.

## 2024-01-26 ENCOUNTER — APPOINTMENT (OUTPATIENT)
Dept: RADIOLOGY | Facility: MEDICAL CENTER | Age: 64
DRG: 872 | End: 2024-01-26
Attending: STUDENT IN AN ORGANIZED HEALTH CARE EDUCATION/TRAINING PROGRAM
Payer: MEDICARE

## 2024-01-26 ENCOUNTER — ANESTHESIA EVENT (OUTPATIENT)
Dept: SURGERY | Facility: MEDICAL CENTER | Age: 64
DRG: 872 | End: 2024-01-26
Payer: MEDICARE

## 2024-01-26 ENCOUNTER — ANESTHESIA (OUTPATIENT)
Dept: SURGERY | Facility: MEDICAL CENTER | Age: 64
DRG: 872 | End: 2024-01-26
Payer: MEDICARE

## 2024-01-26 LAB
ANION GAP SERPL CALC-SCNC: 12 MMOL/L (ref 7–16)
BUN SERPL-MCNC: 18 MG/DL (ref 8–22)
CALCIUM SERPL-MCNC: 8.4 MG/DL (ref 8.4–10.2)
CHLORIDE SERPL-SCNC: 104 MMOL/L (ref 96–112)
CO2 SERPL-SCNC: 18 MMOL/L (ref 20–33)
CREAT SERPL-MCNC: 0.82 MG/DL (ref 0.5–1.4)
ERYTHROCYTE [DISTWIDTH] IN BLOOD BY AUTOMATED COUNT: 52.6 FL (ref 35.9–50)
GFR SERPLBLD CREATININE-BSD FMLA CKD-EPI: 80 ML/MIN/1.73 M 2
GLUCOSE SERPL-MCNC: 130 MG/DL (ref 65–99)
HCT VFR BLD AUTO: 33.4 % (ref 37–47)
HGB BLD-MCNC: 10.8 G/DL (ref 12–16)
LACTATE SERPL-SCNC: 0.7 MMOL/L (ref 0.5–2)
LACTATE SERPL-SCNC: 1.2 MMOL/L (ref 0.5–2)
MCH RBC QN AUTO: 27.3 PG (ref 27–33)
MCHC RBC AUTO-ENTMCNC: 32.3 G/DL (ref 32.2–35.5)
MCV RBC AUTO: 84.6 FL (ref 81.4–97.8)
PLATELET # BLD AUTO: 395 K/UL (ref 164–446)
PMV BLD AUTO: 10.7 FL (ref 9–12.9)
POTASSIUM SERPL-SCNC: 4.3 MMOL/L (ref 3.6–5.5)
PROCALCITONIN SERPL-MCNC: 0.38 NG/ML
RBC # BLD AUTO: 3.95 M/UL (ref 4.2–5.4)
SODIUM SERPL-SCNC: 134 MMOL/L (ref 135–145)
WBC # BLD AUTO: 15 K/UL (ref 4.8–10.8)

## 2024-01-26 PROCEDURE — 87040 BLOOD CULTURE FOR BACTERIA: CPT

## 2024-01-26 PROCEDURE — 700102 HCHG RX REV CODE 250 W/ 637 OVERRIDE(OP): Performed by: HOSPITALIST

## 2024-01-26 PROCEDURE — 160028 HCHG SURGERY MINUTES - 1ST 30 MINS LEVEL 3: Performed by: STUDENT IN AN ORGANIZED HEALTH CARE EDUCATION/TRAINING PROGRAM

## 2024-01-26 PROCEDURE — 160035 HCHG PACU - 1ST 60 MINS PHASE I: Performed by: STUDENT IN AN ORGANIZED HEALTH CARE EDUCATION/TRAINING PROGRAM

## 2024-01-26 PROCEDURE — 700101 HCHG RX REV CODE 250: Performed by: ANESTHESIOLOGY

## 2024-01-26 PROCEDURE — A9270 NON-COVERED ITEM OR SERVICE: HCPCS | Performed by: HOSPITALIST

## 2024-01-26 PROCEDURE — 700105 HCHG RX REV CODE 258: Performed by: STUDENT IN AN ORGANIZED HEALTH CARE EDUCATION/TRAINING PROGRAM

## 2024-01-26 PROCEDURE — 99233 SBSQ HOSP IP/OBS HIGH 50: CPT | Performed by: INTERNAL MEDICINE

## 2024-01-26 PROCEDURE — C1758 CATHETER, URETERAL: HCPCS | Performed by: STUDENT IN AN ORGANIZED HEALTH CARE EDUCATION/TRAINING PROGRAM

## 2024-01-26 PROCEDURE — 160039 HCHG SURGERY MINUTES - EA ADDL 1 MIN LEVEL 3: Performed by: STUDENT IN AN ORGANIZED HEALTH CARE EDUCATION/TRAINING PROGRAM

## 2024-01-26 PROCEDURE — 94760 N-INVAS EAR/PLS OXIMETRY 1: CPT

## 2024-01-26 PROCEDURE — 700111 HCHG RX REV CODE 636 W/ 250 OVERRIDE (IP): Performed by: ANESTHESIOLOGY

## 2024-01-26 PROCEDURE — 160002 HCHG RECOVERY MINUTES (STAT): Performed by: STUDENT IN AN ORGANIZED HEALTH CARE EDUCATION/TRAINING PROGRAM

## 2024-01-26 PROCEDURE — 85027 COMPLETE CBC AUTOMATED: CPT

## 2024-01-26 PROCEDURE — 700105 HCHG RX REV CODE 258: Performed by: HOSPITALIST

## 2024-01-26 PROCEDURE — C1769 GUIDE WIRE: HCPCS | Performed by: STUDENT IN AN ORGANIZED HEALTH CARE EDUCATION/TRAINING PROGRAM

## 2024-01-26 PROCEDURE — 83605 ASSAY OF LACTIC ACID: CPT

## 2024-01-26 PROCEDURE — 770020 HCHG ROOM/CARE - TELE (206)

## 2024-01-26 PROCEDURE — 36415 COLL VENOUS BLD VENIPUNCTURE: CPT

## 2024-01-26 PROCEDURE — 84145 PROCALCITONIN (PCT): CPT

## 2024-01-26 PROCEDURE — 0TJB8ZZ INSPECTION OF BLADDER, VIA NATURAL OR ARTIFICIAL OPENING ENDOSCOPIC: ICD-10-PCS | Performed by: STUDENT IN AN ORGANIZED HEALTH CARE EDUCATION/TRAINING PROGRAM

## 2024-01-26 PROCEDURE — 160048 HCHG OR STATISTICAL LEVEL 1-5: Performed by: STUDENT IN AN ORGANIZED HEALTH CARE EDUCATION/TRAINING PROGRAM

## 2024-01-26 PROCEDURE — 700111 HCHG RX REV CODE 636 W/ 250 OVERRIDE (IP): Mod: JZ | Performed by: HOSPITALIST

## 2024-01-26 PROCEDURE — 160009 HCHG ANES TIME/MIN: Performed by: STUDENT IN AN ORGANIZED HEALTH CARE EDUCATION/TRAINING PROGRAM

## 2024-01-26 PROCEDURE — 80048 BASIC METABOLIC PNL TOTAL CA: CPT

## 2024-01-26 RX ORDER — HYDROMORPHONE HYDROCHLORIDE 1 MG/ML
0.5 INJECTION, SOLUTION INTRAMUSCULAR; INTRAVENOUS; SUBCUTANEOUS
Status: DISCONTINUED | OUTPATIENT
Start: 2024-01-26 | End: 2024-01-26 | Stop reason: HOSPADM

## 2024-01-26 RX ORDER — ONDANSETRON 2 MG/ML
4 INJECTION INTRAMUSCULAR; INTRAVENOUS
Status: DISCONTINUED | OUTPATIENT
Start: 2024-01-26 | End: 2024-01-26 | Stop reason: HOSPADM

## 2024-01-26 RX ORDER — DIPHENHYDRAMINE HYDROCHLORIDE 50 MG/ML
12.5 INJECTION INTRAMUSCULAR; INTRAVENOUS
Status: DISCONTINUED | OUTPATIENT
Start: 2024-01-26 | End: 2024-01-26 | Stop reason: HOSPADM

## 2024-01-26 RX ORDER — OXYCODONE HCL 5 MG/5 ML
10 SOLUTION, ORAL ORAL
Status: DISCONTINUED | OUTPATIENT
Start: 2024-01-26 | End: 2024-01-26 | Stop reason: HOSPADM

## 2024-01-26 RX ORDER — SODIUM CHLORIDE, SODIUM LACTATE, POTASSIUM CHLORIDE, CALCIUM CHLORIDE 600; 310; 30; 20 MG/100ML; MG/100ML; MG/100ML; MG/100ML
INJECTION, SOLUTION INTRAVENOUS CONTINUOUS
Status: ACTIVE | OUTPATIENT
Start: 2024-01-26 | End: 2024-01-26

## 2024-01-26 RX ORDER — HYDROMORPHONE HYDROCHLORIDE 1 MG/ML
0.1 INJECTION, SOLUTION INTRAMUSCULAR; INTRAVENOUS; SUBCUTANEOUS
Status: DISCONTINUED | OUTPATIENT
Start: 2024-01-26 | End: 2024-01-26 | Stop reason: HOSPADM

## 2024-01-26 RX ORDER — MEPERIDINE HYDROCHLORIDE 25 MG/ML
25 INJECTION INTRAMUSCULAR; INTRAVENOUS; SUBCUTANEOUS
Status: DISCONTINUED | OUTPATIENT
Start: 2024-01-26 | End: 2024-01-26 | Stop reason: HOSPADM

## 2024-01-26 RX ORDER — LIDOCAINE HYDROCHLORIDE 20 MG/ML
INJECTION, SOLUTION EPIDURAL; INFILTRATION; INTRACAUDAL; PERINEURAL PRN
Status: DISCONTINUED | OUTPATIENT
Start: 2024-01-26 | End: 2024-01-26 | Stop reason: SURG

## 2024-01-26 RX ORDER — OXYCODONE HCL 5 MG/5 ML
5 SOLUTION, ORAL ORAL
Status: DISCONTINUED | OUTPATIENT
Start: 2024-01-26 | End: 2024-01-26 | Stop reason: HOSPADM

## 2024-01-26 RX ORDER — SODIUM CHLORIDE, SODIUM LACTATE, POTASSIUM CHLORIDE, CALCIUM CHLORIDE 600; 310; 30; 20 MG/100ML; MG/100ML; MG/100ML; MG/100ML
INJECTION, SOLUTION INTRAVENOUS CONTINUOUS
Status: DISCONTINUED | OUTPATIENT
Start: 2024-01-26 | End: 2024-01-26 | Stop reason: HOSPADM

## 2024-01-26 RX ORDER — IPRATROPIUM BROMIDE AND ALBUTEROL SULFATE 2.5; .5 MG/3ML; MG/3ML
3 SOLUTION RESPIRATORY (INHALATION)
Status: DISCONTINUED | OUTPATIENT
Start: 2024-01-26 | End: 2024-01-26 | Stop reason: HOSPADM

## 2024-01-26 RX ORDER — MIDAZOLAM HYDROCHLORIDE 1 MG/ML
1 INJECTION INTRAMUSCULAR; INTRAVENOUS
Status: DISCONTINUED | OUTPATIENT
Start: 2024-01-26 | End: 2024-01-26 | Stop reason: HOSPADM

## 2024-01-26 RX ORDER — ONDANSETRON 2 MG/ML
INJECTION INTRAMUSCULAR; INTRAVENOUS PRN
Status: DISCONTINUED | OUTPATIENT
Start: 2024-01-26 | End: 2024-01-26 | Stop reason: SURG

## 2024-01-26 RX ORDER — HYDROMORPHONE HYDROCHLORIDE 1 MG/ML
0.2 INJECTION, SOLUTION INTRAMUSCULAR; INTRAVENOUS; SUBCUTANEOUS
Status: DISCONTINUED | OUTPATIENT
Start: 2024-01-26 | End: 2024-01-26 | Stop reason: HOSPADM

## 2024-01-26 RX ORDER — MIDAZOLAM HYDROCHLORIDE 1 MG/ML
INJECTION INTRAMUSCULAR; INTRAVENOUS PRN
Status: DISCONTINUED | OUTPATIENT
Start: 2024-01-26 | End: 2024-01-26 | Stop reason: SURG

## 2024-01-26 RX ORDER — DEXAMETHASONE SODIUM PHOSPHATE 4 MG/ML
INJECTION, SOLUTION INTRA-ARTICULAR; INTRALESIONAL; INTRAMUSCULAR; INTRAVENOUS; SOFT TISSUE PRN
Status: DISCONTINUED | OUTPATIENT
Start: 2024-01-26 | End: 2024-01-26 | Stop reason: SURG

## 2024-01-26 RX ADMIN — DEXAMETHASONE SODIUM PHOSPHATE 4 MG: 4 INJECTION INTRA-ARTICULAR; INTRALESIONAL; INTRAMUSCULAR; INTRAVENOUS; SOFT TISSUE at 13:54

## 2024-01-26 RX ADMIN — SODIUM CHLORIDE: 9 INJECTION, SOLUTION INTRAVENOUS at 09:41

## 2024-01-26 RX ADMIN — ONDANSETRON 4 MG: 2 INJECTION INTRAMUSCULAR; INTRAVENOUS at 13:54

## 2024-01-26 RX ADMIN — FAMOTIDINE 20 MG: 20 TABLET, FILM COATED ORAL at 18:00

## 2024-01-26 RX ADMIN — PROPOFOL 150 MG: 10 INJECTION, EMULSION INTRAVENOUS at 13:45

## 2024-01-26 RX ADMIN — SUGAMMADEX 200 MG: 100 INJECTION, SOLUTION INTRAVENOUS at 14:15

## 2024-01-26 RX ADMIN — CEFTRIAXONE SODIUM 1000 MG: 1 INJECTION, POWDER, FOR SOLUTION INTRAMUSCULAR; INTRAVENOUS at 17:59

## 2024-01-26 RX ADMIN — METHYLPREDNISOLONE SODIUM SUCCINATE 20 MG: 40 INJECTION, POWDER, FOR SOLUTION INTRAMUSCULAR; INTRAVENOUS at 05:23

## 2024-01-26 RX ADMIN — PREGABALIN 150 MG: 75 CAPSULE ORAL at 05:25

## 2024-01-26 RX ADMIN — FAMOTIDINE 20 MG: 20 TABLET, FILM COATED ORAL at 05:26

## 2024-01-26 RX ADMIN — FERROUS SULFATE TAB 325 MG (65 MG ELEMENTAL FE) 325 MG: 325 (65 FE) TAB at 05:26

## 2024-01-26 RX ADMIN — DULOXETINE HYDROCHLORIDE 60 MG: 30 CAPSULE, DELAYED RELEASE ORAL at 18:00

## 2024-01-26 RX ADMIN — LIDOCAINE HYDROCHLORIDE 40 MG: 20 INJECTION, SOLUTION EPIDURAL; INFILTRATION; INTRACAUDAL at 13:45

## 2024-01-26 RX ADMIN — DULOXETINE HYDROCHLORIDE 60 MG: 30 CAPSULE, DELAYED RELEASE ORAL at 05:26

## 2024-01-26 RX ADMIN — ROCURONIUM BROMIDE 40 MG: 10 INJECTION, SOLUTION INTRAVENOUS at 13:45

## 2024-01-26 RX ADMIN — FENTANYL CITRATE 100 MCG: 50 INJECTION, SOLUTION INTRAMUSCULAR; INTRAVENOUS at 13:45

## 2024-01-26 RX ADMIN — SODIUM CHLORIDE, POTASSIUM CHLORIDE, SODIUM LACTATE AND CALCIUM CHLORIDE: 600; 310; 30; 20 INJECTION, SOLUTION INTRAVENOUS at 13:40

## 2024-01-26 RX ADMIN — METHYLPREDNISOLONE SODIUM SUCCINATE 20 MG: 40 INJECTION, POWDER, FOR SOLUTION INTRAMUSCULAR; INTRAVENOUS at 21:49

## 2024-01-26 RX ADMIN — FOLIC ACID 2 MG: 1 TABLET ORAL at 05:26

## 2024-01-26 RX ADMIN — PREGABALIN 150 MG: 75 CAPSULE ORAL at 21:49

## 2024-01-26 RX ADMIN — MIDAZOLAM HYDROCHLORIDE 2 MG: 1 INJECTION, SOLUTION INTRAMUSCULAR; INTRAVENOUS at 13:40

## 2024-01-26 ASSESSMENT — ENCOUNTER SYMPTOMS
WEAKNESS: 0
DIZZINESS: 0
DIARRHEA: 0
DEPRESSION: 0
HEARTBURN: 0
BLOOD IN STOOL: 0
FLANK PAIN: 1
COUGH: 0
HALLUCINATIONS: 0
VOMITING: 0
NAUSEA: 0
MYALGIAS: 0
HEADACHES: 0
FEVER: 0
PALPITATIONS: 0
SORE THROAT: 0
CHILLS: 0
FOCAL WEAKNESS: 1
BACK PAIN: 0
SHORTNESS OF BREATH: 0

## 2024-01-26 ASSESSMENT — PAIN DESCRIPTION - PAIN TYPE
TYPE: ACUTE PAIN

## 2024-01-26 ASSESSMENT — PAIN SCALES - GENERAL: PAIN_LEVEL: 1

## 2024-01-26 NOTE — ANESTHESIA PREPROCEDURE EVALUATION
Case: [id number] Date/Time: 01/26/24 1315    Procedure: CYSTOSCOPY, WITH URETERAL STENT INSERTION (Right)    Location: [facility] OR 01 / SURGERY [facility]    Surgeons: Mik Rm M.D.            Relevant Problems   PULMONARY   (positive) COPD (chronic obstructive pulmonary disease) (HCC)         (positive) Hydronephrosis of right kidney   (positive) Hydronephrosis with urinary obstruction due to renal calculus       Physical Exam    Airway   Mallampati: I  TM distance: >3 FB  Neck ROM: full       Cardiovascular - normal exam  Rhythm: regular  Rate: normal  (-) murmur     Dental - normal exam           Pulmonary - normal exam  Breath sounds clear to auscultation     Abdominal    Neurological - normal exam                   Anesthesia Plan    ASA 3- EMERGENT       Plan - general       Airway plan will be ETT          Induction: intravenous    Postoperative Plan: Postoperative administration of opioids is intended.    Pertinent diagnostic labs and testing reviewed    Informed Consent:    Anesthetic plan and risks discussed with patient.    Use of blood products discussed with: patient whom consented to blood products.

## 2024-01-26 NOTE — CARE PLAN
The patient is Stable - Low risk of patient condition declining or worsening    Shift Goals  Clinical Goals: Right Retrograde w/ possible stent in AM with Urology; NPO 0000; IV fluids; Antibiotics  Patient Goals: Comfort; rest    Progress made toward(s) clinical / shift goals:  IV Rocephin. Monitor rash from allergic reaction to Zosyn per report. NPO 0000 for urology procedure in AM.     Problem: Knowledge Deficit - Standard  Goal: Patient and family/care givers will demonstrate understanding of plan of care, disease process/condition, diagnostic tests and medications  Outcome: Progressing     Problem: Fall Risk  Goal: Patient will remain free from falls  Outcome: Progressing       Patient is not progressing towards the following goals:N/A

## 2024-01-26 NOTE — CONSULTS
UROLOGY Consult Note:    Haider Brown P.A.-C.  Date & Time note created:    1/25/2024   8:46 PM     Referring MD:  Dr. Mcknight    Patient ID:   Name:             Nydia Finch     YOB: 1960  Age:                 63 y.o.  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 She is able to provide some history but the bulk of information is obtained from prior Hospital records. She was admitted to outside Hospital on 1/18 after presenting to the ER with several days of pains in her right flank and right hip. These pains were new to her and were progressing. She did not have associated fever or any irritative voiding symptoms. She was started on IV antibiotics, which were switched around a bit until sensitivities were available and she was placed on Rocephin. There is question of chronic right hydronephrosis, although the patient does not recall this. Through several imaging studies it was noted to be worsening. ID at [facility] was consulted through telephone conversation and they actually made the recommendation to transfer the patient for higher level of care due to persistent leukocytosis and the hydronephrosis. Urology has been consulted to assist with care.     Review of Systems:      Constitutional: Denies fevers   Eyes: Denies changes in vision   Ears/Nose/Throat/Mouth: Denies nasal congestion   Cardiovascular: no chest pain   Respiratory: no shortness of breath   Gastrointestinal/Hepatic: Denies abdominal pain   Genitourinary: Denies hematuria, dysuria or frequency  Musculoskeletal/Rheum: Denies joint pain   Skin: Denies rash  Neurological: Denies headache   Psychiatric: denies mood disorder   Endocrine: Coleen thyroid  problems  Heme/Oncology/Lymph Nodes: Denies enlarged lymph nodes   All other systems were reviewed and are negative (AMA/CMS criteria)                Past Medical History:   Past Medical History:   Diagnosis Date    Aphagia 1994    Brain aneurysm     Chronic pain     Nephrolithiasis     Right hemiplegia ([facility]) 1994    Stroke ([facility])      Active Hospital Problems    Diagnosis     Hydronephrosis of right kidney [N13.30]     Drug rash [L27.0]     COPD (chronic obstructive pulmonary disease) ([facility]) [J44.9]     Sepsis due to gram-negative pyelonephritis with possible perinephric abscess ([facility]) [A41.50, N39.0]     Dysphagia [R13.10]     History of completed stroke [Z86.73]     Chronic pain [G89.29]     Leukocytosis [D72.829]        Past Surgical History:  Past Surgical History:   Procedure Laterality Date    CYSTOSCOPY N/A 7/4/2019    Procedure: CYSTOSCOPY;  Surgeon: Monico Killian M.D.;  Location: SURGERY [facility];  Service: Urology    STENT PLACEMENT Right 7/4/2019    Procedure: STENT PLACEMENT;  Surgeon: Monico Killian M.D.;  Location: SURGERY [facility];  Service: Urology    URETEROSCOPY Right 7/4/2019    Procedure: URETEROSCOPY;  Surgeon: Monico Killian M.D.;  Location: SURGERY [facility];  Service: Urology    CYSTOSCOPY STENT PLACEMENT  2/13/2018    Procedure: CYSTOSCOPY STENT PLACEMENT;  Surgeon: Monico Killian M.D.;  Location: SURGERY [facility];  Service: Urology    GASTRIC RESECTION  2012    Duodenal Switch    OTHER      OTHER NEUROLOGICAL SURG         Hospital Medications:    Current Facility-Administered Medications:     DULoxetine (Cymbalta) capsule 60 mg, 60 mg, Oral, BID, Cally Mcknight M.D.    [START ON 1/26/2024] ferrous sulfate tablet 325 mg, 325 mg, Oral, DAILY, Cally Mcknight M.D.    [START ON 1/26/2024] folic acid (Folvite) tablet 2 mg, 2 mg, Oral, DAILY, Cally Mcknight M.D.    ipratropium-albuterol (DUONEB) nebulizer solution, 3 mL, Nebulization, Q4HRS PRN, Cally Mcknight M.D.     pregabalin (Lyrica) capsule 150 mg, 150 mg, Oral, Q8HRS, Cally Mcknight M.D.    senna-docusate (Pericolace Or Senokot S) 8.6-50 MG per tablet 2 Tablet, 2 Tablet, Oral, BID **AND** polyethylene glycol/lytes (Miralax) Packet 1 Packet, 1 Packet, Oral, QDAY PRN **AND** magnesium hydroxide (Milk Of Magnesia) suspension 30 mL, 30 mL, Oral, QDAY PRN **AND** bisacodyl (Dulcolax) suppository 10 mg, 10 mg, Rectal, QDAY PRN, Cally Mcknight M.D.    NS infusion, , Intravenous, Continuous, Cally Mcknight M.D.    acetaminophen (Tylenol) tablet 650 mg, 650 mg, Oral, Q6HRS PRN, Cally Mcknight M.D.    Notify provider if pain remains uncontrolled, , , CONTINUOUS **AND** Use the Numeric Rating Scale (NRS), Lyles-Baker Faces (WBF), or FLACC on regular floors and Critical-Care Pain Observation Tool (CPOT) on ICUs/Trauma to assess pain, , , CONTINUOUS **AND** Pulse Ox, , , CONTINUOUS **AND** Pharmacy Consult Request ...Pain Management Review 1 Each, 1 Each, Other, PHARMACY TO DOSE **AND** If patient difficult to arouse and/or has respiratory depression (respiratory rate of 10 or less), stop any opiates that are currently infusing and call a Rapid Response., , , CONTINUOUS, Cally Mcknight M.D.    oxyCODONE immediate-release (Roxicodone) tablet 2.5 mg, 2.5 mg, Oral, Q3HRS PRN **OR** oxyCODONE immediate-release (Roxicodone) tablet 5 mg, 5 mg, Oral, Q3HRS PRN **OR** morphine 4 MG/ML injection 2 mg, 2 mg, Intravenous, Q3HRS PRN, Cally Mcknight M.D.    labetalol (Normodyne/Trandate) injection 10 mg, 10 mg, Intravenous, Q4HRS PRN, Cally Mcknight M.D.    ondansetron (Zofran) syringe/vial injection 4 mg, 4 mg, Intravenous, Q4HRS PRN, Cally Mcknight M.D.    ondansetron (Zofran ODT) dispertab 4 mg, 4 mg, Oral, Q4HRS PRN, Cally Mcknight M.D.    promethazine (Phenergan) tablet 12.5-25 mg, 12.5-25 mg, Oral, Q4HRS PRN, Cally Mcknight M.D.    promethazine (Phenergan) suppository 12.5-25 mg, 12.5-25 mg, Rectal, Q4HRS PRN, Cally Mcknight M.D.     prochlorperazine (Compazine) injection 5-10 mg, 5-10 mg, Intravenous, Q4HRS PRN, Cally Mcknight M.D.    cefTRIAXone (Rocephin) 1,000 mg in  mL IVPB, 1,000 mg, Intravenous, Q EVENING, Cally Mcknight M.D.    methylPREDNISolone (SOLU-MEDROL) 40 MG injection 20 mg, 20 mg, Intravenous, Q8HRS, Cally Mcknight M.D.    famotidine (Pepcid) tablet 20 mg, 20 mg, Oral, BID **OR** famotidine (Pepcid) injection 20 mg, 20 mg, Intravenous, BID, Cally Mcknight M.D.    diphenhydrAMINE (Benadryl) injection 25 mg, 25 mg, Intravenous, Q6HRS PRN, Cally Mcknight M.D.    Current Outpatient Medications:  Medications Prior to Admission   Medication Sig Dispense Refill Last Dose    ferrous sulfate 325 (65 Fe) MG tablet Take 325 mg by mouth every day.       cyanocobalamin (VITAMIN B-12) 500 MCG Tab Take 1,000 mcg by mouth every day.       ipratropium-albuterol (DUONEB) 0.5-2.5 (3) MG/3ML nebulizer solution 3 mL by Nebulization route every four hours as needed for Shortness of Breath. 30 Bullet      Docusate Calcium (STOOL SOFTENER PO) Take 2 Tabs by mouth every day.       pregabalin (LYRICA) 150 MG Cap Take 150 mg by mouth in the morning, at noon, and at bedtime.       DULoxetine (CYMBALTA) 60 MG Cap DR Particles delayed-release capsule Take 60 mg by mouth 2 times a day.       FLUoxetine (PROZAC) 20 MG Cap Take 20 mg by mouth every day.       folic acid (FOLVITE) 1 MG Tab Take 2 mg by mouth every day.          Medication Allergy:  Allergies   Allergen Reactions    Zosyn [Piperacillin Sod-Tazobactam So]        Family History:  No family history on file.    Social History:  Social History     Socioeconomic History    Marital status:      Spouse name: Not on file    Number of children: Not on file    Years of education: Not on file    Highest education level: Not on file   Occupational History    Not on file   Tobacco Use    Smoking status: Never    Smokeless tobacco: Never   Substance and Sexual Activity    Alcohol use: No    Drug  "use: Not on file     Comment: Aydee    Sexual activity: Not on file   Other Topics Concern    Not on file   Social History Narrative    Not on file     Social Determinants of Health     Financial Resource Strain: Not on file   Food Insecurity: Not on file   Transportation Needs: Not on file   Physical Activity: Not on file   Stress: Not on file   Social Connections: Not on file   Intimate Partner Violence: Not on file   Housing Stability: Not on file         Physical Exam:  Vitals/ General Appearance:   Weight/BMI: Body mass index is 31.63 kg/m².  /47   Pulse 98   Temp 36.5 °C (97.7 °F) (Oral)   Resp 20   Ht 1.676 m (5' 6\")   Wt 88.9 kg (195 lb 15.8 oz)   SpO2 100%   Vitals:    01/25/24 1935   BP: 102/47   Pulse: 98   Resp: 20   Temp: 36.5 °C (97.7 °F)   TempSrc: Oral   SpO2: 100%   Weight: 88.9 kg (195 lb 15.8 oz)   Height: 1.676 m (5' 6\")     Oxygen Therapy:  Pulse Oximetry: 100 %, O2 (LPM): 0, O2 Delivery Device: None - Room Air    Constitutional:   NAD.   HENMT:  Normocephalic, Atraumatic, Oropharynx moist mucous membranes, No oral exudates, Nose normal. Expressive aphasia No thyromegaly.  Eyes:  EOMI, Conjunctiva normal, No discharge.  Neck:  Normal range of motion, No cervical tenderness.  Cardiovascular:  Normal heart rate, Normal rhythm, No murmurs, No rubs, No gallops.   Extremitites with intact distal pulses, no cyanosis, or edema.  Lungs:  Normal breath sounds, breath sounds clear to auscultation bilaterally,  no crackles, no wheezing.   Abdomen: Bowel sounds normal, Soft, No tenderness, No guarding, No rebound, No masses, No hepatosplenomegaly.  : Mild right CVAT  Skin: Warm, Dry, No erythema, No rash, no induration.  Neurologic: Alert. Right sided weakness  Psychiatric: Affect normal, Judgment normal, Mood normal.      MDM (Data Review):     Records reviewed and summarized in current documentation    Lab Data Review:  No results found for this or any previous visit (from the past 24 " hour(s)).    Imaging/Procedures Review:    Reviewed    MDM (Assessment and Plan):       A 64 yo female with hemiparesis and expressive aphasia from prior ruptured aortic aneurysm who has been transferred for higher level of care following initial management at outside Hospital for E Coli UTI. Imaging studies have revealed right hydronephrosis. The images are not available to review but the reports are documenting cortical thinning in the right kidney suggesting a chronic component to this, although it appears to be worsening on serial exams. Her creatinine level is stable but there has been persistent right sided flank pains. She has been receiving appropriate antibiotics per urine culture sensitivities with persistent mild leukocytosis. Would recommend continue the antibiotics. Our plan will be for OR tomorrow for right retrograde and possible placement of a right ureteral stent. I have explained risks and benefits and will be sure she is NPO after midnight for this procedure. Case discussed with Dr. Zaragoza who has directed this plan of care.

## 2024-01-26 NOTE — ASSESSMENT & PLAN NOTE
Drug rash due to Zosyn, severe diffuse rash  Improved w solumedrol  Continue  Continue H2 blocker, Benadryl

## 2024-01-26 NOTE — ANESTHESIA POSTPROCEDURE EVALUATION
Patient: Nydia Finch    Procedure Summary       Date: 01/26/24 Room / Location:  OR 01 / SURGERY [facility]    Anesthesia Start: 1340 Anesthesia Stop: 1434    Procedure: CYSTOSCOPY (Right: Ureter) Diagnosis: (Right hydroureteronephrosis)    Surgeons: Mik Rm M.D. Responsible Provider: Scott Denney M.D.    Anesthesia Type: general ASA Status: 3 - Emergent            Final Anesthesia Type: general  Last vitals  BP   Blood Pressure: 137/82    Temp   36.4 °C (97.5 °F)    Pulse   100   Resp   18    SpO2   100 %      Anesthesia Post Evaluation    Patient location during evaluation: PACU  Patient participation: complete - patient participated  Level of consciousness: awake and alert  Pain score: 1    Airway patency: patent  Anesthetic complications: no  Cardiovascular status: hemodynamically stable  Respiratory status: acceptable  Hydration status: euvolemic    PONV: none          There were no known notable events for this encounter.     Nurse Pain Score: 0 (NPRS)

## 2024-01-26 NOTE — OR NURSING
1450: Report received from FERNANDO Finney.     1456: No complaints of pain or nausea at this time, VSS. Olmedo catheter in place draining clear yellow urine.     1505: Report called to FERNANDO Wilcox. Patient transferred to the floor by FERNANDO Shelton and FERNANDO Chase.

## 2024-01-26 NOTE — OR NURSING
0845: Rcvd report from FERNANDO Wilcox and booked pt for transport.  1222: Pt arrived in pre-op holding via bed, brought by transport, tolerated transfer well. No pain or nausea, awake and alert, tolerating room air. Pt has deficits from previous stroke. Delayed responses and sometimes has difficulty getting her words, but is A&O x 4. She is flaccid on the R side. Pt also has a rash on both arms and throughout her body, but denies any itching related to it.  1315: Left message for her  to call, the anesthesiologist, Dr Denney, wanted to speak to him.  1330: Pt's , Marbin, called back. Dr Rm at bedside, but did not need to speak to him. Explained to pt that the anesthesiologist was getting pt's room set up and verified that pt and pt's  did not have any other questions about the surgery or anesthesiologist.   1333: Patient allergies and NPO status verified, home medication reconciliation completed and belongings secured. Patient verbalizes understanding of pain scale, expected course of stay and plan of care. Surgical site verified with patient. PIV patency verified. Pt came from [facility] and only has a 22 G in her L hand, It flushes and IV fluids run, but the rate is slow. No sign of infiltration or phlebitis. Sequentials placed on legs.

## 2024-01-26 NOTE — ANESTHESIA PROCEDURE NOTES
Airway    Date/Time: 1/26/2024 1:46 PM    Performed by: Scott Denney M.D.  Authorized by: Scott Denney M.D.    Location:  OR  Urgency:  Elective  Indications for Airway Management:  Anesthesia      Spontaneous Ventilation: absent    Sedation Level:  Deep  Preoxygenated: Yes    Final Airway Type:  Supraglottic airway  Final Supraglottic Airway:  Standard LMA    SGA Size:  3  Number of Attempts at Approach:  1

## 2024-01-26 NOTE — PROGRESS NOTES
Telemetry Shift Summary    Rhythm SR  HR Range 82-99  Ectopy rPVC  Measurements 0.16/0.08/0.34        Normal Values  Rhythm SR  HR Range    Measurements 0.12-0.20 / 0.06-0.10  / 0.30-0.52

## 2024-01-26 NOTE — ANESTHESIA TIME REPORT
Anesthesia Start and Stop Event Times       Date Time Event    1/26/2024 1335 Ready for Procedure     1340 Anesthesia Start     1434 Anesthesia Stop          Responsible Staff  01/26/24      Name Role Begin End    Scott Denney M.D. Anesth 1340 1434          Overtime Reason:  no overtime (within assigned shift)    Comments:

## 2024-01-26 NOTE — CONSULTS
Pre-op note    Subjective: 62 yo F who presents w/ sepsis, UTI, right hydronephrosis. Previously underwent right stent placement in 2019 for an obstructing right 7 mm stone.    Objective:     Gen: no acute distress  Card: tachycardic  Pulm: breathing comfortably    Assessment/Plan:    62 yo F w/ likely urosepsis with finding of right hydronephrosis. Risks and benefits of cystoscopy, right ureteral stent placement were discussed in detail. Consent obtained.    - To OR for surgery.

## 2024-01-26 NOTE — RESPIRATORY CARE
"   COPD EDUCATION by COPD CLINICAL EDUCATOR  1/26/2024 at 9:06 AM by Abigail Salas, RRT     Patient reviewed by COPD education team. Patient does not have a history or diagnosis of COPD and is a non-smoker.  Therefore, patient does not qualify for the COPD program.    COPD Screen  COPD Risk Screening  Do you have a history of COPD?: No (NO hx dx COPD, NEVER SMOKED)    COPD Assessment  COPD Clinical Specialists ONLY  COPD Education Initiated: No--Quick Screen (Pt from [facility] because of ongoing hydronephrosis and pyelonephritis with sepsis. Pt has NO hx dx of COPD, never smoked)    PFT Results    No results found for: \"PFT\"    Meds to Beds  [facility] provides bedside medication delivery for all eligible patients at discharge.  Would you like to opt out of this program for any reason?: No - Stay Opted In     MY COPD ACTION PLAN     It is recommended that patients and physicians /healthcare providers complete this action plan together. This plan should be discussed at each physician visit and updated as needed.    The green, yellow and red zones show groups of symptoms of COPD. This list of symptoms is not comprehensive, and you may experience other symptoms. In the \"Actions\" column, your healthcare provider has recommended actions for you to take based on your symptoms.    Patient Name: Nydia Finch   YOB: 1960   Last Updated on:     Green Zone:  I am doing well today Actions     Usual activitiy and exercise level   Take daily medications     Usual amounts of cough and phlegm/mucus   Use oxygen as prescribed     Sleep well at night   Continue regular exercise/diet plan     Appetite is good   At all times avoid cigarette smoke, inhaled irritants     Daily Medications (these medications are taken every day):                Yellow Zone:  I am having a bad day or a COPD flare Actions     More breathless than usual   Continue daily medications     I have less energy for my daily activities   " "Use quick relief inhaler as ordered     Increased or thicker phlegm/mucus   Use oxygen as prescribed     Using quick relief inhaler/nebulizer more often   Get plenty of rest     Swelling of ankles more than usual   Use pursed lip breathing     More coughing than usual   At all times avoid cigarette smoke, inhaled irritants     I feel like I have a \"chest cold\"     Poor sleep and my symptoms woke me up     My appetite is not good     My medicine is not helping      Call provider immediately if symptoms don’t improve     Continue daily medications, add rescue medications:               Medications to be used during a flare up, (as Discussed with Provider):              Red Zone:  I need urgent medical care Actions     Severe shortness of breath even at rest   Call 911 or seek medical care immediately     Not able to do any activity because of breathing      Fever or shaking chills      Feeling confused or very drowsy       Chest pains      Coughing up blood                  "

## 2024-01-26 NOTE — PROGRESS NOTES
Hospital Medicine Daily Progress Note    Date of Service  1/26/2024    Chief Complaint  Nydia Finch is a 63 y.o. 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  She was transferred for urology consultation.  She had developed a diffuse drug rash due to Zosyn therefore has been transitioned to ceftriaxone and continues on Solu-Medrol therapy    Interval Problem Update  1/26: Still with leukocytosis, rash improving.  Creatinine stable at 0.82.  N.p.o. for right stent placement later today    I have discussed this patient's plan of care and discharge plan at IDT rounds today with Case Management, Nursing, Nursing leadership, and other members of the IDT team.    Consultants/Specialty  urology    Code Status  Full Code    Disposition  The patient is not medically cleared for discharge to home or a post-acute facility.  Anticipate discharge to: home with organized home healthcare and close outpatient follow-up    I have placed the appropriate orders for post-discharge needs.    Review of Systems  Review of Systems   Constitutional:  Positive for malaise/fatigue. Negative for chills and fever.   HENT:  Negative for sore throat.    Respiratory:  Negative for cough and shortness of breath.    Cardiovascular:  Negative for chest pain and palpitations.   Gastrointestinal:  Negative for blood in stool, diarrhea, heartburn, nausea and vomiting.   Genitourinary:  Positive for flank pain. Negative for dysuria and frequency.   Musculoskeletal:  Negative for back pain and myalgias.   Neurological:  Positive for focal weakness. Negative for dizziness, weakness and headaches.   Psychiatric/Behavioral:  Negative for depression and hallucinations.    All other  systems reviewed and are negative.       Physical Exam  Temp:  [36.4 °C (97.5 °F)-36.8 °C (98.2 °F)] 36.8 °C (98.2 °F)  Pulse:  [] 101  Resp:  [20] 20  BP: (102-160)/(47-94) 160/92  SpO2:  [94 %-100 %] 98 %    Physical Exam  Constitutional:       General: She is not in acute distress.  HENT:      Nose: Nose normal.      Mouth/Throat:      Mouth: Mucous membranes are dry.   Cardiovascular:      Rate and Rhythm: Normal rate and regular rhythm.      Pulses: Normal pulses.   Pulmonary:      Effort: Pulmonary effort is normal.      Breath sounds: Normal breath sounds.   Abdominal:      General: There is no distension.      Palpations: Abdomen is soft.   Musculoskeletal:         General: No swelling.      Cervical back: No muscular tenderness.   Lymphadenopathy:      Cervical: No cervical adenopathy.   Skin:     General: Skin is warm and dry.      Findings: No rash.   Neurological:      General: No focal deficit present.      Mental Status: She is alert and oriented to person, place, and time.      Cranial Nerves: Cranial nerve deficit and dysarthria present.      Motor: Weakness present.   Psychiatric:         Mood and Affect: Mood normal.         Thought Content: Thought content normal.         Fluids    Intake/Output Summary (Last 24 hours) at 1/26/2024 1423  Last data filed at 1/26/2024 0538  Gross per 24 hour   Intake --   Output 600 ml   Net -600 ml       Laboratory  Recent Labs     01/26/24  0033   WBC 15.0*   RBC 3.95*   HEMOGLOBIN 10.8*   HEMATOCRIT 33.4*   MCV 84.6   MCH 27.3   MCHC 32.3   RDW 52.6*   PLATELETCT 395   MPV 10.7     Recent Labs     01/26/24  0033   SODIUM 134*   POTASSIUM 4.3   CHLORIDE 104   CO2 18*   GLUCOSE 130*   BUN 18   CREATININE 0.82   CALCIUM 8.4                   Imaging  No orders to display        Assessment/Plan  * Hydronephrosis of right kidney- (present on admission)  Assessment & Plan  Patient has R hydronephrosis, previous history of stones on that side  R stent  today  N.p.o.    Drug rash- (present on admission)  Assessment & Plan  Drug rash due to Zosyn, severe diffuse rash  Improved w solumedrol  Continue  Continue H2 blocker, Benadryl    COPD (chronic obstructive pulmonary disease) (HCC)- (present on admission)  Assessment & Plan  History of COPD currently no exacerbation  Continue with oxygen RT protocol    Sepsis due to gram-negative pyelonephritis with possible perinephric abscess (HCC)- (present on admission)  Assessment & Plan  Treatment started at [facility]  Sepsis protocol continue care on admission  Continue IV ceftriaxone  Repeat WBC in a.m. (affected by steroids)  Cystoscopy with stent placement today    Dysphagia- (present on admission)  Assessment & Plan  Dysphagia at this point is apparently resolved and she is eating a regular diet    History of completed stroke- (present on admission)  Assessment & Plan  History of complex stroke with right-sided deficits  Continue fall precautions and seizure precautions    Leukocytosis- (present on admission)  Assessment & Plan  Due to Pyelo/urinary tract infection    Chronic pain- (present on admission)  Assessment & Plan  Pain management         VTE prophylaxis: SCDs    I have performed a physical exam and reviewed and updated ROS and Plan today (1/26/2024). In review of yesterday's note (1/25/2024), there are no changes except as documented above.    Total time:  52 minutes.  I spent greater than 50% of the time for patient care, counseling, and coordination on this date, including unit/floor time, and face-to-face time with the patient as per interval events and assessment and plan above

## 2024-01-26 NOTE — ASSESSMENT & PLAN NOTE
History of complex stroke with right-sided deficits  Continue fall precautions and seizure precautions

## 2024-01-26 NOTE — ASSESSMENT & PLAN NOTE
Treatment started at [facility]  Sepsis protocol continue care on admission  Continue IV ceftriaxone  Repeat WBC in a.m. (affected by steroids)  Cystoscopy with stent placement today

## 2024-01-26 NOTE — PROGRESS NOTES
4 Eyes Skin Assessment Completed by FERNANDO Cantu and FERNANDO Muniz.    Head WDL  Ears WDL  Nose WDL  Mouth WDL  Neck WDL  Breast/Chest Redness and Rash  Shoulder Blades Redness  Spine Redness  (R) Arm/Elbow/Hand Redness and Edema  (L) Arm/Elbow/Hand Redness and Edema  Abdomen Redness  Groin WDL  Scrotum/Coccyx/Buttocks Redness, Blanching, and Discoloration  (R) Leg Redness and Swelling  (L) Leg Redness and Swelling  (R) Heel/Foot/Toe Redness and Blanching; top of feet; heel  (L) Heel/Foot/Toe Redness and Blanching; top of feet; heel    Diffuse flaky skin and redness/rash from allergic reaction from Zosyn prior to admission.       Devices In Places Tele Box      Interventions In Place Waffle Overlay, TAP System, and Q2 Turns    Possible Skin Injury No    Pictures Uploaded Into Epic N/A  Wound Consult Placed N/A  RN Wound Prevention Protocol Ordered No

## 2024-01-26 NOTE — HOSPITAL COURSE
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  She was transferred for urology consultation.  She had developed a diffuse drug rash due to Zosyn therefore has been transitioned to ceftriaxone and continues on Solu-Medrol therapy

## 2024-01-26 NOTE — H&P
Hospital Medicine History & Physical Note    Date of Service  1/25/2024    Primary Care Physician  Pcp Pt States None    Consultants  urology    Specialist Names: Dr. Zaragoza    Code Status  Full Code    Chief Complaint  No chief complaint on file.      History of Presenting Illness  Nydia Finch is a 63 y.o. female who presented 1/25/2024 on transfer from [facility] because of ongoing hydronephrosis and pyelonephritis with sepsis.  The patient at this point has grown out E. coli.  Patient was initially started on Zosyn which gave her head to toe drug rash and at this point we will need to treat the drug rash as well.  Patient was switched to Rocephin which seems to be improving her drug reaction.  The patient does have hydronephrosis and will need a stent to be placed on the right side and this was discussed with urology who agreed to the transfer and at this point they will be involved.  Continue with pain management.  Patient has chronic stroke since 1994 with clonic hemiplegia on the right side..    I discussed the plan of care with patient, bedside RN, and transferring physician and urologist Dr. Zaragoza .    Review of Systems  Review of Systems   Constitutional:  Positive for fever and malaise/fatigue. Negative for chills and diaphoresis.   HENT: Negative.  Negative for nosebleeds and sinus pain.    Eyes: Negative.  Negative for double vision, photophobia, discharge and redness.   Respiratory: Negative.  Negative for cough, sputum production, shortness of breath, wheezing and stridor.    Cardiovascular: Negative.  Negative for chest pain, orthopnea, leg swelling and PND.   Gastrointestinal:  Positive for abdominal pain and nausea. Negative for blood in stool and heartburn.   Genitourinary:  Positive for flank pain. Negative for dysuria and frequency.   Musculoskeletal:  Negative for back pain, falls and myalgias.   Skin:  Positive for rash. Negative for itching.   Neurological:  Positive for  weakness (On her entire right side secondary to stroke in 1994). Negative for dizziness, tingling, sensory change, focal weakness and seizures.   Endo/Heme/Allergies: Negative.  Negative for polydipsia. Does not bruise/bleed easily.   Psychiatric/Behavioral: Negative.  Negative for depression, substance abuse and suicidal ideas. The patient does not have insomnia.    All other systems reviewed and are negative.      Past Medical History   has a past medical history of Aphagia (1994), Brain aneurysm, Chronic pain, Nephrolithiasis, Right hemiplegia (HCC) (1994), and Stroke (HCC).    Surgical History   has a past surgical history that includes other neurological surg; cystoscopy stent placement (2/13/2018); gastric resection (2012); other; cystoscopy (N/A, 7/4/2019); stent placement (Right, 7/4/2019); and ureteroscopy (Right, 7/4/2019).     Family History  family history is not on file.   Family history reviewed with patient. There is no family history that is pertinent to the chief complaint.     Social History   reports that she has never smoked. She has never used smokeless tobacco.  Drug: Inhaled. She reports that she does not drink alcohol.    Allergies  Allergies   Allergen Reactions    Zosyn [Piperacillin Sod-Tazobactam So]        Medications  Prior to Admission Medications   Prescriptions Last Dose Informant Patient Reported? Taking?   DULoxetine (CYMBALTA) 60 MG Cap DR Particles delayed-release capsule  Patient's Home Pharmacy Yes No   Sig: Take 60 mg by mouth 2 times a day.   Docusate Calcium (STOOL SOFTENER PO)  Family Member Yes No   Sig: Take 2 Tabs by mouth every day.   FLUoxetine (PROZAC) 20 MG Cap  Patient's Home Pharmacy Yes No   Sig: Take 20 mg by mouth every day.   cyanocobalamin (VITAMIN B-12) 500 MCG Tab   Yes Yes   Sig: Take 1,000 mcg by mouth every day.   ferrous sulfate 325 (65 Fe) MG tablet   Yes Yes   Sig: Take 325 mg by mouth every day.   folic acid (FOLVITE) 1 MG Tab  Patient's Home Pharmacy  Yes No   Sig: Take 2 mg by mouth every day.   ipratropium-albuterol (DUONEB) 0.5-2.5 (3) MG/3ML nebulizer solution   No No   Sig: 3 mL by Nebulization route every four hours as needed for Shortness of Breath.   pregabalin (LYRICA) 150 MG Cap  Patient's Home Pharmacy Yes No   Sig: Take 150 mg by mouth in the morning, at noon, and at bedtime.      Facility-Administered Medications: None       Physical Exam  Temp:  [36.5 °C (97.7 °F)] 36.5 °C (97.7 °F)  Pulse:  [98] 98  Resp:  [20] 20  BP: (102)/(47) 102/47  SpO2:  [100 %] 100 %  Blood Pressure: 102/47   Temperature: 36.5 °C (97.7 °F)   Pulse: 98   Respiration: 20   Pulse Oximetry: 100 %       Physical Exam  Vitals and nursing note reviewed. Exam conducted with a chaperone present.   Constitutional:       General: She is awake.      Appearance: Normal appearance. She is well-developed, well-groomed and normal weight.   HENT:      Head: Normocephalic and atraumatic.      Jaw: There is normal jaw occlusion. No trismus.      Salivary Glands: Right salivary gland is not tender. Left salivary gland is not tender.      Right Ear: External ear normal.      Left Ear: External ear normal.      Mouth/Throat:      Mouth: Mucous membranes are moist.      Pharynx: Oropharynx is clear.   Eyes:      General: Lids are normal. Vision grossly intact.      Extraocular Movements: Extraocular movements intact.      Conjunctiva/sclera: Conjunctivae normal.      Right eye: Right conjunctiva is not injected. No exudate.     Left eye: Left conjunctiva is not injected. No exudate.     Pupils: Pupils are equal, round, and reactive to light.   Neck:      Thyroid: No thyroid mass.      Vascular: No hepatojugular reflux or JVD.      Trachea: No abnormal tracheal secretions or tracheal deviation.   Cardiovascular:      Rate and Rhythm: Regular rhythm. Tachycardia present. Occasional Extrasystoles are present.     Pulses: Normal pulses.      Heart sounds: Normal heart sounds. No murmur heard.     No  friction rub.   Pulmonary:      Effort: Pulmonary effort is normal.      Breath sounds: Normal breath sounds. No wheezing or rhonchi.   Abdominal:      General: Abdomen is flat. Bowel sounds are normal.      Palpations: Abdomen is soft.      Tenderness: There is abdominal tenderness in the right lower quadrant. There is no right CVA tenderness or left CVA tenderness.      Hernia: No hernia is present.      Comments: Right flank pain as well   Musculoskeletal:      Cervical back: Full passive range of motion without pain, normal range of motion and neck supple. No rigidity. No muscular tenderness.      Right lower leg: No edema.      Left lower leg: No edema.   Lymphadenopathy:      Head:      Right side of head: No submental adenopathy.      Left side of head: No submental adenopathy.      Cervical:      Right cervical: No superficial cervical adenopathy.     Left cervical: No superficial cervical adenopathy.      Upper Body:      Right upper body: No supraclavicular adenopathy.      Left upper body: No supraclavicular adenopathy.   Skin:     General: Skin is warm and dry.      Capillary Refill: Capillary refill takes 2 to 3 seconds.      Coloration: Skin is not cyanotic or pale.      Findings: Erythema and rash (Head to toe skin rash from drug reaction) present. No abrasion or bruising.   Neurological:      General: No focal deficit present.      Mental Status: She is alert and oriented to person, place, and time. Mental status is at baseline.      GCS: GCS eye subscore is 4. GCS verbal subscore is 5. GCS motor subscore is 6.      Cranial Nerves: No cranial nerve deficit.      Sensory: No sensory deficit.      Motor: Weakness and atrophy present.      Coordination: Coordination abnormal. Finger-Nose-Finger Test abnormal. Impaired rapid alternating movements.      Gait: Gait abnormal and tandem walk abnormal.      Deep Tendon Reflexes:      Reflex Scores:       Tricep reflexes are 2+ on the right side and 2+ on the  "left side.       Bicep reflexes are 2+ on the right side and 2+ on the left side.       Brachioradialis reflexes are 2+ on the right side and 2+ on the left side.       Patellar reflexes are 2+ on the right side and 2+ on the left side.       Achilles reflexes are 2+ on the right side and 2+ on the left side.     Comments: Patient has complete paralysis on the right side due to previous stroke, the hemiplegia since 1994   Psychiatric:         Attention and Perception: Attention and perception normal.         Mood and Affect: Mood normal.         Speech: Speech normal.         Behavior: Behavior is cooperative.         Thought Content: Thought content normal.         Cognition and Memory: Cognition and memory normal.         Judgment: Judgment normal.         Laboratory:          No results for input(s): \"ALTSGPT\", \"ASTSGOT\", \"ALKPHOSPHAT\", \"TBILIRUBIN\", \"DBILIRUBIN\", \"GAMMAGT\", \"AMYLASE\", \"LIPASE\", \"ALB\", \"PREALBUMIN\", \"GLUCOSE\" in the last 72 hours.      No results for input(s): \"NTPROBNP\" in the last 72 hours.      No results for input(s): \"TROPONINT\" in the last 72 hours.    Imaging:  No orders to display       X-Ray:  I have personally reviewed the images and compared with prior images.  EKG:  I have personally reviewed the images and compared with prior images.    Assessment/Plan:  Justification for Admission Status  I anticipate this patient will require at least two midnights for appropriate medical management, necessitating inpatient admission because secondary to sepsis from pyelonephritis patient will be at least 48 hours of inpatient management    Patient will need a Telemetry bed on MEDICAL service .  The need is secondary to hydronephrosis with pyelonephritis and sepsis.    * Hydronephrosis of right kidney- (present on admission)  Assessment & Plan  Patient has hydronephrosis on the right side.  Patient has previous history of stones on that side  At this point after discussion with nephrology and the " imaging studies the patient most likely will need a stent placement  N.p.o. at midnight  Cystoscopy planned for tomorrow    Drug rash- (present on admission)  Assessment & Plan  Patient has developed a drug rash due to Zosyn and at this point has severe rash from head to toe  I will support her at this point with steroids, H2 blocker, Benadryl    Sepsis due to gram-negative pyelonephritis with possible perinephric abscess (HCC)- (present on admission)  Assessment & Plan  This is Sepsis Present on admission  SIRS criteria identified on my evaluation include: Fever, with temperature greater than 100.9 deg F, Tachycardia, with heart rate greater than 90 BPM, and Leukocytosis, with WBC greater than 12,000  Clinical indicators of end organ dysfunction include Lactic Acid greater than 2  Source is pyelonephritis  Sepsis protocol initiated  Crystalloid Fluid Administration: Fluid resuscitation ordered per standard protocol - 30 mL/kg per current or ideal body weight  IV antibiotics as appropriate for source of sepsis  Reassessment: I have reassessed the patient's hemodynamic status  Patient transferred to us from [facility] where the patient was initially treated.  At this point patient has developed a rash due to Zosyn administration  Patient has been switched over to IV Rocephin.    COPD (chronic obstructive pulmonary disease) (HCC)- (present on admission)  Assessment & Plan  History of COPD currently no exacerbation  Continue with oxygen RT protocol    Leukocytosis- (present on admission)  Assessment & Plan  Secondary to the current sepsis patient's white blood cell count is elevated  Continue to monitor    Dysphagia- (present on admission)  Assessment & Plan  Dysphagia at this point is apparently resolved and she is eating a regular diet    History of completed stroke- (present on admission)  Assessment & Plan  History of complex stroke with right-sided deficits  Continue fall precautions and seizure  precautions    Chronic pain- (present on admission)  Assessment & Plan  Pain management        VTE prophylaxis: SCDs/TEDs

## 2024-01-26 NOTE — OR NURSING
1426 To PACU from OR by tacos varela.    1441 no c/o pain, VSS.    1450 pt care transferred to FERNANDO Jordan.

## 2024-01-26 NOTE — OP REPORT
Urology Operative Report    Date: 1/26/2024    Pre-operative diagnosis: Right hydroureteronephrosis    Post-operative diagnosis: Right hydroureteronephrosis    Procedures:  Diagnostic cystoscopy    Surgeon: Surgeon(s) and Role:     * Mik Rm M.D. - Primary    Anesthesia: General    EBL: Minimal    IV fluids: Per anesthesia.    Specimens: None    Complications: None    Drains: Olmedo catheter    Disposition:  PACU  Interventional radiology right nephrostomy tube  Okay to complete voiding trial with catheter removal prior to discharge    Findings:  Scarred over right ureteral orifice, unable to bypass with catheter or wire.  Unremarkable appearing left ureteral orifice.    Indications for procedure:    Nydia Finch is a 63 y.o.female who presented with sepsis in the setting of right hydroureteronephrosis in the setting of prior stone.  After lengthy discussion of risks and benefits, patient agreed to proceed with cystoscopy, right retrograde pyelogram, right ureteral stent placement.    Details of procedure:    Patient was seen in the preoperative area and informed consent was obtained.  They were transported to the operating room and underwent general anesthesia without complication.  Prophylactic antibiotics were administered.  Timeout was completed.  Patient was positioned in dorsal lithotomy and then prepped and draped.    25 Frisian cystoscope was inserted into the bladder per urethra.  Cystoscopy was notable for somewhat trabeculated bladder.  Left ureteral orifice was orthotopic and patent.  Right ureteral orifice appeared to be a pinpoint opening.  We attempted to cannulate this with both a wire and ureteral access catheter without success.  It was felt we would not be able to access the ureter retrograde.  Bladder was emptied and scope was withdrawn.  16 Frisian Olmedo catheter was placed with 10 cc balloon.  Patient was awoken from anesthesia and transported to the PACU for recovery.

## 2024-01-27 ENCOUNTER — HOSPITAL ENCOUNTER (INPATIENT)
Facility: MEDICAL CENTER | Age: 64
LOS: 9 days | DRG: 871 | End: 2024-02-05
Attending: INTERNAL MEDICINE | Admitting: INTERNAL MEDICINE
Payer: MEDICARE

## 2024-01-27 VITALS
RESPIRATION RATE: 16 BRPM | WEIGHT: 203.93 LBS | OXYGEN SATURATION: 99 % | HEART RATE: 91 BPM | BODY MASS INDEX: 32.77 KG/M2 | SYSTOLIC BLOOD PRESSURE: 144 MMHG | HEIGHT: 66 IN | TEMPERATURE: 98.1 F | DIASTOLIC BLOOD PRESSURE: 91 MMHG

## 2024-01-27 DIAGNOSIS — Z86.73 HISTORY OF STROKE: ICD-10-CM

## 2024-01-27 DIAGNOSIS — G81.91 RIGHT HEMIPLEGIA (HCC): ICD-10-CM

## 2024-01-27 DIAGNOSIS — N12 PYELONEPHRITIS: ICD-10-CM

## 2024-01-27 DIAGNOSIS — J44.9 CHRONIC OBSTRUCTIVE PULMONARY DISEASE, UNSPECIFIED COPD TYPE (HCC): ICD-10-CM

## 2024-01-27 LAB
ALBUMIN SERPL BCP-MCNC: 2.5 G/DL (ref 3.2–4.9)
BASOPHILS # BLD AUTO: 0.3 % (ref 0–1.8)
BASOPHILS # BLD: 0.03 K/UL (ref 0–0.12)
BUN SERPL-MCNC: 20 MG/DL (ref 8–22)
CALCIUM ALBUM COR SERPL-MCNC: 9.4 MG/DL (ref 8.5–10.5)
CALCIUM SERPL-MCNC: 8.2 MG/DL (ref 8.4–10.2)
CHLORIDE SERPL-SCNC: 107 MMOL/L (ref 96–112)
CO2 SERPL-SCNC: 19 MMOL/L (ref 20–33)
CREAT SERPL-MCNC: 0.78 MG/DL (ref 0.5–1.4)
EOSINOPHIL # BLD AUTO: 0 K/UL (ref 0–0.51)
EOSINOPHIL NFR BLD: 0 % (ref 0–6.9)
ERYTHROCYTE [DISTWIDTH] IN BLOOD BY AUTOMATED COUNT: 52.7 FL (ref 35.9–50)
GFR SERPLBLD CREATININE-BSD FMLA CKD-EPI: 85 ML/MIN/1.73 M 2
GLUCOSE SERPL-MCNC: 158 MG/DL (ref 65–99)
HCT VFR BLD AUTO: 33.4 % (ref 37–47)
HGB BLD-MCNC: 10.6 G/DL (ref 12–16)
IMM GRANULOCYTES # BLD AUTO: 0.28 K/UL (ref 0–0.11)
IMM GRANULOCYTES NFR BLD AUTO: 2.6 % (ref 0–0.9)
LYMPHOCYTES # BLD AUTO: 1.59 K/UL (ref 1–4.8)
LYMPHOCYTES NFR BLD: 14.5 % (ref 22–41)
MAGNESIUM SERPL-MCNC: 2.2 MG/DL (ref 1.5–2.5)
MCH RBC QN AUTO: 26.8 PG (ref 27–33)
MCHC RBC AUTO-ENTMCNC: 31.7 G/DL (ref 32.2–35.5)
MCV RBC AUTO: 84.3 FL (ref 81.4–97.8)
MONOCYTES # BLD AUTO: 0.14 K/UL (ref 0–0.85)
MONOCYTES NFR BLD AUTO: 1.3 % (ref 0–13.4)
NEUTROPHILS # BLD AUTO: 8.9 K/UL (ref 1.82–7.42)
NEUTROPHILS NFR BLD: 81.3 % (ref 44–72)
NRBC # BLD AUTO: 0 K/UL
NRBC BLD-RTO: 0 /100 WBC (ref 0–0.2)
PHOSPHATE SERPL-MCNC: 3.6 MG/DL (ref 2.5–4.5)
PLATELET # BLD AUTO: 430 K/UL (ref 164–446)
PMV BLD AUTO: 11.1 FL (ref 9–12.9)
POTASSIUM SERPL-SCNC: 4.8 MMOL/L (ref 3.6–5.5)
RBC # BLD AUTO: 3.96 M/UL (ref 4.2–5.4)
SODIUM SERPL-SCNC: 137 MMOL/L (ref 135–145)
WBC # BLD AUTO: 10.9 K/UL (ref 4.8–10.8)

## 2024-01-27 PROCEDURE — 700105 HCHG RX REV CODE 258: Performed by: INTERNAL MEDICINE

## 2024-01-27 PROCEDURE — 99239 HOSP IP/OBS DSCHRG MGMT >30: CPT | Performed by: INTERNAL MEDICINE

## 2024-01-27 PROCEDURE — 97166 OT EVAL MOD COMPLEX 45 MIN: CPT

## 2024-01-27 PROCEDURE — 36415 COLL VENOUS BLD VENIPUNCTURE: CPT

## 2024-01-27 PROCEDURE — 87040 BLOOD CULTURE FOR BACTERIA: CPT

## 2024-01-27 PROCEDURE — 80069 RENAL FUNCTION PANEL: CPT

## 2024-01-27 PROCEDURE — 700111 HCHG RX REV CODE 636 W/ 250 OVERRIDE (IP): Performed by: INTERNAL MEDICINE

## 2024-01-27 PROCEDURE — 700111 HCHG RX REV CODE 636 W/ 250 OVERRIDE (IP): Mod: JZ | Performed by: HOSPITALIST

## 2024-01-27 PROCEDURE — 700101 HCHG RX REV CODE 250: Performed by: INTERNAL MEDICINE

## 2024-01-27 PROCEDURE — 700102 HCHG RX REV CODE 250 W/ 637 OVERRIDE(OP): Performed by: HOSPITALIST

## 2024-01-27 PROCEDURE — 770020 HCHG ROOM/CARE - TELE (206)

## 2024-01-27 PROCEDURE — 94760 N-INVAS EAR/PLS OXIMETRY 1: CPT

## 2024-01-27 PROCEDURE — 85025 COMPLETE CBC W/AUTO DIFF WBC: CPT

## 2024-01-27 PROCEDURE — 700105 HCHG RX REV CODE 258: Performed by: HOSPITALIST

## 2024-01-27 PROCEDURE — A9270 NON-COVERED ITEM OR SERVICE: HCPCS | Performed by: HOSPITALIST

## 2024-01-27 PROCEDURE — 700102 HCHG RX REV CODE 250 W/ 637 OVERRIDE(OP): Performed by: INTERNAL MEDICINE

## 2024-01-27 PROCEDURE — 83735 ASSAY OF MAGNESIUM: CPT

## 2024-01-27 PROCEDURE — A9270 NON-COVERED ITEM OR SERVICE: HCPCS | Performed by: INTERNAL MEDICINE

## 2024-01-27 RX ORDER — OXYCODONE HYDROCHLORIDE 5 MG/1
5 TABLET ORAL
Status: DISCONTINUED | OUTPATIENT
Start: 2024-01-27 | End: 2024-02-05 | Stop reason: HOSPADM

## 2024-01-27 RX ORDER — AMOXICILLIN 250 MG
2 CAPSULE ORAL 2 TIMES DAILY
Status: CANCELLED | OUTPATIENT
Start: 2024-01-27

## 2024-01-27 RX ORDER — PREGABALIN 150 MG/1
150 CAPSULE ORAL EVERY 8 HOURS
Status: DISCONTINUED | OUTPATIENT
Start: 2024-01-27 | End: 2024-02-05 | Stop reason: HOSPADM

## 2024-01-27 RX ORDER — METHYLPREDNISOLONE SODIUM SUCCINATE 40 MG/ML
20 INJECTION, POWDER, LYOPHILIZED, FOR SOLUTION INTRAMUSCULAR; INTRAVENOUS EVERY 8 HOURS
Status: CANCELLED | OUTPATIENT
Start: 2024-01-27

## 2024-01-27 RX ORDER — DIPHENHYDRAMINE HYDROCHLORIDE 50 MG/ML
25 INJECTION INTRAMUSCULAR; INTRAVENOUS EVERY 6 HOURS PRN
Status: CANCELLED | OUTPATIENT
Start: 2024-01-27

## 2024-01-27 RX ORDER — LABETALOL HYDROCHLORIDE 5 MG/ML
10 INJECTION, SOLUTION INTRAVENOUS EVERY 4 HOURS PRN
Status: DISCONTINUED | OUTPATIENT
Start: 2024-01-27 | End: 2024-02-05 | Stop reason: HOSPADM

## 2024-01-27 RX ORDER — FOLIC ACID 1 MG/1
2 TABLET ORAL DAILY
Status: CANCELLED | OUTPATIENT
Start: 2024-01-28

## 2024-01-27 RX ORDER — IPRATROPIUM BROMIDE AND ALBUTEROL SULFATE 2.5; .5 MG/3ML; MG/3ML
3 SOLUTION RESPIRATORY (INHALATION) EVERY 4 HOURS PRN
Status: CANCELLED | OUTPATIENT
Start: 2024-01-27

## 2024-01-27 RX ORDER — SODIUM CHLORIDE 9 MG/ML
INJECTION, SOLUTION INTRAVENOUS CONTINUOUS
Status: DISCONTINUED | OUTPATIENT
Start: 2024-01-27 | End: 2024-01-30

## 2024-01-27 RX ORDER — DULOXETIN HYDROCHLORIDE 30 MG/1
60 CAPSULE, DELAYED RELEASE ORAL 2 TIMES DAILY
Status: CANCELLED | OUTPATIENT
Start: 2024-01-27

## 2024-01-27 RX ORDER — ACETAMINOPHEN 325 MG/1
650 TABLET ORAL EVERY 6 HOURS PRN
Status: CANCELLED | OUTPATIENT
Start: 2024-01-27

## 2024-01-27 RX ORDER — MORPHINE SULFATE 4 MG/ML
2 INJECTION INTRAVENOUS
Status: DISCONTINUED | OUTPATIENT
Start: 2024-01-27 | End: 2024-02-05 | Stop reason: HOSPADM

## 2024-01-27 RX ORDER — LABETALOL HYDROCHLORIDE 5 MG/ML
10 INJECTION, SOLUTION INTRAVENOUS EVERY 4 HOURS PRN
Status: CANCELLED | OUTPATIENT
Start: 2024-01-27

## 2024-01-27 RX ORDER — PROMETHAZINE HYDROCHLORIDE 25 MG/1
12.5-25 SUPPOSITORY RECTAL EVERY 4 HOURS PRN
Status: CANCELLED | OUTPATIENT
Start: 2024-01-27

## 2024-01-27 RX ORDER — SODIUM CHLORIDE 9 MG/ML
INJECTION, SOLUTION INTRAVENOUS CONTINUOUS
Status: CANCELLED | OUTPATIENT
Start: 2024-01-27

## 2024-01-27 RX ORDER — OXYCODONE HYDROCHLORIDE 5 MG/1
2.5 TABLET ORAL
Status: DISCONTINUED | OUTPATIENT
Start: 2024-01-27 | End: 2024-02-05 | Stop reason: HOSPADM

## 2024-01-27 RX ORDER — PROCHLORPERAZINE EDISYLATE 5 MG/ML
5-10 INJECTION INTRAMUSCULAR; INTRAVENOUS EVERY 4 HOURS PRN
Status: DISCONTINUED | OUTPATIENT
Start: 2024-01-27 | End: 2024-02-05 | Stop reason: HOSPADM

## 2024-01-27 RX ORDER — METHYLPREDNISOLONE SODIUM SUCCINATE 40 MG/ML
20 INJECTION, POWDER, LYOPHILIZED, FOR SOLUTION INTRAMUSCULAR; INTRAVENOUS EVERY 8 HOURS
Status: DISCONTINUED | OUTPATIENT
Start: 2024-01-27 | End: 2024-01-29

## 2024-01-27 RX ORDER — ONDANSETRON 4 MG/1
4 TABLET, ORALLY DISINTEGRATING ORAL EVERY 4 HOURS PRN
Status: CANCELLED | OUTPATIENT
Start: 2024-01-27

## 2024-01-27 RX ORDER — PREGABALIN 75 MG/1
150 CAPSULE ORAL EVERY 8 HOURS
Status: CANCELLED | OUTPATIENT
Start: 2024-01-27

## 2024-01-27 RX ORDER — IPRATROPIUM BROMIDE AND ALBUTEROL SULFATE 2.5; .5 MG/3ML; MG/3ML
3 SOLUTION RESPIRATORY (INHALATION) EVERY 4 HOURS PRN
Status: DISCONTINUED | OUTPATIENT
Start: 2024-01-27 | End: 2024-02-05 | Stop reason: HOSPADM

## 2024-01-27 RX ORDER — PROCHLORPERAZINE EDISYLATE 5 MG/ML
5-10 INJECTION INTRAMUSCULAR; INTRAVENOUS EVERY 4 HOURS PRN
Status: CANCELLED | OUTPATIENT
Start: 2024-01-27

## 2024-01-27 RX ORDER — DULOXETIN HYDROCHLORIDE 60 MG/1
60 CAPSULE, DELAYED RELEASE ORAL 2 TIMES DAILY
Status: DISCONTINUED | OUTPATIENT
Start: 2024-01-27 | End: 2024-02-05 | Stop reason: HOSPADM

## 2024-01-27 RX ORDER — AMOXICILLIN 250 MG
2 CAPSULE ORAL 2 TIMES DAILY
Status: DISCONTINUED | OUTPATIENT
Start: 2024-01-27 | End: 2024-01-31

## 2024-01-27 RX ORDER — MORPHINE SULFATE 4 MG/ML
2 INJECTION INTRAVENOUS
Status: CANCELLED | OUTPATIENT
Start: 2024-01-27

## 2024-01-27 RX ORDER — ONDANSETRON 2 MG/ML
4 INJECTION INTRAMUSCULAR; INTRAVENOUS EVERY 4 HOURS PRN
Status: CANCELLED | OUTPATIENT
Start: 2024-01-27

## 2024-01-27 RX ORDER — FOLIC ACID 1 MG/1
1 TABLET ORAL DAILY
Status: DISCONTINUED | OUTPATIENT
Start: 2024-01-28 | End: 2024-02-05 | Stop reason: HOSPADM

## 2024-01-27 RX ORDER — DIPHENHYDRAMINE HYDROCHLORIDE 50 MG/ML
25 INJECTION INTRAMUSCULAR; INTRAVENOUS EVERY 6 HOURS PRN
Status: DISCONTINUED | OUTPATIENT
Start: 2024-01-27 | End: 2024-02-05 | Stop reason: HOSPADM

## 2024-01-27 RX ORDER — BISACODYL 10 MG
10 SUPPOSITORY, RECTAL RECTAL
Status: CANCELLED | OUTPATIENT
Start: 2024-01-27

## 2024-01-27 RX ORDER — PROMETHAZINE HYDROCHLORIDE 25 MG/1
12.5-25 TABLET ORAL EVERY 4 HOURS PRN
Status: CANCELLED | OUTPATIENT
Start: 2024-01-27

## 2024-01-27 RX ORDER — BISACODYL 10 MG
10 SUPPOSITORY, RECTAL RECTAL
Status: DISCONTINUED | OUTPATIENT
Start: 2024-01-27 | End: 2024-01-31

## 2024-01-27 RX ORDER — OXYCODONE HYDROCHLORIDE 5 MG/1
5 TABLET ORAL
Status: CANCELLED | OUTPATIENT
Start: 2024-01-27

## 2024-01-27 RX ORDER — FAMOTIDINE 20 MG/1
20 TABLET, FILM COATED ORAL 2 TIMES DAILY
Status: CANCELLED | OUTPATIENT
Start: 2024-01-27

## 2024-01-27 RX ORDER — PROMETHAZINE HYDROCHLORIDE 25 MG/1
12.5-25 TABLET ORAL EVERY 4 HOURS PRN
Status: DISCONTINUED | OUTPATIENT
Start: 2024-01-27 | End: 2024-02-05 | Stop reason: HOSPADM

## 2024-01-27 RX ORDER — ONDANSETRON 2 MG/ML
4 INJECTION INTRAMUSCULAR; INTRAVENOUS EVERY 4 HOURS PRN
Status: DISCONTINUED | OUTPATIENT
Start: 2024-01-27 | End: 2024-02-05 | Stop reason: HOSPADM

## 2024-01-27 RX ORDER — POLYETHYLENE GLYCOL 3350 17 G/17G
1 POWDER, FOR SOLUTION ORAL
Status: DISCONTINUED | OUTPATIENT
Start: 2024-01-27 | End: 2024-01-31

## 2024-01-27 RX ORDER — ACETAMINOPHEN 325 MG/1
650 TABLET ORAL EVERY 6 HOURS PRN
Status: DISCONTINUED | OUTPATIENT
Start: 2024-01-27 | End: 2024-02-05 | Stop reason: HOSPADM

## 2024-01-27 RX ORDER — FERROUS SULFATE 325(65) MG
325 TABLET ORAL DAILY
Status: DISCONTINUED | OUTPATIENT
Start: 2024-01-28 | End: 2024-02-05 | Stop reason: HOSPADM

## 2024-01-27 RX ORDER — FERROUS SULFATE 325(65) MG
325 TABLET ORAL DAILY
Status: CANCELLED | OUTPATIENT
Start: 2024-01-28

## 2024-01-27 RX ORDER — FAMOTIDINE 20 MG/1
20 TABLET, FILM COATED ORAL 2 TIMES DAILY
Status: DISCONTINUED | OUTPATIENT
Start: 2024-01-27 | End: 2024-02-05 | Stop reason: HOSPADM

## 2024-01-27 RX ORDER — PROMETHAZINE HYDROCHLORIDE 12.5 MG/1
12.5-25 SUPPOSITORY RECTAL EVERY 4 HOURS PRN
Status: DISCONTINUED | OUTPATIENT
Start: 2024-01-27 | End: 2024-02-05 | Stop reason: HOSPADM

## 2024-01-27 RX ORDER — OXYCODONE HYDROCHLORIDE 5 MG/1
2.5 TABLET ORAL
Status: CANCELLED | OUTPATIENT
Start: 2024-01-27

## 2024-01-27 RX ORDER — ONDANSETRON 4 MG/1
4 TABLET, ORALLY DISINTEGRATING ORAL EVERY 4 HOURS PRN
Status: DISCONTINUED | OUTPATIENT
Start: 2024-01-27 | End: 2024-02-05 | Stop reason: HOSPADM

## 2024-01-27 RX ORDER — POLYETHYLENE GLYCOL 3350 17 G/17G
1 POWDER, FOR SOLUTION ORAL
Status: CANCELLED | OUTPATIENT
Start: 2024-01-27

## 2024-01-27 RX ADMIN — FAMOTIDINE 20 MG: 20 TABLET, FILM COATED ORAL at 17:48

## 2024-01-27 RX ADMIN — PREGABALIN 150 MG: 75 CAPSULE ORAL at 14:30

## 2024-01-27 RX ADMIN — DULOXETINE HYDROCHLORIDE 60 MG: 60 CAPSULE, DELAYED RELEASE ORAL at 17:49

## 2024-01-27 RX ADMIN — FERROUS SULFATE TAB 325 MG (65 MG ELEMENTAL FE) 325 MG: 325 (65 FE) TAB at 04:43

## 2024-01-27 RX ADMIN — CEFTRIAXONE SODIUM 2000 MG: 10 INJECTION, POWDER, FOR SOLUTION INTRAVENOUS at 17:48

## 2024-01-27 RX ADMIN — FOLIC ACID 2 MG: 1 TABLET ORAL at 04:42

## 2024-01-27 RX ADMIN — FAMOTIDINE 20 MG: 20 TABLET, FILM COATED ORAL at 04:43

## 2024-01-27 RX ADMIN — METHYLPREDNISOLONE SODIUM SUCCINATE 20 MG: 40 INJECTION, POWDER, FOR SOLUTION INTRAMUSCULAR; INTRAVENOUS at 14:30

## 2024-01-27 RX ADMIN — PREGABALIN 150 MG: 150 CAPSULE ORAL at 21:25

## 2024-01-27 RX ADMIN — DOCUSATE SODIUM 50 MG AND SENNOSIDES 8.6 MG 2 TABLET: 8.6; 5 TABLET, FILM COATED ORAL at 17:56

## 2024-01-27 RX ADMIN — DOCUSATE SODIUM 50MG AND SENNOSIDES 8.6MG 2 TABLET: 8.6; 5 TABLET, FILM COATED ORAL at 04:42

## 2024-01-27 RX ADMIN — METHYLPREDNISOLONE SODIUM SUCCINATE 20 MG: 40 INJECTION, POWDER, FOR SOLUTION INTRAMUSCULAR; INTRAVENOUS at 04:42

## 2024-01-27 RX ADMIN — PREGABALIN 150 MG: 75 CAPSULE ORAL at 04:43

## 2024-01-27 RX ADMIN — METHYLPREDNISOLONE SODIUM SUCCINATE 20 MG: 40 INJECTION, POWDER, FOR SOLUTION INTRAMUSCULAR; INTRAVENOUS at 21:25

## 2024-01-27 RX ADMIN — DULOXETINE HYDROCHLORIDE 60 MG: 30 CAPSULE, DELAYED RELEASE ORAL at 04:43

## 2024-01-27 RX ADMIN — SODIUM CHLORIDE: 9 INJECTION, SOLUTION INTRAVENOUS at 17:47

## 2024-01-27 RX ADMIN — SODIUM CHLORIDE: 9 INJECTION, SOLUTION INTRAVENOUS at 01:33

## 2024-01-27 ASSESSMENT — COGNITIVE AND FUNCTIONAL STATUS - GENERAL
SUGGESTED CMS G CODE MODIFIER DAILY ACTIVITY: CL
HELP NEEDED FOR BATHING: TOTAL
DRESSING REGULAR LOWER BODY CLOTHING: TOTAL
DRESSING REGULAR UPPER BODY CLOTHING: A LITTLE
EATING MEALS: A LITTLE
DAILY ACTIVITIY SCORE: 12
TOILETING: TOTAL
PERSONAL GROOMING: A LITTLE

## 2024-01-27 ASSESSMENT — ACTIVITIES OF DAILY LIVING (ADL): TOILETING: REQUIRES ASSIST

## 2024-01-27 ASSESSMENT — PAIN DESCRIPTION - PAIN TYPE
TYPE: ACUTE PAIN

## 2024-01-27 NOTE — CARE PLAN
The patient is Stable - Low risk of patient condition declining or worsening    Shift Goals  Clinical Goals: Q1wxnbx, [facility] to [facility]  Patient Goals: comfort, ease anxiety    Progress made toward(s) clinical / shift goals:  Pt has necessary fall precautions in place. The pt worked with occupational therapy and stood edge of bed. The pt is aware of POC and that they are transferring to [facility]. The pt has NS running and has been having adequate urinary output, 800mL last night.     Problem: Knowledge Deficit - Standard  Goal: Patient and family/care givers will demonstrate understanding of plan of care, disease process/condition, diagnostic tests and medications  Outcome: Progressing     Problem: Pain - Standard  Goal: Alleviation of pain or a reduction in pain to the patient’s comfort goal  1/27/2024 1350 by Jeri Cano R.N.  Outcome: Progressing  1/27/2024 1348 by PETER Amaro.N.  Outcome: Progressing     Problem: Fall Risk  Goal: Patient will remain free from falls  1/27/2024 1350 by Jeri Cano R.N.  Outcome: Progressing  1/27/2024 1350 by PETER Amaro.N.  Outcome: Progressing  1/27/2024 1348 by Jeri Cano R.N.  Outcome: Progressing     Problem: Urinary Elimination  Goal: Establish and maintain regular urinary output  1/27/2024 1350 by Jeri Cano R.N.  Outcome: Progressing  1/27/2024 1350 by PETER Amaro.N.  Outcome: Progressing  1/27/2024 1348 by Jeri Cano R.N.  Outcome: Progressing       Patient is not progressing towards the following goals: The pt's skin continues to be red, flaky, and hot on her trunk. Q2 turns are implemented.       Problem: Skin Integrity  Goal: Skin integrity is maintained or improved  1/27/2024 1350 by INDIRA AmaroN.  Outcome: Not Progressing

## 2024-01-27 NOTE — DISCHARGE PLANNING
**1159  Patient to transfer to [facility]. PCS form faxed to [facility].    **1314  Call to patient's , Marbin. Informed Marbin of transport to [facility]. All questions answered.

## 2024-01-27 NOTE — CARE PLAN
Problem: Skin Integrity  Goal: Skin integrity is maintained or improved  Outcome: Progressing  Note: Every two hours turn     Problem: Fall Risk  Goal: Patient will remain free from falls  Outcome: Progressing  Note: Fall precaution measures in place   The patient is Stable - Low risk of patient condition declining or worsening    Shift Goals  Clinical Goals: q2 turns,IV Abx,Retrogate cystoscopy,IV fluids  Patient Goals: rest    Progress made toward(s) clinical / shift goals:  Educated on fall prevention measures,encouraged use of call light,updated on plan of care.    Patient is not progressing towards the following goals:

## 2024-01-27 NOTE — PROGRESS NOTES
"Urology Progress Note    Post op Day # 1 Diagnostic cystoscopy     Overnight Events: None    S: No fevers, chills, nausea or vomiting.  C/o right flank pain.  Pending transfer to [facility] for R Neph tube placement in IR.     O:   BP (!) 144/91   Pulse 91   Temp 36.7 °C (98.1 °F) (Oral)   Resp 16   Ht 1.676 m (5' 6\")   Wt 92.5 kg (203 lb 14.8 oz)   SpO2 99%   Recent Labs     01/26/24  0033 01/27/24  0057   SODIUM 134* 137   POTASSIUM 4.3 4.8   CHLORIDE 104 107   CO2 18* 19*   GLUCOSE 130* 158*   BUN 18 20   CREATININE 0.82 0.78   CALCIUM 8.4 8.2*     Recent Labs     01/26/24 0033 01/27/24  0057   WBC 15.0* 10.9*   RBC 3.95* 3.96*   HEMOGLOBIN 10.8* 10.6*   HEMATOCRIT 33.4* 33.4*   MCV 84.6 84.3   MCH 27.3 26.8*   MCHC 32.3 31.7*   RDW 52.6* 52.7*   PLATELETCT 395 430   MPV 10.7 11.1         Intake/Output Summary (Last 24 hours) at 1/27/2024 1242  Last data filed at 1/27/2024 0348  Gross per 24 hour   Intake 850 ml   Output 880 ml   Net -30 ml       Exam:  Abdomen soft, benign.     A/P:    Active Hospital Problems    Diagnosis     Hydronephrosis of right kidney [N13.30]     Drug rash [L27.0]     COPD (chronic obstructive pulmonary disease) ([facility]) [J44.9]     Sepsis due to gram-negative pyelonephritis with possible perinephric abscess ([facility]) [A41.50, N39.0]     Dysphagia [R13.10]     History of completed stroke [Z86.73]     Chronic pain [G89.29]     Leukocytosis [D72.829]        - Ambulate, IS.  - Will make NPO p mn for IR R Neph tube placement tomorrow as she ate lunch today, pending transfer.   - We will follow   "

## 2024-01-27 NOTE — PROGRESS NOTES
1406 Pt report attempted to be called to receiving nurse at [facility].    1420 Pt report called to FERNANDO Wiley.     1510 Discharged to [facility] via [facility].

## 2024-01-27 NOTE — PROGRESS NOTES
Telemetry Shift Summary    Rhythm Sinus Rhythm/Sinus Tachycardia  HR Range   Ectopy rare PVC  Measurements .10/.10/.36        Normal Values  Rhythm SR  HR Range    Measurements 0.12-0.20 / 0.06-0.10  / 0.30-0.52

## 2024-01-27 NOTE — CARE PLAN
The patient is Stable - Low risk of patient condition declining or worsening    Shift Goals  Clinical Goals: right retrogate with possible stent today, NS, antibiotics  Patient Goals: surgery    Progress made toward(s) clinical / shift goals: Pt continues to deny pain. Vital signs remain stable. Receiving fluids & antibiotics per MAR. Olmedo placed in OR.     Problem: Pain - Standard  Goal: Alleviation of pain or a reduction in pain to the patient’s comfort goal  1/26/2024 1606 by Jeri Cano R.N.  Outcome: Progressing  1/26/2024 1603 by Jeri Cano R.N.  Outcome: Progressing       Patient is not progressing towards the following goals: Pt underwent procedure with unsuccessful stent placement. Olmedo placed. Plan is to [facility] to [facility] tomorrow for [facility].       Problem: Urinary Elimination  Goal: Establish and maintain regular urinary output  Outcome: Not Progressing

## 2024-01-27 NOTE — PROGRESS NOTES
Telemetry Shift Summary     Rhythm SR ST  HR Range   Ectopy rPVC  Measurements .18/.08/.36  Per strip printed 0400     Normal Values  Rhythm SR  HR Range    Measurements 0.12-0.20 / 0.06-0.10  / 0.30-0.52

## 2024-01-27 NOTE — THERAPY
Occupational Therapy   Initial Evaluation     Patient Name: Nydia Finch  Age:  63 y.o., Sex:  female  Medical Record #: [id number]  Today's Date: 1/27/2024     Precautions  Precautions: Fall Risk    Assessment  Patient is a 63 y.o. female who presented 1/25/2024 on transfer from [facility] because of ongoing hydronephrosis and pyelonephritis with sepsis. Pt s/p diagnostic cystoscopy 1/26. Pt with h/o stroke with residual right-sided deficits. Pt uses at w/c at baseline and reports family assists with transfers, dressing and bathing. During OT eval, pt reports she feel weaker than her typical self. She would benefit from OT services to facilitate return to OF.       Plan    Occupational Therapy Initial Treatment Plan   Treatment Interventions: Self Care / Activities of Daily Living, Adaptive Equipment, Therapeutic Exercises, Therapeutic Activity  Treatment Frequency: 3 Times per Week  Duration: Until Therapy Goals Met    DC Equipment Recommendations: None  Discharge Recommendations: Recommend home health for continued occupational therapy services     Subjective    Agreeable to therapy session      Objective       01/27/24 0829   Prior Living Situation   Prior Services Intermittent Physical Support for ADL Per Family   Housing / Facility 1 Story House   Bathroom Set up Tub Transfer Bench;Bathtub / Shower Combination   Equipment Owned Wheelchair;Bed Side Commode;Tub Transfer Bench   Lives with - Patient's Self Care Capacity Spouse;Adult Children   Comments Pt reports her spouse Florencio and son Jaspal are very supportive   Prior Level of ADL Function   Self Feeding Independent   Grooming / Hygiene Independent   Bathing Requires Assist   Dressing Requires Assist   Toileting Requires Assist   Prior Level of IADL Function   Prior Level Of Mobility Uses Wheel Chair for Community Mobility;Uses Wheel Chair for in Home Mobility  (pt reports she needs assist to transfer to/from w/c)   Comments Pt had a stroke in  1994 with residual right-sided weakness   Precautions   Precautions Fall Risk   Vitals   O2 Delivery Device None - Room Air   Pain 0 - 10 Group   Therapist Pain Assessment During Activity;Nurse Notified  (reports generalized pain everyhwere)   Cognition    Level of Consciousness Alert   Comments difficulty with word finding at times, very pleasant and cooperative   Passive ROM Upper Body   Passive ROM Upper Body X   Comments limitations on R side from chronic stroke deficits   Active ROM Upper Body   Active ROM Upper Body  X   Dominant Hand Using Non-Dominant Hand   Comments Pt was right handed but following stroke only has use of her left hand, Pt with no active movement in RUE   Strength Upper Body   Comments 05, RUE, 5/5 LUE   Upper Body Muscle Tone   Upper Body Muscle Tone  X   Rt Upper Extremity Muscle Tone Hypotonic   Balance Assessment   Sitting Balance (Static) Fair   Sitting Balance (Dynamic) Fair   Standing Balance (Static) Poor   Weight Shift Sitting Poor   Weight Shift Standing Absent   Bed Mobility    Supine to Sit Moderate Assist   Sit to Supine Moderate Assist   Scooting Moderate Assist   ADL Assessment   Eating   (NPO)   Grooming Minimal Assist;Seated   Upper Body Dressing Minimal Assist  (gown)   Lower Body Dressing Total Assist  (socks)   Toileting Total Assist  (pascal)   6 Clicks Daily Activity Score 12   Functional Mobility   Sit to Stand Minimal Assist   Mobility stood for bed linen change   Edema / Skin Assessment   Comments dry, peeling skin   Activity Tolerance   Sitting Edge of Bed 10 min   Standing 1 min   Patient / Family Goals   Patient / Family Goal #1 to return home   Short Term Goals   Short Term Goal # 1 Pt will increase activity tolerance to sit EOB greater than 20 minutes for ADLs   Short Term Goal # 2 Pt will complete BSC transfers with Emma   Education Group   Education Provided Role of Occupational Therapist   Role of Occupational Therapist Patient Response  Patient;Acceptance;Explanation;Verbal Demonstration   Occupational Therapy Initial Treatment Plan    Treatment Interventions Self Care / Activities of Daily Living;Adaptive Equipment;Therapeutic Exercises;Therapeutic Activity   Treatment Frequency 3 Times per Week   Duration Until Therapy Goals Met   Problem List   Problem List Decreased Active Daily Living Skills;Decreased Activity Tolerance;Impaired Postural Control / Balance;Impaired Upper Extremity Tone Right;Decreased Upper Extremity Strength Right;Decreased Upper Extremity AROM Right;Decreased Upper Extremity PROM Right

## 2024-01-27 NOTE — DISCHARGE SUMMARY
Discharge Summary    CHIEF COMPLAINT ON ADMISSION  Flank pain    Reason for Admission  Pyelonephritis     Admission Date  1/25/2024    CODE STATUS  Full Code    HPI & HOSPITAL COURSE  This is a 63 y.o. 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  She was transferred for urology consultation.  She had developed a diffuse drug rash due to Zosyn therefore has been transitioned to ceftriaxone and continues on Solu-Medrol therapy.    Urology attempted stent placement on 1/26 however due to severe scarring, stent was unable to be placed.  She will require IR guided nephrostomy tube which can only be done at [facility].    She was continued on IV abx and transfer was arranged.  She will need IR consultation once she arrives to determine timing, given transfer occurring over the weekend.    Continue Ceftriaxone.  Continue Solu-Medrol, Pepcid, Benadryl for drug rash which occurred with Zosyn.    Notably she has right hemiparesis and expressive aphasia from a remote ruptured aneurysm.    Therefore, she is discharged in fair and stable condition to a critical access hospital.    The patient met 2-midnight criteria for an inpatient stay at the time of discharge.    Discharge Date  1/27/2024    FOLLOW UP ITEMS POST DISCHARGE  Follow-up with IR at [facility] for right nephrostomy tube placement    DISCHARGE DIAGNOSES  Principal Problem:    Hydronephrosis of right kidney (POA: Yes)  Active Problems:    Chronic pain (POA: Yes)    Leukocytosis (POA: Yes)    History of completed stroke (POA: Yes)    Dysphagia (POA: Yes)    Sepsis due to gram-negative pyelonephritis with possible perinephric abscess (HCC) (POA: Yes)    COPD (chronic obstructive pulmonary disease) (HCC) (POA: Yes)    Drug rash (POA:  Yes)  Resolved Problems:    Pyelonephritis (POA: Yes)    Hydronephrosis (POA: Yes)    SIRS (systemic inflammatory response syndrome) (HCC) (POA: Unknown)      FOLLOW UP  Interventional radiology  Urology    MEDICATIONS ON DISCHARGE     Medication List        ASK your doctor about these medications        Instructions   DULoxetine 60 MG Cpep delayed-release capsule  Commonly known as: Cymbalta   Take 60 mg by mouth 2 times a day.  Dose: 60 mg     ferrous sulfate 325 (65 Fe) MG tablet   Take 325 mg by mouth every day.  Dose: 325 mg     multivitamin Tabs   Take 1 Tablet by mouth every day.  Dose: 1 Tablet     pregabalin 150 MG Caps  Commonly known as: Lyrica   Take 150 mg by mouth in the morning, at noon, and at bedtime.  Dose: 150 mg     VITAMIN B-12 PO   Take 1 Tablet by mouth every day.  Dose: 1 Tablet              Allergies  Allergies   Allergen Reactions    Zosyn [Piperacillin Sod-Tazobactam So] Rash     Gave her head to toe drug rash at [facility] 1/25/2024         DIET  Orders Placed This Encounter   Procedures    Diet Order Diet: Regular     Standing Status:   Standing     Number of Occurrences:   1     Order Specific Question:   Diet:     Answer:   Regular [1]       ACTIVITY  As tolerated.  Weight bearing as tolerated    CONSULTATIONS  Urology - Dr. Rm    PROCEDURES  1/26/24: Cystoscopy, unable to place stent due to scarred over right ureteral orifice.    LABORATORY  Lab Results   Component Value Date    SODIUM 137 01/27/2024    POTASSIUM 4.8 01/27/2024    CHLORIDE 107 01/27/2024    CO2 19 (L) 01/27/2024    GLUCOSE 158 (H) 01/27/2024    BUN 20 01/27/2024    CREATININE 0.78 01/27/2024        Lab Results   Component Value Date    WBC 10.9 (H) 01/27/2024    HEMOGLOBIN 10.6 (L) 01/27/2024    HEMATOCRIT 33.4 (L) 01/27/2024    PLATELETCT 430 01/27/2024        Total time of the discharge process exceeds 41 minutes.

## 2024-01-28 ENCOUNTER — APPOINTMENT (OUTPATIENT)
Dept: RADIOLOGY | Facility: MEDICAL CENTER | Age: 64
DRG: 871 | End: 2024-01-28
Attending: INTERNAL MEDICINE
Payer: MEDICARE

## 2024-01-28 PROBLEM — G81.91 RIGHT HEMIPLEGIA (HCC): Status: ACTIVE | Noted: 2024-01-28

## 2024-01-28 PROBLEM — R47.01 EXPRESSIVE APHASIA: Status: ACTIVE | Noted: 2024-01-28

## 2024-01-28 PROBLEM — D64.9 ANEMIA: Status: ACTIVE | Noted: 2024-01-28

## 2024-01-28 LAB
ALBUMIN SERPL BCP-MCNC: 2.7 G/DL (ref 3.2–4.9)
BASOPHILS # BLD AUTO: 0 % (ref 0–1.8)
BASOPHILS # BLD: 0 K/UL (ref 0–0.12)
BUN SERPL-MCNC: 26 MG/DL (ref 8–22)
CALCIUM ALBUM COR SERPL-MCNC: 9.6 MG/DL (ref 8.5–10.5)
CALCIUM SERPL-MCNC: 8.6 MG/DL (ref 8.5–10.5)
CHLORIDE SERPL-SCNC: 105 MMOL/L (ref 96–112)
CO2 SERPL-SCNC: 19 MMOL/L (ref 20–33)
CREAT SERPL-MCNC: 0.86 MG/DL (ref 0.5–1.4)
EOSINOPHIL # BLD AUTO: 0 K/UL (ref 0–0.51)
EOSINOPHIL NFR BLD: 0 % (ref 0–6.9)
ERYTHROCYTE [DISTWIDTH] IN BLOOD BY AUTOMATED COUNT: 52.9 FL (ref 35.9–50)
FUNGUS SPEC FUNGUS STN: NORMAL
GFR SERPLBLD CREATININE-BSD FMLA CKD-EPI: 75 ML/MIN/1.73 M 2
GLUCOSE SERPL-MCNC: 188 MG/DL (ref 65–99)
HCT VFR BLD AUTO: 35.9 % (ref 37–47)
HGB BLD-MCNC: 11.6 G/DL (ref 12–16)
INR BLD: 0.9
INR PPP: 1.08 (ref 0.87–1.13)
LYMPHOCYTES # BLD AUTO: 1.05 K/UL (ref 1–4.8)
LYMPHOCYTES NFR BLD: 9.6 % (ref 22–41)
MAGNESIUM SERPL-MCNC: 2.2 MG/DL (ref 1.5–2.5)
MANUAL DIFF BLD: NORMAL
MCH RBC QN AUTO: 27.2 PG (ref 27–33)
MCHC RBC AUTO-ENTMCNC: 32.3 G/DL (ref 32.2–35.5)
MCV RBC AUTO: 84.1 FL (ref 81.4–97.8)
MONOCYTES # BLD AUTO: 0.28 K/UL (ref 0–0.85)
MONOCYTES NFR BLD AUTO: 2.6 % (ref 0–13.4)
MORPHOLOGY BLD-IMP: NORMAL
MYELOCYTES NFR BLD MANUAL: 0.9 %
NEUTROPHILS # BLD AUTO: 9.47 K/UL (ref 1.82–7.42)
NEUTROPHILS NFR BLD: 86.9 % (ref 44–72)
NRBC # BLD AUTO: 0 K/UL
NRBC BLD-RTO: 0 /100 WBC (ref 0–0.2)
PHOSPHATE SERPL-MCNC: 3.2 MG/DL (ref 2.5–4.5)
PLATELET # BLD AUTO: 443 K/UL (ref 164–446)
PLATELET BLD QL SMEAR: NORMAL
PMV BLD AUTO: 10.5 FL (ref 9–12.9)
POTASSIUM SERPL-SCNC: 4.8 MMOL/L (ref 3.6–5.5)
PROTHROMBIN TIME: 14.1 SEC (ref 12–14.6)
RBC # BLD AUTO: 4.27 M/UL (ref 4.2–5.4)
RBC BLD AUTO: NORMAL
SIGNIFICANT IND 70042: NORMAL
SITE SITE: NORMAL
SODIUM SERPL-SCNC: 132 MMOL/L (ref 135–145)
SOURCE SOURCE: NORMAL
WBC # BLD AUTO: 10.9 K/UL (ref 4.8–10.8)

## 2024-01-28 PROCEDURE — 700111 HCHG RX REV CODE 636 W/ 250 OVERRIDE (IP): Mod: JZ | Performed by: RADIOLOGY

## 2024-01-28 PROCEDURE — 99233 SBSQ HOSP IP/OBS HIGH 50: CPT | Mod: GC | Performed by: INTERNAL MEDICINE

## 2024-01-28 PROCEDURE — 99153 MOD SED SAME PHYS/QHP EA: CPT

## 2024-01-28 PROCEDURE — 770020 HCHG ROOM/CARE - TELE (206)

## 2024-01-28 PROCEDURE — 85027 COMPLETE CBC AUTOMATED: CPT

## 2024-01-28 PROCEDURE — 87206 SMEAR FLUORESCENT/ACID STAI: CPT

## 2024-01-28 PROCEDURE — 80069 RENAL FUNCTION PANEL: CPT

## 2024-01-28 PROCEDURE — 700101 HCHG RX REV CODE 250

## 2024-01-28 PROCEDURE — 85007 BL SMEAR W/DIFF WBC COUNT: CPT

## 2024-01-28 PROCEDURE — 87015 SPECIMEN INFECT AGNT CONCNTJ: CPT

## 2024-01-28 PROCEDURE — 87205 SMEAR GRAM STAIN: CPT

## 2024-01-28 PROCEDURE — 700111 HCHG RX REV CODE 636 W/ 250 OVERRIDE (IP)

## 2024-01-28 PROCEDURE — A9270 NON-COVERED ITEM OR SERVICE: HCPCS | Performed by: INTERNAL MEDICINE

## 2024-01-28 PROCEDURE — 87077 CULTURE AEROBIC IDENTIFY: CPT

## 2024-01-28 PROCEDURE — 85610 PROTHROMBIN TIME: CPT | Mod: 91

## 2024-01-28 PROCEDURE — 700111 HCHG RX REV CODE 636 W/ 250 OVERRIDE (IP): Performed by: INTERNAL MEDICINE

## 2024-01-28 PROCEDURE — 700102 HCHG RX REV CODE 250 W/ 637 OVERRIDE(OP): Performed by: INTERNAL MEDICINE

## 2024-01-28 PROCEDURE — 87102 FUNGUS ISOLATION CULTURE: CPT

## 2024-01-28 PROCEDURE — 36415 COLL VENOUS BLD VENIPUNCTURE: CPT

## 2024-01-28 PROCEDURE — 0T9330Z DRAINAGE OF RIGHT KIDNEY PELVIS WITH DRAINAGE DEVICE, PERCUTANEOUS APPROACH: ICD-10-PCS | Performed by: RADIOLOGY

## 2024-01-28 PROCEDURE — 87070 CULTURE OTHR SPECIMN AEROBIC: CPT

## 2024-01-28 PROCEDURE — 87116 MYCOBACTERIA CULTURE: CPT

## 2024-01-28 PROCEDURE — 700111 HCHG RX REV CODE 636 W/ 250 OVERRIDE (IP): Mod: JZ | Performed by: INTERNAL MEDICINE

## 2024-01-28 PROCEDURE — 87186 SC STD MICRODIL/AGAR DIL: CPT

## 2024-01-28 PROCEDURE — 700105 HCHG RX REV CODE 258: Performed by: INTERNAL MEDICINE

## 2024-01-28 PROCEDURE — 700117 HCHG RX CONTRAST REV CODE 255: Performed by: INTERNAL MEDICINE

## 2024-01-28 PROCEDURE — 83735 ASSAY OF MAGNESIUM: CPT

## 2024-01-28 RX ORDER — MIDAZOLAM HYDROCHLORIDE 1 MG/ML
INJECTION INTRAMUSCULAR; INTRAVENOUS
Status: COMPLETED
Start: 2024-01-28 | End: 2024-01-28

## 2024-01-28 RX ORDER — SODIUM CHLORIDE 9 MG/ML
500 INJECTION, SOLUTION INTRAVENOUS
Status: ACTIVE | OUTPATIENT
Start: 2024-01-28 | End: 2024-01-28

## 2024-01-28 RX ORDER — LORAZEPAM 2 MG/ML
1 INJECTION INTRAMUSCULAR
Status: DISCONTINUED | OUTPATIENT
Start: 2024-01-28 | End: 2024-01-28

## 2024-01-28 RX ORDER — LIDOCAINE HYDROCHLORIDE 10 MG/ML
INJECTION, SOLUTION INFILTRATION; PERINEURAL
Status: COMPLETED
Start: 2024-01-28 | End: 2024-01-28

## 2024-01-28 RX ORDER — LORAZEPAM 2 MG/ML
2 INJECTION INTRAMUSCULAR
Status: DISCONTINUED | OUTPATIENT
Start: 2024-01-28 | End: 2024-01-28

## 2024-01-28 RX ORDER — LORAZEPAM 2 MG/ML
1.5 INJECTION INTRAMUSCULAR
Status: DISCONTINUED | OUTPATIENT
Start: 2024-01-28 | End: 2024-01-28

## 2024-01-28 RX ORDER — ONDANSETRON 2 MG/ML
4 INJECTION INTRAMUSCULAR; INTRAVENOUS PRN
Status: ACTIVE | OUTPATIENT
Start: 2024-01-28 | End: 2024-01-28

## 2024-01-28 RX ORDER — LORAZEPAM 1 MG/1
0.5 TABLET ORAL EVERY 4 HOURS PRN
Status: DISCONTINUED | OUTPATIENT
Start: 2024-01-28 | End: 2024-01-28

## 2024-01-28 RX ORDER — LORAZEPAM 2 MG/1
4 TABLET ORAL
Status: DISCONTINUED | OUTPATIENT
Start: 2024-01-28 | End: 2024-01-28

## 2024-01-28 RX ORDER — ENOXAPARIN SODIUM 100 MG/ML
40 INJECTION SUBCUTANEOUS DAILY
Status: DISCONTINUED | OUTPATIENT
Start: 2024-01-28 | End: 2024-02-05 | Stop reason: HOSPADM

## 2024-01-28 RX ORDER — LORAZEPAM 1 MG/1
1 TABLET ORAL EVERY 4 HOURS PRN
Status: DISCONTINUED | OUTPATIENT
Start: 2024-01-28 | End: 2024-01-28

## 2024-01-28 RX ORDER — LORAZEPAM 2 MG/ML
0.5 INJECTION INTRAMUSCULAR EVERY 4 HOURS PRN
Status: DISCONTINUED | OUTPATIENT
Start: 2024-01-28 | End: 2024-01-28

## 2024-01-28 RX ORDER — MIDAZOLAM HYDROCHLORIDE 1 MG/ML
.5-2 INJECTION INTRAMUSCULAR; INTRAVENOUS PRN
Status: ACTIVE | OUTPATIENT
Start: 2024-01-28 | End: 2024-01-28

## 2024-01-28 RX ORDER — LORAZEPAM 2 MG/1
2 TABLET ORAL
Status: DISCONTINUED | OUTPATIENT
Start: 2024-01-28 | End: 2024-01-28

## 2024-01-28 RX ADMIN — PREGABALIN 150 MG: 150 CAPSULE ORAL at 21:42

## 2024-01-28 RX ADMIN — DOCUSATE SODIUM 50 MG AND SENNOSIDES 8.6 MG 2 TABLET: 8.6; 5 TABLET, FILM COATED ORAL at 05:34

## 2024-01-28 RX ADMIN — CEFTRIAXONE SODIUM 2000 MG: 10 INJECTION, POWDER, FOR SOLUTION INTRAVENOUS at 22:24

## 2024-01-28 RX ADMIN — PREGABALIN 150 MG: 150 CAPSULE ORAL at 05:35

## 2024-01-28 RX ADMIN — FENTANYL CITRATE 25 MCG: 50 INJECTION, SOLUTION INTRAMUSCULAR; INTRAVENOUS at 13:41

## 2024-01-28 RX ADMIN — SODIUM CHLORIDE: 9 INJECTION, SOLUTION INTRAVENOUS at 22:28

## 2024-01-28 RX ADMIN — LIDOCAINE HYDROCHLORIDE 10 ML: 10 INJECTION, SOLUTION INFILTRATION; PERINEURAL at 12:15

## 2024-01-28 RX ADMIN — METHYLPREDNISOLONE SODIUM SUCCINATE 20 MG: 40 INJECTION, POWDER, FOR SOLUTION INTRAMUSCULAR; INTRAVENOUS at 05:34

## 2024-01-28 RX ADMIN — FOLIC ACID 1 MG: 1 TABLET ORAL at 05:34

## 2024-01-28 RX ADMIN — SODIUM CHLORIDE: 9 INJECTION, SOLUTION INTRAVENOUS at 16:20

## 2024-01-28 RX ADMIN — FENTANYL CITRATE 25 MCG: 50 INJECTION, SOLUTION INTRAMUSCULAR; INTRAVENOUS at 13:06

## 2024-01-28 RX ADMIN — DOCUSATE SODIUM 50 MG AND SENNOSIDES 8.6 MG 2 TABLET: 8.6; 5 TABLET, FILM COATED ORAL at 18:12

## 2024-01-28 RX ADMIN — FAMOTIDINE 20 MG: 20 TABLET, FILM COATED ORAL at 05:35

## 2024-01-28 RX ADMIN — DULOXETINE HYDROCHLORIDE 60 MG: 60 CAPSULE, DELAYED RELEASE ORAL at 18:11

## 2024-01-28 RX ADMIN — IOHEXOL 15 ML: 300 INJECTION, SOLUTION INTRAVENOUS at 14:30

## 2024-01-28 RX ADMIN — PREGABALIN 150 MG: 150 CAPSULE ORAL at 15:29

## 2024-01-28 RX ADMIN — MIDAZOLAM HYDROCHLORIDE 1 MG: 1 INJECTION, SOLUTION INTRAMUSCULAR; INTRAVENOUS at 13:06

## 2024-01-28 RX ADMIN — FAMOTIDINE 20 MG: 20 TABLET, FILM COATED ORAL at 18:11

## 2024-01-28 RX ADMIN — DULOXETINE HYDROCHLORIDE 60 MG: 60 CAPSULE, DELAYED RELEASE ORAL at 05:35

## 2024-01-28 RX ADMIN — FERROUS SULFATE TAB 325 MG (65 MG ELEMENTAL FE) 325 MG: 325 (65 FE) TAB at 05:35

## 2024-01-28 RX ADMIN — MIDAZOLAM HYDROCHLORIDE 1 MG: 1 INJECTION INTRAMUSCULAR; INTRAVENOUS at 13:06

## 2024-01-28 ASSESSMENT — COGNITIVE AND FUNCTIONAL STATUS - GENERAL
PERSONAL GROOMING: A LITTLE
WALKING IN HOSPITAL ROOM: TOTAL
EATING MEALS: A LITTLE
MOVING TO AND FROM BED TO CHAIR: UNABLE
MOBILITY SCORE: 7
TURNING FROM BACK TO SIDE WHILE IN FLAT BAD: A LOT
SUGGESTED CMS G CODE MODIFIER DAILY ACTIVITY: CL
TOILETING: TOTAL
STANDING UP FROM CHAIR USING ARMS: TOTAL
HELP NEEDED FOR BATHING: TOTAL
CLIMB 3 TO 5 STEPS WITH RAILING: TOTAL
MOVING FROM LYING ON BACK TO SITTING ON SIDE OF FLAT BED: UNABLE
SUGGESTED CMS G CODE MODIFIER MOBILITY: CM
DRESSING REGULAR UPPER BODY CLOTHING: A LITTLE
DAILY ACTIVITIY SCORE: 12
DRESSING REGULAR LOWER BODY CLOTHING: TOTAL

## 2024-01-28 ASSESSMENT — PAIN SCALES - PAIN ASSESSMENT IN ADVANCED DEMENTIA (PAINAD)
TOTALSCORE: 0
CONSOLABILITY: NO NEED TO CONSOLE
FACIALEXPRESSION: SMILING OR INEXPRESSIVE
BREATHING: NORMAL
BODYLANGUAGE: RELAXED

## 2024-01-28 ASSESSMENT — PAIN DESCRIPTION - PAIN TYPE: TYPE: ACUTE PAIN

## 2024-01-28 ASSESSMENT — PATIENT HEALTH QUESTIONNAIRE - PHQ9
2. FEELING DOWN, DEPRESSED, IRRITABLE, OR HOPELESS: NOT AT ALL
1. LITTLE INTEREST OR PLEASURE IN DOING THINGS: NOT AT ALL
SUM OF ALL RESPONSES TO PHQ9 QUESTIONS 1 AND 2: 0

## 2024-01-28 ASSESSMENT — PAIN SCALES - WONG BAKER: WONGBAKER_NUMERICALRESPONSE: DOESN'T HURT AT ALL

## 2024-01-28 NOTE — ASSESSMENT & PLAN NOTE
History of complex stroke with right-sided deficits and expressive aphasia.  Continue fall precautions and seizure precautions

## 2024-01-28 NOTE — ASSESSMENT & PLAN NOTE
Patient has developed a drug rash due to Zosyn and at this point has severe rash from head to toe.  Antibiotic was switched to Rocephin.  Patient was on IV Solu-Medrol before being transferred to [facility] and was continued until today.  Rash has now significantly resolved therefore IV Solu-Medrol discontinued.  Continue famotidine.  Monitor for any further rash.

## 2024-01-28 NOTE — PROGRESS NOTES
Med rec completed per pt and pharmacy records. Dosing found through pharmacy records.    Allergies reviewed with pt  Home antibiotics in past 30 days: no home antibiotics. Received ceftriaxone during previous admission.    Anticoagulants/antiplatelets in past 14 days: none   Cat Felix, Pharmacy Intern

## 2024-01-28 NOTE — PROGRESS NOTES
Pt presents to IR2. Mrs. Finch was consented by MD at bedside, confirmed by this RN and consent at bedside. Pt transferred to IR2 table in Prone position. Patient underwent a Right Nephrostomy Tube by Dr. Krishnamurthy. Procedure site was marked by MD and verified using imaging guidance. Pt placed on monitor, prepped and draped in a sterile fashion. Vitals were taken every 5 minutes and remained stable during procedure (see doc flow sheet for results). CO2 waveform capnography was monitored and remained WNL throughout procedure. Report called to Lilian KING. Pt transported by stretcher with RN to S173-2.     Specimen: 1mL Right Perinephric Fluid Aspiration in a syringe hand delivered to lab.    [employer] Flexima Regular [id number]  REF: [id number]  LOT: [id number]  EXP: 2026-09-26

## 2024-01-28 NOTE — CARE PLAN
The patient is Stable - Low risk of patient condition declining or worsening    Shift Goals  Clinical Goals: Q2 turns, IR renal stent placement  Patient Goals: comfort, rest, ambulate as tolerated with assistance  Family Goals: ELIZABETH    Progress made toward(s) clinical / shift goals:    Problem: Skin Integrity  Goal: Skin integrity is maintained or improved  Outcome: Progressing  Note: Patient adheres to the Q2 turns and, with the utilization of Q2 turns has maintained her current skin integrity. Patient's current excoriation has not worsened since her admission to the hospital.      Problem: Pain - Standard  Goal: Alleviation of pain or a reduction in pain to the patient’s comfort goal  Outcome: Progressing  Flowsheets  Taken 1/28/2024 0736  OB Pain Intervention: Rest  Taken 1/27/2024 2000  Pain Rating Scale (NPRS): 0  Note: Patient's pain is well controlled. The patient reported zero pain throughout the shift and did not require any prn medications.

## 2024-01-28 NOTE — OR SURGEON
Immediate Post- Operative Note    PostOp Diagnosis: RENAL OBSTRUCTION, GRAM NEGATIVE SEPSIS, FAILED RETROGRADE R URETERAL STENT ATTEMPT. SMALL HYDRONEPHROTIC RIGHT KIDNEY WITH ISOLATED COLLECTING SYSTEM, GROSS PYONEPHROSIS      Procedure(s): RIGHT PERCUTANEOUS NEPHROSTOMY PLACEMENT AND NEPHROSTOGRAM WITH U/S AND FLUOROSCOPIC GUIDANCE      Estimated Blood Loss: <5 CC      FINDINGS: MALL HYDRONEPHROTIC RIGHT KIDNEY WITH ISOLATED COLLECTING SYSTEM, GROSS PYONEPHROSIS    EVACUATION: 10 ML GROSS PUS, INITIALLY BEIGE, SLIGHTLY PINK AT COMPLETION OF EXAM    ISOLATED UPPER COLLECTING SYSTEM. NON-VIZ OF UPJ OR PROX URETER    CATHETER PLACED TO SUCTION BULB DUE TO THICK PUS.         Complications: NONE          1/28/2024     2:17 PM     Ozzy Krishnamurthy M.D.

## 2024-01-28 NOTE — PROGRESS NOTES
I evaluated and examined the patient at bedside, patient's and nursing's questions were answered. For details see admission H&P    63 years old female with a history of depression, hemorrhagic CVA, residual right-sided weakness/aphasia who is being transferred from [facility] for interventional radiology procedure.  Patient was found to have right-sided hydronephrosis with pyelonephritis due to an obstructing stone.  Urology attempted stent placement on 1/26 that was unsuccessful due to severe scarring hence recommended IR guided nephrostomy tube.

## 2024-01-28 NOTE — PROGRESS NOTES
4 Eyes Skin Assessment Completed by FERNANDO Wiley and FERNANDO Quintana.    Head WDL  Ears WDL  Nose WDL  Mouth WDL  Neck Redness  Breast/Chest Redness  Shoulder Blades Redness  Spine Redness  (R) Arm/Elbow/Hand Redness  (L) Arm/Elbow/Hand Redness  Abdomen Redness  Groin Redness  Scrotum/Coccyx/Buttocks Redness  (R) Leg Redness  (L) Leg Redness  (R) Heel/Foot/Toe Redness  (L) Heel/Foot/Toe Redness          Devices In Places ECG, Tele Box, Blood Pressure Cuff, Pulse Ox, and Olmedo      Interventions In Place InterDry, Waffle Overlay, Pillows, Q2 Turns, and Heels Loaded W/Pillows    Possible Skin Injury Yes    Pictures Uploaded Into Epic Yes  Wound Consult Placed Yes  RN Wound Prevention Protocol Ordered Yes    Patient developed rash at outside hospital from [facility], skin is red and peeling on entire body no itching at this time.

## 2024-01-28 NOTE — CARE PLAN
The patient is Stable - Low risk of patient condition declining or worsening    Shift Goals  Clinical Goals: Q2 Turns  Patient Goals: Comfort  Family Goals: ELIZABETH    Progress made toward(s) clinical / shift goals:    Problem: Knowledge Deficit - Standard  Goal: Patient and family/care givers will demonstrate understanding of plan of care, disease process/condition, diagnostic tests and medications  Outcome: Progressing     Problem: Skin Integrity  Goal: Skin integrity is maintained or improved  Outcome: Progressing     Problem: Pain - Standard  Goal: Alleviation of pain or a reduction in pain to the patient’s comfort goal  Outcome: Progressing       Patient is not progressing towards the following goals:

## 2024-01-28 NOTE — PROGRESS NOTES
[facility] Internal Medicine Daily Progress Note    Date of Service  1/28/2024    [employer] Team: R IM Blue Team   Attending: Beverly Vasquez M.d.  Senior Resident: Dr. Brown  Intern:  Dr. Rowley  Contact Number: [phone]    Chief Complaint  Nydia Finch is a 63 y.o. female admitted 1/27/2024 with history of right flank pain and was diagnosed with sepsis secondary to UTI with right-sided hydroureteronephrosis and was transferred from outside hospital for further management.  Hospital Course   63 y.o. 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  Patient was originally admitted  01/18 at [facility] with right flank and right hip pain and was found to have sepsis secondary to UTI in the setting of right hydroureteronephrosis i with history of prior stone.  In 2009 patient had a stone at which point in time she had a stent placed and was lost to follow-ups.  She was started on IV Zosyn however she had developed a diffuse drug rash due to Zosyn therefore has been transitioned to ceftriaxone and continues on Solu-Medrol therapy.   For her right hydroureteronephrosis, urology attempted retrograde right ureteral stent attempt 01/26 which was unsuccessful due to excessive scarring therefore patient was transferred from [facility] to [facility] for interventional radiology guided right nephrostomy tube placement. Patient underwent right percutaneous nephrostomy placement on 01/28/2024.  10 mL gross pus was evacuated.          Interval Problem Update  Patient hemodynamically stable, no acute events overnight.  Patient states that her pain is very well-controlled.  Will discontinue Solu-Medrol since rash has significantly resolved.  IV ceftriaxone to  continue.  IR guided right nephrostomy tube placement done today.  Diet to be resumed postprocedure.    I have discussed this patient's plan of care and discharge plan at IDT rounds today with Case Management, Nursing, Nursing leadership, and other members of the IDT team.    Consultants/Specialty  urology    Code Status  Full Code    Disposition  The patient is not medically cleared for discharge to home or a post-acute facility.  Anticipate discharge to: home with close outpatient follow-up    I have placed the appropriate orders for post-discharge needs.    Review of Systems  ROS   Constitutional:  Positive for fever and malaise/fatigue. Negative for chills and diaphoresis.   HENT: Negative.  Negative for nosebleeds and sinus pain.    Eyes: Negative.  Negative for double vision, photophobia, discharge and redness.   Respiratory: Negative.  Negative for cough, sputum production, shortness of breath, wheezing and stridor.    Cardiovascular: Negative.  Negative for chest pain, orthopnea, leg swelling and PND.   Gastrointestinal:  Positive for abdominal pain and nausea. Negative for blood in stool and heartburn.   Genitourinary:  Positive for flank pain. Negative for dysuria and frequency.   Musculoskeletal:  Negative for back pain, falls and myalgias.   Skin:  Positive for rash. Negative for itching.   Neurological:  Positive for weakness (On her entire right side secondary to stroke in 1994). Negative for dizziness, tingling, sensory change, focal weakness and seizures.   Endo/Heme/Allergies: Negative.  Negative for polydipsia. Does not bruise/bleed easily.   Psychiatric/Behavioral: Negative.  Negative for depression, substance abuse and suicidal ideas. The patient does not have insomnia.    Physical Exam  Temp:  [36.3 °C (97.4 °F)-37 °C (98.6 °F)] 36.6 °C (97.9 °F)  Pulse:  [61-98] 63  Resp:  [11-18] 18  BP: (139-156)/() 151/79  SpO2:  [91 %-98 %] 97 %    Physical Exam  Constitutional:       General: She is  awake.      Appearance: Normal appearance. She is well-developed, well-groomed and normal weight.   HENT:      Head: Normocephalic and atraumatic.      Jaw: There is normal jaw occlusion. No trismus.      Salivary Glands: Right salivary gland is not tender. Left salivary gland is not tender.      Right Ear: External ear normal.      Left Ear: External ear normal.      Mouth/Throat:      Mouth: Mucous membranes are moist.      Pharynx: Oropharynx is clear.   Eyes:      General: Lids are normal. Vision grossly intact.      Extraocular Movements: Extraocular movements intact.      Conjunctiva/sclera: Conjunctivae normal.      Right eye: Right conjunctiva is not injected. No exudate.     Left eye: Left conjunctiva is not injected. No exudate.     Pupils: Pupils are equal, round, and reactive to light.   Neck:      Thyroid: No thyroid mass.      Vascular: No hepatojugular reflux or JVD.      Trachea: No abnormal tracheal secretions or tracheal deviation.   Cardiovascular:      Rate and Rhythm: Regular rhythm.     Pulses: Normal pulses.      Heart sounds: Normal heart sounds. No murmur heard.     No friction rub.   Pulmonary:      Effort: Pulmonary effort is normal.      Breath sounds: Normal breath sounds. No wheezing or rhonchi.   Abdominal:      General: Abdomen is flat. Bowel sounds are normal.      Palpations: Abdomen is soft.      Tenderness: There is no right CVA tenderness or left CVA tenderness.      Hernia: No hernia is present.   Musculoskeletal:      Cervical back: Full passive range of motion without pain, normal range of motion and neck supple. No rigidity. No muscular tenderness.      Right lower leg: No edema.      Left lower leg: No edema.   Lymphadenopathy:      Head:      Right side of head: No submental adenopathy.      Left side of head: No submental adenopathy.      Cervical:      Right cervical: No superficial cervical adenopathy.     Left cervical: No superficial cervical adenopathy.      Upper  Body:      Right upper body: No supraclavicular adenopathy.      Left upper body: No supraclavicular adenopathy.   Skin:     General: Skin is warm and dry.      Coloration: Skin is not cyanotic or pale.      Findings: Mild erythema and rash (Head to toe skin rash from drug reaction) present. No abrasion or bruising.   Neurological:      General: No focal deficit present.      Mental Status: She is alert and oriented to person, place, and time. Mental status is at baseline.      GCS: GCS eye subscore is 4. GCS verbal subscore is 5. GCS motor subscore is 6.      Cranial Nerves: No cranial nerve deficit.      Sensory: No sensory deficit.      Motor: Weakness and atrophy present.      Coordination: Coordination abnormal. Finger-Nose-Finger Test abnormal. Impaired rapid alternating movements.      Gait: Gait abnormal and tandem walk abnormal.      Deep Tendon Reflexes:      Reflex Scores:       Tricep reflexes are 2+ on the right side and 2+ on the left side.       Bicep reflexes are 2+ on the right side and 2+ on the left side.       Brachioradialis reflexes are 2+ on the right side and 2+ on the left side.       Patellar reflexes are 2+ on the right side and 2+ on the left side.       Achilles reflexes are 2+ on the right side and 2+ on the left side.     Comments: Patient has complete paralysis on the right side due to previous stroke, the hemiplegia since 1994   Psychiatric:         Attention and Perception: Attention and perception normal.         Mood and Affect: Mood normal.         Speech: Speech normal.         Behavior: Behavior is cooperative.         Thought Content: Thought content normal.         Cognition and Memory: Cognition and memory normal.         Judgment: Judgment normal.     Fluids    Intake/Output Summary (Last 24 hours) at 1/28/2024 1535  Last data filed at 1/28/2024 1200  Gross per 24 hour   Intake 360 ml   Output 2325 ml   Net -1965 ml       Laboratory  Recent Labs     01/26/24  0033  01/27/24  0057 01/28/24  0807   WBC 15.0* 10.9* 10.9*   RBC 3.95* 3.96* 4.27   HEMOGLOBIN 10.8* 10.6* 11.6*   HEMATOCRIT 33.4* 33.4* 35.9*   MCV 84.6 84.3 84.1   MCH 27.3 26.8* 27.2   MCHC 32.3 31.7* 32.3   RDW 52.6* 52.7* 52.9*   PLATELETCT 395 430 443   MPV 10.7 11.1 10.5     Recent Labs     01/26/24  0033 01/27/24  0057 01/28/24  0807   SODIUM 134* 137 132*   POTASSIUM 4.3 4.8 4.8   CHLORIDE 104 107 105   CO2 18* 19* 19*   GLUCOSE 130* 158* 188*   BUN 18 20 26*   CREATININE 0.82 0.78 0.86   CALCIUM 8.4 8.2* 8.6     Recent Labs     01/28/24  1149   INR 1.08               Imaging  IR-NEPHROSTOGRAM W/ NEW TUBE PLACEMENT (ALL RADIOLOGY) RIGHT    (Results Pending)        Assessment/Plan  Problem Representation:    Expressive aphasia  Assessment & Plan  Patient with expressive aphasia after brain insult 1994.  SLP evaluation.    Right hemiplegia (HCC)  Assessment & Plan  S/p CVA 1994.  PT OT evaluation.    Drug rash- (present on admission)  Assessment & Plan  Patient has developed a drug rash due to Zosyn and at this point has severe rash from head to toe.  Antibiotic was switched to Rocephin.  Patient was on IV Solu-Medrol before being transferred to [facility] and was continued until today.  Rash has now significantly resolved therefore IV Solu-Medrol discontinued.  Continue famotidine.  Monitor for any further rash.      Hydronephrosis- (present on admission)  Assessment & Plan  Patient originally admitted with right flank pain at [facility].  CT abdomen and pelvis showed right-sided hydroureteronephrosis.  Urology attempted retrograde right ureteral stent attempt 01/26 which was unsuccessful due to excessive scarring therefore patient was transferred from [facility] to [facility] for interventional radiology guided right nephrostomy tube placement. Patient underwent right percutaneous nephrostomy placement on 01/28/2024.  10 mL gross pus was evacuated.  Urology following,  appreciate recommendations.  Continue to monitor renal function and urine output.    History of stroke- (present on admission)  Assessment & Plan  History of complex stroke with right-sided deficits and expressive aphasia.  Continue fall precautions and seizure precautions         VTE prophylaxis: SCD since patient underwent right nephrostomy tube placement today.    I have performed a physical exam and reviewed and updated ROS and Plan today (1/28/2024). In review of yesterday's note (1/27/2024), there are no changes except as documented above.

## 2024-01-28 NOTE — PROGRESS NOTES
"Urology Progress Note    Post op Day # 2 Diagnostic cystoscopy     Overnight Events: None    S: No fevers, chills, nausea or vomiting.  C/o right flank pain.  IR placed PERC neph drain on bulb suction 2/2 thick pus draining    O:   BP (!) 151/79   Pulse 63   Temp 36.6 °C (97.9 °F) (Temporal)   Resp 18   Ht 1.676 m (5' 5.98\")   Wt 95 kg (209 lb 7 oz)   SpO2 97%   Recent Labs     01/26/24  0033 01/27/24  0057 01/28/24  0807   SODIUM 134* 137 132*   POTASSIUM 4.3 4.8 4.8   CHLORIDE 104 107 105   CO2 18* 19* 19*   GLUCOSE 130* 158* 188*   BUN 18 20 26*   CREATININE 0.82 0.78 0.86   CALCIUM 8.4 8.2* 8.6     Recent Labs     01/26/24  0033 01/27/24  0057 01/28/24  0807   WBC 15.0* 10.9* 10.9*   RBC 3.95* 3.96* 4.27   HEMOGLOBIN 10.8* 10.6* 11.6*   HEMATOCRIT 33.4* 33.4* 35.9*   MCV 84.6 84.3 84.1   MCH 27.3 26.8* 27.2   MCHC 32.3 31.7* 32.3   RDW 52.6* 52.7* 52.9*   PLATELETCT 395 430 443   MPV 10.7 11.1 10.5         Intake/Output Summary (Last 24 hours) at 1/27/2024 1242  Last data filed at 1/27/2024 0348  Gross per 24 hour   Intake 850 ml   Output 880 ml   Net -30 ml       Exam:  Abdomen soft, benign.    Drain in RT flank with purulent bloody output   [place] with clear yellow urine    A/P:    Active Hospital Problems    Diagnosis     Drug rash [L27.0]     Dysphagia [R13.10]     History of completed stroke [Z86.73]     Pyelonephritis [N12]     Hydronephrosis with urinary obstruction due to renal calculus [N13.2]        - Ambulate, IS.  - abx per culture results and primary team  - Monitor urine and drain output  - Flush drain with 10cc NS BID.  Flush in, don't pull back  - Urology will follow   "

## 2024-01-28 NOTE — WOUND TEAM
Wound team consulted for skin breakdown to pannus. Interdry ordered. No advanced wound care needs at this time. Consult completed.

## 2024-01-28 NOTE — HOSPITAL COURSE
63 y.o. 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  Patient was originally admitted  01/18 at [facility] with right flank and right hip pain and had sepsis secondary to UTI in setting of right hydroureteronephrosis with a prior history of ureteral stone.  In 2009 patient was noted with a ureteral stone at which point in time she had a stent placed and was lost to follow-up.     During this admission, patient was started on IV Zosyn however she had developed a diffuse drug rash therefore has been switched to ceftriaxone. Solumedrol was started for drug rash that eventually resolved, and therefore was Dc'ed on 1/29/24.     For her right hydroureteronephrosis, urology attempted retrograde right ureteral stent attempted on 01/26 which was unsuccessful due to excessive scarring. therefore patient was transferred from [facility] to [facility] for interventional radiology guided right nephrostomy tube placement. Patient underwent right percutaneous nephrostomy placement on 01/28/2024.

## 2024-01-29 LAB
ALBUMIN SERPL BCP-MCNC: 2.6 G/DL (ref 3.2–4.9)
ALBUMIN/GLOB SERPL: 0.8 G/DL
ALP SERPL-CCNC: 386 U/L (ref 30–99)
ALT SERPL-CCNC: 34 U/L (ref 2–50)
ANION GAP SERPL CALC-SCNC: 9 MMOL/L (ref 7–16)
AST SERPL-CCNC: 71 U/L (ref 12–45)
BASOPHILS # BLD AUTO: 0.2 % (ref 0–1.8)
BASOPHILS # BLD: 0.02 K/UL (ref 0–0.12)
BILIRUB SERPL-MCNC: <0.2 MG/DL (ref 0.1–1.5)
BUN SERPL-MCNC: 27 MG/DL (ref 8–22)
CALCIUM ALBUM COR SERPL-MCNC: 9.4 MG/DL (ref 8.5–10.5)
CALCIUM SERPL-MCNC: 8.3 MG/DL (ref 8.5–10.5)
CHLORIDE SERPL-SCNC: 106 MMOL/L (ref 96–112)
CO2 SERPL-SCNC: 19 MMOL/L (ref 20–33)
CREAT SERPL-MCNC: 0.86 MG/DL (ref 0.5–1.4)
EOSINOPHIL # BLD AUTO: 0.01 K/UL (ref 0–0.51)
EOSINOPHIL NFR BLD: 0.1 % (ref 0–6.9)
ERYTHROCYTE [DISTWIDTH] IN BLOOD BY AUTOMATED COUNT: 53.1 FL (ref 35.9–50)
GFR SERPLBLD CREATININE-BSD FMLA CKD-EPI: 75 ML/MIN/1.73 M 2
GLOBULIN SER CALC-MCNC: 3.2 G/DL (ref 1.9–3.5)
GLUCOSE SERPL-MCNC: 139 MG/DL (ref 65–99)
GRAM STN SPEC: NORMAL
HCT VFR BLD AUTO: 32.6 % (ref 37–47)
HGB BLD-MCNC: 10.5 G/DL (ref 12–16)
IMM GRANULOCYTES # BLD AUTO: 0.63 K/UL (ref 0–0.11)
IMM GRANULOCYTES NFR BLD AUTO: 4.9 % (ref 0–0.9)
LYMPHOCYTES # BLD AUTO: 2.51 K/UL (ref 1–4.8)
LYMPHOCYTES NFR BLD: 19.7 % (ref 22–41)
MAGNESIUM SERPL-MCNC: 2.1 MG/DL (ref 1.5–2.5)
MCH RBC QN AUTO: 27.2 PG (ref 27–33)
MCHC RBC AUTO-ENTMCNC: 32.2 G/DL (ref 32.2–35.5)
MCV RBC AUTO: 84.5 FL (ref 81.4–97.8)
MONOCYTES # BLD AUTO: 0.77 K/UL (ref 0–0.85)
MONOCYTES NFR BLD AUTO: 6 % (ref 0–13.4)
NEUTROPHILS # BLD AUTO: 8.79 K/UL (ref 1.82–7.42)
NEUTROPHILS NFR BLD: 69.1 % (ref 44–72)
NRBC # BLD AUTO: 0 K/UL
NRBC BLD-RTO: 0 /100 WBC (ref 0–0.2)
PHOSPHATE SERPL-MCNC: 2.8 MG/DL (ref 2.5–4.5)
PLATELET # BLD AUTO: 396 K/UL (ref 164–446)
PMV BLD AUTO: 10.5 FL (ref 9–12.9)
POTASSIUM SERPL-SCNC: 4.1 MMOL/L (ref 3.6–5.5)
PROT SERPL-MCNC: 5.8 G/DL (ref 6–8.2)
RBC # BLD AUTO: 3.86 M/UL (ref 4.2–5.4)
RHODAMINE-AURAMINE STN SPEC: NORMAL
SIGNIFICANT IND 70042: NORMAL
SIGNIFICANT IND 70042: NORMAL
SITE SITE: NORMAL
SITE SITE: NORMAL
SODIUM SERPL-SCNC: 134 MMOL/L (ref 135–145)
SOURCE SOURCE: NORMAL
SOURCE SOURCE: NORMAL
WBC # BLD AUTO: 12.7 K/UL (ref 4.8–10.8)

## 2024-01-29 PROCEDURE — 700111 HCHG RX REV CODE 636 W/ 250 OVERRIDE (IP): Performed by: INTERNAL MEDICINE

## 2024-01-29 PROCEDURE — A9270 NON-COVERED ITEM OR SERVICE: HCPCS | Performed by: INTERNAL MEDICINE

## 2024-01-29 PROCEDURE — 36415 COLL VENOUS BLD VENIPUNCTURE: CPT

## 2024-01-29 PROCEDURE — 84100 ASSAY OF PHOSPHORUS: CPT

## 2024-01-29 PROCEDURE — 99232 SBSQ HOSP IP/OBS MODERATE 35: CPT | Mod: GC | Performed by: INTERNAL MEDICINE

## 2024-01-29 PROCEDURE — 770020 HCHG ROOM/CARE - TELE (206)

## 2024-01-29 PROCEDURE — 80053 COMPREHEN METABOLIC PANEL: CPT

## 2024-01-29 PROCEDURE — 83735 ASSAY OF MAGNESIUM: CPT

## 2024-01-29 PROCEDURE — 97163 PT EVAL HIGH COMPLEX 45 MIN: CPT

## 2024-01-29 PROCEDURE — 85025 COMPLETE CBC W/AUTO DIFF WBC: CPT

## 2024-01-29 PROCEDURE — 700102 HCHG RX REV CODE 250 W/ 637 OVERRIDE(OP): Performed by: INTERNAL MEDICINE

## 2024-01-29 PROCEDURE — 700105 HCHG RX REV CODE 258: Performed by: INTERNAL MEDICINE

## 2024-01-29 PROCEDURE — 97162 PT EVAL MOD COMPLEX 30 MIN: CPT

## 2024-01-29 PROCEDURE — 97535 SELF CARE MNGMENT TRAINING: CPT

## 2024-01-29 RX ADMIN — PREGABALIN 150 MG: 150 CAPSULE ORAL at 05:47

## 2024-01-29 RX ADMIN — FAMOTIDINE 20 MG: 20 TABLET, FILM COATED ORAL at 16:41

## 2024-01-29 RX ADMIN — FAMOTIDINE 20 MG: 20 TABLET, FILM COATED ORAL at 05:47

## 2024-01-29 RX ADMIN — MAGNESIUM HYDROXIDE 30 ML: 1200 LIQUID ORAL at 16:42

## 2024-01-29 RX ADMIN — DULOXETINE HYDROCHLORIDE 60 MG: 60 CAPSULE, DELAYED RELEASE ORAL at 16:41

## 2024-01-29 RX ADMIN — SODIUM CHLORIDE: 9 INJECTION, SOLUTION INTRAVENOUS at 14:12

## 2024-01-29 RX ADMIN — FOLIC ACID 1 MG: 1 TABLET ORAL at 05:47

## 2024-01-29 RX ADMIN — DULOXETINE HYDROCHLORIDE 60 MG: 60 CAPSULE, DELAYED RELEASE ORAL at 05:47

## 2024-01-29 RX ADMIN — CEFTRIAXONE SODIUM 2000 MG: 10 INJECTION, POWDER, FOR SOLUTION INTRAVENOUS at 16:42

## 2024-01-29 RX ADMIN — FERROUS SULFATE TAB 325 MG (65 MG ELEMENTAL FE) 325 MG: 325 (65 FE) TAB at 05:47

## 2024-01-29 RX ADMIN — ACETAMINOPHEN 650 MG: 325 TABLET, FILM COATED ORAL at 14:57

## 2024-01-29 RX ADMIN — DOCUSATE SODIUM 50 MG AND SENNOSIDES 8.6 MG 2 TABLET: 8.6; 5 TABLET, FILM COATED ORAL at 05:47

## 2024-01-29 RX ADMIN — DOCUSATE SODIUM 50 MG AND SENNOSIDES 8.6 MG 2 TABLET: 8.6; 5 TABLET, FILM COATED ORAL at 16:41

## 2024-01-29 RX ADMIN — PREGABALIN 150 MG: 150 CAPSULE ORAL at 21:24

## 2024-01-29 RX ADMIN — PREGABALIN 150 MG: 150 CAPSULE ORAL at 14:57

## 2024-01-29 ASSESSMENT — COGNITIVE AND FUNCTIONAL STATUS - GENERAL
TURNING FROM BACK TO SIDE WHILE IN FLAT BAD: UNABLE
CLIMB 3 TO 5 STEPS WITH RAILING: TOTAL
STANDING UP FROM CHAIR USING ARMS: A LOT
MOVING FROM LYING ON BACK TO SITTING ON SIDE OF FLAT BED: UNABLE
SUGGESTED CMS G CODE MODIFIER MOBILITY: CM
WALKING IN HOSPITAL ROOM: TOTAL
MOVING TO AND FROM BED TO CHAIR: UNABLE
MOBILITY SCORE: 7

## 2024-01-29 ASSESSMENT — FIBROSIS 4 INDEX: FIB4 SCORE: 1.94

## 2024-01-29 ASSESSMENT — ENCOUNTER SYMPTOMS
SHORTNESS OF BREATH: 0
VOMITING: 0
SENSORY CHANGE: 1
FLANK PAIN: 1
FLANK PAIN: 0
FOCAL WEAKNESS: 1
NAUSEA: 0
COUGH: 0
PSYCHIATRIC NEGATIVE: 1
WEAKNESS: 0
ABDOMINAL PAIN: 0
CHILLS: 0
FEVER: 0
HEADACHES: 0
SPEECH CHANGE: 1
MYALGIAS: 0

## 2024-01-29 ASSESSMENT — PAIN DESCRIPTION - PAIN TYPE: TYPE: ACUTE PAIN

## 2024-01-29 ASSESSMENT — GAIT ASSESSMENTS: GAIT LEVEL OF ASSIST: UNABLE TO PARTICIPATE

## 2024-01-29 ASSESSMENT — PATIENT HEALTH QUESTIONNAIRE - PHQ9
1. LITTLE INTEREST OR PLEASURE IN DOING THINGS: NOT AT ALL
SUM OF ALL RESPONSES TO PHQ9 QUESTIONS 1 AND 2: 0
2. FEELING DOWN, DEPRESSED, IRRITABLE, OR HOPELESS: NOT AT ALL

## 2024-01-29 NOTE — CARE PLAN
The patient is Watcher - Medium risk of patient condition declining or worsening    Shift Goals  Clinical Goals: turn Q2,  Patient Goals: rest  Family Goals: ELIZABETH    Progress made toward(s) clinical / shift goals:    Problem: Knowledge Deficit - Standard  Goal: Patient and family/care givers will demonstrate understanding of plan of care, disease process/condition, diagnostic tests and medications  Description: Target End Date:  1-3 days or as soon as patient condition allows    Document in Patient Education    1.  Patient and family/caregiver oriented to unit, equipment, visitation policy and means for communicating concern  2.  Complete/review Learning Assessment  3.  Assess knowledge level of disease process/condition, treatment plan, diagnostic tests and medications  4.  Explain disease process/condition, treatment plan, diagnostic tests and medications  Outcome: Progressing     Problem: Skin Integrity  Goal: Skin integrity is maintained or improved  Description: Target End Date:  Prior to discharge or change in level of care    Document interventions on Skin Risk/Juan David flowsheet groups and corresponding LDA    1.  Assess and monitor skin integrity, appearance and/or temperature  2.  Assess risk factors for impaired skin integrity and/or pressures ulcers  3.  Implement precautions to protect skin integrity in collaboration with interdisciplinary team  4.  Implement pressure ulcer prevention protocol if at risk for skin breakdown  5.  Confirm wound care consult if at risk for skin breakdown  6.  Ensure patient use of pressure relieving devices  (Low air loss bed, waffle overlay, heel protectors, ROHO cushion, etc)  Outcome: Progressing     Problem: Pain - Standard  Goal: Alleviation of pain or a reduction in pain to the patient’s comfort goal  Description: Target End Date:  Prior to discharge or change in level of care    Document on Vitals flowsheet    1.  Document pain using the appropriate pain scale per order  or unit policy  2.  Educate and implement non-pharmacologic comfort measures (i.e. relaxation, distraction, massage, cold/heat therapy, etc.)  3.  Pain management medications as ordered  4.  Reassess pain after pain med administration per policy  5.  If opiods administered assess patient's response to pain medication is appropriate per POSS sedation scale  6.  Follow pain management plan developed in collaboration with patient and interdisciplinary team (including palliative care or pain specialists if applicable)  Outcome: Progressing     Problem: Fall Risk  Goal: Patient will remain free from falls  Description: Target End Date:  Prior to discharge or change in level of care    Document interventions on the Bacon Sohail Fall Risk Assessment    1.  Assess for fall risk factors  2.  Implement fall precautions  Outcome: Progressing       Patient is not progressing towards the following goals:

## 2024-01-29 NOTE — PROGRESS NOTES
Telemetry Report:    Rhythm: SR  Heart Rate: 64 to 87  Ectopy: R) PVC    FL: 0.15  QRS: 0.05  QT: 0.32      No media available      Per telemetry room monitor

## 2024-01-29 NOTE — DOCUMENTATION QUERY
[facility]                                                                       Query Response Note      PATIENT:               ANGELINA ESCOBAR  ACCT #:                  [id number]  MRN:                     [id number]  :                      1960  ADMIT DATE:       2024 4:34 PM  DISCH DATE:          RESPONDING  PROVIDER #:        [id number]           QUERY TEXT:    The diagnosis of sepsis/gram negative sepsis is documented in the medical record. Per  note dated , patient was originally admitted at outside facility on  for R flank and hip pain, ultimately found to have sepsis secondary to UTI in the setting of hydroureteronephrosis.     Please confirm the status of sepsis upon admission to [facility] (current encounter).    The patient's Clinical Indicators include:   HM:   -  Admitted 2027 with history of right flank pain and was diagnosed with sepsis secondary to UTI with right-sided hydroureteronephrosis and was transferred from outside hospital for further management.  - Patient was originally admitted  at [facility] with right flank and right hip pain and was found to have sepsis secondary to UTI in the setting of right hydroureteronephrosis with history of prior stone.     Clinical Findings ( - Day 1): WBC 10.9, HR 71-98, RR 16-18, TMax 98.6   Risk Factors: Pyonephrosis   Treatment: s/p IR-guided nephrostomy tube placement , IV Rocephin, continuous IVF, lab monitoring     Thank you for your time and attention,  XENIA Hidalgo RN  Available via Voalte  Options provided:   -- Sepsis has resolved prior to current admission (historical diagnosis only)   -- Sepsis still exists and was present on admission, (please provide SIRS criteria and sepsis-related organ dysfunction   -- Other explanation, Please specify      Query created by: Maura Fernandez on 2024 10:20 AM    RESPONSE TEXT:    Sepsis still  exists and was present on admission -          Electronically signed by:  MAXIME GUNN MD 1/29/2024 1:46 PM

## 2024-01-29 NOTE — CARE PLAN
The patient is Stable - Low risk of patient condition declining or worsening    Shift Goals  Clinical Goals: Skin integrity, LETI Drain  Patient Goals: Rest  Family Goals: ELIZABETH    Progress made toward(s) clinical / shift goals:    Problem: Skin Integrity  Goal: Skin integrity is maintained or improved  Description: Target End Date:  Prior to discharge or change in level of care    Document interventions on Skin Risk/Juan David flowsheet groups and corresponding LDA    1.  Assess and monitor skin integrity, appearance and/or temperature  2.  Assess risk factors for impaired skin integrity and/or pressures ulcers  3.  Implement precautions to protect skin integrity in collaboration with interdisciplinary team  4.  Implement pressure ulcer prevention protocol if at risk for skin breakdown  5.  Confirm wound care consult if at risk for skin breakdown  6.  Ensure patient use of pressure relieving devices  (Low air loss bed, waffle overlay, heel protectors, ROHO cushion, etc)  Outcome: Progressing  Note: Patient's skin assessed at beginning of shift, filomena pads changed, barrier cream applied, Q2 turns implemented.      Problem: Urinary Elimination  Goal: Establish and maintain regular urinary output  Description: Target End Date:  Prior to discharge or change in level of care    Document on I/O and Assessment flowsheets    1.  Evaluate need to continue indwelling catheter every shift  2.  Assess signs and symptoms of urinary retention  3.  Assess post-void residual volumes  4.  Implement bladder training program  5.  Encourage scheduled voidings  6.  Assist patient to sit on bedside commode or toilet for voiding  7.  Educate patient and family/caregiver on use and purpose of urine collection devices (document in Patient Education)  Outcome: Progressing  Note: Patient's nephrostomy/leti drain flushed, and monitored for adequate output. Output serosanguinous.

## 2024-01-29 NOTE — CARE PLAN
The patient is Stable - Low risk of patient condition declining or worsening    Shift Goals  Clinical Goals: skin integrity monitoring, JAMI drain outout monitoring, fall precautions, IVF  Patient Goals: rest  Family Goals: none prenent    Progress made toward(s) clinical / shift goals:    Problem: Knowledge Deficit - Standard  Goal: Patient and family/care givers will demonstrate understanding of plan of care, disease process/condition, diagnostic tests and medications  Outcome: Progressing  Note: Pt educated on plan of care, medications and disease process. Pt demonstrates understanding of the plan of care, medications, diagnostic tests and treatment plan.     Problem: Skin Integrity  Goal: Skin integrity is maintained or improved  Outcome: Progressing  Note: Pt encouraged to turn and ambulate to promote adequate blood perfusion. Pt on q2H turns to offload the pressure. Pt is on waffle bed.     Problem: Pain - Standard  Goal: Alleviation of pain or a reduction in pain to the patient’s comfort goal  Outcome: Progressing  Note: Pt  did not c/o pain throughout the shift. Pt educated pt to call RN if any pain occurs.     Problem: Fall Risk  Goal: Patient will remain free from falls  Outcome: Progressing  Note: Pt educated on fall precautions, instructed to use call light for help. Pt's call light and personal belongings within reach. Py's bed locked and in lowest position, bed alarm on. Hourly rounding in place.       Patient is not progressing towards the following goals:

## 2024-01-29 NOTE — PROGRESS NOTES
Monitor Summary    Rhythm:SR  Heart Rate: 56-70  Ectopy: PVC (R)    MI:0.19  QRS:0.04  QT:0.41    Per telemetry monitor strips/ room

## 2024-01-30 ENCOUNTER — APPOINTMENT (OUTPATIENT)
Dept: RADIOLOGY | Facility: MEDICAL CENTER | Age: 64
DRG: 871 | End: 2024-01-30
Attending: PHYSICAL MEDICINE & REHABILITATION
Payer: MEDICARE

## 2024-01-30 LAB
ALBUMIN SERPL BCP-MCNC: 1.9 G/DL (ref 3.2–4.9)
ALBUMIN/GLOB SERPL: 0.5 G/DL
ALP SERPL-CCNC: 432 U/L (ref 30–99)
ALT SERPL-CCNC: 38 U/L (ref 2–50)
ANION GAP SERPL CALC-SCNC: 11 MMOL/L (ref 7–16)
AST SERPL-CCNC: 69 U/L (ref 12–45)
BACTERIA WND AEROBE CULT: ABNORMAL
BACTERIA WND AEROBE CULT: ABNORMAL
BASOPHILS # BLD AUTO: 0 % (ref 0–1.8)
BASOPHILS # BLD: 0 K/UL (ref 0–0.12)
BILIRUB SERPL-MCNC: 0.2 MG/DL (ref 0.1–1.5)
BUN SERPL-MCNC: 20 MG/DL (ref 8–22)
CALCIUM ALBUM COR SERPL-MCNC: 9.6 MG/DL (ref 8.5–10.5)
CALCIUM SERPL-MCNC: 7.9 MG/DL (ref 8.5–10.5)
CHLORIDE SERPL-SCNC: 107 MMOL/L (ref 96–112)
CO2 SERPL-SCNC: 19 MMOL/L (ref 20–33)
CREAT SERPL-MCNC: 0.82 MG/DL (ref 0.5–1.4)
EOSINOPHIL # BLD AUTO: 0 K/UL (ref 0–0.51)
EOSINOPHIL NFR BLD: 0 % (ref 0–6.9)
ERYTHROCYTE [DISTWIDTH] IN BLOOD BY AUTOMATED COUNT: 53.2 FL (ref 35.9–50)
GFR SERPLBLD CREATININE-BSD FMLA CKD-EPI: 80 ML/MIN/1.73 M 2
GLOBULIN SER CALC-MCNC: 3.9 G/DL (ref 1.9–3.5)
GLUCOSE SERPL-MCNC: 105 MG/DL (ref 65–99)
GRAM STN SPEC: ABNORMAL
HCT VFR BLD AUTO: 32.7 % (ref 37–47)
HGB BLD-MCNC: 10.5 G/DL (ref 12–16)
LYMPHOCYTES # BLD AUTO: 1.7 K/UL (ref 1–4.8)
LYMPHOCYTES NFR BLD: 14.8 % (ref 22–41)
MANUAL DIFF BLD: NORMAL
MCH RBC QN AUTO: 27.3 PG (ref 27–33)
MCHC RBC AUTO-ENTMCNC: 32.1 G/DL (ref 32.2–35.5)
MCV RBC AUTO: 84.9 FL (ref 81.4–97.8)
MONOCYTES # BLD AUTO: 0.81 K/UL (ref 0–0.85)
MONOCYTES NFR BLD AUTO: 7 % (ref 0–13.4)
MORPHOLOGY BLD-IMP: NORMAL
MYELOCYTES NFR BLD MANUAL: 1.7 %
NEUTROPHILS # BLD AUTO: 8.8 K/UL (ref 1.82–7.42)
NEUTROPHILS NFR BLD: 76.5 % (ref 44–72)
NRBC # BLD AUTO: 0 K/UL
NRBC BLD-RTO: 0 /100 WBC (ref 0–0.2)
PLATELET # BLD AUTO: 361 K/UL (ref 164–446)
PLATELET BLD QL SMEAR: NORMAL
PMV BLD AUTO: 10.7 FL (ref 9–12.9)
POTASSIUM SERPL-SCNC: 4 MMOL/L (ref 3.6–5.5)
PROT SERPL-MCNC: 5.8 G/DL (ref 6–8.2)
RBC # BLD AUTO: 3.85 M/UL (ref 4.2–5.4)
RBC BLD AUTO: NORMAL
SIGNIFICANT IND 70042: ABNORMAL
SITE SITE: ABNORMAL
SODIUM SERPL-SCNC: 137 MMOL/L (ref 135–145)
SOURCE SOURCE: ABNORMAL
WBC # BLD AUTO: 11.5 K/UL (ref 4.8–10.8)

## 2024-01-30 PROCEDURE — 700111 HCHG RX REV CODE 636 W/ 250 OVERRIDE (IP)

## 2024-01-30 PROCEDURE — A9270 NON-COVERED ITEM OR SERVICE: HCPCS | Performed by: PHYSICAL MEDICINE & REHABILITATION

## 2024-01-30 PROCEDURE — 85027 COMPLETE CBC AUTOMATED: CPT

## 2024-01-30 PROCEDURE — 700102 HCHG RX REV CODE 250 W/ 637 OVERRIDE(OP): Performed by: PHYSICAL MEDICINE & REHABILITATION

## 2024-01-30 PROCEDURE — 80053 COMPREHEN METABOLIC PANEL: CPT

## 2024-01-30 PROCEDURE — 770001 HCHG ROOM/CARE - MED/SURG/GYN PRIV*

## 2024-01-30 PROCEDURE — 700102 HCHG RX REV CODE 250 W/ 637 OVERRIDE(OP): Performed by: INTERNAL MEDICINE

## 2024-01-30 PROCEDURE — 99233 SBSQ HOSP IP/OBS HIGH 50: CPT | Mod: GC | Performed by: INTERNAL MEDICINE

## 2024-01-30 PROCEDURE — 700105 HCHG RX REV CODE 258: Performed by: INTERNAL MEDICINE

## 2024-01-30 PROCEDURE — 99255 IP/OBS CONSLTJ NEW/EST HI 80: CPT | Performed by: INTERNAL MEDICINE

## 2024-01-30 PROCEDURE — 700105 HCHG RX REV CODE 258

## 2024-01-30 PROCEDURE — 700101 HCHG RX REV CODE 250: Performed by: NURSE PRACTITIONER

## 2024-01-30 PROCEDURE — 73030 X-RAY EXAM OF SHOULDER: CPT | Mod: LT

## 2024-01-30 PROCEDURE — 700101 HCHG RX REV CODE 250: Performed by: PHYSICAL MEDICINE & REHABILITATION

## 2024-01-30 PROCEDURE — 36415 COLL VENOUS BLD VENIPUNCTURE: CPT

## 2024-01-30 PROCEDURE — A9270 NON-COVERED ITEM OR SERVICE: HCPCS | Performed by: INTERNAL MEDICINE

## 2024-01-30 PROCEDURE — 85007 BL SMEAR W/DIFF WBC COUNT: CPT

## 2024-01-30 PROCEDURE — 99223 1ST HOSP IP/OBS HIGH 75: CPT | Performed by: PHYSICAL MEDICINE & REHABILITATION

## 2024-01-30 RX ORDER — BACLOFEN 10 MG/1
5 TABLET ORAL
Status: DISCONTINUED | OUTPATIENT
Start: 2024-01-30 | End: 2024-02-05 | Stop reason: HOSPADM

## 2024-01-30 RX ORDER — SODIUM CHLORIDE 0.9 % (FLUSH) 0.9 %
5 SYRINGE (ML) INJECTION EVERY 12 HOURS
Status: DISCONTINUED | OUTPATIENT
Start: 2024-01-30 | End: 2024-02-04

## 2024-01-30 RX ORDER — LIDOCAINE 4 G/G
1 PATCH TOPICAL EVERY 24 HOURS
Status: DISCONTINUED | OUTPATIENT
Start: 2024-01-30 | End: 2024-02-05 | Stop reason: HOSPADM

## 2024-01-30 RX ADMIN — OXYCODONE 5 MG: 5 TABLET ORAL at 01:14

## 2024-01-30 RX ADMIN — FAMOTIDINE 20 MG: 20 TABLET, FILM COATED ORAL at 05:10

## 2024-01-30 RX ADMIN — SODIUM CHLORIDE, PRESERVATIVE FREE 5 ML: 5 INJECTION INTRAVENOUS at 16:53

## 2024-01-30 RX ADMIN — SODIUM CHLORIDE: 9 INJECTION, SOLUTION INTRAVENOUS at 05:17

## 2024-01-30 RX ADMIN — DULOXETINE HYDROCHLORIDE 60 MG: 60 CAPSULE, DELAYED RELEASE ORAL at 16:47

## 2024-01-30 RX ADMIN — MEROPENEM 500 MG: 500 INJECTION, POWDER, FOR SOLUTION INTRAVENOUS at 12:23

## 2024-01-30 RX ADMIN — FERROUS SULFATE TAB 325 MG (65 MG ELEMENTAL FE) 325 MG: 325 (65 FE) TAB at 05:10

## 2024-01-30 RX ADMIN — FOLIC ACID 1 MG: 1 TABLET ORAL at 05:10

## 2024-01-30 RX ADMIN — DOCUSATE SODIUM 50 MG AND SENNOSIDES 8.6 MG 2 TABLET: 8.6; 5 TABLET, FILM COATED ORAL at 05:10

## 2024-01-30 RX ADMIN — LIDOCAINE 1 PATCH: 4 PATCH TOPICAL at 13:07

## 2024-01-30 RX ADMIN — FAMOTIDINE 20 MG: 20 TABLET, FILM COATED ORAL at 16:47

## 2024-01-30 RX ADMIN — MEROPENEM 500 MG: 500 INJECTION, POWDER, FOR SOLUTION INTRAVENOUS at 16:48

## 2024-01-30 RX ADMIN — PREGABALIN 150 MG: 150 CAPSULE ORAL at 22:09

## 2024-01-30 RX ADMIN — PREGABALIN 150 MG: 150 CAPSULE ORAL at 13:07

## 2024-01-30 RX ADMIN — DOCUSATE SODIUM 50 MG AND SENNOSIDES 8.6 MG 2 TABLET: 8.6; 5 TABLET, FILM COATED ORAL at 16:47

## 2024-01-30 RX ADMIN — OXYCODONE 5 MG: 5 TABLET ORAL at 10:30

## 2024-01-30 RX ADMIN — PREGABALIN 150 MG: 150 CAPSULE ORAL at 05:10

## 2024-01-30 RX ADMIN — DULOXETINE HYDROCHLORIDE 60 MG: 60 CAPSULE, DELAYED RELEASE ORAL at 05:10

## 2024-01-30 RX ADMIN — BACLOFEN 5 MG: 10 TABLET ORAL at 22:09

## 2024-01-30 ASSESSMENT — FIBROSIS 4 INDEX: FIB4 SCORE: 1.95

## 2024-01-30 ASSESSMENT — ENCOUNTER SYMPTOMS
WEAKNESS: 0
COUGH: 0
FLANK PAIN: 0
FOCAL WEAKNESS: 1
HEADACHES: 0
VOMITING: 0
FLANK PAIN: 1
NAUSEA: 0
PSYCHIATRIC NEGATIVE: 1
SENSORY CHANGE: 1
CHILLS: 0
ABDOMINAL PAIN: 1
SHORTNESS OF BREATH: 0
SPEECH CHANGE: 1
MYALGIAS: 0
FEVER: 0

## 2024-01-30 ASSESSMENT — PAIN DESCRIPTION - PAIN TYPE
TYPE: ACUTE PAIN

## 2024-01-30 NOTE — CONSULTS
[facility] INFECTIOUS DISEASES INPATIENT CONSULT NOTE     Date of Service: 1/30/2024    Consult Requested By: Wyatt Preciado M.D.    Reason for Consultation: Pyonephrosis    Chief Complaint: Infection    History of Present Illness:     Nydia Finch is a 63 y.o. female admitted 1/27/2024.  Extensive review documentation from multiple specialties performed in generating this HPI.  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  Patient was transferred to [facility] for further management.  On 1/26, patient underwent cystoscopy, they were unable to pass with a wire due to extensive scarring so IR was consulted.  A right-sided percutaneous nephrostomy tube was placed on 1/28, gross pus was aspirated during the procedure and continues to drain alma pus from the nephrostomy tube.  Cultures from this growing ESBL E. coli.  Patient afebrile, white count of 12.7, ceftriaxone switched to meropenem today.  Creatinine 0.82.  Urology on board, planning repeat imaging and follow-up in the outpatient setting after 4 weeks.  Right kidney nonfunctioning    Review of Systems:  All other systems reviewed and are negative expect as noted in HPI    Past Medical History:   Diagnosis Date    Aphagia 1994    Brain aneurysm     Chronic pain     Nephrolithiasis     Right hemiplegia (HCC) 1994    Stroke (HCC)        Past Surgical History:   Procedure Laterality Date    MS CYSTOSCOPY,INSERT URETERAL STENT Right 1/26/2024    Procedure: CYSTOSCOPY;  Surgeon: Mik Rm M.D.;  Location: SURGERY [facility];  Service: Urology    CYSTOSCOPY N/A 7/4/2019    Procedure: CYSTOSCOPY;  Surgeon: Monico Killian M.D.;  Location: SURGERY [facility];  Service:  Urology    STENT PLACEMENT Right 7/4/2019    Procedure: STENT PLACEMENT;  Surgeon: Monico Killian M.D.;  Location: SURGERY [facility];  Service: Urology    URETEROSCOPY Right 7/4/2019    Procedure: URETEROSCOPY;  Surgeon: Monico Killian M.D.;  Location: SURGERY [facility];  Service: Urology    CYSTOSCOPY STENT PLACEMENT  2/13/2018    Procedure: CYSTOSCOPY STENT PLACEMENT;  Surgeon: Monico Killian M.D.;  Location: SURGERY [facility];  Service: Urology    GASTRIC RESECTION  2012    Duodenal Switch    OTHER      OTHER NEUROLOGICAL SURG         No family history on file.    Social History     Socioeconomic History    Marital status:      Spouse name: Not on file    Number of children: Not on file    Years of education: Not on file    Highest education level: Not on file   Occupational History    Not on file   Tobacco Use    Smoking status: Never    Smokeless tobacco: Never   Substance and Sexual Activity    Alcohol use: No    Drug use: Not on file     Comment: [place]    Sexual activity: Not on file   Other Topics Concern    Not on file   Social History Narrative    Not on file     Social Determinants of Health     Financial Resource Strain: Not on file   Food Insecurity: Not on file   Transportation Needs: Not on file   Physical Activity: Not on file   Stress: Not on file   Social Connections: Not on file   Intimate Partner Violence: Not on file   Housing Stability: Not on file       Allergies   Allergen Reactions    Zosyn [Piperacillin Sod-Tazobactam So] Rash     Gave her head to toe drug rash at [facility] 1/25/2024         Medications:    Current Facility-Administered Medications:     baclofen (Lioresal) tablet 5 mg, 5 mg, Oral, QHS, Kathie White D.O.    lidocaine (Asperflex) 4 % patch 1 Patch, 1 Patch, Transdermal, Q24HR, Kathie White D.O., 1 Patch at 01/30/24 1307    normal saline flush 0.9 % SOLN 5 mL, 5 mL, Tube, Q12HRS, Berenice Beach, A.P.R.N.    meropenem (Merrem) 500 mg in   mL IV-MBP, 500 mg, Intravenous, Q6HRS, Ray Davis M.D.    [Held by provider] enoxaparin (Lovenox) inj 40 mg, 40 mg, Subcutaneous, DAILY AT 1800, Bita Rowley M.D.    DULoxetine (Cymbalta) capsule 60 mg, 60 mg, Oral, BID, CLAUDE GomezOBrittany, 60 mg at 01/30/24 0510    famotidine (Pepcid) tablet 20 mg, 20 mg, Oral, BID, 20 mg at 01/30/24 0510 **OR** [DISCONTINUED] famotidine (Pepcid) injection 20 mg, 20 mg, Intravenous, BID, Serina Newsome D.O.    ferrous sulfate tablet 325 mg, 325 mg, Oral, DAILY, CLAUDE GomezOBrittany, 325 mg at 01/30/24 0510    folic acid (Folvite) tablet 1 mg, 1 mg, Oral, DAILY, CLAUDE GomezOBrittany, 1 mg at 01/30/24 0510    pregabalin (Lyrica) capsule 150 mg, 150 mg, Oral, Q8HRS, CLAUDE GomezOBrittany, 150 mg at 01/30/24 1307    senna-docusate (Pericolace Or Senokot S) 8.6-50 MG per tablet 2 Tablet, 2 Tablet, Oral, BID, 2 Tablet at 01/30/24 0510 **AND** polyethylene glycol/lytes (Miralax) Packet 1 Packet, 1 Packet, Oral, QDAY PRN **AND** magnesium hydroxide (Milk Of Magnesia) suspension 30 mL, 30 mL, Oral, QDAY PRN, 30 mL at 01/29/24 1642 **AND** bisacodyl (Dulcolax) suppository 10 mg, 10 mg, Rectal, QDAY PRN, Serina Newsome D.O.    acetaminophen (Tylenol) tablet 650 mg, 650 mg, Oral, Q6HRS PRN, CLAUDE GomezOBrittany, 650 mg at 01/29/24 1457    diphenhydrAMINE (Benadryl) injection 25 mg, 25 mg, Intravenous, Q6HRS PRN, Serina Newsome D.O.    ipratropium-albuterol (DUONEB) nebulizer solution, 3 mL, Nebulization, Q4HRS PRN, Serina Newsome D.O.    labetalol (Normodyne/Trandate) injection 10 mg, 10 mg, Intravenous, Q4HRS PRN, Serina Newsome D.O.    ondansetron (Zofran ODT) dispertab 4 mg, 4 mg, Oral, Q4HRS PRN, Serina Newsome D.O.    ondansetron (Zofran) syringe/vial injection 4 mg, 4 mg, Intravenous, Q4HRS PRN, Serina Newsome D.O.    oxyCODONE immediate-release (Roxicodone) tablet 2.5 mg, 2.5 mg, Oral, Q3HRS PRN **OR** oxyCODONE immediate-release (Roxicodone)  "tablet 5 mg, 5 mg, Oral, Q3HRS PRN, 5 mg at 24 1030 **OR** morphine 4 MG/ML injection 2 mg, 2 mg, Intravenous, Q3HRS PRN, Serina Newsome D.O.    prochlorperazine (Compazine) injection 5-10 mg, 5-10 mg, Intravenous, Q4HRS PRN, Serina Newsome D.O.    promethazine (Phenergan) suppository 12.5-25 mg, 12.5-25 mg, Rectal, Q4HRS PRN, Serina Newsome D.O.    promethazine (Phenergan) tablet 12.5-25 mg, 12.5-25 mg, Oral, Q4HRS PRN, Serina Newsome D.O.    Notify provider if pain remains uncontrolled, , , CONTINUOUS **AND** Use the Numeric Rating Scale (NRS), Lyles-Baker Faces (WBF), or FLACC on regular floors and Critical-Care Pain Observation Tool (CPOT) on ICUs/Trauma to assess pain, , , CONTINUOUS **AND** Pulse Ox, , , CONTINUOUS **AND** Pharmacy Consult Request ...Pain Management Review 1 Each, 1 Each, Other, PHARMACY TO DOSE **AND** If patient difficult to arouse and/or has respiratory depression (respiratory rate of 10 or less), stop any opiates that are currently infusing and call a Rapid Response., , , CONTINUOUS, Serina Newsome D.O.    Physical Exam:   Vital Signs: /87   Pulse 99   Temp 36.3 °C (97.3 °F) (Temporal)   Resp 17   Ht 1.676 m (5' 5.98\")   Wt 94.5 kg (208 lb 5.4 oz)   LMP  (LMP Unknown)   SpO2 96%   Breastfeeding No   BMI 33.64 kg/m²   Temp  Av.6 °C (97.8 °F)  Min: 36.2 °C (97.1 °F)  Max: 37.2 °C (99 °F)  Vital signs reviewed  Constitutional: Patient with some word finding difficulty, slow to respond to questions but appropriate responses  Head: Atraumatic, normocephalic  Eyes: Conjunctivae normal.   Mouth/Throat: Lips without lesions  Cardiovascular: Normal rate, regular rhythm  Pulmonary/Chest: No respiratory distress. Unlabored respiratory effort  Abdominal: Nondistended.  Right-sided percutaneous nephrostomy tube with purulent output in bulb  Neurological: Chronic residual right-sided hemiparesis  Skin: Skin is warm and dry. No rashes or embolic phenomena noted on " exposed skin  Psychiatric: Normal mood and affect. Behavior is normal.     LABS:  Recent Labs     01/28/24  0807 01/29/24  0306 01/30/24  0338   WBC 10.9* 12.7* 11.5*      Recent Labs     01/28/24  0807 01/29/24  0306 01/30/24  0338   HEMOGLOBIN 11.6* 10.5* 10.5*   HEMATOCRIT 35.9* 32.6* 32.7*   MCV 84.1 84.5 84.9   MCH 27.2 27.2 27.3   PLATELETCT 443 396 361       Recent Labs     01/28/24  0807 01/29/24  0306 01/30/24  0338   SODIUM 132* 134* 137   POTASSIUM 4.8 4.1 4.0   CHLORIDE 105 106 107   CO2 19* 19* 19*   CREATININE 0.86 0.86 0.82        Recent Labs     01/28/24  0807 01/29/24  0306 01/30/24  0338   ALBUMIN 2.7* 2.6* 1.9*        MICRO:  Blood Culture   Date Value Ref Range Status   02/19/2018 No growth after 5 days of incubation.  Final        Latest pertinent labs were reviewed    IMAGING STUDIES:  As above    Hospital Course/Assessment:   Nydia Finch is a 63 y.o. 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 Patient was transferred to [facility] for further management.  On 1/26, patient underwent cystoscopy, they were unable to pass with a wire due to extensive scarring so IR was consulted. A right-sided percutaneous nephrostomy tube was placed on 1/28, gross pus was aspirated during the procedure and continues to drain alma pus from the nephrostomy tube.  Cultures from this growing ESBL E. coli.     Pertinent Diagnoses:  Right pyelonephritis with pyonephrosis  Right hydroureteronephrosis  ESBL E. coli infection  History of nephrolithiasis  Right nonfunctioning kidney  History of CVA with residual right-sided hemiparesis and expressive aphasia  Chronic pain  COPD    Plan:   -Agree with IV meropenem  -Follow  pending blood cultures x 2  -IR drain still draining alma pus, recommend repeat CT scan in approximately 5 days to help assess appropriate antibiotic duration (~2/2)  -Urology planning repeat assessment in the outpatient setting in approximately 4 weeks    Disposition: Unable to determine at this time  Need for PICC line: Unable to determine at this time    Plan was discussed with the primary team, Dr. Davis.  ID will follow.  This illness poses a threat to life    Billy Pritchett M.D.    Please note that this dictation was created using voice recognition software. I have worked with technical experts from [employer] to optimize the interface.  I have made every reasonable attempt to correct obvious errors, but there may be errors of grammar and possibly content that I did not discover before finalizing the note.

## 2024-01-30 NOTE — PROGRESS NOTES
[facility] Internal Medicine Daily Progress Note    Date of Service  1/29/2024    [employer] Team: R IM Blue Team   Attending: Beverly Vasquez M.d.  Senior Resident: Dr. Bobby  Intern:  Dr. Davis  Contact Number: [phone]    Chief Complaint  Nydia Finch is a 63 y.o. female admitted 1/27/2024 with history of right flank pain and was diagnosed with sepsis secondary to UTI with right-sided hydroureteronephrosis and was transferred from outside hospital for further management.    Hospital Course   63 y.o. 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  Patient was originally admitted  01/18 at [facility] with right flank and right hip pain and had sepsis secondary to UTI in setting of right hydroureteronephrosis with a prior history of ureteral stone.  In 2009 patient was noted with a ureteral stone at which point in time she had a stent placed and was lost to follow-up.     During this admission, patient was started on IV Zosyn however she had developed a diffuse drug rash therefore has been switched to ceftriaxone. Solumedrol was started for drug rash that eventually resolved, and therefore was Dc'ed on 1/29/24.     For her right hydroureteronephrosis, urology attempted retrograde right ureteral stent attempted on 01/26 which was unsuccessful due to excessive scarring. therefore patient was transferred from [facility] to [facility] for interventional radiology guided right nephrostomy tube placement. Patient underwent right percutaneous nephrostomy placement on 01/28/2024.      Interval Problem Update  Patient hemodynamically stable, no acute events overnight.  Patient states that her pain is very well-controlled.  WBC slightly uptrended however expected  with nephrostomy placement procedure.   IV ceftriaxone to continue: day 2/4    I have discussed this patient's plan of care and discharge plan at IDT rounds today with Case Management, Nursing, Nursing leadership, and other members of the IDT team.    Consultants/Specialty  urology    Code Status  Full Code    Disposition  The patient is not medically cleared for discharge to home or a post-acute facility.    PT: possibly HH  I have placed the appropriate orders for post-discharge needs.    Review of Systems  ROS   Constitutional:  Positive for fever and malaise/fatigue. Negative for chills and diaphoresis.   HENT: Negative.  Negative for nosebleeds and sinus pain.    Eyes: Negative.  Negative for double vision, photophobia, discharge and redness.   Respiratory: Negative.  Negative for cough, sputum production, shortness of breath, wheezing and stridor.    Cardiovascular: Negative.  Negative for chest pain, orthopnea, leg swelling and PND.   Gastrointestinal:  Positive for abdominal pain and nausea. Negative for blood in stool and heartburn.   Genitourinary:  Positive for flank pain. Negative for dysuria and frequency.   Musculoskeletal:  Negative for back pain, falls and myalgias.   Skin:  Positive for rash. Negative for itching.   Neurological:  Positive for weakness (On her entire right side secondary to stroke in 1994). Negative for dizziness, tingling, sensory change, focal weakness and seizures.   Endo/Heme/Allergies: Negative.  Negative for polydipsia. Does not bruise/bleed easily.   Psychiatric/Behavioral: Negative.  Negative for depression, substance abuse and suicidal ideas. The patient does not have insomnia.    Physical Exam  Temp:  [36.2 °C (97.2 °F)-36.7 °C (98.1 °F)] 36.7 °C (98.1 °F)  Pulse:  [72-86] 86  Resp:  [16-19] 19  BP: (116-145)/(65-83) 122/75  SpO2:  [95 %-98 %] 95 %    Physical Exam  Constitutional:       General: She is awake.      Appearance: Normal appearance. She is well-developed,  well-groomed and normal weight.   HENT:      Head: Normocephalic and atraumatic.      Jaw: There is normal jaw occlusion. No trismus.      Salivary Glands: Right salivary gland is not tender. Left salivary gland is not tender.      Right Ear: External ear normal.      Left Ear: External ear normal.      Mouth/Throat:      Mouth: Mucous membranes are moist.      Pharynx: Oropharynx is clear.   Eyes:      General: Lids are normal. Vision grossly intact.      Extraocular Movements: Extraocular movements intact.      Conjunctiva/sclera: Conjunctivae normal.      Right eye: Right conjunctiva is not injected. No exudate.     Left eye: Left conjunctiva is not injected. No exudate.     Pupils: Pupils are equal, round, and reactive to light.   Neck:      Thyroid: No thyroid mass.      Vascular: No hepatojugular reflux or JVD.      Trachea: No abnormal tracheal secretions or tracheal deviation.   Cardiovascular:      Rate and Rhythm: Regular rhythm.     Pulses: Normal pulses.      Heart sounds: Normal heart sounds. No murmur heard.     No friction rub.   Pulmonary:      Effort: Pulmonary effort is normal.      Breath sounds: Normal breath sounds. No wheezing or rhonchi.   Abdominal:      General: Abdomen is flat. Bowel sounds are normal.      Palpations: Abdomen is soft.      Tenderness: There is no right CVA tenderness or left CVA tenderness.      Hernia: No hernia is present.   Musculoskeletal:      Cervical back: Full passive range of motion without pain, normal range of motion and neck supple. No rigidity. No muscular tenderness.      Right lower leg: No edema.      Left lower leg: No edema.   Lymphadenopathy:      Head:      Right side of head: No submental adenopathy.      Left side of head: No submental adenopathy.      Cervical:      Right cervical: No superficial cervical adenopathy.     Left cervical: No superficial cervical adenopathy.      Upper Body:      Right upper body: No supraclavicular adenopathy.      Left  upper body: No supraclavicular adenopathy.   Skin:     General: Skin is warm and dry.      Coloration: Skin is not cyanotic or pale.      Findings: Mild erythema and rash (Head to toe skin rash from drug reaction) present. No abrasion or bruising.   Neurological:      General: No focal deficit present.      Mental Status: She is alert and oriented to person, place, and time. Mental status is at baseline.      GCS: GCS eye subscore is 4. GCS verbal subscore is 5. GCS motor subscore is 6.      Cranial Nerves: No cranial nerve deficit.      Sensory: No sensory deficit.      Motor: Weakness and atrophy present.      Coordination: Coordination abnormal. Finger-Nose-Finger Test abnormal. Impaired rapid alternating movements.      Gait: Gait abnormal and tandem walk abnormal.      Deep Tendon Reflexes:      Reflex Scores:       Tricep reflexes are 2+ on the right side and 2+ on the left side.       Bicep reflexes are 2+ on the right side and 2+ on the left side.       Brachioradialis reflexes are 2+ on the right side and 2+ on the left side.       Patellar reflexes are 2+ on the right side and 2+ on the left side.       Achilles reflexes are 2+ on the right side and 2+ on the left side.     Comments: Patient has complete paralysis on the right side due to previous stroke, the hemiplegia since 1994   Psychiatric:         Attention and Perception: Attention and perception normal.         Mood and Affect: Mood normal.         Speech: Speech normal.         Behavior: Behavior is cooperative.         Thought Content: Thought content normal.         Cognition and Memory: Cognition and memory normal.         Judgment: Judgment normal.     Fluids    Intake/Output Summary (Last 24 hours) at 1/29/2024 1745  Last data filed at 1/29/2024 1700  Gross per 24 hour   Intake 1640 ml   Output 2510 ml   Net -870 ml       Laboratory  Recent Labs     01/27/24  0057 01/28/24  0807 01/29/24  0306   WBC 10.9* 10.9* 12.7*   RBC 3.96* 4.27 3.86*  "  HEMOGLOBIN 10.6* 11.6* 10.5*   HEMATOCRIT 33.4* 35.9* 32.6*   MCV 84.3 84.1 84.5   MCH 26.8* 27.2 27.2   MCHC 31.7* 32.3 32.2   RDW 52.7* 52.9* 53.1*   PLATELETCT 430 443 396   MPV 11.1 10.5 10.5     Recent Labs     01/27/24  0057 01/28/24  0807 01/29/24  0306   SODIUM 137 132* 134*   POTASSIUM 4.8 4.8 4.1   CHLORIDE 107 105 106   CO2 19* 19* 19*   GLUCOSE 158* 188* 139*   BUN 20 26* 27*   CREATININE 0.78 0.86 0.86   CALCIUM 8.2* 8.6 8.3*     Recent Labs     01/28/24  1149   INR 1.08               Imaging  IR-NEPHROSTOGRAM W/ NEW TUBE PLACEMENT (ALL RADIOLOGY) RIGHT   Final Result      1. ULTRASOUND AND FLUOROSCOPIC GUIDED PLACEMENT OF A RIGHT 8 Bengali  PIGTAIL LOCKING LOOP PERCUTANEOUS NEPHROSTOMY CATHETER.      2. DYSMORPHIC LIKELY CHRONICALLY OBSTRUCTED UPPER COLLECTING SYSTEM WITH GROSS PYONEPHROSIS. THIS MAY WELL REPRESENT A NONFUNCTIONING \"ABSCESSED\" KIDNEY. DUE TO THE THICK PUS, THE CATHETER WAS CONNECTED TO SUCTION BULB DRAINAGE AND ORDERS WERE WRITTEN    FOR ONCE DAILY IRRIGATION WITH 3 ML STERILE SALINE. IF URINE OUTPUT IS RESTORED, THE CATHETER MAY BE CONNECTED TO CONVENTIONAL GRAVITY BAG DRAINAGE.           Assessment/Plan  Problem Representation:    Hydronephrosis- (present on admission)  Assessment & Plan  Patient originally admitted with right flank pain at [facility].  CT abdomen and pelvis showed right-sided hydroureteronephrosis.  Urology attempted retrograde right ureteral stent attempt 01/26 which was unsuccessful due to excessive scarring therefore patient was transferred from [facility] to [facility] for interventional radiology guided right nephrostomy tube placement. Patient underwent right percutaneous nephrostomy placement on 01/28/2024.  10 mL gross pus was evacuated.  Urology following, appreciate recommendations.  Continue to monitor renal function and urine output.  -Continued on ceftriaxone  -Consulte ID regarding DC planning as previous E coli " were flouroquinolone resistant      Hydronephrosis with urinary obstruction due to renal calculus- (present on admission)  Assessment & Plan  See hydronephrosis problem    Pyelonephritis- (present on admission)  Assessment & Plan  See hydronephrosis problem    Expressive aphasia  Assessment & Plan  Patient with expressive aphasia after brain insult 1994.  SLP evaluation.    History of stroke- (present on admission)  Assessment & Plan  History of complex stroke with right-sided deficits and expressive aphasia.  Continue fall precautions and seizure precautions    Drug rash- (present on admission)  Assessment & Plan  Patient has developed a drug rash due to Zosyn and at this point has severe rash from head to toe.  Antibiotic was switched to Rocephin.  Patient was on IV Solu-Medrol before being transferred to [facility] and was continued until today.  Rash has now significantly resolved therefore IV Solu-Medrol discontinued.  Continue famotidine.  Monitor for any further rash.      Right hemiplegia (HCC)  Assessment & Plan  S/p CVA 1994.  PT OT evaluation.         VTE prophylaxis: SCD since patient underwent right nephrostomy tube placement today.    I have performed a physical exam and reviewed and updated ROS and Plan today (1/29/2024). In review of yesterday's note (1/28/2024), there are no changes except as documented above.

## 2024-01-30 NOTE — PROGRESS NOTES
Note to reader: this note follows the APSO format rather than the historical SOAP format. Assessment and plan located at the top of the note for ease of use.    Chief Complaint  63 y.o. year old female here with uti, acute on chronic right hydronephrosis    Assessment/Plan  Interval History   UTI  Right hydronephrosis  ?abscessed non functioning right kidney s/p right drain placement 1/28      Continue antibiotics per ID  Right NPT/JAMI to remain for now. Will discuss with Dr. Rm as to timing for removal.   DC pascal  Planning for outpatient follow up in 4 weeks with repeat imaging        Case discussed with patient and Urology-Dr. Rm    Patient seen and examined    1/30. ID consulted, on Meropenem through 2/6. No fever. WBC improved. Creat normal. Right percutaneous drain with 30 cc/24 hours. Pascal has been draining well with good UOP. Rehab placement planned.     1/29. E Coli in urine culture. On Rocephin. No fever. WBC improved. Creat normal. Minimal output from right NPT attached as JAMI drain. Good UOP           Review of Systems  Physical Exam   Review of Systems   Constitutional:  Negative for chills and fever.   Gastrointestinal:  Negative for nausea and vomiting.   Genitourinary:  Negative for flank pain and hematuria.     Vitals:    01/30/24 0336 01/30/24 0400 01/30/24 0720 01/30/24 1150   BP: (!) 138/91  (!) 131/90 117/87   Pulse: 91  82 99   Resp: 19  15 17   Temp: 36.7 °C (98.1 °F)  36.7 °C (98.1 °F) 36.3 °C (97.3 °F)   TempSrc: Temporal  Temporal Temporal   SpO2: 93%  96% 96%   Weight:  94.5 kg (208 lb 5.4 oz)     Height:         Physical Exam  Vitals and nursing note reviewed.   Constitutional:       Appearance: Normal appearance.   HENT:      Head: Normocephalic and atraumatic.      Nose: Nose normal.      Mouth/Throat:      Mouth: Mucous membranes are moist.   Eyes:      Pupils: Pupils are equal, round, and reactive to light.   Pulmonary:      Effort: Pulmonary effort is normal.   Abdominal:       General: Abdomen is flat. There is no distension.      Palpations: Abdomen is soft.      Tenderness: There is no abdominal tenderness.   Genitourinary:     Comments: Right flank drain in place with JAMI bulb, serous fluid in bulb    Olmedo draining clear  Skin:     General: Skin is warm and dry.   Neurological:      Mental Status: She is alert.          Hematology Chemistry   Lab Results   Component Value Date/Time    WBC 11.5 (H) 01/30/2024 03:38 AM    HEMOGLOBIN 10.5 (L) 01/30/2024 03:38 AM    HEMATOCRIT 32.7 (L) 01/30/2024 03:38 AM    PLATELETCT 361 01/30/2024 03:38 AM     Lab Results   Component Value Date/Time    SODIUM 137 01/30/2024 03:38 AM    POTASSIUM 4.0 01/30/2024 03:38 AM    CHLORIDE 107 01/30/2024 03:38 AM    CO2 19 (L) 01/30/2024 03:38 AM    GLUCOSE 105 (H) 01/30/2024 03:38 AM    BUN 20 01/30/2024 03:38 AM    CREATININE 0.82 01/30/2024 03:38 AM         Labs not explicitly included in this progress note were reviewed by the author.   Radiology/imaging not explicitly included in this progress note was reviewed by the author.     Medications reviewed and Labs reviewed

## 2024-01-30 NOTE — PROGRESS NOTES
Radiology Progress Note   Author: Tonie Sams D.N.P. Date & Time created: 1/30/2024  9:48 AM   Date of admission  1/27/2024  Note to reader: this note follows the APSO format rather than the historical SOAP format. Assessment and plan located at the top of the note for ease of use.    Chief Complaint  63 y.o. 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 1/26/24 cytoscopy with scarred over right ureteral orifice, unable to bypass with catheter or wire and subsequent IR consult. 1/28/24 IR Dr. Krishnamurthy places RIGHTnephrostogram with PCNT placement. 10 ml of gross pus aspirated during procedure. Catheter to suction bulb due to purulent pus.    Interval History IR:  01/29/24- RIGHT nephrostomy tube to bulb suction with 30 ml thick purulent drainage in the last 24 hours. RIGHT nephrostomy tube irrigated with 5 ml of NS without aspiration. Pt nods yes to mild intermittent pain to RIGHT back/flank. Denies nausea, vomiting or abdominal pain/discomfort. I reviewed today's labs including WBC 11.5 12.7, Hgb 10.5<11.6, Cr 0.86, GFR 75.  Coordinated post IR procedure care with hospital nursing staff.    01/30/24- RIGHT nephrostomy tube to bulb suction with 10 ml thick purulent bloody drainage in the last 24 hours. RIGHT nephrostomy tube irrigated with 5 ml of NS without aspiration and without patient experiencing any pain. I reviewed today's labs including WBC 12.7<10.9, Hgb 10.5, Cr 0.82, GFR 80.  Right perinephric aspirate positive for E. coli, ESBL.  Antibiotics per ID. Coordinated care with hospital nursing staff.      Assessment/Plan     Active Problems:    Pyelonephritis     Hydronephrosis with urinary obstruction due to renal calculus    History of stroke    Dysphagia    Hydronephrosis    Drug rash    Right hemiplegia (HCC)    Expressive aphasia    Anemia      Plan IR  -- Irrigate  Nephrostomy tube with 5 ml of sterile saline q shift ONLY IF no pain evoked with flushing; once urine output restored then plan to  connected to a conventional gravity bag   - Fluid cultures positive for E.coli-ESBL-ABX per ID  - Urology following and planning for follow-up appointment in 4 weeks with repeat imaging.  - Ultimate urology plan is for RIGHT PCNT to remain indefinitely.   -Ok to shower with catheter as long as site is covered with waterproof dressing; change dressing if it becomes wet.  - No baths or submerging site under water until catheter removed   -In case long term use, Nephrostomy tube will need to be exchanged every 3 months; sooner prn.   - IR will continue to follow nephrostomy tube while pt is in the hospital; we may not round daily.        -Thank you for allowing Interventional Radiology team to participate in the patients care, if any additional care or requests are needed in the future please do not hesitate call or place IR order. [phone]                     Review of Systems  Physical Exam   Review of Systems   Constitutional:  Negative for chills and fever.   Respiratory:  Negative for cough and shortness of breath.    Cardiovascular:  Negative for chest pain.   Gastrointestinal:  Positive for abdominal pain. Negative for nausea and vomiting.        Mild abdominal pain.  Denies nausea   Genitourinary:  Positive for flank pain. Negative for hematuria.   Musculoskeletal:  Negative for myalgias.   Skin:  Negative for rash.   Neurological:  Positive for sensory change, speech change and focal weakness. Negative for weakness and headaches.   Psychiatric/Behavioral: Negative.        Vitals:    01/30/24 0720   BP: (!) 131/90   Pulse: 82   Resp: 15   Temp: 36.7 °C (98.1 °F)   SpO2: 96%         Physical Exam  Constitutional:       Appearance: She is obese. She is ill-appearing.   HENT:      Head: Atraumatic.   Cardiovascular:      Rate and Rhythm: Normal rate.   Pulmonary:      Effort: Pulmonary effort is normal. No respiratory distress.   Abdominal:      Palpations: Abdomen is soft.      Tenderness: There is no abdominal tenderness.      Comments: Right flank tender at nephrostomy tube site  Right nephrostomy tube/bulb syringe with thick purulent bloody drainage   Musculoskeletal:      Comments: Right hemiparesis   Skin:     General: Skin is warm and dry.      Capillary Refill: Capillary refill takes 2 to 3 seconds.      Coloration: Skin is pale.      Comments: Nephrostomy tube to right back with purulent drainage.   Neurological:      Mental Status: She is alert. Mental status is at baseline.      Sensory: Sensory deficit present.      Motor: Weakness present.      Comments: Right side paralysis secondary to previous stroke    Psychiatric:         Mood and Affect: Mood normal.             Labs    Recent Labs     01/28/24  0807 01/29/24  0306 01/30/24  0338   WBC 10.9* 12.7* 11.5*   RBC 4.27 3.86* 3.85*   HEMOGLOBIN 11.6* 10.5* 10.5*   HEMATOCRIT 35.9* 32.6* 32.7*   MCV 84.1 84.5 84.9   MCH 27.2 27.2 27.3   MCHC 32.3 32.2 32.1*   RDW 52.9* 53.1* 53.2*   PLATELETCT 443 396 361   MPV 10.5 10.5 10.7       Recent Labs     01/28/24  0807 01/29/24  0306 01/30/24  0338   SODIUM 132* 134* 137   POTASSIUM 4.8 4.1 4.0   CHLORIDE 105 106 107   CO2 19* 19* 19*   GLUCOSE 188* 139* 105*   BUN 26* 27* 20   CREATININE 0.86 0.86 0.82   CALCIUM 8.6 8.3* 7.9*       Recent Labs     01/28/24  0807 01/29/24  0306 01/30/24  0338   ALBUMIN 2.7* 2.6* 1.9*   TBILIRUBIN  --  <0.2 0.2   ALKPHOSPHAT  --  386* 432*   TOTPROTEIN  --  5.8* 5.8*   ALTSGPT  --  34 38   ASTSGOT  --  71* 69*   CREATININE 0.86 0.86 0.82       IR-NEPHROSTOGRAM W/ NEW TUBE PLACEMENT (ALL RADIOLOGY) RIGHT   Final Result      1. ULTRASOUND AND  "FLUOROSCOPIC GUIDED PLACEMENT OF A RIGHT 8 [place]  PIGTAIL LOCKING LOOP PERCUTANEOUS NEPHROSTOMY CATHETER.      2. DYSMORPHIC LIKELY CHRONICALLY OBSTRUCTED UPPER COLLECTING SYSTEM WITH GROSS PYONEPHROSIS. THIS MAY WELL REPRESENT A NONFUNCTIONING \"ABSCESSED\" KIDNEY. DUE TO THE THICK PUS, THE CATHETER WAS CONNECTED TO SUCTION BULB DRAINAGE AND ORDERS WERE WRITTEN    FOR ONCE DAILY IRRIGATION WITH 3 ML STERILE SALINE. IF URINE OUTPUT IS RESTORED, THE CATHETER MAY BE CONNECTED TO CONVENTIONAL GRAVITY BAG DRAINAGE.          INR   Date Value Ref Range Status   01/28/2024 1.08 0.87 - 1.13 Final     Comment:     INR - Non-therapeutic Reference Range: 0.87-1.13  INR - Therapeutic Reference Range: 2.0-4.0       No results found for: \"POCINR\"     Intake/Output Summary (Last 24 hours) at 1/29/2024 2001  Last data filed at 1/29/2024 1700  Gross per 24 hour   Intake 1640 ml   Output 2530 ml   Net -890 ml      Labs not explicitly included in this progress note were reviewed by the author. Radiology/imaging not explicitly included in this progress note was reviewed by the author.     I have performed a physical exam and reviewed and updated ROS and Plan today (1/30/2024).     35 minutes in directly providing and coordinating care and extensive data review.  No time overlap and excludes procedures.  "

## 2024-01-30 NOTE — CONSULTS
Physical Medicine and Rehabilitation Consultation              Date of initial consultation: 1/30/2024  Requesting provider: ordered by Mango Bobby D.O. at 01/30/24 1130    Consulting provider: Kathie White D.O.  Reason for consultation: assess for acute inpatient rehab appropriateness  LOS: 3 Day(s)    Chief complaint: pyelonephritis     HPI: The patient is a 63 y.o.  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 Per doceumtnation, patient was evaluated at the outside hospital where patient's cultures blood cultures + for E coli . Patient was initially started on Zosyn but was transitioned to Rocephin after rash developed while on Zosyn. Patient was found to have hydronephrosis and patient was transferred to [facility] for higher level of care and concern for needing nephrostomy tube. Upon eval at [facility], urology was consulted and patient under went cystoscopy with failed attempted ureteral stent placement on 1/26 which showed a scarred over right ureteral orifice  and required a right nephrostomy tube placed on 1/28 by IR.  Patient is on on Meropenem started on 1/30, tentative stop date is 2/6, pending ID consult.     Patient seen and examined at bedside, reports she feels tired. Reports chronic left shoulder pain flaring up. Reports LLE muscle spasms worse since being in hospital. Is willing to consider rehab, understands value. Denies pain at nephrostomy tube. Does not report HA, lightheadedness, SOB, CP, abdominal pain, or changes in numbness/tingling/weakness.     Social Hx:  Patient lives with spouse and son   1 JORDY  At prior level of function required assistance for ADLs and  transfers to scooter. Is Mod I for mobility with motorized scooter at home.     Tobacco: Denies   Alcohol: Denies   Drugs: Denies      THERAPY:  Restrictions: Fall Risk, nephrostomy tube   PT: Functional mobility   1/29 Mod A bed mobility, Mod A sit to stand, refused transfers,     OT: ADLs  1/27 Mod A bed mobility, Min A upper body dressing, Total A lower body dressing, Min A sit to stand     SLP:   None     IMAGING:  IR-NEPHROSTOGRAM W/ NEW TUBE PLACEMENT (ALL RADIOLOGY) RIGHT  Narrative: 1/28/2024 12:10 PM    HISTORY/REASON FOR EXAM:  Gram-negative sepsis.  Hydronephrotic markedly atrophic right kidney. Unsuccessful attempt of retrograde right ureteral stent placement on 1/26/2024. Percutaneous nephrostomy requested.    TECHNIQUE/EXAM DESCRIPTION:  RIGHT Percutaneous Nephrostomy and Nephrostogram with ultrasound and fluoroscopic guidance.    PROCEDURE:  Informed consent was obtained.    The skin was prepped and draped in a sterile fashion using chlorhexidine antiseptic.    SEDATION:  Moderate sedation was provided. Pulse oximetry and continuous cardiac monitoring by the nurse was performed throughout the exam. Intraservice time was 45 minutes.    CURRENT ALARA PRINCIPLES FOR DOSE REDUCTION TECHNIQUES WERE UTILIZED FOR THIS EXAMINATION.    ALL ELEMENTS OF MAXIMAL STERILE BARRIER TECHNIQUE WERE FOLLOWED.    FLUOROSCOPY TIME: 7.4 minutes  FLUOROSCOPY DOSE(DAP): 5.37 Gy*cm^2  FLUOROSCOPY DOSE: 37.8 mGy    NUMBER OF FILMS: 13 fluoroscopic frame capture images and/or digital series obtained.    CONTRAST AMOUNT: 15 mL Omnipaque    The RIGHT kidney was localized with real time ultrasound. Dilated upper collecting system identified concordant with CT. Following local anesthesia with 1% lidocaine, an AccuStick 21-gauge needle was advanced into the upper collecting system. There was   no urine return, however, aspiration upon the needle yielded thick beige pus.    A 2 mL sample was collected and sent for culture and sensitivity and Gram stain.    Next, a limited antegrade pyelogram was performed showing contained contrast within somewhat dysmorphic  "upper collecting system structures.    A 0.018 guidewire was placed. An AccuStick coaxial catheter was then placed. Next, an Amplatz guidewire was placed and looped within opacified the upper collecting system. A 0.018 guidewire was left in place as a safety wire.    Next, following serial tract dilatation, an 8 Welsh locking loop nephrostomy catheter was placed and the locking loop secured. Limited nephrostogram confirms catheter position without extravasation. Contrast injection was limited to avoid bacteremia in   in this grossly pyonephrotic right kidney.    The catheter was sutured to the skin and connected to a suction bulb. Suction bulb was placed rather than a gravity bag as there was no significant urine return, only thick pus.    The patient tolerated the procedure well with no evidence of complication.    COMPARISON:  Outside films CT of the abdomen and pelvis 1/24/2024.    FINDINGS:  The nephrostogram shows the catheter to be in satisfactory position. There is no extravasation. The upper collecting system appears isolated. There was no visualization of the UPJ or proximal ureter. Gross pyonephrosis.  Impression: 1. ULTRASOUND AND FLUOROSCOPIC GUIDED PLACEMENT OF A RIGHT 8 Amharic  PIGTAIL LOCKING LOOP PERCUTANEOUS NEPHROSTOMY CATHETER.    2. DYSMORPHIC LIKELY CHRONICALLY OBSTRUCTED UPPER COLLECTING SYSTEM WITH GROSS PYONEPHROSIS. THIS MAY WELL REPRESENT A NONFUNCTIONING \"ABSCESSED\" KIDNEY. DUE TO THE THICK PUS, THE CATHETER WAS CONNECTED TO SUCTION BULB DRAINAGE AND ORDERS WERE WRITTEN   FOR ONCE DAILY IRRIGATION WITH 3 ML STERILE SALINE. IF URINE OUTPUT IS RESTORED, THE CATHETER MAY BE CONNECTED TO CONVENTIONAL GRAVITY BAG DRAINAGE.        PROCEDURES:  1/26 cystoscopy with attempted ureteral stent by Dr. Rm   1/28 R nephrostomy tube placement by Dr. MERRITT     [facility]:  Past Medical History:   Diagnosis Date    Aphagia 1994    Brain aneurysm     Chronic pain     Nephrolithiasis     Right hemiplegia (HCC) 1994 "    Stroke (HCC)        PSH:  Past Surgical History:   Procedure Laterality Date    MS CYSTOSCOPY,INSERT URETERAL STENT Right 1/26/2024    Procedure: CYSTOSCOPY;  Surgeon: Mik Rm M.D.;  Location: SURGERY [facility];  Service: Urology    CYSTOSCOPY N/A 7/4/2019    Procedure: CYSTOSCOPY;  Surgeon: Monico Killian M.D.;  Location: SURGERY [facility];  Service: Urology    STENT PLACEMENT Right 7/4/2019    Procedure: STENT PLACEMENT;  Surgeon: Monico Killian M.D.;  Location: SURGERY [facility];  Service: Urology    URETEROSCOPY Right 7/4/2019    Procedure: URETEROSCOPY;  Surgeon: Monico Killian M.D.;  Location: SURGERY [facility];  Service: Urology    CYSTOSCOPY STENT PLACEMENT  2/13/2018    Procedure: CYSTOSCOPY STENT PLACEMENT;  Surgeon: Monico Killian M.D.;  Location: SURGERY [facility];  Service: Urology    GASTRIC RESECTION  2012    Duodenal Switch    OTHER      OTHER NEUROLOGICAL SURG         FHX:  No family history on file.    Medications:  Current Facility-Administered Medications   Medication Dose    meropenem (Merrem) 500 mg in  mL IV-MBP  500 mg    [Held by provider] enoxaparin (Lovenox) inj 40 mg  40 mg    DULoxetine (Cymbalta) capsule 60 mg  60 mg    famotidine (Pepcid) tablet 20 mg  20 mg    ferrous sulfate tablet 325 mg  325 mg    folic acid (Folvite) tablet 1 mg  1 mg    pregabalin (Lyrica) capsule 150 mg  150 mg    senna-docusate (Pericolace Or Senokot S) 8.6-50 MG per tablet 2 Tablet  2 Tablet    And    polyethylene glycol/lytes (Miralax) Packet 1 Packet  1 Packet    And    magnesium hydroxide (Milk Of Magnesia) suspension 30 mL  30 mL    And    bisacodyl (Dulcolax) suppository 10 mg  10 mg    [Held by provider] NS infusion      acetaminophen (Tylenol) tablet 650 mg  650 mg    diphenhydrAMINE (Benadryl) injection 25 mg  25 mg    ipratropium-albuterol (DUONEB) nebulizer solution  3 mL    labetalol (Normodyne/Trandate) injection 10 mg  10 mg    ondansetron (Zofran  "ODT) dispertab 4 mg  4 mg    ondansetron (Zofran) syringe/vial injection 4 mg  4 mg    oxyCODONE immediate-release (Roxicodone) tablet 2.5 mg  2.5 mg    Or    oxyCODONE immediate-release (Roxicodone) tablet 5 mg  5 mg    Or    morphine 4 MG/ML injection 2 mg  2 mg    prochlorperazine (Compazine) injection 5-10 mg  5-10 mg    promethazine (Phenergan) suppository 12.5-25 mg  12.5-25 mg    promethazine (Phenergan) tablet 12.5-25 mg  12.5-25 mg    Pharmacy Consult Request ...Pain Management Review 1 Each  1 Each       Allergies:  Allergies   Allergen Reactions    Zosyn [Piperacillin Sod-Tazobactam So] Rash     Gave her head to toe drug rash at [facility] 1/25/2024         Physical Exam:  Vitals: BP (!) 131/90   Pulse 82   Temp 36.7 °C (98.1 °F) (Temporal)   Resp 15   Ht 1.676 m (5' 5.98\")   Wt 94.5 kg (208 lb 5.4 oz)   SpO2 96%   Gen: NAD, laying comfortably in bed, no family present   Head: NC/AT  Eyes/ Nose/ Mouth: PERRLA, moist mucous membranes  Cardio: RRR, good distal perfusion, warm extremities  Pulm: normal respiratory effort, no cyanosis, on RA   Abd: Soft NTND, negative borborygmi , + R sided nephrostomy tube in place    Gu: pascal inplace   Ext: No peripheral edema. No calf tenderness. No clubbing.    Mental status:  A&Ox4 (person, place, date, situation) answers questions appropriately follows commands  Speech: fluent, + word finding difficulty due to prior stroke, but uses work arounds      CRANIAL NERVES:  2,3: visual acuity grossly intact, PERRL  3,4,6: EOMI bilaterally, no nystagmus or diplopia  5: sensation intact to light touch bilaterally and symmetric  7: no facial asymmetry  8: hearing grossly intact    Motor:      Upper Extremity  Myotome R L   Shoulder flexion C5 0/5 3, limited ROM/5   Elbow flexion C5 0/5 4, limited by elbow pain /5   Wrist extension C6 1/5 5/5   Elbow extension C7 1/5 5/5   Finger flexion C8 0/5 5/5   Finger abduction T1 0/5 5/5     Lower Extremity Myotome R L "   Hip flexion L2 2/5 5/5   Knee extension L3 2/5 5/5   Ankle dorsiflexion L4 2/5 5/5   Toe extension L5 2/5 5/5   Ankle plantarflexion S1 2/5 5/5       Sensory:   intact to light touch through out left side, decreaed sensation in LUE       +2 beat clonus R ankle   Negative Posey b/l     Tone:  MAS 3 LUE tricep, LUE finger flexion contractures       Labs: Reviewed and significant for   Recent Labs     01/28/24  0807 01/28/24  1149 01/29/24  0306 01/30/24  0338   RBC 4.27  --  3.86* 3.85*   HEMOGLOBIN 11.6*  --  10.5* 10.5*   HEMATOCRIT 35.9*  --  32.6* 32.7*   PLATELETCT 443  --  396 361   PROTHROMBTM  --  14.1  --   --    INR  --  1.08  --   --      Recent Labs     01/28/24  0807 01/29/24  0306 01/30/24  0338   SODIUM 132* 134* 137   POTASSIUM 4.8 4.1 4.0   CHLORIDE 105 106 107   CO2 19* 19* 19*   GLUCOSE 188* 139* 105*   BUN 26* 27* 20   CREATININE 0.86 0.86 0.82   CALCIUM 8.6 8.3* 7.9*     Recent Results (from the past 24 hour(s))   CBC WITH DIFFERENTIAL    Collection Time: 01/30/24  3:38 AM   Result Value Ref Range    WBC 11.5 (H) 4.8 - 10.8 K/uL    RBC 3.85 (L) 4.20 - 5.40 M/uL    Hemoglobin 10.5 (L) 12.0 - 16.0 g/dL    Hematocrit 32.7 (L) 37.0 - 47.0 %    MCV 84.9 81.4 - 97.8 fL    MCH 27.3 27.0 - 33.0 pg    MCHC 32.1 (L) 32.2 - 35.5 g/dL    RDW 53.2 (H) 35.9 - 50.0 fL    Platelet Count 361 164 - 446 K/uL    MPV 10.7 9.0 - 12.9 fL    Neutrophils-Polys 76.50 (H) 44.00 - 72.00 %    Lymphocytes 14.80 (L) 22.00 - 41.00 %    Monocytes 7.00 0.00 - 13.40 %    Eosinophils 0.00 0.00 - 6.90 %    Basophils 0.00 0.00 - 1.80 %    Nucleated RBC 0.00 0.00 - 0.20 /100 WBC    Neutrophils (Absolute) 8.80 (H) 1.82 - 7.42 K/uL    Lymphs (Absolute) 1.70 1.00 - 4.80 K/uL    Monos (Absolute) 0.81 0.00 - 0.85 K/uL    Eos (Absolute) 0.00 0.00 - 0.51 K/uL    Baso (Absolute) 0.00 0.00 - 0.12 K/uL    NRBC (Absolute) 0.00 K/uL   Comp Metabolic Panel    Collection Time: 01/30/24  3:38 AM   Result Value Ref Range    Sodium 137 135 - 145  mmol/L    Potassium 4.0 3.6 - 5.5 mmol/L    Chloride 107 96 - 112 mmol/L    Co2 19 (L) 20 - 33 mmol/L    Anion Gap 11.0 7.0 - 16.0    Glucose 105 (H) 65 - 99 mg/dL    Bun 20 8 - 22 mg/dL    Creatinine 0.82 0.50 - 1.40 mg/dL    Calcium 7.9 (L) 8.5 - 10.5 mg/dL    Correct Calcium 9.6 8.5 - 10.5 mg/dL    AST(SGOT) 69 (H) 12 - 45 U/L    ALT(SGPT) 38 2 - 50 U/L    Alkaline Phosphatase 432 (H) 30 - 99 U/L    Total Bilirubin 0.2 0.1 - 1.5 mg/dL    Albumin 1.9 (L) 3.2 - 4.9 g/dL    Total Protein 5.8 (L) 6.0 - 8.2 g/dL    Globulin 3.9 (H) 1.9 - 3.5 g/dL    A-G Ratio 0.5 g/dL   ESTIMATED GFR    Collection Time: 01/30/24  3:38 AM   Result Value Ref Range    GFR (CKD-EPI) 80 >60 mL/min/1.73 m 2   DIFFERENTIAL MANUAL    Collection Time: 01/30/24  3:38 AM   Result Value Ref Range    Myelocytes 1.70 %    Manual Diff Status PERFORMED    PERIPHERAL SMEAR REVIEW    Collection Time: 01/30/24  3:38 AM   Result Value Ref Range    Peripheral Smear Review see below    PLATELET ESTIMATE    Collection Time: 01/30/24  3:38 AM   Result Value Ref Range    Plt Estimation Normal    MORPHOLOGY    Collection Time: 01/30/24  3:38 AM   Result Value Ref Range    RBC Morphology Normal          ASSESSMENT:  Patient is a 63 y.o. female admitted with pyelopnephritis     [facility] Code / Diagnosis to Support: 0016 - Debility (Non-Cardiac, Non-Pulmonary)    Rehabilitation: Impaired ADLs and mobility  Patient is a good candidate for inpatient rehab based on needs for PT, OT, and speech therapy, see diso details below.     Barriers to transfer include: Insurance authorization, TCCs to verify disposition, medical clearance and bed availability     Additional Recommendations:  Pyelonephritis   - presented with signs of sepsis due to UTI  - found to have hydronephrosis with E coli  pyelonephritis   - previously on zosyn, but caused rash, was switched to rocephin -  - 1/30 switch to meropenem, ID consulted, tentative stop date is 2/6   - urology consulted, 1/26  cystoscopy   - 1/28 R nephrostomy tube placement, planning for tube removal on 1/30?   - continue with PT/OT     Prior CVA   R sided weakness   - CVA in 1994   - greatest deficits are RUE weakness and finger flexion contractures     Spasticty  - chronic RUE tone, needs outpatient follow up for spasticity management   - can trial baclofen 5mg qhs to assist with sleep and night time spasms in RLE, which has been worse since infection     Chronic pain   - continue home dose Lyrica and Cymbalta     L shoulder pain  - likely secondary to overuse due to need for LUE use for transfers   - L shoulder xray ordered  - lidocaine patch to L shoulder and L elbow     Dispo:  - patient is currently functioning below their level of baseline, recommend post acute rehab  - recommend IRF level therapy with 3hr of therapy 5 days per week   - piror to acceptance to IRF, will need insurance auth, confirmation of plans for nephrostomy tube removal and  confirmation of IV abx duration   - TCC to assist with insurance auth and DC support         Medical Complexity:  Pyelonephritis   Prior CVA   R sided weakness   Chronic pain   Impaired mobility and ADLs       DVT PPX: SCDs, lovenox currently held       Thank you for allowing us to participate in the care of this patient.     Patient was seen for 82 minutes on unit/floor of which > 50% of time was spent on counseling and coordination of care regarding the above, including prognosis, risk reduction, benefits of treatment, and options for next stage of care.    Kathie White D.O.   Physical Medicine and Rehabilitation     Please note that this dictation was created using voice recognition software. I have made every reasonable attempt to correct obvious errors, but there may be errors of grammar and possibly content that I did not discover before finalizing the note.

## 2024-01-30 NOTE — PROGRESS NOTES
Rhythm:SR  Rate:78-83's  W[place]/QRS/QT:  .17/.06/.33    No tele images available from the monitor room,

## 2024-01-30 NOTE — CARE PLAN
The patient is Stable - Low risk of patient condition declining or worsening    Shift Goals  Clinical Goals: Monitor JAMI drain, v/s, labs and fall precautions  Patient Goals: Rest and comfort  Family Goals: ELIZABETH no family members preseny at this time.    Progress made toward(s) clinical / shift goals:    Problem: Knowledge Deficit - Standard  Goal: Patient and family/care givers will demonstrate understanding of plan of care, disease process/condition, diagnostic tests and medications  Description: Target End Date:  1-3 days or as soon as patient condition allows    Document in Patient Education    1.  Patient and family/caregiver oriented to unit, equipment, visitation policy and means for communicating concern  2.  Complete/review Learning Assessment  3.  Assess knowledge level of disease process/condition, treatment plan, diagnostic tests and medications  4.  Explain disease process/condition, treatment plan, diagnostic tests and medications  Outcome: Progressing  Note: Patient updated regarding plan of care and current treatment. All questions answered. Pt voiced understanding.       Problem: Pain - Standard  Goal: Alleviation of pain or a reduction in pain to the patient’s comfort goal  Description: Target End Date:  Prior to discharge or change in level of care    Document on Vitals flowsheet    1.  Document pain using the appropriate pain scale per order or unit policy  2.  Educate and implement non-pharmacologic comfort measures (i.e. relaxation, distraction, massage, cold/heat therapy, etc.)  3.  Pain management medications as ordered  4.  Reassess pain after pain med administration per policy  5.  If opiods administered assess patient's response to pain medication is appropriate per POSS sedation scale  6.  Follow pain management plan developed in collaboration with patient and interdisciplinary team (including palliative care or pain specialists if applicable)  Outcome: Progressing  Flowsheets (Taken  1/28/2024 1506 by Lilian Williamson R.N.)  Critical-Care Pain Observation Score: 0  Note: PRN pain medications in use for pain control. Pt states pain level is tolerable after taking pain medication, able to sleep, and VS within parameters.      Problem: Skin Integrity  Goal: Skin integrity is maintained or improved  Description: Target End Date:  Prior to discharge or change in level of care    Document interventions on Skin Risk/Juan David flowsheet groups and corresponding LDA    1.  Assess and monitor skin integrity, appearance and/or temperature  2.  Assess risk factors for impaired skin integrity and/or pressures ulcers  3.  Implement precautions to protect skin integrity in collaboration with interdisciplinary team  4.  Implement pressure ulcer prevention protocol if at risk for skin breakdown  5.  Confirm wound care consult if at risk for skin breakdown  6.  Ensure patient use of pressure relieving devices  (Low air loss bed, waffle overlay, heel protectors, ROHO cushion, etc)  Outcome: Progressing  Note: Pt is Q 2 turns.  Pt educated about the importance of frequent repositioning to prevent skin breakdown. Encouraged turning in bed and notifying staff for help. Pt verbalized understanding. Appropriate dressings in place. Extra pillows in place for comfort, between knees, and to float heels. Barrier cream applied as appropriate. Pt cleaned and linens changed frequently as appropriate.            Patient is not progressing towards the following goals:

## 2024-01-30 NOTE — DISCHARGE PLANNING
[facility] Acute Rehabilitation Transitional Care Coordination    Referral from:  Dr. Mango Bobby  Insurance Provider on Facesheet:  Potential Rehab diagnosis:      Chart review indicates patient has ongoing medical management and therapy needs to possibly meet inpatient rehab facility criteria with the goal of returning to community.      D/C Support: Spouse/Son    Physiatry to consult per protocol.  Pyelonephritis; hx stroke with residual right-sided deficits.  Would welcome OT as clinically appropriate.     Last Covid test:    Thank you for the referral.

## 2024-01-30 NOTE — ASSESSMENT & PLAN NOTE
Patient originally admitted with right flank pain at [facility].  CT abdomen and pelvis showed right-sided hydroureteronephrosis.  Urology attempted retrograde right ureteral stent attempt 01/26 which was unsuccessful due to excessive scarring therefore patient was transferred from [facility] to [facility] for interventional radiology guided right nephrostomy tube placement. Patient underwent right percutaneous nephrostomy placement on 01/28/2024.  10 mL gross pus was evacuated. Tentative plans for nephrostomy tube to remain indefinitely, ID recommends repeat CT in 5 days to guide antibiotic regiment.   Urology following, appreciate recommendations.  -Awaiting CT  with and without contrast on 2/2/24  -Continue to monitor renal function and urine output.  -Continued on meropenem, started on 1/30/24

## 2024-01-30 NOTE — PROGRESS NOTES
Radiology Progress Note   Author: Tonie Sams D.N.P. Date & Time created: 1/29/2024  8:01 PM   Date of admission  1/27/2024  Note to reader: this note follows the APSO format rather than the historical SOAP format. Assessment and plan located at the top of the note for ease of use.    Chief Complaint  63 y.o. female admitted 1/27/2024 with pyelonephritis    HPI  Nydia Finch is a 64 yo female with PMH significant for aneurysm complicated by hemorrhagic stroke with residual dysphasia and right hemiparesis, kidney stones s/p cystoscopic lithotripsy and stent, COPD, chronic pain and rheumatoid arthritis who presented 1/25/24 from OSH because of ongoing hydronephrosis and pyelonephritis with sepsis. 1/26/24 cytoscopy with scarred over right ureteral orifice, unable to bypass with catheter or wire and subsequent IR consult. 1/28/24 IR Dr. Krishnamurthy places RIGHTnephrostogram with PCNT placement. 10 ml of gross pus aspirated during procedure. Catheter to suction bulb due to purulent pus.    Interval History IR:  RIGHT nephrostomy tube to bulb suction with 10 ml thick purulent drainage in the last 24 hours. RIGHT nephrostomy tube irrigated with 5 ml of NS without aspiration. Pt nods yes to mild intermittent pain to RIGHT back/flank. Denies nausea, vomiting or abdominal pain/discomfort. I reviewed today's labs including WBC 12.7<10.9, Hgb 10.5<11.6, Cr 0.86, GFR 75. Coordinated post IR procedure care with hospital nursing staff.      Assessment/Plan     Active Problems:    Pyelonephritis    Hydronephrosis with urinary obstruction due to renal calculus    History of stroke    Dysphagia    Hydronephrosis    Drug rash    Right hemiplegia (HCC)    Expressive aphasia    Anemia      Plan IR  -- Irrigate  Nephrostomy tube with 5 ml of sterile saline  q shift ONLY IF no pain evoked with irrigation.  - Fluid cultures positive for E.coli  - Urology following   - Ok to shower with catheter as long as site is covered with waterproof  dressing; change dressing if it becomes wet.  - No baths or submerging site under water until catheter removed   - Ultimate urology plan is for RIGHT PCNT to remain indefinitely. Olmedo to be removed for voiding trial prior to discharge.  - For long term use, Nephrostomy tube will need to be exchanged every 3 months; sooner prn.   - IR will continue to follow nephrostomy tube while pt is in the hospital; we may not round daily.   - Urology planning for outpatient follow-up in 4 weeks for repeat imaging.     -Thank you for allowing Interventional Radiology team to participate in the patients care, if any additional care or requests are needed in the future please do not hesitate call or place IR order. [phone]                     Review of Systems  Physical Exam   Review of Systems   Constitutional:  Negative for chills and fever.   Respiratory:  Negative for cough and shortness of breath.    Cardiovascular:  Negative for chest pain.   Gastrointestinal:  Negative for abdominal pain, nausea and vomiting.   Genitourinary:  Positive for flank pain. Negative for hematuria.   Musculoskeletal:  Negative for myalgias.   Skin:  Negative for rash.   Neurological:  Positive for sensory change, speech change and focal weakness. Negative for weakness and headaches.   Psychiatric/Behavioral: Negative.        Vitals:    01/29/24 1942   BP: (!) 135/95   Pulse: 90   Resp: 19   Temp: 36.7 °C (98.1 °F)   SpO2: 93%        Physical Exam  Constitutional:       Appearance: She is obese. She is ill-appearing.   HENT:      Head: Atraumatic.   Cardiovascular:      Rate and Rhythm: Normal rate.   Pulmonary:      Effort: Pulmonary effort is normal. No respiratory distress.   Abdominal:      Palpations: Abdomen is soft.      Tenderness: There is no abdominal tenderness.   Musculoskeletal:      Comments: Right hemiparesis   Skin:     General: Skin is warm and dry.      Capillary Refill: Capillary refill takes 2 to 3 seconds.      Coloration:  "Skin is pale.      Comments: Nephrostomy tube to right back with purulent drainage.   Neurological:      Mental Status: She is alert. Mental status is at baseline.      Sensory: Sensory deficit present.      Motor: Weakness present.   Psychiatric:         Mood and Affect: Mood normal.             Labs    Recent Labs     01/27/24 0057 01/28/24  0807 01/29/24  0306   WBC 10.9* 10.9* 12.7*   RBC 3.96* 4.27 3.86*   HEMOGLOBIN 10.6* 11.6* 10.5*   HEMATOCRIT 33.4* 35.9* 32.6*   MCV 84.3 84.1 84.5   MCH 26.8* 27.2 27.2   MCHC 31.7* 32.3 32.2   RDW 52.7* 52.9* 53.1*   PLATELETCT 430 443 396   MPV 11.1 10.5 10.5     Recent Labs     01/27/24 0057 01/28/24  0807 01/29/24  0306   SODIUM 137 132* 134*   POTASSIUM 4.8 4.8 4.1   CHLORIDE 107 105 106   CO2 19* 19* 19*   GLUCOSE 158* 188* 139*   BUN 20 26* 27*   CREATININE 0.78 0.86 0.86   CALCIUM 8.2* 8.6 8.3*     Recent Labs     01/27/24 0057 01/28/24  0807 01/29/24  0306   ALBUMIN 2.5* 2.7* 2.6*   TBILIRUBIN  --   --  <0.2   ALKPHOSPHAT  --   --  386*   TOTPROTEIN  --   --  5.8*   ALTSGPT  --   --  34   ASTSGOT  --   --  71*   CREATININE 0.78 0.86 0.86     IR-NEPHROSTOGRAM W/ NEW TUBE PLACEMENT (ALL RADIOLOGY) RIGHT   Final Result      1. ULTRASOUND AND FLUOROSCOPIC GUIDED PLACEMENT OF A RIGHT 8 Japanese  PIGTAIL LOCKING LOOP PERCUTANEOUS NEPHROSTOMY CATHETER.      2. DYSMORPHIC LIKELY CHRONICALLY OBSTRUCTED UPPER COLLECTING SYSTEM WITH GROSS PYONEPHROSIS. THIS MAY WELL REPRESENT A NONFUNCTIONING \"ABSCESSED\" KIDNEY. DUE TO THE THICK PUS, THE CATHETER WAS CONNECTED TO SUCTION BULB DRAINAGE AND ORDERS WERE WRITTEN    FOR ONCE DAILY IRRIGATION WITH 3 ML STERILE SALINE. IF URINE OUTPUT IS RESTORED, THE CATHETER MAY BE CONNECTED TO CONVENTIONAL GRAVITY BAG DRAINAGE.          INR   Date Value Ref Range Status   01/28/2024 1.08 0.87 - 1.13 Final     Comment:     INR - Non-therapeutic Reference Range: 0.87-1.13  INR - Therapeutic Reference Range: 2.0-4.0       No results found for: " "\"POCINR\"     Intake/Output Summary (Last 24 hours) at 1/29/2024 2001  Last data filed at 1/29/2024 1700  Gross per 24 hour   Intake 1640 ml   Output 2530 ml   Net -890 ml      Labs not explicitly included in this progress note were reviewed by the author. Radiology/imaging not explicitly included in this progress note was reviewed by the author.     I have performed a physical exam and reviewed and updated ROS and Plan today (1/29/2024).     56 minutes in directly providing and coordinating care and extensive data review.  No time overlap and excludes procedures.  "

## 2024-01-30 NOTE — PROGRESS NOTES
Note to reader: this note follows the APSO format rather than the historical SOAP format. Assessment and plan located at the top of the note for ease of use.    Chief Complaint  63 y.o. year old female here with uti, acute on chronic right hydronephrosis    Assessment/Plan  Interval History   UTI  Right hydronephrosis  ?abscesses non functioning right kidney s/p right drain placement 1/28      Continue antibiotics per Hospital team directed by cultures  Right NPT/JAMI to remain for now  Olmedo to be removed for voiding trial prior to discharge  Planning for outpatient follow up in 4 weeks with repeat imaging.      Case discussed with patient, Hospitalist and Urology-Dr. Nik MURILLO  Patient seen and examined      1/29. E Coli in urine culture. On Rocephin. No fever. WBC improved. Creat normal. Minimal output from right NPT attached as JAMI drain. Good UOP           Review of Systems  Physical Exam   Review of Systems   Constitutional:  Negative for chills and fever.   Gastrointestinal:  Negative for nausea and vomiting.   Genitourinary:  Negative for flank pain and hematuria.     Vitals:    01/29/24 0354 01/29/24 0756 01/29/24 1228 01/29/24 1631   BP: 116/65 137/73 (!) 145/83 122/75   Pulse: 75 72 80 86   Resp: 16 16 17 19   Temp: 36.5 °C (97.7 °F) 36.6 °C (97.9 °F) 36.6 °C (97.9 °F) 36.7 °C (98.1 °F)   TempSrc: Temporal Temporal Temporal Temporal   SpO2: 97% 98% 98% 95%   Weight: 94.7 kg (208 lb 12.4 oz)      Height:         Physical Exam  Vitals and nursing note reviewed.   Constitutional:       Appearance: Normal appearance.   HENT:      Head: Normocephalic and atraumatic.      Nose: Nose normal.      Mouth/Throat:      Mouth: Mucous membranes are moist.   Eyes:      Pupils: Pupils are equal, round, and reactive to light.   Pulmonary:      Effort: Pulmonary effort is normal.   Abdominal:      General: Abdomen is flat. There is no distension.      Palpations: Abdomen is soft.      Tenderness: There is no abdominal  tenderness.   Genitourinary:     Comments: Right flank drain in place with JAMI bulb, serous fluid with some mucus in bulb.     Olmedo draining clear  Skin:     General: Skin is warm and dry.   Neurological:      Mental Status: She is alert.          Hematology Chemistry   Lab Results   Component Value Date/Time    WBC 12.7 (H) 01/29/2024 03:06 AM    HEMOGLOBIN 10.5 (L) 01/29/2024 03:06 AM    HEMATOCRIT 32.6 (L) 01/29/2024 03:06 AM    PLATELETCT 396 01/29/2024 03:06 AM     Lab Results   Component Value Date/Time    SODIUM 134 (L) 01/29/2024 03:06 AM    POTASSIUM 4.1 01/29/2024 03:06 AM    CHLORIDE 106 01/29/2024 03:06 AM    CO2 19 (L) 01/29/2024 03:06 AM    GLUCOSE 139 (H) 01/29/2024 03:06 AM    BUN 27 (H) 01/29/2024 03:06 AM    CREATININE 0.86 01/29/2024 03:06 AM         Labs not explicitly included in this progress note were reviewed by the author.   Radiology/imaging not explicitly included in this progress note was reviewed by the author.     Radiology images reviewed, Labs reviewed and Medications reviewed

## 2024-01-31 LAB
25(OH)D3 SERPL-MCNC: 32 NG/ML (ref 30–100)
ALBUMIN SERPL BCP-MCNC: 2.7 G/DL (ref 3.2–4.9)
ALBUMIN/GLOB SERPL: 0.8 G/DL
ALP SERPL-CCNC: 504 U/L (ref 30–99)
ALT SERPL-CCNC: 35 U/L (ref 2–50)
ANION GAP SERPL CALC-SCNC: 9 MMOL/L (ref 7–16)
AST SERPL-CCNC: 41 U/L (ref 12–45)
BACTERIA BLD CULT: NORMAL
BASOPHILS # BLD AUTO: 0 % (ref 0–1.8)
BASOPHILS # BLD: 0 K/UL (ref 0–0.12)
BILIRUB SERPL-MCNC: 0.2 MG/DL (ref 0.1–1.5)
BUN SERPL-MCNC: 15 MG/DL (ref 8–22)
CALCIUM ALBUM COR SERPL-MCNC: 9 MG/DL (ref 8.5–10.5)
CALCIUM SERPL-MCNC: 8 MG/DL (ref 8.5–10.5)
CHLORIDE SERPL-SCNC: 106 MMOL/L (ref 96–112)
CO2 SERPL-SCNC: 21 MMOL/L (ref 20–33)
CREAT SERPL-MCNC: 0.87 MG/DL (ref 0.5–1.4)
EOSINOPHIL # BLD AUTO: 0.47 K/UL (ref 0–0.51)
EOSINOPHIL NFR BLD: 4.3 % (ref 0–6.9)
ERYTHROCYTE [DISTWIDTH] IN BLOOD BY AUTOMATED COUNT: 53.1 FL (ref 35.9–50)
GFR SERPLBLD CREATININE-BSD FMLA CKD-EPI: 74 ML/MIN/1.73 M 2
GGT SERPL-CCNC: 593 U/L (ref 7–34)
GLOBULIN SER CALC-MCNC: 3.3 G/DL (ref 1.9–3.5)
GLUCOSE SERPL-MCNC: 99 MG/DL (ref 65–99)
HCT VFR BLD AUTO: 31.8 % (ref 37–47)
HGB BLD-MCNC: 10.5 G/DL (ref 12–16)
LYMPHOCYTES # BLD AUTO: 1.9 K/UL (ref 1–4.8)
LYMPHOCYTES NFR BLD: 17.4 % (ref 22–41)
MANUAL DIFF BLD: NORMAL
MCH RBC QN AUTO: 27.5 PG (ref 27–33)
MCHC RBC AUTO-ENTMCNC: 33 G/DL (ref 32.2–35.5)
MCV RBC AUTO: 83.2 FL (ref 81.4–97.8)
METAMYELOCYTES NFR BLD MANUAL: 0.9 %
MONOCYTES # BLD AUTO: 0.57 K/UL (ref 0–0.85)
MONOCYTES NFR BLD AUTO: 5.2 % (ref 0–13.4)
MORPHOLOGY BLD-IMP: NORMAL
MYELOCYTES NFR BLD MANUAL: 3.5 %
NEUTROPHILS # BLD AUTO: 7.49 K/UL (ref 1.82–7.42)
NEUTROPHILS NFR BLD: 67.8 % (ref 44–72)
NEUTS BAND NFR BLD MANUAL: 0.9 % (ref 0–10)
NRBC # BLD AUTO: 0 K/UL
NRBC BLD-RTO: 0 /100 WBC (ref 0–0.2)
PLATELET # BLD AUTO: 350 K/UL (ref 164–446)
PLATELET BLD QL SMEAR: NORMAL
PMV BLD AUTO: 10.8 FL (ref 9–12.9)
POTASSIUM SERPL-SCNC: 3.9 MMOL/L (ref 3.6–5.5)
PROT SERPL-MCNC: 6 G/DL (ref 6–8.2)
RBC # BLD AUTO: 3.82 M/UL (ref 4.2–5.4)
RBC BLD AUTO: NORMAL
SIGNIFICANT IND 70042: NORMAL
SITE SITE: NORMAL
SODIUM SERPL-SCNC: 136 MMOL/L (ref 135–145)
SOURCE SOURCE: NORMAL
WBC # BLD AUTO: 10.9 K/UL (ref 4.8–10.8)

## 2024-01-31 PROCEDURE — 97535 SELF CARE MNGMENT TRAINING: CPT

## 2024-01-31 PROCEDURE — 85007 BL SMEAR W/DIFF WBC COUNT: CPT

## 2024-01-31 PROCEDURE — 700102 HCHG RX REV CODE 250 W/ 637 OVERRIDE(OP): Performed by: INTERNAL MEDICINE

## 2024-01-31 PROCEDURE — 700105 HCHG RX REV CODE 258

## 2024-01-31 PROCEDURE — 99233 SBSQ HOSP IP/OBS HIGH 50: CPT | Mod: GC | Performed by: INTERNAL MEDICINE

## 2024-01-31 PROCEDURE — 82977 ASSAY OF GGT: CPT

## 2024-01-31 PROCEDURE — 770001 HCHG ROOM/CARE - MED/SURG/GYN PRIV*

## 2024-01-31 PROCEDURE — 36415 COLL VENOUS BLD VENIPUNCTURE: CPT

## 2024-01-31 PROCEDURE — 82306 VITAMIN D 25 HYDROXY: CPT

## 2024-01-31 PROCEDURE — A9270 NON-COVERED ITEM OR SERVICE: HCPCS | Performed by: PHYSICAL MEDICINE & REHABILITATION

## 2024-01-31 PROCEDURE — A9270 NON-COVERED ITEM OR SERVICE: HCPCS

## 2024-01-31 PROCEDURE — 700102 HCHG RX REV CODE 250 W/ 637 OVERRIDE(OP): Performed by: PHYSICAL MEDICINE & REHABILITATION

## 2024-01-31 PROCEDURE — 700102 HCHG RX REV CODE 250 W/ 637 OVERRIDE(OP)

## 2024-01-31 PROCEDURE — 99233 SBSQ HOSP IP/OBS HIGH 50: CPT | Performed by: INTERNAL MEDICINE

## 2024-01-31 PROCEDURE — 700101 HCHG RX REV CODE 250: Performed by: PHYSICAL MEDICINE & REHABILITATION

## 2024-01-31 PROCEDURE — 85027 COMPLETE CBC AUTOMATED: CPT

## 2024-01-31 PROCEDURE — 80053 COMPREHEN METABOLIC PANEL: CPT

## 2024-01-31 PROCEDURE — 700101 HCHG RX REV CODE 250: Performed by: NURSE PRACTITIONER

## 2024-01-31 PROCEDURE — 97530 THERAPEUTIC ACTIVITIES: CPT

## 2024-01-31 PROCEDURE — A9270 NON-COVERED ITEM OR SERVICE: HCPCS | Performed by: INTERNAL MEDICINE

## 2024-01-31 PROCEDURE — 700111 HCHG RX REV CODE 636 W/ 250 OVERRIDE (IP)

## 2024-01-31 RX ORDER — POLYETHYLENE GLYCOL 3350 17 G/17G
1 POWDER, FOR SOLUTION ORAL 2 TIMES DAILY
Status: DISCONTINUED | OUTPATIENT
Start: 2024-01-31 | End: 2024-02-05 | Stop reason: HOSPADM

## 2024-01-31 RX ORDER — POLYETHYLENE GLYCOL 3350 17 G/17G
1 POWDER, FOR SOLUTION ORAL DAILY
Status: DISCONTINUED | OUTPATIENT
Start: 2024-01-31 | End: 2024-01-31

## 2024-01-31 RX ORDER — AMOXICILLIN 250 MG
2 CAPSULE ORAL 2 TIMES DAILY
Status: DISCONTINUED | OUTPATIENT
Start: 2024-01-31 | End: 2024-01-31

## 2024-01-31 RX ORDER — BISACODYL 10 MG
10 SUPPOSITORY, RECTAL RECTAL
Status: DISCONTINUED | OUTPATIENT
Start: 2024-01-31 | End: 2024-01-31

## 2024-01-31 RX ORDER — AMOXICILLIN 250 MG
2 CAPSULE ORAL 2 TIMES DAILY
Status: DISCONTINUED | OUTPATIENT
Start: 2024-01-31 | End: 2024-02-05 | Stop reason: HOSPADM

## 2024-01-31 RX ORDER — BISACODYL 10 MG
10 SUPPOSITORY, RECTAL RECTAL
Status: DISCONTINUED | OUTPATIENT
Start: 2024-01-31 | End: 2024-02-05 | Stop reason: HOSPADM

## 2024-01-31 RX ADMIN — SODIUM CHLORIDE, PRESERVATIVE FREE 5 ML: 5 INJECTION INTRAVENOUS at 18:36

## 2024-01-31 RX ADMIN — POLYETHYLENE GLYCOL 3350 1 PACKET: 17 POWDER, FOR SOLUTION ORAL at 16:57

## 2024-01-31 RX ADMIN — PREGABALIN 150 MG: 150 CAPSULE ORAL at 21:10

## 2024-01-31 RX ADMIN — BACLOFEN 5 MG: 10 TABLET ORAL at 21:10

## 2024-01-31 RX ADMIN — SODIUM CHLORIDE, PRESERVATIVE FREE 5 ML: 5 INJECTION INTRAVENOUS at 06:26

## 2024-01-31 RX ADMIN — MEROPENEM 500 MG: 500 INJECTION, POWDER, FOR SOLUTION INTRAVENOUS at 00:36

## 2024-01-31 RX ADMIN — DOCUSATE SODIUM 50 MG AND SENNOSIDES 8.6 MG 2 TABLET: 8.6; 5 TABLET, FILM COATED ORAL at 06:26

## 2024-01-31 RX ADMIN — FAMOTIDINE 20 MG: 20 TABLET, FILM COATED ORAL at 06:26

## 2024-01-31 RX ADMIN — PREGABALIN 150 MG: 150 CAPSULE ORAL at 06:26

## 2024-01-31 RX ADMIN — OXYCODONE 5 MG: 5 TABLET ORAL at 22:52

## 2024-01-31 RX ADMIN — PREGABALIN 150 MG: 150 CAPSULE ORAL at 15:05

## 2024-01-31 RX ADMIN — OXYCODONE 5 MG: 5 TABLET ORAL at 00:32

## 2024-01-31 RX ADMIN — LIDOCAINE 1 PATCH: 4 PATCH TOPICAL at 12:47

## 2024-01-31 RX ADMIN — MEROPENEM 500 MG: 500 INJECTION, POWDER, FOR SOLUTION INTRAVENOUS at 06:30

## 2024-01-31 RX ADMIN — FOLIC ACID 1 MG: 1 TABLET ORAL at 06:26

## 2024-01-31 RX ADMIN — FAMOTIDINE 20 MG: 20 TABLET, FILM COATED ORAL at 16:57

## 2024-01-31 RX ADMIN — DULOXETINE HYDROCHLORIDE 60 MG: 60 CAPSULE, DELAYED RELEASE ORAL at 16:57

## 2024-01-31 RX ADMIN — MAGNESIUM HYDROXIDE 30 ML: 1200 LIQUID ORAL at 07:41

## 2024-01-31 RX ADMIN — MEROPENEM 500 MG: 500 INJECTION, POWDER, FOR SOLUTION INTRAVENOUS at 12:46

## 2024-01-31 RX ADMIN — DULOXETINE HYDROCHLORIDE 60 MG: 60 CAPSULE, DELAYED RELEASE ORAL at 06:26

## 2024-01-31 RX ADMIN — MEROPENEM 500 MG: 500 INJECTION, POWDER, FOR SOLUTION INTRAVENOUS at 16:57

## 2024-01-31 RX ADMIN — FERROUS SULFATE TAB 325 MG (65 MG ELEMENTAL FE) 325 MG: 325 (65 FE) TAB at 06:26

## 2024-01-31 RX ADMIN — POLYETHYLENE GLYCOL 3350 1 PACKET: 17 POWDER, FOR SOLUTION ORAL at 09:17

## 2024-01-31 ASSESSMENT — ENCOUNTER SYMPTOMS
FOCAL WEAKNESS: 1
SENSORY CHANGE: 1
PSYCHIATRIC NEGATIVE: 1
COUGH: 0
CHILLS: 0
VOMITING: 0
FEVER: 0
SPEECH CHANGE: 1
HEADACHES: 0
FLANK PAIN: 1
NAUSEA: 0
SHORTNESS OF BREATH: 0
WEAKNESS: 0
MYALGIAS: 0
ABDOMINAL PAIN: 0
FLANK PAIN: 0

## 2024-01-31 ASSESSMENT — COGNITIVE AND FUNCTIONAL STATUS - GENERAL
DRESSING REGULAR UPPER BODY CLOTHING: A LOT
MOVING FROM LYING ON BACK TO SITTING ON SIDE OF FLAT BED: UNABLE
STANDING UP FROM CHAIR USING ARMS: A LOT
WALKING IN HOSPITAL ROOM: TOTAL
CLIMB 3 TO 5 STEPS WITH RAILING: TOTAL
TOILETING: A LOT
MOBILITY SCORE: 7
SUGGESTED CMS G CODE MODIFIER MOBILITY: CM
DRESSING REGULAR LOWER BODY CLOTHING: TOTAL
HELP NEEDED FOR BATHING: A LOT
PERSONAL GROOMING: A LITTLE
MOVING TO AND FROM BED TO CHAIR: UNABLE
TURNING FROM BACK TO SIDE WHILE IN FLAT BAD: UNABLE
EATING MEALS: A LITTLE
DAILY ACTIVITIY SCORE: 13
SUGGESTED CMS G CODE MODIFIER DAILY ACTIVITY: CL

## 2024-01-31 ASSESSMENT — PAIN DESCRIPTION - PAIN TYPE
TYPE: ACUTE PAIN
TYPE: ACUTE PAIN

## 2024-01-31 ASSESSMENT — GAIT ASSESSMENTS: GAIT LEVEL OF ASSIST: UNABLE TO PARTICIPATE

## 2024-01-31 NOTE — CARE PLAN
The patient is Stable - Low risk of patient condition declining or worsening    Shift Goals  Clinical Goals: Monitor JAMI drain, v/s, labs and fall precautions  Patient Goals: Rest and comfort  Family Goals: ELIZABETH no family members preseny at this time.    Progress made toward(s) clinical / shift goals:    Problem: Skin Integrity  Goal: Skin integrity is maintained or improved  Description: Target End Date:  Prior to discharge or change in level of care    Document interventions on Skin Risk/Juan David flowsheet groups and corresponding LDA    1.  Assess and monitor skin integrity, appearance and/or temperature  2.  Assess risk factors for impaired skin integrity and/or pressures ulcers  3.  Implement precautions to protect skin integrity in collaboration with interdisciplinary team  4.  Implement pressure ulcer prevention protocol if at risk for skin breakdown  5.  Confirm wound care consult if at risk for skin breakdown  6.  Ensure patient use of pressure relieving devices  (Low air loss bed, waffle overlay, heel protectors, ROHO cushion, etc)  Outcome: Progressing  Note: Skin assessed at beginning of shift, patient changed when filomena pads damp, q2 turns.     Problem: Hemodynamics  Goal: Patient's hemodynamics, fluid balance and neurologic status will be stable or improve  Description: Target End Date:  Prior to discharge or change in level of care    Document on Assessment and I/O flowsheet templates    1.  Monitor vital signs, pulse oximetry and cardiac monitor per provider order and/or policy  2.  Maintain blood pressure per provider order  3.  Hemodynamic monitoring per provider order  4.  Manage IV fluids and IV infusions  5.  Monitor intake and output  6.  Daily weights per unit policy or provider order  7.  Assess peripheral pulses and capillary refill  8.  Assess color and body temperature  9.  Position patient for maximum circulation/cardiac output  10. Monitor for signs/symptoms of excessive bleeding  11. Assess  mental status, restlessness and changes in level of consciousness  12. Monitor temperature and report fever or hypothermia to provider immediately. Consideration of targeted temperature management.  Outcome: Progressing  Note: Pt remained hemodynamically stable evidenced by stable vital sings, proper urine output and adequate fluid intake.

## 2024-01-31 NOTE — CARE PLAN
The patient is Stable - Low risk of patient condition declining or worsening    Shift Goals  Clinical Goals: IV abx, infection control, monitor labs and v/s.  Patient Goals: Rest and get better.  Family Goals: ELIZABETH (no family member present at this time.)    Progress made toward(s) clinical / shift goals:    Problem: Knowledge Deficit - Standard  Goal: Patient and family/care givers will demonstrate understanding of plan of care, disease process/condition, diagnostic tests and medications  Description: Target End Date:  1-3 days or as soon as patient condition allows    Document in Patient Education    1.  Patient and family/caregiver oriented to unit, equipment, visitation policy and means for communicating concern  2.  Complete/review Learning Assessment  3.  Assess knowledge level of disease process/condition, treatment plan, diagnostic tests and medications  4.  Explain disease process/condition, treatment plan, diagnostic tests and medications  Outcome: Progressing  Note: Patient updated regarding plan of care and current treatment. All questions answered. Pt voiced understanding.       Problem: Pain - Standard  Goal: Alleviation of pain or a reduction in pain to the patient’s comfort goal  Description: Target End Date:  Prior to discharge or change in level of care    Document on Vitals flowsheet    1.  Document pain using the appropriate pain scale per order or unit policy  2.  Educate and implement non-pharmacologic comfort measures (i.e. relaxation, distraction, massage, cold/heat therapy, etc.)  3.  Pain management medications as ordered  4.  Reassess pain after pain med administration per policy  5.  If opiods administered assess patient's response to pain medication is appropriate per POSS sedation scale  6.  Follow pain management plan developed in collaboration with patient and interdisciplinary team (including palliative care or pain specialists if applicable)  Outcome: Not Progressing  Note: Assess  and monitor pt's pain scale, current pain level is 7/10. Educate pt about her non pharmacological vs pharmacological interventions. Pt prefers pharmacological interventions.     Problem: Skin Integrity  Goal: Skin integrity is maintained or improved  Description: Target End Date:  Prior to discharge or change in level of care    Document interventions on Skin Risk/Juan David flowsheet groups and corresponding LDA    1.  Assess and monitor skin integrity, appearance and/or temperature  2.  Assess risk factors for impaired skin integrity and/or pressures ulcers  3.  Implement precautions to protect skin integrity in collaboration with interdisciplinary team  4.  Implement pressure ulcer prevention protocol if at risk for skin breakdown  5.  Confirm wound care consult if at risk for skin breakdown  6.  Ensure patient use of pressure relieving devices  (Low air loss bed, waffle overlay, heel protectors, ROHO cushion, etc)  Outcome: Progressing  Note: Pt is on q 2 turns.Pt educated about the importance of frequent repositioning to prevent skin breakdown. Encouraged turning in bed and notifying staff for help. Pt verbalized understanding. Appropriate dressings in place. Extra pillows in place for comfort, between knees, and to float heels. Barrier cream applied as appropriate. Pt cleaned and linens changed frequently as appropriate.            Patient is not progressing towards the following goals:      Problem: Pain - Standard  Goal: Alleviation of pain or a reduction in pain to the patient’s comfort goal  Description: Target End Date:  Prior to discharge or change in level of care    Document on Vitals flowsheet    1.  Document pain using the appropriate pain scale per order or unit policy  2.  Educate and implement non-pharmacologic comfort measures (i.e. relaxation, distraction, massage, cold/heat therapy, etc.)  3.  Pain management medications as ordered  4.  Reassess pain after pain med administration per policy  5.  If  opiods administered assess patient's response to pain medication is appropriate per POSS sedation scale  6.  Follow pain management plan developed in collaboration with patient and interdisciplinary team (including palliative care or pain specialists if applicable)  Outcome: Not Progressing  Note: Assess and monitor pt's pain scale, current pain level is 7/10. Educate pt about her non pharmacological vs pharmacological interventions. Pt prefers pharmacological interventions.

## 2024-01-31 NOTE — PROGRESS NOTES
Note to reader: this note follows the APSO format rather than the historical SOAP format. Assessment and plan located at the top of the note for ease of use.    Chief Complaint  63 y.o. year old female here with uti, acute on chronic right hydronephrosis    Assessment/Plan  Interval History   UTI  Right hydronephrosis  Likely abscessed non functioning right kidney s/p right drain placement 1/28      Continue antibiotics per ID  Right NPT/JAMI to remain for now. Will plan to exchange JAMI bulb for NPT drain bag prior to discharge.  NPT will remain upon discharge until f/u in our office.  Planning for outpatient follow up in 4 weeks with repeat imaging    No further inpatient urologic intervention required at this time.  Urology signing off. Call with any questions or concerns.    Case discussed with patient and Urology-Dr. Jag MURILLO  Patient seen and examined    1/31. Patient reports feeling better today. ID managing antibiotic treatment. Olmedo discontinued, patient is voiding without difficulty. Minimal drain output from right percutaneous drain. Cr 0.87.    1/30. ID consulted, on Meropenem through 2/6. No fever. WBC improved. Creat normal. Right percutaneous drain with 30 cc/24 hours. Olmedo has been draining well with good UOP. Rehab placement planned.     1/29. E Coli in urine culture. On Rocephin. No fever. WBC improved. Creat normal. Minimal output from right NPT attached as JAMI drain. Good UOP           Review of Systems  Physical Exam   Review of Systems   Constitutional:  Negative for chills and fever.   Gastrointestinal:  Negative for nausea and vomiting.   Genitourinary:  Negative for flank pain and hematuria.     Vitals:    01/30/24 1530 01/30/24 2008 01/31/24 0438 01/31/24 0739   BP: 132/83 (!) 140/93 126/80 126/74   Pulse: 100 85 85 83   Resp: 16 16 16 16   Temp: 36.2 °C (97.2 °F) 36.8 °C (98.2 °F) 36.8 °C (98.2 °F) 36.8 °C (98.2 °F)   TempSrc: Temporal Temporal Temporal Temporal   SpO2: 97% 97% 95% 96%    Weight:       Height:         Physical Exam  Vitals and nursing note reviewed.   Constitutional:       Appearance: Normal appearance.   HENT:      Head: Normocephalic and atraumatic.      Nose: Nose normal.      Mouth/Throat:      Mouth: Mucous membranes are moist.   Eyes:      Pupils: Pupils are equal, round, and reactive to light.   Pulmonary:      Effort: Pulmonary effort is normal.   Abdominal:      General: Abdomen is flat. There is no distension.      Palpations: Abdomen is soft.      Tenderness: There is no abdominal tenderness.   Genitourinary:     Comments: Right flank drain in place with JAMI bulb, serous fluid in bulb    Olmedo draining clear  Skin:     General: Skin is warm and dry.   Neurological:      Mental Status: She is alert.          Hematology Chemistry   Lab Results   Component Value Date/Time    WBC 10.9 (H) 01/31/2024 03:57 AM    HEMOGLOBIN 10.5 (L) 01/31/2024 03:57 AM    HEMATOCRIT 31.8 (L) 01/31/2024 03:57 AM    PLATELETCT 350 01/31/2024 03:57 AM     Lab Results   Component Value Date/Time    SODIUM 136 01/31/2024 03:57 AM    POTASSIUM 3.9 01/31/2024 03:57 AM    CHLORIDE 106 01/31/2024 03:57 AM    CO2 21 01/31/2024 03:57 AM    GLUCOSE 99 01/31/2024 03:57 AM    BUN 15 01/31/2024 03:57 AM    CREATININE 0.87 01/31/2024 03:57 AM         Labs not explicitly included in this progress note were reviewed by the author.   Radiology/imaging not explicitly included in this progress note was reviewed by the author.     Core Measures

## 2024-01-31 NOTE — PROGRESS NOTES
Infectious Disease Progress Note    Author: Billy Pritchett M.D. Date & Time of service: 2024  8:19 AM    Chief Complaint:  Follow-up for pyonephrosis    Interval History:   Patient remains afebrile, white count is 10.9, tolerating meropenem, cultures as below, creatinine 0.87, normal transaminases    Labs Reviewed and Medications Reviewed.    Review of Systems:  Review of Systems   Genitourinary:  Positive for flank pain.       Hemodynamics:  Temp (24hrs), Av.6 °C (97.8 °F), Min:36.2 °C (97.2 °F), Max:36.8 °C (98.2 °F)  Temperature: 36.8 °C (98.2 °F)  Pulse  Av.1  Min: 61  Max: 116   Blood Pressure: 126/74       Physical Exam:  Physical Exam  Vitals and nursing note reviewed.   Constitutional:       General: She is not in acute distress.     Appearance: She is ill-appearing.   HENT:      Mouth/Throat:      Pharynx: No oropharyngeal exudate.   Eyes:      Conjunctiva/sclera: Conjunctivae normal.   Cardiovascular:      Rate and Rhythm: Normal rate.   Pulmonary:      Effort: No respiratory distress.      Breath sounds: No stridor.   Abdominal:      General: There is no distension.      Comments: Right-sided percutaneous nephrostomy tube with purulent output in bulb   Skin:     Findings: No erythema.   Neurological:      Comments: Residual chronic right-sided hemiparesis and some word finding difficulty, aphasia         Meds:    Current Facility-Administered Medications:     senna-docusate **AND** polyethylene glycol/lytes **AND** magnesium hydroxide **AND** bisacodyl    baclofen    lidocaine    normal saline flush    meropenem    [Held by provider] enoxaparin (LOVENOX) injection    DULoxetine    famotidine **OR** [DISCONTINUED] famotidine    ferrous sulfate    folic acid    pregabalin    acetaminophen    diphenhydrAMINE    ipratropium-albuterol    labetalol    ondansetron    ondansetron    oxyCODONE immediate-release **OR** oxyCODONE immediate-release **OR** morphine injection    prochlorperazine     promethazine    promethazine    Notify provider if pain remains uncontrolled **AND** Use the Numeric Rating Scale (NRS), Lyles-Baker Faces (WBF), or FLACC on regular floors and Critical-Care Pain Observation Tool (CPOT) on ICUs/Trauma to assess pain **AND** Pulse Ox **AND** Pharmacy Consult Request **AND** If patient difficult to arouse and/or has respiratory depression (respiratory rate of 10 or less), stop any opiates that are currently infusing and call a Rapid Response.    Labs:  Recent Labs     01/29/24  0306 01/30/24  0338 01/31/24  0357   WBC 12.7* 11.5* 10.9*   RBC 3.86* 3.85* 3.82*   HEMOGLOBIN 10.5* 10.5* 10.5*   HEMATOCRIT 32.6* 32.7* 31.8*   MCV 84.5 84.9 83.2   MCH 27.2 27.3 27.5   RDW 53.1* 53.2* 53.1*   PLATELETCT 396 361 350   MPV 10.5 10.7 10.8   NEUTSPOLYS 69.10 76.50* 67.80   LYMPHOCYTES 19.70* 14.80* 17.40*   MONOCYTES 6.00 7.00 5.20   EOSINOPHILS 0.10 0.00 4.30   BASOPHILS 0.20 0.00 0.00   RBCMORPHOLO  --  Normal Normal     Recent Labs     01/29/24  0306 01/30/24  0338 01/31/24  0357   SODIUM 134* 137 136   POTASSIUM 4.1 4.0 3.9   CHLORIDE 106 107 106   CO2 19* 19* 21   GLUCOSE 139* 105* 99   BUN 27* 20 15     Recent Labs     01/29/24  0306 01/30/24  0338 01/31/24  0357   ALBUMIN 2.6* 1.9* 2.7*   TBILIRUBIN <0.2 0.2 0.2   ALKPHOSPHAT 386* 432* 504*   TOTPROTEIN 5.8* 5.8* 6.0   ALTSGPT 34 38 35   ASTSGOT 71* 69* 41   CREATININE 0.86 0.82 0.87       Imaging:  DX-SHOULDER 2+ LEFT    Result Date: 1/30/2024 1/30/2024 12:58 PM HISTORY/REASON FOR EXAM:  Atraumatic Pain/Swelling/Deformity; decreased ROM, eval for extent of possible OA TECHNIQUE/EXAM DESCRIPTION AND NUMBER OF VIEWS:  3 views of the LEFT shoulder. COMPARISON: None FINDINGS: No acute fracture or dislocation. Severe osteoarthritis of the glenohumeral joint. Elevation of the humeral head abutting the acromion could relate to chronic rotator cuff tear.     Severe osteoarthritis of the glenohumeral joint. Elevation of the humeral head abutting  the acromion could relate to chronic rotator cuff tear.    IR-NEPHROSTOGRAM W/ NEW TUBE PLACEMENT (ALL RADIOLOGY) RIGHT    Result Date: 1/28/2024 1/28/2024 12:10 PM HISTORY/REASON FOR EXAM:  Gram-negative sepsis. Hydronephrotic markedly atrophic right kidney. Unsuccessful attempt of retrograde right ureteral stent placement on 1/26/2024. Percutaneous nephrostomy requested. TECHNIQUE/EXAM DESCRIPTION: RIGHT Percutaneous Nephrostomy and Nephrostogram with ultrasound and fluoroscopic guidance. PROCEDURE: Informed consent was obtained. The skin was prepped and draped in a sterile fashion using chlorhexidine antiseptic. SEDATION: Moderate sedation was provided. Pulse oximetry and continuous cardiac monitoring by the nurse was performed throughout the exam. Intraservice time was 45 minutes. CURRENT ALARA PRINCIPLES FOR DOSE REDUCTION TECHNIQUES WERE UTILIZED FOR THIS EXAMINATION. ALL ELEMENTS OF MAXIMAL STERILE BARRIER TECHNIQUE WERE FOLLOWED. FLUOROSCOPY TIME: 7.4 minutes FLUOROSCOPY DOSE(DAP): 5.37 Gy*cm^2 FLUOROSCOPY DOSE: 37.8 mGy NUMBER OF FILMS: 13 fluoroscopic frame capture images and/or digital series obtained. CONTRAST AMOUNT: 15 mL Omnipaque The RIGHT kidney was localized with real time ultrasound. Dilated upper collecting system identified concordant with CT. Following local anesthesia with 1% lidocaine, an AccuStick 21-gauge needle was advanced into the upper collecting system. There was no urine return, however, aspiration upon the needle yielded thick beige pus. A 2 mL sample was collected and sent for culture and sensitivity and Gram stain. Next, a limited antegrade pyelogram was performed showing contained contrast within somewhat dysmorphic upper collecting system structures. A 0.018 guidewire was placed. An AccuStick coaxial catheter was then placed. Next, an Amplatz guidewire was placed and looped within opacified the upper collecting system. A 0.018 guidewire was left in place as a safety wire. Next,  "following serial tract dilatation, an 8 [place]ian locking loop nephrostomy catheter was placed and the locking loop secured. Limited nephrostogram confirms catheter position without extravasation. Contrast injection was limited to avoid bacteremia in in this grossly pyonephrotic right kidney. The catheter was sutured to the skin and connected to a suction bulb. Suction bulb was placed rather than a gravity bag as there was no significant urine return, only thick pus. The patient tolerated the procedure well with no evidence of complication. COMPARISON:  Outside films CT of the abdomen and pelvis 1/24/2024. FINDINGS:  The nephrostogram shows the catheter to be in satisfactory position. There is no extravasation. The upper collecting system appears isolated. There was no visualization of the UPJ or proximal ureter. Gross pyonephrosis.     1. ULTRASOUND AND FLUOROSCOPIC GUIDED PLACEMENT OF A RIGHT 8 Armenian  PIGTAIL LOCKING LOOP PERCUTANEOUS NEPHROSTOMY CATHETER. 2. DYSMORPHIC LIKELY CHRONICALLY OBSTRUCTED UPPER COLLECTING SYSTEM WITH GROSS PYONEPHROSIS. THIS MAY WELL REPRESENT A NONFUNCTIONING \"ABSCESSED\" KIDNEY. DUE TO THE THICK PUS, THE CATHETER WAS CONNECTED TO SUCTION BULB DRAINAGE AND ORDERS WERE WRITTEN FOR ONCE DAILY IRRIGATION WITH 3 ML STERILE SALINE. IF URINE OUTPUT IS RESTORED, THE CATHETER MAY BE CONNECTED TO CONVENTIONAL GRAVITY BAG DRAINAGE.      Micro:  Results       Procedure Component Value Units Date/Time    CULTURE WOUND W/ GRAM STAIN [[id number]]  (Abnormal)  (Susceptibility) Collected: 01/28/24 1358    Order Status: Completed Specimen: Wound Updated: 01/30/24 1553     Significant Indicator POS     Source WND     Site R perinephric aspirate     Culture Result -     Gram Stain Result Moderate WBCs.  Few Gram negative rods.       Culture Result Escherichia coli ESBL  Moderate growth  Extended Spectrum Beta-lactamase (ESBL) isolated.  ESBL's may be clinically resistant to therapy " with  Penicillins,Cephalosporins or Aztreonam despite  apparent in vitro susceptibility to some of these agents.  The patient requires contact isolation.  Please contact pharmacy or an Infectious Disease Specialist  if you have any questions about appropriate therapy.      Susceptibility       Escherichia coli esbl (1)       Antibiotic Interpretation Microscan   Method Status    Ampicillin Resistant >16 mcg/mL ANISH Final    Ceftriaxone Resistant >32 mcg/mL ANISH Final    Cefazolin Resistant >16 mcg/mL ANISH Final    Ciprofloxacin Resistant >2 mcg/mL ANISH Final    Cefepime Resistant >16 mcg/mL ANISH Final    Cefuroxime Resistant >16 mcg/mL ANISH Final    Ampicillin/sulbactam Intermediate 16/8 mcg/mL ANISH Final    Ertapenem Sensitive <=0.5 mcg/mL ANISH Final    Tobramycin Resistant >8 mcg/mL ANISH Final    Gentamicin Resistant >8 mcg/mL ANISH Final    Levofloxacin Resistant >4 mcg/mL ANISH Final    Minocycline Sensitive <=4 mcg/mL ANISH Final    Moxifloxacin Resistant >4 mcg/mL ANISH Final    Pip/Tazobactam Sensitive <=8 mcg/mL ANISH Final    Trimeth/Sulfa Sensitive <=0.5/9.5 mcg/mL ANISH Final    Tigecycline Sensitive <=2 mcg/mL ANISH Final                       Fungal Culture [[id number]] Collected: 01/28/24 1358    Order Status: Completed Specimen: Wound Updated: 01/30/24 1553     Significant Indicator NEG     Source WND     Site R perinephric aspirate     Culture Result No fungal growth to date.     Fungal Smear Results No fungal elements seen.    AFB Culture [[id number]] Collected: 01/28/24 1358    Order Status: Completed Specimen: Wound Updated: 01/30/24 1553     Significant Indicator NEG     Source WND     Site R perinephric aspirate     Culture Result Culture in progress.     AFB Smear Results No acid fast bacilli seen.    Fungal Smear [[id number]] Collected: 01/28/24 1358    Order Status: Completed Specimen: Wound Updated: 01/29/24 1812     Significant Indicator NEG     Source WND     Site R perinephric aspirate     Fungal Smear Results No  "fungal elements seen.    GRAM STAIN [[id number]] Collected: 01/28/24 1358    Order Status: Completed Specimen: Wound Updated: 01/29/24 1812     Significant Indicator .     Source WND     Site R perinephric aspirate     Gram Stain Result Moderate WBCs.  Few Gram negative rods.      Acid Fast Stain [[id number]] Collected: 01/28/24 1358    Order Status: Completed Specimen: Wound Updated: 01/29/24 1812     Significant Indicator NEG     Source WND     Site R perinephric aspirate     AFB Smear Results No acid fast bacilli seen.    FLUID CULTURE W/GRAM STAIN [[id number]] Collected: 01/28/24 1358    Order Status: Canceled Specimen: Other     Fungal Culture [[id number]]     Order Status: No result Specimen: Respirate from Other Body Fluid     AFB Culture [[id number]]     Order Status: No result Specimen: Respirate from Other Body Fluid     FLUID CULTURE W/GRAM STAIN [[id number]]     Order Status: No result Specimen: Other Body Fluid     BLOOD CULTURE [[id number]] Collected: 01/27/24 1717    Order Status: Completed Specimen: Blood from Peripheral Updated: 01/28/24 1329     Significant Indicator NEG     Source BLD     Site PERIPHERAL     Culture Result No Growth  Note: Blood cultures are incubated for 5 days and  are monitored continuously.Positive blood cultures  are called to the RN and reported as soon as  they are identified.      Narrative:      Per Hospital Policy: Only change Specimen Src: to \"Line\" if  specified by physician order.  Left AC            Assessment:  Nydia Finch is a 63 y.o. 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 Patient was transferred to [facility] for further management.  On 1/26, patient underwent " cystoscopy, they were unable to pass with a wire due to extensive scarring so IR was consulted. A right-sided percutaneous nephrostomy tube was placed on 1/28, gross pus was aspirated during the procedure and continues to drain alma pus from the nephrostomy tube.  Cultures from this growing ESBL E. coli.      Pertinent Diagnoses:  Right pyelonephritis with pyonephrosis  Right hydroureteronephrosis  ESBL E. coli infection  History of nephrolithiasis  Right nonfunctioning kidney  History of CVA with residual right-sided hemiparesis and expressive aphasia  Chronic pain  COPD    Plan:  -This will be treated similar to a alma abscess rather than a typical pyelonephritis. Agree with IV meropenem for now  -Follow pending blood cultures x 2 from 1/26, no growth till date  -IR drain draining alma pus, recommend repeat CT scan in approximately 5 days to help assess appropriate antibiotic duration (~2/2)  -Urology planning repeat assessment in the outpatient setting in approximately 4 weeks     Disposition: Unable to determine at this time  Need for PICC line: Unable to determine at this time     Plan was discussed with the primary team, Dr. Davis.  ID will follow.  This illness poses a threat to life

## 2024-01-31 NOTE — CARE PLAN
The patient is Stable - Low risk of patient condition declining or worsening    Shift Goals  Clinical Goals: IV abx, infection control, monitor labs and v/s.  Patient Goals: Rest and get better.  Family Goals: ELIZABETH (no family member present at this time.)    Progress made toward(s) clinical / shift goals:    Problem: Skin Integrity  Goal: Skin integrity is maintained or improved  Description: Target End Date:  Prior to discharge or change in level of care    Document interventions on Skin Risk/Juan David flowsheet groups and corresponding LDA    1.  Assess and monitor skin integrity, appearance and/or temperature  2.  Assess risk factors for impaired skin integrity and/or pressures ulcers  3.  Implement precautions to protect skin integrity in collaboration with interdisciplinary team  4.  Implement pressure ulcer prevention protocol if at risk for skin breakdown  5.  Confirm wound care consult if at risk for skin breakdown  6.  Ensure patient use of pressure relieving devices  (Low air loss bed, waffle overlay, heel protectors, ROHO cushion, etc)  Outcome: Progressing  Note: Patient's skin assessed at beginning of shift, Q2 turns, pure wick, bedding changed when damp     Problem: Fall Risk  Goal: Patient will remain free from falls  Description: Target End Date:  Prior to discharge or change in level of care    Document interventions on the [facility] Fall Risk Assessment    1.  Assess for fall risk factors  2.  Implement fall precautions  Outcome: Progressing  Note: Patient has remained free of falls this shift. Instructed patient to use call light for help. Call light always placed within reach when leaving room. Patient's room has been cleared of clutter such as cords and extra chairs. Bed alarm is in place and double checked for proper function.

## 2024-01-31 NOTE — PROGRESS NOTES
[facility] Internal Medicine Daily Progress Note    Date of Service  1/30/2024    [employer] Team: R IM Blue Team   Attending: Wyatt Preciado M.d.  Senior Resident: Dr. Bobby  Intern:  Dr. Davis  Contact Number: [phone]    Chief Complaint  Nydia Finch is a 63 y.o. female admitted 1/27/2024 with history of right flank pain and was diagnosed with sepsis secondary to UTI with right-sided hydroureteronephrosis and was transferred from outside hospital for further management.    Hospital Course   63 y.o. 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  Patient was originally admitted  01/18 at [facility] with right flank and right hip pain and had sepsis secondary to UTI in setting of right hydroureteronephrosis with a prior history of ureteral stone.  In 2009 patient was noted with a ureteral stone at which point in time she had a stent placed and was lost to follow-up.     During this admission, patient was started on IV Zosyn however she had developed a diffuse drug rash therefore has been switched to ceftriaxone. Solumedrol was started for drug rash that eventually resolved, and therefore was Dc'ed on 1/29/24.     For her right hydroureteronephrosis, urology attempted retrograde right ureteral stent attempted on 01/26 which was unsuccessful due to excessive scarring. therefore patient was transferred from [facility] to [facility] for interventional radiology guided right nephrostomy tube placement. Patient underwent right percutaneous nephrostomy placement on 01/28/2024.      Interval Problem Update  Patient hemodynamically stable, no acute events overnight.  Patient states that her pain is very well-controlled.  Denies history of nausea and  vomiting, fever, night sweats  Started meropenem today, with ID following.   Called patient's , regarding update    I have discussed this patient's plan of care and discharge plan at IDT rounds today with Case Management, Nursing, Nursing leadership, and other members of the IDT team.    Consultants/Specialty  1/30/24  urology: follow up out patient in 4 weeks, tentative plan to remin with nephrostomy tube indefinitely.   ID: agree with meropem. Repeat CT scan in 5 days to further guide antibiotic regiment  Radiology: irrigate only if no pain provoked, radiology following   PMR: IRF accepted, needs plans for nephrostomy tube removal and abx duration     Code Status  Full Code    Disposition  The patient is not medically cleared for discharge to home or a post-acute facility.  Anticipate discharge to: an inpatient rehabilitation hospital  Inpatient needs: planned for repeat CT in 5 days for antibiotic regiment guidance.  PT: possibly HH  PMR: accepted to inpatient rehab  I have placed the appropriate orders for post-discharge needs.    Review of Systems  ROS   Constitutional:  Positive for fever and malaise/fatigue. Negative for chills and diaphoresis.   HENT: Negative.  Negative for nosebleeds and sinus pain.    Eyes: Negative.  Negative for double vision, photophobia, discharge and redness.   Respiratory: Negative.  Negative for cough, sputum production, shortness of breath, wheezing and stridor.    Cardiovascular: Negative.  Negative for chest pain, orthopnea, leg swelling and PND.   Gastrointestinal:  Positive for abdominal pain and nausea. Negative for blood in stool and heartburn.   Genitourinary:  Positive for flank pain. Negative for dysuria and frequency.   Musculoskeletal:  Negative for back pain, falls and myalgias.   Skin:  Positive for rash. Negative for itching.   Neurological:  Positive for weakness (On her entire right side secondary to stroke in 1994). Negative for dizziness, tingling, sensory  change, focal weakness and seizures.   Endo/Heme/Allergies: Negative.  Negative for polydipsia. Does not bruise/bleed easily.   Psychiatric/Behavioral: Negative.  Negative for depression, substance abuse and suicidal ideas. The patient does not have insomnia.    Physical Exam  Temp:  [36.2 °C (97.2 °F)-36.7 °C (98.1 °F)] 36.2 °C (97.2 °F)  Pulse:  [] 100  Resp:  [15-19] 16  BP: (117-138)/(83-95) 132/83  SpO2:  [93 %-97 %] 97 %    Physical Exam  Constitutional:       General: She is awake.      Appearance: Normal appearance. She is well-developed, well-groomed and normal weight.   HENT:      Head: Normocephalic and atraumatic.      Jaw: There is normal jaw occlusion. No trismus.      Salivary Glands: Right salivary gland is not tender. Left salivary gland is not tender.      Right Ear: External ear normal.      Left Ear: External ear normal.      Mouth/Throat:      Mouth: Mucous membranes are moist.      Pharynx: Oropharynx is clear.   Eyes:      General: Lids are normal. Vision grossly intact.      Extraocular Movements: Extraocular movements intact.      Conjunctiva/sclera: Conjunctivae normal.      Right eye: Right conjunctiva is not injected. No exudate.     Left eye: Left conjunctiva is not injected. No exudate.     Pupils: Pupils are equal, round, and reactive to light.   Neck:      Thyroid: No thyroid mass.      Vascular: No hepatojugular reflux or JVD.      Trachea: No abnormal tracheal secretions or tracheal deviation.   Cardiovascular:      Rate and Rhythm: Regular rhythm.     Pulses: Normal pulses.      Heart sounds: Normal heart sounds. No murmur heard.     No friction rub.   Pulmonary:      Effort: Pulmonary effort is normal.      Breath sounds: Normal breath sounds. No wheezing or rhonchi.   Abdominal:      General: Abdomen is flat. Bowel sounds are normal.      Palpations: Abdomen is soft.      Tenderness: There is no right CVA tenderness or left CVA tenderness.      Hernia: No hernia is present.    Musculoskeletal:      Cervical back: Full passive range of motion without pain, normal range of motion and neck supple. No rigidity. No muscular tenderness.      Right lower leg: No edema.      Left lower leg: No edema.   Lymphadenopathy:      Head:      Right side of head: No submental adenopathy.      Left side of head: No submental adenopathy.      Cervical:      Right cervical: No superficial cervical adenopathy.     Left cervical: No superficial cervical adenopathy.      Upper Body:      Right upper body: No supraclavicular adenopathy.      Left upper body: No supraclavicular adenopathy.   Skin:     General: Skin is warm and dry.      Coloration: Skin is not cyanotic or pale.      Findings: Mild erythema and rash (Head to toe skin rash from drug reaction) present. No abrasion or bruising.   Neurological:      General: No focal deficit present.      Mental Status: She is alert and oriented to person, place, and time. Mental status is at baseline.      GCS: GCS eye subscore is 4. GCS verbal subscore is 5. GCS motor subscore is 6.      Cranial Nerves: No cranial nerve deficit.      Sensory: No sensory deficit.      Motor: Weakness and atrophy present.      Coordination: Coordination abnormal. Finger-Nose-Finger Test abnormal. Impaired rapid alternating movements.      Gait: Gait abnormal and tandem walk abnormal.      Deep Tendon Reflexes:      Reflex Scores:       Tricep reflexes are 2+ on the right side and 2+ on the left side.       Bicep reflexes are 2+ on the right side and 2+ on the left side.       Brachioradialis reflexes are 2+ on the right side and 2+ on the left side.       Patellar reflexes are 2+ on the right side and 2+ on the left side.       Achilles reflexes are 2+ on the right side and 2+ on the left side.     Comments: Patient has complete paralysis on the right side due to previous stroke, the hemiplegia since 1994   Psychiatric:         Attention and Perception: Attention and perception  "normal.         Mood and Affect: Mood normal.         Speech: Speech normal.         Behavior: Behavior is cooperative.         Thought Content: Thought content normal.         Cognition and Memory: Cognition and memory normal.         Judgment: Judgment normal.     Fluids    Intake/Output Summary (Last 24 hours) at 1/30/2024 1633  Last data filed at 1/30/2024 1100  Gross per 24 hour   Intake 730 ml   Output 430 ml   Net 300 ml       Laboratory  Recent Labs     01/28/24  0807 01/29/24  0306 01/30/24  0338   WBC 10.9* 12.7* 11.5*   RBC 4.27 3.86* 3.85*   HEMOGLOBIN 11.6* 10.5* 10.5*   HEMATOCRIT 35.9* 32.6* 32.7*   MCV 84.1 84.5 84.9   MCH 27.2 27.2 27.3   MCHC 32.3 32.2 32.1*   RDW 52.9* 53.1* 53.2*   PLATELETCT 443 396 361   MPV 10.5 10.5 10.7     Recent Labs     01/28/24  0807 01/29/24  0306 01/30/24  0338   SODIUM 132* 134* 137   POTASSIUM 4.8 4.1 4.0   CHLORIDE 105 106 107   CO2 19* 19* 19*   GLUCOSE 188* 139* 105*   BUN 26* 27* 20   CREATININE 0.86 0.86 0.82   CALCIUM 8.6 8.3* 7.9*     Recent Labs     01/28/24  1149   INR 1.08               Imaging  DX-SHOULDER 2+ LEFT   Final Result         Severe osteoarthritis of the glenohumeral joint.   Elevation of the humeral head abutting the acromion could relate to chronic rotator cuff tear.      IR-NEPHROSTOGRAM W/ NEW TUBE PLACEMENT (ALL RADIOLOGY) RIGHT   Final Result      1. ULTRASOUND AND FLUOROSCOPIC GUIDED PLACEMENT OF A RIGHT 8 [place]  PIGTAIL LOCKING LOOP PERCUTANEOUS NEPHROSTOMY CATHETER.      2. DYSMORPHIC LIKELY CHRONICALLY OBSTRUCTED UPPER COLLECTING SYSTEM WITH GROSS PYONEPHROSIS. THIS MAY WELL REPRESENT A NONFUNCTIONING \"ABSCESSED\" KIDNEY. DUE TO THE THICK PUS, THE CATHETER WAS CONNECTED TO SUCTION BULB DRAINAGE AND ORDERS WERE WRITTEN    FOR ONCE DAILY IRRIGATION WITH 3 ML STERILE SALINE. IF URINE OUTPUT IS RESTORED, THE CATHETER MAY BE CONNECTED TO CONVENTIONAL GRAVITY BAG DRAINAGE.           Assessment/Plan  Problem Representation:    Hydronephrosis- " (present on admission)  Assessment & Plan    Please see pyelonephritis note    Hydronephrosis with urinary obstruction due to renal calculus- (present on admission)  Assessment & Plan  See pyelonephritis problem    Pyelonephritis- (present on admission)  Assessment & Plan  Patient originally admitted with right flank pain at [facility].  CT abdomen and pelvis showed right-sided hydroureteronephrosis.  Urology attempted retrograde right ureteral stent attempt 01/26 which was unsuccessful due to excessive scarring therefore patient was transferred from [facility] to [facility] for interventional radiology guided right nephrostomy tube placement. Patient underwent right percutaneous nephrostomy placement on 01/28/2024.  10 mL gross pus was evacuated. Tentative plans for nephrostomy tube to remain indefinitely, ID recommends repeat CT in 5 days to guide antibiotic regiment.   Urology following, appreciate recommendations.  Continue to monitor renal function and urine output.  -Continued on meropenem, started on 1/30/24    Expressive aphasia  Assessment & Plan  Patient with expressive aphasia after brain insult 1994.  SLP evaluation.    History of stroke- (present on admission)  Assessment & Plan  History of complex stroke with right-sided deficits and expressive aphasia.  Continue fall precautions and seizure precautions    Drug rash- (present on admission)  Assessment & Plan  Patient has developed a drug rash due to Zosyn and at this point has severe rash from head to toe.  Antibiotic was switched to Rocephin.  Patient was on IV Solu-Medrol before being transferred to [facility] and was continued until today.  Rash has now significantly resolved therefore IV Solu-Medrol discontinued.  Continue famotidine.  Monitor for any further rash.      Right hemiplegia (HCC)  Assessment & Plan  S/p CVA 1994.  PT OT evaluation.         VTE prophylaxis: SCD since patient underwent right nephrostomy  tube placement today.    I have performed a physical exam and reviewed and updated ROS and Plan today (1/30/2024). In review of yesterday's note (1/29/2024), there are no changes except as documented above.

## 2024-01-31 NOTE — DISCHARGE PLANNING
Following flans for nephrostomy tube removal and abx duration.     0754-Msg placed to PAR to look into benefits.     0929-Nydia only has MCR-A.  Lacking health benefits for [facility]. [facility]CC will no longer follow.  Please reach out to myself with any questions.

## 2024-01-31 NOTE — PROGRESS NOTES
Radiology Progress Note   Author: Tonie Sams D.N.P. Date & Time created: 1/31/2024  3:54 PM   Date of admission  1/27/2024  Note to reader: this note follows the APSO format rather than the historical SOAP format. Assessment and plan located at the top of the note for ease of use.    Chief Complaint  63 y.o. 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 1/26/24 cytoscopy with scarred over right ureteral orifice, unable to bypass with catheter or wire and subsequent IR consult. 1/28/24 IR Dr. Krishnamurthy places RIGHTnephrostogram with PCNT placement. 10 ml of gross pus aspirated during procedure. Catheter to suction bulb due to purulent pus.    Interval History IR:  1/29/24: RIGHT nephrostomy tube to bulb suction with 10 ml thick purulent drainage in the last 24 hours. RIGHT nephrostomy tube irrigated with 5 ml of NS without aspiration. Pt nods yes to mild intermittent pain to RIGHT back/flank. Denies nausea, vomiting or abdominal pain/discomfort. I reviewed today's labs including WBC 12.7<10.9, Hgb 10.5<11.6, Cr 0.86, GFR 75. Coordinated post IR procedure care with hospital nursing staff.    1/30/24: RIGHT nephrostomy tube to bulb suction with 10 ml thick purulent bloody drainage in the last 24 hours. RIGHT nephrostomy tube irrigated with 5 ml of NS without aspiration and without patient experiencing any pain. I reviewed today's labs including WBC 12.7<10.9, Hgb 10.5, Cr 0.82, GFR 80.  Right perinephric aspirate positive for E. coli, ESBL.  Antibiotics per ID. Coordinated care with hospital nursing staff.     1/31/24: RIGHT nephrostomy tube to bulb suction with 10 ml thick  purulent drainage in the last 24 hours. RIGHT nephrostomy tube irrigated with 5 ml of NS with return aspiration of 10 ml of saline with purulent fluid. Pt nods yes to mild intermittent pain to RIGHT back/flank. Denies nausea, vomiting or abdominal pain/discomfort. I reviewed her most recent labs including WBC 10.9, Hgb 10.5, Cr 0.87, GFR 74. Coordinated post IR procedure care with hospital nursing staff - nursing to change RIGHT nephrostomy bulb suction to gravity bag when purulence resolves to urine.      Assessment/Plan     Active Problems:    Pyelonephritis    Hydronephrosis with urinary obstruction due to renal calculus    History of stroke    Dysphagia    Hydronephrosis    Drug rash    Right hemiplegia (HCC)    Expressive aphasia    Anemia      Plan IR  -- Irrigate  Nephrostomy tube with 5 ml of sterile saline  q shift ONLY IF no pain evoked with irrigation.  - Fluid cultures positive for E.coli  - Urology following   - Ok to shower with catheter as long as site is covered with waterproof dressing; change dressing if it becomes wet.  - No baths or submerging site under water until catheter removed   - Ultimate urology plan is for RIGHT PCNT to remain indefinitely.   - For long term use, Nephrostomy tube will need to be exchanged every 3 months; sooner prn.   - IR will continue to follow nephrostomy tube while pt is in the hospital; we may not round daily.   - Urology planning for outpatient follow-up in 4 weeks for repeat imaging.     -Thank you for allowing Interventional Radiology team to participate in the patients care, if any additional care or requests are needed in the future please do not hesitate call or place IR order. [phone]                     Review of Systems  Physical Exam   Review of Systems   Constitutional:  Negative for chills and fever.   Respiratory:  Negative for cough and shortness of breath.    Cardiovascular:  Negative for chest pain.   Gastrointestinal:  Negative for abdominal pain,  nausea and vomiting.   Genitourinary:  Positive for flank pain. Negative for hematuria.   Musculoskeletal:  Negative for myalgias.   Skin:  Negative for rash.   Neurological:  Positive for sensory change, speech change and focal weakness. Negative for weakness and headaches.        Residual neuro changes, including right hemiparesis and dysphasis, secondary to prior CVA.   Psychiatric/Behavioral: Negative.        Vitals:    01/31/24 0739   BP: 126/74   Pulse: 83   Resp: 16   Temp: 36.8 °C (98.2 °F)   SpO2: 96%        Physical Exam  Constitutional:       Appearance: She is obese. She is not ill-appearing.   HENT:      Head: Atraumatic.   Cardiovascular:      Rate and Rhythm: Normal rate.   Pulmonary:      Effort: Pulmonary effort is normal. No respiratory distress.   Abdominal:      Palpations: Abdomen is soft.      Tenderness: There is no abdominal tenderness.   Musculoskeletal:      Comments: Right flaccid hemiparesis secondary to prior CVA.   Skin:     General: Skin is warm and dry.      Capillary Refill: Capillary refill takes 2 to 3 seconds.      Coloration: Skin is pale.      Comments: Nephrostomy tube to right back with purulent drainage.   Neurological:      Mental Status: She is alert. Mental status is at baseline.      Sensory: Sensory deficit present.      Motor: Weakness present.      Comments: Residual neuro changes, including right hemiparesis and dysphasis, secondary to prior CVA.   Psychiatric:         Mood and Affect: Mood normal.             Labs    Recent Labs     01/29/24  0306 01/30/24  0338 01/31/24  0357   WBC 12.7* 11.5* 10.9*   RBC 3.86* 3.85* 3.82*   HEMOGLOBIN 10.5* 10.5* 10.5*   HEMATOCRIT 32.6* 32.7* 31.8*   MCV 84.5 84.9 83.2   MCH 27.2 27.3 27.5   MCHC 32.2 32.1* 33.0   RDW 53.1* 53.2* 53.1*   PLATELETCT 396 361 350   MPV 10.5 10.7 10.8       Recent Labs     01/29/24  0306 01/30/24  0338 01/31/24  0357   SODIUM 134* 137 136   POTASSIUM 4.1 4.0 3.9   CHLORIDE 106 107 106   CO2 19* 19* 21  "  GLUCOSE 139* 105* 99   BUN 27* 20 15   CREATININE 0.86 0.82 0.87   CALCIUM 8.3* 7.9* 8.0*       Recent Labs     01/29/24  0306 01/30/24  0338 01/31/24  0357   ALBUMIN 2.6* 1.9* 2.7*   TBILIRUBIN <0.2 0.2 0.2   ALKPHOSPHAT 386* 432* 504*   TOTPROTEIN 5.8* 5.8* 6.0   ALTSGPT 34 38 35   ASTSGOT 71* 69* 41   CREATININE 0.86 0.82 0.87       DX-SHOULDER 2+ LEFT   Final Result         Severe osteoarthritis of the glenohumeral joint.   Elevation of the humeral head abutting the acromion could relate to chronic rotator cuff tear.      IR-NEPHROSTOGRAM W/ NEW TUBE PLACEMENT (ALL RADIOLOGY) RIGHT   Final Result      1. ULTRASOUND AND FLUOROSCOPIC GUIDED PLACEMENT OF A RIGHT 8 [place]  PIGTAIL LOCKING LOOP PERCUTANEOUS NEPHROSTOMY CATHETER.      2. DYSMORPHIC LIKELY CHRONICALLY OBSTRUCTED UPPER COLLECTING SYSTEM WITH GROSS PYONEPHROSIS. THIS MAY WELL REPRESENT A NONFUNCTIONING \"ABSCESSED\" KIDNEY. DUE TO THE THICK PUS, THE CATHETER WAS CONNECTED TO SUCTION BULB DRAINAGE AND ORDERS WERE WRITTEN    FOR ONCE DAILY IRRIGATION WITH 3 ML STERILE SALINE. IF URINE OUTPUT IS RESTORED, THE CATHETER MAY BE CONNECTED TO CONVENTIONAL GRAVITY BAG DRAINAGE.          INR   Date Value Ref Range Status   01/28/2024 1.08 0.87 - 1.13 Final     Comment:     INR - Non-therapeutic Reference Range: 0.87-1.13  INR - Therapeutic Reference Range: 2.0-4.0       No results found for: \"POCINR\"     Intake/Output Summary (Last 24 hours) at 1/29/2024 2001  Last data filed at 1/29/2024 1700  Gross per 24 hour   Intake 1640 ml   Output 2530 ml   Net -890 ml      Labs not explicitly included in this progress note were reviewed by the author. Radiology/imaging not explicitly included in this progress note was reviewed by the author.     I have performed a physical exam and reviewed and updated ROS and Plan today (1/31/2024).     36 minutes in directly providing and coordinating care and extensive data review.  No time overlap and excludes procedures.  "

## 2024-02-01 ENCOUNTER — APPOINTMENT (OUTPATIENT)
Dept: RADIOLOGY | Facility: MEDICAL CENTER | Age: 64
DRG: 871 | End: 2024-02-01
Payer: MEDICARE

## 2024-02-01 LAB
ALBUMIN SERPL BCP-MCNC: 2.6 G/DL (ref 3.2–4.9)
ALBUMIN/GLOB SERPL: 0.8 G/DL
ALP SERPL-CCNC: 493 U/L (ref 30–99)
ALT SERPL-CCNC: 26 U/L (ref 2–50)
ANION GAP SERPL CALC-SCNC: 10 MMOL/L (ref 7–16)
AST SERPL-CCNC: 25 U/L (ref 12–45)
BACTERIA BLD CULT: NORMAL
BASOPHILS # BLD AUTO: 0.9 % (ref 0–1.8)
BASOPHILS # BLD: 0.09 K/UL (ref 0–0.12)
BILIRUB SERPL-MCNC: 0.3 MG/DL (ref 0.1–1.5)
BLASTS NFR BLD MANUAL: 0.9 %
BUN SERPL-MCNC: 14 MG/DL (ref 8–22)
CALCIUM ALBUM COR SERPL-MCNC: 9.2 MG/DL (ref 8.5–10.5)
CALCIUM SERPL-MCNC: 8.1 MG/DL (ref 8.5–10.5)
CHLORIDE SERPL-SCNC: 105 MMOL/L (ref 96–112)
CO2 SERPL-SCNC: 21 MMOL/L (ref 20–33)
CREAT SERPL-MCNC: 0.79 MG/DL (ref 0.5–1.4)
EOSINOPHIL # BLD AUTO: 0.54 K/UL (ref 0–0.51)
EOSINOPHIL NFR BLD: 5.3 % (ref 0–6.9)
ERYTHROCYTE [DISTWIDTH] IN BLOOD BY AUTOMATED COUNT: 52.7 FL (ref 35.9–50)
GFR SERPLBLD CREATININE-BSD FMLA CKD-EPI: 84 ML/MIN/1.73 M 2
GLOBULIN SER CALC-MCNC: 3.4 G/DL (ref 1.9–3.5)
GLUCOSE SERPL-MCNC: 91 MG/DL (ref 65–99)
HCT VFR BLD AUTO: 31.6 % (ref 37–47)
HGB BLD-MCNC: 10.3 G/DL (ref 12–16)
LYMPHOCYTES # BLD AUTO: 1.53 K/UL (ref 1–4.8)
LYMPHOCYTES NFR BLD: 15.9 % (ref 22–41)
MANUAL DIFF BLD: ABNORMAL
MCH RBC QN AUTO: 27.2 PG (ref 27–33)
MCHC RBC AUTO-ENTMCNC: 32.6 G/DL (ref 32.2–35.5)
MCV RBC AUTO: 83.6 FL (ref 81.4–97.8)
MONOCYTES # BLD AUTO: 0.45 K/UL (ref 0–0.85)
MONOCYTES NFR BLD AUTO: 4.4 % (ref 0–13.4)
MORPHOLOGY BLD-IMP: NORMAL
MYELOCYTES NFR BLD MANUAL: 0.9 %
NEUTROPHILS # BLD AUTO: 7.41 K/UL (ref 1.82–7.42)
NEUTROPHILS NFR BLD: 72.6 % (ref 44–72)
NRBC # BLD AUTO: 0 K/UL
NRBC BLD-RTO: 0 /100 WBC (ref 0–0.2)
PLATELET # BLD AUTO: 342 K/UL (ref 164–446)
PLATELET BLD QL SMEAR: NORMAL
PMV BLD AUTO: 11 FL (ref 9–12.9)
POTASSIUM SERPL-SCNC: 4.3 MMOL/L (ref 3.6–5.5)
PROT SERPL-MCNC: 6 G/DL (ref 6–8.2)
RBC # BLD AUTO: 3.78 M/UL (ref 4.2–5.4)
RBC BLD AUTO: NORMAL
SIGNIFICANT IND 70042: NORMAL
SITE SITE: NORMAL
SODIUM SERPL-SCNC: 136 MMOL/L (ref 135–145)
SOURCE SOURCE: NORMAL
WBC # BLD AUTO: 10.2 K/UL (ref 4.8–10.8)

## 2024-02-01 PROCEDURE — 74177 CT ABD & PELVIS W/CONTRAST: CPT

## 2024-02-01 PROCEDURE — 700102 HCHG RX REV CODE 250 W/ 637 OVERRIDE(OP): Performed by: INTERNAL MEDICINE

## 2024-02-01 PROCEDURE — 700102 HCHG RX REV CODE 250 W/ 637 OVERRIDE(OP)

## 2024-02-01 PROCEDURE — 85007 BL SMEAR W/DIFF WBC COUNT: CPT

## 2024-02-01 PROCEDURE — 770001 HCHG ROOM/CARE - MED/SURG/GYN PRIV*

## 2024-02-01 PROCEDURE — 700117 HCHG RX CONTRAST REV CODE 255

## 2024-02-01 PROCEDURE — 700111 HCHG RX REV CODE 636 W/ 250 OVERRIDE (IP): Mod: JZ

## 2024-02-01 PROCEDURE — 700101 HCHG RX REV CODE 250: Performed by: PHYSICAL MEDICINE & REHABILITATION

## 2024-02-01 PROCEDURE — 85027 COMPLETE CBC AUTOMATED: CPT

## 2024-02-01 PROCEDURE — 700101 HCHG RX REV CODE 250: Performed by: NURSE PRACTITIONER

## 2024-02-01 PROCEDURE — A9270 NON-COVERED ITEM OR SERVICE: HCPCS | Performed by: PHYSICAL MEDICINE & REHABILITATION

## 2024-02-01 PROCEDURE — 36415 COLL VENOUS BLD VENIPUNCTURE: CPT

## 2024-02-01 PROCEDURE — 700102 HCHG RX REV CODE 250 W/ 637 OVERRIDE(OP): Performed by: PHYSICAL MEDICINE & REHABILITATION

## 2024-02-01 PROCEDURE — 99233 SBSQ HOSP IP/OBS HIGH 50: CPT | Mod: GC | Performed by: INTERNAL MEDICINE

## 2024-02-01 PROCEDURE — 700111 HCHG RX REV CODE 636 W/ 250 OVERRIDE (IP)

## 2024-02-01 PROCEDURE — 700105 HCHG RX REV CODE 258

## 2024-02-01 PROCEDURE — A9270 NON-COVERED ITEM OR SERVICE: HCPCS | Performed by: INTERNAL MEDICINE

## 2024-02-01 PROCEDURE — A9270 NON-COVERED ITEM OR SERVICE: HCPCS

## 2024-02-01 PROCEDURE — 80053 COMPREHEN METABOLIC PANEL: CPT

## 2024-02-01 RX ADMIN — FOLIC ACID 1 MG: 1 TABLET ORAL at 05:23

## 2024-02-01 RX ADMIN — PREGABALIN 150 MG: 150 CAPSULE ORAL at 22:56

## 2024-02-01 RX ADMIN — PREGABALIN 150 MG: 150 CAPSULE ORAL at 14:56

## 2024-02-01 RX ADMIN — MEROPENEM 500 MG: 500 INJECTION, POWDER, FOR SOLUTION INTRAVENOUS at 17:19

## 2024-02-01 RX ADMIN — MEROPENEM 500 MG: 500 INJECTION, POWDER, FOR SOLUTION INTRAVENOUS at 13:00

## 2024-02-01 RX ADMIN — OXYCODONE 5 MG: 5 TABLET ORAL at 22:56

## 2024-02-01 RX ADMIN — FAMOTIDINE 20 MG: 20 TABLET, FILM COATED ORAL at 17:14

## 2024-02-01 RX ADMIN — LIDOCAINE 1 PATCH: 4 PATCH TOPICAL at 12:59

## 2024-02-01 RX ADMIN — MEROPENEM 500 MG: 500 INJECTION, POWDER, FOR SOLUTION INTRAVENOUS at 06:06

## 2024-02-01 RX ADMIN — DULOXETINE HYDROCHLORIDE 60 MG: 60 CAPSULE, DELAYED RELEASE ORAL at 05:24

## 2024-02-01 RX ADMIN — DULOXETINE HYDROCHLORIDE 60 MG: 60 CAPSULE, DELAYED RELEASE ORAL at 17:15

## 2024-02-01 RX ADMIN — PREGABALIN 150 MG: 150 CAPSULE ORAL at 05:24

## 2024-02-01 RX ADMIN — FERROUS SULFATE TAB 325 MG (65 MG ELEMENTAL FE) 325 MG: 325 (65 FE) TAB at 05:24

## 2024-02-01 RX ADMIN — MEROPENEM 500 MG: 500 INJECTION, POWDER, FOR SOLUTION INTRAVENOUS at 00:03

## 2024-02-01 RX ADMIN — SODIUM CHLORIDE, PRESERVATIVE FREE 5 ML: 5 INJECTION INTRAVENOUS at 17:15

## 2024-02-01 RX ADMIN — FAMOTIDINE 20 MG: 20 TABLET, FILM COATED ORAL at 05:23

## 2024-02-01 RX ADMIN — IOHEXOL 100 ML: 350 INJECTION, SOLUTION INTRAVENOUS at 16:35

## 2024-02-01 RX ADMIN — SENNOSIDES AND DOCUSATE SODIUM 2 TABLET: 50; 8.6 TABLET ORAL at 05:23

## 2024-02-01 RX ADMIN — ENOXAPARIN SODIUM 40 MG: 100 INJECTION SUBCUTANEOUS at 17:15

## 2024-02-01 RX ADMIN — SODIUM CHLORIDE, PRESERVATIVE FREE 5 ML: 5 INJECTION INTRAVENOUS at 05:24

## 2024-02-01 RX ADMIN — BACLOFEN 5 MG: 10 TABLET ORAL at 22:56

## 2024-02-01 ASSESSMENT — ENCOUNTER SYMPTOMS
COUGH: 0
HEADACHES: 0
SHORTNESS OF BREATH: 0
FEVER: 0
FOCAL WEAKNESS: 1
VOMITING: 0
MYALGIAS: 0
CHILLS: 0
SPEECH CHANGE: 1
FLANK PAIN: 1
PSYCHIATRIC NEGATIVE: 1
SENSORY CHANGE: 1
NAUSEA: 0
WEAKNESS: 0
ABDOMINAL PAIN: 0

## 2024-02-01 ASSESSMENT — PATIENT HEALTH QUESTIONNAIRE - PHQ9
SUM OF ALL RESPONSES TO PHQ9 QUESTIONS 1 AND 2: 0
1. LITTLE INTEREST OR PLEASURE IN DOING THINGS: NOT AT ALL

## 2024-02-01 ASSESSMENT — PAIN DESCRIPTION - PAIN TYPE
TYPE: ACUTE PAIN
TYPE: CHRONIC PAIN
TYPE: ACUTE PAIN

## 2024-02-01 NOTE — THERAPY
Physical Therapy   Daily Treatment     Patient Name: Nydia Finch  Age:  63 y.o., Sex:  female  Medical Record #: [id number]  Today's Date: 1/31/2024     Precautions  Precautions: Fall Risk;Other (See Comments)  Comments: R nephrostomy tube connected to J-P drain; H/O CVA w/ R side residual deficit    Assessment  Pt was agreeable to the treatment session detailed below. Pt participated in standing pivot transfers during today's session. Pt requires Mod assistance w/ standing tasks including RLE management. Pt states she is participating w/ tasks at the same level as prior to admit. Pt continues to require Mod>Max assistance w/ bed mobility tasks. Pt appears to be near her baseline mobility function. Pt is appropriate for d/c home from a physical therapist's perspective as long as pt's  and son continue to be able and available to provide high level of support. Otherwise, post-acute placement is necessary.    Plan    Treatment Plan Status: Continue Current Treatment Plan  Type of Treatment: Bed Mobility, Neuro Re-Education / Balance, Therapeutic Activities, Therapeutic Exercise  Treatment Frequency: 3 Times per Week  Treatment Duration: Until Therapy Goals Met    DC Equipment Recommendations: None (pt owns a power w/c)  Discharge Recommendations: Other - (Pt appears to be near her baseline function & states she participates in tasks at the same level as prior to admit. May require post-acute placement, if family unable to assist as needed.)     Objective     01/31/24 1017   Precautions   Precautions Fall Risk;Other (See Comments)   Comments R nephrostomy tube connected to J-P drain; H/O CVA w/ R side residual deficit   Vitals   O2 Delivery Device None - Room Air   Vitals Comments no c/o dizziness or lightheadedness   Pain   Pain Scales 0 to 10 Scale    Intervention Medication (see MAR);Repositioned;Rest   Pain 0 - 10 Group   Location Flank   Location Orientation Right   Therapist Pain Assessment Post Activity  Pain Same as Prior to Activity;Prior to Activity;During Activity;Post Activity   Cognition    Cognition / Consciousness X   Speech/ Communication Delayed Responses;Expressive Aphasia;Word Finding Impairment   Level of Consciousness Alert   Comments pt very pleasant and cooperative; discussion about d/c options, pt was unable to eloborate on home environment and expressed frustration in her word finding limitations   Other Treatments   Other Treatments Provided Reinforced importance of OOB activites and R LE management during mobility tasks   Balance   Sitting Balance (Static) Fair   Sitting Balance (Dynamic) Fair -   Standing Balance (Static) Poor +   Standing Balance (Dynamic) Poor   Weight Shift Sitting Fair   Weight Shift Standing Poor   Skilled Intervention Facilitation;Postural Facilitation;Sequencing;Verbal Cuing   Comments standing w/ Mod assistance; v/c for upright posture and correct hand placement on therapist's arms; limited weightbearing and movement of RLE   Bed Mobility    Supine to Sit Moderate Assist   Scooting Maximal Assist   Rolling Moderate Assist to Lt.   Skilled Intervention Facilitation;Postural Facilitation;Verbal Cuing   Comments HOB flat, w/ rails; v/c provided for proper log roll sequencing   Gait Analysis   Gait Level Of Assist Unable to Participate   Comments non-ambulatory at baseline   Functional Mobility   Sit to Stand Moderate Assist   Bed, Chair, Wheelchair Transfer Moderate Assist   Transfer Method Stand Pivot   Mobility bed>w/c>bed>w/c   Wheelchair Assist Maximal Assist   Skilled Intervention Facilitation;Postural Facilitation;Sequencing;Verbal Cuing   Comments stand pivot bed>w/c x2 w/ Mod assistance, stand pivot w/c>bed w/ Mod assistance; Patient uses power WC at home   How much difficulty does the patient currently have...   Turning over in bed (including adjusting bedclothes, sheets and blankets)? 1   Sitting down on and standing up from a chair with arms (e.g., wheelchair,  bedside commode, etc.) 1   Moving from lying on back to sitting on the side of the bed? 1   How much help from another person does the patient currently need...   Moving to and from a bed to a chair (including a wheelchair)? 2   Need to walk in a hospital room? 1   Climbing 3-5 steps with a railing? 1   6 clicks Mobility Score 7   Activity Tolerance   Sitting in Chair post session  (in w/c)   Sitting Edge of Bed 15 minutes   Standing 1 minute  (includuding sitting rest breaks)   Patient / Family Goals    Patient / Family Goal #1 return to baseline function   Goal #1 Outcome Progressing as expected   Short Term Goals    Short Term Goal # 1 pt will perform standing pivot transfer from bed>w/c w/ Min assist in 6 visits   Goal Outcome # 1 Progressing as expected   Short Term Goal # 2 pt will perform bed mobility w/ Min assist in 6 visits   Goal Outcome # 2 Progressing as expected   Short Term Goal # 3 Pt will perform STS w/ Min assist in 6 visits   Goal Outcome # 3 Progressing as expected   Education Group   Education Provided Role of Physical Therapist   Role of Physical Therapist Patient Response Patient;Eager;Explanation;Verbal Demonstration   Physical Therapy Treatment Plan   Physical Therapy Treatment Plan Continue Current Treatment Plan   Treatment Plan  Bed Mobility;Neuro Re-Education / Balance;Therapeutic Activities;Therapeutic Exercise   Treatment Frequency 3 Times per Week   Duration Until Therapy Goals Met   Anticipated Discharge Equipment and Recommendations   DC Equipment Recommendations None  (pt owns a power w/c)   Discharge Recommendations Other -  (Pt appears to be near her baseline function & states she participates in tasks at the same level as prior to admit. May require post-acute placement, if family unable to assist as needed.)   Interdisciplinary Plan of Care Collaboration   IDT Collaboration with  Nursing   Patient Position at End of Therapy Seated;Chair Alarm On;Call Light within Reach;Tray  Table within Reach;Phone within Reach  (in w/c)   Collaboration Comments RN notified   Session Information   Date / Session Number  1/31- 2(2/3, 2/4)

## 2024-02-01 NOTE — DISCHARGE PLANNING
@ 2000 DPA sent out  SNF blanket to [place] [facility] [place] [facility] [place] per EST[facility]A [place] request .     DPA Called [facility] at [phone] spoke to Lazarus who confirmed Rakel in admissions was out of office , will try calling back at a later time .   ELIZA spoke to Wanda at [facility] regarding placement for pt , Wanda provided FAX# [phone] where ELIZA sent over referral for pt .  Wanda stated , no bed holds but can call day of discharge if there is bed available pt will be accepted .   Call back with day of discharge at [phone] Extension [phone]

## 2024-02-01 NOTE — DISCHARGE PLANNING
Care Transition Team Assessment    RN CM  met with pt at bedside to complete assessment and discuss discharge planning.Pt A&Ox4 and asked me to contact her  to complete assessment.     RN CM called and spoke with patient's  Marbin, he reports that pt lives with him in house with ramp to get inside.  Marbin Finch () (p) [phone]; DPOA and emergency contact.       Marbin reports, prior to admission patient is dependent on him with ADL's and IADL’s.  Pt uses a power wheel chair, and bed side commode at baseline. Pt's  is her caregiver and good support for her. Pt is retired and receives $370 monthly SSI deposits.      The patient's PCP is Dr. Manuel Dubose.  Patient's preferred pharmacy [facility] in [facility].    Patient's  reports a history of SNF X2 in the past, and should the patient need SNF placement after this hospitalization, Marbin choose [facility] in [facility]. Choice form completed and sent to [facility].    Per pt's  report, no concern for alcohol or substance abuse. Pt is complaint with depression medications.     Confirmed medical coverage via Medicare and MEDI [place].  Patient has means to transportation and spouse will provide transport once medically stable for discharge.      RN CM called and left message for [facility]F in [place], [place]. Anticipate D/C to choice SNF. CM will assist with d/c plans as needed.      Information Source  Orientation Level: Oriented X4  Information Given By: Spouse  Informant's Name: Marbin  Who is responsible for making decisions for patient? : Patient    Readmission Evaluation  Is this a readmission?: No    Elopement Risk  Legal Hold: No  Ambulatory or Self Mobile in Wheelchair: No-Not an Elopement Risk  Disoriented: No  Psychiatric Symptoms: None  History of Wandering: No  Elopement this Admit: No  Vocalizing Wanting to Leave: No  Displays Behaviors, Body Language Wanting to Leave: No-Not at Risk for  Elopement  Elopement Risk: Not at Risk for Elopement    Interdisciplinary Discharge Planning  Lives with - Patient's Self Care Capacity: Spouse, Adult Children  Patient or legal guardian wants to designate a caregiver: No  Support Systems: Family Member(s), Spouse / Significant Other  Housing / Facility: 1 Story Apartment / Condo  Durable Medical Equipment: Commode    Discharge Preparedness  What is your plan after discharge?: Uncertain - pending medical team collaboration  What are your discharge supports?: Spouse  Prior Functional Level: Uses Wheelchair, Needs Assist with Medication Management, Needs Assist with Activities of Daily Living    Functional Assesment  Prior Functional Level: Uses Wheelchair, Needs Assist with Medication Management, Needs Assist with Activities of Daily Living    Finances  Financial Barriers to Discharge: No  Prescription Coverage: Yes    Vision / Hearing Impairment  Vision Impairment : Yes  Right Eye Vision: Impaired, Wears Glasses  Left Eye Vision: Impaired, Wears Glasses  Hearing Impairment : No    Advance Directive  Advance Directive?: DPOA for Health Care  Durable Power of  Name and Contact : Marbin Finch [phone]    Domestic Abuse  Have you ever been the victim of abuse or violence?: No  Physical Abuse or Sexual Abuse: No  Verbal Abuse or Emotional Abuse: No  Possible Abuse/Neglect Reported to:: Not Applicable    Psychological Assessment  History of Substance Abuse: None  History of Psychiatric Problems: Yes    Discharge Risks or Barriers  Discharge risks or barriers?: Complex medical needs  Patient risk factors: Complex medical needs    Anticipated Discharge Information  Discharge Disposition: D/T to SNF with Medicare cert in anticipation of skilled care (03)

## 2024-02-01 NOTE — PROGRESS NOTES
[facility] Internal Medicine Daily Progress Note    Date of Service  2/1/2024    [employer] Team: R IM Blue Team   Attending: Wyatt Preciado M.d.  Senior Resident: Dr. Bobby  Intern:  Dr. Davis  Contact Number: [phone]    Chief Complaint  Nydia Finch is a 63 y.o. female admitted 1/27/2024 with history of right flank pain and was diagnosed with sepsis secondary to UTI with right-sided hydroureteronephrosis and was transferred from outside hospital for further management.    Hospital Course   63 y.o. 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  Patient was originally admitted  01/18 at [facility] with right flank and right hip pain and had sepsis secondary to UTI in setting of right hydroureteronephrosis with a prior history of ureteral stone.  In 2009 patient was noted with a ureteral stone at which point in time she had a stent placed and was lost to follow-up.     During this admission, patient was started on IV Zosyn however she had developed a diffuse drug rash therefore has been switched to ceftriaxone. Solumedrol was started for drug rash that eventually resolved, and therefore was Dc'ed on 1/29/24.     For her right hydroureteronephrosis, urology attempted retrograde right ureteral stent attempted on 01/26 which was unsuccessful due to excessive scarring. therefore patient was transferred from [facility] to [facility] for interventional radiology guided right nephrostomy tube placement. Patient underwent right percutaneous nephrostomy placement on 01/28/2024.      Interval Problem Update  Patient hemodynamically stable, no acute events overnight.  Patient states that her pain is well-controlled.  ALP high with GGT high, patient denies  history of abdominal pain, nausea and vomiting, fever, night sweats.   Patient awaiting CT imaging to characterize renal abscess, and to further guide antibiotics regiment.  Started meropenem 1/30/24 with ID following.       I have discussed this patient's plan of care and discharge plan at IDT rounds today with Case Management, Nursing, Nursing leadership, and other members of the IDT team.    Consultants/Specialty  1/30/24  urology: follow up out patient in 4 weeks, tentative plan to remin with nephrostomy tube indefinitely.   ID: agree with meropem. Repeat CT scan in 5 days to further guide antibiotic regiment  Radiology: irrigate only if no pain provoked, radiology following   PMR: IRF accepted, needs plans for nephrostomy tube removal and abx duration     Code Status  Full Code    Disposition  The patient is not medically cleared for discharge to home or a post-acute facility.    Inpatient needs: planned for repeat CT in 5 days for antibiotic regiment guidance.  PT: possibly HH, may require SNF  PMR: accepted to inpatient rehab  I have placed the appropriate orders for post-discharge needs.    Review of Systems  ROS   Constitutional:  Positive for fever and malaise/fatigue. Negative for chills and diaphoresis.   HENT: Negative.  Negative for nosebleeds and sinus pain.    Eyes: Negative.  Negative for double vision, photophobia, discharge and redness.   Respiratory: Negative.  Negative for cough, sputum production, shortness of breath, wheezing and stridor.    Cardiovascular: Negative.  Negative for chest pain, orthopnea, leg swelling and PND.   Gastrointestinal:  Positive for abdominal pain and nausea. Negative for blood in stool and heartburn.   Genitourinary:  Positive for flank pain. Negative for dysuria and frequency.   Musculoskeletal:  Negative for back pain, falls and myalgias.   Skin:  Positive for rash. Negative for itching.   Neurological:  Positive for weakness (On her entire right side secondary to  stroke in 1994). Negative for dizziness, tingling, sensory change, focal weakness and seizures.   Endo/Heme/Allergies: Negative.  Negative for polydipsia. Does not bruise/bleed easily.   Psychiatric/Behavioral: Negative.  Negative for depression, substance abuse and suicidal ideas. The patient does not have insomnia.    Physical Exam  Temp:  [36.6 °C (97.9 °F)-36.9 °C (98.4 °F)] 36.9 °C (98.4 °F)  Pulse:  [76-86] 86  Resp:  [16-18] 18  BP: (111-144)/(65-87) 133/74  SpO2:  [94 %-99 %] 99 %    Physical Exam  Constitutional:       General: She is awake.      Appearance: Normal appearance. She is well-developed, well-groomed and normal weight.   HENT:      Head: Normocephalic and atraumatic.      Jaw: There is normal jaw occlusion. No trismus.      Salivary Glands: Right salivary gland is not tender. Left salivary gland is not tender.      Right Ear: External ear normal.      Left Ear: External ear normal.      Mouth/Throat:      Mouth: Mucous membranes are moist.      Pharynx: Oropharynx is clear.   Eyes:      General: Lids are normal. Vision grossly intact.      Extraocular Movements: Extraocular movements intact.      Conjunctiva/sclera: Conjunctivae normal.      Right eye: Right conjunctiva is not injected. No exudate.     Left eye: Left conjunctiva is not injected. No exudate.     Pupils: Pupils are equal, round, and reactive to light.   Neck:      Thyroid: No thyroid mass.      Vascular: No hepatojugular reflux or JVD.      Trachea: No abnormal tracheal secretions or tracheal deviation.   Cardiovascular:      Rate and Rhythm: Regular rhythm.     Pulses: Normal pulses.      Heart sounds: Normal heart sounds. No murmur heard.     No friction rub.   Pulmonary:      Effort: Pulmonary effort is normal.      Breath sounds: Normal breath sounds. No wheezing or rhonchi.   Abdominal:      General: Abdomen is flat. Bowel sounds are normal.      Palpations: Abdomen is soft.      Tenderness: There is no right CVA tenderness  or left CVA tenderness.      Hernia: No hernia is present.   Musculoskeletal:      Cervical back: Full passive range of motion without pain, normal range of motion and neck supple. No rigidity. No muscular tenderness.      Right lower leg: No edema.      Left lower leg: No edema.   Lymphadenopathy:      Head:      Right side of head: No submental adenopathy.      Left side of head: No submental adenopathy.      Cervical:      Right cervical: No superficial cervical adenopathy.     Left cervical: No superficial cervical adenopathy.      Upper Body:      Right upper body: No supraclavicular adenopathy.      Left upper body: No supraclavicular adenopathy.   Skin:     General: Skin is warm and dry.      Coloration: Skin is not cyanotic or pale.      Findings: Mild erythema and rash (Head to toe skin rash from drug reaction) present. No abrasion or bruising.   Neurological:      General: No focal deficit present.      Mental Status: She is alert and oriented to person, place, and time. Mental status is at baseline.      GCS: GCS eye subscore is 4. GCS verbal subscore is 5. GCS motor subscore is 6.      Cranial Nerves: No cranial nerve deficit.      Sensory: No sensory deficit.      Motor: Weakness and atrophy present.      Coordination: Coordination abnormal. Finger-Nose-Finger Test abnormal. Impaired rapid alternating movements.      Gait: Gait abnormal and tandem walk abnormal.      Deep Tendon Reflexes:      Reflex Scores:       Tricep reflexes are 2+ on the right side and 2+ on the left side.       Bicep reflexes are 2+ on the right side and 2+ on the left side.       Brachioradialis reflexes are 2+ on the right side and 2+ on the left side.       Patellar reflexes are 2+ on the right side and 2+ on the left side.       Achilles reflexes are 2+ on the right side and 2+ on the left side.     Comments: Patient has complete paralysis on the right side due to previous stroke, the hemiplegia since 1994   Psychiatric:     "     Attention and Perception: Attention and perception normal.         Mood and Affect: Mood normal.         Speech: Speech normal.         Behavior: Behavior is cooperative.         Thought Content: Thought content normal.         Cognition and Memory: Cognition and memory normal.         Judgment: Judgment normal.     Fluids    Intake/Output Summary (Last 24 hours) at 2/1/2024 0926  Last data filed at 2/1/2024 0900  Gross per 24 hour   Intake 1551.99 ml   Output 2330 ml   Net -778.01 ml         Laboratory  Recent Labs     01/30/24 0338 01/31/24 0357 02/01/24 0447   WBC 11.5* 10.9* 10.2   RBC 3.85* 3.82* 3.78*   HEMOGLOBIN 10.5* 10.5* 10.3*   HEMATOCRIT 32.7* 31.8* 31.6*   MCV 84.9 83.2 83.6   MCH 27.3 27.5 27.2   MCHC 32.1* 33.0 32.6   RDW 53.2* 53.1* 52.7*   PLATELETCT 361 350 342   MPV 10.7 10.8 11.0       Recent Labs     01/30/24 0338 01/31/24 0357 02/01/24 0447   SODIUM 137 136 136   POTASSIUM 4.0 3.9 4.3   CHLORIDE 107 106 105   CO2 19* 21 21   GLUCOSE 105* 99 91   BUN 20 15 14   CREATININE 0.82 0.87 0.79   CALCIUM 7.9* 8.0* 8.1*                       Imaging  DX-SHOULDER 2+ LEFT   Final Result         Severe osteoarthritis of the glenohumeral joint.   Elevation of the humeral head abutting the acromion could relate to chronic rotator cuff tear.      IR-NEPHROSTOGRAM W/ NEW TUBE PLACEMENT (ALL RADIOLOGY) RIGHT   Final Result      1. ULTRASOUND AND FLUOROSCOPIC GUIDED PLACEMENT OF A RIGHT 8 [place]  PIGTAIL LOCKING LOOP PERCUTANEOUS NEPHROSTOMY CATHETER.      2. DYSMORPHIC LIKELY CHRONICALLY OBSTRUCTED UPPER COLLECTING SYSTEM WITH GROSS PYONEPHROSIS. THIS MAY WELL REPRESENT A NONFUNCTIONING \"ABSCESSED\" KIDNEY. DUE TO THE THICK PUS, THE CATHETER WAS CONNECTED TO SUCTION BULB DRAINAGE AND ORDERS WERE WRITTEN    FOR ONCE DAILY IRRIGATION WITH 3 ML STERILE SALINE. IF URINE OUTPUT IS RESTORED, THE CATHETER MAY BE CONNECTED TO CONVENTIONAL GRAVITY BAG DRAINAGE.           Assessment/Plan  Problem " Representation:    Hydronephrosis- (present on admission)  Assessment & Plan    Please see pyelonephritis note    Hydronephrosis with urinary obstruction due to renal calculus- (present on admission)  Assessment & Plan  See pyelonephritis problem    Pyelonephritis- (present on admission)  Assessment & Plan  Patient originally admitted with right flank pain at [facility].  CT abdomen and pelvis showed right-sided hydroureteronephrosis.  Urology attempted retrograde right ureteral stent attempt 01/26 which was unsuccessful due to excessive scarring therefore patient was transferred from [facility] to [facility] for interventional radiology guided right nephrostomy tube placement. Patient underwent right percutaneous nephrostomy placement on 01/28/2024.  10 mL gross pus was evacuated. Tentative plans for nephrostomy tube to remain indefinitely, ID recommends repeat CT in 5 days to guide antibiotic regiment.   Urology following, appreciate recommendations.  -Awaiting CT on 2/2/24  -Continue to monitor renal function and urine output.  -Continued on meropenem, started on 1/30/24    Expressive aphasia  Assessment & Plan  Patient with expressive aphasia after brain insult 1994.  SLP evaluation.    History of stroke- (present on admission)  Assessment & Plan  History of complex stroke with right-sided deficits and expressive aphasia.  Continue fall precautions and seizure precautions    Drug rash- (present on admission)  Assessment & Plan  Patient has developed a drug rash due to Zosyn and at this point has severe rash from head to toe.  Antibiotic was switched to Rocephin.  Patient was on IV Solu-Medrol before being transferred to [facility] and was continued until today.  Rash has now significantly resolved therefore IV Solu-Medrol discontinued.  Continue famotidine.  Monitor for any further rash.      Right hemiplegia (HCC)  Assessment & Plan  S/p CVA 1994.  PT OT evaluation.         VTE  prophylaxis: SCD since patient underwent right nephrostomy tube placement today.    I have performed a physical exam and reviewed and updated ROS and Plan today (2/1/2024). In review of yesterday's note (1/31/2024), there are no changes except as documented above.

## 2024-02-01 NOTE — DISCHARGE PLANNING
Case Management Discharge Planning    Admission Date: 1/27/2024  GMLOS: 3.6  ALOS: 5    6-Clicks ADL Score: 13  6-Clicks Mobility Score: 7  PT and/or OT Eval ordered: Yes  Post-acute Referrals Ordered: Yes  Post-acute Choice Obtained: Yes  Has referral(s) been sent to post-acute provider:  Yes      Anticipated Discharge Dispo: Discharge Disposition: D/T to SNF with Medicare cert in anticipation of skilled care (03)    DME Needed: No    Action(s) Taken: Choice obtained; RN JOEY discussed in IDT rounds with the team, chart reviewed. Pt is not medically cleared to discharge. Plan to repeat CT pelvis tomorrow. SNF choice obtained, referral sent to [facility].    Escalations Completed: None    Medically Clear: No    Next Steps: F/U with SNF referral.    RN CM will continue to assist with discharge needs as they arise.    Barriers to Discharge: Medical clearance and Pending Placement    Is the patient up for discharge tomorrow: No

## 2024-02-01 NOTE — PROGRESS NOTES
[facility] Internal Medicine Daily Progress Note    Date of Service  1/31/2024    [employer] Team: R IM Blue Team   Attending: Wyatt Preciado M.d.  Senior Resident: Dr. Bobby  Intern:  Dr. Davis  Contact Number: [phone]    Chief Complaint  Nydia Finch is a 63 y.o. female admitted 1/27/2024 with history of right flank pain and was diagnosed with sepsis secondary to UTI with right-sided hydroureteronephrosis and was transferred from outside hospital for further management.    Hospital Course   63 y.o. 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  Patient was originally admitted  01/18 at [facility] with right flank and right hip pain and had sepsis secondary to UTI in setting of right hydroureteronephrosis with a prior history of ureteral stone.  In 2009 patient was noted with a ureteral stone at which point in time she had a stent placed and was lost to follow-up.     During this admission, patient was started on IV Zosyn however she had developed a diffuse drug rash therefore has been switched to ceftriaxone. Solumedrol was started for drug rash that eventually resolved, and therefore was Dc'ed on 1/29/24.     For her right hydroureteronephrosis, urology attempted retrograde right ureteral stent attempted on 01/26 which was unsuccessful due to excessive scarring. therefore patient was transferred from [facility] to [facility] for interventional radiology guided right nephrostomy tube placement. Patient underwent right percutaneous nephrostomy placement on 01/28/2024.      Interval Problem Update  Patient hemodynamically stable, no acute events overnight.  Patient states that her pain is well-controlled.  Denies history of nausea and vomiting,  fever, night sweats  Started meropenem 1/30/24 with ID following.     I have discussed this patient's plan of care and discharge plan at IDT rounds today with Case Management, Nursing, Nursing leadership, and other members of the IDT team.    Consultants/Specialty  1/30/24  urology: follow up out patient in 4 weeks, tentative plan to remin with nephrostomy tube indefinitely.   ID: agree with meropem. Repeat CT scan in 5 days to further guide antibiotic regiment  Radiology: irrigate only if no pain provoked, radiology following   PMR: IRF accepted, needs plans for nephrostomy tube removal and abx duration     Code Status  Full Code    Disposition  The patient is not medically cleared for discharge to home or a post-acute facility.    Inpatient needs: planned for repeat CT in 5 days for antibiotic regiment guidance.  PT: possibly HH, may require SNF  PMR: accepted to inpatient rehab  I have placed the appropriate orders for post-discharge needs.    Review of Systems  ROS   Constitutional:  Positive for fever and malaise/fatigue. Negative for chills and diaphoresis.   HENT: Negative.  Negative for nosebleeds and sinus pain.    Eyes: Negative.  Negative for double vision, photophobia, discharge and redness.   Respiratory: Negative.  Negative for cough, sputum production, shortness of breath, wheezing and stridor.    Cardiovascular: Negative.  Negative for chest pain, orthopnea, leg swelling and PND.   Gastrointestinal:  Positive for abdominal pain and nausea. Negative for blood in stool and heartburn.   Genitourinary:  Positive for flank pain. Negative for dysuria and frequency.   Musculoskeletal:  Negative for back pain, falls and myalgias.   Skin:  Positive for rash. Negative for itching.   Neurological:  Positive for weakness (On her entire right side secondary to stroke in 1994). Negative for dizziness, tingling, sensory change, focal weakness and seizures.   Endo/Heme/Allergies: Negative.  Negative for polydipsia.  Does not bruise/bleed easily.   Psychiatric/Behavioral: Negative.  Negative for depression, substance abuse and suicidal ideas. The patient does not have insomnia.    Physical Exam  Temp:  [36.8 °C (98.2 °F)] 36.8 °C (98.2 °F)  Pulse:  [83-85] 83  Resp:  [16] 16  BP: (126-140)/(74-93) 126/74  SpO2:  [95 %-97 %] 96 %    Physical Exam  Constitutional:       General: She is awake.      Appearance: Normal appearance. She is well-developed, well-groomed and normal weight.   HENT:      Head: Normocephalic and atraumatic.      Jaw: There is normal jaw occlusion. No trismus.      Salivary Glands: Right salivary gland is not tender. Left salivary gland is not tender.      Right Ear: External ear normal.      Left Ear: External ear normal.      Mouth/Throat:      Mouth: Mucous membranes are moist.      Pharynx: Oropharynx is clear.   Eyes:      General: Lids are normal. Vision grossly intact.      Extraocular Movements: Extraocular movements intact.      Conjunctiva/sclera: Conjunctivae normal.      Right eye: Right conjunctiva is not injected. No exudate.     Left eye: Left conjunctiva is not injected. No exudate.     Pupils: Pupils are equal, round, and reactive to light.   Neck:      Thyroid: No thyroid mass.      Vascular: No hepatojugular reflux or JVD.      Trachea: No abnormal tracheal secretions or tracheal deviation.   Cardiovascular:      Rate and Rhythm: Regular rhythm.     Pulses: Normal pulses.      Heart sounds: Normal heart sounds. No murmur heard.     No friction rub.   Pulmonary:      Effort: Pulmonary effort is normal.      Breath sounds: Normal breath sounds. No wheezing or rhonchi.   Abdominal:      General: Abdomen is flat. Bowel sounds are normal.      Palpations: Abdomen is soft.      Tenderness: There is no right CVA tenderness or left CVA tenderness.      Hernia: No hernia is present.   Musculoskeletal:      Cervical back: Full passive range of motion without pain, normal range of motion and neck  supple. No rigidity. No muscular tenderness.      Right lower leg: No edema.      Left lower leg: No edema.   Lymphadenopathy:      Head:      Right side of head: No submental adenopathy.      Left side of head: No submental adenopathy.      Cervical:      Right cervical: No superficial cervical adenopathy.     Left cervical: No superficial cervical adenopathy.      Upper Body:      Right upper body: No supraclavicular adenopathy.      Left upper body: No supraclavicular adenopathy.   Skin:     General: Skin is warm and dry.      Coloration: Skin is not cyanotic or pale.      Findings: Mild erythema and rash (Head to toe skin rash from drug reaction) present. No abrasion or bruising.   Neurological:      General: No focal deficit present.      Mental Status: She is alert and oriented to person, place, and time. Mental status is at baseline.      GCS: GCS eye subscore is 4. GCS verbal subscore is 5. GCS motor subscore is 6.      Cranial Nerves: No cranial nerve deficit.      Sensory: No sensory deficit.      Motor: Weakness and atrophy present.      Coordination: Coordination abnormal. Finger-Nose-Finger Test abnormal. Impaired rapid alternating movements.      Gait: Gait abnormal and tandem walk abnormal.      Deep Tendon Reflexes:      Reflex Scores:       Tricep reflexes are 2+ on the right side and 2+ on the left side.       Bicep reflexes are 2+ on the right side and 2+ on the left side.       Brachioradialis reflexes are 2+ on the right side and 2+ on the left side.       Patellar reflexes are 2+ on the right side and 2+ on the left side.       Achilles reflexes are 2+ on the right side and 2+ on the left side.     Comments: Patient has complete paralysis on the right side due to previous stroke, the hemiplegia since 1994   Psychiatric:         Attention and Perception: Attention and perception normal.         Mood and Affect: Mood normal.         Speech: Speech normal.         Behavior: Behavior is  "cooperative.         Thought Content: Thought content normal.         Cognition and Memory: Cognition and memory normal.         Judgment: Judgment normal.     Fluids    Intake/Output Summary (Last 24 hours) at 1/31/2024 1618  Last data filed at 1/31/2024 1246  Gross per 24 hour   Intake 940 ml   Output 1625 ml   Net -685 ml       Laboratory  Recent Labs     01/29/24  0306 01/30/24  0338 01/31/24  0357   WBC 12.7* 11.5* 10.9*   RBC 3.86* 3.85* 3.82*   HEMOGLOBIN 10.5* 10.5* 10.5*   HEMATOCRIT 32.6* 32.7* 31.8*   MCV 84.5 84.9 83.2   MCH 27.2 27.3 27.5   MCHC 32.2 32.1* 33.0   RDW 53.1* 53.2* 53.1*   PLATELETCT 396 361 350   MPV 10.5 10.7 10.8     Recent Labs     01/29/24  0306 01/30/24  0338 01/31/24  0357   SODIUM 134* 137 136   POTASSIUM 4.1 4.0 3.9   CHLORIDE 106 107 106   CO2 19* 19* 21   GLUCOSE 139* 105* 99   BUN 27* 20 15   CREATININE 0.86 0.82 0.87   CALCIUM 8.3* 7.9* 8.0*                     Imaging  DX-SHOULDER 2+ LEFT   Final Result         Severe osteoarthritis of the glenohumeral joint.   Elevation of the humeral head abutting the acromion could relate to chronic rotator cuff tear.      IR-NEPHROSTOGRAM W/ NEW TUBE PLACEMENT (ALL RADIOLOGY) RIGHT   Final Result      1. ULTRASOUND AND FLUOROSCOPIC GUIDED PLACEMENT OF A RIGHT 8 [place]  PIGTAIL LOCKING LOOP PERCUTANEOUS NEPHROSTOMY CATHETER.      2. DYSMORPHIC LIKELY CHRONICALLY OBSTRUCTED UPPER COLLECTING SYSTEM WITH GROSS PYONEPHROSIS. THIS MAY WELL REPRESENT A NONFUNCTIONING \"ABSCESSED\" KIDNEY. DUE TO THE THICK PUS, THE CATHETER WAS CONNECTED TO SUCTION BULB DRAINAGE AND ORDERS WERE WRITTEN    FOR ONCE DAILY IRRIGATION WITH 3 ML STERILE SALINE. IF URINE OUTPUT IS RESTORED, THE CATHETER MAY BE CONNECTED TO CONVENTIONAL GRAVITY BAG DRAINAGE.           Assessment/Plan  Problem Representation:    Hydronephrosis- (present on admission)  Assessment & Plan    Please see pyelonephritis note    Hydronephrosis with urinary obstruction due to renal calculus- " (present on admission)  Assessment & Plan  See pyelonephritis problem    Pyelonephritis- (present on admission)  Assessment & Plan  Patient originally admitted with right flank pain at [facility].  CT abdomen and pelvis showed right-sided hydroureteronephrosis.  Urology attempted retrograde right ureteral stent attempt 01/26 which was unsuccessful due to excessive scarring therefore patient was transferred from [facility] to [facility] for interventional radiology guided right nephrostomy tube placement. Patient underwent right percutaneous nephrostomy placement on 01/28/2024.  10 mL gross pus was evacuated. Tentative plans for nephrostomy tube to remain indefinitely, ID recommends repeat CT in 5 days to guide antibiotic regiment.   Urology following, appreciate recommendations.  -Awaiting CT on 2/2/24  -Continue to monitor renal function and urine output.  -Continued on meropenem, started on 1/30/24    Expressive aphasia  Assessment & Plan  Patient with expressive aphasia after brain insult 1994.  SLP evaluation.    History of stroke- (present on admission)  Assessment & Plan  History of complex stroke with right-sided deficits and expressive aphasia.  Continue fall precautions and seizure precautions    Drug rash- (present on admission)  Assessment & Plan  Patient has developed a drug rash due to Zosyn and at this point has severe rash from head to toe.  Antibiotic was switched to Rocephin.  Patient was on IV Solu-Medrol before being transferred to [facility] and was continued until today.  Rash has now significantly resolved therefore IV Solu-Medrol discontinued.  Continue famotidine.  Monitor for any further rash.      Right hemiplegia (HCC)  Assessment & Plan  S/p CVA 1994.  PT OT evaluation.         VTE prophylaxis: SCD since patient underwent right nephrostomy tube placement today.    I have performed a physical exam and reviewed and updated ROS and Plan today (1/31/2024). In  review of yesterday's note (1/30/2024), there are no changes except as documented above.

## 2024-02-01 NOTE — CARE PLAN
The patient is Stable - Low risk of patient condition declining or worsening    Shift Goals  Clinical Goals: IV abx, trend WBC, infection control  Patient Goals: Rest and comfort  Family Goals: ELIZABETH (no family member present at this time.)    Progress made toward(s) clinical / shift goals:    Problem: Knowledge Deficit - Standard  Goal: Patient and family/care givers will demonstrate understanding of plan of care, disease process/condition, diagnostic tests and medications  Description: Target End Date:  1-3 days or as soon as patient condition allows    Document in Patient Education    1.  Patient and family/caregiver oriented to unit, equipment, visitation policy and means for communicating concern  2.  Complete/review Learning Assessment  3.  Assess knowledge level of disease process/condition, treatment plan, diagnostic tests and medications  4.  Explain disease process/condition, treatment plan, diagnostic tests and medications  Outcome: Progressing     Problem: Skin Integrity  Goal: Skin integrity is maintained or improved  Description: Target End Date:  Prior to discharge or change in level of care    Document interventions on Skin Risk/Juan David flowsheet groups and corresponding LDA    1.  Assess and monitor skin integrity, appearance and/or temperature  2.  Assess risk factors for impaired skin integrity and/or pressures ulcers  3.  Implement precautions to protect skin integrity in collaboration with interdisciplinary team  4.  Implement pressure ulcer prevention protocol if at risk for skin breakdown  5.  Confirm wound care consult if at risk for skin breakdown  6.  Ensure patient use of pressure relieving devices  (Low air loss bed, waffle overlay, heel protectors, ROHO cushion, etc)  Outcome: Progressing     Problem: Pain - Standard  Goal: Alleviation of pain or a reduction in pain to the patient’s comfort goal  Description: Target End Date:  Prior to discharge or change in level of care    Document on  Vitals flowsheet    1.  Document pain using the appropriate pain scale per order or unit policy  2.  Educate and implement non-pharmacologic comfort measures (i.e. relaxation, distraction, massage, cold/heat therapy, etc.)  3.  Pain management medications as ordered  4.  Reassess pain after pain med administration per policy  5.  If opiods administered assess patient's response to pain medication is appropriate per POSS sedation scale  6.  Follow pain management plan developed in collaboration with patient and interdisciplinary team (including palliative care or pain specialists if applicable)  Outcome: Progressing

## 2024-02-01 NOTE — THERAPY
"Occupational Therapy  Daily Treatment     Patient Name: Nydia Finch  Age:  63 y.o., Sex:  female  Medical Record #: [id number]  Today's Date: 1/31/2024     Precautions  Precautions: Fall Risk, Other (See Comments)  Comments: R nephrostomy tube connected to J-P drain; H/O CVA w/ R side residual deficit    Assessment    Pt seen for OT treatment. Pt required mod A for seated partial sponge bath/putting on lotion, min A for seated g/h, mod-max A for bed mobility, mod-max A to don/doff gown, and total A to don/doff socks. Pt R UE with no active movement and no pain. Pt reported ability to detect light touch to R UE. Pt is R handed at baseline. Pt current functional performance limited by impaired balance, impaired activity tolerance, generalized weakness, and R sided deficits. Pt will continue to benefit from skilled OT while admitted to acute care.     Plan    Treatment Plan Status: Continue Current Treatment Plan  Type of Treatment: Self Care / Activities of Daily Living, Adaptive Equipment, Neuro Re-Education / Balance, Therapeutic Exercises, Therapeutic Activity  Treatment Frequency: 3 Times per Week  Treatment Duration: Until Therapy Goals Met    DC Equipment Recommendations: None  Discharge Recommendations: Other - (If family comfortable/able to provide assist required, pt may d/c with HH OT. If not, recommed post acute placement.)    Subjective    \"It is dead, dead.\" Referencing R UE     Objective     01/31/24 8329   Precautions   Precautions Fall Risk;Other (See Comments)   Vitals   Pulse 76   Patient BP Position Sitting   Blood Pressure (!) 144/87   Respiration 16   Pulse Oximetry 96 %   O2 (LPM) 0   O2 Delivery Device None - Room Air   Pain   Pain Scales 0 to 10 Scale    Pain 0 - 10 Group   Therapist Pain Assessment Post Activity Pain Same as Prior to Activity;Nurse Notified  (not rated, agreeable to session)   Non Verbal Descriptors   Non Verbal Scale  Calm   Cognition    Cognition / Consciousness X   Speech/ " Communication Delayed Responses;Expressive Aphasia;Word Finding Impairment   Level of Consciousness Alert   Comments Very pleasant and cooperative   Passive ROM Upper Body   Passive ROM Upper Body X   Comments Limitations to R UE, especially shoulder ROM, 2 finger sublux noted   Active ROM Upper Body   Active ROM Upper Body  X   Dominant Hand Using Non-Dominant Hand   Comments No AROM to R UE   Strength Upper Body   Comments 0/5 R UE, L UE 5/5  (when cued to perform shoulder shrug, suspect pt using compensatory strategies)   Sensation Upper Body   Comments denied pain, numbness/tingling to R UE   Upper Body Muscle Tone   Upper Body Muscle Tone  X   Rt Upper Extremity Muscle Tone Hypotonic;Non Functional   Other Treatments   Other Treatments Provided Provided education regarding the importance of positioning of R UE and awareness/protection of it during functional mobility. Pt R UE propped on pillows while EOB.   Balance   Sitting Balance (Static) Fair   Sitting Balance (Dynamic) Fair -   Weight Shift Sitting Fair   Weight Shift Standing Poor   Skilled Intervention Verbal Cuing;Tactile Cuing;Postural Facilitation;Facilitation   Comments no stand due to fatigue after EOB ADLs   Bed Mobility    Supine to Sit Moderate Assist   Sit to Supine Maximal Assist   Scooting Maximal Assist   Rolling Minimum Assist to Lt.;Maximal Assist to Rt.   Skilled Intervention Facilitation;Postural Facilitation;Tactile Cuing;Verbal Cuing   Comments HOB slightly elevated, use of rails, verbal cues for sequencing   Activities of Daily Living   Grooming Minimal Assist;Seated  (washed hands/face)   Bathing Maximal Assist  (partial seated sponge bath, don lotion after)   Upper Body Dressing Moderate Assist  (don/doff gown using hemitechnique)   Lower Body Dressing Total Assist  (don/doff socks)   Skilled Intervention Verbal Cuing;Facilitation;Sequencing;Tactile Cuing;Postural Facilitation;Compensatory Strategies   Comments Pt with L lean with  fatigue during ADL tasks.   Functional Mobility   Comments EOB only for ADLs   Visual Perception   Visual Perception  Not Tested   Comments noted to look primarily to the L throughout session   Activity Tolerance   Sitting in Chair NT, politely declined   Sitting Edge of Bed >20 min   Standing NT   Comments limited by weakess and fatigue   Patient / Family Goals   Patient / Family Goal #1 to return home   Goal #1 Outcome Progressing as expected   Short Term Goals   Short Term Goal # 1 Pt will increase activity tolerance to sit EOB greater than 20 minutes for ADLs   Goal Outcome # 1 Progressing as expected   Short Term Goal # 2 Pt will complete BSC transfers with Emma   Goal Outcome # 2 Goal not met   Education Group   Education Provided Role of Occupational Therapist;Activities of Daily Living;Joint Protection   Role of Occupational Therapist Patient Response Patient;Acceptance;Explanation;Verbal Demonstration   Joint Protection Patient Response Patient;Acceptance;Explanation;Demonstration;Verbal Demonstration;Action Demonstration;Reinforcement Needed   ADL Patient Response Patient;Acceptance;Explanation;Demonstration;Verbal Demonstration;Action Demonstration   Occupational Therapy Treatment Plan    O.T. Treatment Plan Continue Current Treatment Plan   Treatment Interventions Self Care / Activities of Daily Living;Adaptive Equipment;Neuro Re-Education / Balance;Therapeutic Exercises;Therapeutic Activity   Treatment Frequency 3 Times per Week   Duration Until Therapy Goals Met

## 2024-02-01 NOTE — CARE PLAN
The patient is Stable - Low risk of patient condition declining or worsening    Shift Goals  Clinical Goals: IV ABX, labs, skin integrity  Patient Goals: Rest  Family Goals: ELIZABETH    Progress made toward(s) clinical / shift goals:    Problem: Knowledge Deficit - Standard  Goal: Patient and family/care givers will demonstrate understanding of plan of care, disease process/condition, diagnostic tests and medications  Description: Target End Date:  1-3 days or as soon as patient condition allows    Document in Patient Education    1.  Patient and family/caregiver oriented to unit, equipment, visitation policy and means for communicating concern  2.  Complete/review Learning Assessment  3.  Assess knowledge level of disease process/condition, treatment plan, diagnostic tests and medications  4.  Explain disease process/condition, treatment plan, diagnostic tests and medications  Outcome: Progressing  Note: CT scheduled to further assess kidney function, JAMI bulb still in place on nephrostomy, drainage sanguineus.      Problem: Skin Integrity  Goal: Skin integrity is maintained or improved  Description: Target End Date:  Prior to discharge or change in level of care    Document interventions on Skin Risk/Juan David flowsheet groups and corresponding LDA    1.  Assess and monitor skin integrity, appearance and/or temperature  2.  Assess risk factors for impaired skin integrity and/or pressures ulcers  3.  Implement precautions to protect skin integrity in collaboration with interdisciplinary team  4.  Implement pressure ulcer prevention protocol if at risk for skin breakdown  5.  Confirm wound care consult if at risk for skin breakdown  6.  Ensure patient use of pressure relieving devices  (Low air loss bed, waffle overlay, heel protectors, ROHO cushion, etc)  Outcome: Progressing  Note: Patient turned every two hours, skin kept clean and dry, barrier cream applied, female wick in place.      Problem: Hemodynamics  Goal:  Patient's hemodynamics, fluid balance and neurologic status will be stable or improve  Description: Target End Date:  Prior to discharge or change in level of care    Document on Assessment and I/O flowsheet templates    1.  Monitor vital signs, pulse oximetry and cardiac monitor per provider order and/or policy  2.  Maintain blood pressure per provider order  3.  Hemodynamic monitoring per provider order  4.  Manage IV fluids and IV infusions  5.  Monitor intake and output  6.  Daily weights per unit policy or provider order  7.  Assess peripheral pulses and capillary refill  8.  Assess color and body temperature  9.  Position patient for maximum circulation/cardiac output  10. Monitor for signs/symptoms of excessive bleeding  11. Assess mental status, restlessness and changes in level of consciousness  12. Monitor temperature and report fever or hypothermia to provider immediately. Consideration of targeted temperature management.  Outcome: Progressing  Note: Pt remained hemodynamically stable evidenced by stable vital sings, proper urine output and adequate fluid intake.

## 2024-02-01 NOTE — PROGRESS NOTES
Radiology Progress Note     Author: Tonie Sams D.N.P. Date & Time created: 2/1/2024  2:22 PM   Date of admission  1/27/2024  Note to reader: this note follows the APSO format rather than the historical SOAP format. Assessment and plan located at the top of the note for ease of use.    Chief Complaint  63 y.o. 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 1/26/24 cytoscopy with scarred over right ureteral orifice, unable to bypass with catheter or wire and subsequent IR consult. 1/28/24 IR Dr. Krishnamurthy places RIGHTnephrostogram with PCNT placement. 10 ml of gross pus aspirated during procedure. Catheter to suction bulb due to purulent pus.    Interval History IR:  1/29/24: RIGHT nephrostomy tube to bulb suction with 10 ml thick purulent drainage in the last 24 hours. RIGHT nephrostomy tube irrigated with 5 ml of NS without aspiration. Pt nods yes to mild intermittent pain to RIGHT back/flank. Denies nausea, vomiting or abdominal pain/discomfort. I reviewed today's labs including WBC 12.7<10.9, Hgb 10.5<11.6, Cr 0.86, GFR 75. Coordinated post IR procedure care with hospital nursing staff.    1/30/24: RIGHT nephrostomy tube to bulb suction with 10 ml thick purulent bloody drainage in the last 24 hours. RIGHT nephrostomy tube irrigated with 5 ml of NS without aspiration and without patient experiencing any pain. I reviewed today's labs including WBC 12.7<10.9, Hgb 10.5, Cr 0.82, GFR 80.  Right perinephric aspirate positive for E. coli, ESBL.  Antibiotics per ID. Coordinated care with hospital nursing staff.     1/31/24: RIGHT nephrostomy tube to bulb suction with 10 ml thick  purulent drainage in the last 24 hours. RIGHT nephrostomy tube irrigated with 5 ml of NS with return aspiration of 10 ml of saline with purulent fluid. Pt nods yes to mild intermittent pain to RIGHT back/flank. Denies nausea, vomiting or abdominal pain/discomfort. I reviewed her most recent labs including WBC 10.9, Hgb 10.5, Cr 0.87, GFR 74. Coordinated post IR procedure care with hospital nursing staff - nursing to change RIGHT nephrostomy bulb suction to gravity bag when purulence resolves to urine.    2/1/24: RIGHT nephrostomy tube to bulb suction with 10 ml thick bloody drainage in the last 24 hours. RIGHT nephrostomy tube irrigated with 5 ml of NS with return aspiration of 10 ml of bloody fluid. Pt nods yes to mild intermittent pain to RIGHT back/flank. Denies nausea, vomiting or abdominal pain/discomfort. I reviewed her most recent labs including WBC 10.2, Hgb 10.3<10.5, Cr 0.79, GFR 84. Coordinated post IR procedure care with hospital nursing staff - nursing to change RIGHT nephrostomy bulb suction to gravity bag if purulence or blood resolves to urine.    Assessment/Plan     Active Problems:    Pyelonephritis    Hydronephrosis with urinary obstruction due to renal calculus    History of stroke    Dysphagia    Hydronephrosis    Drug rash    Right hemiplegia (HCC)    Expressive aphasia    Anemia      Plan IR  -- Pending CT abd pelvis 2/1/24  - Consider RIGHT nephrostomy removal following imaging, per Dr. Hampton. RIGHT nephrostomy tube DAY 4 postprocedure with 10 ml or less output in 24 hours with purulent drainage now bloody.  -  Irrigate RIGHT Nephrostomy tube with 5 ml of sterile saline  q shift ONLY IF no pain evoked with irrigation.  - Fluid cultures positive for E.coli  - Urology following   - Ok to shower with catheter as long as site is covered with waterproof dressing; change dressing if it becomes wet.  - No baths or submerging site under water until catheter removed   - Ultimate urology plan is for  RIGHT PCNT to remain indefinitely.   - For long term use, Nephrostomy tube will need to be exchanged every 3 months; sooner prn.   - IR will continue to follow nephrostomy tube while pt is in the hospital; we may not round daily.   - Urology planning for outpatient follow-up in 4 weeks for repeat imaging.     -Thank you for allowing Interventional Radiology team to participate in the patients care, if any additional care or requests are needed in the future please do not hesitate call or place IR order. [phone]                     Review of Systems  Physical Exam   Review of Systems   Constitutional:  Negative for chills and fever.   Respiratory:  Negative for cough and shortness of breath.    Cardiovascular:  Negative for chest pain.   Gastrointestinal:  Negative for abdominal pain, nausea and vomiting.   Genitourinary:  Positive for flank pain. Negative for hematuria.   Musculoskeletal:  Negative for myalgias.   Skin:  Negative for rash.   Neurological:  Positive for sensory change, speech change and focal weakness. Negative for weakness and headaches.        Residual neuro changes, including right hemiparesis and dysphasis, secondary to prior CVA.   Psychiatric/Behavioral: Negative.        Vitals:    02/01/24 0759   BP: 133/74   Pulse: 86   Resp: 18   Temp: 36.9 °C (98.4 °F)   SpO2: 99%        Physical Exam  Constitutional:       Appearance: She is obese. She is not ill-appearing.   HENT:      Head: Atraumatic.   Cardiovascular:      Rate and Rhythm: Normal rate.   Pulmonary:      Effort: Pulmonary effort is normal. No respiratory distress.   Abdominal:      Palpations: Abdomen is soft.      Tenderness: There is no abdominal tenderness.   Musculoskeletal:      Comments: Right flaccid hemiparesis secondary to prior CVA.   Skin:     General: Skin is warm and dry.      Capillary Refill: Capillary refill takes 2 to 3 seconds.      Coloration: Skin is pale.      Comments: Nephrostomy tube to right back with purulent  "drainage.   Neurological:      Mental Status: She is alert. Mental status is at baseline.      Sensory: Sensory deficit present.      Motor: Weakness present.      Comments: Residual neuro changes, including right hemiparesis and dysphasis, secondary to prior CVA.   Psychiatric:         Mood and Affect: Mood normal.             Labs    Recent Labs     01/30/24 0338 01/31/24 0357 02/01/24 0447   WBC 11.5* 10.9* 10.2   RBC 3.85* 3.82* 3.78*   HEMOGLOBIN 10.5* 10.5* 10.3*   HEMATOCRIT 32.7* 31.8* 31.6*   MCV 84.9 83.2 83.6   MCH 27.3 27.5 27.2   MCHC 32.1* 33.0 32.6   RDW 53.2* 53.1* 52.7*   PLATELETCT 361 350 342   MPV 10.7 10.8 11.0       Recent Labs     01/30/24 0338 01/31/24 0357 02/01/24 0447   SODIUM 137 136 136   POTASSIUM 4.0 3.9 4.3   CHLORIDE 107 106 105   CO2 19* 21 21   GLUCOSE 105* 99 91   BUN 20 15 14   CREATININE 0.82 0.87 0.79   CALCIUM 7.9* 8.0* 8.1*       Recent Labs     01/30/24 0338 01/31/24 0357 02/01/24  0447   ALBUMIN 1.9* 2.7* 2.6*   TBILIRUBIN 0.2 0.2 0.3   ALKPHOSPHAT 432* 504* 493*   TOTPROTEIN 5.8* 6.0 6.0   ALTSGPT 38 35 26   ASTSGOT 69* 41 25   CREATININE 0.82 0.87 0.79       DX-SHOULDER 2+ LEFT   Final Result         Severe osteoarthritis of the glenohumeral joint.   Elevation of the humeral head abutting the acromion could relate to chronic rotator cuff tear.      IR-NEPHROSTOGRAM W/ NEW TUBE PLACEMENT (ALL RADIOLOGY) RIGHT   Final Result      1. ULTRASOUND AND FLUOROSCOPIC GUIDED PLACEMENT OF A RIGHT 8 [place]  PIGTAIL LOCKING LOOP PERCUTANEOUS NEPHROSTOMY CATHETER.      2. DYSMORPHIC LIKELY CHRONICALLY OBSTRUCTED UPPER COLLECTING SYSTEM WITH GROSS PYONEPHROSIS. THIS MAY WELL REPRESENT A NONFUNCTIONING \"ABSCESSED\" KIDNEY. DUE TO THE THICK PUS, THE CATHETER WAS CONNECTED TO SUCTION BULB DRAINAGE AND ORDERS WERE WRITTEN    FOR ONCE DAILY IRRIGATION WITH 3 ML STERILE SALINE. IF URINE OUTPUT IS RESTORED, THE CATHETER MAY BE CONNECTED TO CONVENTIONAL GRAVITY BAG DRAINAGE.    " "  CT-ABDOMEN-PELVIS WITH & W/O    (Results Pending)       INR   Date Value Ref Range Status   01/28/2024 1.08 0.87 - 1.13 Final     Comment:     INR - Non-therapeutic Reference Range: 0.87-1.13  INR - Therapeutic Reference Range: 2.0-4.0       No results found for: \"POCINR\"     Intake/Output Summary (Last 24 hours) at 1/29/2024 2001  Last data filed at 1/29/2024 1700  Gross per 24 hour   Intake 1640 ml   Output 2530 ml   Net -890 ml      Labs not explicitly included in this progress note were reviewed by the author. Radiology/imaging not explicitly included in this progress note was reviewed by the author.     I have performed a physical exam and reviewed and updated ROS and Plan today (2/1/2024).     31 minutes in directly providing and coordinating care and extensive data review.  No time overlap and excludes procedures.  "

## 2024-02-02 LAB
ALBUMIN SERPL BCP-MCNC: 2.7 G/DL (ref 3.2–4.9)
ALBUMIN/GLOB SERPL: 0.7 G/DL
ALP SERPL-CCNC: 474 U/L (ref 30–99)
ALT SERPL-CCNC: 23 U/L (ref 2–50)
ANION GAP SERPL CALC-SCNC: 9 MMOL/L (ref 7–16)
AST SERPL-CCNC: 17 U/L (ref 12–45)
BASOPHILS # BLD AUTO: 0.7 % (ref 0–1.8)
BASOPHILS # BLD: 0.08 K/UL (ref 0–0.12)
BILIRUB SERPL-MCNC: 0.2 MG/DL (ref 0.1–1.5)
BUN SERPL-MCNC: 14 MG/DL (ref 8–22)
CALCIUM ALBUM COR SERPL-MCNC: 9.3 MG/DL (ref 8.5–10.5)
CALCIUM SERPL-MCNC: 8.3 MG/DL (ref 8.5–10.5)
CHLORIDE SERPL-SCNC: 105 MMOL/L (ref 96–112)
CO2 SERPL-SCNC: 22 MMOL/L (ref 20–33)
CREAT SERPL-MCNC: 0.8 MG/DL (ref 0.5–1.4)
EOSINOPHIL # BLD AUTO: 0.41 K/UL (ref 0–0.51)
EOSINOPHIL NFR BLD: 3.7 % (ref 0–6.9)
ERYTHROCYTE [DISTWIDTH] IN BLOOD BY AUTOMATED COUNT: 53.1 FL (ref 35.9–50)
ERYTHROCYTE [DISTWIDTH] IN BLOOD BY AUTOMATED COUNT: 54 FL (ref 35.9–50)
GFR SERPLBLD CREATININE-BSD FMLA CKD-EPI: 82 ML/MIN/1.73 M 2
GLOBULIN SER CALC-MCNC: 3.8 G/DL (ref 1.9–3.5)
GLUCOSE SERPL-MCNC: 94 MG/DL (ref 65–99)
HCT VFR BLD AUTO: 30.4 % (ref 37–47)
HCT VFR BLD AUTO: 32.4 % (ref 37–47)
HGB BLD-MCNC: 10 G/DL (ref 12–16)
HGB BLD-MCNC: 10.9 G/DL (ref 12–16)
IMM GRANULOCYTES # BLD AUTO: 0.27 K/UL (ref 0–0.11)
IMM GRANULOCYTES NFR BLD AUTO: 2.5 % (ref 0–0.9)
INR PPP: 0.98 (ref 0.87–1.13)
LYMPHOCYTES # BLD AUTO: 2.42 K/UL (ref 1–4.8)
LYMPHOCYTES NFR BLD: 22 % (ref 22–41)
MCH RBC QN AUTO: 27.6 PG (ref 27–33)
MCH RBC QN AUTO: 27.6 PG (ref 27–33)
MCHC RBC AUTO-ENTMCNC: 32.9 G/DL (ref 32.2–35.5)
MCHC RBC AUTO-ENTMCNC: 33.6 G/DL (ref 32.2–35.5)
MCV RBC AUTO: 82 FL (ref 81.4–97.8)
MCV RBC AUTO: 84 FL (ref 81.4–97.8)
MONOCYTES # BLD AUTO: 0.88 K/UL (ref 0–0.85)
MONOCYTES NFR BLD AUTO: 8 % (ref 0–13.4)
NEUTROPHILS # BLD AUTO: 6.96 K/UL (ref 1.82–7.42)
NEUTROPHILS NFR BLD: 63.1 % (ref 44–72)
NRBC # BLD AUTO: 0 K/UL
NRBC BLD-RTO: 0 /100 WBC (ref 0–0.2)
PLATELET # BLD AUTO: 323 K/UL (ref 164–446)
PLATELET # BLD AUTO: 337 K/UL (ref 164–446)
PMV BLD AUTO: 11.4 FL (ref 9–12.9)
PMV BLD AUTO: 11.4 FL (ref 9–12.9)
POTASSIUM SERPL-SCNC: 4.3 MMOL/L (ref 3.6–5.5)
PROT SERPL-MCNC: 6.5 G/DL (ref 6–8.2)
PROTHROMBIN TIME: 13 SEC (ref 12–14.6)
RBC # BLD AUTO: 3.62 M/UL (ref 4.2–5.4)
RBC # BLD AUTO: 3.95 M/UL (ref 4.2–5.4)
SODIUM SERPL-SCNC: 136 MMOL/L (ref 135–145)
WBC # BLD AUTO: 11 K/UL (ref 4.8–10.8)
WBC # BLD AUTO: 9.5 K/UL (ref 4.8–10.8)

## 2024-02-02 PROCEDURE — 700102 HCHG RX REV CODE 250 W/ 637 OVERRIDE(OP): Performed by: PHYSICAL MEDICINE & REHABILITATION

## 2024-02-02 PROCEDURE — 700111 HCHG RX REV CODE 636 W/ 250 OVERRIDE (IP): Mod: JZ

## 2024-02-02 PROCEDURE — 85610 PROTHROMBIN TIME: CPT

## 2024-02-02 PROCEDURE — 99233 SBSQ HOSP IP/OBS HIGH 50: CPT | Performed by: INTERNAL MEDICINE

## 2024-02-02 PROCEDURE — 700105 HCHG RX REV CODE 258

## 2024-02-02 PROCEDURE — 85027 COMPLETE CBC AUTOMATED: CPT

## 2024-02-02 PROCEDURE — 36415 COLL VENOUS BLD VENIPUNCTURE: CPT

## 2024-02-02 PROCEDURE — A9270 NON-COVERED ITEM OR SERVICE: HCPCS | Performed by: INTERNAL MEDICINE

## 2024-02-02 PROCEDURE — 99232 SBSQ HOSP IP/OBS MODERATE 35: CPT | Mod: GC | Performed by: INTERNAL MEDICINE

## 2024-02-02 PROCEDURE — 770001 HCHG ROOM/CARE - MED/SURG/GYN PRIV*

## 2024-02-02 PROCEDURE — 80053 COMPREHEN METABOLIC PANEL: CPT

## 2024-02-02 PROCEDURE — 700101 HCHG RX REV CODE 250: Performed by: PHYSICAL MEDICINE & REHABILITATION

## 2024-02-02 PROCEDURE — A9270 NON-COVERED ITEM OR SERVICE: HCPCS | Performed by: PHYSICAL MEDICINE & REHABILITATION

## 2024-02-02 PROCEDURE — A9270 NON-COVERED ITEM OR SERVICE: HCPCS

## 2024-02-02 PROCEDURE — 700111 HCHG RX REV CODE 636 W/ 250 OVERRIDE (IP)

## 2024-02-02 PROCEDURE — 700102 HCHG RX REV CODE 250 W/ 637 OVERRIDE(OP)

## 2024-02-02 PROCEDURE — 700102 HCHG RX REV CODE 250 W/ 637 OVERRIDE(OP): Performed by: INTERNAL MEDICINE

## 2024-02-02 PROCEDURE — 85025 COMPLETE CBC W/AUTO DIFF WBC: CPT

## 2024-02-02 PROCEDURE — 700101 HCHG RX REV CODE 250: Performed by: NURSE PRACTITIONER

## 2024-02-02 RX ORDER — SULFAMETHOXAZOLE AND TRIMETHOPRIM 800; 160 MG/1; MG/1
1 TABLET ORAL EVERY 12 HOURS
Status: DISCONTINUED | OUTPATIENT
Start: 2024-02-02 | End: 2024-02-05 | Stop reason: HOSPADM

## 2024-02-02 RX ADMIN — MEROPENEM 500 MG: 500 INJECTION, POWDER, FOR SOLUTION INTRAVENOUS at 05:51

## 2024-02-02 RX ADMIN — PREGABALIN 150 MG: 150 CAPSULE ORAL at 14:57

## 2024-02-02 RX ADMIN — LIDOCAINE 1 PATCH: 4 PATCH TOPICAL at 12:47

## 2024-02-02 RX ADMIN — DULOXETINE HYDROCHLORIDE 60 MG: 60 CAPSULE, DELAYED RELEASE ORAL at 04:40

## 2024-02-02 RX ADMIN — PREGABALIN 150 MG: 150 CAPSULE ORAL at 21:34

## 2024-02-02 RX ADMIN — SULFAMETHOXAZOLE AND TRIMETHOPRIM 1 TABLET: 800; 160 TABLET ORAL at 10:40

## 2024-02-02 RX ADMIN — FOLIC ACID 1 MG: 1 TABLET ORAL at 04:39

## 2024-02-02 RX ADMIN — ENOXAPARIN SODIUM 40 MG: 100 INJECTION SUBCUTANEOUS at 17:36

## 2024-02-02 RX ADMIN — SODIUM CHLORIDE, PRESERVATIVE FREE 5 ML: 5 INJECTION INTRAVENOUS at 04:40

## 2024-02-02 RX ADMIN — FAMOTIDINE 20 MG: 20 TABLET, FILM COATED ORAL at 04:40

## 2024-02-02 RX ADMIN — SULFAMETHOXAZOLE AND TRIMETHOPRIM 1 TABLET: 800; 160 TABLET ORAL at 17:37

## 2024-02-02 RX ADMIN — PREGABALIN 150 MG: 150 CAPSULE ORAL at 04:39

## 2024-02-02 RX ADMIN — FAMOTIDINE 20 MG: 20 TABLET, FILM COATED ORAL at 17:36

## 2024-02-02 RX ADMIN — DULOXETINE HYDROCHLORIDE 60 MG: 60 CAPSULE, DELAYED RELEASE ORAL at 17:36

## 2024-02-02 RX ADMIN — MEROPENEM 500 MG: 500 INJECTION, POWDER, FOR SOLUTION INTRAVENOUS at 00:35

## 2024-02-02 RX ADMIN — FERROUS SULFATE TAB 325 MG (65 MG ELEMENTAL FE) 325 MG: 325 (65 FE) TAB at 04:39

## 2024-02-02 RX ADMIN — BACLOFEN 5 MG: 10 TABLET ORAL at 21:34

## 2024-02-02 ASSESSMENT — ENCOUNTER SYMPTOMS
FEVER: 0
FLANK PAIN: 1
COUGH: 0
SENSORY CHANGE: 1
HEADACHES: 0
FLANK PAIN: 0
MYALGIAS: 0
NAUSEA: 0
SHORTNESS OF BREATH: 0
FOCAL WEAKNESS: 1
CHILLS: 0
VOMITING: 0
WEAKNESS: 0
PSYCHIATRIC NEGATIVE: 1
ABDOMINAL PAIN: 0
SPEECH CHANGE: 1

## 2024-02-02 ASSESSMENT — PAIN SCALES - WONG BAKER: WONGBAKER_NUMERICALRESPONSE: DOESN'T HURT AT ALL

## 2024-02-02 ASSESSMENT — PATIENT HEALTH QUESTIONNAIRE - PHQ9
SUM OF ALL RESPONSES TO PHQ9 QUESTIONS 1 AND 2: 0
2. FEELING DOWN, DEPRESSED, IRRITABLE, OR HOPELESS: NOT AT ALL
1. LITTLE INTEREST OR PLEASURE IN DOING THINGS: NOT AT ALL

## 2024-02-02 ASSESSMENT — PAIN DESCRIPTION - PAIN TYPE
TYPE: ACUTE PAIN

## 2024-02-02 NOTE — DISCHARGE PLANNING
Case Management Discharge Planning    Admission Date: 1/27/2024  GMLOS: 5.1  ALOS: 6    6-Clicks ADL Score: 13  6-Clicks Mobility Score: 7  PT and/or OT Eval ordered: Yes  Post-acute Referrals Ordered: Yes  Post-acute Choice Obtained: Yes  Has referral(s) been sent to post-acute provider:  Yes      Anticipated Discharge Dispo: Discharge Disposition: D/T to SNF with Medicare cert in anticipation of skilled care (03)    DME Needed: No    Action(s) Taken: Pt discussed in IDT rounds, pt is not [facility] per providers.    Plan is to d/c to SNF/IRF. RN JOEY called pt's  to discuss discharge plan, pt's  Marbin does not want pt sent to any SNFs/IRF in [place]/ [facility].     Marbin reports he has been pt's caregiver for a very long time and is well equipped to care for her at home. Marbin also stated that pt was previously at a SNF here in [place] and did not fair well being away from family and known care-givers.    RN CM notified Marbni that [facility] informed [facility] that they do not hold beds and do same day acceptance based on bed availability.     Patient has good D/C support of spouse and son. Please call spouse to  pt when [facility] if [facility] has no accepting bed at time of discharge.     Family will provide transport home.      Escalations Completed: None    Medically Clear: No    Next Steps: F/U with medical team regarding D/C needs/ barriers.     Barriers to Discharge: Medical clearance and Pending Placement    Is the patient up for discharge tomorrow: No

## 2024-02-02 NOTE — PROGRESS NOTES
Radiology Progress Note     Author: Tonie Sams D.N.P. Date & Time created: 2/2/2024  3:56 PM   Date of admission  1/27/2024  Note to reader: this note follows the APSO format rather than the historical SOAP format. Assessment and plan located at the top of the note for ease of use.    Chief Complaint  63 y.o. female admitted 1/27/2024 with pyelonephritis    HPI  Nydia Finch is a 64 yo female with PMH significant for aneurysm complicated by hemorrhagic stroke with residual dysphasia and right hemiparesis, kidney stones s/p cystoscopic lithotripsy and stent, COPD, chronic pain and rheumatoid arthritis who presented 1/25/24 from OSH because of ongoing hydronephrosis and pyelonephritis with sepsis. 1/26/24 cytoscopy with scarred over right ureteral orifice, unable to bypass with catheter or wire and subsequent IR consult. 1/28/24 IR Dr. Krishnamurthy places RIGHTnephrostogram with PCNT placement. 10 ml of gross pus aspirated during procedure. Catheter to suction bulb due to purulent pus.    Interval History IR:  1/29/24: RIGHT nephrostomy tube to bulb suction with 10 ml thick purulent drainage in the last 24 hours. RIGHT nephrostomy tube irrigated with 5 ml of NS without aspiration. Pt nods yes to mild intermittent pain to RIGHT back/flank. Denies nausea, vomiting or abdominal pain/discomfort. I reviewed today's labs including WBC 12.7<10.9, Hgb 10.5<11.6, Cr 0.86, GFR 75. Coordinated post IR procedure care with hospital nursing staff.    1/30/24: RIGHT nephrostomy tube to bulb suction with 10 ml thick purulent bloody drainage in the last 24 hours. RIGHT nephrostomy tube irrigated with 5 ml of NS without aspiration and without patient experiencing any pain. I reviewed today's labs including WBC 12.7<10.9, Hgb 10.5, Cr 0.82, GFR 80.  Right perinephric aspirate positive for E. coli, ESBL.  Antibiotics per ID. Coordinated care with hospital nursing staff.     1/31/24: RIGHT nephrostomy tube to bulb suction with 10 ml thick  purulent drainage in the last 24 hours. RIGHT nephrostomy tube irrigated with 5 ml of NS with return aspiration of 10 ml of saline with purulent fluid. Pt nods yes to mild intermittent pain to RIGHT back/flank. Denies nausea, vomiting or abdominal pain/discomfort. I reviewed her most recent labs including WBC 10.9, Hgb 10.5, Cr 0.87, GFR 74. Coordinated post IR procedure care with hospital nursing staff - nursing to change RIGHT nephrostomy bulb suction to gravity bag when purulence resolves to urine.    2/1/24: RIGHT nephrostomy tube to bulb suction with 10 ml thick bloody drainage in the last 24 hours. RIGHT nephrostomy tube irrigated with 5 ml of NS with return aspiration of 10 ml of bloody fluid. Pt nods yes to mild intermittent pain to RIGHT back/flank. Denies nausea, vomiting or abdominal pain/discomfort. I reviewed her most recent labs including WBC 10.2, Hgb 10.3<10.5, Cr 0.79, GFR 84. Coordinated post IR procedure care with hospital nursing staff - nursing to change RIGHT nephrostomy bulb suction to gravity bag if purulence or blood resolves to urine.    2/2/24: CT 2/1/24 shows atrophic lobulated right kidney with right percutaneous drain with no evidence of a focal renal fluid collection.NO hydronephrosis. Drain output less than 10 ml per 24 hour period x 5 days. Drain has never put out urine. 2/1/24 no additional viscous grey yellow purulent drainage - blood in suction bulb and 10 ml of blood aspirated with flushing. Discussed drain out put and reviewed new imaging with IR Dr. Abreu. He recommended discontinuation of drain today. I reviewed patient's most recent labs including WBC 11.0, Hgb 10.0, Cr 0.80. Coordinated post IR procedural care with interventional radiologist, resident team and hospital nursing staff.  Drain removal without bleeding or pain. Drain intact.    Assessment/Plan     Active Problems:    Pyelonephritis    Hydronephrosis with urinary obstruction due to renal calculus    History of  stroke    Dysphagia    Hydronephrosis    Drug rash    Right hemiplegia (HCC)    Expressive aphasia    Anemia      Plan IR  -- IR recommends repeat RIGHT renal US 2/3/24 to assess for hydronephrosis or bleeding.   - Consult IR for hydronephrosis or hemorrhage  - Fluid cultures positive for E.coli  - Urology following   - Urology planning for outpatient follow-up 4 weeks from discharge date for repeat imaging.     -Thank you for allowing Interventional Radiology team to participate in the patients care, if any additional care or requests are needed in the future please do not hesitate call or place IR order. [phone]                     Review of Systems  Physical Exam   Review of Systems   Constitutional:  Negative for chills and fever.   Respiratory:  Negative for cough and shortness of breath.    Cardiovascular:  Negative for chest pain.   Gastrointestinal:  Negative for abdominal pain, nausea and vomiting.   Genitourinary:  Negative for flank pain and hematuria.   Musculoskeletal:  Negative for myalgias.   Skin:  Negative for rash.   Neurological:  Positive for sensory change, speech change and focal weakness. Negative for weakness and headaches.        Residual neuro changes, including right hemiparesis and dysphasis, secondary to prior CVA.   Psychiatric/Behavioral: Negative.        Vitals:    02/02/24 0840   BP: 126/71   Pulse: 82   Resp: 16   Temp: 36.8 °C (98.2 °F)   SpO2: 98%        Physical Exam  Constitutional:       Appearance: She is obese. She is not ill-appearing.   HENT:      Head: Atraumatic.   Cardiovascular:      Rate and Rhythm: Normal rate.   Pulmonary:      Effort: Pulmonary effort is normal. No respiratory distress.   Abdominal:      Palpations: Abdomen is soft.      Tenderness: There is no abdominal tenderness.   Musculoskeletal:      Comments: Right flaccid hemiparesis secondary to prior CVA.   Skin:     General: Skin is warm and dry.      Capillary Refill: Capillary refill takes 2 to 3  seconds.      Coloration: Skin is pale.      Comments: Dressing to right back over drain removal site.   Neurological:      Mental Status: She is alert. Mental status is at baseline.      Sensory: Sensory deficit present.      Motor: Weakness present.      Comments: Residual neuro changes, including right hemiparesis and dysphasis, secondary to prior CVA.   Psychiatric:         Mood and Affect: Mood normal.             Labs    Recent Labs     01/31/24 0357 02/01/24 0447 02/02/24  0321   WBC 10.9* 10.2 11.0*   RBC 3.82* 3.78* 3.62*   HEMOGLOBIN 10.5* 10.3* 10.0*   HEMATOCRIT 31.8* 31.6* 30.4*   MCV 83.2 83.6 84.0   MCH 27.5 27.2 27.6   MCHC 33.0 32.6 32.9   RDW 53.1* 52.7* 54.0*   PLATELETCT 350 342 323   MPV 10.8 11.0 11.4       Recent Labs     01/31/24 0357 02/01/24 0447 02/02/24  0321   SODIUM 136 136 136   POTASSIUM 3.9 4.3 4.3   CHLORIDE 106 105 105   CO2 21 21 22   GLUCOSE 99 91 94   BUN 15 14 14   CREATININE 0.87 0.79 0.80   CALCIUM 8.0* 8.1* 8.3*       Recent Labs     01/31/24 0357 02/01/24 0447 02/02/24  0321   ALBUMIN 2.7* 2.6* 2.7*   TBILIRUBIN 0.2 0.3 0.2   ALKPHOSPHAT 504* 493* 474*   TOTPROTEIN 6.0 6.0 6.5   ALTSGPT 35 26 23   ASTSGOT 41 25 17   CREATININE 0.87 0.79 0.80       CT-ABDOMEN-PELVIS WITH   Final Result      1.  Atrophic right kidney with a percutaneous drain extending into the upper pole of the right kidney. There is right renal pelvic dilatation with thickening of the wall of the renal pelvis with diffuse thickening of the wall of the urinary bladder. This    may represent infectious or neoplastic process.      2.   Prior cholecystectomy intrahepatic and extrahepatic biliary ductal prominence likely related to prior cholecystectomy.      3.  Diverticulosis.      4.  Hiatal hernia with surgical changes involving the stomach.      5.  Bibasilar atelectasis and minimal bilateral pleural effusions.      DX-SHOULDER 2+ LEFT   Final Result         Severe osteoarthritis of the glenohumeral  "joint.   Elevation of the humeral head abutting the acromion could relate to chronic rotator cuff tear.      IR-NEPHROSTOGRAM W/ NEW TUBE PLACEMENT (ALL RADIOLOGY) RIGHT   Final Result      1. ULTRASOUND AND FLUOROSCOPIC GUIDED PLACEMENT OF A RIGHT 8 [place]  PIGTAIL LOCKING LOOP PERCUTANEOUS NEPHROSTOMY CATHETER.      2. DYSMORPHIC LIKELY CHRONICALLY OBSTRUCTED UPPER COLLECTING SYSTEM WITH GROSS PYONEPHROSIS. THIS MAY WELL REPRESENT A NONFUNCTIONING \"ABSCESSED\" KIDNEY. DUE TO THE THICK PUS, THE CATHETER WAS CONNECTED TO SUCTION BULB DRAINAGE AND ORDERS WERE WRITTEN    FOR ONCE DAILY IRRIGATION WITH 3 ML STERILE SALINE. IF URINE OUTPUT IS RESTORED, THE CATHETER MAY BE CONNECTED TO CONVENTIONAL GRAVITY BAG DRAINAGE.          INR   Date Value Ref Range Status   02/02/2024 0.98 0.87 - 1.13 Final     Comment:     INR - Non-therapeutic Reference Range: 0.87-1.13  INR - Therapeutic Reference Range: 2.0-4.0       No results found for: \"POCINR\"     Intake/Output Summary (Last 24 hours) at 1/29/2024 2001  Last data filed at 1/29/2024 1700  Gross per 24 hour   Intake 1640 ml   Output 2530 ml   Net -890 ml      Labs not explicitly included in this progress note were reviewed by the author. Radiology/imaging not explicitly included in this progress note was reviewed by the author.     I have performed a physical exam and reviewed and updated ROS and Plan today (2/2/2024).     47 minutes in directly providing and coordinating care and extensive data review.  No time overlap and excludes procedures.      As the Staff Neurointerventional radiologist, I have reviewed the notes, discussed the findings and plan of management with the Nurse Practitioner and agree with the contents of the note.   "

## 2024-02-02 NOTE — CARE PLAN
The patient is Stable - Low risk of patient condition declining or worsening    Shift Goals  Clinical Goals: IV abx, maintain skin integrity  Patient Goals: Comfort and sleep  Family Goals: ELIZABETH    Progress made toward(s) clinical / shift goals:    Problem: Skin Integrity  Goal: Skin integrity is maintained or improved  Description: Target End Date:  Prior to discharge or change in level of care    Document interventions on Skin Risk/Juan David flowsheet groups and corresponding LDA    1.  Assess and monitor skin integrity, appearance and/or temperature  2.  Assess risk factors for impaired skin integrity and/or pressures ulcers  3.  Implement precautions to protect skin integrity in collaboration with interdisciplinary team  4.  Implement pressure ulcer prevention protocol if at risk for skin breakdown  5.  Confirm wound care consult if at risk for skin breakdown  6.  Ensure patient use of pressure relieving devices  (Low air loss bed, waffle overlay, heel protectors, ROHO cushion, etc)  Outcome: Progressing  Note: Allergic reaction to zosyn is improving, skin is decreasing in redness. Skin is still peeling on back and sacrum.     Problem: Pain - Standard  Goal: Alleviation of pain or a reduction in pain to the patient’s comfort goal  Description: Target End Date:  Prior to discharge or change in level of care    Document on Vitals flowsheet    1.  Document pain using the appropriate pain scale per order or unit policy  2.  Educate and implement non-pharmacologic comfort measures (i.e. relaxation, distraction, massage, cold/heat therapy, etc.)  3.  Pain management medications as ordered  4.  Reassess pain after pain med administration per policy  5.  If opiods administered assess patient's response to pain medication is appropriate per POSS sedation scale  6.  Follow pain management plan developed in collaboration with patient and interdisciplinary team (including palliative care or pain specialists if  applicable)  Outcome: Progressing  Note: Managing pain on flank with medication, rest, and distraction.       Patient is not progressing towards the following goals:

## 2024-02-02 NOTE — CARE PLAN
The patient is Watcher - Medium risk of patient condition declining or worsening    Shift Goals  Clinical Goals: Antibiotics, Discharge home  Patient Goals: Discharge home  Family Goals: Discharge home      Problem: Knowledge Deficit - Standard  Goal: Patient and family/care givers will demonstrate understanding of plan of care, disease process/condition, diagnostic tests and medications  Outcome: Progressing    Notes: Updated patient and her  on POC.  verbalized he wants paitient to be discharged to a facility close to home. Informed .     Problem: Skin Integrity  Goal: Skin integrity is maintained or improved  Outcome: Progressing       NOTES: Ensured patient's sheets and linens are kept clean an dry.  Rolled patient q2h.

## 2024-02-02 NOTE — PROGRESS NOTES
Infectious Disease Progress Note    Author: Billy Pritchett M.D. Date & Time of service: 2024  8:20 AM    Chief Complaint:  Follow-up for pyonephrosis    Interval History:   Patient remains afebrile, white count is 10.9, tolerating meropenem, cultures as below, creatinine 0.87, normal transaminases   patient remains afebrile, white count is 11,000, tolerating meropenem, repeat imaging as below, creatinine 0.8, normal transaminases,     Labs Reviewed and Medications Reviewed.    Review of Systems:  Review of Systems   Genitourinary:  Positive for flank pain.       Hemodynamics:  Temp (24hrs), Av.6 °C (97.9 °F), Min:36.3 °C (97.3 °F), Max:36.8 °C (98.2 °F)  Temperature: 36.3 °C (97.3 °F)  Pulse  Av.4  Min: 61  Max: 116   Blood Pressure: 108/64       Physical Exam:  Physical Exam  Vitals and nursing note reviewed.   Constitutional:       General: She is not in acute distress.     Appearance: She is ill-appearing.   HENT:      Mouth/Throat:      Pharynx: No oropharyngeal exudate.   Eyes:      Conjunctiva/sclera: Conjunctivae normal.   Cardiovascular:      Rate and Rhythm: Normal rate.   Pulmonary:      Effort: No respiratory distress.      Breath sounds: No stridor.   Abdominal:      General: There is no distension.      Comments: Right-sided percutaneous nephrostomy tube with minimal serosanguineous output   Skin:     Findings: No erythema.   Neurological:      Comments: Residual chronic right-sided hemiparesis and some word finding difficulty, aphasia         Meds:    Current Facility-Administered Medications:     senna-docusate **AND** polyethylene glycol/lytes **AND** magnesium hydroxide **AND** bisacodyl    baclofen    lidocaine    normal saline flush    meropenem    enoxaparin (LOVENOX) injection    DULoxetine    famotidine **OR** [DISCONTINUED] famotidine    ferrous sulfate    folic acid    pregabalin    acetaminophen    diphenhydrAMINE    ipratropium-albuterol    labetalol     ondansetron    ondansetron    oxyCODONE immediate-release **OR** oxyCODONE immediate-release **OR** morphine injection    prochlorperazine    promethazine    promethazine    Notify provider if pain remains uncontrolled **AND** Use the Numeric Rating Scale (NRS), Lyles-Baker Faces (WBF), or FLACC on regular floors and Critical-Care Pain Observation Tool (CPOT) on ICUs/Trauma to assess pain **AND** Pulse Ox **AND** Pharmacy Consult Request **AND** If patient difficult to arouse and/or has respiratory depression (respiratory rate of 10 or less), stop any opiates that are currently infusing and call a Rapid Response.    Labs:  Recent Labs     01/31/24 0357 02/01/24 0447 02/02/24 0321   WBC 10.9* 10.2 11.0*   RBC 3.82* 3.78* 3.62*   HEMOGLOBIN 10.5* 10.3* 10.0*   HEMATOCRIT 31.8* 31.6* 30.4*   MCV 83.2 83.6 84.0   MCH 27.5 27.2 27.6   RDW 53.1* 52.7* 54.0*   PLATELETCT 350 342 323   MPV 10.8 11.0 11.4   NEUTSPOLYS 67.80 72.60* 63.10   LYMPHOCYTES 17.40* 15.90* 22.00   MONOCYTES 5.20 4.40 8.00   EOSINOPHILS 4.30 5.30 3.70   BASOPHILS 0.00 0.90 0.70   RBCMORPHOLO Normal Normal  --        Recent Labs     01/31/24 0357 02/01/24 0447 02/02/24  0321   SODIUM 136 136 136   POTASSIUM 3.9 4.3 4.3   CHLORIDE 106 105 105   CO2 21 21 22   GLUCOSE 99 91 94   BUN 15 14 14       Recent Labs     01/31/24 0357 02/01/24 0447 02/02/24  0321   ALBUMIN 2.7* 2.6* 2.7*   TBILIRUBIN 0.2 0.3 0.2   ALKPHOSPHAT 504* 493* 474*   TOTPROTEIN 6.0 6.0 6.5   ALTSGPT 35 26 23   ASTSGOT 41 25 17   CREATININE 0.87 0.79 0.80         Imaging:  DX-SHOULDER 2+ LEFT    Result Date: 1/30/2024 1/30/2024 12:58 PM HISTORY/REASON FOR EXAM:  Atraumatic Pain/Swelling/Deformity; decreased ROM, eval for extent of possible OA TECHNIQUE/EXAM DESCRIPTION AND NUMBER OF VIEWS:  3 views of the LEFT shoulder. COMPARISON: None FINDINGS: No acute fracture or dislocation. Severe osteoarthritis of the glenohumeral joint. Elevation of the humeral head abutting the acromion  could relate to chronic rotator cuff tear.     Severe osteoarthritis of the glenohumeral joint. Elevation of the humeral head abutting the acromion could relate to chronic rotator cuff tear.    IR-NEPHROSTOGRAM W/ NEW TUBE PLACEMENT (ALL RADIOLOGY) RIGHT    Result Date: 1/28/2024 1/28/2024 12:10 PM HISTORY/REASON FOR EXAM:  Gram-negative sepsis. Hydronephrotic markedly atrophic right kidney. Unsuccessful attempt of retrograde right ureteral stent placement on 1/26/2024. Percutaneous nephrostomy requested. TECHNIQUE/EXAM DESCRIPTION: RIGHT Percutaneous Nephrostomy and Nephrostogram with ultrasound and fluoroscopic guidance. PROCEDURE: Informed consent was obtained. The skin was prepped and draped in a sterile fashion using chlorhexidine antiseptic. SEDATION: Moderate sedation was provided. Pulse oximetry and continuous cardiac monitoring by the nurse was performed throughout the exam. Intraservice time was 45 minutes. CURRENT ALARA PRINCIPLES FOR DOSE REDUCTION TECHNIQUES WERE UTILIZED FOR THIS EXAMINATION. ALL ELEMENTS OF MAXIMAL STERILE BARRIER TECHNIQUE WERE FOLLOWED. FLUOROSCOPY TIME: 7.4 minutes FLUOROSCOPY DOSE(DAP): 5.37 Gy*cm^2 FLUOROSCOPY DOSE: 37.8 mGy NUMBER OF FILMS: 13 fluoroscopic frame capture images and/or digital series obtained. CONTRAST AMOUNT: 15 mL Omnipaque The RIGHT kidney was localized with real time ultrasound. Dilated upper collecting system identified concordant with CT. Following local anesthesia with 1% lidocaine, an AccuStick 21-gauge needle was advanced into the upper collecting system. There was no urine return, however, aspiration upon the needle yielded thick beige pus. A 2 mL sample was collected and sent for culture and sensitivity and Gram stain. Next, a limited antegrade pyelogram was performed showing contained contrast within somewhat dysmorphic upper collecting system structures. A 0.018 guidewire was placed. An AccuStick coaxial catheter was then placed. Next, an Amplatz  "guidewire was placed and looped within opacified the upper collecting system. A 0.018 guidewire was left in place as a safety wire. Next, following serial tract dilatation, an 8 Pashto locking loop nephrostomy catheter was placed and the locking loop secured. Limited nephrostogram confirms catheter position without extravasation. Contrast injection was limited to avoid bacteremia in in this grossly pyonephrotic right kidney. The catheter was sutured to the skin and connected to a suction bulb. Suction bulb was placed rather than a gravity bag as there was no significant urine return, only thick pus. The patient tolerated the procedure well with no evidence of complication. COMPARISON:  Outside films CT of the abdomen and pelvis 1/24/2024. FINDINGS:  The nephrostogram shows the catheter to be in satisfactory position. There is no extravasation. The upper collecting system appears isolated. There was no visualization of the UPJ or proximal ureter. Gross pyonephrosis.     1. ULTRASOUND AND FLUOROSCOPIC GUIDED PLACEMENT OF A RIGHT 8 Croatian  PIGTAIL LOCKING LOOP PERCUTANEOUS NEPHROSTOMY CATHETER. 2. DYSMORPHIC LIKELY CHRONICALLY OBSTRUCTED UPPER COLLECTING SYSTEM WITH GROSS PYONEPHROSIS. THIS MAY WELL REPRESENT A NONFUNCTIONING \"ABSCESSED\" KIDNEY. DUE TO THE THICK PUS, THE CATHETER WAS CONNECTED TO SUCTION BULB DRAINAGE AND ORDERS WERE WRITTEN FOR ONCE DAILY IRRIGATION WITH 3 ML STERILE SALINE. IF URINE OUTPUT IS RESTORED, THE CATHETER MAY BE CONNECTED TO CONVENTIONAL GRAVITY BAG DRAINAGE.      Micro:  Results       Procedure Component Value Units Date/Time    BLOOD CULTURE [[id number]] Collected: 01/27/24 1717    Order Status: Completed Specimen: Blood from Peripheral Updated: 02/01/24 1900     Significant Indicator NEG     Source BLD     Site PERIPHERAL     Culture Result No growth after 5 days of incubation.    Narrative:      Per Hospital Policy: Only change Specimen Src: to \"Line\" if  specified by physician " order.  Left AC    CULTURE WOUND W/ GRAM STAIN [[id number]]  (Abnormal)  (Susceptibility) Collected: 01/28/24 1358    Order Status: Completed Specimen: Wound Updated: 01/30/24 1553     Significant Indicator POS     Source WND     Site R perinephric aspirate     Culture Result -     Gram Stain Result Moderate WBCs.  Few Gram negative rods.       Culture Result Escherichia coli ESBL  Moderate growth  Extended Spectrum Beta-lactamase (ESBL) isolated.  ESBL's may be clinically resistant to therapy with  Penicillins,Cephalosporins or Aztreonam despite  apparent in vitro susceptibility to some of these agents.  The patient requires contact isolation.  Please contact pharmacy or an Infectious Disease Specialist  if you have any questions about appropriate therapy.      Susceptibility       Escherichia coli esbl (1)       Antibiotic Interpretation Microscan   Method Status    Ampicillin Resistant >16 mcg/mL ANISH Final    Ceftriaxone Resistant >32 mcg/mL ANISH Final    Cefazolin Resistant >16 mcg/mL ANISH Final    Ciprofloxacin Resistant >2 mcg/mL ANISH Final    Cefepime Resistant >16 mcg/mL ANISH Final    Cefuroxime Resistant >16 mcg/mL ANISH Final    Ampicillin/sulbactam Intermediate 16/8 mcg/mL ANISH Final    Ertapenem Sensitive <=0.5 mcg/mL ANISH Final    Tobramycin Resistant >8 mcg/mL ANISH Final    Gentamicin Resistant >8 mcg/mL ANISH Final    Levofloxacin Resistant >4 mcg/mL ANISH Final    Minocycline Sensitive <=4 mcg/mL ANISH Final    Moxifloxacin Resistant >4 mcg/mL ANISH Final    Pip/Tazobactam Sensitive <=8 mcg/mL ANISH Final    Trimeth/Sulfa Sensitive <=0.5/9.5 mcg/mL ANISH Final    Tigecycline Sensitive <=2 mcg/mL ANISH Final                       Fungal Culture [[id number]] Collected: 01/28/24 1358    Order Status: Completed Specimen: Wound Updated: 01/30/24 1553     Significant Indicator NEG     Source WND     Site R perinephric aspirate     Culture Result No fungal growth to date.     Fungal Smear Results No fungal elements seen.    AFB  Culture [[id number]] Collected: 01/28/24 1358    Order Status: Completed Specimen: Wound Updated: 01/30/24 1553     Significant Indicator NEG     Source WND     Site R perinephric aspirate     Culture Result Culture in progress.     AFB Smear Results No acid fast bacilli seen.    Fungal Smear [[id number]] Collected: 01/28/24 1358    Order Status: Completed Specimen: Wound Updated: 01/29/24 1812     Significant Indicator NEG     Source WND     Site R perinephric aspirate     Fungal Smear Results No fungal elements seen.    GRAM STAIN [[id number]] Collected: 01/28/24 1355    Order Status: Completed Specimen: Wound Updated: 01/29/24 1812     Significant Indicator .     Source WND     Site R perinephric aspirate     Gram Stain Result Moderate WBCs.  Few Gram negative rods.      Acid Fast Stain [[id number]] Collected: 01/28/24 1358    Order Status: Completed Specimen: Wound Updated: 01/29/24 1812     Significant Indicator NEG     Source WND     Site R perinephric aspirate     AFB Smear Results No acid fast bacilli seen.    FLUID CULTURE W/GRAM STAIN [[id number]] Collected: 01/28/24 1358    Order Status: Canceled Specimen: Other     Fungal Culture [[id number]]     Order Status: No result Specimen: Respirate from Other Body Fluid     AFB Culture [[id number]]     Order Status: No result Specimen: Respirate from Other Body Fluid     FLUID CULTURE W/GRAM STAIN [[id number]]     Order Status: No result Specimen: Other Body Fluid             Assessment:  Nydia Finch is a 63 y.o. 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 Patient was transferred to [facility] for further management.  On 1/26, patient underwent cystoscopy, they were unable  to pass with a wire due to extensive scarring so IR was consulted. A right-sided percutaneous nephrostomy tube was placed on 1/28, gross pus was aspirated during the procedure and continues to drain alma pus from the nephrostomy tube.  Cultures from this growing ESBL E. coli.      Pertinent Diagnoses:  Right pyelonephritis with pyonephrosis  Right hydroureteronephrosis  ESBL E. coli infection  History of nephrolithiasis  Right nonfunctioning kidney  History of CVA with residual right-sided hemiparesis and expressive aphasia  Chronic pain  COPD    Plan:  -Very little output from the IR drain.  Repeat CT scan with atrophic right kidney, renal pelvic dilatation, perinephric fat stranding, no focal renal fluid collections  -Okay to continue IV meropenem.  On discharge, can switch to p.o. Bactrim 1 DS twice daily through 2/10/2024  -Urology planning repeat assessment in the outpatient setting in approximately 4 weeks.  Patient remains at risk for recurrent infections but hopefully the nephrostomy tube will prevent stagnation of fluid within the right kidney.  Patient already with multidrug-resistant organisms so recommend against chronic antibiotic suppression without a definitive timeline for surgical intervention     Disposition: No ID specific disposition needs  Need for PICC line: No     Plan was discussed with the primary team, Dr. Davis.  ID will sign off.  This illness poses a threat to life

## 2024-02-03 ENCOUNTER — APPOINTMENT (OUTPATIENT)
Dept: RADIOLOGY | Facility: MEDICAL CENTER | Age: 64
DRG: 871 | End: 2024-02-03
Payer: MEDICARE

## 2024-02-03 LAB
ALBUMIN SERPL BCP-MCNC: 3.2 G/DL (ref 3.2–4.9)
ALBUMIN/GLOB SERPL: 0.8 G/DL
ALP SERPL-CCNC: 524 U/L (ref 30–99)
ALT SERPL-CCNC: 23 U/L (ref 2–50)
ANION GAP SERPL CALC-SCNC: 11 MMOL/L (ref 7–16)
AST SERPL-CCNC: 23 U/L (ref 12–45)
BASOPHILS # BLD AUTO: 0.9 % (ref 0–1.8)
BASOPHILS # BLD: 0.08 K/UL (ref 0–0.12)
BILIRUB SERPL-MCNC: 0.2 MG/DL (ref 0.1–1.5)
BUN SERPL-MCNC: 11 MG/DL (ref 8–22)
CALCIUM ALBUM COR SERPL-MCNC: 9.3 MG/DL (ref 8.5–10.5)
CALCIUM SERPL-MCNC: 8.7 MG/DL (ref 8.5–10.5)
CHLORIDE SERPL-SCNC: 104 MMOL/L (ref 96–112)
CO2 SERPL-SCNC: 22 MMOL/L (ref 20–33)
CREAT SERPL-MCNC: 0.88 MG/DL (ref 0.5–1.4)
EOSINOPHIL # BLD AUTO: 0.33 K/UL (ref 0–0.51)
EOSINOPHIL NFR BLD: 3.8 % (ref 0–6.9)
ERYTHROCYTE [DISTWIDTH] IN BLOOD BY AUTOMATED COUNT: 55.9 FL (ref 35.9–50)
GFR SERPLBLD CREATININE-BSD FMLA CKD-EPI: 73 ML/MIN/1.73 M 2
GLOBULIN SER CALC-MCNC: 3.8 G/DL (ref 1.9–3.5)
GLUCOSE SERPL-MCNC: 78 MG/DL (ref 65–99)
HCT VFR BLD AUTO: 34.8 % (ref 37–47)
HGB BLD-MCNC: 11.1 G/DL (ref 12–16)
IMM GRANULOCYTES # BLD AUTO: 0.15 K/UL (ref 0–0.11)
IMM GRANULOCYTES NFR BLD AUTO: 1.7 % (ref 0–0.9)
LYMPHOCYTES # BLD AUTO: 1.59 K/UL (ref 1–4.8)
LYMPHOCYTES NFR BLD: 18.2 % (ref 22–41)
MCH RBC QN AUTO: 26.9 PG (ref 27–33)
MCHC RBC AUTO-ENTMCNC: 31.9 G/DL (ref 32.2–35.5)
MCV RBC AUTO: 84.5 FL (ref 81.4–97.8)
MONOCYTES # BLD AUTO: 0.9 K/UL (ref 0–0.85)
MONOCYTES NFR BLD AUTO: 10.3 % (ref 0–13.4)
NEUTROPHILS # BLD AUTO: 5.7 K/UL (ref 1.82–7.42)
NEUTROPHILS NFR BLD: 65.1 % (ref 44–72)
NRBC # BLD AUTO: 0 K/UL
NRBC BLD-RTO: 0 /100 WBC (ref 0–0.2)
PLATELET # BLD AUTO: 335 K/UL (ref 164–446)
PMV BLD AUTO: 11.6 FL (ref 9–12.9)
POTASSIUM SERPL-SCNC: 4.3 MMOL/L (ref 3.6–5.5)
PROT SERPL-MCNC: 7 G/DL (ref 6–8.2)
RBC # BLD AUTO: 4.12 M/UL (ref 4.2–5.4)
SODIUM SERPL-SCNC: 137 MMOL/L (ref 135–145)
WBC # BLD AUTO: 8.8 K/UL (ref 4.8–10.8)

## 2024-02-03 PROCEDURE — 700105 HCHG RX REV CODE 258

## 2024-02-03 PROCEDURE — 76775 US EXAM ABDO BACK WALL LIM: CPT

## 2024-02-03 PROCEDURE — 99232 SBSQ HOSP IP/OBS MODERATE 35: CPT | Mod: GC | Performed by: INTERNAL MEDICINE

## 2024-02-03 PROCEDURE — 700102 HCHG RX REV CODE 250 W/ 637 OVERRIDE(OP): Performed by: PHYSICAL MEDICINE & REHABILITATION

## 2024-02-03 PROCEDURE — 700102 HCHG RX REV CODE 250 W/ 637 OVERRIDE(OP): Performed by: INTERNAL MEDICINE

## 2024-02-03 PROCEDURE — A9270 NON-COVERED ITEM OR SERVICE: HCPCS | Performed by: PHYSICAL MEDICINE & REHABILITATION

## 2024-02-03 PROCEDURE — 700117 HCHG RX CONTRAST REV CODE 255

## 2024-02-03 PROCEDURE — 770001 HCHG ROOM/CARE - MED/SURG/GYN PRIV*

## 2024-02-03 PROCEDURE — 700111 HCHG RX REV CODE 636 W/ 250 OVERRIDE (IP)

## 2024-02-03 PROCEDURE — 700101 HCHG RX REV CODE 250: Performed by: PHYSICAL MEDICINE & REHABILITATION

## 2024-02-03 PROCEDURE — A9270 NON-COVERED ITEM OR SERVICE: HCPCS

## 2024-02-03 PROCEDURE — 36415 COLL VENOUS BLD VENIPUNCTURE: CPT

## 2024-02-03 PROCEDURE — A9270 NON-COVERED ITEM OR SERVICE: HCPCS | Performed by: INTERNAL MEDICINE

## 2024-02-03 PROCEDURE — 85025 COMPLETE CBC W/AUTO DIFF WBC: CPT

## 2024-02-03 PROCEDURE — 700102 HCHG RX REV CODE 250 W/ 637 OVERRIDE(OP)

## 2024-02-03 PROCEDURE — 80053 COMPREHEN METABOLIC PANEL: CPT

## 2024-02-03 PROCEDURE — 74170 CT ABD WO CNTRST FLWD CNTRST: CPT

## 2024-02-03 RX ADMIN — LIDOCAINE 1 PATCH: 4 PATCH TOPICAL at 12:36

## 2024-02-03 RX ADMIN — DULOXETINE HYDROCHLORIDE 60 MG: 60 CAPSULE, DELAYED RELEASE ORAL at 17:26

## 2024-02-03 RX ADMIN — PREGABALIN 150 MG: 150 CAPSULE ORAL at 04:05

## 2024-02-03 RX ADMIN — FAMOTIDINE 20 MG: 20 TABLET, FILM COATED ORAL at 04:05

## 2024-02-03 RX ADMIN — PREGABALIN 150 MG: 150 CAPSULE ORAL at 16:32

## 2024-02-03 RX ADMIN — PREGABALIN 150 MG: 150 CAPSULE ORAL at 21:12

## 2024-02-03 RX ADMIN — FERROUS SULFATE TAB 325 MG (65 MG ELEMENTAL FE) 325 MG: 325 (65 FE) TAB at 04:05

## 2024-02-03 RX ADMIN — BACLOFEN 5 MG: 10 TABLET ORAL at 21:11

## 2024-02-03 RX ADMIN — SULFAMETHOXAZOLE AND TRIMETHOPRIM 1 TABLET: 800; 160 TABLET ORAL at 04:06

## 2024-02-03 RX ADMIN — DULOXETINE HYDROCHLORIDE 60 MG: 60 CAPSULE, DELAYED RELEASE ORAL at 04:05

## 2024-02-03 RX ADMIN — MEROPENEM 500 MG: 500 INJECTION, POWDER, FOR SOLUTION INTRAVENOUS at 12:43

## 2024-02-03 RX ADMIN — MEROPENEM 500 MG: 500 INJECTION, POWDER, FOR SOLUTION INTRAVENOUS at 17:27

## 2024-02-03 RX ADMIN — FOLIC ACID 1 MG: 1 TABLET ORAL at 04:05

## 2024-02-03 RX ADMIN — FAMOTIDINE 20 MG: 20 TABLET, FILM COATED ORAL at 17:26

## 2024-02-03 RX ADMIN — IOHEXOL 100 ML: 350 INJECTION, SOLUTION INTRAVENOUS at 15:15

## 2024-02-03 ASSESSMENT — ENCOUNTER SYMPTOMS
PALPITATIONS: 0
CHILLS: 0
SPEECH CHANGE: 0
HEADACHES: 0
SENSORY CHANGE: 0
FEVER: 0
VOMITING: 0
TINGLING: 0
SHORTNESS OF BREATH: 0
WEAKNESS: 0
FLANK PAIN: 0
NAUSEA: 0

## 2024-02-03 ASSESSMENT — PAIN SCALES - WONG BAKER: WONGBAKER_NUMERICALRESPONSE: DOESN'T HURT AT ALL

## 2024-02-03 ASSESSMENT — PAIN SCALES - PAIN ASSESSMENT IN ADVANCED DEMENTIA (PAINAD)
FACIALEXPRESSION: SMILING OR INEXPRESSIVE
CONSOLABILITY: NO NEED TO CONSOLE
BREATHING: NORMAL
BODYLANGUAGE: RELAXED
TOTALSCORE: 0

## 2024-02-03 ASSESSMENT — PAIN DESCRIPTION - PAIN TYPE
TYPE: ACUTE PAIN
TYPE: ACUTE PAIN

## 2024-02-03 ASSESSMENT — FIBROSIS 4 INDEX: FIB4 SCORE: 0.9

## 2024-02-03 NOTE — PROGRESS NOTES
[facility] Internal Medicine Daily Progress Note    Date of Service  2/3/2024    [facility] Team: [facility] IM Blue Team   Attending: Wyatt Preciado M.d.  Senior Resident: Dr. Bobby  Intern:  Dr. Davis  Contact Number: [phone]    Chief Complaint  Nydia Finch is a 63 y.o. female admitted 1/27/2024 with history of right flank pain and was diagnosed with sepsis secondary to UTI with right-sided hydroureteronephrosis and was transferred from outside hospital for further management.    Hospital Course   63 y.o. 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  Patient was originally admitted  01/18 at [facility] with right flank and right hip pain and had sepsis secondary to UTI in setting of right hydroureteronephrosis with a prior history of ureteral stone.  In 2009 patient was noted with a ureteral stone at which point in time she had a stent placed and was lost to follow-up.     During this admission, patient was started on IV Zosyn however she had developed a diffuse drug rash therefore has been switched to ceftriaxone. Solumedrol was started for drug rash that eventually resolved, and therefore was Dc'ed on 1/29/24.     For her right hydroureteronephrosis, urology attempted retrograde right ureteral stent attempted on 01/26 which was unsuccessful due to excessive scarring. therefore patient was transferred from [facility] to [facility] for interventional radiology guided right nephrostomy tube placement. Patient underwent right percutaneous nephrostomy placement on 01/28/2024.      Interval Problem Update  Patient hemodynamically stable, no acute events overnight.  Patient states that her pain is well-controlled.  IR team removed nephrostomy as patient's  CT was noted with drain placement in area of high vacularity, and given drain has not been draining pus, it was removed. Urology recommended CT contrast to further characterize hydronephrosis for assessment if patient to be continued on nephrostomy tube.     Started meropenem 1/30/24 with ID following, subsequently recommended bactrim on 2/2/24.      I have discussed this patient's plan of care and discharge plan at IDT rounds today with Case Management, Nursing, Nursing leadership, and other members of the IDT team.    Consultants/Specialty  2/3/24  urology: follow up out patient in 4 weeks, tentative plan to remin with nephrostomy tube indefinitely, however IR has recommended removal due to bleeding.  ID: agreed with meropenem, advised for bactrim starting 2/2/24. Cleared for discharge.   Radiology: irrigate only if no pain provoked, radiology following, advised for nephrostomy removal. Removed on 2/2/24.  PMR: IRF accepted, needs plans for nephrostomy tube removal and abx duration     Code Status  Full Code    Disposition  The patient is not medically cleared for discharge to home or a post-acute facility.    Inpatient needs: planned for repeat CT in 5 days for antibiotic regiment guidance.  PT: possibly HH, may require SNF  PMR: accepted to inpatient rehab  I have placed the appropriate orders for post-discharge needs.    Review of Systems  ROS   Constitutional:  Positive for fever and malaise/fatigue. Negative for chills and diaphoresis.   HENT: Negative.  Negative for nosebleeds and sinus pain.    Eyes: Negative.  Negative for double vision, photophobia, discharge and redness.   Respiratory: Negative.  Negative for cough, sputum production, shortness of breath, wheezing and stridor.    Cardiovascular: Negative.  Negative for chest pain, orthopnea, leg swelling and PND.   Gastrointestinal:  Positive for abdominal pain and nausea. Negative for blood in stool and heartburn.   Genitourinary:  Positive for flank  pain. Negative for dysuria and frequency.   Musculoskeletal:  Negative for back pain, falls and myalgias.   Skin:  Positive for rash. Negative for itching.   Neurological:  Positive for weakness (On her entire right side secondary to stroke in 1994). Negative for dizziness, tingling, sensory change, focal weakness and seizures.   Endo/Heme/Allergies: Negative.  Negative for polydipsia. Does not bruise/bleed easily.   Psychiatric/Behavioral: Negative.  Negative for depression, substance abuse and suicidal ideas. The patient does not have insomnia.    Physical Exam  Temp:  [36.6 °C (97.9 °F)-36.9 °C (98.4 °F)] 36.9 °C (98.4 °F)  Pulse:  [76-86] 86  Resp:  [16-18] 18  BP: (111-144)/(65-87) 133/74  SpO2:  [94 %-99 %] 99 %    Physical Exam  Constitutional:       General: She is awake.      Appearance: Normal appearance. She is well-developed, well-groomed and normal weight.   HENT:      Head: Normocephalic and atraumatic.      Jaw: There is normal jaw occlusion. No trismus.      Salivary Glands: Right salivary gland is not tender. Left salivary gland is not tender.      Right Ear: External ear normal.      Left Ear: External ear normal.      Mouth/Throat:      Mouth: Mucous membranes are moist.      Pharynx: Oropharynx is clear.   Eyes:      General: Lids are normal. Vision grossly intact.      Extraocular Movements: Extraocular movements intact.      Conjunctiva/sclera: Conjunctivae normal.      Right eye: Right conjunctiva is not injected. No exudate.     Left eye: Left conjunctiva is not injected. No exudate.     Pupils: Pupils are equal, round, and reactive to light.   Neck:      Thyroid: No thyroid mass.      Vascular: No hepatojugular reflux or JVD.      Trachea: No abnormal tracheal secretions or tracheal deviation.   Cardiovascular:      Rate and Rhythm: Regular rhythm.     Pulses: Normal pulses.      Heart sounds: Normal heart sounds. No murmur heard.     No friction rub.   Pulmonary:      Effort: Pulmonary  effort is normal.      Breath sounds: Normal breath sounds. No wheezing or rhonchi.   Abdominal:      General: Abdomen is flat. Bowel sounds are normal.      Palpations: Abdomen is soft.      Tenderness: There is no right CVA tenderness or left CVA tenderness.      Hernia: No hernia is present.   Musculoskeletal:      Cervical back: Full passive range of motion without pain, normal range of motion and neck supple. No rigidity. No muscular tenderness.      Right lower leg: No edema.      Left lower leg: No edema.   Lymphadenopathy:      Head:      Right side of head: No submental adenopathy.      Left side of head: No submental adenopathy.      Cervical:      Right cervical: No superficial cervical adenopathy.     Left cervical: No superficial cervical adenopathy.      Upper Body:      Right upper body: No supraclavicular adenopathy.      Left upper body: No supraclavicular adenopathy.   Skin:     General: Skin is warm and dry.      Coloration: Skin is not cyanotic or pale.      Findings: Mild erythema and rash (Head to toe skin rash from drug reaction) present. No abrasion or bruising.   Neurological:      General: No focal deficit present.      Mental Status: She is alert and oriented to person, place, and time. Mental status is at baseline.      GCS: GCS eye subscore is 4. GCS verbal subscore is 5. GCS motor subscore is 6.      Cranial Nerves: No cranial nerve deficit.      Sensory: No sensory deficit.      Motor: Weakness and atrophy present.      Coordination: Coordination abnormal. Finger-Nose-Finger Test abnormal. Impaired rapid alternating movements.      Gait: Gait abnormal and tandem walk abnormal.      Deep Tendon Reflexes:      Reflex Scores:       Tricep reflexes are 2+ on the right side and 2+ on the left side.       Bicep reflexes are 2+ on the right side and 2+ on the left side.       Brachioradialis reflexes are 2+ on the right side and 2+ on the left side.       Patellar reflexes are 2+ on the right  "side and 2+ on the left side.       Achilles reflexes are 2+ on the right side and 2+ on the left side.     Comments: Patient has complete paralysis on the right side due to previous stroke, the hemiplegia since 1994   Psychiatric:         Attention and Perception: Attention and perception normal.         Mood and Affect: Mood normal.         Speech: Speech normal.         Behavior: Behavior is cooperative.         Thought Content: Thought content normal.         Cognition and Memory: Cognition and memory normal.         Judgment: Judgment normal.     Fluids    Intake/Output Summary (Last 24 hours) at 2/1/2024 0926  Last data filed at 2/1/2024 0900  Gross per 24 hour   Intake 1551.99 ml   Output 2330 ml   Net -778.01 ml         Laboratory  Recent Labs     01/30/24 0338 01/31/24 0357 02/01/24 0447   WBC 11.5* 10.9* 10.2   RBC 3.85* 3.82* 3.78*   HEMOGLOBIN 10.5* 10.5* 10.3*   HEMATOCRIT 32.7* 31.8* 31.6*   MCV 84.9 83.2 83.6   MCH 27.3 27.5 27.2   MCHC 32.1* 33.0 32.6   RDW 53.2* 53.1* 52.7*   PLATELETCT 361 350 342   MPV 10.7 10.8 11.0       Recent Labs     01/30/24 0338 01/31/24 0357 02/01/24 0447   SODIUM 137 136 136   POTASSIUM 4.0 3.9 4.3   CHLORIDE 107 106 105   CO2 19* 21 21   GLUCOSE 105* 99 91   BUN 20 15 14   CREATININE 0.82 0.87 0.79   CALCIUM 7.9* 8.0* 8.1*                       Imaging  .imag  DX-SHOULDER 2+ LEFT   Final Result         Severe osteoarthritis of the glenohumeral joint.   Elevation of the humeral head abutting the acromion could relate to chronic rotator cuff tear.      IR-NEPHROSTOGRAM W/ NEW TUBE PLACEMENT (ALL RADIOLOGY) RIGHT   Final Result      1. ULTRASOUND AND FLUOROSCOPIC GUIDED PLACEMENT OF A RIGHT 8 [place]  PIGTAIL LOCKING LOOP PERCUTANEOUS NEPHROSTOMY CATHETER.      2. DYSMORPHIC LIKELY CHRONICALLY OBSTRUCTED UPPER COLLECTING SYSTEM WITH GROSS PYONEPHROSIS. THIS MAY WELL REPRESENT A NONFUNCTIONING \"ABSCESSED\" KIDNEY. DUE TO THE THICK PUS, THE CATHETER WAS CONNECTED TO " SUCTION BULB DRAINAGE AND ORDERS WERE WRITTEN    FOR ONCE DAILY IRRIGATION WITH 3 ML STERILE SALINE. IF URINE OUTPUT IS RESTORED, THE CATHETER MAY BE CONNECTED TO CONVENTIONAL GRAVITY BAG DRAINAGE.           Assessment/Plan  Problem Representation:    Hydronephrosis- (present on admission)  Assessment & Plan    Please see pyelonephritis note    Hydronephrosis with urinary obstruction due to renal calculus- (present on admission)  Assessment & Plan  See pyelonephritis problem    Pyelonephritis- (present on admission)  Assessment & Plan  Patient originally admitted with right flank pain at [facility].  CT abdomen and pelvis showed right-sided hydroureteronephrosis.  Urology attempted retrograde right ureteral stent attempt 01/26 which was unsuccessful due to excessive scarring therefore patient was transferred from [facility] to [facility] for interventional radiology guided right nephrostomy tube placement. Patient underwent right percutaneous nephrostomy placement on 01/28/2024.  10 mL gross pus was evacuated. Tentative plans for nephrostomy tube to remain indefinitely, however IR recommended removal, and removed nephrostomy on 2/2/24. Patient to repeat ultrasound on 2/3/24.   Urology following, appreciate recommendations.  -Continue to monitor renal function and urine output.  -Continued on meropenem, started on 1/30/24, discontinued on 2/2/24 and started bactrim, however given that patient has been kept in patient, patient has been continued on meropenem on 2/3/24.     Expressive aphasia  Assessment & Plan  Patient with expressive aphasia after brain insult 1994.  SLP evaluation.    History of stroke- (present on admission)  Assessment & Plan  History of complex stroke with right-sided deficits and expressive aphasia.  Continue fall precautions and seizure precautions    Drug rash- (present on admission)  Assessment & Plan  Patient has developed a drug rash due to Zosyn and  at this point has severe rash from head to toe.  Antibiotic was switched to Rocephin.  Patient was on IV Solu-Medrol before being transferred to [facility] and was continued until today.  Rash has now significantly resolved therefore IV Solu-Medrol discontinued.  Continue famotidine.  Monitor for any further rash.      Right hemiplegia (HCC)  Assessment & Plan  S/p CVA 1994.  PT OT evaluation.         VTE prophylaxis: SCD since patient underwent right nephrostomy tube placement today.    I have performed a physical exam and reviewed and updated ROS and Plan today (2/1/2024). In review of yesterday's note (1/31/2024), there are no changes except as documented above.

## 2024-02-03 NOTE — DISCHARGE PLANNING
Case Management Discharge Planning    Admission Date: 1/27/2024  GMLOS: 5.1  ALOS: 7    6-Clicks ADL Score: 13  6-Clicks Mobility Score: 7  PT and/or OT Eval ordered: Yes  Post-acute Referrals Ordered: Yes  Post-acute Choice Obtained: Yes  Has referral(s) been sent to post-acute provider:  Yes      Anticipated Discharge Dispo: Discharge Disposition: Discharged to home/self care (01)     DME Needed: No    Action(s) Taken: OTHER    Escalations Completed: None    Medically Clear: Yes      Course of Action  **0905  Per handoff, it appears plan is for patient to be admitted to [facility] over the weekend. If no beds are available, family opted to take patient home. LSW placed phone call to [facility] [phone] and spoke to nurses station as admissions was not available. Per nurses station, they do not do admissions over the weekend.     **0707  Spoke to patient's spouse, Marbin, on the phone. Discussed [facility] does not do admissions over the weekend and I am unsure if they have beds for patient. Marbin reports he would like to take patient home as he has been her caregiver for some time. Florencio reports he lives about 3 hours away and assured me he can pick patient up today. IMM explained. Verbal obtained.     **1117  Patient discussed during IDT rounds. Per MD, patient is not cleared for discharge. Resident to contact spouse with update.

## 2024-02-03 NOTE — CARE PLAN
The patient is Stable - Low risk of patient condition declining or worsening    Shift Goals  Clinical Goals: safety, skin integrity  Patient Goals: rest, go home  Family Goals: Discharge home    Problem: Knowledge Deficit - Standard  Goal: Patient and family/care givers will demonstrate understanding of plan of care, disease process/condition, diagnostic tests and medications  Outcome: Not Progressing    Notes: Updated patient on POC. Patient will be discharged home and they will arrange skilled nursing facility from there. Explained situation to patient.      Problem: Skin Integrity  Goal: Skin integrity is maintained or improved  Outcome: Not Progressing    Notes: Ensured patient's linens and pads are kept clean and dry. Q2 turns done but sometimes patient refused. Explained the importance of turns to patient. Attended to needs. Call light within reach.

## 2024-02-03 NOTE — PROGRESS NOTES
[facility] Internal Medicine Daily Progress Note    Date of Service  2/1/2024    [employer] Team: R IM Blue Team   Attending: Wyatt Preciado M.d.  Senior Resident: Dr. Bobby  Intern:  Dr. Davis  Contact Number: [phone]    Chief Complaint  Nydia Finch is a 63 y.o. female admitted 1/27/2024 with history of right flank pain and was diagnosed with sepsis secondary to UTI with right-sided hydroureteronephrosis and was transferred from outside hospital for further management.    Hospital Course   63 y.o. 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  Patient was originally admitted  01/18 at [facility] with right flank and right hip pain and had sepsis secondary to UTI in setting of right hydroureteronephrosis with a prior history of ureteral stone.  In 2009 patient was noted with a ureteral stone at which point in time she had a stent placed and was lost to follow-up.     During this admission, patient was started on IV Zosyn however she had developed a diffuse drug rash therefore has been switched to ceftriaxone. Solumedrol was started for drug rash that eventually resolved, and therefore was Dc'ed on 1/29/24.     For her right hydroureteronephrosis, urology attempted retrograde right ureteral stent attempted on 01/26 which was unsuccessful due to excessive scarring. therefore patient was transferred from [facility] to [facility] for interventional radiology guided right nephrostomy tube placement. Patient underwent right percutaneous nephrostomy placement on 01/28/2024.      Interval Problem Update  Patient hemodynamically stable, no acute events overnight.  Patient states that her pain is well-controlled.  ALP high with GGT high, patient denies  history of abdominal pain, nausea and vomiting, fever, night sweats.   Patient underwent CT today and showed decreased in size of abscess, removed drain today. Patient anticipated to repeat US tomorrow, to assess if patient develop hematoma.  Started meropenem 1/30/24 with ID following, subsequently recommended bactrim on 2/2/24.      I have discussed this patient's plan of care and discharge plan at IDT rounds today with Case Management, Nursing, Nursing leadership, and other members of the IDT team.    Consultants/Specialty  1/30/24  urology: follow up out patient in 4 weeks, tentative plan to remin with nephrostomy tube indefinitely, however IR has recommended removal due to bleeding.  ID: agreed with meropenem, advised for bactrim starting 2/2/24. Cleared for discharge.   Radiology: irrigate only if no pain provoked, radiology following, advised for nephrostomy removal  PMR: IRF accepted, needs plans for nephrostomy tube removal and abx duration     Code Status  Full Code    Disposition  The patient is not medically cleared for discharge to home or a post-acute facility.    Inpatient needs: planned for repeat CT in 5 days for antibiotic regiment guidance.  PT: possibly HH, may require SNF  PMR: accepted to inpatient rehab  I have placed the appropriate orders for post-discharge needs.    Review of Systems  ROS   Constitutional:  Positive for fever and malaise/fatigue. Negative for chills and diaphoresis.   HENT: Negative.  Negative for nosebleeds and sinus pain.    Eyes: Negative.  Negative for double vision, photophobia, discharge and redness.   Respiratory: Negative.  Negative for cough, sputum production, shortness of breath, wheezing and stridor.    Cardiovascular: Negative.  Negative for chest pain, orthopnea, leg swelling and PND.   Gastrointestinal:  Positive for abdominal pain and nausea. Negative for blood in stool and heartburn.   Genitourinary:  Positive for flank pain. Negative for dysuria and  frequency.   Musculoskeletal:  Negative for back pain, falls and myalgias.   Skin:  Positive for rash. Negative for itching.   Neurological:  Positive for weakness (On her entire right side secondary to stroke in 1994). Negative for dizziness, tingling, sensory change, focal weakness and seizures.   Endo/Heme/Allergies: Negative.  Negative for polydipsia. Does not bruise/bleed easily.   Psychiatric/Behavioral: Negative.  Negative for depression, substance abuse and suicidal ideas. The patient does not have insomnia.    Physical Exam  Temp:  [36.6 °C (97.9 °F)-36.9 °C (98.4 °F)] 36.9 °C (98.4 °F)  Pulse:  [76-86] 86  Resp:  [16-18] 18  BP: (111-144)/(65-87) 133/74  SpO2:  [94 %-99 %] 99 %    Physical Exam  Constitutional:       General: She is awake.      Appearance: Normal appearance. She is well-developed, well-groomed and normal weight.   HENT:      Head: Normocephalic and atraumatic.      Jaw: There is normal jaw occlusion. No trismus.      Salivary Glands: Right salivary gland is not tender. Left salivary gland is not tender.      Right Ear: External ear normal.      Left Ear: External ear normal.      Mouth/Throat:      Mouth: Mucous membranes are moist.      Pharynx: Oropharynx is clear.   Eyes:      General: Lids are normal. Vision grossly intact.      Extraocular Movements: Extraocular movements intact.      Conjunctiva/sclera: Conjunctivae normal.      Right eye: Right conjunctiva is not injected. No exudate.     Left eye: Left conjunctiva is not injected. No exudate.     Pupils: Pupils are equal, round, and reactive to light.   Neck:      Thyroid: No thyroid mass.      Vascular: No hepatojugular reflux or JVD.      Trachea: No abnormal tracheal secretions or tracheal deviation.   Cardiovascular:      Rate and Rhythm: Regular rhythm.     Pulses: Normal pulses.      Heart sounds: Normal heart sounds. No murmur heard.     No friction rub.   Pulmonary:      Effort: Pulmonary effort is normal.      Breath  sounds: Normal breath sounds. No wheezing or rhonchi.   Abdominal:      General: Abdomen is flat. Bowel sounds are normal.      Palpations: Abdomen is soft.      Tenderness: There is no right CVA tenderness or left CVA tenderness.      Hernia: No hernia is present.   Musculoskeletal:      Cervical back: Full passive range of motion without pain, normal range of motion and neck supple. No rigidity. No muscular tenderness.      Right lower leg: No edema.      Left lower leg: No edema.   Lymphadenopathy:      Head:      Right side of head: No submental adenopathy.      Left side of head: No submental adenopathy.      Cervical:      Right cervical: No superficial cervical adenopathy.     Left cervical: No superficial cervical adenopathy.      Upper Body:      Right upper body: No supraclavicular adenopathy.      Left upper body: No supraclavicular adenopathy.   Skin:     General: Skin is warm and dry.      Coloration: Skin is not cyanotic or pale.      Findings: Mild erythema and rash (Head to toe skin rash from drug reaction) present. No abrasion or bruising.   Neurological:      General: No focal deficit present.      Mental Status: She is alert and oriented to person, place, and time. Mental status is at baseline.      GCS: GCS eye subscore is 4. GCS verbal subscore is 5. GCS motor subscore is 6.      Cranial Nerves: No cranial nerve deficit.      Sensory: No sensory deficit.      Motor: Weakness and atrophy present.      Coordination: Coordination abnormal. Finger-Nose-Finger Test abnormal. Impaired rapid alternating movements.      Gait: Gait abnormal and tandem walk abnormal.      Deep Tendon Reflexes:      Reflex Scores:       Tricep reflexes are 2+ on the right side and 2+ on the left side.       Bicep reflexes are 2+ on the right side and 2+ on the left side.       Brachioradialis reflexes are 2+ on the right side and 2+ on the left side.       Patellar reflexes are 2+ on the right side and 2+ on the left  "side.       Achilles reflexes are 2+ on the right side and 2+ on the left side.     Comments: Patient has complete paralysis on the right side due to previous stroke, the hemiplegia since 1994   Psychiatric:         Attention and Perception: Attention and perception normal.         Mood and Affect: Mood normal.         Speech: Speech normal.         Behavior: Behavior is cooperative.         Thought Content: Thought content normal.         Cognition and Memory: Cognition and memory normal.         Judgment: Judgment normal.     Fluids    Intake/Output Summary (Last 24 hours) at 2/1/2024 0926  Last data filed at 2/1/2024 0900  Gross per 24 hour   Intake 1551.99 ml   Output 2330 ml   Net -778.01 ml         Laboratory  Recent Labs     01/30/24 0338 01/31/24 0357 02/01/24 0447   WBC 11.5* 10.9* 10.2   RBC 3.85* 3.82* 3.78*   HEMOGLOBIN 10.5* 10.5* 10.3*   HEMATOCRIT 32.7* 31.8* 31.6*   MCV 84.9 83.2 83.6   MCH 27.3 27.5 27.2   MCHC 32.1* 33.0 32.6   RDW 53.2* 53.1* 52.7*   PLATELETCT 361 350 342   MPV 10.7 10.8 11.0       Recent Labs     01/30/24 0338 01/31/24 0357 02/01/24 0447   SODIUM 137 136 136   POTASSIUM 4.0 3.9 4.3   CHLORIDE 107 106 105   CO2 19* 21 21   GLUCOSE 105* 99 91   BUN 20 15 14   CREATININE 0.82 0.87 0.79   CALCIUM 7.9* 8.0* 8.1*                       Imaging  .imag  DX-SHOULDER 2+ LEFT   Final Result         Severe osteoarthritis of the glenohumeral joint.   Elevation of the humeral head abutting the acromion could relate to chronic rotator cuff tear.      IR-NEPHROSTOGRAM W/ NEW TUBE PLACEMENT (ALL RADIOLOGY) RIGHT   Final Result      1. ULTRASOUND AND FLUOROSCOPIC GUIDED PLACEMENT OF A RIGHT 8 [place] citizen  PIGTAIL LOCKING LOOP PERCUTANEOUS NEPHROSTOMY CATHETER.      2. DYSMORPHIC LIKELY CHRONICALLY OBSTRUCTED UPPER COLLECTING SYSTEM WITH GROSS PYONEPHROSIS. THIS MAY WELL REPRESENT A NONFUNCTIONING \"ABSCESSED\" KIDNEY. DUE TO THE THICK PUS, THE CATHETER WAS CONNECTED TO SUCTION BULB DRAINAGE AND " ORDERS WERE WRITTEN    FOR ONCE DAILY IRRIGATION WITH 3 ML STERILE SALINE. IF URINE OUTPUT IS RESTORED, THE CATHETER MAY BE CONNECTED TO CONVENTIONAL GRAVITY BAG DRAINAGE.           Assessment/Plan  Problem Representation:    Hydronephrosis- (present on admission)  Assessment & Plan    Please see pyelonephritis note    Hydronephrosis with urinary obstruction due to renal calculus- (present on admission)  Assessment & Plan  See pyelonephritis problem    Pyelonephritis- (present on admission)  Assessment & Plan  Patient originally admitted with right flank pain at [facility].  CT abdomen and pelvis showed right-sided hydroureteronephrosis.  Urology attempted retrograde right ureteral stent attempt 01/26 which was unsuccessful due to excessive scarring therefore patient was transferred from [facility] to [facility] for interventional radiology guided right nephrostomy tube placement. Patient underwent right percutaneous nephrostomy placement on 01/28/2024.  10 mL gross pus was evacuated. Tentative plans for nephrostomy tube to remain indefinitely, however IR recommended removal, and removed nephrostomy on 2/2/24. Patient to repeat ultrasound on 2/3/24.   Urology following, appreciate recommendations.  -Continue to monitor renal function and urine output.  -Continued on meropenem, started on 1/30/24, discontinued on 2/2/24 and started bactrim    Expressive aphasia  Assessment & Plan  Patient with expressive aphasia after brain insult 1994.  SLP evaluation.    History of stroke- (present on admission)  Assessment & Plan  History of complex stroke with right-sided deficits and expressive aphasia.  Continue fall precautions and seizure precautions    Drug rash- (present on admission)  Assessment & Plan  Patient has developed a drug rash due to Zosyn and at this point has severe rash from head to toe.  Antibiotic was switched to Rocephin.  Patient was on IV Solu-Medrol before being  transferred to [facility] and was continued until today.  Rash has now significantly resolved therefore IV Solu-Medrol discontinued.  Continue famotidine.  Monitor for any further rash.      Right hemiplegia (HCC)  Assessment & Plan  S/p CVA 1994.  PT OT evaluation.         VTE prophylaxis: SCD since patient underwent right nephrostomy tube placement today.    I have performed a physical exam and reviewed and updated ROS and Plan today (2/1/2024). In review of yesterday's note (1/31/2024), there are no changes except as documented above.

## 2024-02-04 LAB
ALBUMIN SERPL BCP-MCNC: 3.2 G/DL (ref 3.2–4.9)
ALBUMIN/GLOB SERPL: 0.9 G/DL
ALP SERPL-CCNC: 508 U/L (ref 30–99)
ALT SERPL-CCNC: 23 U/L (ref 2–50)
ANION GAP SERPL CALC-SCNC: 9 MMOL/L (ref 7–16)
AST SERPL-CCNC: 20 U/L (ref 12–45)
BASOPHILS # BLD AUTO: 0.9 % (ref 0–1.8)
BASOPHILS # BLD: 0.09 K/UL (ref 0–0.12)
BILIRUB SERPL-MCNC: 0.2 MG/DL (ref 0.1–1.5)
BUN SERPL-MCNC: 13 MG/DL (ref 8–22)
CALCIUM ALBUM COR SERPL-MCNC: 9.3 MG/DL (ref 8.5–10.5)
CALCIUM SERPL-MCNC: 8.7 MG/DL (ref 8.5–10.5)
CHLORIDE SERPL-SCNC: 103 MMOL/L (ref 96–112)
CO2 SERPL-SCNC: 21 MMOL/L (ref 20–33)
CREAT SERPL-MCNC: 1 MG/DL (ref 0.5–1.4)
EOSINOPHIL # BLD AUTO: 0.25 K/UL (ref 0–0.51)
EOSINOPHIL NFR BLD: 2.5 % (ref 0–6.9)
ERYTHROCYTE [DISTWIDTH] IN BLOOD BY AUTOMATED COUNT: 52.8 FL (ref 35.9–50)
GFR SERPLBLD CREATININE-BSD FMLA CKD-EPI: 63 ML/MIN/1.73 M 2
GLOBULIN SER CALC-MCNC: 3.7 G/DL (ref 1.9–3.5)
GLUCOSE SERPL-MCNC: 90 MG/DL (ref 65–99)
HCT VFR BLD AUTO: 31.8 % (ref 37–47)
HGB BLD-MCNC: 10.7 G/DL (ref 12–16)
IMM GRANULOCYTES # BLD AUTO: 0.07 K/UL (ref 0–0.11)
IMM GRANULOCYTES NFR BLD AUTO: 0.7 % (ref 0–0.9)
LYMPHOCYTES # BLD AUTO: 1.83 K/UL (ref 1–4.8)
LYMPHOCYTES NFR BLD: 18.4 % (ref 22–41)
MCH RBC QN AUTO: 27.4 PG (ref 27–33)
MCHC RBC AUTO-ENTMCNC: 33.6 G/DL (ref 32.2–35.5)
MCV RBC AUTO: 81.5 FL (ref 81.4–97.8)
MONOCYTES # BLD AUTO: 0.95 K/UL (ref 0–0.85)
MONOCYTES NFR BLD AUTO: 9.6 % (ref 0–13.4)
NEUTROPHILS # BLD AUTO: 6.73 K/UL (ref 1.82–7.42)
NEUTROPHILS NFR BLD: 67.9 % (ref 44–72)
NRBC # BLD AUTO: 0 K/UL
NRBC BLD-RTO: 0 /100 WBC (ref 0–0.2)
PLATELET # BLD AUTO: 342 K/UL (ref 164–446)
PMV BLD AUTO: 11.7 FL (ref 9–12.9)
POTASSIUM SERPL-SCNC: 4.7 MMOL/L (ref 3.6–5.5)
PROT SERPL-MCNC: 6.9 G/DL (ref 6–8.2)
RBC # BLD AUTO: 3.9 M/UL (ref 4.2–5.4)
SODIUM SERPL-SCNC: 133 MMOL/L (ref 135–145)
WBC # BLD AUTO: 9.9 K/UL (ref 4.8–10.8)

## 2024-02-04 PROCEDURE — A9270 NON-COVERED ITEM OR SERVICE: HCPCS

## 2024-02-04 PROCEDURE — 99232 SBSQ HOSP IP/OBS MODERATE 35: CPT | Mod: GC | Performed by: INTERNAL MEDICINE

## 2024-02-04 PROCEDURE — 700111 HCHG RX REV CODE 636 W/ 250 OVERRIDE (IP)

## 2024-02-04 PROCEDURE — 700102 HCHG RX REV CODE 250 W/ 637 OVERRIDE(OP): Performed by: INTERNAL MEDICINE

## 2024-02-04 PROCEDURE — 700102 HCHG RX REV CODE 250 W/ 637 OVERRIDE(OP): Performed by: PHYSICAL MEDICINE & REHABILITATION

## 2024-02-04 PROCEDURE — 700102 HCHG RX REV CODE 250 W/ 637 OVERRIDE(OP)

## 2024-02-04 PROCEDURE — 36415 COLL VENOUS BLD VENIPUNCTURE: CPT

## 2024-02-04 PROCEDURE — 700105 HCHG RX REV CODE 258

## 2024-02-04 PROCEDURE — 85025 COMPLETE CBC W/AUTO DIFF WBC: CPT

## 2024-02-04 PROCEDURE — A9270 NON-COVERED ITEM OR SERVICE: HCPCS | Performed by: INTERNAL MEDICINE

## 2024-02-04 PROCEDURE — 700101 HCHG RX REV CODE 250: Performed by: NURSE PRACTITIONER

## 2024-02-04 PROCEDURE — 700101 HCHG RX REV CODE 250: Performed by: PHYSICAL MEDICINE & REHABILITATION

## 2024-02-04 PROCEDURE — 770001 HCHG ROOM/CARE - MED/SURG/GYN PRIV*

## 2024-02-04 PROCEDURE — 80053 COMPREHEN METABOLIC PANEL: CPT

## 2024-02-04 PROCEDURE — A9270 NON-COVERED ITEM OR SERVICE: HCPCS | Performed by: PHYSICAL MEDICINE & REHABILITATION

## 2024-02-04 RX ADMIN — BACLOFEN 5 MG: 10 TABLET ORAL at 20:55

## 2024-02-04 RX ADMIN — PREGABALIN 150 MG: 150 CAPSULE ORAL at 04:17

## 2024-02-04 RX ADMIN — PREGABALIN 150 MG: 150 CAPSULE ORAL at 20:55

## 2024-02-04 RX ADMIN — FAMOTIDINE 20 MG: 20 TABLET, FILM COATED ORAL at 04:18

## 2024-02-04 RX ADMIN — DULOXETINE HYDROCHLORIDE 60 MG: 60 CAPSULE, DELAYED RELEASE ORAL at 16:19

## 2024-02-04 RX ADMIN — SULFAMETHOXAZOLE AND TRIMETHOPRIM 1 TABLET: 800; 160 TABLET ORAL at 20:56

## 2024-02-04 RX ADMIN — MEROPENEM 500 MG: 500 INJECTION, POWDER, FOR SOLUTION INTRAVENOUS at 00:12

## 2024-02-04 RX ADMIN — PREGABALIN 150 MG: 150 CAPSULE ORAL at 12:47

## 2024-02-04 RX ADMIN — FERROUS SULFATE TAB 325 MG (65 MG ELEMENTAL FE) 325 MG: 325 (65 FE) TAB at 04:17

## 2024-02-04 RX ADMIN — POLYETHYLENE GLYCOL 3350 1 PACKET: 17 POWDER, FOR SOLUTION ORAL at 04:18

## 2024-02-04 RX ADMIN — FAMOTIDINE 20 MG: 20 TABLET, FILM COATED ORAL at 16:19

## 2024-02-04 RX ADMIN — DULOXETINE HYDROCHLORIDE 60 MG: 60 CAPSULE, DELAYED RELEASE ORAL at 04:18

## 2024-02-04 RX ADMIN — SENNOSIDES AND DOCUSATE SODIUM 2 TABLET: 50; 8.6 TABLET ORAL at 04:17

## 2024-02-04 RX ADMIN — SULFAMETHOXAZOLE AND TRIMETHOPRIM 1 TABLET: 800; 160 TABLET ORAL at 11:25

## 2024-02-04 RX ADMIN — SODIUM CHLORIDE, PRESERVATIVE FREE 5 ML: 5 INJECTION INTRAVENOUS at 06:00

## 2024-02-04 RX ADMIN — MEROPENEM 500 MG: 500 INJECTION, POWDER, FOR SOLUTION INTRAVENOUS at 04:21

## 2024-02-04 RX ADMIN — FOLIC ACID 1 MG: 1 TABLET ORAL at 04:18

## 2024-02-04 RX ADMIN — LIDOCAINE 1 PATCH: 4 PATCH TOPICAL at 11:25

## 2024-02-04 ASSESSMENT — FIBROSIS 4 INDEX: FIB4 SCORE: 0.77

## 2024-02-04 ASSESSMENT — PAIN DESCRIPTION - PAIN TYPE
TYPE: ACUTE PAIN
TYPE: ACUTE PAIN

## 2024-02-04 ASSESSMENT — PAIN SCALES - WONG BAKER: WONGBAKER_NUMERICALRESPONSE: DOESN'T HURT AT ALL

## 2024-02-04 NOTE — CARE PLAN
The patient is Watcher - Medium risk of patient condition declining or worsening    Shift Goals  Clinical Goals: Rest, safety, skin integrity  Patient Goals: rest  Family Goals: d/c home    Progress made toward(s) clinical / shift goals:      Problem: Pain - Standard  Goal: Alleviation of pain or a reduction in pain to the patient’s comfort goal  Outcome: Progressing  Flowsheets  Taken 2/4/2024 0037  Non Verbal Scale:   Calm   Unlabored Breathing  OB Pain Intervention:   Medication - See MAR   Repositioned  Taken 2/3/2024 1930  Pain Rating Scale (NPRS): 1  Note: Pain managed with scheduled medications. Pt awake working on her electronic device states she is comfortable.   Problem: Skin Integrity  Goal: Skin integrity is maintained or improved  Outcome: Progressing  Note: Pressure relieving devices in place. Barrier cream applied to coccyx and buttock after [place] change. RUE flaccid placed on pillow for support.       Problem: Fall Risk  Goal: Patient will remain free from falls  Outcome: Progressing  Note: Pt remained safe with fall precautions in place, call light within reach of Pt KRISTYE/hand, Pt demonstrated she knows how to use call light appropriately.             Patient is not progressing towards the following goals: Skin remains red, dry, flaky and fragile, edematous

## 2024-02-04 NOTE — PROGRESS NOTES
R Internal Medicine Daily Progress Note    Date of Service  2/4/2024    [employer] Team: [employer] IM Blue Team   Attending: Wyatt Preciado MD  Senior Resident: Mango Bobby DO  Intern: Ray Davis MD  Contact Number: [phone]    Chief Complaint  Right flank pain     Hospital Course   63 y.o. 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  Patient was originally admitted  01/18 at [facility] with right flank and right hip pain and had sepsis secondary to UTI in setting of right hydroureteronephrosis with a prior history of ureteral stone.  In 2009 patient was noted with a ureteral stone at which point in time she had a stent placed and was lost to follow-up.     During this admission, patient was started on IV Zosyn however she had developed a diffuse drug rash therefore has been switched to ceftriaxone. Solumedrol was started for drug rash that eventually resolved, and therefore was Dc'ed on 1/29/24.     For her right hydroureteronephrosis, urology attempted retrograde right ureteral stent attempted on 01/26 which was unsuccessful due to excessive scarring. therefore patient was transferred from [facility] to [facility] for interventional radiology guided right nephrostomy tube placement. Patient underwent right percutaneous nephrostomy placement on 01/28/2024.    Interval Problem Update  No acute events overnight. Denies any flank pain, nausea, vomiting, chills, or night sweats.  Her pain is well-controlled.   Meropenem discontinued in favor of Bactrim (2/4-2/10/24).     Underwent nephrostomy tube removal on 2/2/2024 given ongoing bloody output, concerning for calyceal hematoma. Follow-up renal ultrasound (2/3/24) shows no evidence of ongoing  bleeding or capsular perforation.    Per Urology: Will follow up in clinic in 4 weeks and undergo repeat imaging via CT renal mass protocol in 2-3 months.    I have discussed this patient's plan of care and discharge plan at IDT rounds today with Case Management, Nursing, Nursing leadership, and other members of the IDT team.    Consultants/Specialty  Interventional radiology  Infectious disease  Urology    Code Status  Full Code    Disposition  The patient is medically cleared for discharge to home or a post-acute facility.    I have placed the appropriate orders for post-discharge needs.    Review of Systems  ROS   Constitutional: Negative for fever, chills, or malaise.   Respiratory: Negative for cough or shortness of breath.  Cardiovascular: Negative for chest pain.  Gastrointestinal: Negative for abdominal pain or nausea.  Genitourinary:  Negative for dysuria or flank pain.   Endo/Heme/Allergies: Does not bruise/bleed easily.     Physical Exam  Temp:  [36.6 °C (97.9 °F)-36.9 °C (98.4 °F)] 36.9 °C (98.4 °F)  Pulse:  [76-86] 86  Resp:  [16-18] 18  BP: (111-144)/(65-87) 133/74  SpO2:  [94 %-99 %] 99 %    Physical Exam  Constitutional:       General: She is awake.      Appearance: Normal appearance.   HENT:      Head: Normocephalic and atraumatic.   Eyes:      Extraocular Movements: Extraocular movements intact.   Pulmonary:      Effort: Pulmonary effort is normal.   Abdominal:      Tenderness: There is no right CVA tenderness or left CVA tenderness.   Neurological:      Cranial Nerves: No cranial nerve deficit.      Sensory: No sensory deficit.      Comments: Baseline right-sided hemiparesis without interval worsening, present since stroke in 1994.   Psychiatric:         Mood and Affect: Mood normal.         Speech: Speech is delayed.       Thought Content: Thought content normal.        Laboratory   Latest Reference Range & Units Most Recent   WBC 4.8 - 10.8 K/uL 9.9  2/4/24 01:32   RBC 4.20 - 5.40 M/uL 3.90  (L)  2/4/24 01:32   Hemoglobin 12.0 - 16.0 g/dL 10.7 (L)  2/4/24 01:32   Hematocrit 37.0 - 47.0 % 31.8 (L)  2/4/24 01:32   MCV 81.4 - 97.8 fL 81.5  2/4/24 01:32   MCH 27.0 - 33.0 pg 27.4  2/4/24 01:32   MCHC 32.2 - 35.5 g/dL 33.6  2/4/24 01:32   RDW 35.9 - 50.0 fL 52.8 (H)  2/4/24 01:32   Platelet Count 164 - 446 K/uL 342  2/4/24 01:32   MPV 9.0 - 12.9 fL 11.7  2/4/24 01:32   Neutrophils-Polys 44.00 - 72.00 % 67.90  2/4/24 01:32   Neutrophils (Absolute) 1.82 - 7.42 K/uL 6.73  2/4/24 01:32   Lymphocytes 22.00 - 41.00 % 18.40 (L)  2/4/24 01:32   Lymphs (Absolute) 1.00 - 4.80 K/uL 1.83  2/4/24 01:32   Monocytes 0.00 - 13.40 % 9.60  2/4/24 01:32   Monos (Absolute) 0.00 - 0.85 K/uL 0.95 (H)  2/4/24 01:32   Eosinophils 0.00 - 6.90 % 2.50  2/4/24 01:32   Eos (Absolute) 0.00 - 0.51 K/uL 0.25  2/4/24 01:32   Basophils 0.00 - 1.80 % 0.90  2/4/24 01:32   Baso (Absolute) 0.00 - 0.12 K/uL 0.09  2/4/24 01:32   Immature Granulocytes 0.00 - 0.90 % 0.70  2/4/24 01:32   Immature Granulocytes (abs) 0.00 - 0.11 K/uL 0.07  2/4/24 01:32   Nucleated RBC 0.00 - 0.20 /100 WBC 0.00  2/4/24 01:32   NRBC (Absolute) K/uL 0.00  2/4/24 01:32   Sodium 135 - 145 mmol/L 133 (L)  2/4/24 01:32   Potassium 3.6 - 5.5 mmol/L 4.7  2/4/24 01:32   Chloride 96 - 112 mmol/L 103  2/4/24 01:32   Co2 20 - 33 mmol/L 21  2/4/24 01:32   Anion Gap 7.0 - 16.0  9.0  2/4/24 01:32   Glucose 65 - 99 mg/dL 90  2/4/24 01:32   Bun 8 - 22 mg/dL 13  2/4/24 01:32   Creatinine 0.50 - 1.40 mg/dL 1.00  2/4/24 01:32   GFR (CKD-EPI) >60 mL/min/1.73 m 2 63  2/4/24 01:32   Calcium 8.5 - 10.5 mg/dL 8.7  2/4/24 01:32   Correct Calcium 8.5 - 10.5 mg/dL 9.3  2/4/24 01:32   AST(SGOT) 12 - 45 U/L 20  2/4/24 01:32   ALT(SGPT) 2 - 50 U/L 23  2/4/24 01:32   Alkaline Phosphatase 30 - 99 U/L 508 (H)  2/4/24 01:32   Total Bilirubin 0.1 - 1.5 mg/dL 0.2  2/4/24 01:32   Albumin 3.2 - 4.9 g/dL 3.2  2/4/24 01:32   Total Protein 6.0 - 8.2 g/dL 6.9  2/4/24 01:32   Globulin 1.9 - 3.5 g/dL 3.7  (H)  2/4/24 01:32   A-G Ratio g/dL 0.9  2/4/24 01:32   (L): Data is abnormally low  (H): Data is abnormally high    Imaging    CT-RENAL WITH & W/O (2/3/2024):   IMPRESSION:     1.  Atrophic right kidney with a right upper pole low attenuation mass in the area of the previously present pigtail catheter percutaneous drain. That mass measures 24 Hounsfield units precontrast and 32 Hounsfield units postcontrast and measures 4 x 3.6   x 2.0 cm in size. Differential diagnosis includes inflammatory mass related to prior abscess given the patient's clinical history however an underlying neoplastic process such as renal cell cancer remains a consideration. Renal MRI pre and postcontrast   may be of benefit to further evaluate this mass. Additionally, close interval follow-up with repeat renal mass protocol CT scan in 2-3 months is recommended.    Scheduled Medications   Medication Dose Frequency    sulfamethoxazole-trimethoprim  1 Tablet Q12HRS    senna-docusate  2 Tablet BID    And    polyethylene glycol/lytes  1 Packet BID    baclofen  5 mg QHS    lidocaine  1 Patch Q24HR    [Held by provider] enoxaparin (LOVENOX) injection  40 mg DAILY AT 1800    DULoxetine  60 mg BID    famotidine  20 mg BID    ferrous sulfate  325 mg DAILY    folic acid  1 mg DAILY    pregabalin  150 mg Q8HRS    Pharmacy Consult Request  1 Each PHARMACY TO DOSE      Assessment/Plan  Problem Representation:    Pyelonephritis- (present on admission)  Assessment & Plan  Direct transfer from OSH for R nephrostomy tube placement following failed retrograde R ureteral stent attempt (1/26/24, [facility]; aborted due to excessive scarring). CTAP showed dysmorphic right kidney with obstructed collecting system and pyohydronephrosis.   - s/p R percutaneous nephrostomy placement, 1/28-2/2/2024  - No evidence of ongoing hemorrhage or perforated renal capsule on renal ultrasound (2/3/2024) after tube removal   - Meropenem discontinued in favor of Bactrim (till  2/10).     Per Urology: Will follow up in clinic in 4 weeks and undergo repeat imaging via CT renal mass protocol in 2-3 months.    Hydronephrosis- (present on admission)  Assessment & Plan  Please see pyelonephritis note    Hydronephrosis with urinary obstruction due to renal calculus- (present on admission)  Assessment & Plan  See pyelonephritis problem    Expressive aphasia  Assessment & Plan  Patient with expressive aphasia after brain insult 1994.  SLP evaluation.    History of stroke- (present on admission)  Assessment & Plan  History of complex stroke with right-sided deficits and expressive aphasia.  Continue fall precautions and seizure precautions    Drug rash- (present on admission)  Assessment & Plan  Patient has developed a drug rash due to Zosyn and at this point has severe rash from head to toe.  Antibiotic was switched to Rocephin.  Patient was on IV Solu-Medrol before being transferred to [facility] and was continued until today.  Rash has now significantly resolved therefore IV Solu-Medrol discontinued.  Continue famotidine.  Monitor for any further rash.    Right hemiplegia (HCC)  Assessment & Plan  S/p CVA 1994.  PT OT evaluation.      VTE prophylaxis: SCD    I have performed a physical exam and reviewed and updated ROS and Plan today (2/1/2024). In review of yesterday's note (1/31/2024), there are no changes except as documented above.

## 2024-02-04 NOTE — PROGRESS NOTES
Radiology Progress Note   Author: SARAH Ennis Date & Time created: 2/3/2024  2:20 pm   Date of admission  1/27/2024  Note to reader: this note follows the APSO format rather than the historical SOAP format. Assessment and plan located at the top of the note for ease of use.    Chief Complaint  63 y.o. female admitted 1/27/2024 with right flank pain      HPI    64 yo female with PMH significant for aneurysm complicated by hemorrhagic stroke with residual dysphasia and right hemiparesis, kidney stones s/p cystoscopic lithotripsy and stent, COPD, chronic pain and rheumatoid arthritis who presented 1/25/24 from OSH because of ongoing hydronephrosis and pyelonephritis with sepsis. 1/26/24 cytoscopy with scarred over right ureteral orifice, unable to bypass with catheter or wire and subsequent IR consult. Pt underwent RIGHT nephrostogram with Right PCNT placement/28/24. 10 ml of gross pus aspirated during procedure. Catheter to suction bulb due to purulent pus.      Interval History:   1/29/24: RIGHT nephrostomy tube to bulb suction with 10 ml thick purulent drainage in the last 24 hours. RIGHT nephrostomy tube irrigated with 5 ml of NS without aspiration. Pt nods yes to mild intermittent pain to RIGHT back/flank. Denies nausea, vomiting or abdominal pain/discomfort. I reviewed today's labs including WBC 12.7<10.9, Hgb 10.5<11.6, Cr 0.86, GFR 75. Coordinated post IR procedure care with hospital nursing staff.    1/30/24: RIGHT nephrostomy tube to bulb suction with 10 ml thick purulent bloody drainage in the last 24 hours. RIGHT nephrostomy tube irrigated with 5 ml of NS without aspiration and without patient experiencing any pain. I reviewed today's labs including WBC 12.7<10.9, Hgb 10.5, Cr 0.82, GFR 80.  Right perinephric aspirate positive for E. coli, ESBL.  Antibiotics per ID. Coordinated care with hospital nursing staff.      1/31/24: RIGHT nephrostomy tube to bulb suction with 10 ml thick purulent  drainage in the last 24 hours. RIGHT nephrostomy tube irrigated with 5 ml of NS with return aspiration of 10 ml of saline with purulent fluid. Pt nods yes to mild intermittent pain to RIGHT back/flank. Denies nausea, vomiting or abdominal pain/discomfort. I reviewed her most recent labs including WBC 10.9, Hgb 10.5, Cr 0.87, GFR 74. Coordinated post IR procedure care with hospital nursing staff - nursing to change RIGHT nephrostomy bulb suction to gravity bag when purulence resolves to urine.     2/1/24: RIGHT nephrostomy tube to bulb suction with 10 ml thick bloody drainage in the last 24 hours. RIGHT nephrostomy tube irrigated with 5 ml of NS with return aspiration of 10 ml of bloody fluid. Pt nods yes to mild intermittent pain to RIGHT back/flank. Denies nausea, vomiting or abdominal pain/discomfort. I reviewed her most recent labs including WBC 10.2, Hgb 10.3<10.5, Cr 0.79, GFR 84. Coordinated post IR procedure care with hospital nursing staff - nursing to change RIGHT nephrostomy bulb suction to gravity bag if purulence or blood resolves to urine.     2/2/24: CT 2/1/24 shows atrophic lobulated right kidney with right percutaneous drain with no evidence of a focal renal fluid collection.NO hydronephrosis. Drain output less than 10 ml per 24 hour period x 5 days. Drain has never put out urine. 2/1/24 no additional viscous grey yellow purulent drainage - blood in suction bulb and 10 ml of blood aspirated with flushing. Discussed drain out put and reviewed new imaging with IR Dr. Abreu. He recommended discontinuation of drain today. I reviewed patient's most recent labs including WBC 11.0, Hgb 10.0, Cr 0.80. Coordinated post IR procedural care with interventional radiologist, resident team and hospital nursing staff.  Drain removal without bleeding or pain. Drain intact.    02/03/24 -right percutaneous nephrostomy tube removed yesterday with a repeat right renal ultrasound today showing no perinephric hemorrhage.   Renal CT ordered and pending.    Assessment/Plan     Active Problems:    Pyelonephritis    Hydronephrosis with urinary obstruction due to renal calculus    History of stroke    Dysphagia    Hydronephrosis    Drug rash    Right hemiplegia (HCC)    Expressive aphasia    Anemia      Plan IR  -Right nephrostomy tube removed yesterday with no perinephric hemorrhage on repeat renal ultrasound.  - IR signing off.  -  -Thank you for allowing Interventional Radiology team to participate in the patients care, if any additional care or requests are needed in the future please do not hesitate to call or place IR order   [phone]           Review of Systems  Physical Exam   Review of Systems   Constitutional:  Positive for malaise/fatigue. Negative for chills and fever.   Respiratory:  Negative for shortness of breath.    Cardiovascular:  Negative for chest pain and palpitations.   Gastrointestinal:  Negative for nausea and vomiting.   Genitourinary:  Negative for flank pain.   Neurological:  Negative for tingling, sensory change, speech change, weakness and headaches.        Residual right-sided weakness due to remote history of CVA      Vitals:    02/03/24 1527   BP: (!) 150/97   Pulse: 99   Resp: 17   Temp: 36.9 °C (98.4 °F)   SpO2: 95%        Physical Exam  Constitutional:       General: She is not in acute distress.     Appearance: Normal appearance.   Cardiovascular:      Rate and Rhythm: Normal rate.   Pulmonary:      Effort: Pulmonary effort is normal. No respiratory distress.   Abdominal:      Palpations: Abdomen is soft.   Skin:     General: Skin is warm and dry.      Comments: Bandage to right posterior flank CDI   Neurological:      General: No focal deficit present.      Mental Status: She is alert and oriented to person, place, and time.      Comments: Right-sided weakness due to remote history of CVA   Psychiatric:         Mood and Affect: Mood normal.         Behavior: Behavior normal.             Labs    Recent  Labs     02/02/24 0321 02/02/24  2140 02/03/24  0632   WBC 11.0* 9.5 8.8   RBC 3.62* 3.95* 4.12*   HEMOGLOBIN 10.0* 10.9* 11.1*   HEMATOCRIT 30.4* 32.4* 34.8*   MCV 84.0 82.0 84.5   MCH 27.6 27.6 26.9*   MCHC 32.9 33.6 31.9*   RDW 54.0* 53.1* 55.9*   PLATELETCT 323 337 335   MPV 11.4 11.4 11.6     Recent Labs     02/01/24 0447 02/02/24  0321 02/03/24  0632   SODIUM 136 136 137   POTASSIUM 4.3 4.3 4.3   CHLORIDE 105 105 104   CO2 21 22 22   GLUCOSE 91 94 78   BUN 14 14 11   CREATININE 0.79 0.80 0.88   CALCIUM 8.1* 8.3* 8.7     Recent Labs     02/01/24 0447 02/02/24 0321 02/03/24  0632   ALBUMIN 2.6* 2.7* 3.2   TBILIRUBIN 0.3 0.2 0.2   ALKPHOSPHAT 493* 474* 524*   TOTPROTEIN 6.0 6.5 7.0   ALTSGPT 26 23 23   ASTSGOT 25 17 23   CREATININE 0.79 0.80 0.88     CT-RENAL WITH & W/O   Final Result      1.  Atrophic right kidney with a right upper pole low attenuation mass in the area of the previously present pigtail catheter percutaneous drain. That mass measures 24 Hounsfield units precontrast and 32 Hounsfield units postcontrast and measures 4 x 3.6    x 2.0 cm in size. Differential diagnosis includes inflammatory mass related to prior abscess given the patient's clinical history however an underlying neoplastic process such as renal cell cancer remains a consideration. Renal MRI pre and postcontrast    may be of benefit to further evaluate this mass. Additionally, close interval follow-up with repeat renal mass protocol CT scan in 2-3 months is recommended.      2.  Again noted urothelial thickening and enhancement of the right renal pelvis and ureter. This can be seen with infectious process as well as neoplasm.      3.  Prior cholecystectomy with intrahepatic and extraocular ductal prominence likely related to prior surgery.      4.  Moderate size hiatal hernia with surgical changes involving the stomach and bowel.      5.  Diverticulosis.      6.  Bibasilar atelectasis.      7.  Uterine fibroids.           "  US-RENAL   Final Result         1. Stable right renal pelvic dilation and wall thickening, suspicious for infection. Mass also not excluded. Recommend continued follow-up with CT urogram.   2. No perinephric hemorrhage identified, as questioned.      CT-ABDOMEN-PELVIS WITH   Final Result      1.  Atrophic right kidney with a percutaneous drain extending into the upper pole of the right kidney. There is right renal pelvic dilatation with thickening of the wall of the renal pelvis with diffuse thickening of the wall of the urinary bladder. This    may represent infectious or neoplastic process.      2.   Prior cholecystectomy intrahepatic and extrahepatic biliary ductal prominence likely related to prior cholecystectomy.      3.  Diverticulosis.      4.  Hiatal hernia with surgical changes involving the stomach.      5.  Bibasilar atelectasis and minimal bilateral pleural effusions.      DX-SHOULDER 2+ LEFT   Final Result         Severe osteoarthritis of the glenohumeral joint.   Elevation of the humeral head abutting the acromion could relate to chronic rotator cuff tear.      IR-NEPHROSTOGRAM W/ NEW TUBE PLACEMENT (ALL RADIOLOGY) RIGHT   Final Result      1. ULTRASOUND AND FLUOROSCOPIC GUIDED PLACEMENT OF A RIGHT 8 Urdu  PIGTAIL LOCKING LOOP PERCUTANEOUS NEPHROSTOMY CATHETER.      2. DYSMORPHIC LIKELY CHRONICALLY OBSTRUCTED UPPER COLLECTING SYSTEM WITH GROSS PYONEPHROSIS. THIS MAY WELL REPRESENT A NONFUNCTIONING \"ABSCESSED\" KIDNEY. DUE TO THE THICK PUS, THE CATHETER WAS CONNECTED TO SUCTION BULB DRAINAGE AND ORDERS WERE WRITTEN    FOR ONCE DAILY IRRIGATION WITH 3 ML STERILE SALINE. IF URINE OUTPUT IS RESTORED, THE CATHETER MAY BE CONNECTED TO CONVENTIONAL GRAVITY BAG DRAINAGE.        INR   Date Value Ref Range Status   02/02/2024 0.98 0.87 - 1.13 Final     Comment:     INR - Non-therapeutic Reference Range: 0.87-1.13  INR - Therapeutic Reference Range: 2.0-4.0       No results found for: \"POCINR\" "     Intake/Output Summary (Last 24 hours) at 2/3/2024 1922  Last data filed at 2/3/2024 1600  Gross per 24 hour   Intake 1240 ml   Output 2500 ml   Net -1260 ml      Labs not explicitly included in this progress note were reviewed by the author. Radiology/imaging not explicitly included in this progress note was reviewed by the author.     I have performed a physical exam and reviewed and updated ROS and Plan today (2/3/2024).     45 minutes in directly providing and coordinating care and extensive data review.  No time overlap and excludes procedures.

## 2024-02-04 NOTE — CARE PLAN
The patient is Stable - Low risk of patient condition declining or worsening    Shift Goals  Clinical Goals: rest, safety, skin integrity  Patient Goals: rest  Family Goals: na    Progress made toward(s) clinical / shift goals:      Problem: Knowledge Deficit - Standard  Goal: Patient and family/care givers will demonstrate understanding of plan of care, disease process/condition, diagnostic tests and medications  Description: Target End Date:  1-3 days or as soon as patient condition allows    Document in Patient Education    1.  Patient and family/caregiver oriented to unit, equipment, visitation policy and means for communicating concern  2.  Complete/review Learning Assessment  3.  Assess knowledge level of disease process/condition, treatment plan, diagnostic tests and medications  4.  Explain disease process/condition, treatment plan, diagnostic tests and medications  Outcome: Progressing     Problem: Skin Integrity  Goal: Skin integrity is maintained or improved  Description: Target End Date:  Prior to discharge or change in level of care    Document interventions on Skin Risk/Juan David flowsheet groups and corresponding LDA    1.  Assess and monitor skin integrity, appearance and/or temperature  2.  Assess risk factors for impaired skin integrity and/or pressures ulcers  3.  Implement precautions to protect skin integrity in collaboration with interdisciplinary team  4.  Implement pressure ulcer prevention protocol if at risk for skin breakdown  5.  Confirm wound care consult if at risk for skin breakdown  6.  Ensure patient use of pressure relieving devices  (Low air loss bed, waffle overlay, heel protectors, ROHO cushion, etc)  Outcome: Progressing     Problem: Pain - Standard  Goal: Alleviation of pain or a reduction in pain to the patient’s comfort goal  Description: Target End Date:  Prior to discharge or change in level of care    Document on Vitals flowsheet    1.  Document pain using the appropriate  pain scale per order or unit policy  2.  Educate and implement non-pharmacologic comfort measures (i.e. relaxation, distraction, massage, cold/heat therapy, etc.)  3.  Pain management medications as ordered  4.  Reassess pain after pain med administration per policy  5.  If opiods administered assess patient's response to pain medication is appropriate per POSS sedation scale  6.  Follow pain management plan developed in collaboration with patient and interdisciplinary team (including palliative care or pain specialists if applicable)  Outcome: Progressing     Problem: Fall Risk  Goal: Patient will remain free from falls  Description: Target End Date:  Prior to discharge or change in level of care    Document interventions on the Bacon Sohail Fall Risk Assessment    1.  Assess for fall risk factors  2.  Implement fall precautions  Outcome: Progressing       Patient is not progressing towards the following goals:

## 2024-02-05 VITALS
OXYGEN SATURATION: 93 % | SYSTOLIC BLOOD PRESSURE: 128 MMHG | BODY MASS INDEX: 29.44 KG/M2 | WEIGHT: 183.2 LBS | DIASTOLIC BLOOD PRESSURE: 78 MMHG | RESPIRATION RATE: 18 BRPM | HEART RATE: 83 BPM | HEIGHT: 66 IN | TEMPERATURE: 97.3 F

## 2024-02-05 PROBLEM — L27.0 DRUG RASH: Status: RESOLVED | Noted: 2024-01-25 | Resolved: 2024-02-05

## 2024-02-05 PROCEDURE — 97530 THERAPEUTIC ACTIVITIES: CPT

## 2024-02-05 PROCEDURE — A9270 NON-COVERED ITEM OR SERVICE: HCPCS | Performed by: INTERNAL MEDICINE

## 2024-02-05 PROCEDURE — 700101 HCHG RX REV CODE 250: Performed by: PHYSICAL MEDICINE & REHABILITATION

## 2024-02-05 PROCEDURE — A9270 NON-COVERED ITEM OR SERVICE: HCPCS

## 2024-02-05 PROCEDURE — 700102 HCHG RX REV CODE 250 W/ 637 OVERRIDE(OP)

## 2024-02-05 PROCEDURE — 700102 HCHG RX REV CODE 250 W/ 637 OVERRIDE(OP): Performed by: INTERNAL MEDICINE

## 2024-02-05 PROCEDURE — 99239 HOSP IP/OBS DSCHRG MGMT >30: CPT | Mod: GC | Performed by: INTERNAL MEDICINE

## 2024-02-05 RX ORDER — SULFAMETHOXAZOLE AND TRIMETHOPRIM 800; 160 MG/1; MG/1
1 TABLET ORAL EVERY 12 HOURS
Qty: 10 TABLET | Refills: 0 | Status: ACTIVE | OUTPATIENT
Start: 2024-02-05 | End: 2024-02-10

## 2024-02-05 RX ORDER — FOLIC ACID 1 MG/1
2 TABLET ORAL DAILY
Qty: 60 TABLET | Refills: 0 | Status: SHIPPED | OUTPATIENT
Start: 2024-02-05 | End: 2024-03-06

## 2024-02-05 RX ORDER — FERROUS SULFATE 325(65) MG
325 TABLET ORAL DAILY
Qty: 30 TABLET | Refills: 0 | Status: SHIPPED | OUTPATIENT
Start: 2024-02-05 | End: 2024-03-06

## 2024-02-05 RX ADMIN — DULOXETINE HYDROCHLORIDE 60 MG: 60 CAPSULE, DELAYED RELEASE ORAL at 04:21

## 2024-02-05 RX ADMIN — SENNOSIDES AND DOCUSATE SODIUM 2 TABLET: 50; 8.6 TABLET ORAL at 04:22

## 2024-02-05 RX ADMIN — LIDOCAINE 1 PATCH: 4 PATCH TOPICAL at 12:37

## 2024-02-05 RX ADMIN — PREGABALIN 150 MG: 150 CAPSULE ORAL at 04:22

## 2024-02-05 RX ADMIN — FOLIC ACID 1 MG: 1 TABLET ORAL at 04:21

## 2024-02-05 RX ADMIN — POLYETHYLENE GLYCOL 3350 1 PACKET: 17 POWDER, FOR SOLUTION ORAL at 04:21

## 2024-02-05 RX ADMIN — DULOXETINE HYDROCHLORIDE 60 MG: 60 CAPSULE, DELAYED RELEASE ORAL at 16:22

## 2024-02-05 RX ADMIN — SULFAMETHOXAZOLE AND TRIMETHOPRIM 1 TABLET: 800; 160 TABLET ORAL at 04:22

## 2024-02-05 RX ADMIN — PREGABALIN 150 MG: 150 CAPSULE ORAL at 12:37

## 2024-02-05 RX ADMIN — SULFAMETHOXAZOLE AND TRIMETHOPRIM 1 TABLET: 800; 160 TABLET ORAL at 16:22

## 2024-02-05 RX ADMIN — FERROUS SULFATE TAB 325 MG (65 MG ELEMENTAL FE) 325 MG: 325 (65 FE) TAB at 04:21

## 2024-02-05 RX ADMIN — FAMOTIDINE 20 MG: 20 TABLET, FILM COATED ORAL at 04:22

## 2024-02-05 RX ADMIN — FAMOTIDINE 20 MG: 20 TABLET, FILM COATED ORAL at 16:22

## 2024-02-05 ASSESSMENT — GAIT ASSESSMENTS: GAIT LEVEL OF ASSIST: UNABLE TO PARTICIPATE

## 2024-02-05 ASSESSMENT — PATIENT HEALTH QUESTIONNAIRE - PHQ9
1. LITTLE INTEREST OR PLEASURE IN DOING THINGS: NOT AT ALL
SUM OF ALL RESPONSES TO PHQ9 QUESTIONS 1 AND 2: 0

## 2024-02-05 ASSESSMENT — COGNITIVE AND FUNCTIONAL STATUS - GENERAL
WALKING IN HOSPITAL ROOM: TOTAL
TURNING FROM BACK TO SIDE WHILE IN FLAT BAD: UNABLE
STANDING UP FROM CHAIR USING ARMS: A LOT
MOVING FROM LYING ON BACK TO SITTING ON SIDE OF FLAT BED: UNABLE
CLIMB 3 TO 5 STEPS WITH RAILING: TOTAL
MOBILITY SCORE: 7
SUGGESTED CMS G CODE MODIFIER MOBILITY: CM
MOVING TO AND FROM BED TO CHAIR: UNABLE

## 2024-02-05 ASSESSMENT — FIBROSIS 4 INDEX: FIB4 SCORE: 0.77

## 2024-02-05 ASSESSMENT — PAIN DESCRIPTION - PAIN TYPE: TYPE: ACUTE PAIN

## 2024-02-05 NOTE — DISCHARGE PLANNING
Case Management Discharge Planning    Admission Date: 1/27/2024  GMLOS: 5.1  ALOS: 9    6-Clicks ADL Score: 13  6-Clicks Mobility Score: 7  PT and/or OT Eval ordered: No  Post-acute Referrals Ordered: NA  Post-acute Choice Obtained: NA  Has referral(s) been sent to post-acute provider:  BARBARA      Anticipated Discharge Dispo: Discharge Disposition: Discharged to home/self care (01)    DME Needed: No    Action(s) Taken: Transport Arranged . [facility] will  at 1715 for transport home. Patient aware, spouse aware.    Escalations Completed: None    Medically Clear: Yes    Next Steps: CM will continue to follow for discharge planning needs.    Barriers to Discharge: Transportation

## 2024-02-05 NOTE — DISCHARGE SUMMARY
[facility] Internal Medicine Discharge Summary    Attending: Wyatt Preciado M.d.  Senior Resident: Mango Bobby DO  Intern: Ray Davis MD  Contact Number: [phone]    CHIEF COMPLAINT ON ADMISSION  Right flank pain    Reason for Admission  Hydronephrosis of right kidney     Admission Date  1/27/2024    CODE STATUS  Full Code    HPI & HOSPITAL COURSE  Nydia Finch is a 63-year-old female with residual right-sided hemiparesis and expressive aphasia due to hemorrhagic stroke (in 1994) and history of ureterolithiasis (s/p ureteral stent placed, 2009; lost to follow-up) admitted on 1/28/2024 as direct transfer from OSH for pyelonephritis and hydronephrosis of right kidney due to ureteral obstruction.     She initially presented with right flank and hip pain on 1/28/2024 at OSH, was found to have sepsis and ureteral obstruction. She was empirically started on Zosyn, leading to widespread allergic rash, then switched to Rocephin and transferred to [facility] for repeat ureteral stent placement.     Her drug rash quickly resolved with pulse Solumedrol, which was discontinued on 1/29/2024.     She underwent right percutaneous nephrostomy tube placement on 1/28/2024, allowing for drainage of purulent fluid which quickly grew ESBL and E. Coli. Rocephin was continued (1/28-1/30), then switched to Meropenem (1/30-2/4) after cultures returned positive for these bacteria.     She is being discharged on Bactrim till 2/10/2024 and will follow up with Urology in 4 weeks with plan for surveillance imaging at that time.     Therefore, she is discharged in fair and stable condition to home with close outpatient follow-up.    The patient met 2-midnight criteria for an inpatient stay at the time of discharge.    Discharge Date  2/5/2024    Physical Exam on Day of Discharge  Physical Exam  Constitutional:       General: She is awake.      Appearance: Normal appearance.   HENT:      Head: Normocephalic and atraumatic.   Eyes:      Extraocular  Movements: Extraocular movements intact.   Pulmonary:      Effort: Pulmonary effort is normal.   Abdominal:      Tenderness: There is no right CVA tenderness or left CVA tenderness.   Neurological:      Cranial Nerves: No cranial nerve deficit.      Sensory: No sensory deficit.      Comments: Baseline right-sided hemiparesis without interval worsening, present since stroke in 1994.   Psychiatric:         Mood and Affect: Mood normal.         Speech: Speech is delayed.       Thought Content: Thought content normal.        FOLLOW UP ITEMS POST DISCHARGE  You were admitted for pyelonephritis (right kidney infection) and hydronephrosis (fluid-filled kidney) of your right kidney due to ureteral obstruction. We placed a tube (called a percutaneous nephrostomy tube) to help drain the infection and any excess fluid in your kidney. This tube was removed on 2/2/2024. We also treated your kidney infection with intravenous antibiotics.      We are sending you home with an antibiotic called Bactrim. Please take this medication twice daily until 2/10/2024.   Please follow up with Urology as outpatient in 3-4 weeks. You will likely need follow-up renal imaging (specifically, the CT renal mass protocol) in 2-3 months.     DISCHARGE DIAGNOSES  Active Problems:    Pyelonephritis (POA: Yes)    Hydronephrosis with urinary obstruction due to renal calculus (POA: Yes)    History of stroke (POA: Yes)    Dysphagia (POA: Yes)    Hydronephrosis (POA: Yes)    Right hemiplegia (HCC) (POA: Yes)    Expressive aphasia (POA: Yes)    Anemia (POA: Yes)      Overview: Patient with hemoglobin 11.6.  At this point etiology is unknown however       patient had been taking ferrous sulfate at home.      Consider rechecking ferritin, serum iron, TIBC.      Continue with ferrous sulfate for now.  Resolved Problems:    Drug rash (POA: Yes)    FOLLOW UP  Urology    MEDICATIONS ON DISCHARGE     Medication List        START taking these medications         Instructions   folic acid 1 MG Tabs  Commonly known as: Folvite   Take 2 Tablets by mouth every day for 30 days.  Dose: 2 mg     sulfamethoxazole-trimethoprim 800-160 MG tablet  Commonly known as: Bactrim DS   Take 1 Tablet by mouth every 12 hours for 5 days.  Dose: 1 Tablet            CONTINUE taking these medications        Instructions   D3 PO   Take 1 Tablet by mouth every day.  Dose: 1 Tablet     DULoxetine 60 MG Cpep delayed-release capsule  Commonly known as: Cymbalta   Take 60 mg by mouth 2 times a day.  Dose: 60 mg     ferrous sulfate 325 (65 Fe) MG tablet   Take 1 Tablet by mouth every day for 30 days.  Dose: 325 mg     pregabalin 150 MG Caps  Commonly known as: Lyrica   Take 150 mg by mouth in the morning, at noon, and at bedtime.  Dose: 150 mg     VITAMIN B-12 PO   Take 1 Tablet by mouth every day.  Dose: 1 Tablet              Allergies  Allergies   Allergen Reactions    Zosyn [Piperacillin Sod-Tazobactam So] Rash     Gave her head to toe drug rash at [facility] 1/25/2024         DIET  Orders Placed This Encounter   Procedures    Diet Order Diet: Regular     Standing Status:   Standing     Number of Occurrences:   1     Order Specific Question:   Diet:     Answer:   Regular [1]       ACTIVITY  As tolerated.  Weight bearing as tolerated    CONSULTATIONS  Urology  Interventional radiology    PROCEDURES  Right percutaneous nephrostomy placement and nephrostogram with ultrasound and fluoroscopic guidance (1/28/2024)    LABORATORY  Lab Results   Component Value Date    SODIUM 133 (L) 02/04/2024    POTASSIUM 4.7 02/04/2024    CHLORIDE 103 02/04/2024    CO2 21 02/04/2024    GLUCOSE 90 02/04/2024    BUN 13 02/04/2024    CREATININE 1.00 02/04/2024        Lab Results   Component Value Date    WBC 9.9 02/04/2024    HEMOGLOBIN 10.7 (L) 02/04/2024    HEMATOCRIT 31.8 (L) 02/04/2024    PLATELETCT 342 02/04/2024      Total time of the discharge process exceeds 90 minutes.

## 2024-02-05 NOTE — CARE PLAN
The patient is Stable - Low risk of patient condition declining or worsening    Shift Goals  Clinical Goals: rest, safety, skin integrity  Patient Goals: rest  Family Goals: na    Progress made toward(s) clinical / shift goals:      Problem: Skin Integrity  Goal: Skin integrity is maintained or improved  Description: Target End Date:  Prior to discharge or change in level of care    Document interventions on Skin Risk/Juan David flowsheet groups and corresponding LDA    1.  Assess and monitor skin integrity, appearance and/or temperature  2.  Assess risk factors for impaired skin integrity and/or pressures ulcers  3.  Implement precautions to protect skin integrity in collaboration with interdisciplinary team  4.  Implement pressure ulcer prevention protocol if at risk for skin breakdown  5.  Confirm wound care consult if at risk for skin breakdown  6.  Ensure patient use of pressure relieving devices  (Low air loss bed, waffle overlay, heel protectors, ROHO cushion, etc)  Outcome: Progressing     Problem: Pain - Standard  Goal: Alleviation of pain or a reduction in pain to the patient’s comfort goal  Description: Target End Date:  Prior to discharge or change in level of care    Document on Vitals flowsheet    1.  Document pain using the appropriate pain scale per order or unit policy  2.  Educate and implement non-pharmacologic comfort measures (i.e. relaxation, distraction, massage, cold/heat therapy, etc.)  3.  Pain management medications as ordered  4.  Reassess pain after pain med administration per policy  5.  If opiods administered assess patient's response to pain medication is appropriate per POSS sedation scale  6.  Follow pain management plan developed in collaboration with patient and interdisciplinary team (including palliative care or pain specialists if applicable)  Outcome: Progressing     Problem: Fall Risk  Goal: Patient will remain free from falls  Description: Target End Date:  Prior to discharge or  change in level of care    Document interventions on the Bacon Sohail Fall Risk Assessment    1.  Assess for fall risk factors  2.  Implement fall precautions  Outcome: Progressing       Patient is not progressing towards the following goals:

## 2024-02-05 NOTE — DISCHARGE PLANNING
DC Transport Scheduled    Transport Company Scheduled:  [employer]    Scheduled Date: 2/5/2024  Scheduled Time: 1715    Destination: HOME  [address] [facility]    Notified care team of scheduled transport via [employer].     If there are any changes needed to the DC transportation scheduled, please contact [facility] at ext. [phone] between the hours of 6026-0333 Mon-Fri. If outside those hours, contact the ED Case Manager at ext. [phone].

## 2024-02-05 NOTE — DISCHARGE INSTRUCTIONS
You were admitted for pyelonephritis (right kidney infection) and hydronephrosis (fluid-filled kidney) of your right kidney due to ureteral obstruction. We placed a tube (called a percutaneous nephrostomy tube) to help drain the infection and any excess fluid in your kidney. This tube was removed on 2/2/2024. We also treated your kidney infection with intravenous antibiotics.     We are sending you home with an antibiotic called Bactrim. Please take this medication twice daily until 2/10/2024.   Please follow up with Urology as outpatient in 3-4 weeks. You will likely need follow-up renal imaging (specifically, the CT renal mass protocol) in 2-3 months.

## 2024-02-05 NOTE — THERAPY
Physical Therapy   Daily Treatment     Patient Name: Nydia Finch  Age:  63 y.o., Sex:  female  Medical Record #: [id number]  Today's Date: 2/5/2024     Precautions  Precautions: Fall Risk;Other (See Comments)  Comments: Contact precautions for ESBL; CVA with R side residual deficit    Assessment    Patient seen for PT treatment session. Patient in bed, agreeable for the session. Able to participate with bed mobility, functional transfers as detailed below. Continues to require Mod A with mobility. Will continue to benefit from PT services and recommend home with HH PT, as long as family is able to provide the required level of assistance/support. May need to consider SNF placement, if family have concerns about assisting her at home.      Plan    Treatment Plan Status: Continue Current Treatment Plan  Type of Treatment: Bed Mobility, Neuro Re-Education / Balance, Therapeutic Activities, Therapeutic Exercise  Treatment Frequency: 3 Times per Week  Treatment Duration: Until Therapy Goals Met    DC Equipment Recommendations: None  Discharge Recommendations: Other - (Home with HH PT, provided patient's family is able to provide the required level of assistance needed. May need to consider SNF placement, if family have concerns.)    Objective       02/05/24 1103   Charge Group   Charges  Yes   PT Therapeutic Activities (Units) 1   Total Time Spent   PT Total Time Yes   PT Therapeutic Activities Time Spent (Mins) 20   PT Total Time Spent (Calculated) 20   Precautions   Precautions Fall Risk;Other (See Comments)   Comments Contact precautions for ESBL; CVA with R side residual deficit   Vitals   O2 Delivery Device None - Room Air   Pain   Pain Scales 0 to 10 Scale    Intervention Declines   Cognition    Cognition / Consciousness X   Speech/ Communication Delayed Responses;Expressive Aphasia;Word Finding Impairment   Level of Consciousness Alert   Other Treatments   Other Treatments Provided Reinforced daily mobility/OOB to  WC with assistance from nursing.   Balance   Sitting Balance (Static) Fair   Sitting Balance (Dynamic) Fair -   Standing Balance (Static) Poor +   Standing Balance (Dynamic) Poor   Weight Shift Sitting Fair   Weight Shift Standing Poor   Skilled Intervention Verbal Cuing;Tactile Cuing;Postural Facilitation   Comments Seated EOB; Standing with hand held assist   Bed Mobility    Supine to Sit Moderate Assist   Scooting Moderate Assist   Skilled Intervention Verbal Cuing;Sequencing;Facilitation   Comments HOB flat, increased time & effort; assistance mainly required with trunk and to scoot to EOB   Gait Analysis   Gait Level Of Assist Unable to Participate   Comments Non-ambulatory at baseline   Functional Mobility   Sit to Stand Moderate Assist   Bed, Chair, Wheelchair Transfer Moderate Assist   Transfer Method Stand Pivot   Mobility Bed-WC X 2 times-towards R side-Min A; WC-bed x 1 time-towards L side-Mod A. Cues for sequencing of task; assistance with appropriate placement of RLE/RUE   Wheelchair Assist Maximal Assist   Distance Wheelchair (Feet or Distance) 20   Skilled Intervention Verbal Cuing;Tactile Cuing;Sequencing;Facilitation   Comments Patient required assistance with appropriate positioning of WC in the room. At baseline, patient uses power/motorized WC.   How much difficulty does the patient currently have...   Turning over in bed (including adjusting bedclothes, sheets and blankets)? 1   Sitting down on and standing up from a chair with arms (e.g., wheelchair, bedside commode, etc.) 1   Moving from lying on back to sitting on the side of the bed? 1   How much help from another person does the patient currently need...   Moving to and from a bed to a chair (including a wheelchair)? 2   Need to walk in a hospital room? 1   Climbing 3-5 steps with a railing? 1   6 clicks Mobility Score 7   Activity Tolerance   Sitting in Chair Post session   Patient / Family Goals    Patient / Family Goal #1 To return home    Goal #1 Outcome Goal not met   Short Term Goals    Short Term Goal # 1 pt will perform standing pivot transfer from bed>w/c w/ Min assist in 6 visits   Goal Outcome # 1 Progressing as expected   Short Term Goal # 2 pt will perform bed mobility w/ Min assist in 6 visits   Goal Outcome # 2 Progressing as expected   Short Term Goal # 3 Pt will perform STS w/ Min assist in 6 visits   Goal Outcome # 3 Progressing as expected   Physical Therapy Treatment Plan   Physical Therapy Treatment Plan Continue Current Treatment Plan   Treatment Plan  Bed Mobility;Neuro Re-Education / Balance;Therapeutic Activities;Therapeutic Exercise   Treatment Frequency 3 Times per Week   Duration Until Therapy Goals Met   Anticipated Discharge Equipment and Recommendations   DC Equipment Recommendations None   Discharge Recommendations Other -  (Home with HH PT, provided patient's family is able to provide the required level of assistance needed. May need to consider SNF placement, if family have concerns.)   Interdisciplinary Plan of Care Collaboration   IDT Collaboration with  Nursing   Patient Position at End of Therapy Seated;Chair Alarm On;Call Light within Reach;Tray Table within Reach;Phone within Reach  (In the WC)   Session Information   Date / Session Number  2/5-3(1/3, 2/11)

## 2024-02-05 NOTE — CARE PLAN
The patient is Stable - Low risk of patient condition declining or worsening    Shift Goals  Clinical Goals: Rest, safety, skin integrity  Patient Goals: Rest  Family Goals: na    Progress made toward(s) clinical / shift goals:      Problem: Skin Integrity  Goal: Skin integrity is maintained or improved  Outcome: Progressing  Note: Pressure relieving devices in place, skin cleansed with barrier cream applied to dry, flaky areas.      Problem: Urinary Elimination  Goal: Establish and maintain regular urinary output  Outcome: Progressing  Note: Pt voiding clear, yellow urine. Purwick in place to protect Pt skin from becoming damaged by moisture.     Problem: Fall Risk  Goal: Patient will remain free from falls  Outcome: Progressing  Note: Fall precautions in place, call light within reach of Pt left hand she has demonstrated she knows how to use it appropriately.      Problem: Pain - Standard  Goal: Alleviation of pain or a reduction in pain to the patient’s comfort goal  Outcome: Progressing  Note: Pt comfortable no report of pain with regular repositioning       Oral ABX administered with no adverse event (choking).

## 2024-02-05 NOTE — THERAPY
Physical Therapy   Daily Treatment     Patient Name: Nydia Finch  Age:  63 y.o., Sex:  female  Medical Record #: [id number]  Today's Date: 2/5/2024     Precautions  Precautions: Fall Risk;Other (See Comments)  Comments: Contact precautions for ESBL; CVA with R side residual deficit    Assessment    Patient seen for PT treatment session. Patient in bed, agreeable for the session. Able to participate with bed mobility, functional transfers as detailed below. Continues to require Mod A with mobility. Will continue to benefit from PT services and recommend home with HH PT, as long as family is able to provide the required level of assistance/support. May need to consider SNF placement, if family have concerns about assisting her at home.      Plan    Treatment Plan Status: Continue Current Treatment Plan  Type of Treatment: Bed Mobility, Neuro Re-Education / Balance, Therapeutic Activities, Therapeutic Exercise  Treatment Frequency: 3 Times per Week  Treatment Duration: Until Therapy Goals Met    DC Equipment Recommendations: None  Discharge Recommendations: Other - (Home with HH PT, provided patient's family is able to provide the required level of assistance needed. May need to consider SNF placement, if family have concerns.)    Objective       02/05/24 1103   Precautions   Precautions Fall Risk;Other (See Comments)   Comments Contact precautions for ESBL; CVA with R side residual deficit   Vitals   O2 Delivery Device None - Room Air   Pain   Pain Scales 0 to 10 Scale    Intervention Declines   Cognition    Cognition / Consciousness X   Speech/ Communication Delayed Responses;Expressive Aphasia;Word Finding Impairment   Level of Consciousness Alert   Other Treatments   Other Treatments Provided Reinforced daily mobility/OOB to  with assistance from nursing.   Balance   Sitting Balance (Static) Fair   Sitting Balance (Dynamic) Fair -   Standing Balance (Static) Poor +   Standing Balance (Dynamic) Poor   Weight Shift  Sitting Fair   Weight Shift Standing Poor   Skilled Intervention Verbal Cuing;Tactile Cuing;Postural Facilitation   Comments Seated EOB; Standing with hand held assist   Bed Mobility    Supine to Sit Moderate Assist   Scooting Moderate Assist   Skilled Intervention Verbal Cuing;Sequencing;Facilitation   Comments HOB flat, increased time & effort; assistance mainly required with trunk and to scoot to EOB   Gait Analysis   Gait Level Of Assist Unable to Participate   Comments Non-ambulatory at baseline   Functional Mobility   Sit to Stand Moderate Assist   Bed, Chair, Wheelchair Transfer Moderate Assist   Transfer Method Stand Pivot   Mobility Bed-WC X 2 times-towards R side-Min A; WC-bed x 1 time-towards L side-Mod A. Cues for sequencing of task; assistance with appropriate placement of RLE/RUE   Wheelchair Assist Maximal Assist   Distance Wheelchair (Feet or Distance) 20   Skilled Intervention Verbal Cuing;Tactile Cuing;Sequencing;Facilitation   Comments Patient required assistance with appropriate positioning of WC in the room. At baseline, patient is usually pushed around on her WC.   How much difficulty does the patient currently have...   Turning over in bed (including adjusting bedclothes, sheets and blankets)? 1   Sitting down on and standing up from a chair with arms (e.g., wheelchair, bedside commode, etc.) 1   Moving from lying on back to sitting on the side of the bed? 1   How much help from another person does the patient currently need...   Moving to and from a bed to a chair (including a wheelchair)? 2   Need to walk in a hospital room? 1   Climbing 3-5 steps with a railing? 1   6 clicks Mobility Score 7   Activity Tolerance   Sitting in Chair Post session   Patient / Family Goals    Patient / Family Goal #1 To return home   Goal #1 Outcome Goal not met   Short Term Goals    Short Term Goal # 1 pt will perform standing pivot transfer from bed>w/c w/ Min assist in 6 visits   Goal Outcome # 1 Progressing  as expected   Short Term Goal # 2 pt will perform bed mobility w/ Min assist in 6 visits   Goal Outcome # 2 Progressing as expected   Short Term Goal # 3 Pt will perform STS w/ Min assist in 6 visits   Goal Outcome # 3 Progressing as expected   Physical Therapy Treatment Plan   Physical Therapy Treatment Plan Continue Current Treatment Plan   Treatment Plan  Bed Mobility;Neuro Re-Education / Balance;Therapeutic Activities;Therapeutic Exercise   Treatment Frequency 3 Times per Week   Duration Until Therapy Goals Met   Anticipated Discharge Equipment and Recommendations   DC Equipment Recommendations None   Discharge Recommendations Other -  (Home with  PT, provided patient's family is able to provide the required level of assistance needed. May need to consider SNF placement, if family have concerns.)   Interdisciplinary Plan of Care Collaboration   IDT Collaboration with  Nursing   Patient Position at End of Therapy Seated;Chair Alarm On;Call Light within Reach;Tray Table within Reach;Phone within Reach  (In the WC)   Session Information   Date / Session Number  2/5-3(1/3, 2/11)

## 2024-02-06 NOTE — HOSPITAL COURSE
Nydia Finch is a 63-year-old female with residual right-sided hemiparesis and expressive aphasia due to hemorrhagic stroke (in 1994) and history of ureterolithiasis (s/p ureteral stent placed, 2009; lost to follow-up) admitted on 1/28/2024 as direct transfer from OSH for pyelonephritis and hydronephrosis of right kidney due to ureteral obstruction.     She initially presented with right flank and hip pain on 1/28/2024 at OSH, was found to have sepsis and ureteral obstruction. She was empirically started on Zosyn, leading to widespread allergic rash, then switched to Rocephin and transferred to [facility] for repeat ureteral stent placement.     Her drug rash quickly resolved with pulse Solumedrol, which was discontinued on 1/29/2024.     She underwent right percutaneous nephrostomy tube placement on 1/28/2024, allowing for drainage of purulent fluid which quickly grew ESBL and E. Coli. Rocephin was continued (1/28-1/30), then switched to Meropenem (1/30-2/4) after cultures returned positive for these bacteria.     She is being discharged on Bactrim till 2/10/2024 and will follow up with Urology in 4 weeks with plan for surveillance imaging at that time.

## 2024-02-28 LAB
FUNGUS SPEC CULT: NORMAL
FUNGUS SPEC FUNGUS STN: NORMAL
SIGNIFICANT IND 70042: NORMAL
SITE SITE: NORMAL
SOURCE SOURCE: NORMAL

## 2024-03-11 LAB
MYCOBACTERIUM SPEC CULT: NORMAL
RHODAMINE-AURAMINE STN SPEC: NORMAL
SIGNIFICANT IND 70042: NORMAL
SITE SITE: NORMAL
SOURCE SOURCE: NORMAL

## 2024-05-16 NOTE — PROGRESS NOTES
Received report from [facility] nurse. Assessment complete. Patient is A&Ox4 with expressive aphasia, pills floated in pudding, pt tolerated well, denies pain at this time,  at bedside, PICC line in L arm patent and IVF infusing.  Pt titrated to RA,  in place. Pt educated on POC and verbalized understanding. Patient is resting now. Call light within reach, bed in lowest position, treaded socks in place.     \"Have you been to the ER, urgent care clinic since your last visit?  Hospitalized since your last visit?\"    NO    “Have you seen or consulted any other health care providers outside of [facility] since your last visit?”    NO    Have you had a mammogram?”   NO    No breast cancer screening on file      “Have you had a pap smear?”    NO    No cervical cancer screening on file             Click Here for Release of Records Request

## 2024-07-25 ENCOUNTER — HOSPITAL ENCOUNTER (OUTPATIENT)
Dept: RADIOLOGY | Facility: MEDICAL CENTER | Age: 64
End: 2024-07-25
Payer: COMMERCIAL

## 2024-07-25 ENCOUNTER — HOSPITAL ENCOUNTER (INPATIENT)
Facility: MEDICAL CENTER | Age: 64
End: 2024-07-25
Attending: INTERNAL MEDICINE | Admitting: INTERNAL MEDICINE
Payer: MEDICARE

## 2024-07-25 DIAGNOSIS — N15.1 RENAL AND PERINEPHRIC ABSCESS: ICD-10-CM

## 2024-07-25 DIAGNOSIS — R00.0 SINUS TACHYCARDIA: ICD-10-CM

## 2024-07-25 DIAGNOSIS — G89.29 OTHER CHRONIC PAIN: ICD-10-CM

## 2024-07-25 DIAGNOSIS — N13.30 HYDRONEPHROSIS OF RIGHT KIDNEY: ICD-10-CM

## 2024-07-25 DIAGNOSIS — G81.91 RIGHT HEMIPLEGIA (HCC): ICD-10-CM

## 2024-07-25 PROBLEM — N11.1 OBSTRUCTIVE PYELONEPHRITIS: Status: ACTIVE | Noted: 2024-07-25

## 2024-07-25 PROCEDURE — 770006 HCHG ROOM/CARE - MED/SURG/GYN SEMI*

## 2024-07-25 PROCEDURE — 99223 1ST HOSP IP/OBS HIGH 75: CPT | Mod: AI | Performed by: HOSPITALIST

## 2024-07-25 PROCEDURE — 700105 HCHG RX REV CODE 258: Performed by: HOSPITALIST

## 2024-07-25 PROCEDURE — 93005 ELECTROCARDIOGRAM TRACING: CPT | Performed by: HOSPITALIST

## 2024-07-25 RX ORDER — ACETAMINOPHEN 325 MG/1
650 TABLET ORAL EVERY 6 HOURS PRN
Status: DISCONTINUED | OUTPATIENT
Start: 2024-07-25 | End: 2024-08-01 | Stop reason: HOSPADM

## 2024-07-25 RX ORDER — ONDANSETRON 4 MG/1
4 TABLET, ORALLY DISINTEGRATING ORAL EVERY 4 HOURS PRN
Status: DISCONTINUED | OUTPATIENT
Start: 2024-07-25 | End: 2024-08-01 | Stop reason: HOSPADM

## 2024-07-25 RX ORDER — PREGABALIN 150 MG/1
150 CAPSULE ORAL 3 TIMES DAILY
Status: DISCONTINUED | OUTPATIENT
Start: 2024-07-26 | End: 2024-08-01 | Stop reason: HOSPADM

## 2024-07-25 RX ORDER — OXYCODONE HYDROCHLORIDE 5 MG/1
5 TABLET ORAL EVERY 4 HOURS PRN
Status: DISCONTINUED | OUTPATIENT
Start: 2024-07-25 | End: 2024-08-01 | Stop reason: HOSPADM

## 2024-07-25 RX ORDER — METHYLPREDNISOLONE SODIUM SUCCINATE 40 MG/ML
40 INJECTION, POWDER, LYOPHILIZED, FOR SOLUTION INTRAMUSCULAR; INTRAVENOUS ONCE
Status: COMPLETED | OUTPATIENT
Start: 2024-07-26 | End: 2024-07-26

## 2024-07-25 RX ORDER — PROCHLORPERAZINE EDISYLATE 5 MG/ML
5-10 INJECTION INTRAMUSCULAR; INTRAVENOUS EVERY 4 HOURS PRN
Status: DISCONTINUED | OUTPATIENT
Start: 2024-07-25 | End: 2024-08-01 | Stop reason: HOSPADM

## 2024-07-25 RX ORDER — PROMETHAZINE HYDROCHLORIDE 25 MG/1
12.5-25 TABLET ORAL EVERY 4 HOURS PRN
Status: DISCONTINUED | OUTPATIENT
Start: 2024-07-25 | End: 2024-08-01 | Stop reason: HOSPADM

## 2024-07-25 RX ORDER — TAMSULOSIN HYDROCHLORIDE 0.4 MG/1
0.4 CAPSULE ORAL
Status: DISCONTINUED | OUTPATIENT
Start: 2024-07-26 | End: 2024-08-01 | Stop reason: HOSPADM

## 2024-07-25 RX ORDER — DULOXETIN HYDROCHLORIDE 30 MG/1
60 CAPSULE, DELAYED RELEASE ORAL 2 TIMES DAILY
Status: DISCONTINUED | OUTPATIENT
Start: 2024-07-26 | End: 2024-08-01 | Stop reason: HOSPADM

## 2024-07-25 RX ORDER — MORPHINE SULFATE 4 MG/ML
1 INJECTION INTRAVENOUS EVERY 4 HOURS PRN
Status: DISCONTINUED | OUTPATIENT
Start: 2024-07-25 | End: 2024-08-01 | Stop reason: HOSPADM

## 2024-07-25 RX ORDER — ONDANSETRON 2 MG/ML
4 INJECTION INTRAMUSCULAR; INTRAVENOUS EVERY 4 HOURS PRN
Status: DISCONTINUED | OUTPATIENT
Start: 2024-07-25 | End: 2024-08-01 | Stop reason: HOSPADM

## 2024-07-25 RX ORDER — PROMETHAZINE HYDROCHLORIDE 25 MG/1
12.5-25 SUPPOSITORY RECTAL EVERY 4 HOURS PRN
Status: DISCONTINUED | OUTPATIENT
Start: 2024-07-25 | End: 2024-08-01 | Stop reason: HOSPADM

## 2024-07-25 RX ORDER — DIPHENHYDRAMINE HYDROCHLORIDE 50 MG/ML
25 INJECTION INTRAMUSCULAR; INTRAVENOUS ONCE
Status: COMPLETED | OUTPATIENT
Start: 2024-07-26 | End: 2024-07-26

## 2024-07-25 RX ORDER — SODIUM CHLORIDE, SODIUM LACTATE, POTASSIUM CHLORIDE, CALCIUM CHLORIDE 600; 310; 30; 20 MG/100ML; MG/100ML; MG/100ML; MG/100ML
INJECTION, SOLUTION INTRAVENOUS CONTINUOUS
Status: DISCONTINUED | OUTPATIENT
Start: 2024-07-25 | End: 2024-07-30

## 2024-07-25 RX ADMIN — SODIUM CHLORIDE, POTASSIUM CHLORIDE, SODIUM LACTATE AND CALCIUM CHLORIDE: 600; 310; 30; 20 INJECTION, SOLUTION INTRAVENOUS at 23:35

## 2024-07-25 ASSESSMENT — ENCOUNTER SYMPTOMS
STRIDOR: 0
SINUS PAIN: 0
PND: 0
VOMITING: 0
WHEEZING: 0
COUGH: 0
BLOOD IN STOOL: 0
SPUTUM PRODUCTION: 0
CLAUDICATION: 0
PALPITATIONS: 0
CONSTIPATION: 0
BLURRED VISION: 0
MYALGIAS: 0
BACK PAIN: 0
DOUBLE VISION: 0
SORE THROAT: 0
HALLUCINATIONS: 0
TREMORS: 0
SHORTNESS OF BREATH: 0
DIARRHEA: 0
FALLS: 0
TINGLING: 0
POLYDIPSIA: 0
PHOTOPHOBIA: 0
FEVER: 1
HEADACHES: 0
ORTHOPNEA: 0
HEMOPTYSIS: 0
WEAKNESS: 0
DIAPHORESIS: 0
FLANK PAIN: 1
HEARTBURN: 0
DEPRESSION: 0
CHILLS: 1
BRUISES/BLEEDS EASILY: 0
NAUSEA: 1
EYE PAIN: 0
NECK PAIN: 0
DIZZINESS: 0
ABDOMINAL PAIN: 0

## 2024-07-25 ASSESSMENT — PAIN DESCRIPTION - PAIN TYPE: TYPE: ACUTE PAIN

## 2024-07-25 ASSESSMENT — LIFESTYLE VARIABLES: SUBSTANCE_ABUSE: 0

## 2024-07-25 ASSESSMENT — FIBROSIS 4 INDEX: FIB4 SCORE: 0.78

## 2024-07-26 ENCOUNTER — APPOINTMENT (OUTPATIENT)
Dept: RADIOLOGY | Facility: MEDICAL CENTER | Age: 64
DRG: 690 | End: 2024-07-26
Attending: STUDENT IN AN ORGANIZED HEALTH CARE EDUCATION/TRAINING PROGRAM
Payer: MEDICARE

## 2024-07-26 LAB
ALBUMIN SERPL BCP-MCNC: 3.3 G/DL (ref 3.2–4.9)
ALBUMIN SERPL BCP-MCNC: 3.4 G/DL (ref 3.2–4.9)
ALBUMIN/GLOB SERPL: 0.7 G/DL
ALBUMIN/GLOB SERPL: 0.7 G/DL
ALP SERPL-CCNC: 167 U/L (ref 30–99)
ALP SERPL-CCNC: 168 U/L (ref 30–99)
ALT SERPL-CCNC: 5 U/L (ref 2–50)
ALT SERPL-CCNC: 6 U/L (ref 2–50)
ANION GAP SERPL CALC-SCNC: 15 MMOL/L (ref 7–16)
ANION GAP SERPL CALC-SCNC: 18 MMOL/L (ref 7–16)
APPEARANCE UR: ABNORMAL
AST SERPL-CCNC: 11 U/L (ref 12–45)
AST SERPL-CCNC: 14 U/L (ref 12–45)
BACTERIA #/AREA URNS HPF: ABNORMAL /HPF
BASOPHILS # BLD AUTO: 0.3 % (ref 0–1.8)
BASOPHILS # BLD AUTO: 0.4 % (ref 0–1.8)
BASOPHILS # BLD: 0.05 K/UL (ref 0–0.12)
BASOPHILS # BLD: 0.06 K/UL (ref 0–0.12)
BILIRUB SERPL-MCNC: 0.5 MG/DL (ref 0.1–1.5)
BILIRUB SERPL-MCNC: 0.6 MG/DL (ref 0.1–1.5)
BILIRUB UR QL STRIP.AUTO: NEGATIVE
BUN SERPL-MCNC: 15 MG/DL (ref 8–22)
BUN SERPL-MCNC: 16 MG/DL (ref 8–22)
CALCIUM ALBUM COR SERPL-MCNC: 10 MG/DL (ref 8.5–10.5)
CALCIUM ALBUM COR SERPL-MCNC: 9.9 MG/DL (ref 8.5–10.5)
CALCIUM SERPL-MCNC: 9.4 MG/DL (ref 8.5–10.5)
CALCIUM SERPL-MCNC: 9.4 MG/DL (ref 8.5–10.5)
CHLORIDE SERPL-SCNC: 105 MMOL/L (ref 96–112)
CHLORIDE SERPL-SCNC: 107 MMOL/L (ref 96–112)
CO2 SERPL-SCNC: 16 MMOL/L (ref 20–33)
CO2 SERPL-SCNC: 17 MMOL/L (ref 20–33)
COLOR UR: YELLOW
CREAT SERPL-MCNC: 0.71 MG/DL (ref 0.5–1.4)
CREAT SERPL-MCNC: 0.72 MG/DL (ref 0.5–1.4)
EKG IMPRESSION: NORMAL
EOSINOPHIL # BLD AUTO: 0.01 K/UL (ref 0–0.51)
EOSINOPHIL # BLD AUTO: 0.03 K/UL (ref 0–0.51)
EOSINOPHIL NFR BLD: 0.1 % (ref 0–6.9)
EOSINOPHIL NFR BLD: 0.2 % (ref 0–6.9)
EPI CELLS #/AREA URNS HPF: NEGATIVE /HPF
ERYTHROCYTE [DISTWIDTH] IN BLOOD BY AUTOMATED COUNT: 52.3 FL (ref 35.9–50)
ERYTHROCYTE [DISTWIDTH] IN BLOOD BY AUTOMATED COUNT: 52.6 FL (ref 35.9–50)
GFR SERPLBLD CREATININE-BSD FMLA CKD-EPI: 93 ML/MIN/1.73 M 2
GFR SERPLBLD CREATININE-BSD FMLA CKD-EPI: 95 ML/MIN/1.73 M 2
GLOBULIN SER CALC-MCNC: 4.6 G/DL (ref 1.9–3.5)
GLOBULIN SER CALC-MCNC: 4.7 G/DL (ref 1.9–3.5)
GLUCOSE SERPL-MCNC: 149 MG/DL (ref 65–99)
GLUCOSE SERPL-MCNC: 160 MG/DL (ref 65–99)
GLUCOSE UR STRIP.AUTO-MCNC: NEGATIVE MG/DL
GRAM STN SPEC: NORMAL
HCT VFR BLD AUTO: 39.4 % (ref 37–47)
HCT VFR BLD AUTO: 42.1 % (ref 37–47)
HGB BLD-MCNC: 13.3 G/DL (ref 12–16)
HGB BLD-MCNC: 13.8 G/DL (ref 12–16)
HYALINE CASTS #/AREA URNS LPF: ABNORMAL /LPF
IMM GRANULOCYTES # BLD AUTO: 0.06 K/UL (ref 0–0.11)
IMM GRANULOCYTES # BLD AUTO: 0.08 K/UL (ref 0–0.11)
IMM GRANULOCYTES NFR BLD AUTO: 0.4 % (ref 0–0.9)
IMM GRANULOCYTES NFR BLD AUTO: 0.4 % (ref 0–0.9)
INR PPP: 1.05 (ref 0.87–1.13)
KETONES UR STRIP.AUTO-MCNC: 15 MG/DL
LACTATE SERPL-SCNC: 0.7 MMOL/L (ref 0.5–2)
LACTATE SERPL-SCNC: 1.1 MMOL/L (ref 0.5–2)
LACTATE SERPL-SCNC: 1.3 MMOL/L (ref 0.5–2)
LACTATE SERPL-SCNC: 2 MMOL/L (ref 0.5–2)
LEUKOCYTE ESTERASE UR QL STRIP.AUTO: ABNORMAL
LYMPHOCYTES # BLD AUTO: 0.66 K/UL (ref 1–4.8)
LYMPHOCYTES # BLD AUTO: 0.86 K/UL (ref 1–4.8)
LYMPHOCYTES NFR BLD: 3.7 % (ref 22–41)
LYMPHOCYTES NFR BLD: 5.4 % (ref 22–41)
MAGNESIUM SERPL-MCNC: 1.9 MG/DL (ref 1.5–2.5)
MCH RBC QN AUTO: 28.5 PG (ref 27–33)
MCH RBC QN AUTO: 29.2 PG (ref 27–33)
MCHC RBC AUTO-ENTMCNC: 32.8 G/DL (ref 32.2–35.5)
MCHC RBC AUTO-ENTMCNC: 33.8 G/DL (ref 32.2–35.5)
MCV RBC AUTO: 86.4 FL (ref 81.4–97.8)
MCV RBC AUTO: 86.8 FL (ref 81.4–97.8)
MICRO URNS: ABNORMAL
MONOCYTES # BLD AUTO: 0.08 K/UL (ref 0–0.85)
MONOCYTES # BLD AUTO: 0.38 K/UL (ref 0–0.85)
MONOCYTES NFR BLD AUTO: 0.4 % (ref 0–13.4)
MONOCYTES NFR BLD AUTO: 2.4 % (ref 0–13.4)
NEUTROPHILS # BLD AUTO: 14.56 K/UL (ref 1.82–7.42)
NEUTROPHILS # BLD AUTO: 16.91 K/UL (ref 1.82–7.42)
NEUTROPHILS NFR BLD: 91.2 % (ref 44–72)
NEUTROPHILS NFR BLD: 95.1 % (ref 44–72)
NITRITE UR QL STRIP.AUTO: POSITIVE
NRBC # BLD AUTO: 0 K/UL
NRBC # BLD AUTO: 0 K/UL
NRBC BLD-RTO: 0 /100 WBC (ref 0–0.2)
NRBC BLD-RTO: 0 /100 WBC (ref 0–0.2)
PH UR STRIP.AUTO: 6 [PH] (ref 5–8)
PHOSPHATE SERPL-MCNC: 2.8 MG/DL (ref 2.5–4.5)
PLATELET # BLD AUTO: 344 K/UL (ref 164–446)
PLATELET # BLD AUTO: 361 K/UL (ref 164–446)
PMV BLD AUTO: 11.5 FL (ref 9–12.9)
PMV BLD AUTO: 11.5 FL (ref 9–12.9)
POTASSIUM SERPL-SCNC: 3.6 MMOL/L (ref 3.6–5.5)
POTASSIUM SERPL-SCNC: 3.9 MMOL/L (ref 3.6–5.5)
PROCALCITONIN SERPL-MCNC: 0.05 NG/ML
PROT SERPL-MCNC: 7.9 G/DL (ref 6–8.2)
PROT SERPL-MCNC: 8.1 G/DL (ref 6–8.2)
PROT UR QL STRIP: NEGATIVE MG/DL
PROTHROMBIN TIME: 13.8 SEC (ref 12–14.6)
RBC # BLD AUTO: 4.56 M/UL (ref 4.2–5.4)
RBC # BLD AUTO: 4.85 M/UL (ref 4.2–5.4)
RBC # URNS HPF: ABNORMAL /HPF
RBC UR QL AUTO: NEGATIVE
SIGNIFICANT IND 70042: NORMAL
SITE SITE: NORMAL
SODIUM SERPL-SCNC: 139 MMOL/L (ref 135–145)
SODIUM SERPL-SCNC: 139 MMOL/L (ref 135–145)
SOURCE SOURCE: NORMAL
SP GR UR STRIP.AUTO: 1.02
UROBILINOGEN UR STRIP.AUTO-MCNC: 0.2 MG/DL
WBC # BLD AUTO: 16 K/UL (ref 4.8–10.8)
WBC # BLD AUTO: 17.8 K/UL (ref 4.8–10.8)
WBC #/AREA URNS HPF: ABNORMAL /HPF

## 2024-07-26 PROCEDURE — 87186 SC STD MICRODIL/AGAR DIL: CPT

## 2024-07-26 PROCEDURE — 87205 SMEAR GRAM STAIN: CPT

## 2024-07-26 PROCEDURE — 0T9330Z DRAINAGE OF RIGHT KIDNEY PELVIS WITH DRAINAGE DEVICE, PERCUTANEOUS APPROACH: ICD-10-PCS | Performed by: RADIOLOGY

## 2024-07-26 PROCEDURE — 84100 ASSAY OF PHOSPHORUS: CPT

## 2024-07-26 PROCEDURE — 83605 ASSAY OF LACTIC ACID: CPT | Mod: 91

## 2024-07-26 PROCEDURE — 97162 PT EVAL MOD COMPLEX 30 MIN: CPT

## 2024-07-26 PROCEDURE — 99233 SBSQ HOSP IP/OBS HIGH 50: CPT | Performed by: STUDENT IN AN ORGANIZED HEALTH CARE EDUCATION/TRAINING PROGRAM

## 2024-07-26 PROCEDURE — 93010 ELECTROCARDIOGRAM REPORT: CPT | Performed by: INTERNAL MEDICINE

## 2024-07-26 PROCEDURE — 700117 HCHG RX CONTRAST REV CODE 255: Performed by: STUDENT IN AN ORGANIZED HEALTH CARE EDUCATION/TRAINING PROGRAM

## 2024-07-26 PROCEDURE — 700111 HCHG RX REV CODE 636 W/ 250 OVERRIDE (IP): Mod: JZ | Performed by: RADIOLOGY

## 2024-07-26 PROCEDURE — 76937 US GUIDE VASCULAR ACCESS: CPT

## 2024-07-26 PROCEDURE — 36415 COLL VENOUS BLD VENIPUNCTURE: CPT

## 2024-07-26 PROCEDURE — 85025 COMPLETE CBC W/AUTO DIFF WBC: CPT

## 2024-07-26 PROCEDURE — 83735 ASSAY OF MAGNESIUM: CPT

## 2024-07-26 PROCEDURE — 84145 PROCALCITONIN (PCT): CPT

## 2024-07-26 PROCEDURE — 700102 HCHG RX REV CODE 250 W/ 637 OVERRIDE(OP): Performed by: HOSPITALIST

## 2024-07-26 PROCEDURE — 85610 PROTHROMBIN TIME: CPT

## 2024-07-26 PROCEDURE — 700111 HCHG RX REV CODE 636 W/ 250 OVERRIDE (IP): Performed by: HOSPITALIST

## 2024-07-26 PROCEDURE — 81001 URINALYSIS AUTO W/SCOPE: CPT

## 2024-07-26 PROCEDURE — 80053 COMPREHEN METABOLIC PANEL: CPT

## 2024-07-26 PROCEDURE — A9270 NON-COVERED ITEM OR SERVICE: HCPCS | Performed by: HOSPITALIST

## 2024-07-26 PROCEDURE — 700105 HCHG RX REV CODE 258: Performed by: HOSPITALIST

## 2024-07-26 PROCEDURE — 770006 HCHG ROOM/CARE - MED/SURG/GYN SEMI*

## 2024-07-26 PROCEDURE — C1729 CATH, DRAINAGE: HCPCS

## 2024-07-26 PROCEDURE — 87070 CULTURE OTHR SPECIMN AEROBIC: CPT

## 2024-07-26 PROCEDURE — 700111 HCHG RX REV CODE 636 W/ 250 OVERRIDE (IP): Mod: JZ

## 2024-07-26 PROCEDURE — 87077 CULTURE AEROBIC IDENTIFY: CPT

## 2024-07-26 RX ORDER — MIDAZOLAM HYDROCHLORIDE 1 MG/ML
.5-2 INJECTION INTRAMUSCULAR; INTRAVENOUS PRN
Status: DISCONTINUED | OUTPATIENT
Start: 2024-07-26 | End: 2024-07-26 | Stop reason: HOSPADM

## 2024-07-26 RX ORDER — ONDANSETRON 2 MG/ML
4 INJECTION INTRAMUSCULAR; INTRAVENOUS PRN
Status: DISCONTINUED | OUTPATIENT
Start: 2024-07-26 | End: 2024-07-26 | Stop reason: HOSPADM

## 2024-07-26 RX ORDER — MIDAZOLAM HYDROCHLORIDE 1 MG/ML
INJECTION INTRAMUSCULAR; INTRAVENOUS
Status: COMPLETED
Start: 2024-07-26 | End: 2024-07-26

## 2024-07-26 RX ORDER — MELOXICAM 7.5 MG/1
7.5 TABLET ORAL DAILY
Status: ON HOLD | COMMUNITY
End: 2024-08-01

## 2024-07-26 RX ORDER — SODIUM CHLORIDE 9 MG/ML
500 INJECTION, SOLUTION INTRAVENOUS
Status: DISCONTINUED | OUTPATIENT
Start: 2024-07-26 | End: 2024-07-26 | Stop reason: HOSPADM

## 2024-07-26 RX ORDER — ATORVASTATIN CALCIUM 40 MG/1
40 TABLET, FILM COATED ORAL NIGHTLY
COMMUNITY

## 2024-07-26 RX ADMIN — PREGABALIN 150 MG: 150 CAPSULE ORAL at 17:59

## 2024-07-26 RX ADMIN — MEROPENEM 500 MG: 500 INJECTION, POWDER, FOR SOLUTION INTRAVENOUS at 13:58

## 2024-07-26 RX ADMIN — MEROPENEM 500 MG: 500 INJECTION, POWDER, FOR SOLUTION INTRAVENOUS at 00:42

## 2024-07-26 RX ADMIN — FENTANYL CITRATE 50 MCG: 50 INJECTION, SOLUTION INTRAMUSCULAR; INTRAVENOUS at 11:01

## 2024-07-26 RX ADMIN — FAMOTIDINE 40 MG: 10 INJECTION, SOLUTION INTRAVENOUS at 00:41

## 2024-07-26 RX ADMIN — MIDAZOLAM HYDROCHLORIDE 1 MG: 1 INJECTION INTRAMUSCULAR; INTRAVENOUS at 11:01

## 2024-07-26 RX ADMIN — MIDAZOLAM HYDROCHLORIDE 1 MG: 1 INJECTION, SOLUTION INTRAMUSCULAR; INTRAVENOUS at 11:01

## 2024-07-26 RX ADMIN — MEROPENEM 500 MG: 500 INJECTION, POWDER, FOR SOLUTION INTRAVENOUS at 23:27

## 2024-07-26 RX ADMIN — SODIUM CHLORIDE, POTASSIUM CHLORIDE, SODIUM LACTATE AND CALCIUM CHLORIDE: 600; 310; 30; 20 INJECTION, SOLUTION INTRAVENOUS at 18:42

## 2024-07-26 RX ADMIN — MEROPENEM 500 MG: 500 INJECTION, POWDER, FOR SOLUTION INTRAVENOUS at 05:09

## 2024-07-26 RX ADMIN — FENTANYL CITRATE 50 MCG: 50 INJECTION, SOLUTION INTRAMUSCULAR; INTRAVENOUS at 11:10

## 2024-07-26 RX ADMIN — IOHEXOL 3 ML: 300 INJECTION, SOLUTION INTRAVENOUS at 11:45

## 2024-07-26 RX ADMIN — DIPHENHYDRAMINE HYDROCHLORIDE 25 MG: 50 INJECTION, SOLUTION INTRAMUSCULAR; INTRAVENOUS at 00:41

## 2024-07-26 RX ADMIN — DULOXETINE HYDROCHLORIDE 60 MG: 30 CAPSULE, DELAYED RELEASE ORAL at 17:59

## 2024-07-26 RX ADMIN — METHYLPREDNISOLONE SODIUM SUCCINATE 40 MG: 40 INJECTION, POWDER, FOR SOLUTION INTRAMUSCULAR; INTRAVENOUS at 00:41

## 2024-07-26 RX ADMIN — PREGABALIN 150 MG: 150 CAPSULE ORAL at 13:45

## 2024-07-26 RX ADMIN — TAMSULOSIN HYDROCHLORIDE 0.4 MG: 0.4 CAPSULE ORAL at 09:58

## 2024-07-26 RX ADMIN — MEROPENEM 500 MG: 500 INJECTION, POWDER, FOR SOLUTION INTRAVENOUS at 18:01

## 2024-07-26 RX ADMIN — DULOXETINE HYDROCHLORIDE 60 MG: 30 CAPSULE, DELAYED RELEASE ORAL at 09:58

## 2024-07-26 SDOH — ECONOMIC STABILITY: TRANSPORTATION INSECURITY
IN THE PAST 12 MONTHS, HAS LACK OF RELIABLE TRANSPORTATION KEPT YOU FROM MEDICAL APPOINTMENTS, MEETINGS, WORK OR FROM GETTING THINGS NEEDED FOR DAILY LIVING?: NO

## 2024-07-26 SDOH — ECONOMIC STABILITY: TRANSPORTATION INSECURITY
IN THE PAST 12 MONTHS, HAS THE LACK OF TRANSPORTATION KEPT YOU FROM MEDICAL APPOINTMENTS OR FROM GETTING MEDICATIONS?: NO

## 2024-07-26 ASSESSMENT — LIFESTYLE VARIABLES
AVERAGE NUMBER OF DAYS PER WEEK YOU HAVE A DRINK CONTAINING ALCOHOL: 0
TOTAL SCORE: 0
TOTAL SCORE: 0
DOES PATIENT WANT TO STOP DRINKING: NO
ON A TYPICAL DAY WHEN YOU DRINK ALCOHOL HOW MANY DRINKS DO YOU HAVE: 0
EVER FELT BAD OR GUILTY ABOUT YOUR DRINKING: NO
TOTAL SCORE: 0
EVER HAD A DRINK FIRST THING IN THE MORNING TO STEADY YOUR NERVES TO GET RID OF A HANGOVER: NO
HAVE YOU EVER FELT YOU SHOULD CUT DOWN ON YOUR DRINKING: NO
HOW MANY TIMES IN THE PAST YEAR HAVE YOU HAD 5 OR MORE DRINKS IN A DAY: 0
HAVE PEOPLE ANNOYED YOU BY CRITICIZING YOUR DRINKING: NO
CONSUMPTION TOTAL: NEGATIVE
ALCOHOL_USE: NO

## 2024-07-26 ASSESSMENT — FIBROSIS 4 INDEX: FIB4 SCORE: 1.11

## 2024-07-26 ASSESSMENT — ENCOUNTER SYMPTOMS
VOMITING: 0
PALPITATIONS: 0
CHILLS: 0
DIZZINESS: 0
COUGH: 0
HEADACHES: 0
HEMOPTYSIS: 0
NAUSEA: 0
FEVER: 0

## 2024-07-26 ASSESSMENT — SOCIAL DETERMINANTS OF HEALTH (SDOH)
WITHIN THE LAST YEAR, HAVE TO BEEN RAPED OR FORCED TO HAVE ANY KIND OF SEXUAL ACTIVITY BY YOUR PARTNER OR EX-PARTNER?: NO
WITHIN THE LAST YEAR, HAVE YOU BEEN HUMILIATED OR EMOTIONALLY ABUSED IN OTHER WAYS BY YOUR PARTNER OR EX-PARTNER?: NO
IN THE PAST 12 MONTHS, HAS THE ELECTRIC, GAS, OIL, OR WATER COMPANY THREATENED TO SHUT OFF SERVICE IN YOUR HOME?: NO
WITHIN THE PAST 12 MONTHS, YOU WORRIED THAT YOUR FOOD WOULD RUN OUT BEFORE YOU GOT THE MONEY TO BUY MORE: NEVER TRUE
WITHIN THE LAST YEAR, HAVE YOU BEEN KICKED, HIT, SLAPPED, OR OTHERWISE PHYSICALLY HURT BY YOUR PARTNER OR EX-PARTNER?: NO
WITHIN THE PAST 12 MONTHS, THE FOOD YOU BOUGHT JUST DIDN'T LAST AND YOU DIDN'T HAVE MONEY TO GET MORE: NEVER TRUE
WITHIN THE LAST YEAR, HAVE YOU BEEN AFRAID OF YOUR PARTNER OR EX-PARTNER?: NO

## 2024-07-26 ASSESSMENT — PAIN DESCRIPTION - PAIN TYPE
TYPE: ACUTE PAIN

## 2024-07-26 ASSESSMENT — COGNITIVE AND FUNCTIONAL STATUS - GENERAL
MOVING FROM LYING ON BACK TO SITTING ON SIDE OF FLAT BED: TOTAL
CLIMB 3 TO 5 STEPS WITH RAILING: TOTAL
WALKING IN HOSPITAL ROOM: TOTAL
TURNING FROM BACK TO SIDE WHILE IN FLAT BAD: A LOT
EATING MEALS: A LITTLE
MOBILITY SCORE: 8
MOVING FROM LYING ON BACK TO SITTING ON SIDE OF FLAT BED: A LOT
SUGGESTED CMS G CODE MODIFIER MOBILITY: CM
DRESSING REGULAR LOWER BODY CLOTHING: TOTAL
DAILY ACTIVITIY SCORE: 13
MOBILITY SCORE: 9
STANDING UP FROM CHAIR USING ARMS: A LOT
TOILETING: A LOT
WALKING IN HOSPITAL ROOM: TOTAL
HELP NEEDED FOR BATHING: A LOT
STANDING UP FROM CHAIR USING ARMS: TOTAL
SUGGESTED CMS G CODE MODIFIER DAILY ACTIVITY: CL
TURNING FROM BACK TO SIDE WHILE IN FLAT BAD: A LOT
DRESSING REGULAR UPPER BODY CLOTHING: A LOT
PERSONAL GROOMING: A LITTLE
MOVING TO AND FROM BED TO CHAIR: A LOT
SUGGESTED CMS G CODE MODIFIER MOBILITY: CM
MOVING TO AND FROM BED TO CHAIR: TOTAL
CLIMB 3 TO 5 STEPS WITH RAILING: TOTAL

## 2024-07-26 ASSESSMENT — PATIENT HEALTH QUESTIONNAIRE - PHQ9
SUM OF ALL RESPONSES TO PHQ9 QUESTIONS 1 AND 2: 0
2. FEELING DOWN, DEPRESSED, IRRITABLE, OR HOPELESS: NOT AT ALL
SUM OF ALL RESPONSES TO PHQ9 QUESTIONS 1 AND 2: 0
1. LITTLE INTEREST OR PLEASURE IN DOING THINGS: NOT AT ALL
1. LITTLE INTEREST OR PLEASURE IN DOING THINGS: NOT AT ALL
2. FEELING DOWN, DEPRESSED, IRRITABLE, OR HOPELESS: NOT AT ALL

## 2024-07-26 ASSESSMENT — GAIT ASSESSMENTS: GAIT LEVEL OF ASSIST: UNABLE TO PARTICIPATE

## 2024-07-26 NOTE — PROGRESS NOTES
Pt presents to IR 1. Pt was consented by MD at bedside, confirmed by this RN and consent at bedside. Pt transferred to IR table in prone position. Patient underwent a Right nephrostomy tube placement by Dr. Newton. Procedure site was marked by MD and verified using imaging guidance. Pt placed on monitor, prepped and draped in a sterile fashion. Vitals were taken every 5 minutes and remained stable during procedure (see doc flow sheet for results). CO2 waveform capnography was monitored and remained WNL throughout procedure. Report called to Jeri KING. Pt transported by stretcher with RN to room.     Specimen taken to lab.    Pebble Flexima  8 Fr x 25 cm  REF: [id number]  LOT: [id number]  EXP: 4/9/2027

## 2024-07-26 NOTE — RESPIRATORY CARE
" COPD EDUCATION by COPD CLINICAL EDUCATOR  7/26/2024 at 6:16 AM by Maritza Garcia, RRT     Patient reviewed by COPD education team. Patient does not have a history or diagnosis of COPD and is a non-smoker.  Therefore, patient does not qualify for the COPD program.      COPD Screen       COPD Assessment  COPD Clinical Specialists ONLY  COPD Education Initiated: No--Quick Screen (never smoker. no home resp medications, no pulm hx)    PFT Results    No results found for: \"PFT\"    Meds to Beds  [facility] provides bedside medication delivery for all eligible patients at discharge.  Would you like to opt out of this program for any reason?: No - Stay Opted In             "

## 2024-07-26 NOTE — PROGRESS NOTES
4 Eyes Skin Assessment Completed by Michelle RN and FERNANDO Galeano.    Head WDL  Ears WDL  Nose WDL  Mouth WDL  Neck WDL  Breast/Chest WDL  Shoulder Blades WDL  Spine WDL  (R) Arm/Elbow/Hand Redness  (L) Arm/Elbow/Hand Redness  Abdomen Redness and Rash  Groin Redness moist  Scrotum/Coccyx/Buttocks Redness and Discoloration  (R) Leg Redness  (L) Leg Redness  (R) Heel/Foot/Toe Redness  (L) Heel/Foot/Toe Redness          Devices In Places pressure cuff      Interventions In Place Heel Mepilex, Pillows, and Q2 Turns    Possible Skin Injury yes    Pictures Uploaded Into Epic Yes  Wound Consult Placed Yes  RN Wound Prevention Protocol Ordered No

## 2024-07-26 NOTE — ASSESSMENT & PLAN NOTE
Chronic  Patient has normal kidney function  IV fluids  Discussed with urology and radiology  Status post nephrostomy tube placement  7/30- discussed personally with IR and nephrostomy working well. She needs to follow up as OP with urology.

## 2024-07-26 NOTE — CARE PLAN
The patient is Stable - Low risk of patient condition declining or worsening    Shift Goals  Clinical Goals: IVF, IV abx  Patient Goals: pan of care update  Family Goals: na    Progress made toward(s) clinical / shift goals:  pt updated with plan of care, skin prevention protocol in place, ivf and abx started.    Patient is not progressing towards the following goals:

## 2024-07-26 NOTE — PROGRESS NOTES
Pt arrived in unit via EMS from [facility], Pt is awake, Aox2, aphasia noted as she steggled to tell where she is and the reason of hr hospitalization, pt vital signs checked,Righ side hemiplegia noted skin check done, pt transferred to DAVI bed, pt updated with plan of care, seen by DR Lucas, pt medicated and needs were attended.

## 2024-07-26 NOTE — PROGRESS NOTES
[facility] DIRECT ADMISSION REPORT  Transferring facility: [place]  Transferring physician: Unknown    Chief complaint: Flank pain  Pertinent history & patient course:     64-year-old female with history of recurrent UTIs and kidney stones in the right ureter who presents with right flank pain.  She was found on imaging to have worsening hydronephrosis and obstructive kidney stones with associated UTI and pyelonephritis.  The outside ER doctor spoke with Dr. Rm of urology [place] who recommended the patient be transferred to our hospital and get an IR consult for percutaneous nephrostomy tube.  Her vital signs are otherwise unremarkable.    Pertinent imaging & lab results: See above  Consultants called prior to transfer and pertinent input from consultants: Urology  Code Status: Full code full care per transferring provider, I personally verified with the transferring provider patient's code status and the transferring provider has confirmed this with the patient.  Reason for Transfer: IR consultation for percutaneous nephrostomy tube placement  Further work up or recommendations requested prior to transfer: None    Patient accepted for transfer: Yes  Accepting [facility] Facility: [facility] - Nursing to notify the Triage Coordinator in the RTOC via Voalte or Phone ext. [phone] when patient arrives to the unit. The Triage Coordinator will assign the admitting provider.    Consultants to be called upon arrival: Interventional radiology  Admission status: Inpatient.   Floor requested: Medical  If ICU transfer, name of intensivist case discussed with and pertinent input from critical care: Not applicable    The admitting provider is the point of contact for questions or concerns regarding patient's care.

## 2024-07-26 NOTE — THERAPY
Physical Therapy   Initial Evaluation     Patient Name: Nydia Finch  Age:  64 y.o., Sex:  female  Medical Record #: [id number]  Today's Date: 7/26/2024     Precautions  Precautions: Fall Risk  Comments: R hemiplegia, aphasaia    Assessment  Patient is 64 y.o. female admitted with R flank pain found to have obstructive pyelonephritis, hydronephrosis, and encephalopathy. PMH includes R hydronephrosis, ESBL, cerebral aneurysm, CVA with residual R sided hemiparesis with aphasia.   Pt received in bed agreeable to work with therapist. She presents with significant R sided deficits from prior CVA. She reports she uses a WC but her son or spouse has to assist her with transfers and with propelling WC. Pt limited by abdominal pain, only able to tolerate sitting EOB for which she required mod assist to complete. While EOB, she was able to sit without assistance and maintain midline posture. Suspect pt is close to her functional baseline. PT will continue following at a low frequency to assist with dc planning and mobility.     Plan    Physical Therapy Initial Treatment Plan   Treatment Plan : Bed Mobility, Family / Caregiver Training, Neuro Re-Education / Balance, Self Care / Home Evaluation, Therapeutic Activities, Therapeutic Exercise  Treatment Frequency: 2 Times per Week  Duration: Until Therapy Goals Met    DC Equipment Recommendations: None  Discharge Recommendations: Recommend home health for continued physical therapy services          07/26/24 0918   Pain 0 - 10 Group   Therapist Pain Assessment During Activity;Nurse Notified  (abdominal pain noted)   Prior Living Situation   Prior Services Continuous (24 Hour) Care Giving Family   Housing / Facility 1 Story House   Steps Into Home 0   Steps In Home 0   Equipment Owned Wheelchair   Lives with - Patient's Self Care Capacity Spouse;Adult Children   Comments pt lives with her spouse and son who are able to assist 24/7.   Prior Level of Functional Mobility   Bed Mobility  Required Assist   Transfer Status Required Assist   Ambulation Required Assist   Wheelchair Required Assist   Comments pt states she required assistance with transfers and WC mobility   History of Falls   History of Falls Yes   Cognition    Cognition / Consciousness X   Speech/ Communication Expressive Aphasia   Level of Consciousness Alert   Comments cooperative, able to answer yes/no questions well and with increased time pt was able to provide additional information   Active ROM Upper Body   Active ROM Upper Body  X   Flaccid Upper Extremity Right Upper Extremity Flaccid   Strength Upper Body   Upper Body Strength  X   Comments R UE deficits from prior CVA, L shoulder weakness   Active ROM Lower Body    Active ROM Lower Body  X   Comments R LE significantly impaired, trace movements in ankle and knee   Strength Lower Body   Lower Body Strength  X   Comments 1/5 R ankle, 1/5 R knee, 2-3/5 R hip-- able to use hip strength to move R LE to EOB   Lower Body Muscle Tone   Lower Body Muscle Tone  X   Rt Lower Extremity Muscle Tone Hypertonic   Coordination Lower Body    Coordination Lower Body  X   Comments R limited by weakness   Balance Assessment   Sitting Balance (Static) Fair   Sitting Balance (Dynamic) Fair -   Standing Balance (Static) Dependent   Standing Balance (Dynamic) Dependent   Weight Shift Sitting Poor   Weight Shift Standing Absent   Bed Mobility    Supine to Sit Moderate Assist   Sit to Supine Moderate Assist   Scooting Moderate Assist   Rolling Minimal Assist to Rt.;Maximum Assist to Lt.   Comments HOB raised   Gait Analysis   Gait Level Of Assist Unable to Participate   Functional Mobility   Mobility EOB, scooting EOB   Edema / Skin Assessment   Edema / Skin  Not Assessed   Patient / Family Goals    Patient / Family Goal #1 get home when medically cleared   Short Term Goals    Short Term Goal # 1 pt will be able to complete SPT with min assist in 6tx in order to progress back to prior level   Short  Term Goal # 2 pt will be able to scoot EOB with SPV in 6tx in order to decrease fall risk while seated EOB   Education Group   Education Provided Role of Physical Therapist   Role of Physical Therapist Patient Response Patient;Acceptance;Demonstration;Action Demonstration   Anticipated Discharge Equipment and Recommendations   DC Equipment Recommendations None   Discharge Recommendations Recommend home health for continued physical therapy services

## 2024-07-26 NOTE — PROGRESS NOTES
Report received from NOC RN and assumed patient care at 0700. Patient is A&Ox 4 with expressive aphasia, on room air, and has a bed alarm on. Patient reporting a pain level of 0/10. Patient sitting up in bed for meal.    Call light within reach and bed in lowest position. Reinforced the need to call for assistance. Plan of care discussed and patient does not have any further needs at this time.     1030- Patient left to IR with transport for IR.     1130- Patient arrived back to unit from IR, dressing clean/dry/intact with bloody/purulent discharge in drain.

## 2024-07-26 NOTE — PROGRESS NOTES
Med rec updated and complete. Allergies reviewed.  Per phone call to home pharmacy [facility] ([phone]) .     No antibiotics in the last 30 days on profile. No anticoagulants on profile.        Home pharmacy  [facility] [phone]

## 2024-07-26 NOTE — ASSESSMENT & PLAN NOTE
Recurrent problem  I requested RTOC have images uploaded, discussed with radiology, no obstructing stone however also discussed with urology and this is a recurrent problem with hydronephrosis/pyelonephrosis that improved with treatment for.  Status post nephrostomy tube placement because of concern for abscess   Patient feeling improvement  Continue meropenem- end date 8/9   I did review urology recommendations and plan is for follow up with urology in 2-4 weeks for nephrectomy

## 2024-07-26 NOTE — OR SURGEON
Immediate Post- Operative Note        Findings: R pyonephrosis.       Procedure(s): R PCN placement.       Estimated Blood Loss: Less than 5 ml        Complications: None            7/26/2024     1110 AM     Sea Newton M.D.

## 2024-07-26 NOTE — PROGRESS NOTES
4 Eyes Skin Assessment Completed by Michelle RN and FERNANDO Galeano.    Head WDL  Ears WDL  Nose WDL  Mouth WDL  Neck WDL  Breast/Chest WDL  Shoulder Blades WDL  Spine WDL  (R) Arm/Elbow/Hand Redness  (L) Arm/Elbow/Hand Redness  Abdomen WDL  Groin Redness and Rash   Scrotum/Coccyx/Buttocks Redness  (R) Leg Redness  (L) Leg Redness  (R) Heel/Foot/Toe Redness  (L) Heel/Foot/Toe Redness          Devices In Places Blood Pressure Cuff and SCD's      Interventions In Place Heel Mepilex, Pillows, Q2 Turns, and Low Air Loss Mattress taps    Possible Skin Injury No    Pictures Uploaded Into Epic Yes  Wound Consult Placed Yes  RN Wound Prevention Protocol Ordered Yes

## 2024-07-26 NOTE — H&P
Hospital Medicine History & Physical Note    Date of Service  7/25/2024    Primary Care Physician  Manuel Dubose M.D.    Consultants  urology    Specialist Names:     Code Status  Full Code    Chief Complaint  Right flank pain    History of Presenting Illness  Nydia Finch is a 64 y.o. female who presented 7/25/2024 with PMH  right hydronephrosis ,ESBL E. 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  The patient was recently admitted in the hospital for having hydronephrosis of the right kidney secondary to nephrolithiasis.  Apparently patient did have a stent placed in 2009 and was lost to follow-up.  Eventually the stent was obstructed and had her replacement in January.  Patient underwent a right percutaneous nephrostomy tube placement on 1/2024.  At that time she grew ESBL.    Patient was transferred from [facility] and I reviewed the records.  CT scan of the head found left cerebral repair, loss status post surgical procedure without acute intracranial process.  CT scan of the abdomen found due to adrenal glands and pancreas show no evidence of mass or cyst.  Right kidney is smaller than the left.  There is moderate hydronephrosis and mild hydroureter.  There is diffuse thickening up to 3 mm wall of the right pelvis and proximal ureter.  No dilatation of the left.  No renal calculi seen on either side.  The left kidney is normal in size.    WBC 10, Hemoglobin 12, platelets 337, BUN 20, creatinine 0.9, CO2 18, sodium 140, potassium 3.4, AST 23, ALT 14,, bili 1.8, lactic acid 1, CRP 39, troponin 0.012, urine drug screen was negative  UA was nitrate positive, leukoesterase positive, WBC 5-10, bacteria moderate.  COVID is negative.    Upon arrival to [facility] patient was given IV Rocephin and developed a drug rash.  I gave her Solu-Medrol, Benadryl and  Pepcid    I discussed the plan of care with patient.    Review of Systems  Review of Systems   Constitutional:  Positive for chills and fever. Negative for diaphoresis and malaise/fatigue.   HENT:  Negative for congestion, ear discharge, ear pain, hearing loss, nosebleeds, sinus pain, sore throat and tinnitus.    Eyes:  Negative for blurred vision, double vision, photophobia and pain.   Respiratory:  Negative for cough, hemoptysis, sputum production, shortness of breath, wheezing and stridor.    Cardiovascular:  Negative for chest pain, palpitations, orthopnea, claudication, leg swelling and PND.   Gastrointestinal:  Positive for nausea. Negative for abdominal pain, blood in stool, constipation, diarrhea, heartburn, melena and vomiting.   Genitourinary:  Positive for flank pain. Negative for dysuria, frequency, hematuria and urgency.   Musculoskeletal:  Negative for back pain, falls, joint pain, myalgias and neck pain.   Skin:  Negative for itching and rash.   Neurological:  Negative for dizziness, tingling, tremors, weakness and headaches.   Endo/Heme/Allergies:  Negative for environmental allergies and polydipsia. Does not bruise/bleed easily.   Psychiatric/Behavioral:  Negative for depression, hallucinations, substance abuse and suicidal ideas.        Past Medical History   has a past medical history of Aphagia (1994), Brain aneurysm, Chronic pain, Nephrolithiasis, Right hemiplegia (HCC) (1994), and Stroke (HCC).    Surgical History   has a past surgical history that includes other neurological surg; cystoscopy stent placement (2/13/2018); gastric resection (2012); other; cystoscopy (N/A, 7/4/2019); stent placement (Right, 7/4/2019); ureteroscopy (Right, 7/4/2019); and pr cystoscopy,insert ureteral stent (Right, 1/26/2024).     Family History  Family history reviewed with patient. There is no family history that is pertinent to the chief complaint.     Social History   reports that she has never smoked. She has  never used smokeless tobacco.  Drug: Inhaled. She reports that she does not drink alcohol.    Allergies  Allergies   Allergen Reactions    Zosyn [Piperacillin Sod-Tazobactam So] Rash     Gave her head to toe drug rash at [facility] 1/25/2024      Rocephin [Ceftriaxone] Itching     Generalized redness       Medications  Prior to Admission Medications   Prescriptions Last Dose Informant Patient Reported? Taking?   Cholecalciferol (D3 PO)   Yes No   Sig: Take 1 Tablet by mouth every day.   Cyanocobalamin (VITAMIN B-12 PO)  Patient Yes No   Sig: Take 1 Tablet by mouth every day.   DULoxetine (CYMBALTA) 60 MG Cap DR Particles delayed-release capsule  Patient Yes No   Sig: Take 60 mg by mouth 2 times a day.   pregabalin (LYRICA) 150 MG Cap  Patient Yes No   Sig: Take 150 mg by mouth in the morning, at noon, and at bedtime.      Facility-Administered Medications: None       Physical Exam  Temp:  [36.3 °C (97.4 °F)] 36.3 °C (97.4 °F)  Pulse:  [117] 117  Resp:  [15] 15  BP: (141)/(96) 141/96  SpO2:  [95 %] 95 %  Blood Pressure: (!) 141/96 (RN notified )   Temperature: 36.3 °C (97.4 °F)   Pulse: (!) 117   Respiration: 15   Pulse Oximetry: 95 %       Physical Exam  Vitals and nursing note reviewed.   Constitutional:       General: She is not in acute distress.     Appearance: Normal appearance. She is not ill-appearing, toxic-appearing or diaphoretic.   HENT:      Head: Normocephalic and atraumatic.      Nose: No congestion or rhinorrhea.      Mouth/Throat:      Pharynx: No oropharyngeal exudate or posterior oropharyngeal erythema.   Eyes:      General: No scleral icterus.  Neck:      Vascular: No carotid bruit or JVD.   Cardiovascular:      Rate and Rhythm: Normal rate and regular rhythm.      Pulses: Normal pulses.      Heart sounds: Normal heart sounds. No murmur heard.     No friction rub. No gallop.   Pulmonary:      Effort: Pulmonary effort is normal. No respiratory distress.      Breath sounds: No stridor. No  "wheezing, rhonchi or rales.   Abdominal:      General: Abdomen is flat. There is no distension.      Palpations: There is no mass.      Tenderness: There is no abdominal tenderness. There is no left CVA tenderness, guarding or rebound.      Hernia: No hernia is present.   Musculoskeletal:         General: No swelling. Normal range of motion.      Cervical back: No rigidity. No muscular tenderness.      Right lower leg: No edema.      Left lower leg: No edema.   Lymphadenopathy:      Cervical: No cervical adenopathy.   Skin:     General: Skin is warm and dry.      Capillary Refill: Capillary refill takes more than 3 seconds.      Coloration: Skin is not jaundiced or pale.      Findings: No bruising or erythema.   Neurological:      Mental Status: She is alert.      Motor: Weakness present.         Laboratory:          No results for input(s): \"ALTSGPT\", \"ASTSGOT\", \"ALKPHOSPHAT\", \"TBILIRUBIN\", \"DBILIRUBIN\", \"GAMMAGT\", \"AMYLASE\", \"LIPASE\", \"ALB\", \"PREALBUMIN\", \"GLUCOSE\" in the last 72 hours.      No results for input(s): \"NTPROBNP\" in the last 72 hours.      No results for input(s): \"TROPONINT\" in the last 72 hours.    Imaging:  No orders to display       X-Ray:  I have personally reviewed the images and compared with prior images.  EKG:  I have personally reviewed the images and compared with prior images.    Assessment/Plan:  Justification for Admission Status  I anticipate this patient will require at least two midnights for appropriate medical management, necessitating inpatient admission because acute pyelonephritis    Patient will need a Med/Surg bed on MEDICAL service .  The need is secondary to acute pyelonephritis.    * Obstructive pyelonephritis- (present on admission)  Assessment & Plan  CT scan read that there was no nephrolithiasis.  Looking at the imaging myself I do not see a kidney stone.  Consult with radiology in a.m. to confirm if there is a stone or not.  Patient has a history of ESBL and now on IV " meropenem  Follow-up urine cultures and blood cultures    Right hemiplegia (HCC)- (present on admission)  Assessment & Plan  Chronic from a previous cerebral aneurysm    Drug rash- (present on admission)  Assessment & Plan  Secondary to ceftriaxone  I will give Solu-Medrol, Pepcid, Benadryl  Monitor respiratory status    Hydronephrosis- (present on admission)  Assessment & Plan  Chronic  Patient has normal kidney function  IV fluids  Follow-up with urology in a.m.    Encephalopathy acute- (present on admission)  Assessment & Plan  Secondary to infectious etiology  Continue to monitor        VTE prophylaxis: SCDs/TEDs

## 2024-07-26 NOTE — PROGRESS NOTES
Hospital Medicine Daily Progress Note    Date of Service  7/26/2024    Chief Complaint  Nydia Finch is a 64 y.o. female admitted 7/25/2024 with abdominal pain    Hospital Course  Nydia Finch is a 64 y.o. female who presented 7/25/2024 with PMH  right hydronephrosis ,ESBL E. 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  The patient was recently admitted in the hospital for having hydronephrosis of the right kidney secondary to nephrolithiasis.  Apparently patient did have a stent placed in 2009 and was lost to follow-up.  Eventually the stent was obstructed and had her replacement in January.  Patient underwent a right percutaneous nephrostomy tube placement on 1/2024.  At that time she grew ESBL.     Patient was transferred from [facility] and I reviewed the records.  CT scan of the head found left cerebral repair, loss status post surgical procedure without acute intracranial process.  CT scan of the abdomen found due to adrenal glands and pancreas show no evidence of mass or cyst.  Right kidney is smaller than the left.  There is moderate hydronephrosis and mild hydroureter.  There is diffuse thickening up to 3 mm wall of the right pelvis and proximal ureter.  No dilatation of the left.  No renal calculi seen on either side.  The left kidney is normal in size.     WBC 10, Hemoglobin 12, platelets 337, BUN 20, creatinine 0.9, CO2 18, sodium 140, potassium 3.4, AST 23, ALT 14,, bili 1.8, lactic acid 1, CRP 39, troponin 0.012, urine drug screen was negative  UA was nitrate positive, leukoesterase positive, WBC 5-10, bacteria moderate.  COVID is negative.     Upon arrival to [facility] patient was given IV Rocephin and developed a drug rash.  She was given Solu-Medrol, Benadryl and Pepcid    Interval Problem Update  7/26-she was taken for nephrostomy tube today.  I  discussed with urology, Dr. Rm, and radiology, Dr. Newton and nephrostomy tube was successfully placed  Patient evaluated after nephrostomy and she reports feeling well.  Discussed with  Abdoulaye    I have discussed this patient's plan of care and discharge plan at IDT rounds today with Case Management, Nursing, Nursing leadership, and other members of the IDT team.    Consultants/Specialty  urology    Code Status  Full Code    Disposition  Medically Cleared  I have placed the appropriate orders for post-discharge needs.    Review of Systems  Review of Systems   Constitutional:  Negative for chills and fever.   Respiratory:  Negative for cough and hemoptysis.    Cardiovascular:  Negative for chest pain and palpitations.   Gastrointestinal:  Negative for nausea and vomiting.   Genitourinary:  Negative for dysuria.   Neurological:  Negative for dizziness and headaches.        Physical Exam  Temp:  [36.1 °C (96.9 °F)-36.6 °C (97.9 °F)] 36.6 °C (97.9 °F)  Pulse:  [] 85  Resp:  [11-20] 16  BP: (112-156)/() 134/85  SpO2:  [95 %-100 %] 97 %    Physical Exam  Constitutional:       General: She is not in acute distress.     Appearance: She is not toxic-appearing.   HENT:      Head: Normocephalic and atraumatic.      Nose: Nose normal.      Mouth/Throat:      Mouth: Mucous membranes are moist.      Pharynx: Oropharynx is clear.   Eyes:      Extraocular Movements: Extraocular movements intact.      Conjunctiva/sclera: Conjunctivae normal.   Cardiovascular:      Rate and Rhythm: Normal rate and regular rhythm.      Pulses: Normal pulses.      Heart sounds: Normal heart sounds.   Pulmonary:      Effort: Pulmonary effort is normal.      Breath sounds: Normal breath sounds.   Abdominal:      General: There is no distension.      Palpations: Abdomen is soft.   Musculoskeletal:         General: No swelling or deformity.      Cervical back: Neck supple. No rigidity.   Skin:     General: Skin is warm and dry.    Neurological:      General: No focal deficit present.      Mental Status: She is oriented to person, place, and time.         Fluids  No intake or output data in the 24 hours ending 07/26/24 1339    Laboratory  Recent Labs     07/26/24  0003 07/26/24  0351   WBC 16.0* 17.8*   RBC 4.85 4.56   HEMOGLOBIN 13.8 13.3   HEMATOCRIT 42.1 39.4   MCV 86.8 86.4   MCH 28.5 29.2   MCHC 32.8 33.8   RDW 52.6* 52.3*   PLATELETCT 361 344   MPV 11.5 11.5     Recent Labs     07/26/24  0003 07/26/24  0351   SODIUM 139 139   POTASSIUM 3.6 3.9   CHLORIDE 105 107   CO2 16* 17*   GLUCOSE 149* 160*   BUN 16 15   CREATININE 0.72 0.71   CALCIUM 9.4 9.4     Recent Labs     07/26/24  0956   INR 1.05               Imaging  IR-NEPHROSTOGRAM W/ NEW TUBE PLACEMENT (ALL RADIOLOGY) RIGHT   Final Result      1. Ultrasound and fluoroscopic guided placement of a right 8 [place] pigtail locking loop percutaneous nephrostomy catheter.      2. Nephrostogram showing satisfactory catheter position without extravasation.      3. A purulent urine sample was sent for microbiology.           Assessment/Plan  * Obstructive pyelonephritis- (present on admission)  Assessment & Plan  Recurrent problem  I requested RTOC have images uploaded, discussed with radiology, no obstructing stone however also discussed with urology and this is a recurrent problem with hydronephrosis/pyelonephrosis that improved with treatment for.  Status post nephrostomy tube placement  Patient feeling improvement  Continue meropenem    Right hemiplegia (HCC)- (present on admission)  Assessment & Plan  Chronic from a previous cerebral aneurysm    Drug rash- (present on admission)  Assessment & Plan  Improved  Status post Solu-Medrol and Benadryl    Hydronephrosis- (present on admission)  Assessment & Plan  Chronic  Patient has normal kidney function  IV fluids  Discussed with urology and radiology  Status post nephrostomy tube placement    Encephalopathy acute- (present on  admission)  Assessment & Plan  Seems improved  Continue antibiotics  Status post nephrostomy         VTE prophylaxis: VTE Selection    I have performed a physical exam and reviewed and updated ROS and Plan today (7/26/2024). In review of yesterday's note (7/25/2024), there are no changes except as documented above.

## 2024-07-27 LAB
ANION GAP SERPL CALC-SCNC: 13 MMOL/L (ref 7–16)
BUN SERPL-MCNC: 18 MG/DL (ref 8–22)
CALCIUM SERPL-MCNC: 8.8 MG/DL (ref 8.5–10.5)
CHLORIDE SERPL-SCNC: 106 MMOL/L (ref 96–112)
CO2 SERPL-SCNC: 19 MMOL/L (ref 20–33)
CREAT SERPL-MCNC: 0.72 MG/DL (ref 0.5–1.4)
ERYTHROCYTE [DISTWIDTH] IN BLOOD BY AUTOMATED COUNT: 54.4 FL (ref 35.9–50)
GFR SERPLBLD CREATININE-BSD FMLA CKD-EPI: 93 ML/MIN/1.73 M 2
GLUCOSE SERPL-MCNC: 100 MG/DL (ref 65–99)
HCT VFR BLD AUTO: 35 % (ref 37–47)
HGB BLD-MCNC: 11.4 G/DL (ref 12–16)
MCH RBC QN AUTO: 28.6 PG (ref 27–33)
MCHC RBC AUTO-ENTMCNC: 32.6 G/DL (ref 32.2–35.5)
MCV RBC AUTO: 87.9 FL (ref 81.4–97.8)
PLATELET # BLD AUTO: 323 K/UL (ref 164–446)
PMV BLD AUTO: 11.5 FL (ref 9–12.9)
POTASSIUM SERPL-SCNC: 3.6 MMOL/L (ref 3.6–5.5)
RBC # BLD AUTO: 3.98 M/UL (ref 4.2–5.4)
SODIUM SERPL-SCNC: 138 MMOL/L (ref 135–145)
WBC # BLD AUTO: 12 K/UL (ref 4.8–10.8)

## 2024-07-27 PROCEDURE — 700101 HCHG RX REV CODE 250: Performed by: NURSE PRACTITIONER

## 2024-07-27 PROCEDURE — 700111 HCHG RX REV CODE 636 W/ 250 OVERRIDE (IP): Performed by: HOSPITALIST

## 2024-07-27 PROCEDURE — 85027 COMPLETE CBC AUTOMATED: CPT

## 2024-07-27 PROCEDURE — 99233 SBSQ HOSP IP/OBS HIGH 50: CPT | Performed by: STUDENT IN AN ORGANIZED HEALTH CARE EDUCATION/TRAINING PROGRAM

## 2024-07-27 PROCEDURE — 700105 HCHG RX REV CODE 258: Performed by: HOSPITALIST

## 2024-07-27 PROCEDURE — 80048 BASIC METABOLIC PNL TOTAL CA: CPT

## 2024-07-27 PROCEDURE — 700102 HCHG RX REV CODE 250 W/ 637 OVERRIDE(OP): Performed by: HOSPITALIST

## 2024-07-27 PROCEDURE — 36415 COLL VENOUS BLD VENIPUNCTURE: CPT

## 2024-07-27 PROCEDURE — A9270 NON-COVERED ITEM OR SERVICE: HCPCS | Performed by: HOSPITALIST

## 2024-07-27 PROCEDURE — 770006 HCHG ROOM/CARE - MED/SURG/GYN SEMI*

## 2024-07-27 RX ORDER — SODIUM CHLORIDE 0.9 % (FLUSH) 0.9 %
10 SYRINGE (ML) INJECTION 3 TIMES DAILY PRN
Status: DISCONTINUED | OUTPATIENT
Start: 2024-07-27 | End: 2024-07-28

## 2024-07-27 RX ORDER — SODIUM CHLORIDE 0.9 % (FLUSH) 0.9 %
10 SYRINGE (ML) INJECTION 2 TIMES DAILY PRN
Status: DISCONTINUED | OUTPATIENT
Start: 2024-07-27 | End: 2024-07-27

## 2024-07-27 RX ADMIN — ACETAMINOPHEN 650 MG: 325 TABLET ORAL at 18:09

## 2024-07-27 RX ADMIN — MEROPENEM 500 MG: 500 INJECTION, POWDER, FOR SOLUTION INTRAVENOUS at 11:57

## 2024-07-27 RX ADMIN — DULOXETINE HYDROCHLORIDE 60 MG: 30 CAPSULE, DELAYED RELEASE ORAL at 05:16

## 2024-07-27 RX ADMIN — MEROPENEM 500 MG: 500 INJECTION, POWDER, FOR SOLUTION INTRAVENOUS at 17:11

## 2024-07-27 RX ADMIN — MEROPENEM 500 MG: 500 INJECTION, POWDER, FOR SOLUTION INTRAVENOUS at 05:16

## 2024-07-27 RX ADMIN — PREGABALIN 150 MG: 150 CAPSULE ORAL at 05:16

## 2024-07-27 RX ADMIN — SODIUM CHLORIDE, PRESERVATIVE FREE 10 ML: 5 INJECTION INTRAVENOUS at 16:39

## 2024-07-27 RX ADMIN — PREGABALIN 150 MG: 150 CAPSULE ORAL at 11:56

## 2024-07-27 RX ADMIN — PREGABALIN 150 MG: 150 CAPSULE ORAL at 17:10

## 2024-07-27 RX ADMIN — DULOXETINE HYDROCHLORIDE 60 MG: 30 CAPSULE, DELAYED RELEASE ORAL at 17:10

## 2024-07-27 RX ADMIN — TAMSULOSIN HYDROCHLORIDE 0.4 MG: 0.4 CAPSULE ORAL at 09:55

## 2024-07-27 ASSESSMENT — ENCOUNTER SYMPTOMS
FLANK PAIN: 1
FEVER: 0
MYALGIAS: 0
WEAKNESS: 1
VOMITING: 0
HEADACHES: 0
DIZZINESS: 0
HEMOPTYSIS: 0
COUGH: 0
PALPITATIONS: 0
CHILLS: 0
NAUSEA: 0
SHORTNESS OF BREATH: 0

## 2024-07-27 ASSESSMENT — FIBROSIS 4 INDEX: FIB4 SCORE: 0.89

## 2024-07-27 ASSESSMENT — PAIN DESCRIPTION - PAIN TYPE
TYPE: ACUTE PAIN
TYPE: ACUTE PAIN

## 2024-07-27 NOTE — CARE PLAN
The patient is Stable - Low risk of patient condition declining or worsening    Shift Goals  Clinical Goals: IVF, IV abx  Patient Goals: sleep and rest  Family Goals: mari    Progress made toward(s) clinical / shift goals:      Problem: Knowledge Deficit - Standard  Goal: Patient and family/care givers will demonstrate understanding of plan of care, disease process/condition, diagnostic tests and medications  Outcome: Progressing  Note: Patient updated on plan of care by team members.     Problem: Skin Integrity  Goal: Skin integrity is maintained or improved  Outcome: Progressing  Note: Skin breakdown prevention measures in place. Frequent position changes. Pillows in use for support. Adequate oral intake     Problem: Fall Risk  Goal: Patient will remain free from falls  Outcome: Progressing  Note: Patient remains free from falls. Fall prevention measures in place.       Patient is not progressing towards the following goals:

## 2024-07-27 NOTE — CARE PLAN
The patient is Stable - Low risk of patient condition declining or worsening    Shift Goals  Clinical Goals: monitor nephrodtomy output,  Patient Goals: comfort  Family Goals: mari    Progress made toward(s) clinical / shift goals:  tube in place, minimal serosanguinous drain, pt rounded  hourly, , no complaints of pain, + bowel movement, skin kept dry    Patient is not progressing towards the following goals:

## 2024-07-27 NOTE — PROGRESS NOTES
Radiology Progress Note   Author: SARAH Gomez Date & Time created: 7/27/2024     Date of admission  7/25/2024  Note to reader: this note follows the APSO format rather than the historical SOAP format. Assessment and plan located at the top of the note for ease of use.    Chief Complaint  64 y.o. female admitted 7/25/2024 with   HPI  Nydia Finch is a 64-year-old female with a PMH of  cerebral aneurysm, CVA (right side hemiparesis and expressive aphasia), ESBL Ecoli infection, renal stones with right hydronephrosis who presented to [facility] unable to speak. CT abdomen found moderate right hydro with some mild hydroureter without evidence of stone consistent with right pyelonephrosis.  IR consulted for placement of right PCN.  On 07/26/2024 Dr. Newton (IR) successfully placed Right PCN.       Interval History:   07/27/2024- Right  neph tube (8 Sami) to gravity drain in place with no documented nephrostomy tube output  in the last 24 hours.  R NT with bloody drainage in drainage bag and  tubing.  I did easily flush nephrostomy tube with 10 mL of normal saline.  Patient tolerated flushing drain well and nephrostomy tube appeared to have improved drainage post flush.  I reviewed today's labs: WBC 12.0; Hgb 11.4/35, Cr 0.72; Coags 1.05, UA-positive for nitrite, leukocyte esterase, bacteria ;micro -Urine cultures pending, ABX per hospitalist I discussed patient plan of care with  patient, nursing staff.  IDT notes reviewed      Assessment/Plan     Principal Problem:    Obstructive pyelonephritis  Active Problems:    Encephalopathy acute    Hydronephrosis    Drug rash    Right hemiplegia (HCC)      Plan IR  -- Irrigate Right Nephrostomy tube with 10 ml of sterile saline q shift ONLY IF no output or bloody output  - Fluid cultures pending   - Urology following   - Education provided on S/S of infection   - Ok to shower with catheter as long as site is covered with waterproof dressing; change dressing if it becomes  wet.  - No baths or submerging site under water until catheter removed   - Ultimate plan is for patient to discharge with nephrostomy tube in place and follow-up with urology [place] for right nephrostomy care/management  - For long term use, Nephrostomy tube will need to be exchanged every 3 months; sooner prn.   - IR will continue to follow nephrostomy tube while pt is in the hospital; we may not round daily.      -Thank you for allowing Interventional Radiology team to participate in the patients care, if any additional care or requests are needed in the future please do not hesitate call or place IR order. [phone]     -Thank you for allowing Interventional Radiology team to participate in the patients care, if any additional care or requests are needed in the future please do not hesitate to call or place IR order           Review of Systems  Physical Exam   Review of Systems   Constitutional:  Negative for chills and fever.   Respiratory:  Negative for cough and shortness of breath.    Cardiovascular:  Negative for chest pain.   Gastrointestinal:  Negative for nausea and vomiting.   Genitourinary:  Positive for flank pain. Negative for dysuria.        Right flank pain has improved   Musculoskeletal:  Negative for myalgias.   Neurological:  Positive for weakness. Negative for dizziness.        Right side hemiplegia secondary to previous CVA      Vitals:    07/27/24 0709   BP: (!) 141/96   Pulse: 85   Resp: 16   Temp: 36.3 °C (97.4 °F)   SpO2: 100%        Physical Exam  Vitals and nursing note reviewed.   HENT:      Head: Normocephalic and atraumatic.      Mouth/Throat:      Mouth: Mucous membranes are dry.      Pharynx: Oropharynx is clear.   Eyes:      Pupils: Pupils are equal, round, and reactive to light.   Cardiovascular:      Rate and Rhythm: Normal rate and regular rhythm.   Pulmonary:      Effort: Pulmonary effort is normal. No respiratory distress.   Abdominal:      General: Abdomen is flat.       "Palpations: Abdomen is soft.   Genitourinary:     Comments: Right nephrostomy tube draining bloody output  Neurological:      Mental Status: She is alert and oriented to person, place, and time.      Comments: Right-sided hemiplegia   Psychiatric:         Attention and Perception: Attention normal.         Mood and Affect: Mood normal.         Speech: Speech normal.             Labs    Recent Labs     07/26/24  0003 07/26/24  0351 07/27/24  0128   WBC 16.0* 17.8* 12.0*   RBC 4.85 4.56 3.98*   HEMOGLOBIN 13.8 13.3 11.4*   HEMATOCRIT 42.1 39.4 35.0*   MCV 86.8 86.4 87.9   MCH 28.5 29.2 28.6   MCHC 32.8 33.8 32.6   RDW 52.6* 52.3* 54.4*   PLATELETCT 361 344 323   MPV 11.5 11.5 11.5     Recent Labs     07/26/24 0003 07/26/24  0351 07/27/24  0128   SODIUM 139 139 138   POTASSIUM 3.6 3.9 3.6   CHLORIDE 105 107 106   CO2 16* 17* 19*   GLUCOSE 149* 160* 100*   BUN 16 15 18   CREATININE 0.72 0.71 0.72   CALCIUM 9.4 9.4 8.8     Recent Labs     07/26/24 0003 07/26/24  0351 07/27/24  0128   ALBUMIN 3.4 3.3  --    TBILIRUBIN 0.6 0.5  --    ALKPHOSPHAT 168* 167*  --    TOTPROTEIN 8.1 7.9  --    ALTSGPT 5 6  --    ASTSGOT 14 11*  --    CREATININE 0.72 0.71 0.72     IR-NEPHROSTOGRAM W/ NEW TUBE PLACEMENT (ALL RADIOLOGY) RIGHT   Final Result      1. Ultrasound and fluoroscopic guided placement of a right 8 [place] pigtail locking loop percutaneous nephrostomy catheter.      2. Nephrostogram showing satisfactory catheter position without extravasation.      3. A purulent urine sample was sent for microbiology.        INR   Date Value Ref Range Status   07/26/2024 1.05 0.87 - 1.13 Final     Comment:     INR - Non-therapeutic Reference Range: 0.87-1.13  INR - Therapeutic Reference Range: 2.0-4.0       No results found for: \"POCINR\"   No intake or output data in the 24 hours ending 07/26/24 0853   I have personally reviewed the above labs and imaging      I have performed a physical exam and reviewed and updated ROS and Plan today " (7/27/2024).     40 minutes in directly providing and coordinating care and extensive data review.  No time overlap and excludes procedures.

## 2024-07-27 NOTE — CONSULTS
UROLOGY Consult Note:    Mary Vivar P.A.-C.  Date & Time note created:    7/27/2024   9:16 AM     Referring MD:  Dr. Preston     Patient ID:   Name:             Nydia Finch     YOB: 1960  Age:                 64 y.o.  female   MRN:               [id number]                                                             Reason for Consult:      Pyonephrosis in atrophic kidney.     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 She is able to provide some history but the bulk of information is obtained from prior Hospital records.     Previously she was admitted to outside Hospital on 1/18 after presenting to the ER with several days of pains in her right flank and right hip. CT scan showed R atrophic kidney with hydronephrosis, concern for abscess of R kidney. She was started on IV antibiotics. PCNT was placed by IR 1/29. Pt improved during that time. IR decided to remove NPT 2/2 due to no urine output and limited fluid output for 5 days. No fluid collection on imaging at that time and urology agreed with this decision. Patient was to follow up with our office in one month but was lost to follow up.     Pt represents with similar presentation from above. WBC elevated. IR replaced R PCNT 7/26. Today WBC improving on meropenum. Kin dey function remains normal.     Review of Systems:      Constitutional: Denies fevers   Eyes: Denies changes in vision   Ears/Nose/Throat/Mouth: Denies nasal congestion   Cardiovascular: no chest pain   Respiratory: no shortness of breath   Gastrointestinal/Hepatic: Denies abdominal pain   Genitourinary: Denies hematuria, dysuria or frequency  Musculoskeletal/Rheum: Denies joint pain   Skin: Denies rash  Neurological: Denies headache   Psychiatric: denies mood disorder   Endocrine: Coleen thyroid problems  Heme/Oncology/Lymph Nodes: Denies enlarged lymph nodes    All other systems were reviewed and are negative (AMA/CMS criteria)                Past Medical History:   Past Medical History:   Diagnosis Date    Aphagia 1994    Brain aneurysm     Chronic pain     Nephrolithiasis     Right hemiplegia (HCC) 1994    Stroke (HCC)      Active Hospital Problems    Diagnosis     Obstructive pyelonephritis [N11.1]     Right hemiplegia (HCC) [G81.91]     Hydronephrosis [N13.30]     Drug rash [L27.0]     Encephalopathy acute [G93.40]        Past Surgical History:  Past Surgical History:   Procedure Laterality Date    ME CYSTOSCOPY,INSERT URETERAL STENT Right 1/26/2024    Procedure: CYSTOSCOPY;  Surgeon: Mik Rm M.D.;  Location: SURGERY [facility];  Service: Urology    CYSTOSCOPY N/A 7/4/2019    Procedure: CYSTOSCOPY;  Surgeon: Monico Killian M.D.;  Location: SURGERY [facility];  Service: Urology    STENT PLACEMENT Right 7/4/2019    Procedure: STENT PLACEMENT;  Surgeon: Monico Killian M.D.;  Location: SURGERY [facility];  Service: Urology    URETEROSCOPY Right 7/4/2019    Procedure: URETEROSCOPY;  Surgeon: Monico Killian M.D.;  Location: SURGERY [facility];  Service: Urology    CYSTOSCOPY STENT PLACEMENT  2/13/2018    Procedure: CYSTOSCOPY STENT PLACEMENT;  Surgeon: Monico Killian M.D.;  Location: SURGERY [facility];  Service: Urology    GASTRIC RESECTION  2012    Duodenal Switch    OTHER      OTHER NEUROLOGICAL SURG         Hospital Medications:    Current Facility-Administered Medications:     normal saline flush 0.9 % SOLN 10 mL, 10 mL, Tube, BID PRN, Berenice Beach A.P.R.NBrittany    lactated ringers infusion, , Intravenous, Continuous, Ray Lucas M.D., Last Rate: 83 mL/hr at 07/26/24 1842, New Bag at 07/26/24 1842    acetaminophen (Tylenol) tablet 650 mg, 650 mg, Oral, Q6HRS PRN, Ray Lucas M.D.    ondansetron (Zofran) syringe/vial injection 4 mg, 4 mg, Intravenous, Q4HRS PRN, Ray Lucas M.D.    ondansetron (Zofran ODT) dispertab 4 mg, 4 mg, Oral,  Q4HRS PRN, Ray Lucas M.D.    promethazine (Phenergan) tablet 12.5-25 mg, 12.5-25 mg, Oral, Q4HRS PRN, Ray Lucas M.D.    promethazine (Phenergan) suppository 12.5-25 mg, 12.5-25 mg, Rectal, Q4HRS PRN, Ray Lucas M.D.    prochlorperazine (Compazine) injection 5-10 mg, 5-10 mg, Intravenous, Q4HRS PRN, Ray Lucas M.D.    DULoxetine (Cymbalta) capsule 60 mg, 60 mg, Oral, BID, Ray Lucas M.D., 60 mg at 07/27/24 0516    pregabalin (Lyrica) capsule 150 mg, 150 mg, Oral, TID, Ray Lucas M.D., 150 mg at 07/27/24 0516    meropenem (Merrem) 500 mg in  mL IV-MBP, 500 mg, Intravenous, Q6HRS, Ray Lucas M.D., Stopped at 07/27/24 0546    tamsulosin (Flomax) capsule 0.4 mg, 0.4 mg, Oral, AFTER BREAKFAST, Ray Lucas M.D., 0.4 mg at 07/26/24 0958    oxyCODONE immediate-release (Roxicodone) tablet 5 mg, 5 mg, Oral, Q4HRS PRN, Ray Lucas M.D.    morphine 4 MG/ML injection 1 mg, 1 mg, Intravenous, Q4HRS PRN, Ray Lucas M.D.    Current Outpatient Medications:  Medications Prior to Admission   Medication Sig Dispense Refill Last Dose    atorvastatin (LIPITOR) 40 MG Tab Take 40 mg by mouth every evening.   unknown at unknown    meloxicam (MOBIC) 7.5 MG Tab Take 7.5 mg by mouth every day.   unknown at unknown    omeprazole (PRILOSEC) 20 MG delayed-release capsule Take 20 mg by mouth every day.   unknown at unknown    pregabalin (LYRICA) 150 MG Cap Take 150 mg by mouth 3 times a day.   unknown at unknown    DULoxetine (CYMBALTA) 60 MG Cap DR Particles delayed-release capsule Take 60 mg by mouth 2 times a day.   unknown at unknown       Medication Allergy:  Allergies   Allergen Reactions    Piperacillin Sod-Tazobactam So Rash     Gave her head to toe drug rash at [facility] 1/25/2024      Sulfamethoxazole W-Trimethoprim Itching     Rash ,itching    Ceftriaxone Itching     Generalized redness       Family History:  No family history on file.    Social History:  Social History     Socioeconomic  "History    Marital status:      Spouse name: Not on file    Number of children: Not on file    Years of education: Not on file    Highest education level: Not on file   Occupational History    Not on file   Tobacco Use    Smoking status: Never    Smokeless tobacco: Never   Substance and Sexual Activity    Alcohol use: No    Drug use: Not on file     Comment: Aydee    Sexual activity: Not on file   Other Topics Concern    Not on file   Social History Narrative    Not on file     Social Determinants of Health     Financial Resource Strain: Low Risk  (11/10/2022)    Received from [facility]    Financial Resource Strain     Difficulty of Paying Living Expenses: Not on file     Access to Reliable Phone: Not on file   Food Insecurity: No Food Insecurity (7/26/2024)    Hunger Vital Sign     Worried About Running Out of Food in the Last Year: Never true     Ran Out of Food in the Last Year: Never true   Transportation Needs: No Transportation Needs (7/26/2024)    PRAPARE - Transportation     Lack of Transportation (Medical): No     Lack of Transportation (Non-Medical): No   Physical Activity: Not on file   Stress: Not on file   Social Connections: Not on file   Intimate Partner Violence: Not At Risk (7/26/2024)    Humiliation, Afraid, Rape, and Kick questionnaire     Fear of Current or Ex-Partner: No     Emotionally Abused: No     Physically Abused: No     Sexually Abused: No   Housing Stability: Low Risk  (7/26/2024)    Housing Stability Vital Sign     Unable to Pay for Housing in the Last Year: No     Number of Places Lived in the Last Year: 1     Unstable Housing in the Last Year: No         Physical Exam:  Vitals/ General Appearance:   Weight/BMI: Body mass index is 30.08 kg/m².  BP (!) 141/96   Pulse 85   Temp 36.3 °C (97.4 °F) (Temporal)   Resp 16   Ht 1.676 m (5' 5.98\")   Wt 84.5 kg (186 lb 4.6 oz)   SpO2 100%   Vitals:    07/26/24 1705 07/26/24 2125 07/27/24 0349 07/27/24 0709   BP: (!) " 132/93 119/73 125/74 (!) 141/96   Pulse: (!) 105 93 83 85   Resp: 16 16 16 16   Temp: 36.7 °C (98.1 °F) 36.2 °C (97.2 °F) 35.9 °C (96.6 °F) 36.3 °C (97.4 °F)   TempSrc: Temporal Temporal Temporal Temporal   SpO2: 97% 95% 100% 100%   Weight:   84.5 kg (186 lb 4.6 oz)    Height:         Oxygen Therapy:  Pulse Oximetry: 100 %, O2 (LPM): 2, O2 Delivery Device: None - Room Air    Constitutional:   Well developed, Well nourished, No acute distress  HENMT:  Normocephalic, Atraumatic, Oropharynx moist mucous membranes, No oral exudates, Nose normal.  No thyromegaly.  Eyes:  EOMI, Conjunctiva normal, No discharge.  Neck:  Normal range of motion, No cervical tenderness.  Lungs:  Normal breath sounds,  Abdomen: Soft, No tenderness,   : -CVAT, NP in place with some clot in tube   Skin: Warm, Dry, No erythema, No rash, no induration.  Neurologic: Alert & oriented x 3, No focal deficits noted.  Psychiatric: Affect normal, Judgment normal, Mood normal.      MDM (Data Review):     Records reviewed and summarized in current documentation    Lab Data Review:  Recent Results (from the past 24 hour(s))   Prothrombin Time    Collection Time: 07/26/24  9:56 AM   Result Value Ref Range    PT 13.8 12.0 - 14.6 sec    INR 1.05 0.87 - 1.13   CULTURE WOUND W/ GRAM STAIN    Collection Time: 07/26/24 11:08 AM    Specimen: Wound   Result Value Ref Range    Significant Indicator NEG     Source WND     Site Right kidney fluid     Culture Result -     Gram Stain Result Many WBCs.  Moderate Gram negative rods.      GRAM STAIN    Collection Time: 07/26/24 11:08 AM    Specimen: Wound   Result Value Ref Range    Significant Indicator .     Source WND     Site Right kidney fluid     Gram Stain Result Many WBCs.  Moderate Gram negative rods.      LACTIC ACID    Collection Time: 07/26/24 12:14 PM   Result Value Ref Range    Lactic Acid 2.0 0.5 - 2.0 mmol/L   CBC WITHOUT DIFFERENTIAL    Collection Time: 07/27/24  1:28 AM   Result Value Ref Range    WBC  12.0 (H) 4.8 - 10.8 K/uL    RBC 3.98 (L) 4.20 - 5.40 M/uL    Hemoglobin 11.4 (L) 12.0 - 16.0 g/dL    Hematocrit 35.0 (L) 37.0 - 47.0 %    MCV 87.9 81.4 - 97.8 fL    MCH 28.6 27.0 - 33.0 pg    MCHC 32.6 32.2 - 35.5 g/dL    RDW 54.4 (H) 35.9 - 50.0 fL    Platelet Count 323 164 - 446 K/uL    MPV 11.5 9.0 - 12.9 fL   Basic Metabolic Panel    Collection Time: 07/27/24  1:28 AM   Result Value Ref Range    Sodium 138 135 - 145 mmol/L    Potassium 3.6 3.6 - 5.5 mmol/L    Chloride 106 96 - 112 mmol/L    Co2 19 (L) 20 - 33 mmol/L    Glucose 100 (H) 65 - 99 mg/dL    Bun 18 8 - 22 mg/dL    Creatinine 0.72 0.50 - 1.40 mg/dL    Calcium 8.8 8.5 - 10.5 mg/dL    Anion Gap 13.0 7.0 - 16.0   ESTIMATED GFR    Collection Time: 07/27/24  1:28 AM   Result Value Ref Range    GFR (CKD-EPI) 93 >60 mL/min/1.73 m 2       Imaging/Procedures Review:    Reviewed    MDM (Assessment and Plan):         Plan:   Continued cares per primary team  Monitor I&O's include neph tube output   Am labs    Pt will need follow up in our office to review options for R simple nephrectomy.     Dr. Rm is aware of this consult and has directed this plan of care.        Mary Vivar, P.A.-C.   [address] [place] [address]   [phone]

## 2024-07-27 NOTE — CARE PLAN
The patient is Stable - Low risk of patient condition declining or worsening    Shift Goals  Clinical Goals: Monitor nephrostomy tube  Patient Goals: Rest, comfort  Family Goals: NA    Progress made toward(s) clinical / shift goals:  Patient and family verbalized understanding of plan of care and education. Patient's pain was controlled with pharmacological and nonpharmacological comfort measures. Patient was free from falls and no changes in skin integrity were noticed during this shift.  Problem: Knowledge Deficit - Standard  Goal: Patient and family/care givers will demonstrate understanding of plan of care, disease process/condition, diagnostic tests and medications  Outcome: Progressing     Problem: Skin Integrity  Goal: Skin integrity is maintained or improved  Outcome: Progressing     Problem: Fall Risk  Goal: Patient will remain free from falls  Outcome: Progressing       Patient is not progressing towards the following goals:

## 2024-07-27 NOTE — PROGRESS NOTES
Report received from NOC RN and assumed patient care at 0700. Patient is A&Ox 4, on RA, and bed alarm in place. Patient reporting a pain level of 0/10. Call light within reach and bed in lowest position. Reinforced the need to call for assistance. Plan of care discussed and patient does not have any further needs at this time.

## 2024-07-27 NOTE — PROGRESS NOTES
Hospital Medicine Daily Progress Note    Date of Service  7/27/2024    Chief Complaint  Nydia Finch is a 64 y.o. female admitted 7/25/2024 with abdominal pain    Hospital Course  Nydia Finch is a 64 y.o. female who presented 7/25/2024 with PMH  right hydronephrosis ,ESBL E. 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  The patient was recently admitted in the hospital for having hydronephrosis of the right kidney secondary to nephrolithiasis.  Apparently patient did have a stent placed in 2009 and was lost to follow-up.  Eventually the stent was obstructed and had her replacement in January.  Patient underwent a right percutaneous nephrostomy tube placement on 1/2024.  At that time she grew ESBL.     Patient was transferred from [facility] and I reviewed the records.  CT scan of the head found left cerebral repair, loss status post surgical procedure without acute intracranial process.  CT scan of the abdomen found due to adrenal glands and pancreas show no evidence of mass or cyst.  Right kidney is smaller than the left.  There is moderate hydronephrosis and mild hydroureter.  There is diffuse thickening up to 3 mm wall of the right pelvis and proximal ureter.  No dilatation of the left.  No renal calculi seen on either side.  The left kidney is normal in size.     WBC 10, Hemoglobin 12, platelets 337, BUN 20, creatinine 0.9, CO2 18, sodium 140, potassium 3.4, AST 23, ALT 14,, bili 1.8, lactic acid 1, CRP 39, troponin 0.012, urine drug screen was negative  UA was nitrate positive, leukoesterase positive, WBC 5-10, bacteria moderate.  COVID is negative.     Upon [facility] to [facility] patient was given IV Rocephin and developed a drug rash.  She was given Solu-Medrol, Benadryl and Pepcid    Interval Problem Update  7/27-she was taken for nephrostomy tube yesterday.  I  discussed with urology, Dr. Rm, and radiology, Dr. Newton and nephrostomy tube was successfully placed  Patient evaluated after nephrostomy and she reports feeling well.  Discussed with  Abdoulaye  Patient progressing well, likely discharge tomorrow    I have discussed this patient's plan of care and discharge plan at IDT rounds today with Case Management, Nursing, Nursing leadership, and other members of the IDT team.    Consultants/Specialty  urology    Code Status  Full Code    Disposition  Medically Cleared  I have placed the appropriate orders for post-discharge needs.    Review of Systems  Review of Systems   Constitutional:  Negative for chills and fever.   Respiratory:  Negative for cough and hemoptysis.    Cardiovascular:  Negative for chest pain and palpitations.   Gastrointestinal:  Negative for nausea and vomiting.   Genitourinary:  Negative for dysuria.   Neurological:  Negative for dizziness and headaches.        Physical Exam  Temp:  [35.9 °C (96.6 °F)-36.7 °C (98.1 °F)] 36.6 °C (97.9 °F)  Pulse:  [] 115  Resp:  [16] 16  BP: (119-158)/(73-96) 121/78  SpO2:  [95 %-100 %] 97 %    Physical Exam  Constitutional:       General: She is not in acute distress.     Appearance: She is not toxic-appearing.   HENT:      Head: Normocephalic and atraumatic.      Nose: Nose normal.      Mouth/Throat:      Mouth: Mucous membranes are moist.      Pharynx: Oropharynx is clear.   Eyes:      Extraocular Movements: Extraocular movements intact.      Conjunctiva/sclera: Conjunctivae normal.   Cardiovascular:      Rate and Rhythm: Normal rate and regular rhythm.      Pulses: Normal pulses.      Heart sounds: Normal heart sounds.   Pulmonary:      Effort: Pulmonary effort is normal.      Breath sounds: Normal breath sounds.   Abdominal:      General: There is no distension.      Palpations: Abdomen is soft.   Musculoskeletal:         General: No swelling or deformity.      Cervical back: Neck supple. No rigidity.    Skin:     General: Skin is warm and dry.   Neurological:      General: No focal deficit present.      Mental Status: She is oriented to person, place, and time.         Fluids    Intake/Output Summary (Last 24 hours) at 7/27/2024 1516  Last data filed at 7/27/2024 1000  Gross per 24 hour   Intake 110 ml   Output --   Net 110 ml       Laboratory  Recent Labs     07/26/24  0003 07/26/24  0351 07/27/24  0128   WBC 16.0* 17.8* 12.0*   RBC 4.85 4.56 3.98*   HEMOGLOBIN 13.8 13.3 11.4*   HEMATOCRIT 42.1 39.4 35.0*   MCV 86.8 86.4 87.9   MCH 28.5 29.2 28.6   MCHC 32.8 33.8 32.6   RDW 52.6* 52.3* 54.4*   PLATELETCT 361 344 323   MPV 11.5 11.5 11.5     Recent Labs     07/26/24  0003 07/26/24  0351 07/27/24  0128   SODIUM 139 139 138   POTASSIUM 3.6 3.9 3.6   CHLORIDE 105 107 106   CO2 16* 17* 19*   GLUCOSE 149* 160* 100*   BUN 16 15 18   CREATININE 0.72 0.71 0.72   CALCIUM 9.4 9.4 8.8     Recent Labs     07/26/24  0956   INR 1.05               Imaging  IR-NEPHROSTOGRAM W/ NEW TUBE PLACEMENT (ALL RADIOLOGY) RIGHT   Final Result      1. Ultrasound and fluoroscopic guided placement of a right 8 Azeri pigtail locking loop percutaneous nephrostomy catheter.      2. Nephrostogram showing satisfactory catheter position without extravasation.      3. A purulent urine sample was sent for microbiology.           Assessment/Plan  * Obstructive pyelonephritis- (present on admission)  Assessment & Plan  Recurrent problem  I requested RTOC have images uploaded, discussed with radiology, no obstructing stone however also discussed with urology and this is a recurrent problem with hydronephrosis/pyelonephrosis that improved with treatment for.  Status post nephrostomy tube placement  Patient feeling improvement  Continue meropenem    Right hemiplegia (HCC)- (present on admission)  Assessment & Plan  Chronic from a previous cerebral aneurysm    Drug rash- (present on admission)  Assessment & Plan  Improved  Status post Solu-Medrol and  Benadryl    Hydronephrosis- (present on admission)  Assessment & Plan  Chronic  Patient has normal kidney function  IV fluids  Discussed with urology and radiology  Status post nephrostomy tube placement    Encephalopathy acute- (present on admission)  Assessment & Plan  Seems improved  Continue antibiotics  Status post nephrostomy         VTE prophylaxis: VTE Selection    I have performed a physical exam and reviewed and updated ROS and Plan today (7/27/2024). In review of yesterday's note (7/26/2024), there are no changes except as documented above.

## 2024-07-28 LAB
ANION GAP SERPL CALC-SCNC: 12 MMOL/L (ref 7–16)
BASOPHILS # BLD AUTO: 0.3 % (ref 0–1.8)
BASOPHILS # BLD: 0.03 K/UL (ref 0–0.12)
BUN SERPL-MCNC: 11 MG/DL (ref 8–22)
CALCIUM SERPL-MCNC: 8.6 MG/DL (ref 8.5–10.5)
CHLORIDE SERPL-SCNC: 102 MMOL/L (ref 96–112)
CO2 SERPL-SCNC: 21 MMOL/L (ref 20–33)
CREAT SERPL-MCNC: 0.65 MG/DL (ref 0.5–1.4)
EOSINOPHIL # BLD AUTO: 0.07 K/UL (ref 0–0.51)
EOSINOPHIL NFR BLD: 0.8 % (ref 0–6.9)
ERYTHROCYTE [DISTWIDTH] IN BLOOD BY AUTOMATED COUNT: 51.7 FL (ref 35.9–50)
GFR SERPLBLD CREATININE-BSD FMLA CKD-EPI: 98 ML/MIN/1.73 M 2
GLUCOSE SERPL-MCNC: 95 MG/DL (ref 65–99)
HCT VFR BLD AUTO: 32.2 % (ref 37–47)
HGB BLD-MCNC: 10.5 G/DL (ref 12–16)
IMM GRANULOCYTES # BLD AUTO: 0.03 K/UL (ref 0–0.11)
IMM GRANULOCYTES NFR BLD AUTO: 0.3 % (ref 0–0.9)
LYMPHOCYTES # BLD AUTO: 1.11 K/UL (ref 1–4.8)
LYMPHOCYTES NFR BLD: 12 % (ref 22–41)
MCH RBC QN AUTO: 27.9 PG (ref 27–33)
MCHC RBC AUTO-ENTMCNC: 32.6 G/DL (ref 32.2–35.5)
MCV RBC AUTO: 85.4 FL (ref 81.4–97.8)
MONOCYTES # BLD AUTO: 0.58 K/UL (ref 0–0.85)
MONOCYTES NFR BLD AUTO: 6.3 % (ref 0–13.4)
NEUTROPHILS # BLD AUTO: 7.43 K/UL (ref 1.82–7.42)
NEUTROPHILS NFR BLD: 80.3 % (ref 44–72)
NRBC # BLD AUTO: 0 K/UL
NRBC BLD-RTO: 0 /100 WBC (ref 0–0.2)
PLATELET # BLD AUTO: 272 K/UL (ref 164–446)
PMV BLD AUTO: 11.2 FL (ref 9–12.9)
POTASSIUM SERPL-SCNC: 3.4 MMOL/L (ref 3.6–5.5)
RBC # BLD AUTO: 3.77 M/UL (ref 4.2–5.4)
SODIUM SERPL-SCNC: 135 MMOL/L (ref 135–145)
WBC # BLD AUTO: 9.3 K/UL (ref 4.8–10.8)

## 2024-07-28 PROCEDURE — 700101 HCHG RX REV CODE 250: Performed by: NURSE PRACTITIONER

## 2024-07-28 PROCEDURE — 700111 HCHG RX REV CODE 636 W/ 250 OVERRIDE (IP): Performed by: HOSPITALIST

## 2024-07-28 PROCEDURE — 700105 HCHG RX REV CODE 258: Performed by: HOSPITALIST

## 2024-07-28 PROCEDURE — 700102 HCHG RX REV CODE 250 W/ 637 OVERRIDE(OP): Performed by: HOSPITALIST

## 2024-07-28 PROCEDURE — 36415 COLL VENOUS BLD VENIPUNCTURE: CPT

## 2024-07-28 PROCEDURE — 80048 BASIC METABOLIC PNL TOTAL CA: CPT

## 2024-07-28 PROCEDURE — A9270 NON-COVERED ITEM OR SERVICE: HCPCS | Performed by: HOSPITALIST

## 2024-07-28 PROCEDURE — 85025 COMPLETE CBC W/AUTO DIFF WBC: CPT

## 2024-07-28 PROCEDURE — 770006 HCHG ROOM/CARE - MED/SURG/GYN SEMI*

## 2024-07-28 PROCEDURE — 99233 SBSQ HOSP IP/OBS HIGH 50: CPT | Performed by: STUDENT IN AN ORGANIZED HEALTH CARE EDUCATION/TRAINING PROGRAM

## 2024-07-28 RX ORDER — SODIUM CHLORIDE 0.9 % (FLUSH) 0.9 %
10 SYRINGE (ML) INJECTION EVERY 6 HOURS
Status: DISCONTINUED | OUTPATIENT
Start: 2024-07-28 | End: 2024-07-28

## 2024-07-28 RX ORDER — SODIUM CHLORIDE 0.9 % (FLUSH) 0.9 %
5-10 SYRINGE (ML) INJECTION EVERY 6 HOURS
Status: DISCONTINUED | OUTPATIENT
Start: 2024-07-28 | End: 2024-08-01 | Stop reason: HOSPADM

## 2024-07-28 RX ADMIN — MEROPENEM 500 MG: 500 INJECTION, POWDER, FOR SOLUTION INTRAVENOUS at 12:57

## 2024-07-28 RX ADMIN — PREGABALIN 150 MG: 150 CAPSULE ORAL at 12:58

## 2024-07-28 RX ADMIN — TAMSULOSIN HYDROCHLORIDE 0.4 MG: 0.4 CAPSULE ORAL at 09:16

## 2024-07-28 RX ADMIN — SODIUM CHLORIDE, PRESERVATIVE FREE 10 ML: 5 INJECTION INTRAVENOUS at 23:32

## 2024-07-28 RX ADMIN — DULOXETINE HYDROCHLORIDE 60 MG: 30 CAPSULE, DELAYED RELEASE ORAL at 05:38

## 2024-07-28 RX ADMIN — MEROPENEM 500 MG: 500 INJECTION, POWDER, FOR SOLUTION INTRAVENOUS at 05:39

## 2024-07-28 RX ADMIN — ACETAMINOPHEN 650 MG: 325 TABLET ORAL at 12:58

## 2024-07-28 RX ADMIN — DULOXETINE HYDROCHLORIDE 60 MG: 30 CAPSULE, DELAYED RELEASE ORAL at 19:09

## 2024-07-28 RX ADMIN — SODIUM CHLORIDE, PRESERVATIVE FREE 10 ML: 5 INJECTION INTRAVENOUS at 02:57

## 2024-07-28 RX ADMIN — MEROPENEM 500 MG: 500 INJECTION, POWDER, FOR SOLUTION INTRAVENOUS at 19:09

## 2024-07-28 RX ADMIN — MEROPENEM 500 MG: 500 INJECTION, POWDER, FOR SOLUTION INTRAVENOUS at 23:33

## 2024-07-28 RX ADMIN — PREGABALIN 150 MG: 150 CAPSULE ORAL at 05:38

## 2024-07-28 RX ADMIN — PREGABALIN 150 MG: 150 CAPSULE ORAL at 19:09

## 2024-07-28 RX ADMIN — SODIUM CHLORIDE, POTASSIUM CHLORIDE, SODIUM LACTATE AND CALCIUM CHLORIDE: 600; 310; 30; 20 INJECTION, SOLUTION INTRAVENOUS at 05:39

## 2024-07-28 RX ADMIN — MEROPENEM 500 MG: 500 INJECTION, POWDER, FOR SOLUTION INTRAVENOUS at 00:23

## 2024-07-28 ASSESSMENT — ENCOUNTER SYMPTOMS
PALPITATIONS: 0
SHORTNESS OF BREATH: 0
COUGH: 0
WEAKNESS: 1
ABDOMINAL PAIN: 0
CHILLS: 0
FLANK PAIN: 0
HEMOPTYSIS: 0
NAUSEA: 0
VOMITING: 0
DIZZINESS: 0
FEVER: 0
MYALGIAS: 0
HEADACHES: 0

## 2024-07-28 ASSESSMENT — PAIN DESCRIPTION - PAIN TYPE
TYPE: ACUTE PAIN

## 2024-07-28 NOTE — PROGRESS NOTES
Hospital Medicine Daily Progress Note    Date of Service  7/28/2024    Chief Complaint  Nydia Finch is a 64 y.o. female admitted 7/25/2024 with abdominal pain    Hospital Course  Nydia Finch is a 64 y.o. female who presented 7/25/2024 with PMH  right hydronephrosis ,ESBL E. 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  The patient was recently admitted in the hospital for having hydronephrosis of the right kidney secondary to nephrolithiasis.  Apparently patient did have a stent placed in 2009 and was lost to follow-up.  Eventually the stent was obstructed and had her replacement in January.  Patient underwent a right percutaneous nephrostomy tube placement on 1/2024.  At that time she grew ESBL.     Patient was transferred from [facility] and I reviewed the records.  CT scan of the head found left cerebral repair, loss status post surgical procedure without acute intracranial process.  CT scan of the abdomen found due to adrenal glands and pancreas show no evidence of mass or cyst.  Right kidney is smaller than the left.  There is moderate hydronephrosis and mild hydroureter.  There is diffuse thickening up to 3 mm wall of the right pelvis and proximal ureter.  No dilatation of the left.  No renal calculi seen on either side.  The left kidney is normal in size.     WBC 10, Hemoglobin 12, platelets 337, BUN 20, creatinine 0.9, CO2 18, sodium 140, potassium 3.4, AST 23, ALT 14,, bili 1.8, lactic acid 1, CRP 39, troponin 0.012, urine drug screen was negative  UA was nitrate positive, leukoesterase positive, WBC 5-10, bacteria moderate.  COVID is negative.     Upon arrival to [facility] ([place]) patient was given IV Rocephin and developed a drug rash.  She was given Solu-Medrol, Benadryl and Pepcid    Interval Problem Update  7/28-seen by urology and radiology.  Radiology would  prefer output to clear up prior to discharge.  Will observe further today and ensure everything is going to clean up and possible discharge tomorrow.      I have discussed this patient's plan of care and discharge plan at IDT rounds today with Case Management, Nursing, Nursing leadership, and other members of the IDT team.    Consultants/Specialty  urology    Code Status  Full Code    Disposition  Medically Cleared  I have placed the appropriate orders for post-discharge needs.    Review of Systems  Review of Systems   Constitutional:  Negative for chills and fever.   Respiratory:  Negative for cough and hemoptysis.    Cardiovascular:  Negative for chest pain and palpitations.   Gastrointestinal:  Negative for nausea and vomiting.   Genitourinary:  Negative for dysuria.   Neurological:  Negative for dizziness and headaches.        Physical Exam  Temp:  [36.4 °C (97.5 °F)-36.9 °C (98.5 °F)] 36.6 °C (97.8 °F)  Pulse:  [107-122] 122  Resp:  [16-19] 18  BP: (121-159)/() 137/89  SpO2:  [96 %-98 %] 96 %    Physical Exam  Constitutional:       General: She is not in acute distress.     Appearance: She is not toxic-appearing.   HENT:      Head: Normocephalic and atraumatic.      Nose: Nose normal.      Mouth/Throat:      Mouth: Mucous membranes are moist.      Pharynx: Oropharynx is clear.   Eyes:      Extraocular Movements: Extraocular movements intact.      Conjunctiva/sclera: Conjunctivae normal.   Cardiovascular:      Rate and Rhythm: Normal rate and regular rhythm.      Pulses: Normal pulses.      Heart sounds: Normal heart sounds.   Pulmonary:      Effort: Pulmonary effort is normal.      Breath sounds: Normal breath sounds.   Abdominal:      General: There is no distension.      Palpations: Abdomen is soft.   Musculoskeletal:         General: No swelling or deformity.      Cervical back: Neck supple. No rigidity.   Skin:     General: Skin is warm and dry.   Neurological:      General: No focal deficit present.       Mental Status: She is oriented to person, place, and time.         Fluids    Intake/Output Summary (Last 24 hours) at 7/28/2024 1300  Last data filed at 7/28/2024 1008  Gross per 24 hour   Intake 620 ml   Output 1543 ml   Net -923 ml       Laboratory  Recent Labs     07/26/24  0351 07/27/24  0128 07/28/24  0818   WBC 17.8* 12.0* 9.3   RBC 4.56 3.98* 3.77*   HEMOGLOBIN 13.3 11.4* 10.5*   HEMATOCRIT 39.4 35.0* 32.2*   MCV 86.4 87.9 85.4   MCH 29.2 28.6 27.9   MCHC 33.8 32.6 32.6   RDW 52.3* 54.4* 51.7*   PLATELETCT 344 323 272   MPV 11.5 11.5 11.2     Recent Labs     07/26/24  0351 07/27/24  0128 07/28/24  0818   SODIUM 139 138 135   POTASSIUM 3.9 3.6 3.4*   CHLORIDE 107 106 102   CO2 17* 19* 21   GLUCOSE 160* 100* 95   BUN 15 18 11   CREATININE 0.71 0.72 0.65   CALCIUM 9.4 8.8 8.6     Recent Labs     07/26/24  0956   INR 1.05               Imaging  IR-NEPHROSTOGRAM W/ NEW TUBE PLACEMENT (ALL RADIOLOGY) RIGHT   Final Result      1. Ultrasound and fluoroscopic guided placement of a right 8 Maltese pigtail locking loop percutaneous nephrostomy catheter.      2. Nephrostogram showing satisfactory catheter position without extravasation.      3. A purulent urine sample was sent for microbiology.           Assessment/Plan  * Obstructive pyelonephritis- (present on admission)  Assessment & Plan  Recurrent problem  I requested RTOC have images uploaded, discussed with radiology, no obstructing stone however also discussed with urology and this is a recurrent problem with hydronephrosis/pyelonephrosis that improved with treatment for.  Status post nephrostomy tube placement  Patient feeling improvement  Continue meropenem    Right hemiplegia (HCC)- (present on admission)  Assessment & Plan  Chronic from a previous cerebral aneurysm    Drug rash- (present on admission)  Assessment & Plan  Improved  Status post Solu-Medrol and Benadryl    Hydronephrosis- (present on admission)  Assessment & Plan  Chronic  Patient has normal  kidney function  IV fluids  Discussed with urology and radiology  Status post nephrostomy tube placement    Encephalopathy acute- (present on admission)  Assessment & Plan  Seems improved  Continue antibiotics  Status post nephrostomy         VTE prophylaxis: VTE Selection    I have performed a physical exam and reviewed and updated ROS and Plan today (7/28/2024). In review of yesterday's note (7/27/2024), there are no changes except as documented above.

## 2024-07-28 NOTE — CARE PLAN
The patient is Stable - Low risk of patient condition declining or worsening    Shift Goals  Clinical Goals: flushes on neph tube, monitor output  Patient Goals: comfort, sleep  Family Goals: NA    Progress made toward(s) clinical / shift goals:  nephr tubes flused with ns still with bloody output with small clots, minimal bloody drain, no pain complaints, q2  turned, needs attended  Problem: Knowledge Deficit - Standard  Goal: Patient and family/care givers will demonstrate understanding of plan of care, disease process/condition, diagnostic tests and medications  Description: Target End Date:  1-3 days or as soon as patient condition allows    Document in Patient Education    1.  Patient and family/caregiver oriented to unit, equipment, visitation policy and means for communicating concern  2.  Complete/review Learning Assessment  3.  Assess knowledge level of disease process/condition, treatment plan, diagnostic tests and medications  4.  Explain disease process/condition, treatment plan, diagnostic tests and medications  Outcome: Progressing     Problem: Skin Integrity  Goal: Skin integrity is maintained or improved  Description: Target End Date:  Prior to discharge or change in level of care    Document interventions on Skin Risk/Juan David flowsheet groups and corresponding LDA    1.  Assess and monitor skin integrity, appearance and/or temperature  2.  Assess risk factors for impaired skin integrity and/or pressures ulcers  3.  Implement precautions to protect skin integrity in collaboration with interdisciplinary team  4.  Implement pressure ulcer prevention protocol if at risk for skin breakdown  5.  Confirm wound care consult if at risk for skin breakdown  6.  Ensure patient use of pressure relieving devices  (Low air loss bed, waffle overlay, heel protectors, ROHO cushion, etc)  Outcome: Progressing     Problem: Fall Risk  Goal: Patient will remain free from falls  Description: Target End Date:  Prior to  discharge or change in level of care    Document interventions on the Arleen Sauceda Fall Risk Assessment    1.  Assess for fall risk factors  2.  Implement fall precautions  Outcome: Progressing       Patient is not progressing towards the following goals:

## 2024-07-28 NOTE — PROGRESS NOTES
ISOLATION PRECAUTIONS EDUCATION    Educated PATIENT, FAMILY, S.O: patient on isolation for ESBL.    Educated on reason for isolation, how the infection may be transmitted, and how to help prevent transmission to others. Educated precautions involves staff and visitors wearing PPE, following Standard Precautions and performing meticulous hand hygiene in order to prevent transmission of infection.     Contact Precautions: Educated that Contact Precautions involves staff and visitors wearing gowns and gloves when in the patient room.     In addition, educated that the patient may leave the room, but prior to exiting the patient room each time, the patient needs to have on a fresh patient gown, ensure the potentially infectious area is covered, and perform hand hygiene with soap and water or alcohol-based hand rub, immediately prior to exiting the room.     Patient transport and mobilization on unit  Educated that they may leave their room, but prior to exiting, the patient needs to have on a fresh patient gown, ensure the potentially infectious area is covered, performing appropriate hand hygiene immediately prior to exiting the room.

## 2024-07-28 NOTE — PROGRESS NOTES
Note to reader: this note follows the APSO format rather than the historical SOAP format. Assessment and plan located at the top of the note for ease of use.    Chief Complaint  64 y.o. year old female here with possible w/ right pyonephrosis in atrophic kidney   Assessment/Plan  Interval History       Plan:   Continue abx per primary team  Flush NT   We will Schedule f/u w/ MD provider to discuss right simple nephrectomy in the next 2-4 weeks.     Case discussed with Dr. Huang, nursing     Patient seen and examined    7/28- Pt sleeping comfortably when I arrived. NT with alma bright red blood. Minimal ouput from NT- 45cc. Also voiding, 700cc UOP recorded via purwick. Po intake only 600oz. Wbc 12(17.8) on meropenem. Primary team discharging with home health care.  Expect minimal output from NT due to atrophic kidney.                  Review of Systems  Physical Exam   Review of Systems   Constitutional:  Negative for chills and fever.   Respiratory:  Negative for cough.    Cardiovascular:  Negative for chest pain.   Gastrointestinal:  Negative for abdominal pain.   Genitourinary:  Positive for hematuria. Negative for dysuria, flank pain, frequency and urgency.   Skin:  Negative for rash.     Vitals:    07/27/24 1806 07/27/24 1918 07/28/24 0426 07/28/24 0732   BP: (!) 159/110 130/78 134/78 (P) 137/89   Pulse: (!) 116 (!) 107 (!) 115 (!) (P) 122   Resp: 16 18 19 (P) 18   Temp: 36.6 °C (97.8 °F) 36.4 °C (97.5 °F) 36.7 °C (98 °F) (P) 36.6 °C (97.8 °F)   TempSrc: Temporal Temporal Temporal (P) Temporal   SpO2: 98% 98% 97% (P) 96%   Weight:       Height:         Physical Exam  Constitutional:       Appearance: Normal appearance.   HENT:      Head: Normocephalic.      Nose: Nose normal.   Pulmonary:      Effort: Pulmonary effort is normal.   Abdominal:      General: Abdomen is flat.   Genitourinary:     Comments: Neph tube in place with BRB   Psychiatric:         Mood and Affect: Mood normal.           Hematology Chemistry   Lab Results   Component Value Date/Time    WBC 12.0 (H) 07/27/2024 01:28 AM    HEMOGLOBIN 11.4 (L) 07/27/2024 01:28 AM    HEMATOCRIT 35.0 (L) 07/27/2024 01:28 AM    PLATELETCT 323 07/27/2024 01:28 AM     Lab Results   Component Value Date/Time    SODIUM 138 07/27/2024 01:28 AM    POTASSIUM 3.6 07/27/2024 01:28 AM    CHLORIDE 106 07/27/2024 01:28 AM    CO2 19 (L) 07/27/2024 01:28 AM    GLUCOSE 100 (H) 07/27/2024 01:28 AM    BUN 18 07/27/2024 01:28 AM    CREATININE 0.72 07/27/2024 01:28 AM         Labs not explicitly included in this progress note were reviewed by the author.   Radiology/imaging not explicitly included in this progress note was reviewed by the author.     Medications reviewed, Radiology images reviewed and Labs reviewed                          Mary Vivar, P.A.-C.   [id number] [place] [id number]   [phone]

## 2024-07-28 NOTE — PROGRESS NOTES
Radiology Progress Note   Author: SARAH Gomez Date & Time created: 7/28/2024     Date of admission  7/25/2024  Note to reader: this note follows the APSO format rather than the historical SOAP format. Assessment and plan located at the top of the note for ease of use.    Chief Complaint  64 y.o. female admitted 7/25/2024 with aphasia, flank pain    HPI  Nydia Finch is a 64-year-old female with a PMH of  cerebral aneurysm, CVA (right side hemiparesis and expressive aphasia), ESBL Ecoli infection, renal stones with right hydronephrosis who presented to [facility] unable to speak. CT abdomen found moderate right hydro with some mild hydroureter without evidence of stone, consistent with right pyelonephrosis.  IR consulted for placement of right PCN.  On 07/26/2024 Dr. Newton (IR) successfully placed Right PCN.       Interval History:   07/27/2024- Right  neph tube (8 [place]) to gravity drain in place with no documented nephrostomy tube output  in the last 24 hours.  R NT with bloody drainage in drainage bag and  tubing.  I did easily flush nephrostomy tube with 10 mL of normal saline.  Patient tolerated flushing drain well and nephrostomy tube appeared to have improved drainage post flush.  I reviewed today's labs: WBC 12.0; Hgb 11.4/35, Cr 0.72; Coags 1.05, UA-positive for nitrite, leukocyte esterase, bacteria ;micro -Urine cultures pending, ABX per hospitalist I discussed patient plan of care with  patient, nursing staff.  IDT notes reviewed    07/28/2024- Right neph tube (8 [place]) to gravity drain in place with 43 mLs of bloody  output  in the last 24 hours.  R NT with bloody drainage in drainage bag and  tubing.  I was able to easily flush nephrostomy tube with 10 mL of normal saline.  Patient tolerated flushing  and nephrostomy tube with improved drainage post flushing.  I reviewed today's labs: WBC 9.3; Hgb 10.5/32.2, Cr 0.65; Coags 1.05, UA-positive for nitrite, leukocyte esterase, bacteria ;micro -Urine  cultures positive for heavy growth of E. coli. ABX per hospitalist I discussed patient plan of care with  patient, nursing staff.  IDT notes reviewed      Assessment/Plan     Principal Problem:    Obstructive pyelonephritis  Active Problems:    Encephalopathy acute    Hydronephrosis    Drug rash    Right hemiplegia (HCC)      Plan IR  -- Continue orders to irrigate right Nephrostomy tube with 10 ml of sterile saline q shift ONLY IF no output or bloody output  - Fluid cultures pending   - Urology following   - Education provided on S/S of infection   - Ok to shower with catheter as long as site is covered with waterproof dressing; change dressing if it becomes wet.  - No baths or submerging site under water until catheter removed   - Ultimate plan is for patient to discharge with nephrostomy tube in place and follow-up with urology [place] for right nephrostomy care/management- Okay to dc with NT in place once draining well without bloody drainage  - For long term use, Nephrostomy tube will need to be exchanged every 3 months; sooner prn.   - IR will continue to follow nephrostomy tube while pt is in the hospital; we may not round daily.      -Thank you for allowing Interventional Radiology team to participate in the patients care, if any additional care or requests are needed in the future please do not hesitate call or place IR order. [phone]     -Thank you for allowing Interventional Radiology team to participate in the patients care, if any additional care or requests are needed in the future please do not hesitate to call or place IR order           Review of Systems  Physical Exam   Review of Systems   Constitutional:  Negative for chills and fever.   Respiratory:  Negative for cough and shortness of breath.    Cardiovascular:  Negative for chest pain.   Gastrointestinal:  Negative for nausea and vomiting.   Genitourinary:  Positive for hematuria. Negative for dysuria and flank pain.        Right flank pain has  improved   Musculoskeletal:  Negative for myalgias.   Neurological:  Positive for weakness. Negative for dizziness.        Right side hemiplegia secondary to previous CVA      Vitals:    07/28/24 0732   BP: (P) 137/89   Pulse: (!) (P) 122   Resp: (P) 18   Temp: (P) 36.6 °C (97.8 °F)   SpO2: (P) 96%        Physical Exam  Vitals and nursing note reviewed.   HENT:      Head: Normocephalic and atraumatic.      Mouth/Throat:      Mouth: Mucous membranes are dry.      Pharynx: Oropharynx is clear.   Eyes:      Pupils: Pupils are equal, round, and reactive to light.   Cardiovascular:      Rate and Rhythm: Normal rate and regular rhythm.   Pulmonary:      Effort: Pulmonary effort is normal. No respiratory distress.   Abdominal:      General: Abdomen is flat.      Palpations: Abdomen is soft.   Genitourinary:     Comments: Right nephrostomy tube draining bloody output  Skin:     General: Skin is warm and dry.      Capillary Refill: Capillary refill takes less than 2 seconds.   Neurological:      Mental Status: She is alert and oriented to person, place, and time.      Comments: Right-sided hemiplegia  Oriented with delayed and low volume speach   Psychiatric:         Attention and Perception: Attention normal.         Mood and Affect: Mood normal.         Speech: Speech normal.         Behavior: Behavior is cooperative.             Labs    Recent Labs     07/26/24  0351 07/27/24  0128 07/28/24  0818   WBC 17.8* 12.0* 9.3   RBC 4.56 3.98* 3.77*   HEMOGLOBIN 13.3 11.4* 10.5*   HEMATOCRIT 39.4 35.0* 32.2*   MCV 86.4 87.9 85.4   MCH 29.2 28.6 27.9   MCHC 33.8 32.6 32.6   RDW 52.3* 54.4* 51.7*   PLATELETCT 344 323 272   MPV 11.5 11.5 11.2     Recent Labs     07/26/24  0351 07/27/24  0128 07/28/24  0818   SODIUM 139 138 135   POTASSIUM 3.9 3.6 3.4*   CHLORIDE 107 106 102   CO2 17* 19* 21   GLUCOSE 160* 100* 95   BUN 15 18 11   CREATININE 0.71 0.72 0.65   CALCIUM 9.4 8.8 8.6     Recent Labs     07/26/24  0003 07/26/24 0351  "07/27/24  0128 07/28/24  0818   ALBUMIN 3.4 3.3  --   --    TBILIRUBIN 0.6 0.5  --   --    ALKPHOSPHAT 168* 167*  --   --    TOTPROTEIN 8.1 7.9  --   --    ALTSGPT 5 6  --   --    ASTSGOT 14 11*  --   --    CREATININE 0.72 0.71 0.72 0.65     IR-NEPHROSTOGRAM W/ NEW TUBE PLACEMENT (ALL RADIOLOGY) RIGHT   Final Result      1. Ultrasound and fluoroscopic guided placement of a right 8 Turkmen pigtail locking loop percutaneous nephrostomy catheter.      2. Nephrostogram showing satisfactory catheter position without extravasation.      3. A purulent urine sample was sent for microbiology.        INR   Date Value Ref Range Status   07/26/2024 1.05 0.87 - 1.13 Final     Comment:     INR - Non-therapeutic Reference Range: 0.87-1.13  INR - Therapeutic Reference Range: 2.0-4.0       No results found for: \"POCINR\"   No intake or output data in the 24 hours ending 07/26/24 1639   I have personally reviewed the above labs and imaging      I have performed a physical exam and reviewed and updated ROS and Plan today (7/28/2024).     38 minutes in directly providing and coordinating care and extensive data review.  No time overlap and excludes procedures.    "

## 2024-07-28 NOTE — PROGRESS NOTES
Assumed care for pt at shift change.R neph tube in place, minimal bloody output. Flushed with 10ml NS - pt tolerated well. Adequate clear yellow urine output noted from darian ortiz.  Reached out to MEENU Vivar, Urology PA  - pt can be cleared for discharge from urology standpoint. Okay to send with neph tube in place, may teach patient and family how and when to flush neph tube. Will relay to Hospitalist.      Stay away from people for 5 full days starting from your positive test result. Strict indoor masking for the 5 days following quarantine. Over-the-counter cough and cold medication as needed for symptom management. Monitor your oxygen levels with a pulse oximeter. If your oxygen levels are less than 95% persistently go to the ER. If you develop fever that lasts longer than 5 days, chest pain or shortness of breath go to the ER. Follow up with your primary doctor. If you have new, changing or worsening symptoms, please go directly to the ER.

## 2024-07-29 LAB
BACTERIA WND AEROBE CULT: ABNORMAL
BACTERIA WND AEROBE CULT: ABNORMAL
GRAM STN SPEC: ABNORMAL
SIGNIFICANT IND 70042: ABNORMAL
SITE SITE: ABNORMAL
SOURCE SOURCE: ABNORMAL

## 2024-07-29 PROCEDURE — 770001 HCHG ROOM/CARE - MED/SURG/GYN PRIV*

## 2024-07-29 PROCEDURE — 700105 HCHG RX REV CODE 258: Performed by: HOSPITALIST

## 2024-07-29 PROCEDURE — A9270 NON-COVERED ITEM OR SERVICE: HCPCS | Performed by: HOSPITALIST

## 2024-07-29 PROCEDURE — 99233 SBSQ HOSP IP/OBS HIGH 50: CPT | Performed by: STUDENT IN AN ORGANIZED HEALTH CARE EDUCATION/TRAINING PROGRAM

## 2024-07-29 PROCEDURE — 700101 HCHG RX REV CODE 250: Performed by: NURSE PRACTITIONER

## 2024-07-29 PROCEDURE — 700111 HCHG RX REV CODE 636 W/ 250 OVERRIDE (IP): Performed by: HOSPITALIST

## 2024-07-29 PROCEDURE — 700102 HCHG RX REV CODE 250 W/ 637 OVERRIDE(OP): Performed by: HOSPITALIST

## 2024-07-29 RX ADMIN — PREGABALIN 150 MG: 150 CAPSULE ORAL at 05:36

## 2024-07-29 RX ADMIN — DULOXETINE HYDROCHLORIDE 60 MG: 30 CAPSULE, DELAYED RELEASE ORAL at 05:36

## 2024-07-29 RX ADMIN — PREGABALIN 150 MG: 150 CAPSULE ORAL at 12:37

## 2024-07-29 RX ADMIN — SODIUM CHLORIDE, PRESERVATIVE FREE 10 ML: 5 INJECTION INTRAVENOUS at 12:00

## 2024-07-29 RX ADMIN — DULOXETINE HYDROCHLORIDE 60 MG: 30 CAPSULE, DELAYED RELEASE ORAL at 17:24

## 2024-07-29 RX ADMIN — MEROPENEM 500 MG: 500 INJECTION, POWDER, FOR SOLUTION INTRAVENOUS at 17:29

## 2024-07-29 RX ADMIN — SODIUM CHLORIDE, PRESERVATIVE FREE 10 ML: 5 INJECTION INTRAVENOUS at 18:00

## 2024-07-29 RX ADMIN — MEROPENEM 500 MG: 500 INJECTION, POWDER, FOR SOLUTION INTRAVENOUS at 05:38

## 2024-07-29 RX ADMIN — OXYCODONE HYDROCHLORIDE 5 MG: 5 TABLET ORAL at 15:17

## 2024-07-29 RX ADMIN — MEROPENEM 500 MG: 500 INJECTION, POWDER, FOR SOLUTION INTRAVENOUS at 12:38

## 2024-07-29 RX ADMIN — TAMSULOSIN HYDROCHLORIDE 0.4 MG: 0.4 CAPSULE ORAL at 09:14

## 2024-07-29 RX ADMIN — PREGABALIN 150 MG: 150 CAPSULE ORAL at 17:25

## 2024-07-29 RX ADMIN — SODIUM CHLORIDE, PRESERVATIVE FREE 10 ML: 5 INJECTION INTRAVENOUS at 06:19

## 2024-07-29 ASSESSMENT — PAIN DESCRIPTION - PAIN TYPE
TYPE: ACUTE PAIN

## 2024-07-29 ASSESSMENT — ENCOUNTER SYMPTOMS
HEMOPTYSIS: 0
PALPITATIONS: 0
SHORTNESS OF BREATH: 0
WEAKNESS: 1
VOMITING: 0
FEVER: 0
COUGH: 0
DIZZINESS: 0
MYALGIAS: 0
CHILLS: 0
NAUSEA: 0
FLANK PAIN: 0
HEADACHES: 0

## 2024-07-29 NOTE — PROGRESS NOTES
Isolation Precautions:    Patient is on Contact isolation for ESBL.    Patient educated on reason for isolation, how the infection may be transmitted, and how to help prevent transmission to others.     Patient educated that Contact precautions involves staff and visitors wearing PPE, follow  Standard Precautions and perform meticulous hand hygiene in order to prevent transmission of infection. (Contact Precautions: gown and gloves; Special Contact Precautions: gown and gloves, with the added requirement of soap and water for hand hygiene; Droplet Precautions: surgical mask worn by staff and visitors in the room, and worn by the patient when out of the room; Airborne Precautions: involves staff wearing PPE to include an N95 Respirator or Controlled Air Purifying Respirator (CAPR).  Visitors should be limited and may wear an N95 mask).         Patient transport and mobilization on unit. Patient educated that they may leave their room, but prior to exiting, the patient needs to have on a fresh patient gown, ensure the potentially infectious area is covered, perform appropriate hand hygiene immediately prior to exiting the room. (*For Airborne Precautions: Patient educated that time out of the room should be minimized and limited to transport to diagnostic procedures or other activities. Patient is to wear surgical mask when required to leave the patient room).

## 2024-07-29 NOTE — CARE PLAN
Problem: Fall Risk  Goal: Patient will remain free from falls  Outcome: Progressing     Problem: Pain - Standard  Goal: Alleviation of pain or a reduction in pain to the patient’s comfort goal  Outcome: Progressing   The patient is Watcher - Medium risk of patient condition declining or worsening    Shift Goals  Clinical Goals: monitor neph tube output, pain control  Patient Goals: comfort, sleep  Family Goals: NA    Progress made toward(s) clinical / shift goals:      Minimal pain throughout shift - controlled with prn tylenol  Neph tube flushed twice during shift - once by this RN, once by radiology     Patient is not progressing towards the following goals:

## 2024-07-29 NOTE — PROGRESS NOTES
"Received alert and oriented x 4.Expressive aphasia and right sided weakness.  Check vitals sign and recorded accordingly and due med given per MAR. Monitor sign and symptoms of respiratory distress and treatment given accordingly per MAR.Medicated per MAR and reassessed every 2 hours per protocol. Call light within reach. Bed framed alarm in placed. Needs attended. Continue to monitor.BP (!) 131/91   Pulse (!) 103   Temp 37 °C (98.6 °F) (Temporal)   Resp 18   Ht 1.676 m (5' 5.98\")   Wt 84.5 kg (186 lb 4.6 oz)   LMP  (LMP Unknown)   SpO2 94%   Breastfeeding No   BMI 30.08 kg/m² . Q2 turn.   "

## 2024-07-29 NOTE — PROGRESS NOTES
Radiology Progress Note   Author: SARAH Arteaga Date & Time created: 7/29/2024  1213 PM   Date of admission  7/25/2024  Note to reader: this note follows the APSO format rather than the historical SOAP format. Assessment and plan located at the top of the note for ease of use.    Chief Complaint  64 y.o. 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 CT abdomen found moderate right hydro with some mild hydroureter without evidence of stone, consistent with right pyelonephrosis.  IR consulted for placement of right PCN.  On 07/26/2024 Dr. Newton (IR) successfully placed Right PCN.       Interval History:   07/27/2024- Right  neph tube (8 [place] citizen) to gravity drain in place with no documented nephrostomy tube output  in the last 24 hours.  R NT with bloody drainage in drainage bag and  tubing.  I did easily flush nephrostomy tube with 10 mL of normal saline.  Patient tolerated flushing drain well and nephrostomy tube appeared to have improved drainage post flush.  I reviewed today's labs: WBC 12.0; Hgb 11.4/35, Cr 0.72; Coags 1.05, UA-positive for nitrite, leukocyte esterase, bacteria ;micro -Urine cultures pending, ABX per hospitalist I discussed patient plan of care with  patient, nursing staff.  IDT notes reviewed    07/28/2024- Right neph tube (8 [place] citizen) to gravity drain in place with 43 mLs of bloody  output  in the last 24 hours.  R NT with bloody drainage in drainage bag and  tubing.  I was able to easily flush nephrostomy tube with 10 mL of normal saline.  Patient tolerated flushing  and nephrostomy tube with improved drainage post flushing.  I reviewed today's labs: WBC 9.3; Hgb 10.5/32.2, Cr 0.65; Coags 1.05, UA-positive for nitrite, leukocyte esterase,  bacteria ;micro -Urine cultures positive for heavy growth of E. coli. ABX per hospitalist I discussed patient plan of care with  patient, nursing staff.  IDT notes reviewed    07/29/2024- Right neph tube (8 [place]ian) to gravity drain in place with no recorded output overnight. Approximately 50 mLs of bloody output in gravity bag upon assessment. I was able to easily flush nephrostomy tube with 10 mL of normal saline. Patient tolerated flushing and nephrostomy tube with improved drainage post flushing. No new labs today. UA-positive for nitrite, leukocyte esterase, bacteria ;micro -Urine cultures positive for heavy growth of E. coli. ESBL. ABX per hospitalist. I discussed patient plan of care with patient, nursing staff, and I reviewed IDT notes.    Assessment/Plan     Principal Problem:    Obstructive pyelonephritis  Active Problems:    Encephalopathy acute    Hydronephrosis    Drug rash    Right hemiplegia (HCC)      Plan IR  - Continue to irrigate right Nephrostomy tube with 10 ml of sterile saline q shift ONLY IF no output or bloody output  - Right kidney fluid cultures positive for Escherichia coli ESBL.  - Hospitalist and Urology following   - Education provided on S/S of infection   - Ok to shower with catheter as long as site is covered with waterproof dressing; change dressing if it becomes wet.  - No baths or submerging site under water until catheter removed   - Ultimate plan is for patient to discharge with nephrostomy tube in place and follow-up with urology [place] for right nephrostomy care/management- Okay to dc with NT in place once draining well without bloody drainage.  - For long term use, Nephrostomy tube will need to be exchanged every 3 months; sooner prn.   - IR will continue to follow nephrostomy tube while pt is in the hospital; we may not round daily.        -Thank you for allowing Interventional Radiology team to participate in the patients care, if any additional care or requests are needed  in the future please do not hesitate to call or place IR order.        Review of Systems  Physical Exam   Review of Systems   Constitutional:  Negative for chills and fever.   Respiratory:  Negative for cough and shortness of breath.    Cardiovascular:  Negative for chest pain.   Gastrointestinal:  Negative for nausea and vomiting.   Genitourinary:  Positive for hematuria. Negative for dysuria and flank pain.   Musculoskeletal:  Negative for myalgias.   Neurological:  Positive for weakness. Negative for dizziness.        Right side hemiplegia secondary to previous CVA      Vitals:    07/29/24 0716   BP: (!) 157/80   Pulse:    Resp:    Temp:    SpO2:         Physical Exam  Vitals and nursing note reviewed.   HENT:      Head: Normocephalic and atraumatic.      Mouth/Throat:      Mouth: Mucous membranes are dry.      Pharynx: Oropharynx is clear.   Eyes:      Pupils: Pupils are equal, round, and reactive to light.   Cardiovascular:      Rate and Rhythm: Normal rate and regular rhythm.   Pulmonary:      Effort: Pulmonary effort is normal. No respiratory distress.   Abdominal:      General: Abdomen is flat.      Palpations: Abdomen is soft.   Genitourinary:     Comments: Right nephrostomy tube draining bloody output, dressing CDI.  Skin:     General: Skin is warm and dry.      Capillary Refill: Capillary refill takes less than 2 seconds.   Neurological:      Mental Status: She is alert and oriented to person, place, and time.      Comments: Right-sided hemiplegia  Oriented with delayed and low volume speach   Psychiatric:         Attention and Perception: Attention normal.         Mood and Affect: Mood normal.         Speech: Speech normal.         Behavior: Behavior is cooperative.          Labs    Recent Labs     07/27/24  0128 07/28/24  0818   WBC 12.0* 9.3   RBC 3.98* 3.77*   HEMOGLOBIN 11.4* 10.5*   HEMATOCRIT 35.0* 32.2*   MCV 87.9 85.4   MCH 28.6 27.9   MCHC 32.6 32.6   RDW 54.4* 51.7*   PLATELETCT 323 272   MPV  "11.5 11.2     Recent Labs     07/27/24  0128 07/28/24  0818   SODIUM 138 135   POTASSIUM 3.6 3.4*   CHLORIDE 106 102   CO2 19* 21   GLUCOSE 100* 95   BUN 18 11   CREATININE 0.72 0.65   CALCIUM 8.8 8.6     Recent Labs     07/27/24  0128 07/28/24  0818   CREATININE 0.72 0.65     IR-NEPHROSTOGRAM W/ NEW TUBE PLACEMENT (ALL RADIOLOGY) RIGHT   Final Result      1. Ultrasound and fluoroscopic guided placement of a right 8 [place]bourgish pigtail locking loop percutaneous nephrostomy catheter.      2. Nephrostogram showing satisfactory catheter position without extravasation.      3. A purulent urine sample was sent for microbiology.        INR   Date Value Ref Range Status   07/26/2024 1.05 0.87 - 1.13 Final     Comment:     INR - Non-therapeutic Reference Range: 0.87-1.13  INR - Therapeutic Reference Range: 2.0-4.0       No results found for: \"POCINR\"   No intake or output data in the 24 hours ending 07/26/24 5799   I have personally reviewed the above labs and imaging      I have performed a physical exam and reviewed and updated ROS and Plan today (7/29/2024).     35 minutes in directly providing and coordinating care and extensive data review.  No time overlap and excludes procedures.    "

## 2024-07-29 NOTE — DOCUMENTATION QUERY
[facility]                                                                       Query Response Note      PATIENT:               ANGELINA ESCOBAR  ACCT #:                  [id number]  MRN:                     [id number]  :                      1960  ADMIT DATE:       2024 10:41 PM  DISCH DATE:          RESPONDING  PROVIDER #:        [id number]           QUERY TEXT:    Encephalopathy secondary to infectious etiology is documented in the Medical Record.     Can the type of encephalopathy be further specified?    Note: If you agree with a specified diagnosis please remember to include it in your concurrent daily documentation and the DC Summary.    The patient's Clinical Indicators include:     64 y.o. F transfer from OSH w/ obstructive pyelonephritis, pyonephrosis.  OSH CT-Head: Without acute intracranial process.    H&P: Encephalopathy acute-Secondary to infectious etiology.        PN: Encephalopathy acute-Seems improved. Continue antibiotics. S/p nephrostomy.  Culture Rt. Kidney Fluid: POS - Escherichia coli ESBL      PN: PE: Mental Status: She is oriented to person, place, and time.    Treatment: IVF; IV Merrem; Rt. Nephrostomy Tube Placement; Urology/IR Consults; lab  Risk Factors: Rt. Obstructive Pyelonephritis/Pyonephrosis; hx of cerebral aneurysm repair    Thank You,  Mary Ann Pérez RN, CCDS  Senior Clinical    [email or web address]  Connect via [email or web address] Messenger  Options provided:   -- Metabolic encephalopathy   -- Other type of encephalopathy   -- Other explanation, (please specify other explanation)   -- Unable to determine      Query created by: Mary Ann Pérez on 2024 10:27 AM    RESPONSE TEXT:    Unable to determine          Electronically signed by:  MARTIN JACOBSON MD 2024 3:32 PM

## 2024-07-29 NOTE — DISCHARGE PLANNING
Case Management Discharge Planning    Admission Date: 7/25/2024  GMLOS: 2.9  ALOS: 4    6-Clicks ADL Score: 13  6-Clicks Mobility Score: 9  PT and/or OT Eval ordered: Yes  Post-acute Referrals Ordered: Yes  Post-acute Choice Obtained: NA  Has referral(s) been sent to post-acute provider:  BARBARA      Anticipated Discharge Dispo: Discharge Disposition: Discharged to home/self care (01)  Discharge Address: [address] [place] [address]  Discharge Contact Phone Number: [phone]    DME Needed: No    Action(s) Taken:   RN CCM met with patient at bedside and she face timed her son, Farrukh on her Ipad.  Pt lives with her , Marbin and Farrukh in a 1 story [place] in [place].  Pt is wc bound at baseline.  Farrukh and Marbin assist her with transfers and all ADLs and IADLs.  Pt has been sent home with nephrostomy tubes in the past and they know care procedures.  They are able to drive her home when medically stable.  No home health available in [facility].    Medically Clear: No    Next Steps:   Provide transitional care coordination as needed.    Barriers to Discharge: Medical clearance    Care Transition Team Assessment    Information Source  Orientation Level: Disoriented X4  Information Given By: Patient, Relative  Informant's Name: Farrukh Finch  Who is responsible for making decisions for patient? : Patient    Readmission Evaluation  Is this a readmission?: No    Elopement Risk  Legal Hold: No  Ambulatory or Self Mobile in Wheelchair: Yes  Disoriented: No  Psychiatric Symptoms: None  History of Wandering: No  Elopement this Admit: No  Vocalizing Wanting to Leave: No  Displays Behaviors, Body Language Wanting to Leave: No-Not at Risk for Elopement  Elopement Risk: Not at Risk for Elopement    Interdisciplinary Discharge Planning  Lives with - Patient's Self Care Capacity: Spouse, Adult Children  Support Systems: Family Member(s), Spouse / Significant Other  Housing / Facility: 1 hospitals  Prior Services: Continuous (24  Hour) Care Giving Family    Discharge Preparedness  What is your plan after discharge?: Home with help  What are your discharge supports?: Spouse, Child  Prior Functional Level: Ambulatory, Needs Assist with Activities of Daily Living, Needs Assist with Medication Management, Uses Wheelchair  Difficulity with ADLs: Bathing, Brushing teeth, Dressing, Eating, Toileting, Walking  Difficulity with IADLs: Cooking, Driving, Keeping track of finances, Laundry, Managing medication, Shopping, Using the telephone or computer    Functional Assesment  Prior Functional Level: Ambulatory, Needs Assist with Activities of Daily Living, Needs Assist with Medication Management, Uses Wheelchair    Finances  Financial Barriers to Discharge: No  Prescription Coverage: Yes    Vision / Hearing Impairment  Vision Impairment : No  Hearing Impairment : No         Advance Directive  Advance Directive?: None    Domestic Abuse  Possible Abuse/Neglect Reported to:: Not Applicable    Psychological Assessment  History of Substance Abuse: None  History of Psychiatric Problems: No    Discharge Risks or Barriers  Discharge risks or barriers?: No  Patient risk factors: Cognitive / sensory / physical deficit    Anticipated Discharge Information  Discharge Disposition: Discharged to home/self care (01)  Discharge Address: [address] [place] [address]  Discharge Contact Phone Number: [phone]

## 2024-07-29 NOTE — PROGRESS NOTES
Report received from NOC RN and assumed patient care at 0700. Patient is A&Ox 4, on room air, and bed alarm is on. Patient is resting comfortably in bed, no complaints of pain. Right nephrostomy tube observed with sanguineous output and dressing CDI. Call light within reach and bed in lowest position. Reinforced the need to call for assistance. Plan of care discussed and patient does not have any further needs at this time.

## 2024-07-29 NOTE — PROGRESS NOTES
[facility] Daily Progress Note    Date of Service  7/29/2024    Chief Complaint  Nydia Finch is a 64 y.o. female admitted 7/25/2024 with abdominal pain    Hospital Course  Nydia Finch is a 64 y.o. female who presented 7/25/2024 with PMH  right hydronephrosis ,ESBL E. 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  The patient was recently admitted in the hospital for having hydronephrosis of the right kidney secondary to nephrolithiasis.  Apparently patient did have a stent placed in 2009 and was lost to follow-up.  Eventually the stent was obstructed and had her replacement in January.  Patient underwent a right percutaneous nephrostomy tube placement on 1/2024.  At that time she grew ESBL.     Patient was transferred from [facility] and I reviewed the records.  CT scan of the head found left cerebral repair, loss status post surgical procedure without acute intracranial process.  CT scan of the abdomen found due to adrenal glands and pancreas show no evidence of mass or cyst.  Right kidney is smaller than the left.  There is moderate hydronephrosis and mild hydroureter.  There is diffuse thickening up to 3 mm wall of the right pelvis and proximal ureter.  No dilatation of the left.  No renal calculi seen on either side.  The left kidney is normal in size.     WBC 10, Hemoglobin 12, platelets 337, BUN 20, creatinine 0.9, CO2 18, sodium 140, potassium 3.4, AST 23, ALT 14,, bili 1.8, lactic acid 1, CRP 39, troponin 0.012, urine drug screen was negative  UA was nitrate positive, leukoesterase positive, WBC 5-10, bacteria moderate.  COVID is negative.     Upon arrival to [facility] patient was given IV Rocephin and developed a drug rash.  She was given Solu-Medrol, Benadryl and Pepcid    Interval Problem Update  7/29-seen by radiology.  Continuing to irrigate  nephrostomy tube  No new complaints and no acute events  Can consider discharge when full plan from urology and radiology is confirmed.    I have discussed this patient's plan of care and discharge plan at IDT rounds today with Case Management, Nursing, Nursing leadership, and other members of the IDT team.    Consultants/Specialty  urology    Code Status  Full Code    Disposition  Medically Cleared  I have placed the appropriate orders for post-discharge needs.    Review of Systems  Review of Systems   Constitutional:  Negative for chills and fever.   Respiratory:  Negative for cough and hemoptysis.    Cardiovascular:  Negative for chest pain and palpitations.   Gastrointestinal:  Negative for nausea and vomiting.   Genitourinary:  Negative for dysuria.   Neurological:  Negative for dizziness and headaches.        Physical Exam  Temp:  [36.1 °C (97 °F)-37 °C (98.6 °F)] 36.3 °C (97.3 °F)  Pulse:  [] 80  Resp:  [16-19] 17  BP: (117-182)/() 157/80  SpO2:  [94 %-95 %] 94 %    Physical Exam  Constitutional:       General: She is not in acute distress.     Appearance: She is not toxic-appearing.   HENT:      Head: Normocephalic and atraumatic.      Nose: Nose normal.      Mouth/Throat:      Mouth: Mucous membranes are moist.      Pharynx: Oropharynx is clear.   Eyes:      Extraocular Movements: Extraocular movements intact.      Conjunctiva/sclera: Conjunctivae normal.   Cardiovascular:      Rate and Rhythm: Normal rate and regular rhythm.      Pulses: Normal pulses.      Heart sounds: Normal heart sounds.   Pulmonary:      Effort: Pulmonary effort is normal.      Breath sounds: Normal breath sounds.   Abdominal:      General: There is no distension.      Palpations: Abdomen is soft.   Musculoskeletal:         General: No swelling or deformity.      Cervical back: Neck supple. No rigidity.   Skin:     General: Skin is warm and dry.   Neurological:      General: No focal deficit present.      Mental Status:  She is oriented to person, place, and time.         Fluids    Intake/Output Summary (Last 24 hours) at 7/29/2024 1403  Last data filed at 7/28/2024 1722  Gross per 24 hour   Intake 830 ml   Output 1000 ml   Net -170 ml       Laboratory  Recent Labs     07/27/24  0128 07/28/24  0818   WBC 12.0* 9.3   RBC 3.98* 3.77*   HEMOGLOBIN 11.4* 10.5*   HEMATOCRIT 35.0* 32.2*   MCV 87.9 85.4   MCH 28.6 27.9   MCHC 32.6 32.6   RDW 54.4* 51.7*   PLATELETCT 323 272   MPV 11.5 11.2     Recent Labs     07/27/24  0128 07/28/24  0818   SODIUM 138 135   POTASSIUM 3.6 3.4*   CHLORIDE 106 102   CO2 19* 21   GLUCOSE 100* 95   BUN 18 11   CREATININE 0.72 0.65   CALCIUM 8.8 8.6                     Imaging  IR-NEPHROSTOGRAM W/ NEW TUBE PLACEMENT (ALL RADIOLOGY) RIGHT   Final Result      1. Ultrasound and fluoroscopic guided placement of a right 8 Vietnamese pigtail locking loop percutaneous nephrostomy catheter.      2. Nephrostogram showing satisfactory catheter position without extravasation.      3. A purulent urine sample was sent for microbiology.           Assessment/Plan  * Obstructive pyelonephritis- (present on admission)  Assessment & Plan  Recurrent problem  I requested RTOC have images uploaded, discussed with radiology, no obstructing stone however also discussed with urology and this is a recurrent problem with hydronephrosis/pyelonephrosis that improved with treatment for.  Status post nephrostomy tube placement  Patient feeling improvement  Continue meropenem    Right hemiplegia (HCC)- (present on admission)  Assessment & Plan  Chronic from a previous cerebral aneurysm    Drug rash- (present on admission)  Assessment & Plan  Improved  Status post Solu-Medrol and Benadryl    Hydronephrosis- (present on admission)  Assessment & Plan  Chronic  Patient has normal kidney function  IV fluids  Discussed with urology and radiology  Status post nephrostomy tube placement    Encephalopathy acute- (present on admission)  Assessment &  Plan  Seems improved  Continue antibiotics  Status post nephrostomy         VTE prophylaxis: VTE Selection    I have performed a physical exam and reviewed and updated ROS and Plan today (7/29/2024). In review of yesterday's note (7/28/2024), there are no changes except as documented above.

## 2024-07-30 ENCOUNTER — APPOINTMENT (OUTPATIENT)
Dept: RADIOLOGY | Facility: MEDICAL CENTER | Age: 64
DRG: 690 | End: 2024-07-30
Attending: INTERNAL MEDICINE
Payer: MEDICARE

## 2024-07-30 LAB
ANION GAP SERPL CALC-SCNC: 12 MMOL/L (ref 7–16)
BUN SERPL-MCNC: 9 MG/DL (ref 8–22)
CALCIUM SERPL-MCNC: 8.4 MG/DL (ref 8.5–10.5)
CHLORIDE SERPL-SCNC: 104 MMOL/L (ref 96–112)
CO2 SERPL-SCNC: 21 MMOL/L (ref 20–33)
CREAT SERPL-MCNC: 0.65 MG/DL (ref 0.5–1.4)
ERYTHROCYTE [DISTWIDTH] IN BLOOD BY AUTOMATED COUNT: 52.7 FL (ref 35.9–50)
GFR SERPLBLD CREATININE-BSD FMLA CKD-EPI: 98 ML/MIN/1.73 M 2
GLUCOSE SERPL-MCNC: 96 MG/DL (ref 65–99)
HCT VFR BLD AUTO: 31.5 % (ref 37–47)
HGB BLD-MCNC: 10.4 G/DL (ref 12–16)
MCH RBC QN AUTO: 28.9 PG (ref 27–33)
MCHC RBC AUTO-ENTMCNC: 33 G/DL (ref 32.2–35.5)
MCV RBC AUTO: 87.5 FL (ref 81.4–97.8)
PLATELET # BLD AUTO: 244 K/UL (ref 164–446)
PMV BLD AUTO: 11.3 FL (ref 9–12.9)
POTASSIUM SERPL-SCNC: 3.4 MMOL/L (ref 3.6–5.5)
RBC # BLD AUTO: 3.6 M/UL (ref 4.2–5.4)
SODIUM SERPL-SCNC: 137 MMOL/L (ref 135–145)
WBC # BLD AUTO: 8.1 K/UL (ref 4.8–10.8)

## 2024-07-30 PROCEDURE — 770001 HCHG ROOM/CARE - MED/SURG/GYN PRIV*

## 2024-07-30 PROCEDURE — 700105 HCHG RX REV CODE 258: Performed by: HOSPITALIST

## 2024-07-30 PROCEDURE — A9270 NON-COVERED ITEM OR SERVICE: HCPCS | Performed by: HOSPITALIST

## 2024-07-30 PROCEDURE — 700102 HCHG RX REV CODE 250 W/ 637 OVERRIDE(OP): Performed by: HOSPITALIST

## 2024-07-30 PROCEDURE — 700111 HCHG RX REV CODE 636 W/ 250 OVERRIDE (IP): Performed by: HOSPITALIST

## 2024-07-30 PROCEDURE — 99233 SBSQ HOSP IP/OBS HIGH 50: CPT | Performed by: INTERNAL MEDICINE

## 2024-07-30 PROCEDURE — A9270 NON-COVERED ITEM OR SERVICE: HCPCS | Performed by: INTERNAL MEDICINE

## 2024-07-30 PROCEDURE — 700102 HCHG RX REV CODE 250 W/ 637 OVERRIDE(OP)

## 2024-07-30 PROCEDURE — 700101 HCHG RX REV CODE 250: Performed by: NURSE PRACTITIONER

## 2024-07-30 PROCEDURE — 85027 COMPLETE CBC AUTOMATED: CPT

## 2024-07-30 PROCEDURE — A9270 NON-COVERED ITEM OR SERVICE: HCPCS

## 2024-07-30 PROCEDURE — 700105 HCHG RX REV CODE 258: Performed by: INTERNAL MEDICINE

## 2024-07-30 PROCEDURE — 36415 COLL VENOUS BLD VENIPUNCTURE: CPT

## 2024-07-30 PROCEDURE — 700111 HCHG RX REV CODE 636 W/ 250 OVERRIDE (IP): Performed by: INTERNAL MEDICINE

## 2024-07-30 PROCEDURE — 700102 HCHG RX REV CODE 250 W/ 637 OVERRIDE(OP): Performed by: INTERNAL MEDICINE

## 2024-07-30 PROCEDURE — 80048 BASIC METABOLIC PNL TOTAL CA: CPT

## 2024-07-30 RX ORDER — ATORVASTATIN CALCIUM 40 MG/1
40 TABLET, FILM COATED ORAL NIGHTLY
Status: DISCONTINUED | OUTPATIENT
Start: 2024-07-30 | End: 2024-08-01 | Stop reason: HOSPADM

## 2024-07-30 RX ORDER — CALCIUM CARBONATE 500 MG/1
500 TABLET, CHEWABLE ORAL 3 TIMES DAILY PRN
Status: DISCONTINUED | OUTPATIENT
Start: 2024-07-30 | End: 2024-08-01 | Stop reason: HOSPADM

## 2024-07-30 RX ORDER — POTASSIUM CHLORIDE 1500 MG/1
40 TABLET, EXTENDED RELEASE ORAL ONCE
Status: COMPLETED | OUTPATIENT
Start: 2024-07-30 | End: 2024-07-30

## 2024-07-30 RX ADMIN — MEROPENEM 500 MG: 500 INJECTION, POWDER, FOR SOLUTION INTRAVENOUS at 12:54

## 2024-07-30 RX ADMIN — SODIUM CHLORIDE, POTASSIUM CHLORIDE, SODIUM LACTATE AND CALCIUM CHLORIDE: 600; 310; 30; 20 INJECTION, SOLUTION INTRAVENOUS at 05:09

## 2024-07-30 RX ADMIN — MEROPENEM 500 MG: 500 INJECTION, POWDER, FOR SOLUTION INTRAVENOUS at 00:39

## 2024-07-30 RX ADMIN — POTASSIUM CHLORIDE 40 MEQ: 1500 TABLET, EXTENDED RELEASE ORAL at 16:23

## 2024-07-30 RX ADMIN — SODIUM CHLORIDE, PRESERVATIVE FREE 10 ML: 5 INJECTION INTRAVENOUS at 18:28

## 2024-07-30 RX ADMIN — DULOXETINE HYDROCHLORIDE 60 MG: 30 CAPSULE, DELAYED RELEASE ORAL at 18:28

## 2024-07-30 RX ADMIN — PREGABALIN 150 MG: 150 CAPSULE ORAL at 12:54

## 2024-07-30 RX ADMIN — OXYCODONE HYDROCHLORIDE 5 MG: 5 TABLET ORAL at 22:24

## 2024-07-30 RX ADMIN — ONDANSETRON 4 MG: 4 TABLET, ORALLY DISINTEGRATING ORAL at 22:25

## 2024-07-30 RX ADMIN — ANTACID TABLETS 500 MG: 500 TABLET, CHEWABLE ORAL at 22:25

## 2024-07-30 RX ADMIN — MEROPENEM 500 MG: 500 INJECTION, POWDER, FOR SOLUTION INTRAVENOUS at 18:31

## 2024-07-30 RX ADMIN — ATORVASTATIN CALCIUM 40 MG: 40 TABLET, FILM COATED ORAL at 20:08

## 2024-07-30 RX ADMIN — PREGABALIN 150 MG: 150 CAPSULE ORAL at 18:28

## 2024-07-30 RX ADMIN — DULOXETINE HYDROCHLORIDE 60 MG: 30 CAPSULE, DELAYED RELEASE ORAL at 05:18

## 2024-07-30 RX ADMIN — OMEPRAZOLE 20 MG: 20 CAPSULE, DELAYED RELEASE ORAL at 16:23

## 2024-07-30 RX ADMIN — TAMSULOSIN HYDROCHLORIDE 0.4 MG: 0.4 CAPSULE ORAL at 09:11

## 2024-07-30 RX ADMIN — ANTACID TABLETS 500 MG: 500 TABLET, CHEWABLE ORAL at 01:16

## 2024-07-30 RX ADMIN — SODIUM CHLORIDE, PRESERVATIVE FREE 10 ML: 5 INJECTION INTRAVENOUS at 00:38

## 2024-07-30 RX ADMIN — MEROPENEM 500 MG: 500 INJECTION, POWDER, FOR SOLUTION INTRAVENOUS at 05:19

## 2024-07-30 RX ADMIN — SODIUM CHLORIDE, PRESERVATIVE FREE 10 ML: 5 INJECTION INTRAVENOUS at 05:18

## 2024-07-30 RX ADMIN — SODIUM CHLORIDE, PRESERVATIVE FREE 10 ML: 5 INJECTION INTRAVENOUS at 12:54

## 2024-07-30 RX ADMIN — PREGABALIN 150 MG: 150 CAPSULE ORAL at 05:18

## 2024-07-30 ASSESSMENT — ENCOUNTER SYMPTOMS
SHORTNESS OF BREATH: 0
WEAKNESS: 1
DIZZINESS: 0
HEADACHES: 0
COUGH: 0
CHILLS: 0
PALPITATIONS: 0
HEMOPTYSIS: 0
NAUSEA: 0
VOMITING: 0
FEVER: 0

## 2024-07-30 ASSESSMENT — PAIN DESCRIPTION - PAIN TYPE
TYPE: ACUTE PAIN

## 2024-07-30 NOTE — CARE PLAN
Problem: Knowledge Deficit - Standard  Goal: Patient and family/care givers will demonstrate understanding of plan of care, disease process/condition, diagnostic tests and medications  Description: Target End Date:  1-3 days or as soon as patient condition allows    Document in Patient Education    1.  Patient and family/caregiver oriented to unit, equipment, visitation policy and means for communicating concern  2.  Complete/review Learning Assessment  3.  Assess knowledge level of disease process/condition, treatment plan, diagnostic tests and medications  4.  Explain disease process/condition, treatment plan, diagnostic tests and medications  Outcome: Progressing     Problem: Skin Integrity  Goal: Skin integrity is maintained or improved  Description: Target End Date:  Prior to discharge or change in level of care    Document interventions on Skin Risk/Juan David flowsheet groups and corresponding LDA    1.  Assess and monitor skin integrity, appearance and/or temperature  2.  Assess risk factors for impaired skin integrity and/or pressures ulcers  3.  Implement precautions to protect skin integrity in collaboration with interdisciplinary team  4.  Implement pressure ulcer prevention protocol if at risk for skin breakdown  5.  Confirm wound care consult if at risk for skin breakdown  6.  Ensure patient use of pressure relieving devices  (Low air loss bed, waffle overlay, heel protectors, ROHO cushion, etc)  Outcome: Progressing     Problem: Fall Risk  Goal: Patient will remain free from falls  Description: Target End Date:  Prior to discharge or change in level of care    Document interventions on the Arleen Sauceda Fall Risk Assessment    1.  Assess for fall risk factors  2.  Implement fall precautions  Outcome: Progressing   The patient is Stable - Low risk of patient condition declining or worsening    Shift Goals  Clinical Goals: Patient neph tube will be flushed and monitored for patency and output.  Patient  Goals: Patient will rest comfortably throughout the shift.      Progress made toward(s) clinical / shift goals:  Patient neph tube flushed every 6 hours and output monitored. Output is still grossly hematuric. Patient currently resting comfortably with calm, unlabored breathing.    Patient is not progressing towards the following goals:

## 2024-07-30 NOTE — CARE PLAN
The patient is Stable - Low risk of patient condition declining or worsening    Shift Goals  Clinical Goals: maintain nephrostomy tube patency, safety, IV antibiotics  Patient Goals: rest, comfort  Family Goals: wants updates from MD    Progress made toward(s) clinical / shift goals:        Problem: Knowledge Deficit - Standard  Goal: Patient and family/care givers will demonstrate understanding of plan of care, disease process/condition, diagnostic tests and medications  Outcome: Progressing  Note: Patient updated on plan of care by team members.     Problem: Skin Integrity  Goal: Skin integrity is maintained or improved  Outcome: Progressing  Note: Skin breakdown prevention measures in place (adequate oral intake, Q2 turns, barrier cream, floating heels)      Problem: Fall Risk  Goal: Patient will remain free from falls  Outcome: Progressing  Note: Patient remains free from falls. Fall prevention measures in place.     Problem: Pain - Standard  Goal: Alleviation of pain or a reduction in pain to the patient’s comfort goal  Outcome: Progressing  Note: Patient maintains 0/10 pain level with interventions in place.       Patient is not progressing towards the following goals:

## 2024-07-30 NOTE — PROGRESS NOTES
Received report and assumed care of patient at change of shift. Patient is A&Ox 3 disoriented to time, on room air, and denies any pain at this time. Patient assessment completed, bed in lowest position, frame alarm on, and call light and personal belongings are within reach. Patient expressed no further needs at this time.

## 2024-07-30 NOTE — CONSULTS
INFECTIOUS DISEASES INPATIENT CONSULT NOTE     Date of Service: 7/30/2024    Consult Requested By: Beverly Vasquez M.D.    Reason for Consultation: Pyelonephritis    History of Present Illness:   Nydia Finch is a 64 y.o. woman with a history of brain aneurysm, prior stroke complicated by right hemiplegia and expressive aphasia, and history of nephrolithiasis admitted 7/25/2024 from [facility] secondary to abdominal pain where she was found to have moderate hydronephrosis and mild hydroureter.  Extensive review of emergency physician notes, hospital medicine notes and consultant notes performed.  The patient was last seen by the ID service back in February 2024 secondary to pyelonephritis.  On 1/26/2024 she underwent cystoscopy but they were unable to pass a wire due to extensive scarring so IR was consulted.  She underwent right-sided percutaneous nephrostomy tube placement on 1/28/2024.  Gross pus was aspirated.  Cultures grew ESBL E. coli.  At discharge she was switched to Bactrim through 2/10/2024.  Patient underwent right-sided percutaneous nephrostomy placement on 7/26/2024.  Fluid cultures are positive for ESBL E. coli only sensitive to Bactrim however she has rash and itching.  Patient states that there are plans for nephrectomy in approximately 2 weeks.  She is currently on IV meropenem.  Infectious disease service consulted for antibiotic recommendations.      All other review of systems reviewed and negative except those documented above in the HPI.     PMH:   Past Medical History:   Diagnosis Date    Aphagia 1994    Brain aneurysm     Chronic pain     Nephrolithiasis     Right hemiplegia (HCC) 1994    Stroke (HCC)        PSH:  Past Surgical History:   Procedure Laterality Date    TN CYSTOSCOPY,INSERT URETERAL STENT Right 1/26/2024    Procedure: CYSTOSCOPY;  Surgeon: Mik Rm M.D.;  Location: SURGERY [facility];  Service: Urology    CYSTOSCOPY N/A 7/4/2019    Procedure: CYSTOSCOPY;   Surgeon: Monico Killian M.D.;  Location: SURGERY [facility];  Service: Urology    STENT PLACEMENT Right 7/4/2019    Procedure: STENT PLACEMENT;  Surgeon: Monico Killian M.D.;  Location: SURGERY [facility];  Service: Urology    URETEROSCOPY Right 7/4/2019    Procedure: URETEROSCOPY;  Surgeon: Monico Killian M.D.;  Location: SURGERY [facility];  Service: Urology    CYSTOSCOPY STENT PLACEMENT  2/13/2018    Procedure: CYSTOSCOPY STENT PLACEMENT;  Surgeon: Monico Killian M.D.;  Location: SURGERY [facility];  Service: Urology    GASTRIC RESECTION  2012    Duodenal Switch    OTHER      OTHER NEUROLOGICAL SURG         FAMILY HX:  Reviewed and not pertinent to the patient's clinical presentation    SOCIAL HX:  Social History     Socioeconomic History    Marital status:      Spouse name: Not on file    Number of children: Not on file    Years of education: Not on file    Highest education level: Not on file   Occupational History    Not on file   Tobacco Use    Smoking status: Never    Smokeless tobacco: Never   Substance and Sexual Activity    Alcohol use: No    Drug use: Not on file     Comment: Aydee    Sexual activity: Not on file   Other Topics Concern    Not on file   Social History Narrative    Not on file     Social Determinants of Health     Financial Resource Strain: Low Risk  (11/10/2022)    Received from [facility]    Financial Resource Strain     Difficulty of Paying Living Expenses: Not on file     Access to Reliable Phone: Not on file   Food Insecurity: No Food Insecurity (7/26/2024)    Hunger Vital Sign     Worried About Running Out of Food in the Last Year: Never true     Ran Out of Food in the Last Year: Never true   Transportation Needs: No Transportation Needs (7/26/2024)    PRAPARE - Transportation     Lack of Transportation (Medical): No     Lack of Transportation (Non-Medical): No   Physical Activity: Not on file   Stress: Not on file   Social Connections: Not  on file   Intimate Partner Violence: Not At Risk (7/26/2024)    Humiliation, Afraid, Rape, and Kick questionnaire     Fear of Current or Ex-Partner: No     Emotionally Abused: No     Physically Abused: No     Sexually Abused: No   Housing Stability: Low Risk  (7/26/2024)    Housing Stability Vital Sign     Unable to Pay for Housing in the Last Year: No     Number of Places Lived in the Last Year: 1     Unstable Housing in the Last Year: No     Social History     Tobacco Use   Smoking Status Never   Smokeless Tobacco Never     Social History     Substance and Sexual Activity   Alcohol Use No       Allergies/Intolerances:  Allergies   Allergen Reactions    Piperacillin Sod-Tazobactam So Rash     Gave her head to toe drug rash at [facility] 1/25/2024      Sulfamethoxazole W-Trimethoprim Itching     Rash ,itching    Ceftriaxone Itching     Generalized redness       History reviewed with the patient     Other Current Medications:    Current Facility-Administered Medications:     calcium carbonate (Tums) chewable tab 500 mg, 500 mg, Oral, TID PRN, Lazarus Finch, A.P.R.N., 500 mg at 07/30/24 0116    normal saline flush 0.9 % SOLN 5-10 mL, 5-10 mL, Tube, Q6HRS, Berenice Beach A.P.R.N., 10 mL at 07/30/24 0518    lactated ringers infusion, , Intravenous, Continuous, Ray Lucas M.D., Last Rate: 83 mL/hr at 07/30/24 0509, New Bag at 07/30/24 0509    acetaminophen (Tylenol) tablet 650 mg, 650 mg, Oral, Q6HRS PRN, Ray Lucas M.D., 650 mg at 07/28/24 1258    ondansetron (Zofran) syringe/vial injection 4 mg, 4 mg, Intravenous, Q4HRS PRN, Ray Lucsa M.D.    ondansetron (Zofran ODT) dispertab 4 mg, 4 mg, Oral, Q4HRS PRN, Ray Lucas M.D.    promethazine (Phenergan) tablet 12.5-25 mg, 12.5-25 mg, Oral, Q4HRS PRN, Ray Lucsa M.D.    promethazine (Phenergan) suppository 12.5-25 mg, 12.5-25 mg, Rectal, Q4HRS PRN, Ray Lucas M.D.    prochlorperazine (Compazine) injection 5-10 mg, 5-10 mg, Intravenous, Q4HRS PRN,  "Ray Lucas M.D.    DULoxetine (Cymbalta) capsule 60 mg, 60 mg, Oral, BID, Ray Lucas M.D., 60 mg at 24 0518    pregabalin (Lyrica) capsule 150 mg, 150 mg, Oral, TID, Ray Lucas M.D., 150 mg at 24 0518    meropenem (Merrem) 500 mg in  mL IV-MBP, 500 mg, Intravenous, Q6HRS, Ray Lucas M.D., Stopped at 24 0549    tamsulosin (Flomax) capsule 0.4 mg, 0.4 mg, Oral, AFTER BREAKFAST, Ray Lucas M.D., 0.4 mg at 24 0911    oxyCODONE immediate-release (Roxicodone) tablet 5 mg, 5 mg, Oral, Q4HRS PRN, Ray Lucas M.D., 5 mg at 24 1517    morphine 4 MG/ML injection 1 mg, 1 mg, Intravenous, Q4HRS PRN, Ray Lucas M.D.  [unfilled]    Most Recent Vital Signs:  BP (!) 156/83   Pulse 87   Temp 35.9 °C (96.6 °F) (Temporal)   Resp 15   Ht 1.676 m (5' 5.98\")   Wt 84.5 kg (186 lb 4.6 oz)   LMP  (LMP Unknown)   SpO2 99%   Breastfeeding No   BMI 30.08 kg/m²   Temp  Av.4 °C (97.6 °F)  Min: 35.9 °C (96.6 °F)  Max: 37 °C (98.6 °F)    Physical Exam:  General: well nourished, no diaphoresis, well-appearing, no acute distress  HEENT: sclera anicteric, PERRL, extraocular muscles intact, mucous membranes moist, oropharynx clear and moist, no oral lesions or exudate  Neck: supple, no lymphadenopathy  Chest: CTAB, no rales, rhonchi or wheezes, normal work of breathing.  Cardiac: regular rate and rhythm, normal S1 S2, no murmurs, rubs or gallops  Abdomen: + bowel sounds, soft, non-tender, non-distended, no hepatosplenomegaly, right percutaneous nephrostomy tube with bloody drainage in bag  Extremities: WWP, no edema, 2+ pedal pulses  Skin: warm and dry, no rashes or worrisome lesions  Neuro: Alert and oriented times 3, expressive aphasia, right-sided hemiplegia psych: normal mood and behavior, pleasant; memory intact, normal judgement    Pertinent Lab Results:  Recent Labs     24  0818 24  013   WBC 9.3 8.1      Recent Labs     24  0818 24  0134 " "  HEMOGLOBIN 10.5* 10.4*   HEMATOCRIT 32.2* 31.5*   MCV 85.4 87.5   MCH 27.9 28.9   PLATELETCT 272 244         Recent Labs     07/28/24  0818 07/30/24  0134   SODIUM 135 137   POTASSIUM 3.4* 3.4*   CHLORIDE 102 104   CO2 21 21   CREATININE 0.65 0.65        No results for input(s): \"ALBUMIN\" in the last 72 hours.    Invalid input(s): \"AST\", \"ALT\", \"ALKPHOS\", \"BILITOT\", \"TOTALBILIRUB\", \"BILIRUBINTOT\", \"BILIRUBINDIR\", \"BILIRUBININD\", \"ALKALINEPHOS\"     Pertinent Micro:  Results       Procedure Component Value Units Date/Time    CULTURE WOUND W/ GRAM STAIN [[id number]]  (Abnormal)  (Susceptibility) Collected: 07/26/24 1108    Order Status: Completed Specimen: Wound Updated: 07/29/24 1201     Significant Indicator POS     Source WND     Site Right kidney fluid     Culture Result -     Gram Stain Result Many WBCs.  Moderate Gram negative rods.       Culture Result Escherichia coli ESBL  Heavy growth  Extended Spectrum Beta-lactamase (ESBL) isolated.  ESBL's may be clinically resistant to therapy with  Penicillins,Cephalosporins or Aztreonam despite  apparent in vitro susceptibility to some of these agents.  The patient requires contact isolation.  Please contact pharmacy or an Infectious Disease Specialist  if you have any questions about appropriate therapy.      Susceptibility       Escherichia coli esbl (1)       Antibiotic Interpretation Microscan   Method Status    Ampicillin/sulbactam Intermediate 16/8 mcg/mL ANISH Final    Ampicillin Resistant >16 mcg/mL ANISH Final    Ceftriaxone Resistant >32 mcg/mL ANISH Final    Cefazolin Resistant >16 mcg/mL ANISH Final    Ciprofloxacin Resistant >2 mcg/mL ANISH Final    Cefepime Resistant >16 mcg/mL ANISH Final    Cefuroxime Resistant >16 mcg/mL ANISH Final    Ertapenem Sensitive <=0.5 mcg/mL ANISH Final    Gentamicin Resistant >8 mcg/mL ANISH Final    Levofloxacin Resistant >4 mcg/mL ANISH Final    Minocycline Sensitive <=4 mcg/mL ANISH Final    Moxifloxacin Resistant >4 mcg/mL ANISH Final    " Pip/Tazobactam Sensitive <=8 mcg/mL ANISH Final    Trimeth/Sulfa Sensitive <=0.5/9.5 mcg/mL ANISH Final    Tigecycline Sensitive <=2 mcg/mL ANISH Final    Tobramycin Resistant >8 mcg/mL ANISH Final                       GRAM STAIN [[id number]] Collected: 07/26/24 1108    Order Status: Completed Specimen: Wound Updated: 07/26/24 1816     Significant Indicator .     Source WND     Site Right kidney fluid     Gram Stain Result Many WBCs.  Moderate Gram negative rods.      FLUID CULTURE W/GRAM STAIN [[id number]]     Order Status: No result Specimen: Other Body Fluid     URINALYSIS [[id number]]  (Abnormal) Collected: 07/26/24 0845    Order Status: Completed Specimen: Urine, Clean Catch Updated: 07/26/24 0924     Color Yellow     Character Cloudy     Specific Gravity 1.016     Ph 6.0     Glucose Negative mg/dL      Ketones 15 mg/dL      Protein Negative mg/dL      Bilirubin Negative     Urobilinogen, Urine 0.2     Nitrite Positive     Leukocyte Esterase Moderate     Occult Blood Negative     Micro Urine Req Microscopic          Blood Culture   Date Value Ref Range Status   02/19/2018 No growth after 5 days of incubation.  Final        Studies:  IR-NEPHROSTOGRAM W/ NEW TUBE PLACEMENT (ALL RADIOLOGY) RIGHT    Result Date: 7/26/2024 7/26/2024 10:50 AM HISTORY/REASON FOR EXAM: Pyonephrosis. TECHNIQUE/EXAM DESCRIPTION: Right Percutaneous Nephrostomy and Nephrostogram with ultrasound and fluoroscopic guidance. 2 fluoroscopic images obtained. Fluoroscopy time: 0.4 minutes Fluoroscopy dose(DAP): 0.295 Gy*cm^2 Moderate sedation with Fentanyl and Versed was administered during the procedure with appropriate continuous patient monitoring by the radiology nurse. Intraservice duration 9 minutes. PROCEDURE: Informed consent was obtained. The skin was prepped and draped in a sterile fashion using chlorhexidine antiseptic. The kidney was localized with real time ultrasound. Following local anesthesia with 1% lidocaine, an 18 G needle with a  christophe tip stylet was advanced into the renal collecting system under real-time ultrasound guidance. An angled Glidewire was advanced into the renal pelvis and following serial tract dilatation, an 8 English locking loop pigtail nephrostomy catheter was placed and reformed in the renal pelvis. The locking loop was secured and a nephrostogram obtained documenting satisfactory catheter position with all side holes within the collecting system. A purulent urine sample was sent to microbiology for analysis. The catheter was secured to the skin with a 2-0 Ethilon suture and connected to a gravity drainage bag. The patient tolerated the procedure well with no evidence of complication. COMPARISON:  None. FINDINGS:  The nephrostogram shows the catheter to be in satisfactory position. There is no extravasation.     1. Ultrasound and fluoroscopic guided placement of a right 8 English pigtail locking loop percutaneous nephrostomy catheter. 2. Nephrostogram showing satisfactory catheter position without extravasation. 3. A purulent urine sample was sent for microbiology.      IMPRESSION:   1.  Pyelonephritis status post right-sided percutaneous nephrostomy tube placement on 7/26   2.  ESBL E. coli infection  3.  History of brain aneurysm  4.  History of prior CVA with residual right-sided hemiplegia and expressive aphasia      PLAN:   Nydia Finch is a 64 y.o. woman with a history of brain aneurysm, prior CVA with right-sided hemiplegia and expressive aphasia and history of right-sided pyelonephritis with ESBL E. coli and February 2024 admitted on 7/25/2024 from an outside hospital secondary to abdominal pain and evidence of hydronephrosis and hydro ureter on imaging.  She is now status post percutaneous nephrostomy tube placement on 7/26 with fluid cultures positive for ESBL E. coli.  Unfortunately, the only oral antibiotic available to treat this infection is Bactrim per the susceptibilities however she is allergic to  Bactrim with rash and pruritus.    -Continue IV meropenem 500 mg every 6 hours  -Plan a 14-day course of antibiotics from 7/26 with stop date 08/09/2024  -Midline ordered today  -Follow-up with urology postdischarge.  Patient states there are plans for nephrectomy in approximately 2 weeks    Disposition: TBD may need skilled facility as per last case management note, no home health in [place], [place]    Need for midline: Yes    Follow-up in ID clinic with NP      Plan of care discussed with IM Beverly Vasquez M.D..  ID signing off.  Please reconsult if needed    Pauline Pittman M.D.      Please note that this dictation was created using voice recognition software. I have worked with technical experts from [facility] to optimize the interface.  I have made every reasonable attempt to correct obvious errors, but there may be errors of grammar and possibly content that I did not discover before finalizing the note.

## 2024-07-30 NOTE — PROGRESS NOTES
[facility] Medicine Daily Progress Note    Date of Service  7/30/2024    Chief Complaint  Nydia Finch is a 64 y.o. female admitted 7/25/2024 with abdominal pain    Hospital Course  Nydia Finch is a 64 y.o. female who presented 7/25/2024 with PMH  right hydronephrosis ,ESBL E. 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  The patient was recently admitted in the hospital for having hydronephrosis of the right kidney secondary to nephrolithiasis.  Apparently patient did have a stent placed in 2009 and was lost to follow-up.  Eventually the stent was obstructed and had her replacement in January.  Patient underwent a right percutaneous nephrostomy tube placement on 1/2024.  At that time she grew ESBL.     Patient was transferred from [facility] and I reviewed the records.  CT scan of the head found left cerebral repair, loss status post surgical procedure without acute intracranial process.  CT scan of the abdomen found due to adrenal glands and pancreas show no evidence of mass or cyst.  Right kidney is smaller than the left.  There is moderate hydronephrosis and mild hydroureter.  There is diffuse thickening up to 3 mm wall of the right pelvis and proximal ureter.  No dilatation of the left.  No renal calculi seen on either side.  The left kidney is normal in size.     WBC 10, Hemoglobin 12, platelets 337, BUN 20, creatinine 0.9, CO2 18, sodium 140, potassium 3.4, AST 23, ALT 14,, bili 1.8, lactic acid 1, CRP 39, troponin 0.012, urine drug screen was negative  UA was nitrate positive, leukoesterase positive, WBC 5-10, bacteria moderate.  COVID is negative.     Upon arrival to [facility] patient was given IV Rocephin and developed a drug rash.  She was given Solu-Medrol, Benadryl and Pepcid    Interval Problem Update  7/29-seen by radiology.  Continuing to irrigate  nephrostomy tube  No new complaints and no acute events  Can consider discharge when full plan from urology and radiology is confirmed.    7/30- she is feeling better. She is aphasic, but still able to communicate with signs. She has I-pad with her that she also use. I was able to call her  and gave updates. All questions answered. Her vitals are improving.she is not tachycardic anymore. I did discuss personally with Dr. Pittman, ID physician and pt will need meropenem until 8/9. We will work with placement, IV antibiotic . Midline placed   Stop IVF     I have discussed this patient's plan of care and discharge plan at IDT rounds today with Case Management, Nursing, Nursing leadership, and other members of the IDT team.    Consultants/Specialty  urology    Code Status  Full Code    Disposition  The patient is not medically cleared for discharge to home or a post-acute facility.      I have placed the appropriate orders for post-discharge needs.    Review of Systems  Review of Systems   Constitutional:  Negative for chills and fever.   Respiratory:  Negative for cough and hemoptysis.    Cardiovascular:  Negative for chest pain and palpitations.   Gastrointestinal:  Negative for nausea and vomiting.   Genitourinary:  Negative for dysuria.   Neurological:  Negative for dizziness and headaches.        Physical Exam  Temp:  [35.9 °C (96.6 °F)-36.2 °C (97.2 °F)] 35.9 °C (96.6 °F)  Pulse:  [87-99] 87  Resp:  [15-17] 15  BP: (107-156)/(57-83) 156/83  SpO2:  [95 %-99 %] 99 %    Physical Exam  Constitutional:       General: She is not in acute distress.     Appearance: She is not toxic-appearing.   HENT:      Head: Normocephalic and atraumatic.      Nose: Nose normal.      Mouth/Throat:      Mouth: Mucous membranes are moist.      Pharynx: Oropharynx is clear.   Eyes:      Extraocular Movements: Extraocular movements intact.      Conjunctiva/sclera: Conjunctivae normal.   Cardiovascular:      Rate and  Rhythm: Normal rate and regular rhythm.      Pulses: Normal pulses.      Heart sounds: Normal heart sounds.   Pulmonary:      Effort: Pulmonary effort is normal.      Breath sounds: Normal breath sounds.   Abdominal:      General: There is no distension.      Palpations: Abdomen is soft.   Musculoskeletal:         General: No swelling or deformity.      Cervical back: Neck supple. No rigidity.   Skin:     General: Skin is warm and dry.   Neurological:      General: No focal deficit present.      Mental Status: She is oriented to person, place, and time.         Fluids    Intake/Output Summary (Last 24 hours) at 7/30/2024 1527  Last data filed at 7/30/2024 0900  Gross per 24 hour   Intake --   Output 1000 ml   Net -1000 ml       Laboratory  Recent Labs     07/28/24  0818 07/30/24  0134   WBC 9.3 8.1   RBC 3.77* 3.60*   HEMOGLOBIN 10.5* 10.4*   HEMATOCRIT 32.2* 31.5*   MCV 85.4 87.5   MCH 27.9 28.9   MCHC 32.6 33.0   RDW 51.7* 52.7*   PLATELETCT 272 244   MPV 11.2 11.3     Recent Labs     07/28/24  0818 07/30/24  0134   SODIUM 135 137   POTASSIUM 3.4* 3.4*   CHLORIDE 102 104   CO2 21 21   GLUCOSE 95 96   BUN 11 9   CREATININE 0.65 0.65   CALCIUM 8.6 8.4*                     Imaging  IR-NEPHROSTOGRAM W/ NEW TUBE PLACEMENT (ALL RADIOLOGY) RIGHT   Final Result      1. Ultrasound and fluoroscopic guided placement of a right 8 Slovenian pigtail locking loop percutaneous nephrostomy catheter.      2. Nephrostogram showing satisfactory catheter position without extravasation.      3. A purulent urine sample was sent for microbiology.      IR-MIDLINE CATHETER INSERTION WO GUIDANCE > AGE 3    (Results Pending)        Assessment/Plan  * Obstructive pyelonephritis- (present on admission)  Assessment & Plan  Recurrent problem  I requested RTOC have images uploaded, discussed with radiology, no obstructing stone however also discussed with urology and this is a recurrent problem with hydronephrosis/pyelonephrosis that improved with  treatment for.  Status post nephrostomy tube placement because of concern for abscess   Patient feeling improvement  Continue meropenem- end date 8/9     Right hemiplegia (HCC)- (present on admission)  Assessment & Plan  Chronic from a previous cerebral aneurysm    Drug rash- (present on admission)  Assessment & Plan  Improved  Status post Solu-Medrol and Benadryl    Hydronephrosis- (present on admission)  Assessment & Plan  Chronic  Patient has normal kidney function  IV fluids  Discussed with urology and radiology  Status post nephrostomy tube placement  7/30- discussed personally with IR and nephrostomy working well. She needs to follow up as OP with urology.     Encephalopathy acute- (present on admission)  Assessment & Plan  Seems improved  Continue antibiotics  Status post nephrostomy  7/30- at her baseline          VTE prophylaxis:   SCDs/TEDs      I have performed a physical exam and reviewed and updated ROS and Plan today (7/30/2024). In review of yesterday's note (7/29/2024), there are no changes except as documented above.      Greater than 51 minutes spent prepping to see patient (e.g. review of tests) obtaining and/or reviewing separately obtained history. Performing a medically appropriate examination and/ evaluation.  Counseling and educating the patient/family/caregiver.  Ordering medications, tests, or procedures.  Referring and communicating with other health care professionals.  Documenting clinical information in EPIC.  Independently interpreting results and communicating results to patient/family/caregiver.  Care coordination.

## 2024-07-30 NOTE — DISCHARGE PLANNING
Case Management Discharge Planning    Admission Date: 7/25/2024  GMLOS: 2.9  ALOS: 5    6-Clicks ADL Score: 13  6-Clicks Mobility Score: 9  PT and/or OT Eval ordered: Yes  Post-acute Referrals Ordered: Yes  Post-acute Choice Obtained: Yes  Has referral(s) been sent to post-acute provider:  Yes      Anticipated Discharge Dispo: Discharge Disposition: Discharged to home/self care (01)  Discharge Address: [address] [place] [address]  Discharge Contact Phone Number: [phone]    DME Needed: No    Action(s) Taken: OTHER    Discussed case during IDT Unit Rounds. Per MD, patient is not medically clear. Per MD, the anticipated discharge disposition is Post Acute Placement. SW requested DPA initiates [facility].     [id number]    Escalations Completed: None    Medically Clear: No    Next Steps: SNF    Barriers to Discharge: Medical clearance    Is the patient up for discharge tomorrow:

## 2024-07-30 NOTE — PROCEDURES
Vat x 2 at bedside unfortunately upon assessment of Left upper arm we could not place a midline or PICC due to inadequate vasculature. Bedside RN notified.

## 2024-07-30 NOTE — PROGRESS NOTES
Radiology Progress Note   Author: SARAH Ennis Date & Time created: 7/30/2024  12:34 PM   Date of admission  7/25/2024  Note to reader: this note follows the APSO format rather than the historical SOAP format. Assessment and plan located at the top of the note for ease of use.    Chief Complaint  64 y.o. female admitted 7/25/2024 with No chief complaint on file.        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 Upon arrival, IR was consulted and pt underwent a right nephrostomy tube placement with IR Dr. Newton on 07/26/2024.     Interval History:   07/27/2024- Right  neph tube (8 Kitt[place]) to gravity drain in place with no documented nephrostomy tube output  in the last 24 hours.  R NT with bloody drainage in drainage bag and  tubing.  I did easily flush nephrostomy tube with 10 mL of normal saline.  Patient tolerated flushing drain well and nephrostomy tube appeared to have improved drainage post flush.  I reviewed today's labs: WBC 12.0; Hgb 11.4/35, Cr 0.72; Coags 1.05, UA-positive for nitrite, leukocyte esterase, bacteria ;micro -Urine cultures pending, ABX per hospitalist I discussed patient plan of care with  patient, nursing staff.  IDT notes reviewed     07/28/2024- Right neph tube (8 Kitt[place]) to gravity drain in place with 43 mLs of bloody  output  in the last 24 hours.  R NT with bloody drainage in drainage bag and  tubing.  I was able to easily flush nephrostomy tube with 10 mL of normal saline.  Patient tolerated flushing  and nephrostomy tube with improved drainage post flushing.  I reviewed today's labs: WBC 9.3; Hgb 10.5/32.2, Cr 0.65; Coags 1.05, UA-positive for nitrite, leukocyte esterase, bacteria ;micro  -Urine cultures positive for heavy growth of E. coli. ABX per hospitalist I discussed patient plan of care with  patient, nursing staff.  IDT notes reviewed     07/29/2024- Right neph tube (8 Citizen of [place]) to gravity drain in place with no recorded output overnight. Approximately 50 mLs of bloody output in gravity bag upon assessment. I was able to easily flush nephrostomy tube with 10 mL of normal saline. Patient tolerated flushing and nephrostomy tube with improved drainage post flushing. No new labs today. UA-positive for nitrite, leukocyte esterase, bacteria ;micro -Urine cultures positive for heavy growth of E. coli. ESBL. ABX per hospitalist. I discussed patient plan of care with patient, nursing staff, and I reviewed IDT notes.    07/30/24 - right neph tube to gravity drain in place with no recorded output in the last 24 hours; scant amount of bloody drainage in bag.  Bedside RN exchanged tubing earlier this morning.  Pt with Atrophic right kidney. Labs reviewed; WBC 8.1, Creatinine 0.65, GFR 98.  Drain flushed with 10 mL NS.  Pt discussed with bedside RN and hospitalist.     Assessment/Plan     Principal Problem:    Obstructive pyelonephritis  Active Problems:    Encephalopathy acute    Hydronephrosis    Drug rash    Right hemiplegia (HCC)      Plan IR  - Continue to irrigate right Nephrostomy tube with 10 ml of sterile saline q shift   - Expect minimal output due to atrophic kidney  - Right kidney fluid cultures positive for E. Coli ESBL  - Education provided on S/S of infection   - Ok to shower with catheter as long as site is covered with waterproof dressing; change dressing if it becomes wet.  - No baths or submerging site under water until catheter removed   - Ultimate plan is for patient to discharge with nephrostomy tube in place and follow-up with urology [place] for right nephrectomy in 2 to 4 weeks  - Okay to dc with PNT in place once medically cleared  - For long term use, Nephrostomy tube will need to  be exchanged every 3 months; sooner prn.   - IR will continue to follow nephrostomy tube while pt is in the hospital; we may not round daily.   -  -Thank you for allowing Interventional Radiology team to participate in the patients care, if any additional care or requests are needed in the future please do not hesitate to call or place IR order           Review of Systems  Physical Exam   Review of Systems   Constitutional:  Negative for chills and fever.   Respiratory:  Negative for shortness of breath.    Cardiovascular:  Negative for chest pain and palpitations.   Gastrointestinal:  Negative for nausea and vomiting.   Neurological:  Positive for weakness.         Right side hemiplegia secondary to previous CVA       Vitals:    07/30/24 0826   BP: (!) 156/83   Pulse: 87   Resp: 15   Temp: 35.9 °C (96.6 °F)   SpO2: 99%        Physical Exam  Constitutional:       Appearance: Normal appearance.   Cardiovascular:      Rate and Rhythm: Normal rate.   Pulmonary:      Effort: Pulmonary effort is normal. No respiratory distress.   Abdominal:      General: There is no distension.   Genitourinary:     Comments: Right Neph tube  Skin:     General: Skin is warm and dry.   Neurological:      Mental Status: She is alert and oriented to person, place, and time.      Comments: Right-sided hemiplegia baseline       Psychiatric:         Mood and Affect: Mood normal.         Behavior: Behavior normal.             Labs    Recent Labs     07/28/24  0818 07/30/24  0134   WBC 9.3 8.1   RBC 3.77* 3.60*   HEMOGLOBIN 10.5* 10.4*   HEMATOCRIT 32.2* 31.5*   MCV 85.4 87.5   MCH 27.9 28.9   MCHC 32.6 33.0   RDW 51.7* 52.7*   PLATELETCT 272 244   MPV 11.2 11.3     Recent Labs     07/28/24  0818 07/30/24  0134   SODIUM 135 137   POTASSIUM 3.4* 3.4*   CHLORIDE 102 104   CO2 21 21   GLUCOSE 95 96   BUN 11 9   CREATININE 0.65 0.65   CALCIUM 8.6 8.4*     Recent Labs     07/28/24  0818 07/30/24  0134   CREATININE 0.65 0.65     IR-NEPHROSTOGRAM W/ NEW  "TUBE PLACEMENT (ALL RADIOLOGY) RIGHT   Final Result      1. Ultrasound and fluoroscopic guided placement of a right 8 [place] pigtail locking loop percutaneous nephrostomy catheter.      2. Nephrostogram showing satisfactory catheter position without extravasation.      3. A purulent urine sample was sent for microbiology.      IR-MIDLINE CATHETER INSERTION WO GUIDANCE > AGE 3    (Results Pending)     INR   Date Value Ref Range Status   07/26/2024 1.05 0.87 - 1.13 Final     Comment:     INR - Non-therapeutic Reference Range: 0.87-1.13  INR - Therapeutic Reference Range: 2.0-4.0       No results found for: \"POCINR\"     Intake/Output Summary (Last 24 hours) at 7/30/2024 1234  Last data filed at 7/30/2024 0900  Gross per 24 hour   Intake --   Output 1000 ml   Net -1000 ml      I have personally reviewed the above labs and imaging      I have performed a physical exam and reviewed and updated ROS and Plan today (7/30/2024).     32 minutes in directly providing and coordinating care and extensive data review.  No time overlap and excludes procedures.   "

## 2024-07-31 ENCOUNTER — APPOINTMENT (OUTPATIENT)
Dept: RADIOLOGY | Facility: MEDICAL CENTER | Age: 64
DRG: 690 | End: 2024-07-31
Attending: INTERNAL MEDICINE
Payer: MEDICARE

## 2024-07-31 VITALS
DIASTOLIC BLOOD PRESSURE: 66 MMHG | HEART RATE: 107 BPM | SYSTOLIC BLOOD PRESSURE: 105 MMHG | HEIGHT: 66 IN | TEMPERATURE: 98 F | OXYGEN SATURATION: 92 % | RESPIRATION RATE: 16 BRPM | WEIGHT: 186.29 LBS | BODY MASS INDEX: 29.94 KG/M2

## 2024-07-31 PROBLEM — R00.0 SINUS TACHYCARDIA: Status: ACTIVE | Noted: 2024-07-31

## 2024-07-31 PROBLEM — N15.1 RENAL AND PERINEPHRIC ABSCESS: Status: ACTIVE | Noted: 2024-07-31

## 2024-07-31 PROCEDURE — 700101 HCHG RX REV CODE 250

## 2024-07-31 PROCEDURE — A9270 NON-COVERED ITEM OR SERVICE: HCPCS | Performed by: HOSPITALIST

## 2024-07-31 PROCEDURE — 700101 HCHG RX REV CODE 250: Performed by: NURSE PRACTITIONER

## 2024-07-31 PROCEDURE — 700102 HCHG RX REV CODE 250 W/ 637 OVERRIDE(OP): Performed by: HOSPITALIST

## 2024-07-31 PROCEDURE — 700111 HCHG RX REV CODE 636 W/ 250 OVERRIDE (IP): Performed by: RADIOLOGY

## 2024-07-31 PROCEDURE — 700102 HCHG RX REV CODE 250 W/ 637 OVERRIDE(OP): Performed by: INTERNAL MEDICINE

## 2024-07-31 PROCEDURE — 99233 SBSQ HOSP IP/OBS HIGH 50: CPT | Performed by: INTERNAL MEDICINE

## 2024-07-31 PROCEDURE — 0JH63XZ INSERTION OF TUNNELED VASCULAR ACCESS DEVICE INTO CHEST SUBCUTANEOUS TISSUE AND FASCIA, PERCUTANEOUS APPROACH: ICD-10-PCS | Performed by: RADIOLOGY

## 2024-07-31 PROCEDURE — 700117 HCHG RX CONTRAST REV CODE 255: Performed by: INTERNAL MEDICINE

## 2024-07-31 PROCEDURE — A9270 NON-COVERED ITEM OR SERVICE: HCPCS | Performed by: INTERNAL MEDICINE

## 2024-07-31 PROCEDURE — 71275 CT ANGIOGRAPHY CHEST: CPT

## 2024-07-31 PROCEDURE — 770001 HCHG ROOM/CARE - MED/SURG/GYN PRIV*

## 2024-07-31 PROCEDURE — 02H633Z INSERTION OF INFUSION DEVICE INTO RIGHT ATRIUM, PERCUTANEOUS APPROACH: ICD-10-PCS | Performed by: RADIOLOGY

## 2024-07-31 PROCEDURE — 700105 HCHG RX REV CODE 258: Performed by: INTERNAL MEDICINE

## 2024-07-31 PROCEDURE — 99152 MOD SED SAME PHYS/QHP 5/>YRS: CPT

## 2024-07-31 PROCEDURE — 700111 HCHG RX REV CODE 636 W/ 250 OVERRIDE (IP)

## 2024-07-31 PROCEDURE — 700111 HCHG RX REV CODE 636 W/ 250 OVERRIDE (IP): Performed by: INTERNAL MEDICINE

## 2024-07-31 RX ORDER — MIDAZOLAM HYDROCHLORIDE 1 MG/ML
INJECTION INTRAMUSCULAR; INTRAVENOUS
Status: COMPLETED
Start: 2024-07-31 | End: 2024-07-31

## 2024-07-31 RX ORDER — MIDAZOLAM HYDROCHLORIDE 1 MG/ML
.5-2 INJECTION INTRAMUSCULAR; INTRAVENOUS PRN
Status: ACTIVE | OUTPATIENT
Start: 2024-07-31 | End: 2024-07-31

## 2024-07-31 RX ORDER — SODIUM CHLORIDE 9 MG/ML
500 INJECTION, SOLUTION INTRAVENOUS
Status: ACTIVE | OUTPATIENT
Start: 2024-07-31 | End: 2024-07-31

## 2024-07-31 RX ORDER — CLINDAMYCIN PHOSPHATE 900 MG/50ML
900 INJECTION, SOLUTION INTRAVENOUS ONCE
Status: DISCONTINUED | OUTPATIENT
Start: 2024-07-31 | End: 2024-07-31

## 2024-07-31 RX ORDER — LIDOCAINE HYDROCHLORIDE AND EPINEPHRINE 10; 10 MG/ML; UG/ML
INJECTION, SOLUTION INFILTRATION; PERINEURAL
Status: COMPLETED
Start: 2024-07-31 | End: 2024-07-31

## 2024-07-31 RX ADMIN — TAMSULOSIN HYDROCHLORIDE 0.4 MG: 0.4 CAPSULE ORAL at 08:26

## 2024-07-31 RX ADMIN — DULOXETINE HYDROCHLORIDE 60 MG: 30 CAPSULE, DELAYED RELEASE ORAL at 17:11

## 2024-07-31 RX ADMIN — ATORVASTATIN CALCIUM 40 MG: 40 TABLET, FILM COATED ORAL at 20:31

## 2024-07-31 RX ADMIN — MEROPENEM 500 MG: 500 INJECTION, POWDER, FOR SOLUTION INTRAVENOUS at 18:26

## 2024-07-31 RX ADMIN — SODIUM CHLORIDE, PRESERVATIVE FREE 10 ML: 5 INJECTION INTRAVENOUS at 00:05

## 2024-07-31 RX ADMIN — IOHEXOL 57 ML: 350 INJECTION, SOLUTION INTRAVENOUS at 20:10

## 2024-07-31 RX ADMIN — MEROPENEM 500 MG: 500 INJECTION, POWDER, FOR SOLUTION INTRAVENOUS at 12:58

## 2024-07-31 RX ADMIN — LIDOCAINE HYDROCHLORIDE AND EPINEPHRINE: 10; 10 INJECTION, SOLUTION INFILTRATION; PERINEURAL at 11:48

## 2024-07-31 RX ADMIN — PREGABALIN 150 MG: 150 CAPSULE ORAL at 12:56

## 2024-07-31 RX ADMIN — MIDAZOLAM HYDROCHLORIDE 0.5 MG: 2 INJECTION, SOLUTION INTRAMUSCULAR; INTRAVENOUS at 11:50

## 2024-07-31 RX ADMIN — OMEPRAZOLE 20 MG: 20 CAPSULE, DELAYED RELEASE ORAL at 05:19

## 2024-07-31 RX ADMIN — OXYCODONE HYDROCHLORIDE 5 MG: 5 TABLET ORAL at 17:10

## 2024-07-31 RX ADMIN — PREGABALIN 150 MG: 150 CAPSULE ORAL at 05:19

## 2024-07-31 RX ADMIN — MIDAZOLAM HYDROCHLORIDE 1 MG: 1 INJECTION, SOLUTION INTRAMUSCULAR; INTRAVENOUS at 11:47

## 2024-07-31 RX ADMIN — MIDAZOLAM HYDROCHLORIDE 1 MG: 2 INJECTION, SOLUTION INTRAMUSCULAR; INTRAVENOUS at 11:47

## 2024-07-31 RX ADMIN — MEROPENEM 500 MG: 500 INJECTION, POWDER, FOR SOLUTION INTRAVENOUS at 00:04

## 2024-07-31 RX ADMIN — SODIUM CHLORIDE, PRESERVATIVE FREE 10 ML: 5 INJECTION INTRAVENOUS at 05:19

## 2024-07-31 RX ADMIN — MEROPENEM 500 MG: 500 INJECTION, POWDER, FOR SOLUTION INTRAVENOUS at 05:21

## 2024-07-31 RX ADMIN — SODIUM CHLORIDE, PRESERVATIVE FREE 10 ML: 5 INJECTION INTRAVENOUS at 12:56

## 2024-07-31 RX ADMIN — PREGABALIN 150 MG: 150 CAPSULE ORAL at 17:11

## 2024-07-31 RX ADMIN — SODIUM CHLORIDE, PRESERVATIVE FREE 10 ML: 5 INJECTION INTRAVENOUS at 18:25

## 2024-07-31 RX ADMIN — DULOXETINE HYDROCHLORIDE 60 MG: 30 CAPSULE, DELAYED RELEASE ORAL at 05:19

## 2024-07-31 ASSESSMENT — ENCOUNTER SYMPTOMS
CHILLS: 0
FEVER: 0
DIZZINESS: 0
NAUSEA: 0
HEADACHES: 0
VOMITING: 0
SHORTNESS OF BREATH: 0
WEAKNESS: 1
COUGH: 0
PALPITATIONS: 0
HEMOPTYSIS: 0

## 2024-07-31 ASSESSMENT — PATIENT HEALTH QUESTIONNAIRE - PHQ9
2. FEELING DOWN, DEPRESSED, IRRITABLE, OR HOPELESS: NOT AT ALL
SUM OF ALL RESPONSES TO PHQ9 QUESTIONS 1 AND 2: 0
1. LITTLE INTEREST OR PLEASURE IN DOING THINGS: NOT AT ALL

## 2024-07-31 ASSESSMENT — PAIN DESCRIPTION - PAIN TYPE
TYPE: ACUTE PAIN

## 2024-07-31 NOTE — PROGRESS NOTES
Hospital Medicine Daily Progress Note    Date of Service  7/31/2024    Chief Complaint  Nydia Finch is a 64 y.o. female admitted 7/25/2024 with abdominal pain    Hospital Course  Nydia Finch is a 64 y.o. female who presented 7/25/2024 with PMH  right hydronephrosis ,ESBL E. 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  The patient was recently admitted in the hospital for having hydronephrosis of the right kidney secondary to nephrolithiasis.  Apparently patient did have a stent placed in 2009 and was lost to follow-up.  Eventually the stent was obstructed and had her replacement in January.  Patient underwent a right percutaneous nephrostomy tube placement on 1/2024.  At that time she grew ESBL.     Patient was transferred from [facility] and I reviewed the records.  CT scan of the head found left cerebral repair, loss status post surgical procedure without acute intracranial process.  CT scan of the abdomen found due to adrenal glands and pancreas show no evidence of mass or cyst.  Right kidney is smaller than the left.  There is moderate hydronephrosis and mild hydroureter.  There is diffuse thickening up to 3 mm wall of the right pelvis and proximal ureter.  No dilatation of the left.  No renal calculi seen on either side.  The left kidney is normal in size.     WBC 10, Hemoglobin 12, platelets 337, BUN 20, creatinine 0.9, CO2 18, sodium 140, potassium 3.4, AST 23, ALT 14,, bili 1.8, lactic acid 1, CRP 39, troponin 0.012, urine drug screen was negative  UA was nitrate positive, leukoesterase positive, WBC 5-10, bacteria moderate.  COVID is negative.     Upon arrival to [facility] patient was given IV Rocephin and developed a drug rash.  She was given Solu-Medrol, Benadryl and Pepcid    Urology was consulted. She did get nephrostomy tube 7/26/24- pus was coming  out. Cultures were positive for ESBL. ID consulted and recommended meropenem until 8/9/2024    Urology also recommended nephrectomy in 2-4 weeks as OP and pt needs to follow up closely.     Interval Problem Update  7/29-seen by radiology.  Continuing to irrigate nephrostomy tube  No new complaints and no acute events  Can consider discharge when full plan from urology and radiology is confirmed.    7/30- she is feeling better. She is aphasic, but still able to communicate with signs. She has I-pad with her that she also use. I was able to call her  and gave updates. All questions answered. Her vitals are improving.she is not tachycardic anymore. I did discuss personally with Dr. Pittman, ID physician and pt will need meropenem until 8/9. We will work with placement, IV antibiotic . Midline placed   Stop IVF     7/31-she looks more awake.  She is smiling.  She denies any pain.  She is eating well.  She has been tachycardic all day.  Blood pressure 118/60, SpO2 94% in room air.  She was able to get the tunneled catheter today for IV antibiotics  Plan for [facility] skilled nursing facility tomorrow seems that is only place which can give meropenem  I did try to call her  but not answering and I cannot leave voice mail    I have discussed this patient's plan of care and discharge plan at IDT rounds today with Case Management, Nursing, Nursing leadership, and other members of the IDT team.    Consultants/Specialty  urology    Code Status  Full Code    Disposition  The patient is medically cleared for discharge to home or a post-acute facility.      I have placed the appropriate orders for post-discharge needs.    Review of Systems  Review of Systems   Constitutional:  Negative for chills and fever.   Respiratory:  Negative for cough and hemoptysis.    Cardiovascular:  Negative for chest pain and palpitations.   Gastrointestinal:  Negative for nausea and vomiting.   Genitourinary:  Negative for  dysuria.   Neurological:  Negative for dizziness and headaches.        Physical Exam  Temp:  [35.8 °C (96.5 °F)-36.5 °C (97.7 °F)] 36.5 °C (97.7 °F)  Pulse:  [] 93  Resp:  [11-16] 15  BP: ()/(58-79) 118/60  SpO2:  [90 %-97 %] 94 %    Physical Exam  Constitutional:       General: She is not in acute distress.     Appearance: She is not toxic-appearing.   HENT:      Head: Normocephalic and atraumatic.      Nose: Nose normal.      Mouth/Throat:      Mouth: Mucous membranes are moist.      Pharynx: Oropharynx is clear.   Eyes:      Extraocular Movements: Extraocular movements intact.      Conjunctiva/sclera: Conjunctivae normal.   Cardiovascular:      Rate and Rhythm: Normal rate and regular rhythm.      Pulses: Normal pulses.      Heart sounds: Normal heart sounds.   Pulmonary:      Effort: Pulmonary effort is normal.      Breath sounds: Normal breath sounds.   Abdominal:      General: There is no distension.      Palpations: Abdomen is soft.   Musculoskeletal:         General: No swelling or deformity.      Cervical back: Neck supple. No rigidity.   Skin:     General: Skin is warm and dry.   Neurological:      General: No focal deficit present.      Mental Status: She is oriented to person, place, and time.         Fluids    Intake/Output Summary (Last 24 hours) at 7/31/2024 1642  Last data filed at 7/30/2024 2000  Gross per 24 hour   Intake 240 ml   Output --   Net 240 ml       Laboratory  Recent Labs     07/30/24  0134   WBC 8.1   RBC 3.60*   HEMOGLOBIN 10.4*   HEMATOCRIT 31.5*   MCV 87.5   MCH 28.9   MCHC 33.0   RDW 52.7*   PLATELETCT 244   MPV 11.3     Recent Labs     07/30/24  0134   SODIUM 137   POTASSIUM 3.4*   CHLORIDE 104   CO2 21   GLUCOSE 96   BUN 9   CREATININE 0.65   CALCIUM 8.4*                     Imaging  IR-CVC, SMALL BORE CATHETER, TUNNELED>AGE 5   Final Result      1. ULTRASOUND AND FLUOROSCOPIC GUIDED PLACEMENT OF A Right INTERNAL JUGULAR 6 [place] SMALL BORE CENTRAL CATHETER.      2.  THE CENTRAL CATHETER MAY BE USED IMMEDIATELY AS CLINICALLY INDICATED. FLUSHES PER PROTOCOL.         IR-NEPHROSTOGRAM W/ NEW TUBE PLACEMENT (ALL RADIOLOGY) RIGHT   Final Result      1. Ultrasound and fluoroscopic guided placement of a right 8 [place] pigtail locking loop percutaneous nephrostomy catheter.      2. Nephrostogram showing satisfactory catheter position without extravasation.      3. A purulent urine sample was sent for microbiology.      CT-CTA CHEST PULMONARY ARTERY W/ RECONS    (Results Pending)        Assessment/Plan  * Obstructive pyelonephritis- (present on admission)  Assessment & Plan  Recurrent problem  I requested RTOC have images uploaded, discussed with radiology, no obstructing stone however also discussed with urology and this is a recurrent problem with hydronephrosis/pyelonephrosis that improved with treatment for.  Status post nephrostomy tube placement because of concern for abscess   Patient feeling improvement  Continue meropenem- end date 8/9   I did review urology recommendations and plan is for follow up with urology in 2-4 weeks for nephrectomy     Renal and perinephric abscess- (present on admission)  Assessment & Plan  S/p nephrostomy in place 7/26  Cultures positive for ESBL  Complete meropenem 8/9  Follow up with urology for possible nephrectomy     Sinus tachycardia- (present on admission)  Assessment & Plan  She is tachycardic 7/31. She has been on SCD all this admission due to previous h/o brain bleed/ cerebral aneurysm   She feels slight SOB   CTA chest for further eval. She is bed bound, high risk for PE     Right hemiplegia (HCC)- (present on admission)  Assessment & Plan  Chronic from a previous cerebral aneurysm    Drug rash- (present on admission)  Assessment & Plan  Improved  Status post Solu-Medrol and Benadryl    Hydronephrosis- (present on admission)  Assessment & Plan  Chronic  Patient has normal kidney function  IV fluids  Discussed with urology and  radiology  Status post nephrostomy tube placement  7/30- discussed personally with IR and nephrostomy working well. She needs to follow up as OP with urology.     Encephalopathy acute- (present on admission)  Assessment & Plan  Seems improved  Continue antibiotics  Status post nephrostomy  7/30- at her baseline          VTE prophylaxis:   SCDs/TEDs      I have performed a physical exam and reviewed and updated ROS and Plan today (7/31/2024). In review of yesterday's note (7/30/2024), there are no changes except as documented above.

## 2024-07-31 NOTE — DISCHARGE PLANNING
Case Management Discharge Planning    Admission Date: 7/25/2024  GMLOS: 2.9  ALOS: 6    6-Clicks ADL Score: 13  6-Clicks Mobility Score: 9  PT and/or OT Eval ordered: Yes  Post-acute Referrals Ordered: Yes  Post-acute Choice Obtained: Yes  Has referral(s) been sent to post-acute provider:  Yes      Anticipated Discharge Dispo: Discharge Disposition: Discharged to home/self care (01)  Discharge Address: [address] [place] [address]  Discharge Contact Phone Number: [phone]    DME Needed: No    Action(s) Taken: OTHER    Patient's case discussed with MD during IDT Unit Rounds. Per MD, patient is not medically clear.    MD reports patient might be medically clear tomorrow 8/1/24.     SW met with patient at bedside. Patient is aware and agrees with transfer to SNF for the duration of the IV ABX.    SW explained, [facility] is able to accept, confirmation of acceptance / bed is pending from [facility].    Patient indicates her choice is (1) [facility] (2) [facility] (3) [facility].     SW requested DPA obtains updates from [facility] and [facility].    11:00am - Per DPA, [facility] requested clarification regarding the frequency of the IV ABX.    1:30pm - Per DPA, [facility] is not able to accommodate Q6, MD aware.     Per MD, ID reports patient has to remain in Q6 IV ABX.    Per DPA, [facility] is able to accommodate Q6 and has the isolation bed available.    SW submitted Transport Request Order in EPIC.    Escalations Completed: None    Medically Clear: No    Next Steps: SNF 8/1/24    Barriers to Discharge: Medical clearance    Is the patient up for discharge tomorrow: Yes.

## 2024-07-31 NOTE — PROGRESS NOTES
IR Nursing Note    Small Bore Central Catheter by MD [facility], Right IJ/Upper chest access site.  Patient was consented by MD and confirmed by this RN. Procedure site was marked by MD and verified using imaging guidance.  Patient was placed in a supine position.  Patient was prepped and draped in a sterile fashion. Vitals were taken every 5 minutes and continues CO2 waveform capnography, all remained stable during procedure (see doc flow sheet for results).  Line sutured, a biopatch, gauze and tegaderm dressing placed over surgical site.     Report given to FERNANDO Wilcox.  Patient transported to [facility] via IR RN monitored then transferred care to report RN.    PowerLine, 6F, Cut@21cm  REF: [id number]  LOT: [id number]  EXP: 07/31/2028

## 2024-07-31 NOTE — CARE PLAN
The patient is Stable - Low risk of patient condition declining or worsening    Shift Goals  Clinical Goals: Pt's nephrostomy tube will remain patent by flushing q6h. Pt to receive IV abx per MAR.  Patient Goals: Rest, comfort  Family Goals:     Progress made toward(s) clinical / shift goals:    Pt's R nephrostomy tube with minimal bloody output. Tube flushed q6h with NS and remains patent. Pt receiving IV meropenem q6h. Pt reported 7/10 abdominal pain and chronic pain and was given prn oxy per MAR. Pt reassessed and pain decreased to 3/10. Pt able to rest with pain management. Pt turned q2h to help maintain skin integrity. Pt utilizes call light appropriately for needs.    Problem: Knowledge Deficit - Standard  Goal: Patient and family/care givers will demonstrate understanding of plan of care, disease process/condition, diagnostic tests and medications  Outcome: Progressing     Problem: Skin Integrity  Goal: Skin integrity is maintained or improved  Outcome: Progressing     Problem: Fall Risk  Goal: Patient will remain free from falls  Outcome: Progressing     Problem: Pain - Standard  Goal: Alleviation of pain or a reduction in pain to the patient’s comfort goal  Outcome: Progressing       Patient is not progressing towards the following goals:

## 2024-07-31 NOTE — PROGRESS NOTES
Assumed care of patient at 1900. Received report from day RN. Patient A&Ox4, on RA, Reporting a pain level of 0/10. Call light within reach, belongings within reach, fall precautions in place, bed in lowest position, frame alarm on DAVI bed. Purewick in place. Patient does not have any other needs at this time.     POC was discussed with patient. All questions were answered. Patient verbalized understanding.

## 2024-07-31 NOTE — PROGRESS NOTES
Radiology Progress Note   Author: SARAH Ennis Date & Time created: 7/31/2024  11:23 AM   Date of admission  7/25/2024  Note to reader: this note follows the APSO format rather than the historical SOAP format. Assessment and plan located at the top of the note for ease of use.    Chief Complaint  64 y.o. 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 Upon arrival, IR was consulted and pt underwent a right nephrostomy tube placement with IR Dr. Newton on 07/26/2024.     Interval History:   07/27/2024- Right  neph tube (8 Citizen of the [place]) to gravity drain in place with no documented nephrostomy tube output  in the last 24 hours.  R NT with bloody drainage in drainage bag and  tubing.  I did easily flush nephrostomy tube with 10 mL of normal saline.  Patient tolerated flushing drain well and nephrostomy tube appeared to have improved drainage post flush.  I reviewed today's labs: WBC 12.0; Hgb 11.4/35, Cr 0.72; Coags 1.05, UA-positive for nitrite, leukocyte esterase, bacteria ;micro -Urine cultures pending, ABX per hospitalist I discussed patient plan of care with  patient, nursing staff.  IDT notes reviewed     07/28/2024- Right neph tube (8 Citizen of the [place]) to gravity drain in place with 43 mLs of bloody  output  in the last 24 hours.  R NT with bloody drainage in drainage bag and  tubing.  I was able to easily flush nephrostomy tube with 10 mL of normal saline.  Patient tolerated flushing  and nephrostomy tube with improved drainage post flushing.  I reviewed today's labs: WBC 9.3; Hgb 10.5/32.2, Cr 0.65; Coags 1.05, UA-positive for nitrite, leukocyte esterase, bacteria ;micro -Urine cultures  positive for heavy growth of E. coli. ABX per hospitalist I discussed patient plan of care with  patient, nursing staff.  IDT notes reviewed     07/29/2024- Right neph tube (8 Sets[place]) to gravity drain in place with no recorded output overnight. Approximately 50 mLs of bloody output in gravity bag upon assessment. I was able to easily flush nephrostomy tube with 10 mL of normal saline. Patient tolerated flushing and nephrostomy tube with improved drainage post flushing. No new labs today. UA-positive for nitrite, leukocyte esterase, bacteria ;micro -Urine cultures positive for heavy growth of E. coli. ESBL. ABX per hospitalist. I discussed patient plan of care with patient, nursing staff, and I reviewed IDT notes.    07/30/24 - right neph tube to gravity drain in place with no recorded output in the last 24 hours; scant amount of bloody drainage in bag.  Bedside RN exchanged tubing earlier this morning.  Pt with Atrophic right kidney. Labs reviewed; WBC 8.1, Creatinine 0.65, GFR 98.  Drain flushed with 10 mL NS.  Pt discussed with bedside RN and hospitalist.    07/31/24 - right neph tube to gravity drain in place with nscant bloody output in the last 24 hours. Pt with Atrophic right kidney. No labs today.  Drain flushed with 10 mL NS.  Pt discussed with bedside RN and hospitalist. Possible discharge tomorrow to SNF.     Assessment/Plan     Principal Problem:    Obstructive pyelonephritis  Active Problems:    Encephalopathy acute    Hydronephrosis    Drug rash    Right hemiplegia (HCC)      Plan IR  - Continue to irrigate right Nephrostomy tube with 10 ml of sterile saline q shift   - Expect minimal output due to atrophic kidney  - Right kidney fluid cultures positive for E. Coli ESBL  - Education provided on S/S of infection   - Ok to shower with catheter as long as site is covered with waterproof dressing; change dressing if it becomes wet.  - No baths or submerging site under water until catheter removed   -  Ultimate plan is for patient to discharge with nephrostomy tube in place and follow-up with urology [place] for right nephrectomy in 2 to 4 weeks  - Okay to dc with PNT in place once medically cleared  - For long term use, Nephrostomy tube will need to be exchanged every 3 months; sooner prn.   - IR will continue to follow nephrostomy tube while pt is in the hospital; we may not round daily.   -  -Thank you for allowing Interventional Radiology team to participate in the patients care, if any additional care or requests are needed in the future please do not hesitate to call or place IR order           Review of Systems  Physical Exam   Review of Systems   Constitutional:  Negative for chills and fever.   Respiratory:  Negative for shortness of breath.    Cardiovascular:  Negative for chest pain and palpitations.   Gastrointestinal:  Negative for nausea and vomiting.   Neurological:  Positive for weakness.         Right side hemiplegia secondary to previous CVA       Vitals:    07/31/24 0750   BP: 112/66   Pulse: (!) 114   Resp: 12   Temp: 36 °C (96.8 °F)   SpO2: 94%        Physical Exam  Constitutional:       Appearance: Normal appearance.   Cardiovascular:      Rate and Rhythm: Normal rate.   Pulmonary:      Effort: Pulmonary effort is normal. No respiratory distress.   Abdominal:      General: There is no distension.   Genitourinary:     Comments: Right Neph tube  Skin:     General: Skin is warm and dry.   Neurological:      Mental Status: She is alert and oriented to person, place, and time.      Comments: Right-sided hemiplegia baseline       Psychiatric:         Mood and Affect: Mood normal.         Behavior: Behavior normal.             Labs    Recent Labs     07/30/24  0134   WBC 8.1   RBC 3.60*   HEMOGLOBIN 10.4*   HEMATOCRIT 31.5*   MCV 87.5   MCH 28.9   MCHC 33.0   RDW 52.7*   PLATELETCT 244   MPV 11.3     Recent Labs     07/30/24  0134   SODIUM 137   POTASSIUM 3.4*   CHLORIDE 104   CO2 21   GLUCOSE 96  "  BUN 9   CREATININE 0.65   CALCIUM 8.4*     Recent Labs     07/30/24  0134   CREATININE 0.65     IR-NEPHROSTOGRAM W/ NEW TUBE PLACEMENT (ALL RADIOLOGY) RIGHT   Final Result      1. Ultrasound and fluoroscopic guided placement of a right 8 Thai pigtail locking loop percutaneous nephrostomy catheter.      2. Nephrostogram showing satisfactory catheter position without extravasation.      3. A purulent urine sample was sent for microbiology.      IR-CVC, SMALL BORE CATHETER, TUNNELED>AGE 5    (Results Pending)     INR   Date Value Ref Range Status   07/26/2024 1.05 0.87 - 1.13 Final     Comment:     INR - Non-therapeutic Reference Range: 0.87-1.13  INR - Therapeutic Reference Range: 2.0-4.0       No results found for: \"POCINR\"     Intake/Output Summary (Last 24 hours) at 7/30/2024 1234  Last data filed at 7/30/2024 0900  Gross per 24 hour   Intake --   Output 1000 ml   Net -1000 ml      I have personally reviewed the above labs and imaging      I have performed a physical exam and reviewed and updated ROS and Plan today (7/31/2024).     32 minutes in directly providing and coordinating care and extensive data review.  No time overlap and excludes procedures.   "

## 2024-07-31 NOTE — ASSESSMENT & PLAN NOTE
She is tachycardic 7/31. She has been on SCD all this admission due to previous h/o brain bleed/ cerebral aneurysm   She feels slight SOB   CTA chest for further eval. She is bed bound, high risk for PE

## 2024-07-31 NOTE — OR SURGEON
Immediate Post- Operative Note        Findings: Needs Longterm IV Access      Procedure(s): RT IJ TICC      Estimated Blood Loss: Less than 5 ml        Complications: None            7/31/2024     11:57 AM     Richar Hampton M.D.

## 2024-07-31 NOTE — DISCHARGE PLANNING
DC Transport Scheduled    Transport Company Scheduled:  [facility]  Spoke with Louann at [facility] to schedule transport.    Scheduled Date: 8/1/2024  Scheduled Time: 1200    Destination: [facility]   Destination address: [place] [address]    Notified care team of scheduled transport via Vo[employer].     If there are any changes needed to the DC transportation scheduled, please contact [facility] at ext. [phone] between the hours of 0984-0029 Mon-Fri. If outside those hours, contact the ED Case Manager at ext. [phone].

## 2024-07-31 NOTE — DISCHARGE PLANNING
Spoke To: Renee  Agency/Facility Name: [facility]  Plan or Request: referral going into review.    0925-   Agency/Facility Name: [facility]  Plan or Request: DPA left vm regarding bed availability for tomorrow.    1011- Spoke To: Veto  Agency/Facility Name: [facility]  Plan or Request: Veto is asking the frequency of the IV abx.    1130- DPA left vm for Renee at [facility] asking if referral was  reviewed. Asked for a return call.    1136-   Agency/Facility Name: [facility]  Plan or Request: ELIZA left a vm for Veto giving her the dose frequency of q6 for the IV abx. Requested a return call.    1245- Message from Renee at [facility], no available bed.    1255- DPA left another vm for Veto at [facility], asked for a return call.    1330- DPA called the cell phone for Yris (liaison) for [facility], was told they can not do q6 abx only q24.    1447- DPA sent message to Luis Manuel at [facility] asking for an iso bed for tomorrow.    1500- Spoke To: via teams- Lui sManuel  Agency/Facility Name: [facility]  Plan or Request: Transport to be set up for 1200 tomorrow.

## 2024-08-01 VITALS
HEIGHT: 66 IN | DIASTOLIC BLOOD PRESSURE: 77 MMHG | SYSTOLIC BLOOD PRESSURE: 118 MMHG | WEIGHT: 186.29 LBS | HEART RATE: 92 BPM | TEMPERATURE: 97.8 F | OXYGEN SATURATION: 94 % | BODY MASS INDEX: 29.94 KG/M2 | RESPIRATION RATE: 17 BRPM

## 2024-08-01 LAB
ALBUMIN SERPL BCP-MCNC: 2.7 G/DL (ref 3.2–4.9)
BUN SERPL-MCNC: 11 MG/DL (ref 8–22)
CALCIUM ALBUM COR SERPL-MCNC: 9.6 MG/DL (ref 8.5–10.5)
CALCIUM SERPL-MCNC: 8.6 MG/DL (ref 8.5–10.5)
CHLORIDE SERPL-SCNC: 103 MMOL/L (ref 96–112)
CO2 SERPL-SCNC: 23 MMOL/L (ref 20–33)
CREAT SERPL-MCNC: 0.79 MG/DL (ref 0.5–1.4)
GFR SERPLBLD CREATININE-BSD FMLA CKD-EPI: 83 ML/MIN/1.73 M 2
GLUCOSE SERPL-MCNC: 82 MG/DL (ref 65–99)
MAGNESIUM SERPL-MCNC: 2.1 MG/DL (ref 1.5–2.5)
PHOSPHATE SERPL-MCNC: 2.4 MG/DL (ref 2.5–4.5)
POTASSIUM SERPL-SCNC: 4 MMOL/L (ref 3.6–5.5)
SODIUM SERPL-SCNC: 135 MMOL/L (ref 135–145)

## 2024-08-01 PROCEDURE — 700111 HCHG RX REV CODE 636 W/ 250 OVERRIDE (IP): Performed by: INTERNAL MEDICINE

## 2024-08-01 PROCEDURE — 700102 HCHG RX REV CODE 250 W/ 637 OVERRIDE(OP): Performed by: HOSPITALIST

## 2024-08-01 PROCEDURE — 80069 RENAL FUNCTION PANEL: CPT

## 2024-08-01 PROCEDURE — 99239 HOSP IP/OBS DSCHRG MGMT >30: CPT | Performed by: INTERNAL MEDICINE

## 2024-08-01 PROCEDURE — A9270 NON-COVERED ITEM OR SERVICE: HCPCS | Performed by: INTERNAL MEDICINE

## 2024-08-01 PROCEDURE — 36415 COLL VENOUS BLD VENIPUNCTURE: CPT

## 2024-08-01 PROCEDURE — A9270 NON-COVERED ITEM OR SERVICE: HCPCS | Performed by: HOSPITALIST

## 2024-08-01 PROCEDURE — 700105 HCHG RX REV CODE 258: Performed by: INTERNAL MEDICINE

## 2024-08-01 PROCEDURE — 700101 HCHG RX REV CODE 250: Performed by: NURSE PRACTITIONER

## 2024-08-01 PROCEDURE — 83735 ASSAY OF MAGNESIUM: CPT

## 2024-08-01 PROCEDURE — 700102 HCHG RX REV CODE 250 W/ 637 OVERRIDE(OP): Performed by: INTERNAL MEDICINE

## 2024-08-01 RX ORDER — METOPROLOL TARTRATE 25 MG/1
25 TABLET, FILM COATED ORAL 2 TIMES DAILY
Status: SHIPPED
Start: 2024-08-01

## 2024-08-01 RX ORDER — TAMSULOSIN HYDROCHLORIDE 0.4 MG/1
0.4 CAPSULE ORAL
Status: SHIPPED
Start: 2024-08-02

## 2024-08-01 RX ORDER — PREGABALIN 150 MG/1
150 CAPSULE ORAL 3 TIMES DAILY
Qty: 15 CAPSULE | Refills: 0 | Status: SHIPPED | OUTPATIENT
Start: 2024-08-01 | End: 2024-08-06

## 2024-08-01 RX ORDER — METOPROLOL TARTRATE 25 MG/1
25 TABLET, FILM COATED ORAL 2 TIMES DAILY
Status: DISCONTINUED | OUTPATIENT
Start: 2024-08-01 | End: 2024-08-01 | Stop reason: HOSPADM

## 2024-08-01 RX ADMIN — SODIUM CHLORIDE, PRESERVATIVE FREE 10 ML: 5 INJECTION INTRAVENOUS at 00:04

## 2024-08-01 RX ADMIN — OMEPRAZOLE 20 MG: 20 CAPSULE, DELAYED RELEASE ORAL at 04:51

## 2024-08-01 RX ADMIN — METOPROLOL TARTRATE 25 MG: 25 TABLET, FILM COATED ORAL at 08:37

## 2024-08-01 RX ADMIN — DULOXETINE HYDROCHLORIDE 60 MG: 30 CAPSULE, DELAYED RELEASE ORAL at 04:51

## 2024-08-01 RX ADMIN — SODIUM CHLORIDE, PRESERVATIVE FREE 10 ML: 5 INJECTION INTRAVENOUS at 04:51

## 2024-08-01 RX ADMIN — SODIUM CHLORIDE, PRESERVATIVE FREE 10 ML: 5 INJECTION INTRAVENOUS at 11:52

## 2024-08-01 RX ADMIN — TAMSULOSIN HYDROCHLORIDE 0.4 MG: 0.4 CAPSULE ORAL at 08:18

## 2024-08-01 RX ADMIN — PREGABALIN 150 MG: 150 CAPSULE ORAL at 04:51

## 2024-08-01 RX ADMIN — PREGABALIN 150 MG: 150 CAPSULE ORAL at 11:53

## 2024-08-01 RX ADMIN — MEROPENEM 500 MG: 500 INJECTION, POWDER, FOR SOLUTION INTRAVENOUS at 00:05

## 2024-08-01 RX ADMIN — MEROPENEM 500 MG: 500 INJECTION, POWDER, FOR SOLUTION INTRAVENOUS at 04:54

## 2024-08-01 ASSESSMENT — ENCOUNTER SYMPTOMS
CHILLS: 0
FEVER: 0
NAUSEA: 0
WEAKNESS: 1
PALPITATIONS: 0
SHORTNESS OF BREATH: 0
VOMITING: 0

## 2024-08-01 ASSESSMENT — PAIN DESCRIPTION - PAIN TYPE
TYPE: ACUTE PAIN

## 2024-08-01 NOTE — CARE PLAN
The patient is Stable - Low risk of patient condition declining or worsening    Shift Goals  Clinical Goals: Pt nephrostomy tube to remain patent by flushing q6h with NS. Pt to be turned q2h to help maintain skin integrity. Pt to receive IV abx.  Patient Goals: Rest, comfort  Family Goals:     Progress made toward(s) clinical / shift goals:    Pt nephrostomy tube flushed q6h with 10 mL NS to help maintain patency. Bloody output in drainage bag. Pt turned q2h with wedges to help maintain skin integrity. Interdry placed. Barrier paste applied to sacrum. Pt receiving IV meropenem q6h. Pt able to rest during the night with no complaints of pain. Pt utilizes call light appropriately for needs.     Problem: Knowledge Deficit - Standard  Goal: Patient and family/care givers will demonstrate understanding of plan of care, disease process/condition, diagnostic tests and medications  Outcome: Progressing     Problem: Skin Integrity  Goal: Skin integrity is maintained or improved  Outcome: Progressing     Problem: Fall Risk  Goal: Patient will remain free from falls  Outcome: Progressing       Patient is not progressing towards the following goals:

## 2024-08-01 NOTE — PROGRESS NOTES
Radiology Progress Note   Author: SARAH Ennis Date & Time created: 8/1/2024  9:08 AM   Date of admission  7/25/2024  Note to reader: this note follows the APSO format rather than the historical SOAP format. Assessment and plan located at the top of the note for ease of use.    Chief Complaint  64 y.o. 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 Upon arrival, IR was consulted and pt underwent a right nephrostomy tube placement with IR Dr. Newton on 07/26/2024.     Interval History:   07/27/2024- Right  neph tube (8 [place]) to gravity drain in place with no documented nephrostomy tube output  in the last 24 hours.  R NT with bloody drainage in drainage bag and  tubing.  I did easily flush nephrostomy tube with 10 mL of normal saline.  Patient tolerated flushing drain well and nephrostomy tube appeared to have improved drainage post flush.  I reviewed today's labs: WBC 12.0; Hgb 11.4/35, Cr 0.72; Coags 1.05, UA-positive for nitrite, leukocyte esterase, bacteria ;micro -Urine cultures pending, ABX per hospitalist I discussed patient plan of care with  patient, nursing staff.  IDT notes reviewed     07/28/2024- Right neph tube (8 [place]) to gravity drain in place with 43 mLs of bloody  output  in the last 24 hours.  R NT with bloody drainage in drainage bag and  tubing.  I was able to easily flush nephrostomy tube with 10 mL of normal saline.  Patient tolerated flushing  and nephrostomy tube with improved drainage post flushing.  I reviewed today's labs: WBC 9.3; Hgb 10.5/32.2, Cr 0.65; Coags 1.05, UA-positive for nitrite, leukocyte esterase, bacteria ;micro -Urine cultures  positive for heavy growth of E. coli. ABX per hospitalist I discussed patient plan of care with  patient, nursing staff.  IDT notes reviewed     07/29/2024- Right neph tube (8 [place]) to gravity drain in place with no recorded output overnight. Approximately 50 mLs of bloody output in gravity bag upon assessment. I was able to easily flush nephrostomy tube with 10 mL of normal saline. Patient tolerated flushing and nephrostomy tube with improved drainage post flushing. No new labs today. UA-positive for nitrite, leukocyte esterase, bacteria ;micro -Urine cultures positive for heavy growth of E. coli. ESBL. ABX per hospitalist. I discussed patient plan of care with patient, nursing staff, and I reviewed IDT notes.    07/30/24 - right neph tube to gravity drain in place with no recorded output in the last 24 hours; scant amount of bloody drainage in bag.  Bedside RN exchanged tubing earlier this morning.  Pt with Atrophic right kidney. Labs reviewed; WBC 8.1, Creatinine 0.65, GFR 98.  Drain flushed with 10 mL NS.  Pt discussed with bedside RN and hospitalist.    07/31/24 - right neph tube to gravity drain in place with nscant bloody output in the last 24 hours. Pt with Atrophic right kidney. No labs today.  Drain flushed with 10 mL NS.  Pt discussed with bedside RN and hospitalist. Possible discharge tomorrow to SNF.    08/01/24 - right neph tube to gravity drain in place with 30 mL bloody output in the last 24 hours. Pt with Atrophic right kidney. Labs reviewed; renal function normal.  Pt had Right small bore central catheter placed yesterday for abx. Drain flushed with 10 mL NS.  Pt discussed with bedside RN and hospitalist. Plan for discharge this afternoon.      Assessment/Plan     Principal Problem:    Obstructive pyelonephritis  Active Problems:    Encephalopathy acute    Hydronephrosis    Drug rash    Right hemiplegia (HCC)    Sinus tachycardia    Renal and perinephric abscess      Plan IR  - Discharging this  afternoon to SNF  - Continue to irrigate right Nephrostomy tube with 10 ml of sterile saline bid to prevent blood clots and tube blockage   - Expect minimal output due to atrophic kidney  - Right kidney fluid cultures positive for E. Coli ESBL; on abx   - Education provided on S/S of infection   - Ok to shower with catheter as long as site is covered with waterproof dressing; change dressing if it becomes wet.  - No baths or submerging site under water until catheter removed   - Ultimate plan is for patient to follow-up with urology [place] for right nephrectomy in 2 to 4 weeks  - For long term use, Nephrostomy tube will need to be exchanged every 3 months; sooner prn.   -  -Thank you for allowing Interventional Radiology team to participate in the patients care, if any additional care or requests are needed in the future please do not hesitate to call or place IR order           Review of Systems  Physical Exam   Review of Systems   Constitutional:  Negative for chills and fever.   Respiratory:  Negative for shortness of breath.    Cardiovascular:  Negative for chest pain and palpitations.   Gastrointestinal:  Negative for nausea and vomiting.   Neurological:  Positive for weakness.         Right side hemiplegia secondary to previous CVA       Vitals:    08/01/24 0800   BP: 118/77   Pulse: 92   Resp: 17   Temp: 36.6 °C (97.8 °F)   SpO2: 94%        Physical Exam  Constitutional:       Appearance: Normal appearance.   Cardiovascular:      Rate and Rhythm: Normal rate.   Pulmonary:      Effort: Pulmonary effort is normal. No respiratory distress.   Abdominal:      General: There is no distension.   Genitourinary:     Comments: Right Neph tube  Skin:     General: Skin is warm and dry.   Neurological:      Mental Status: She is alert and oriented to person, place, and time.      Comments: Right-sided hemiplegia baseline       Psychiatric:         Mood and Affect: Mood normal.         Behavior: Behavior normal.              Labs    Recent Labs     07/30/24  0134   WBC 8.1   RBC 3.60*   HEMOGLOBIN 10.4*   HEMATOCRIT 31.5*   MCV 87.5   MCH 28.9   MCHC 33.0   RDW 52.7*   PLATELETCT 244   MPV 11.3     Recent Labs     07/30/24  0134 08/01/24  0151   SODIUM 137 135   POTASSIUM 3.4* 4.0   CHLORIDE 104 103   CO2 21 23   GLUCOSE 96 82   BUN 9 11   CREATININE 0.65 0.79   CALCIUM 8.4* 8.6     Recent Labs     07/30/24  0134 08/01/24  0151   ALBUMIN  --  2.7*   CREATININE 0.65 0.79     CT-CTA CHEST PULMONARY ARTERY W/ RECONS   Final Result         1.  No pulmonary embolus appreciated.   2.  Trace layering bilateral pleural effusions   3.  Linear densities in the bilateral lung bases favor changes of atelectasis   4.  4.1 cm ascending thoracic aortic aneurysm, radiographic follow-up and surveillance recommended as clinically appropriate   5.  Large hiatal hernia   6.  Atherosclerosis and atherosclerotic coronary artery disease.   7.  Pulmonary nodules, see nodule follow-up recommendations below.      Fleischner Society pulmonary nodule recommendations:   Low Risk: No routine follow-up      High Risk: Optional CT at 12 months      Comments: Use most suspicious nodule as guide to management. Follow-up intervals may vary according to size and risk.      Low Risk - Minimal or absent history of smoking and of other known risk factors.      High Risk - History of smoking or of other known risk factors.      Note: These recommendations do not apply to lung cancer screening, patients with immunosuppression, or patients with known primary cancer.      Fleischner Society 2017 Guidelines for Management of Incidentally Detected Pulmonary Nodules in Adults         IR-CVC, SMALL BORE CATHETER, TUNNELED>AGE 5   Final Result      1. ULTRASOUND AND FLUOROSCOPIC GUIDED PLACEMENT OF A Right INTERNAL JUGULAR 6 Hebrew SMALL BORE CENTRAL CATHETER.      2. THE CENTRAL CATHETER MAY BE USED IMMEDIATELY AS CLINICALLY INDICATED. FLUSHES PER PROTOCOL.        "  IR-NEPHROSTOGRAM W/ NEW TUBE PLACEMENT (ALL RADIOLOGY) RIGHT   Final Result      1. Ultrasound and fluoroscopic guided placement of a right 8 Albanian pigtail locking loop percutaneous nephrostomy catheter.      2. Nephrostogram showing satisfactory catheter position without extravasation.      3. A purulent urine sample was sent for microbiology.        INR   Date Value Ref Range Status   07/26/2024 1.05 0.87 - 1.13 Final     Comment:     INR - Non-therapeutic Reference Range: 0.87-1.13  INR - Therapeutic Reference Range: 2.0-4.0       No results found for: \"POCINR\"     Intake/Output Summary (Last 24 hours) at 7/30/2024 1234  Last data filed at 7/30/2024 0900  Gross per 24 hour   Intake --   Output 1000 ml   Net -1000 ml      I have personally reviewed the above labs and imaging      I have performed a physical exam and reviewed and updated ROS and Plan today (8/1/2024).     31 minutes in directly providing and coordinating care and extensive data review.  No time overlap and excludes procedures.   "

## 2024-08-01 NOTE — CARE PLAN
The patient is Stable - Low risk of patient condition declining or worsening    Shift Goals  Clinical Goals: Q6 Flushing nephrostomytube, [id number], IV ABX  Patient Goals: Rest, comfort  Family Goals: wants updates from MD    Progress made toward(s) clinical / shift goals:        Problem: Skin Integrity  Goal: Skin integrity is maintained or improved  Outcome: Progressing     Skin breakdown prevention measures in place, no signs of new onset skin breakdown     Problem: Fall Risk  Goal: Patient will remain free from falls  Outcome: Progressing     Pt free of falls, fall prevention measures in place and charted.    Patient is not progressing towards the following goals:

## 2024-08-01 NOTE — DISCHARGE SUMMARY
Discharge Summary    CHIEF COMPLAINT ON ADMISSION  No chief complaint on file.      Reason for Admission  Rt pyelonephritis with hydronephro*    Admission Date  7/25/2024     CODE STATUS  Full Code    HPI & HOSPITAL COURSE  Nydia Finch is a 64 y.o. female who presented 7/25/2024 with PMH  right hydronephrosis ,ESBL E. 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  The patient was recently admitted in the hospital for having hydronephrosis of the right kidney secondary to nephrolithiasis.  Apparently patient did have a stent placed in 2009 and was lost to follow-up.  Eventually the stent was obstructed and had her replacement in January.  Patient underwent a right percutaneous nephrostomy tube placement on 1/2024.  At that time she grew ESBL.     On arrival CT scan of the head found left cerebral repair, loss status post surgical procedure without acute intracranial process.  CT scan of the abdomen found due to adrenal glands and pancreas show no evidence of mass or cyst.  Right kidney is smaller than the left.  There is moderate hydronephrosis and mild hydroureter.  There is diffuse thickening up to 3 mm wall of the right pelvis and proximal ureter.  No dilatation of the left.  No renal calculi seen on either side.  The left kidney is normal in size.     WBC 10, Hemoglobin 12, platelets 337, BUN 20, creatinine 0.9, CO2 18, sodium 140, potassium 3.4, AST 23, ALT 14,, bili 1.8, lactic acid 1, CRP 39, troponin 0.012, urine drug screen was negative  UA was nitrate positive, leukoesterase positive, WBC 5-10, bacteria moderate.  COVID is negative.     Upon arrival to [facility] patient was given IV Rocephin and developed a drug rash.  She was given Solu-Medrol, Benadryl and Pepcid     Urology was consulted. She did get nephrostomy tube 7/26/24- pus was coming out. Cultures were positive  for ESBL. ID consulted and recommended meropenem until 8/9/2024     Urology also recommended nephrectomy in 2-4 weeks as OP and pt needs to follow up closely.     So far she has tolerated current treatment well.  has been updated on daily basis.     IR instruction for nephrostomy tube:   -Continue to irrigate right Nephrostomy tube with 10 ml of sterile saline q shift   - Expect minimal output due to atrophic kidney  - Ok to shower with catheter as long as site is covered with waterproof dressing; change dressing if it becomes wet.  - No baths or submerging site under water until catheter removed     Therefore, she is discharged in fair and stable condition to skilled nursing facility.    The patient met 2-midnight criteria for an inpatient stay at the time of discharge.      FOLLOW UP ITEMS POST DISCHARGE  Follow up with urology in two weeks     DISCHARGE DIAGNOSES  Principal Problem:    Obstructive pyelonephritis (POA: Yes)  Active Problems:    Encephalopathy acute (POA: Yes)    Hydronephrosis (POA: Yes)    Drug rash (POA: Yes)    Right hemiplegia (HCC) (POA: Yes)    Sinus tachycardia (POA: Yes)    Renal and perinephric abscess (POA: Yes)  Resolved Problems:    * No resolved hospital problems. *      FOLLOW UP  No future appointments.  [facility]  [address] [facility] [id number] [phone]          MEDICATIONS ON DISCHARGE     Medication List        START taking these medications        Instructions   metoprolol tartrate 25 MG Tabs  Commonly known as: Lopressor   Take 1 Tablet by mouth 2 times a day.  Dose: 25 mg     NS SOLN 100 mL with meropenem 500 MG SOLR   MEROPENEM 500MG INJ/0.9 % NACLProduct MEROPENEM 500MG INJ/0.9 % NACL every 6 hours     tamsulosin 0.4 MG capsule  Start taking on: August 2, 2024  Commonly known as: Flomax   Take 1 Capsule by mouth 1/2 hour after breakfast.  Dose: 0.4 mg            CONTINUE taking these medications        Instructions   atorvastatin 40 MG  Tabs  Commonly known as: Lipitor   Take 40 mg by mouth every evening.  Dose: 40 mg     DULoxetine 60 MG Cpep delayed-release capsule  Commonly known as: Cymbalta   Take 60 mg by mouth 2 times a day.  Dose: 60 mg     omeprazole 20 MG delayed-release capsule  Commonly known as: PriLOSEC   Take 20 mg by mouth every day.  Dose: 20 mg     pregabalin 150 MG Caps  Commonly known as: Lyrica   Take 150 mg by mouth 3 times a day.  Dose: 150 mg            STOP taking these medications      meloxicam 7.5 MG Tabs  Commonly known as: Mobic              Allergies  Allergies   Allergen Reactions    Piperacillin Sod-Tazobactam So Rash     Gave her head to toe drug rash at [facility] 1/25/2024      Sulfamethoxazole W-Trimethoprim Itching     Rash ,itching    Ceftriaxone Itching     Generalized redness       DIET  Orders Placed This Encounter   Procedures    Diet Order Diet: Regular     Standing Status:   Standing     Number of Occurrences:   1     Order Specific Question:   Diet:     Answer:   Regular [1]       ACTIVITY  As tolerated and directed by skilled nursing.  Weight bearing as tolerated    LINES, DRAINS, AND WOUNDS  This is an automated list. Peripheral IVs will be removed prior to discharge.  Peripheral IV 07/26/24 20 G Anterior;Left Forearm (Active)   Site Assessment Clean;Dry;Intact 08/01/24 0700   Dressing Type Transparent;Tape 08/01/24 0700   Line Status Flushed;Scrubbed the hub prior to access;Saline locked 08/01/24 0700   Dressing Status Clean;Dry;Intact 08/01/24 0700   Dressing Intervention N/A 07/31/24 2030   Dressing Change Due 08/03/24 08/01/24 0700   Date Primary Tubing Changed 07/27/24 08/01/24 0700   Date Secondary Tubing Changed 08/01/24 08/01/24 0700   NEXT Primary Tubing Change  08/03/24 08/01/24 0700   NEXT Secondary Tubing Change  08/02/24 08/01/24 0700   Infiltration Grading ([facility]) 0 08/01/24 0700   Phlebitis Scale ([facility] Only) 0 08/01/24 0700       CVC Double Lumen 07/31/24 Tunneled  Right Internal jugular (Active)   Site Assessment Clean;Dry;Intact 08/01/24 0700   Lumen 1 Status Blood return noted;Flushed;Normal saline locked;Scrubbed the hub prior to access 08/01/24 0700   Lumen 2 Status Blood return noted;Flushed;Infusing;Scrubbed the hub prior to access 08/01/24 0700   Dressing Type Biopatch;Occlusive;Transparent 08/01/24 0700   Dressing Status Clean;Dry;Intact 08/01/24 0700   Dressing Intervention N/A 07/31/24 2030   Dressing Change Due 08/03/24 08/01/24 0700   Date Primary Tubing Changed 07/27/24 08/01/24 0700   Date Secondary Tubing Changed 08/01/24 08/01/24 0700   NEXT Primary Tubing Change  08/03/24 08/01/24 0700   NEXT Secondary Tubing Change  08/02/24 08/01/24 0700     Nephrostomy Right 8 Fr. (Active)   Site Assessment Clean;Intact 07/31/24 2030   Dressing Status Clean;Dry;Intact 07/31/24 2030   Dressing Type Split gauze 07/31/24 2030   Securement Method Tape 07/31/24 2030   Output (mL) 10 mL 08/01/24 0500       External Urinary Catheter (Female Wick) (Active)   Collection Container Suction container 07/28/24 1722   Output (mL) 1200 mL 08/01/24 0500      Moisture Associated Skin Damage 07/25/24 Medial;Midline Sacrum (Active)   Wound Image   07/31/24 2030   NEXT Weekly Photo (Inpatient Only) 08/07/24 08/01/24 0700   Drainage Amount None 08/01/24 0700   Periwound Assessment Clean;Pink;Red 08/01/24 0700   IAD Cleansing Foam Cleanser/Washcloth 08/01/24 0700   Periwound Protectant Barrier Paste 08/01/24 0700   IAD Containment Device Purewick 08/01/24 0700       Moisture Associated Skin Damage 07/26/24 Panus;Groin (Active)   Wound Image   07/31/24 2030   NEXT Weekly Photo (Inpatient Only) 08/07/24 08/01/24 0700   Drainage Amount None 08/01/24 0700   Drainage Description Clear;Foul purulent 07/25/24 2300   Periwound Assessment Clean;Dry;Pink 08/01/24 0700   IAD Cleansing Foam Cleanser/Washcloth 08/01/24 0700   Periwound Protectant Interdry 08/01/24 0700   IAD Containment Device Purewick  08/01/24 0700     CVC Double Lumen 07/31/24 Tunneled Right Internal jugular (Active)   Site Assessment Clean;Dry;Intact 08/01/24 0700   Lumen 1 Status Blood return noted;Flushed;Normal saline locked;Scrubbed the hub prior to access 08/01/24 0700   Lumen 2 Status Blood return noted;Flushed;Infusing;Scrubbed the hub prior to access 08/01/24 0700   Dressing Type Biopatch;Occlusive;Transparent 08/01/24 0700   Dressing Status Clean;Dry;Intact 08/01/24 0700   Dressing Intervention N/A 07/31/24 2030   Dressing Change Due 08/03/24 08/01/24 0700   Date Primary Tubing Changed 07/27/24 08/01/24 0700   Date Secondary Tubing Changed 08/01/24 08/01/24 0700   NEXT Primary Tubing Change  08/03/24 08/01/24 0700   NEXT Secondary Tubing Change  08/02/24 08/01/24 0700      Peripheral IV 07/26/24 20 G Anterior;Left Forearm (Active)   Site Assessment Clean;Dry;Intact 08/01/24 0700   Dressing Type Transparent;Tape 08/01/24 0700   Line Status Flushed;Scrubbed the hub prior to access;Saline locked 08/01/24 0700   Dressing Status Clean;Dry;Intact 08/01/24 0700   Dressing Intervention N/A 07/31/24 2030   Dressing Change Due 08/03/24 08/01/24 0700   Date Primary Tubing Changed 07/27/24 08/01/24 0700   Date Secondary Tubing Changed 08/01/24 08/01/24 0700   NEXT Primary Tubing Change  08/03/24 08/01/24 0700   NEXT Secondary Tubing Change  08/02/24 08/01/24 0700   Infiltration Grading ([facility]) 0 08/01/24 0700   Phlebitis Scale ([facility] Only) 0 08/01/24 0700               MENTAL STATUS ON TRANSFER   A&O x4          CONSULTATIONS  ID  Urology  IR    PROCEDURES  Nephrostomy placement 7/26    LABORATORY  Lab Results   Component Value Date    SODIUM 135 08/01/2024    POTASSIUM 4.0 08/01/2024    CHLORIDE 103 08/01/2024    CO2 23 08/01/2024    GLUCOSE 82 08/01/2024    BUN 11 08/01/2024    CREATININE 0.79 08/01/2024        Lab Results   Component Value Date    WBC 8.1 07/30/2024    HEMOGLOBIN 10.4 (L) 07/30/2024    HEMATOCRIT 31.5 (L) 07/30/2024     PLATELETCT 244 07/30/2024        Total time of the discharge process exceeds 33 minutes.

## 2024-08-01 NOTE — DISCHARGE PLANNING
Case Management Discharge Planning    Admission Date: 7/25/2024  GMLOS: 2.9  ALOS: 7    6-Clicks ADL Score: 13  6-Clicks Mobility Score: 9  PT and/or OT Eval ordered: Yes  Post-acute Referrals Ordered: Yes  Post-acute Choice Obtained: Yes  Has referral(s) been sent to post-acute provider:  Yes      Anticipated Discharge Dispo: Discharge Disposition: Discharged to home/self care (01)  Discharge Address: [address] [place] [address]  Discharge Contact Phone Number: [phone]    DME Needed: No    Action(s) Taken: OTHER    Per MD, patient is medically clear for SNF. Per Laureen Wilcox Line Coordinator, transfer to [facility] is scheduled for today at 12:00pm via [facility] Wheel[facility].    SW completed Transfer Packet and [facility]ra Form, RN aware.    Escalations Completed: None    Medically Clear: Yes    Next Steps: SNF    Barriers to Discharge: None    Is the patient up for discharge tomorrow:

## 2024-08-01 NOTE — PROGRESS NOTES
Pt pivoted from bed to wheelchair via x2 assist. Discharge plan discussed, paperwork reviewed with pt. All personal items with pt in wheelchair, pt discharged off the floor in good and stable condition.

## 2024-08-01 NOTE — PROGRESS NOTES
Received report from [facility] nurse. Pt on RA sleeping comfortably, calm, unlabored breathing. Plan of care reviewed. Call light and personal items within reach, bed locked and in lowest position. No further needs at this time.

## 2024-08-01 NOTE — PROGRESS NOTES
Assumed care of patient at 1900. Received report from day RN. Patient A&Ox3, disoriented to time, sitting up in bed watching TV, on RA, Reporting a pain level of 0/10. Call light within reach, belongings within reach, fall precautions in place, bed in lowest position, frame alarm on DAVI bed. Purewick in place. Central line in place. Provided pt with water and juice per request. Patient does not have any other needs at this time.      POC was discussed with patient. All questions were answered. Patient verbalized understanding.

## 2024-08-05 ENCOUNTER — TELEPHONE (OUTPATIENT)
Dept: HEALTH INFORMATION MANAGEMENT | Facility: OTHER | Age: 64
End: 2024-08-05
Payer: MEDICARE

## 2024-08-08 ENCOUNTER — TELEPHONE (OUTPATIENT)
Dept: INFECTIOUS DISEASES | Facility: MEDICAL CENTER | Age: 64
End: 2024-08-08
Payer: MEDICARE

## 2024-08-13 ENCOUNTER — HOSPITAL ENCOUNTER (OUTPATIENT)
Dept: RADIOLOGY | Facility: MEDICAL CENTER | Age: 64
End: 2024-08-13
Payer: MEDICARE

## 2024-08-13 ENCOUNTER — HOSPITAL ENCOUNTER (OUTPATIENT)
Dept: RADIOLOGY | Facility: MEDICAL CENTER | Age: 64
End: 2024-08-13
Attending: INTERNAL MEDICINE
Payer: MEDICARE

## 2024-08-17 ENCOUNTER — APPOINTMENT (OUTPATIENT)
Dept: RADIOLOGY | Facility: MEDICAL CENTER | Age: 64
End: 2024-08-17
Attending: EMERGENCY MEDICINE
Payer: MEDICARE

## 2024-08-17 ENCOUNTER — HOSPITAL ENCOUNTER (EMERGENCY)
Facility: MEDICAL CENTER | Age: 64
End: 2024-08-17
Attending: EMERGENCY MEDICINE
Payer: MEDICARE

## 2024-08-17 ENCOUNTER — HOSPITAL ENCOUNTER (INPATIENT)
Facility: MEDICAL CENTER | Age: 64
LOS: 6 days | DRG: 659 | End: 2024-08-28
Attending: INTERNAL MEDICINE | Admitting: INTERNAL MEDICINE
Payer: MEDICARE

## 2024-08-17 ENCOUNTER — APPOINTMENT (OUTPATIENT)
Dept: RADIOLOGY | Facility: MEDICAL CENTER | Age: 64
DRG: 659 | End: 2024-08-17
Attending: INTERNAL MEDICINE
Payer: MEDICARE

## 2024-08-17 VITALS
OXYGEN SATURATION: 100 % | SYSTOLIC BLOOD PRESSURE: 149 MMHG | HEART RATE: 67 BPM | BODY MASS INDEX: 29.19 KG/M2 | TEMPERATURE: 98.3 F | WEIGHT: 186 LBS | HEIGHT: 67 IN | RESPIRATION RATE: 16 BRPM | DIASTOLIC BLOOD PRESSURE: 78 MMHG

## 2024-08-17 DIAGNOSIS — I69.351 HEMIPARESIS OF RIGHT DOMINANT SIDE AS LATE EFFECT OF CEREBRAL INFARCTION (HCC): ICD-10-CM

## 2024-08-17 DIAGNOSIS — A41.50 SEPSIS DUE TO GRAM-NEGATIVE UTI (HCC): ICD-10-CM

## 2024-08-17 DIAGNOSIS — A49.9 ESBL (EXTENDED SPECTRUM BETA-LACTAMASE) PRODUCING BACTERIA INFECTION: ICD-10-CM

## 2024-08-17 DIAGNOSIS — Z16.12 ESBL (EXTENDED SPECTRUM BETA-LACTAMASE) PRODUCING BACTERIA INFECTION: ICD-10-CM

## 2024-08-17 DIAGNOSIS — T83.022A NEPHROSTOMY TUBE DISPLACED (HCC): ICD-10-CM

## 2024-08-17 DIAGNOSIS — N39.0 SEPSIS DUE TO GRAM-NEGATIVE UTI (HCC): ICD-10-CM

## 2024-08-17 LAB
ALBUMIN SERPL BCP-MCNC: 2.9 G/DL (ref 3.2–4.9)
ALBUMIN/GLOB SERPL: 0.7 G/DL
ALP SERPL-CCNC: 157 U/L (ref 30–99)
ALT SERPL-CCNC: 6 U/L (ref 2–50)
ANION GAP SERPL CALC-SCNC: 11 MMOL/L (ref 7–16)
AST SERPL-CCNC: 8 U/L (ref 12–45)
BASOPHILS # BLD AUTO: 1 % (ref 0–1.8)
BASOPHILS # BLD: 0.1 K/UL (ref 0–0.12)
BILIRUB SERPL-MCNC: 0.4 MG/DL (ref 0.1–1.5)
BUN SERPL-MCNC: 20 MG/DL (ref 8–22)
CALCIUM ALBUM COR SERPL-MCNC: 9.3 MG/DL (ref 8.5–10.5)
CALCIUM SERPL-MCNC: 8.4 MG/DL (ref 8.4–10.2)
CHLORIDE SERPL-SCNC: 108 MMOL/L (ref 96–112)
CO2 SERPL-SCNC: 20 MMOL/L (ref 20–33)
CREAT SERPL-MCNC: 0.81 MG/DL (ref 0.5–1.4)
EOSINOPHIL # BLD AUTO: 0.25 K/UL (ref 0–0.51)
EOSINOPHIL NFR BLD: 2.4 % (ref 0–6.9)
ERYTHROCYTE [DISTWIDTH] IN BLOOD BY AUTOMATED COUNT: 50.1 FL (ref 35.9–50)
GFR SERPLBLD CREATININE-BSD FMLA CKD-EPI: 81 ML/MIN/1.73 M 2
GLOBULIN SER CALC-MCNC: 4.3 G/DL (ref 1.9–3.5)
GLUCOSE SERPL-MCNC: 82 MG/DL (ref 65–99)
HCT VFR BLD AUTO: 33.4 % (ref 37–47)
HGB BLD-MCNC: 10.8 G/DL (ref 12–16)
IMM GRANULOCYTES # BLD AUTO: 0.02 K/UL (ref 0–0.11)
IMM GRANULOCYTES NFR BLD AUTO: 0.2 % (ref 0–0.9)
LACTATE SERPL-SCNC: 0.5 MMOL/L (ref 0.5–2)
LYMPHOCYTES # BLD AUTO: 2.51 K/UL (ref 1–4.8)
LYMPHOCYTES NFR BLD: 23.9 % (ref 22–41)
MCH RBC QN AUTO: 28.4 PG (ref 27–33)
MCHC RBC AUTO-ENTMCNC: 32.3 G/DL (ref 32.2–35.5)
MCV RBC AUTO: 87.9 FL (ref 81.4–97.8)
MONOCYTES # BLD AUTO: 0.78 K/UL (ref 0–0.85)
MONOCYTES NFR BLD AUTO: 7.4 % (ref 0–13.4)
NEUTROPHILS # BLD AUTO: 6.83 K/UL (ref 1.82–7.42)
NEUTROPHILS NFR BLD: 65.1 % (ref 44–72)
NRBC # BLD AUTO: 0 K/UL
NRBC BLD-RTO: 0 /100 WBC (ref 0–0.2)
PLATELET # BLD AUTO: 310 K/UL (ref 164–446)
PMV BLD AUTO: 11.2 FL (ref 9–12.9)
POTASSIUM SERPL-SCNC: 4 MMOL/L (ref 3.6–5.5)
PROT SERPL-MCNC: 7.2 G/DL (ref 6–8.2)
RBC # BLD AUTO: 3.8 M/UL (ref 4.2–5.4)
SODIUM SERPL-SCNC: 139 MMOL/L (ref 135–145)
WBC # BLD AUTO: 10.5 K/UL (ref 4.8–10.8)

## 2024-08-17 PROCEDURE — 99223 1ST HOSP IP/OBS HIGH 75: CPT | Performed by: INTERNAL MEDICINE

## 2024-08-17 PROCEDURE — 770006 HCHG ROOM/CARE - MED/SURG/GYN SEMI*

## 2024-08-17 PROCEDURE — 74150 CT ABDOMEN W/O CONTRAST: CPT

## 2024-08-17 PROCEDURE — A9270 NON-COVERED ITEM OR SERVICE: HCPCS | Performed by: INTERNAL MEDICINE

## 2024-08-17 PROCEDURE — 71045 X-RAY EXAM CHEST 1 VIEW: CPT

## 2024-08-17 PROCEDURE — 700102 HCHG RX REV CODE 250 W/ 637 OVERRIDE(OP): Performed by: INTERNAL MEDICINE

## 2024-08-17 PROCEDURE — 85025 COMPLETE CBC W/AUTO DIFF WBC: CPT

## 2024-08-17 PROCEDURE — 80053 COMPREHEN METABOLIC PANEL: CPT

## 2024-08-17 PROCEDURE — 99284 EMERGENCY DEPT VISIT MOD MDM: CPT | Mod: 25

## 2024-08-17 PROCEDURE — 83605 ASSAY OF LACTIC ACID: CPT

## 2024-08-17 PROCEDURE — 36415 COLL VENOUS BLD VENIPUNCTURE: CPT

## 2024-08-17 RX ORDER — ATORVASTATIN CALCIUM 40 MG/1
40 TABLET, FILM COATED ORAL NIGHTLY
Status: DISCONTINUED | OUTPATIENT
Start: 2024-08-17 | End: 2024-08-28 | Stop reason: HOSPADM

## 2024-08-17 RX ORDER — NYSTATIN 100000 U/G
1 CREAM TOPICAL 3 TIMES DAILY
Status: COMPLETED | OUTPATIENT
Start: 2024-08-17 | End: 2024-08-20

## 2024-08-17 RX ORDER — MIRTAZAPINE 15 MG/1
15 TABLET, FILM COATED ORAL
Status: DISCONTINUED | OUTPATIENT
Start: 2024-08-17 | End: 2024-08-28 | Stop reason: HOSPADM

## 2024-08-17 RX ORDER — NYSTATIN 100000 U/G
1 CREAM TOPICAL 3 TIMES DAILY
COMMUNITY
Start: 2024-08-16

## 2024-08-17 RX ORDER — AMOXICILLIN 250 MG
2 CAPSULE ORAL EVERY EVENING
Status: DISCONTINUED | OUTPATIENT
Start: 2024-08-18 | End: 2024-08-28 | Stop reason: HOSPADM

## 2024-08-17 RX ORDER — ONDANSETRON 4 MG/1
4 TABLET, ORALLY DISINTEGRATING ORAL EVERY 4 HOURS PRN
Status: DISCONTINUED | OUTPATIENT
Start: 2024-08-17 | End: 2024-08-28 | Stop reason: HOSPADM

## 2024-08-17 RX ORDER — LABETALOL HYDROCHLORIDE 5 MG/ML
10 INJECTION, SOLUTION INTRAVENOUS EVERY 4 HOURS PRN
Status: DISCONTINUED | OUTPATIENT
Start: 2024-08-17 | End: 2024-08-22

## 2024-08-17 RX ORDER — MEROPENEM 500 MG/1
500 INJECTION, POWDER, FOR SOLUTION INTRAVENOUS EVERY 6 HOURS
Status: ON HOLD | COMMUNITY
End: 2024-08-28

## 2024-08-17 RX ORDER — PREGABALIN 150 MG/1
150 CAPSULE ORAL DAILY
Status: DISCONTINUED | OUTPATIENT
Start: 2024-08-18 | End: 2024-08-28 | Stop reason: HOSPADM

## 2024-08-17 RX ORDER — POLYETHYLENE GLYCOL 3350 17 G/17G
1 POWDER, FOR SOLUTION ORAL
Status: DISCONTINUED | OUTPATIENT
Start: 2024-08-17 | End: 2024-08-28 | Stop reason: HOSPADM

## 2024-08-17 RX ORDER — PREGABALIN 150 MG/1
150 CAPSULE ORAL DAILY
COMMUNITY

## 2024-08-17 RX ORDER — TAMSULOSIN HYDROCHLORIDE 0.4 MG/1
0.4 CAPSULE ORAL
Status: DISCONTINUED | OUTPATIENT
Start: 2024-08-18 | End: 2024-08-28 | Stop reason: HOSPADM

## 2024-08-17 RX ORDER — DULOXETIN HYDROCHLORIDE 60 MG/1
60 CAPSULE, DELAYED RELEASE ORAL 2 TIMES DAILY
Status: DISCONTINUED | OUTPATIENT
Start: 2024-08-17 | End: 2024-08-28 | Stop reason: HOSPADM

## 2024-08-17 RX ORDER — PROMETHAZINE HYDROCHLORIDE 25 MG/1
12.5-25 TABLET ORAL EVERY 4 HOURS PRN
Status: DISCONTINUED | OUTPATIENT
Start: 2024-08-17 | End: 2024-08-28 | Stop reason: HOSPADM

## 2024-08-17 RX ORDER — PROCHLORPERAZINE EDISYLATE 5 MG/ML
5-10 INJECTION INTRAMUSCULAR; INTRAVENOUS EVERY 4 HOURS PRN
Status: DISCONTINUED | OUTPATIENT
Start: 2024-08-17 | End: 2024-08-28 | Stop reason: HOSPADM

## 2024-08-17 RX ORDER — METOPROLOL TARTRATE 25 MG/1
25 TABLET, FILM COATED ORAL 2 TIMES DAILY
Status: DISCONTINUED | OUTPATIENT
Start: 2024-08-17 | End: 2024-08-21

## 2024-08-17 RX ORDER — ACETAMINOPHEN 325 MG/1
650 TABLET ORAL EVERY 6 HOURS PRN
Status: DISCONTINUED | OUTPATIENT
Start: 2024-08-17 | End: 2024-08-28 | Stop reason: HOSPADM

## 2024-08-17 RX ORDER — MIRTAZAPINE 15 MG/1
15 TABLET, FILM COATED ORAL
COMMUNITY

## 2024-08-17 RX ORDER — ONDANSETRON 2 MG/ML
4 INJECTION INTRAMUSCULAR; INTRAVENOUS EVERY 4 HOURS PRN
Status: DISCONTINUED | OUTPATIENT
Start: 2024-08-17 | End: 2024-08-28 | Stop reason: HOSPADM

## 2024-08-17 RX ORDER — PROMETHAZINE HYDROCHLORIDE 12.5 MG/1
12.5-25 SUPPOSITORY RECTAL EVERY 4 HOURS PRN
Status: DISCONTINUED | OUTPATIENT
Start: 2024-08-17 | End: 2024-08-28 | Stop reason: HOSPADM

## 2024-08-17 RX ADMIN — DULOXETINE HYDROCHLORIDE 60 MG: 30 CAPSULE, DELAYED RELEASE ORAL at 20:33

## 2024-08-17 RX ADMIN — NYSTATIN 100000 UNITS: 100000 CREAM TOPICAL at 20:34

## 2024-08-17 RX ADMIN — ATORVASTATIN CALCIUM 40 MG: 40 TABLET, FILM COATED ORAL at 20:33

## 2024-08-17 RX ADMIN — MIRTAZAPINE 15 MG: 15 TABLET, FILM COATED ORAL at 20:33

## 2024-08-17 ASSESSMENT — ENCOUNTER SYMPTOMS
COUGH: 0
VOMITING: 0
NAUSEA: 0
DOUBLE VISION: 0
SEIZURES: 0
BACK PAIN: 0
SORE THROAT: 0
BLURRED VISION: 0
FEVER: 0
ABDOMINAL PAIN: 0
FALLS: 0
CHILLS: 0
SHORTNESS OF BREATH: 0
HALLUCINATIONS: 0
HEADACHES: 0
PALPITATIONS: 0

## 2024-08-17 ASSESSMENT — FIBROSIS 4 INDEX
FIB4 SCORE: 0.67
FIB4 SCORE: 1.18

## 2024-08-17 ASSESSMENT — LIFESTYLE VARIABLES: SUBSTANCE_ABUSE: 0

## 2024-08-17 NOTE — ED TRIAGE NOTES
"Chief Complaint   Patient presents with    Other     BIBA from [facility], pt has a right nephrostomy tube that was pulled out today. Denies back pain.  Pt reports recent ABX, does not know which ABX, reports for kidney infection     /71   Pulse 79   Temp 36.8 °C (98.3 °F) (Temporal)   Resp 16   Ht 1.702 m (5' 7\")   Wt 84.4 kg (186 lb)   LMP  (LMP Unknown)   SpO2 99%   BMI 29.13 kg/m²     "

## 2024-08-17 NOTE — ED NOTES
Pharmacy Medication Reconciliation    ~Med rec updated and complete per MAR- [facility]   ~Allergies have been verified and updated per MAR        ~Anticoagulants (rivaroxaban, apixaban, edoxaban, dabigatran, warfarin, enoxaparin) taken in the last 14 days? No

## 2024-08-17 NOTE — ED PROVIDER NOTES
"CHIEF COMPLAINT  Chief Complaint   Patient presents with    Other     BIBA from [facility], pt has a right nephrostomy tube that was pulled out today. Denies back pain.  Pt reports recent ABX, does not know which ABX, reports for kidney infection       LIMITATION TO HISTORY   Select: none    HPI    Nydia Finch is a 64 y.o. female who presents to the Emergency Department right nephrostomy tube being removed.  The patient has had a very long course of a minimally functional nonfunctioning right kidney status post ESBL pyelonephritis on the right side.  Patient most recently had a nephrostomy tube placed on the right side that did show very purulent material and was ESBL was treated with meropenem.  Patient does have significant comorbidities of being hemiparetic on the right side secondary to a hemorrhagic stroke.  The patient currently is at a skilled nursing facility.  Apparently got pulled and the tube got pulled out of place and is no longer functioning.  Patient was sent for replacement of nephrostomy tube.  Patient is otherwise feeling well has no other complaints.    OUTSIDE HISTORIAN(S):  Select: None    EXTERNAL RECORDS REVIEWED  Select: Other recent admission 7/5/2024 \"Urology was consulted. She did get nephrostomy tube 7/26/24- pus was coming out. Cultures were positive for ESBL. ID consulted and recommended meropenem until 8/9/2024  Urology also recommended nephrectomy in 2-4 weeks as OP and pt needs to follow up closely.\"      PAST MEDICAL HISTORY  Past Medical History:   Diagnosis Date    Aphagia 1994    Brain aneurysm     Chronic pain     Nephrolithiasis     Right hemiplegia (HCC) 1994    Stroke (HCC)      .    SURGICAL HISTORY  Past Surgical History:   Procedure Laterality Date    HI CYSTOSCOPY,INSERT URETERAL STENT Right 1/26/2024    Procedure: CYSTOSCOPY;  Surgeon: Mik Rm M.D.;  Location: SURGERY [facility];  Service: Urology    CYSTOSCOPY N/A 7/4/2019    Procedure: " CYSTOSCOPY;  Surgeon: Monico Killian M.D.;  Location: SURGERY [facility];  Service: Urology    STENT PLACEMENT Right 7/4/2019    Procedure: STENT PLACEMENT;  Surgeon: Monico Killian M.D.;  Location: SURGERY [facility];  Service: Urology    URETEROSCOPY Right 7/4/2019    Procedure: URETEROSCOPY;  Surgeon: Monico Killian M.D.;  Location: SURGERY [facility];  Service: Urology    CYSTOSCOPY STENT PLACEMENT  2/13/2018    Procedure: CYSTOSCOPY STENT PLACEMENT;  Surgeon: Monico Killian M.D.;  Location: SURGERY [facility];  Service: Urology    GASTRIC RESECTION  2012    Duodenal Switch    OTHER      OTHER NEUROLOGICAL SURG           FAMILY HISTORY  No family history on file.       SOCIAL HISTORY  Social History     Socioeconomic History    Marital status:      Spouse name: Not on file    Number of children: Not on file    Years of education: Not on file    Highest education level: Not on file   Occupational History    Not on file   Tobacco Use    Smoking status: Never    Smokeless tobacco: Never   Substance and Sexual Activity    Alcohol use: No    Drug use: Not on file     Comment: Aydee    Sexual activity: Not on file   Other Topics Concern    Not on file   Social History Narrative    Not on file     Social Determinants of Health     Financial Resource Strain: Low Risk  (11/10/2022)    Received from [facility]    Financial Resource Strain     Difficulty of Paying Living Expenses: Not on file     Access to Reliable Phone: Not on file   Food Insecurity: No Food Insecurity (7/26/2024)    Hunger Vital Sign     Worried About Running Out of Food in the Last Year: Never true     Ran Out of Food in the Last Year: Never true   Transportation Needs: No Transportation Needs (7/26/2024)    PRAPARE - Transportation     Lack of Transportation (Medical): No     Lack of Transportation (Non-Medical): No   Physical Activity: Not on file   Stress: Not on file   Social Connections: Not on file  "  Intimate Partner Violence: Not At Risk (7/26/2024)    Humiliation, Afraid, Rape, and Kick questionnaire     Fear of Current or Ex-Partner: No     Emotionally Abused: No     Physically Abused: No     Sexually Abused: No   Housing Stability: Low Risk  (7/26/2024)    Housing Stability Vital Sign     Unable to Pay for Housing in the Last Year: No     Number of Places Lived in the Last Year: 1     Unstable Housing in the Last Year: No         CURRENT MEDICATIONS  No current facility-administered medications on file prior to encounter.     Current Outpatient Medications on File Prior to Encounter   Medication Sig Dispense Refill    pregabalin (LYRICA) 150 MG Cap Take 150 mg by mouth every day.      mirtazapine (REMERON) 15 MG Tab Take 15 mg by mouth at bedtime.      nystatin (MYCOSTATIN) 590911 UNIT/GM Cream topical cream Apply 1 Application topically 3 times a day. 10 days      meropenem (MERREM) 500 MG Recon Soln Infuse 500 mg into a venous catheter every 6 hours.      magnesium hydroxide (MILK OF MAGNESIA) 400 MG/5ML Suspension Take 30 mL by mouth 1 time a day as needed.      metoprolol tartrate (LOPRESSOR) 25 MG Tab Take 1 Tablet by mouth 2 times a day.      tamsulosin (FLOMAX) 0.4 MG capsule Take 1 Capsule by mouth 1/2 hour after breakfast.      atorvastatin (LIPITOR) 40 MG Tab Take 40 mg by mouth every evening.      omeprazole (PRILOSEC) 20 MG delayed-release capsule Take 20 mg by mouth every day.      DULoxetine (CYMBALTA) 60 MG Cap DR Particles delayed-release capsule Take 60 mg by mouth 2 times a day.             ALLERGIES  Allergies   Allergen Reactions    Piperacillin Sod-Tazobactam So Rash     Gave her head to toe drug rash at [facility] 1/25/2024      Sulfamethoxazole W-Trimethoprim Itching     Rash ,itching    Ceftriaxone Itching     Generalized redness       PHYSICAL EXAM  VITAL SIGNS:/71   Pulse 79   Temp 36.8 °C (98.3 °F) (Temporal)   Resp 16   Ht 1.702 m (5' 7\")   Wt 84.4 kg (186 " lb)   LMP  (LMP Unknown)   SpO2 99%   BMI 29.13 kg/m²     Constitutional:no acute distress   HENT: Normocephalic, Atraumatic, Bilateral external ears normal.  Eyes:  conjunctiva are normal.   Neck: Supple.  Nontender midline  Cardiovascular: Regular rate and rhythm without murmurs gallops or rubs.   Thorax & Lungs: No respiratory distress. Breathing comfortably. Lungs are clear to auscultation bilaterally, there are no wheezes no rales. Chest wall is nontender.  Abdomen: Soft, non distended, non tender   Skin: Warm, Dry, No erythema,   Back: No tenderness, No CVA tenderness.  Patient does have a nephrostomy tube coming off of the right side.  Musculoskeletal: Does have some pitting edema to the right side primarily upper and lower extremity.  No calf tenderness.  Neurologic: Alert & oriented x 3, patient is hemiparetic on the right side  Psychiatric: Affect normal, Judgment normal, Mood normal.       DIAGNOSTIC STUDIES / PROCEDURES      LABS  Results for orders placed or performed during the hospital encounter of 08/17/24   CBC WITH DIFFERENTIAL   Result Value Ref Range    WBC 10.5 4.8 - 10.8 K/uL    RBC 3.80 (L) 4.20 - 5.40 M/uL    Hemoglobin 10.8 (L) 12.0 - 16.0 g/dL    Hematocrit 33.4 (L) 37.0 - 47.0 %    MCV 87.9 81.4 - 97.8 fL    MCH 28.4 27.0 - 33.0 pg    MCHC 32.3 32.2 - 35.5 g/dL    RDW 50.1 (H) 35.9 - 50.0 fL    Platelet Count 310 164 - 446 K/uL    MPV 11.2 9.0 - 12.9 fL    Neutrophils-Polys 65.10 44.00 - 72.00 %    Lymphocytes 23.90 22.00 - 41.00 %    Monocytes 7.40 0.00 - 13.40 %    Eosinophils 2.40 0.00 - 6.90 %    Basophils 1.00 0.00 - 1.80 %    Immature Granulocytes 0.20 0.00 - 0.90 %    Nucleated RBC 0.00 0.00 - 0.20 /100 WBC    Neutrophils (Absolute) 6.83 1.82 - 7.42 K/uL    Lymphs (Absolute) 2.51 1.00 - 4.80 K/uL    Monos (Absolute) 0.78 0.00 - 0.85 K/uL    Eos (Absolute) 0.25 0.00 - 0.51 K/uL    Baso (Absolute) 0.10 0.00 - 0.12 K/uL    Immature Granulocytes (abs) 0.02 0.00 - 0.11 K/uL    NRBC  (Absolute) 0.00 K/uL   COMP METABOLIC PANEL   Result Value Ref Range    Sodium 139 135 - 145 mmol/L    Potassium 4.0 3.6 - 5.5 mmol/L    Chloride 108 96 - 112 mmol/L    Co2 20 20 - 33 mmol/L    Anion Gap 11.0 7.0 - 16.0    Glucose 82 65 - 99 mg/dL    Bun 20 8 - 22 mg/dL    Creatinine 0.81 0.50 - 1.40 mg/dL    Calcium 8.4 8.4 - 10.2 mg/dL    Correct Calcium 9.3 8.5 - 10.5 mg/dL    AST(SGOT) 8 (L) 12 - 45 U/L    ALT(SGPT) 6 2 - 50 U/L    Alkaline Phosphatase 157 (H) 30 - 99 U/L    Total Bilirubin 0.4 0.1 - 1.5 mg/dL    Albumin 2.9 (L) 3.2 - 4.9 g/dL    Total Protein 7.2 6.0 - 8.2 g/dL    Globulin 4.3 (H) 1.9 - 3.5 g/dL    A-G Ratio 0.7 g/dL   LACTIC ACID   Result Value Ref Range    Lactic Acid 0.5 0.5 - 2.0 mmol/L   ESTIMATED GFR   Result Value Ref Range    GFR (CKD-EPI) 81 >60 mL/min/1.73 m 2             RADIOLOGY  I have independently interpreted the diagnostic imaging associated with this visit and am waiting the final reading from the radiologist.   My preliminary interpretation is as follows: Free fluid    Radiologist interpretation:  CT-ABDOMEN W/O   Final Result      Right nephrostomy tube pigtail loop terminates in the peripheral subcutaneous tissues of the right flank. Recommend replacement. There is mild right hydronephrosis with chronic urothelial thickening.              COURSE & MEDICAL DECISION MAKING    ED COURSE:    ED Observation Status? No, the patient does not qualify for observation    INTERVENTIONS BY ME:  Medications - No data to display          INITIAL ASSESSMENT, COURSE AND PLAN  Care Narrative: Patient presents for evaluation of this partially removed nephrostomy tube.  I did have a conversation with Dr. Faust because it sounds like after the patient had been on antibiotics the plan would be to do a nephrectomy.  Dr. Faust reviewed the case on file and at this point it would be prudent to place the nephrostomy tube back in place because they will not be able to do a nephrectomy  anytime soon and since is not an emergent procedure.  Unfortunately, we do not have interventional radiology down here at [facility].  I did do laboratory studies there was no significant changes.  At this point the patient will need to be transferred to [facility].  I did speak with interventional radiology they will not be able to do an ostomy tube tonight.  So I have had the patient directly admitted I spoke with Dr. Brown who will accept the patient in transfer and directly admitted to [facility] for nephrostomy tube placement.      ADDITIONAL PROBLEM LIST  CVA hemorrhagic right-sided deficits  DISPOSITION AND DISCUSSIONS  I patient will be transferred to [facility] as a direct admit  FINAL DIAGNOSIS  1. Nephrostomy tube displaced (HCC)    2. Hemiparesis of right dominant side as late effect of cerebral infarction (HCC)    3. ESBL (extended spectrum beta-lactamase) producing bacteria infection        Electronically signed by: Haider Fragoso M.D.,4:24 PM 08/17/24

## 2024-08-17 NOTE — ED NOTES
Assisited erp with pt -turned to left side for attempt at urostomy tube visualization. Per same-tube intact, sutures not to skin. Per pt, last po was breakfast at 0800 this am, with minimal sips of water po since. Aware of npo  and probable transfer to regional for new tube placement

## 2024-08-17 NOTE — ED NOTES
Pt resting with eyes closed. Room assignment recvd along with remsa  time. Update to pt as well as spouse per pt approval (justin). Pt also able to speak with same over the phone. Remains npo

## 2024-08-17 NOTE — PROGRESS NOTES
[facility] DIRECT ADMISSION REPORT  Transferring facility: [facility]  Transferring physician: Dr. Sexton    Chief complaint: Neph tube displacement  Pertinent history & patient course:     64-year-old female with history of right nephrostomy tube placement in January which was replaced in July.  Patient has been residing at a skilled nursing facility where she underwent a course of meropenem for ESBL in her urine.  Antibiotic stop date on 8/9.  Patient's neph tube has become dislodged.  Patient is transferring to [facility] for IR neph tube placement,    Pertinent imaging & lab results:   Consultants called prior to transfer and pertinent input from consultants: urology  Code Status:  per transferring provider, I personally verified with the transferring provider patient's code status and the transferring provider has confirmed this with the patient.  Reason for Transfer: Neph tube placement  Further work up or recommendations requested prior to transfer:     Patient accepted for transfer: Yes  Accepting [facility] Facility: [facility] to notify the Triage Coordinator in the [facility] via Voalte or Phone ext. [phone] when patient arrives to the unit. The Triage Coordinator will assign the admitting provider.    Consultants to be called upon arrival: IR  Admission status: Inpatient.   Floor requested: medical  If ICU transfer, name of intensivist case discussed with and pertinent input from critical care:     The admitting provider is the point of contact for questions or concerns regarding patient's care.

## 2024-08-18 PROBLEM — R33.9 URINARY RETENTION: Status: ACTIVE | Noted: 2024-08-18

## 2024-08-18 PROCEDURE — 700102 HCHG RX REV CODE 250 W/ 637 OVERRIDE(OP): Performed by: INTERNAL MEDICINE

## 2024-08-18 PROCEDURE — 51798 US URINE CAPACITY MEASURE: CPT

## 2024-08-18 PROCEDURE — G0378 HOSPITAL OBSERVATION PER HR: HCPCS

## 2024-08-18 PROCEDURE — 99232 SBSQ HOSP IP/OBS MODERATE 35: CPT | Performed by: HOSPITALIST

## 2024-08-18 PROCEDURE — A9270 NON-COVERED ITEM OR SERVICE: HCPCS | Performed by: INTERNAL MEDICINE

## 2024-08-18 RX ADMIN — SENNOSIDES AND DOCUSATE SODIUM 2 TABLET: 50; 8.6 TABLET ORAL at 17:55

## 2024-08-18 RX ADMIN — TAMSULOSIN HYDROCHLORIDE 0.4 MG: 0.4 CAPSULE ORAL at 11:01

## 2024-08-18 RX ADMIN — ATORVASTATIN CALCIUM 40 MG: 40 TABLET, FILM COATED ORAL at 21:08

## 2024-08-18 RX ADMIN — NYSTATIN 100000 UNITS: 100000 CREAM TOPICAL at 11:01

## 2024-08-18 RX ADMIN — NYSTATIN 100000 UNITS: 100000 CREAM TOPICAL at 16:54

## 2024-08-18 RX ADMIN — PREGABALIN 150 MG: 150 CAPSULE ORAL at 05:12

## 2024-08-18 RX ADMIN — METOPROLOL TARTRATE 25 MG: 25 TABLET, FILM COATED ORAL at 16:54

## 2024-08-18 RX ADMIN — RIVAROXABAN 10 MG: 10 TABLET, FILM COATED ORAL at 17:55

## 2024-08-18 RX ADMIN — OMEPRAZOLE 20 MG: 20 CAPSULE, DELAYED RELEASE ORAL at 05:12

## 2024-08-18 RX ADMIN — DULOXETINE HYDROCHLORIDE 60 MG: 30 CAPSULE, DELAYED RELEASE ORAL at 16:54

## 2024-08-18 RX ADMIN — MIRTAZAPINE 15 MG: 15 TABLET, FILM COATED ORAL at 21:08

## 2024-08-18 RX ADMIN — NYSTATIN 100000 UNITS: 100000 CREAM TOPICAL at 05:46

## 2024-08-18 RX ADMIN — DULOXETINE HYDROCHLORIDE 60 MG: 30 CAPSULE, DELAYED RELEASE ORAL at 05:12

## 2024-08-18 ASSESSMENT — ENCOUNTER SYMPTOMS
FOCAL WEAKNESS: 0
RESPIRATORY NEGATIVE: 1
PSYCHIATRIC NEGATIVE: 1
COUGH: 0
ABDOMINAL PAIN: 0
DIZZINESS: 0
WEIGHT LOSS: 0
DIAPHORESIS: 0
NEUROLOGICAL NEGATIVE: 1
WEAKNESS: 0
FEVER: 0
PALPITATIONS: 0
BLURRED VISION: 0
EYES NEGATIVE: 1
CONSTITUTIONAL NEGATIVE: 1
SORE THROAT: 0
DEPRESSION: 0
GASTROINTESTINAL NEGATIVE: 1
VOMITING: 0
HEADACHES: 0
NAUSEA: 0
MYALGIAS: 0
BRUISES/BLEEDS EASILY: 0
CHILLS: 0
SHORTNESS OF BREATH: 0
CARDIOVASCULAR NEGATIVE: 1
MUSCULOSKELETAL NEGATIVE: 1

## 2024-08-18 ASSESSMENT — LIFESTYLE VARIABLES: SUBSTANCE_ABUSE: 0

## 2024-08-18 NOTE — PROGRESS NOTES
[facility] Medicine Daily Progress Note    Date of Service  8/18/2024    Chief Complaint  Nyida Finch is a 64 y.o. female admitted 8/17/2024 with a dislodged nephrostomy tube    Hospital Course  Nydia Finch is a 64 y.o. female who presented to [facility] on 8/17/2024 with dislodged nephrostomy tube.  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  She was recently admitted here from  7/25-08/01 for ESBL E. coli infection of urine.  A right sided nephrostomy tube replaced on 7/26.  ID consulted and recommended meropenem until 8/9 which patient has now completed.  Patient will ultimately require a nephrectomy as outpatient procedure.  In the meantime patient will need to have her nephrostomy tube replaced.     Interval Problem Update  Axox3, stable mild expressive aphasia. She denies pain, no dizziness. Unfortunately IR was unable to place tube yesterday or today, reportedly due to staffing issues. I have escalated to leadership for assistance. She has urinary retention as well now - will place pascal, NPO at midnight for hopefully tube placement in am. Exam and vitals stable. ROS otherwise negative.     I have discussed this patient's plan of care and discharge plan at IDT rounds today with Case Management, Nursing, Nursing leadership, and other members of the IDT team.    Consultants/Specialty  IR     Code Status  Full Code    Disposition  The patient is not medically cleared for discharge to home or a post-acute facility.      I have placed the appropriate orders for post-discharge needs.    Review of Systems  Review of Systems   Constitutional: Negative.  Negative for chills, diaphoresis, fever, malaise/fatigue and weight loss.   HENT: Negative.  Negative for sore throat.    Eyes: Negative.  Negative for blurred vision.   Respiratory: Negative.  Negative for cough and shortness of breath.    Cardiovascular: Negative.  Negative for chest pain, palpitations  and leg swelling.   Gastrointestinal: Negative.  Negative for abdominal pain, nausea and vomiting.   Genitourinary: Negative.  Negative for dysuria.   Musculoskeletal: Negative.  Negative for myalgias.   Skin: Negative.  Negative for itching and rash.   Neurological: Negative.  Negative for dizziness, focal weakness, weakness and headaches.   Endo/Heme/Allergies: Negative.  Does not bruise/bleed easily.   Psychiatric/Behavioral: Negative.  Negative for depression, substance abuse and suicidal ideas.    All other systems reviewed and are negative.       Physical Exam  Temp:  [36.1 °C (97 °F)-36.9 °C (98.5 °F)] 36.7 °C (98.1 °F)  Pulse:  [63-73] 73  Resp:  [16-18] 18  BP: ()/(53-78) 115/64  SpO2:  [99 %-100 %] 100 %    Physical Exam  Vitals and nursing note reviewed. Exam conducted with a chaperone present.   Constitutional:       General: She is not in acute distress.     Appearance: Normal appearance. She is not diaphoretic.   HENT:      Head: Normocephalic.      Nose: Nose normal.      Mouth/Throat:      Mouth: Mucous membranes are moist.   Eyes:      Pupils: Pupils are equal, round, and reactive to light.   Cardiovascular:      Rate and Rhythm: Normal rate and regular rhythm.      Pulses: Normal pulses.      Heart sounds: Normal heart sounds.   Pulmonary:      Effort: Pulmonary effort is normal.      Breath sounds: Normal breath sounds.   Abdominal:      General: Abdomen is flat. Bowel sounds are normal.      Palpations: Abdomen is soft.   Genitourinary:     Comments: R sided nephrostomy tube site with no d/c  Musculoskeletal:         General: No swelling or deformity. Normal range of motion.   Skin:     General: Skin is warm and dry.      Capillary Refill: Capillary refill takes less than 2 seconds.   Neurological:      General: No focal deficit present.      Mental Status: She is alert and oriented to person, place, and time.      Cranial Nerves: No cranial nerve deficit.      Motor: Weakness (chronic R  sided) present.   Psychiatric:         Mood and Affect: Mood normal.         Behavior: Behavior normal.         Fluids    Intake/Output Summary (Last 24 hours) at 8/18/2024 1538  Last data filed at 8/18/2024 0526  Gross per 24 hour   Intake --   Output 600 ml   Net -600 ml       Laboratory  Recent Labs     08/17/24  1402   WBC 10.5   RBC 3.80*   HEMOGLOBIN 10.8*   HEMATOCRIT 33.4*   MCV 87.9   MCH 28.4   MCHC 32.3   RDW 50.1*   PLATELETCT 310   MPV 11.2     Recent Labs     08/17/24  1402   SODIUM 139   POTASSIUM 4.0   CHLORIDE 108   CO2 20   GLUCOSE 82   BUN 20   CREATININE 0.81   CALCIUM 8.4                   Imaging  DX-CHEST-LIMITED (1 VIEW)   Final Result      Right sided central venous catheter tip projects at cavoatrial junction. No pneumothorax.      No acute cardiopulmonary abnormality.      Hiatal hernia.      IR-CONSULT AND TREAT    (Results Pending)        Assessment/Plan  * Nephrostomy tube displaced (HCC)- (present on admission)  Assessment & Plan  Recently had nephrostomy tube placed in January which was replaced in July  History of ESBL recently finished course of Merrem on 8/9  Patient is to eventually get an outpatient nephrectomy but temporarily has nephrostomy tube in place  Nephrostomy tube was dislodged within last 24 hours  IR to replace when they are available    Right hemiplegia (HCC)- (present on admission)  Assessment & Plan  From prior stroke    History of stroke- (present on admission)  Assessment & Plan  Residual right-sided hemiplegia and dysarthria         VTE prophylaxis: xarelto    I have performed a physical exam and reviewed and updated ROS and Plan today (8/18/2024). In review of yesterday's note (8/17/2024), there are no changes except as documented above.    35 minutes spent with patient and communicating with IR and leadership to facilitate nephrostomy tube placement

## 2024-08-18 NOTE — PROGRESS NOTES
"Med rec completed by [facility] rec tech at [facility] on 08/17/24, prior to transfer:    \"              Pharmacy Medication Reconciliation     ~Med rec updated and complete per MAR- [facility]   ~Allergies have been verified and updated per MAR           ~Anticoagulants (rivaroxaban, apixaban, edoxaban, dabigatran, warfarin, enoxaparin) taken in the last 14 days? No            Electronically signed by Jesus Alberto Cooper at 8/17/2024  1:00 PM\"    "

## 2024-08-18 NOTE — PROGRESS NOTES
4 Eyes Skin Assessment Completed by Ulises RN and FERNANDO Mims.    Head WDL  Ears Redness and Blanching  Nose WDL  Mouth WDL  Neck , WDL  Breast/Chest WDL R chest double lumen CVC.  Shoulder Blades WDL  Spine WDL, R flank area has opening neph tube in place  (R) Arm/Elbow/Hand Redness and Blanching, R arm flaccid  (L) Arm/Elbow/Hand Redness and Blanching  Abdomen Redness and Rash, Panus area red excoriated  Groin Redness and Excoriation  Scrotum/Coccyx/Buttocks Redness, Blanching, and Excoriation  (R) Leg Scar R knee scar  (L) Leg Scar L knee scar  (R) Heel/Foot/Toe Redness and Blanching, scab on 2nd toe, RLE flaccid paralysis  (L) Heel/Foot/Toe Redness and Blanching          Devices In Places double lumen CVC R chest, R flank neph tube      Interventions In Place TAP System, Pillows, Elbow Mepilex, Q2 Turns, Low Air Loss Mattress, Barrier Cream, Dri-Owen Pads, and Heels Loaded W/Pillows    Possible Skin Injury Yes    Pictures Uploaded Into Epic Yes  Wound Consult Placed Yes  RN Wound Prevention Protocol Ordered Yes Upon admission to the floor patient was informed how we do 2 RN Skin checks to make sure there are no skin issues. Pt gave this RN consent to perform a skin check.

## 2024-08-18 NOTE — ASSESSMENT & PLAN NOTE
8/19 Recently had nephrostomy tube placed in January which was replaced in July  History of ESBL recently finished course of Merrem on 8/9  Patient is to eventually get an outpatient nephrectomy but temporarily has nephrostomy tube in place  IR attempted to replace today - unsuccessful, see above - they recommend repeat US in 2 days, if hydronephrosis at that time they will re attempt    8/20 d/w IR, f/u renal u/s eval for recurrent hydronephrosis  - pascal removed, bladder scan protocol  - urology Dr. Huang consulted, appreciate input    8/21 ultrasound consistent with severe right-sided hydronephrosis  Pascal catheter removed, as per RN, patient able to have small amounts of urination, PVR of less than 300  Will encourage urination  May need replacement of Pascal catheter as needed for known history of urinary retention  N.p.o. after midnight for IR evaluation for replacement of right nephrostomy tube  Discussed with urology, aware of patient's wishes for nephrectomy ASAP  Per urology concerned with patient's deconditioning and hypotensive on August 20 8/27: Ureteral stent placed 8/25. Tolerating well.   Repeat CT 8/25: A right double-J ureteral stent is in place, with the proximal loop located in the renal pelvis and the distal loop in the urinary bladder. There is a filling defect in the renal pelvis mixed with contrast, likely representing hemorrhage.   - Will need outpatient follow up with [facility] [place] for possible nephrectomy in the future   - Continue IV meropenem until 9/8

## 2024-08-18 NOTE — DISCHARGE PLANNING
Transport request, faxed to [facility] and placed into Will Call status, per CM request

## 2024-08-18 NOTE — CARE PLAN
The patient is Stable - Low risk of patient condition declining or worsening    Shift Goals  Clinical Goals: NPO at midnight; pt safety during shift  Patient Goals: rest, comfort  Family Goals: mari    Progress made toward(s) clinical / shift goals: pt NPO at midnight for IR consult. Q2 turns continued. Pt safety maintained at this time, call light and personal belongings within reach. Bed locked in low position. Bed alarm on, fall precautions in place.     Problem: Knowledge Deficit - Standard  Goal: Patient and family/care givers will demonstrate understanding of plan of care, disease process/condition, diagnostic tests and medications  Outcome: Progressing     Problem: Skin Integrity  Goal: Skin integrity is maintained or improved  Outcome: Progressing     Problem: Fall Risk  Goal: Patient will remain free from falls  Outcome: Progressing     Patient is not progressing towards the following goals:       Hypercalcemia

## 2024-08-18 NOTE — H&P
Hospital Medicine History & Physical Note    Date of Service  8/17/2024    Primary Care Physician  Manuel Dubose M.D.    Code Status  Full Code    Chief Complaint  No chief complaint on file.      History of Presenting Illness  Nydia Finch is a 64 y.o. female who presented 8/17/2024 with dislodged nephrostomy tube.  Medical history of cerebral aneurysm, CVA with residual right-sided hemiparesis and expressive aphasia.  Recently admitted 7/25-08/01 for ESBL E. coli infection of urine.  Nephrostomy tube replaced on 7/26.  ID consulted and recommended meropenem until 8/9 which patient has now completed.  Patient will ultimately require a nephrectomy as outpatient procedure.  In the meantime patient will need to have her nephrostomy tube replaced.  Patient denies any symptoms.  Patient denies fever, chills, nausea, vomiting, abdominal pain.    I discussed the plan of care with patient.    Review of Systems  Review of Systems   Constitutional:  Negative for chills and fever.   HENT:  Negative for congestion and sore throat.    Eyes:  Negative for blurred vision and double vision.   Respiratory:  Negative for cough and shortness of breath.    Cardiovascular:  Negative for chest pain and palpitations.   Gastrointestinal:  Negative for abdominal pain, nausea and vomiting.   Genitourinary:  Negative for dysuria and frequency.   Musculoskeletal:  Negative for back pain and falls.   Neurological:  Negative for seizures and headaches.   Psychiatric/Behavioral:  Negative for hallucinations and substance abuse.        Past Medical History   has a past medical history of Aphagia (1994), Brain aneurysm, Chronic pain, Nephrolithiasis, Right hemiplegia (HCC) (1994), and Stroke (HCC).    Surgical History   has a past surgical history that includes other neurological surg; cystoscopy stent placement (2/13/2018); gastric resection (2012); other; cystoscopy (N/A, 7/4/2019); stent placement (Right, 7/4/2019); ureteroscopy (Right,  7/4/2019); and pr cystoscopy,insert ureteral stent (Right, 1/26/2024).     Family History  family history is not on file.   Family history reviewed with patient. There is no family history that is pertinent to the chief complaint.     Social History   reports that she has never smoked. She has never used smokeless tobacco.  Drug: Inhaled. She reports that she does not drink alcohol.    Allergies  Allergies   Allergen Reactions    Piperacillin Sod-Tazobactam So Rash     Gave her head to toe drug rash at [facility] 1/25/2024      Sulfamethoxazole W-Trimethoprim Itching     Rash ,itching    Ceftriaxone Itching     Generalized redness       Medications  Prior to Admission Medications   Prescriptions Last Dose Informant Patient Reported? Taking?   DULoxetine (CYMBALTA) 60 MG Cap DR Particles delayed-release capsule  MAR from Other Facility Yes No   Sig: Take 60 mg by mouth 2 times a day.   atorvastatin (LIPITOR) 40 MG Tab  MAR from Other Facility Yes No   Sig: Take 40 mg by mouth every evening.   magnesium hydroxide (MILK OF MAGNESIA) 400 MG/5ML Suspension  MAR from Other Facility Yes No   Sig: Take 30 mL by mouth 1 time a day as needed.   meropenem (MERREM) 500 MG Recon Soln  MAR from Other Facility Yes No   Sig: Infuse 500 mg into a venous catheter every 6 hours.   metoprolol tartrate (LOPRESSOR) 25 MG Tab  MAR from Other Facility No No   Sig: Take 1 Tablet by mouth 2 times a day.   mirtazapine (REMERON) 15 MG Tab  MAR from Other Facility Yes No   Sig: Take 15 mg by mouth at bedtime.   nystatin (MYCOSTATIN) 577461 UNIT/GM Cream topical cream  MAR from Other Facility Yes No   Sig: Apply 1 Application topically 3 times a day. 10 days   omeprazole (PRILOSEC) 20 MG delayed-release capsule  MAR from Other Facility Yes No   Sig: Take 20 mg by mouth every day.   pregabalin (LYRICA) 150 MG Cap  MAR from Other Facility Yes No   Sig: Take 150 mg by mouth every day.   tamsulosin (FLOMAX) 0.4 MG capsule  MAR from Other  "Facility No No   Sig: Take 1 Capsule by mouth 1/2 hour after breakfast.      Facility-Administered Medications: None       Physical Exam  Temp:  [36.8 °C (98.3 °F)] 36.8 °C (98.3 °F)  Pulse:  [65-79] 72  Resp:  [16-17] 17  BP: ()/(71-78) 92/72  SpO2:  [96 %-100 %] 99 %  Blood Pressure: 92/72   Temperature: 36.8 °C (98.3 °F)   Pulse: 72   Respiration: 17   Pulse Oximetry: 99 %       Physical Exam  Vitals reviewed.   Constitutional:       General: She is not in acute distress.     Appearance: She is not toxic-appearing.   HENT:      Right Ear: External ear normal.      Left Ear: External ear normal.      Nose: No congestion.      Mouth/Throat:      Pharynx: No oropharyngeal exudate.   Eyes:      General: No scleral icterus.     Pupils: Pupils are equal, round, and reactive to light.   Cardiovascular:      Rate and Rhythm: Normal rate and regular rhythm.      Heart sounds: No murmur heard.  Pulmonary:      Breath sounds: No wheezing.   Abdominal:      Palpations: Abdomen is soft.      Tenderness: There is no abdominal tenderness. There is no guarding or rebound.   Musculoskeletal:         General: No swelling or deformity.   Skin:     Coloration: Skin is not jaundiced.      Findings: No bruising.   Neurological:      Mental Status: She is alert.      Comments: Right sided flacid paralysis  dysarthria   Psychiatric:         Mood and Affect: Mood normal.         Behavior: Behavior normal.         Laboratory:  Recent Labs     08/17/24  1402   WBC 10.5   RBC 3.80*   HEMOGLOBIN 10.8*   HEMATOCRIT 33.4*   MCV 87.9   MCH 28.4   MCHC 32.3   RDW 50.1*   PLATELETCT 310   MPV 11.2     Recent Labs     08/17/24  1402   SODIUM 139   POTASSIUM 4.0   CHLORIDE 108   CO2 20   GLUCOSE 82   BUN 20   CREATININE 0.81   CALCIUM 8.4     Recent Labs     08/17/24  1402   ALTSGPT 6   ASTSGOT 8*   ALKPHOSPHAT 157*   TBILIRUBIN 0.4   GLUCOSE 82         No results for input(s): \"NTPROBNP\" in the last 72 hours.      No results for input(s): " "\"TROPONINT\" in the last 72 hours.    Imaging:  DX-CHEST-LIMITED (1 VIEW)    (Results Pending)   IR-CONSULT AND TREAT    (Results Pending)       Assessment/Plan:  Justification for Admission Status  I anticipate this patient will require at least two midnights for appropriate medical management, necessitating inpatient admission because dislodged nephrostomy tube    Patient will need a Med/Surg bed on MEDICAL service .  The need is secondary to nephrostomy tube replacement.    * Nephrostomy tube displaced (HCC)- (present on admission)  Assessment & Plan  Recently had nephrostomy tube placed in January which was replaced in July  History of ESBL recently finished course of Merrem on 8/9  Patient is to eventually get an outpatient nephrectomy but temporarily has nephrostomy tube in place  Nephrostomy tube was dislodged within last 24 hours  IR to replace when they are available    Right hemiplegia (HCC)- (present on admission)  Assessment & Plan  From prior stroke    History of stroke- (present on admission)  Assessment & Plan  Residual right-sided hemiplegia and dysarthria        VTE prophylaxis: SCDs/TEDs and Xarelto 10 mg daily as prophylaxis  "

## 2024-08-18 NOTE — DISCHARGE PLANNING
Care Transition Team Assessment    Emergency Contact is pt's spouse Marbin Finch ([phone])     JOEY RN met with pt at bedside to complete discharge assessment and chart review was completed to obtain the information used in this assessment. JOEY RN Introduced self and department roles. Pt verified information on face sheet.     - Prior to admission, pt was a resident at [facility] and stated she is agreeable to return upon medical clearance.   - Pt stated needing assistance with ADL/IADLs,   - Per pt, she is RA at baseline and no O2 was owned/used prior to admission.   - Pt is insured through Medicare and [employer]   - PCP is Arun Dubose M.D.     [facility] File # [id number]     Information Source  Orientation Level: Oriented to person, Oriented to place, Oriented to situation, Disoriented to time  Information Given By: Patient  Who is responsible for making decisions for patient? : Patient    Readmission Evaluation  Is this a readmission?: Yes - unplanned readmission    Elopement Risk  Legal Hold: No  Ambulatory or Self Mobile in Wheelchair: No-Not an Elopement Risk  Disoriented: No  Psychiatric Symptoms: None  History of Wandering: No  Elopement this Admit: No  Vocalizing Wanting to Leave: No  Displays Behaviors, Body Language Wanting to Leave: No-Not at Risk for Elopement  Elopement Risk: Not at Risk for Elopement         Discharge Preparedness  What is your plan after discharge?: Skilled nursing facility  What are your discharge supports?: Child, Spouse  Prior Functional Level: Needs Assist with Activities of Daily Living, Needs Assist with Medication Management    Functional Assesment  Prior Functional Level: Needs Assist with Activities of Daily Living, Needs Assist with Medication Management    Finances  Financial Barriers to Discharge: No  Prescription Coverage: Yes    Advance Directive  Advance Directive?: None    Discharge Risks or Barriers  Discharge risks or barriers?: Complex medical  needs  Patient risk factors: Complex medical needs    Anticipated Discharge Information  Discharge Disposition: D/T to SNF with Medicare cert in anticipation of skilled care (03)

## 2024-08-18 NOTE — RESPIRATORY CARE
COPD EDUCATION by COPD CLINICAL EDUCATOR  8/18/2024 at 6:36 AM by No Chavez, RRT     Patient reviewed by COPD education team. Patient does not have a history or diagnosis of COPD and is a non-smoker (never).  Therefore, patient does not qualify for the COPD program.

## 2024-08-18 NOTE — DISCHARGE PLANNING
Case Management Discharge Planning    Admission Date: 8/17/2024  GMLOS: 3.3  ALOS: 1    6-Clicks ADL Score:    6-Clicks Mobility Score:    PT and/or OT Eval ordered: No  Post-acute Referrals Ordered: No  Post-acute Choice Obtained: Yes  Has referral(s) been sent to post-acute provider:  Yes    Anticipated Discharge Dispo: Discharge Disposition: D/T to SNF with Medicare cert in anticipation of skilled care (03)    DME Needed: No    Action(s) Taken:   Per MD, pt to be medically clear post procedure.     JOEY RN met with the pt at the bedside to discuss discharge plan. Pt verbalized she was agreeable with returning to [facility] upon medical clearance. Pt completed choice form for [place] and form was then faxed to the [facility] for processing.     IMM was delivered.     Addendum @ 4253  [facility] with [facility] confirmed pt can be accepted back to facility today upon medical clearance. Per [facility], 1600 is the latest they are able to accept admissions today.     Code 44/Form 2 Commercial was delivered to the pt at the bedside.     Addendum @ 4535  JOEY RN send ride line request to [facility] requesting transprotation be placed in Will Call to [facility]. Pending procedure and clearance from MD.     Escalations Completed: None    Medically Clear: No    Next Steps:  JOEY RN to follow up with medical team to discuss discharge barriers or needs.     Barriers to Discharge: Medical clearance

## 2024-08-18 NOTE — DISCHARGE PLANNING
Patient rolled back to observation/outpatient status per attending MD determination (Dr. Finch) and UR committee MD secondary review (Dr. Mathew).  Condition code 44 completed.

## 2024-08-18 NOTE — PROGRESS NOTES
Spoke to  Marbin updated about pt. Arrival at [facility], pt. Armband placed. Consent to treat signed by patient. Updated Dr. Cook about pt and husbands POC, per Dr. Cook he will call  and updated. NOC Rn updated about POC as well. Intact CVC noted R chest, and neph tube on R flank area with dressing in place and scant amount of bloody drain on the drain bag.

## 2024-08-19 ENCOUNTER — APPOINTMENT (OUTPATIENT)
Dept: RADIOLOGY | Facility: MEDICAL CENTER | Age: 64
DRG: 659 | End: 2024-08-19
Attending: HOSPITALIST
Payer: MEDICARE

## 2024-08-19 LAB — INR BLD: 1.2

## 2024-08-19 PROCEDURE — 700105 HCHG RX REV CODE 258

## 2024-08-19 PROCEDURE — 700105 HCHG RX REV CODE 258: Performed by: RADIOLOGY

## 2024-08-19 PROCEDURE — A9270 NON-COVERED ITEM OR SERVICE: HCPCS | Performed by: INTERNAL MEDICINE

## 2024-08-19 PROCEDURE — 700111 HCHG RX REV CODE 636 W/ 250 OVERRIDE (IP): Performed by: RADIOLOGY

## 2024-08-19 PROCEDURE — 99153 MOD SED SAME PHYS/QHP EA: CPT

## 2024-08-19 PROCEDURE — 700105 HCHG RX REV CODE 258: Performed by: NURSE PRACTITIONER

## 2024-08-19 PROCEDURE — 700111 HCHG RX REV CODE 636 W/ 250 OVERRIDE (IP): Mod: JZ

## 2024-08-19 PROCEDURE — G0378 HOSPITAL OBSERVATION PER HR: HCPCS

## 2024-08-19 PROCEDURE — 700111 HCHG RX REV CODE 636 W/ 250 OVERRIDE (IP): Mod: JZ | Performed by: HOSPITALIST

## 2024-08-19 PROCEDURE — 700117 HCHG RX CONTRAST REV CODE 255: Performed by: RADIOLOGY

## 2024-08-19 PROCEDURE — 99232 SBSQ HOSP IP/OBS MODERATE 35: CPT | Performed by: HOSPITALIST

## 2024-08-19 PROCEDURE — 85610 PROTHROMBIN TIME: CPT

## 2024-08-19 PROCEDURE — 0TJ53ZZ INSPECTION OF KIDNEY, PERCUTANEOUS APPROACH: ICD-10-PCS | Performed by: RADIOLOGY

## 2024-08-19 PROCEDURE — 700102 HCHG RX REV CODE 250 W/ 637 OVERRIDE(OP): Performed by: INTERNAL MEDICINE

## 2024-08-19 RX ORDER — MORPHINE SULFATE 4 MG/ML
1 INJECTION INTRAVENOUS ONCE
Status: COMPLETED | OUTPATIENT
Start: 2024-08-19 | End: 2024-08-19

## 2024-08-19 RX ORDER — MIDAZOLAM HYDROCHLORIDE 1 MG/ML
INJECTION INTRAMUSCULAR; INTRAVENOUS
Status: COMPLETED
Start: 2024-08-19 | End: 2024-08-19

## 2024-08-19 RX ORDER — MIDAZOLAM HYDROCHLORIDE 1 MG/ML
.5-2 INJECTION INTRAMUSCULAR; INTRAVENOUS PRN
Status: ACTIVE | OUTPATIENT
Start: 2024-08-19 | End: 2024-08-19

## 2024-08-19 RX ORDER — SODIUM CHLORIDE 9 MG/ML
500 INJECTION, SOLUTION INTRAVENOUS
Status: ACTIVE | OUTPATIENT
Start: 2024-08-19 | End: 2024-08-19

## 2024-08-19 RX ORDER — SODIUM CHLORIDE 9 MG/ML
500 INJECTION, SOLUTION INTRAVENOUS ONCE
Status: COMPLETED | OUTPATIENT
Start: 2024-08-19 | End: 2024-08-19

## 2024-08-19 RX ORDER — CEFAZOLIN SODIUM 1 G/3ML
INJECTION, POWDER, FOR SOLUTION INTRAMUSCULAR; INTRAVENOUS
Status: DISPENSED
Start: 2024-08-19 | End: 2024-08-20

## 2024-08-19 RX ADMIN — TAMSULOSIN HYDROCHLORIDE 0.4 MG: 0.4 CAPSULE ORAL at 09:35

## 2024-08-19 RX ADMIN — FENTANYL CITRATE 25 MCG: 50 INJECTION, SOLUTION INTRAMUSCULAR; INTRAVENOUS at 15:34

## 2024-08-19 RX ADMIN — MIDAZOLAM HYDROCHLORIDE 1 MG: 2 INJECTION, SOLUTION INTRAMUSCULAR; INTRAVENOUS at 15:32

## 2024-08-19 RX ADMIN — SENNOSIDES AND DOCUSATE SODIUM 2 TABLET: 50; 8.6 TABLET ORAL at 17:49

## 2024-08-19 RX ADMIN — MIDAZOLAM HYDROCHLORIDE 0.5 MG: 2 INJECTION, SOLUTION INTRAMUSCULAR; INTRAVENOUS at 15:36

## 2024-08-19 RX ADMIN — DULOXETINE HYDROCHLORIDE 60 MG: 30 CAPSULE, DELAYED RELEASE ORAL at 05:18

## 2024-08-19 RX ADMIN — SODIUM CHLORIDE 500 ML: 9 INJECTION, SOLUTION INTRAVENOUS at 22:55

## 2024-08-19 RX ADMIN — METOPROLOL TARTRATE 25 MG: 25 TABLET, FILM COATED ORAL at 17:50

## 2024-08-19 RX ADMIN — MORPHINE SULFATE 1 MG: 4 INJECTION, SOLUTION INTRAMUSCULAR; INTRAVENOUS at 19:23

## 2024-08-19 RX ADMIN — NYSTATIN 100000 UNITS: 100000 CREAM TOPICAL at 13:18

## 2024-08-19 RX ADMIN — DULOXETINE HYDROCHLORIDE 60 MG: 30 CAPSULE, DELAYED RELEASE ORAL at 17:50

## 2024-08-19 RX ADMIN — FENTANYL CITRATE 50 MCG: 50 INJECTION, SOLUTION INTRAMUSCULAR; INTRAVENOUS at 15:32

## 2024-08-19 RX ADMIN — CEFAZOLIN 2 G: 1 INJECTION, POWDER, FOR SOLUTION INTRAMUSCULAR; INTRAVENOUS at 15:22

## 2024-08-19 RX ADMIN — MIDAZOLAM HYDROCHLORIDE 1 MG: 1 INJECTION, SOLUTION INTRAMUSCULAR; INTRAVENOUS at 15:32

## 2024-08-19 RX ADMIN — IOHEXOL 5 ML: 300 INJECTION, SOLUTION INTRAVENOUS at 16:30

## 2024-08-19 RX ADMIN — SODIUM CHLORIDE 500 ML: 9 INJECTION, SOLUTION INTRAVENOUS at 21:55

## 2024-08-19 RX ADMIN — OMEPRAZOLE 20 MG: 20 CAPSULE, DELAYED RELEASE ORAL at 05:18

## 2024-08-19 RX ADMIN — ATORVASTATIN CALCIUM 40 MG: 40 TABLET, FILM COATED ORAL at 20:12

## 2024-08-19 RX ADMIN — PREGABALIN 150 MG: 150 CAPSULE ORAL at 05:18

## 2024-08-19 RX ADMIN — ACETAMINOPHEN 650 MG: 325 TABLET ORAL at 17:53

## 2024-08-19 RX ADMIN — NYSTATIN 100000 UNITS: 100000 CREAM TOPICAL at 05:18

## 2024-08-19 RX ADMIN — MIRTAZAPINE 15 MG: 15 TABLET, FILM COATED ORAL at 20:12

## 2024-08-19 RX ADMIN — NYSTATIN 100000 UNITS: 100000 CREAM TOPICAL at 17:50

## 2024-08-19 ASSESSMENT — ENCOUNTER SYMPTOMS
WEAKNESS: 0
NAUSEA: 0
FEVER: 0
CARDIOVASCULAR NEGATIVE: 1
RESPIRATORY NEGATIVE: 1
GASTROINTESTINAL NEGATIVE: 1
COUGH: 0
VOMITING: 0
NEUROLOGICAL NEGATIVE: 1
MUSCULOSKELETAL NEGATIVE: 1
DIAPHORESIS: 0
DIZZINESS: 0
SHORTNESS OF BREATH: 0
PSYCHIATRIC NEGATIVE: 1
DEPRESSION: 1
SORE THROAT: 0
WEIGHT LOSS: 0
MYALGIAS: 0
EYES NEGATIVE: 1
CONSTITUTIONAL NEGATIVE: 1
CHILLS: 0
ABDOMINAL PAIN: 0
FOCAL WEAKNESS: 0
BRUISES/BLEEDS EASILY: 0
PALPITATIONS: 0
BLURRED VISION: 0
DEPRESSION: 0
HEADACHES: 0

## 2024-08-19 ASSESSMENT — PAIN DESCRIPTION - PAIN TYPE
TYPE: ACUTE PAIN;SURGICAL PAIN
TYPE: ACUTE PAIN
TYPE: ACUTE PAIN

## 2024-08-19 ASSESSMENT — LIFESTYLE VARIABLES: SUBSTANCE_ABUSE: 0

## 2024-08-19 NOTE — PROGRESS NOTES
Pt presents to [facility]. Pt was consented by MD at bedside, confirmed by this RN and consent at bedside. Pt transferred to IR table in prone position. Patient underwent a right nephrostomy tube placement attempt by Dr. Hampton. Procedure site was marked by MD and verified using imaging guidance. Pt placed on monitor, prepped and draped in a sterile fashion. Vitals were taken every 5 minutes and remained stable during procedure (see doc flow sheet for results). CO2 waveform capnography was monitored and remained WNL throughout procedure. Report called to Daylin KING. Pt transported by stretcher with RN to [place] [address].

## 2024-08-19 NOTE — OR SURGEON
Immediate Post- Operative Note        Findings: Aborted RT Neph tube placement secondary to absence of hydronephrosis       Procedure(s): Aborted RT Neph Tube Placement      Estimated Blood Loss: Less than 5 ml        Complications: None            8/19/2024     4:23 PM     Richar Hampton M.D.

## 2024-08-19 NOTE — CONSULTS
Radiology Consult  Author: BRYANT Prather   Date & Time created: 8/19/2024  9:11 AM   Date of admission  8/17/2024  Note to reader: this note follows the APSO format rather than the historical SOAP format. Assessment and plan located at the top of the note for ease of use.    Chief Complaint  64 y.o. female admitted 8/17/2024 with dislodged nephrostomy tube.    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 IR  consulted for right nephrostomy tube replacement.        Assessment/Plan     Principal Problem:    Nephrostomy tube displaced (HCC)  Active Problems:    History of stroke    Right hemiplegia (HCC)    Anemia    Urinary retention      Plan IR  - Right nephrostomy tube placement per IR scheduling and IR physician- May have to wait 48 hrs after last dose of Xarelto; however, Xarelto was given only one time yesterday and at a lower dose for DVT PPX  not for anticoagulation.   - HOLD Xarelto- Pt received 10 mg Xarelto yesterday for DVT PPX.    - May restart Xarelto 24hrs s/p neph tube placement  - Ultimate  plan is for pt to have  a right nephrectomy by urology nettie balderrama an outpt    -Discussed plan of care in length with the pt, Dr. Hampton, RN, and IR team     Thank you for allowing Interventional Radiology team to participate in the patients care, if any additonal care or requests are needed in the future please do not hesitate call or place IR order           Review of Systems  Physical Exam   Review of Systems   Constitutional:  Negative for fever.   HENT:  Negative for hearing loss.    Respiratory:  Negative for cough.    Cardiovascular:  Positive for chest pain.   Gastrointestinal:  Negative for abdominal pain, nausea and  vomiting.   Skin:  Negative for rash.   Neurological:  Negative for headaches.        Right side hemiplegia    Psychiatric/Behavioral:  Positive for depression.       Vitals:    08/19/24 0832   BP: 112/72   Pulse: 85   Resp: 18   Temp: 36.2 °C (97.2 °F)   SpO2: 94%      Physical Exam  Vitals and nursing note reviewed.   Constitutional:       General: She is awake.   HENT:      Head: Macrocephalic.   Cardiovascular:      Rate and Rhythm: Normal rate and regular rhythm.   Pulmonary:      Effort: No respiratory distress.   Chest:   Breasts:     Breasts are symmetrical.   Abdominal:      General: Bowel sounds are absent.      Palpations: Abdomen is soft.   Genitourinary:     Comments: Right nephrostomy tube with little to no output noted- bloody drainage  Skin:     General: Skin is warm and dry.      Capillary Refill: Capillary refill takes less than 2 seconds.   Neurological:      Mental Status: She is alert and oriented to person, place, and time.      Comments: Right side hemiplegia    Psychiatric:         Mood and Affect: Mood is anxious and depressed. Affect is tearful.         Speech: Speech normal.         Behavior: Behavior normal. Behavior is cooperative.             Labs reviewed by me today    Recent Labs     08/17/24  1402   WBC 10.5   RBC 3.80*   HEMOGLOBIN 10.8*   HEMATOCRIT 33.4*   MCV 87.9   MCH 28.4   MCHC 32.3   RDW 50.1*   PLATELETCT 310   MPV 11.2     Recent Labs     08/17/24  1402   SODIUM 139   POTASSIUM 4.0   CHLORIDE 108   CO2 20   GLUCOSE 82   BUN 20   CREATININE 0.81   CALCIUM 8.4     DX-CHEST-LIMITED (1 VIEW)   Final Result      Right sided central venous catheter tip projects at cavoatrial junction. No pneumothorax.      No acute cardiopulmonary abnormality.      Hiatal hernia.      IR-CONSULT AND TREAT    (Results Pending)     Recent Labs     08/17/24  1402   SODIUM 139   POTASSIUM 4.0   CHLORIDE 108   CO2 20   GLUCOSE 82   BUN 20     INR   Date Value Ref Range Status   07/26/2024 1.05 0.87  "- 1.13 Final     Comment:     INR - Non-therapeutic Reference Range: 0.87-1.13  INR - Therapeutic Reference Range: 2.0-4.0       No results found for: \"POCINR\"     Intake/Output Summary (Last 24 hours) at 8/19/2024 0836  Last data filed at 8/19/2024 0500  Gross per 24 hour   Intake 120 ml   Output 200 ml   Net -80 ml      Labs not explicitly included in this progress note were reviewed by the author. Radiology/imaging not explicitly included in this progress note was reviewed by the author.     Past Medical History:   Diagnosis Date    Aphagia 1994    Brain aneurysm     Chronic pain     Nephrolithiasis     Right hemiplegia (HCC) 1994    Stroke (HCC)         Home Medications    Medication Sig Taking? Last Dose Authorizing Provider   nystatin (MYCOSTATIN) [id number] UNIT/GM Cream topical cream Apply 1 Application topically 3 times a day. 10 days Yes 8/17/2024 at AM Physician Outpatient   metoprolol tartrate (LOPRESSOR) 25 MG Tab Take 1 Tablet by mouth 2 times a day. Yes 8/17/2024 at AM Beverly Vasquez M.D.   tamsulosin (FLOMAX) 0.4 MG capsule Take 1 Capsule by mouth 1/2 hour after breakfast. Yes 8/17/2024 at AM Beverly Vasquez M.D.   atorvastatin (LIPITOR) 40 MG Tab Take 40 mg by mouth every evening. Yes 8/16/2024 at  Physician Outpatient   omeprazole (PRILOSEC) 20 MG delayed-release capsule Take 20 mg by mouth every day. Yes 8/17/2024 at AM Physician Outpatient   DULoxetine (CYMBALTA) 60 MG Cap DR Particles delayed-release capsule Take 60 mg by mouth 2 times a day. Yes 8/17/2024 at AM Physician Outpatient   pregabalin (LYRICA) 150 MG Cap Take 150 mg by mouth every day.  8/16/2024 at  Physician Outpatient   mirtazapine (REMERON) 15 MG Tab Take 15 mg by mouth at bedtime.  8/16/2024 at  Physician Outpatient   meropenem (MERREM) 500 MG Recon Soln Infuse 500 mg into a venous catheter every 6 hours.  8/9/2024 at [facility] Physician Outpatient   magnesium hydroxide (MILK OF MAGNESIA) 400 MG/5ML Suspension Take 30 mL by mouth 1 " time a day as needed.  8/4/2024 at [facility] Physician Outpatient       I have performed a physical exam and reviewed and updated ROS and Plan today (8/19/2024).     50  minutes in directly providing and coordinating care and extensive data review.  No time overlap and excludes procedures.

## 2024-08-19 NOTE — DISCHARGE PLANNING
ELIZA sent a teams message to Luis Manuel at [place] [facility] stating Transport is on will call, pt needs a follow up appt with interventional radiology.  Once appt is confirmed, we will have pt transport.  I will keep you posted.

## 2024-08-19 NOTE — PROGRESS NOTES
Pt is complaining of pain to neph tube removal site, dressing is cdi. Pt pain was mild and apap given, now c/o severe pain, non pharm interventions in place, communication with Berenice De La Garza MD, states to give morphine IV one time, verbal order given, placed.

## 2024-08-19 NOTE — PROGRESS NOTES
[facility] Medicine Daily Progress Note    Date of Service  8/19/2024    Chief Complaint  Nydia Finch is a 64 y.o. female admitted 8/17/2024 with a dislodged nephrostomy tube    Hospital Course  Nydia Finch is a 64 y.o. female who presented to [facility] on 8/17/2024 with dislodged nephrostomy tube.  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  She was recently admitted here from  7/25-08/01 for ESBL E. coli infection of urine.  A right sided nephrostomy tube replaced on 7/26.  ID consulted and recommended meropenem until 8/9 which patient has now completed.  Patient will ultimately require a nephrectomy as outpatient procedure.  In the meantime patient will need to have her nephrostomy tube replaced.     Interval Problem Update  Axox3, doing well, chronic expressive aphasia stable, doing well with pascal, denies pain, exam and vitals stable. I discussed plan with IR. They attempted to place a nephrostomy tube today and unable, they recommend repeat US in 2 days and if hydronephrosis at that time they will re attempt    I have discussed this patient's plan of care and discharge plan at IDT rounds today with Case Management, Nursing, Nursing leadership, and other members of the IDT team.    Consultants/Specialty  IR     Code Status  Full Code    Disposition  The patient is not medically cleared for discharge to home or a post-acute facility.      I have placed the appropriate orders for post-discharge needs.    Review of Systems  Review of Systems   Constitutional: Negative.  Negative for chills, diaphoresis, fever, malaise/fatigue and weight loss.   HENT: Negative.  Negative for sore throat.    Eyes: Negative.  Negative for blurred vision.   Respiratory: Negative.  Negative for cough and shortness of breath.    Cardiovascular: Negative.  Negative for chest pain, palpitations and leg swelling.   Gastrointestinal: Negative.  Negative for abdominal pain,  nausea and vomiting.   Genitourinary: Negative.  Negative for dysuria.   Musculoskeletal: Negative.  Negative for myalgias.   Skin: Negative.  Negative for itching and rash.   Neurological: Negative.  Negative for dizziness, focal weakness, weakness and headaches.   Endo/Heme/Allergies: Negative.  Does not bruise/bleed easily.   Psychiatric/Behavioral: Negative.  Negative for depression, substance abuse and suicidal ideas.    All other systems reviewed and are negative.       Physical Exam  Temp:  [36.2 °C (97.2 °F)-36.7 °C (98 °F)] 36.7 °C (98 °F)  Pulse:  [] 72  Resp:  [10-32] 16  BP: ()/(60-80) 107/60  SpO2:  [92 %-100 %] 100 %    Physical Exam  Vitals and nursing note reviewed. Exam conducted with a chaperone present.   Constitutional:       General: She is not in acute distress.     Appearance: Normal appearance. She is not diaphoretic.   HENT:      Head: Normocephalic.      Nose: Nose normal.      Mouth/Throat:      Mouth: Mucous membranes are moist.   Eyes:      Pupils: Pupils are equal, round, and reactive to light.   Cardiovascular:      Rate and Rhythm: Normal rate and regular rhythm.      Pulses: Normal pulses.      Heart sounds: Normal heart sounds.   Pulmonary:      Effort: Pulmonary effort is normal.      Breath sounds: Normal breath sounds.   Abdominal:      General: Abdomen is flat. Bowel sounds are normal.      Palpations: Abdomen is soft.   Genitourinary:     Comments: R sided nephrostomy tube site with no d/c    Olmedo in place  Musculoskeletal:         General: No swelling or deformity. Normal range of motion.   Skin:     General: Skin is warm and dry.      Capillary Refill: Capillary refill takes less than 2 seconds.   Neurological:      General: No focal deficit present.      Mental Status: She is alert and oriented to person, place, and time.      Cranial Nerves: No cranial nerve deficit.      Motor: Weakness (chronic R sided) present.   Psychiatric:         Mood and Affect: Mood  normal.         Behavior: Behavior normal.         Fluids    Intake/Output Summary (Last 24 hours) at 8/19/2024 1645  Last data filed at 8/19/2024 1215  Gross per 24 hour   Intake 360 ml   Output 200 ml   Net 160 ml       Laboratory  Recent Labs     08/17/24  1402   WBC 10.5   RBC 3.80*   HEMOGLOBIN 10.8*   HEMATOCRIT 33.4*   MCV 87.9   MCH 28.4   MCHC 32.3   RDW 50.1*   PLATELETCT 310   MPV 11.2     Recent Labs     08/17/24  1402   SODIUM 139   POTASSIUM 4.0   CHLORIDE 108   CO2 20   GLUCOSE 82   BUN 20   CREATININE 0.81   CALCIUM 8.4                   Imaging  IR-PERQ NEPH CATH NEW ACCESS (ALL RADIOLOGY) RIGHT   Final Result            1.  Mild pelviectasis of the right kidney without evidence of significant hydronephrosis.      2.  Aborted right nephrostomy tube placement. Recommend follow-up ultrasound of the right kidney in several days to assess if right-sided hydronephrosis recurs, and if right-sided hydronephrosis develops right nephrostomy tube placement can be    reattempted.      DX-CHEST-LIMITED (1 VIEW)   Final Result      Right sided central venous catheter tip projects at cavoatrial junction. No pneumothorax.      No acute cardiopulmonary abnormality.      Hiatal hernia.           Assessment/Plan  * Nephrostomy tube displaced (HCC)- (present on admission)  Assessment & Plan  Recently had nephrostomy tube placed in January which was replaced in July  History of ESBL recently finished course of Merrem on 8/9  Patient is to eventually get an outpatient nephrectomy but temporarily has nephrostomy tube in place  IR attempted to replace today - unsuccessful, see above - they recommend repeat US in 2 days, if hydronephrosis at that time they will re attempt  Following renal function  Bmp in am     Urinary retention  Assessment & Plan  See above  Bmp in am     Anemia- (present on admission)  Assessment & Plan  Chronic   At baseline   Follow intermittently     Right hemiplegia (HCC)- (present on  admission)  Assessment & Plan  From prior stroke    History of stroke- (present on admission)  Assessment & Plan  Residual right-sided hemiplegia and dysarthria  Supportive care  At baseline         VTE prophylaxis: xarelto    I have performed a physical exam and reviewed and updated ROS and Plan today (8/19/2024). In review of yesterday's note (8/18/2024), there are no changes except as documented above.    35 minutes spent with patient and communicating with IR and leadership to facilitate nephrostomy tube placement

## 2024-08-19 NOTE — CARE PLAN
The patient is Stable - Low risk of patient condition declining or worsening    Shift Goals  Clinical Goals: q2 turns; NPO at midnight; pt safety during shift  Patient Goals: rest  Family Goals: mari    Progress made toward(s) clinical / shift goals: q2 turns continued. Pt NPO at midnight. Pt safety maintained at this time, call light and personal belongings within reach. Bed locked in low position. Bed alarm on, fall precautions in place.     Problem: Knowledge Deficit - Standard  Goal: Patient and family/care givers will demonstrate understanding of plan of care, disease process/condition, diagnostic tests and medications  Outcome: Progressing     Problem: Skin Integrity  Goal: Skin integrity is maintained or improved  Outcome: Progressing     Problem: Fall Risk  Goal: Patient will remain free from falls  Outcome: Progressing     Patient is not progressing towards the following goals:

## 2024-08-19 NOTE — PROGRESS NOTES
Communication from hospitalist Berenice De La Garza MD, review of pt no void and bladder scan, states to please place a pascal.

## 2024-08-19 NOTE — CARE PLAN
The patient is Watcher - Medium risk of patient condition declining or worsening    Shift Goals  Clinical Goals: Pt will maintain or improve skin integrity by the end of shift.  Patient Goals: rest  Family Goals: ELIZABETH    Progress made toward(s) clinical / shift goals:  Pt with need of precautions to protect skin integrity, pt with frequent positioning to protect skin integrity.     Patient is not progressing towards the following goals: progressing.

## 2024-08-19 NOTE — CARE PLAN
The patient is Watcher - Medium risk of patient condition declining or worsening    Shift Goals  Clinical Goals: Pt needs for IR and neph tube placement will be met by the end of shift.  Patient Goals: rest  Family Goals: ELIZABETH    Progress made toward(s) clinical / shift goals:  Close communication with IR and MD throughout day, plan for pt made with communications and pt able to go to IR for tube placement. Communication with  throughout day. Pt new gown and CHG bath prior to leaving.     Patient is not progressing towards the following goals: progressing.

## 2024-08-19 NOTE — DISCHARGE PLANNING
Case Management Discharge Planning    Admission Date: 8/17/2024  GMLOS: 3.3  ALOS: 0    6-Clicks ADL Score:    6-Clicks Mobility Score:    PT and/or OT Eval ordered: No  Post-acute Referrals Ordered: Yes  Post-acute Choice Obtained: Yes  Has referral(s) been sent to post-acute provider:  Yes      Anticipated Discharge Dispo: Discharge Disposition: D/T to SNF with Medicare cert in anticipation of skilled care (03)    DME Needed: No    Action(s) Taken: Updated Provider/Nurse on Discharge PlanRNCM attended IDT rounds and reviewed chart.. [facility] will accept pt today. Received confirmation for transport via [facility] @1330.   @1226 Message left for spouse, Marbin, regarding discharge plan and transport today.    @1300 Spoke to spouse Marbin via phone. He voiced concerns regarding pt being discharged today with a pascal catheter but her verbalized replacement for nephrostomy tube seemed too far out. RNCM messaged IR  to follow up. [facility] transportation place on [facility] now until appointment can be confirmed.   @1524 Discharge is pending. ED RN Maritza notified pt might be an after hours discharge. RNCM updated Maritza that COBRA packet was prepared and MD to complete DC Summary before 1700. Transport remains on [facility].     Escalations Completed: No    Medically Clear: Yes    Next Steps: RNCM will continue toassist with discharge plan.      Barriers to Discharge: None

## 2024-08-20 PROBLEM — L30.9 DERMATITIS: Status: ACTIVE | Noted: 2024-08-20

## 2024-08-20 LAB
ANION GAP SERPL CALC-SCNC: 8 MMOL/L (ref 7–16)
BUN SERPL-MCNC: 21 MG/DL (ref 8–22)
CALCIUM SERPL-MCNC: 7.8 MG/DL (ref 8.5–10.5)
CHLORIDE SERPL-SCNC: 112 MMOL/L (ref 96–112)
CO2 SERPL-SCNC: 19 MMOL/L (ref 20–33)
CREAT SERPL-MCNC: 0.89 MG/DL (ref 0.5–1.4)
GFR SERPLBLD CREATININE-BSD FMLA CKD-EPI: 72 ML/MIN/1.73 M 2
GLUCOSE SERPL-MCNC: 108 MG/DL (ref 65–99)
INR PPP: 1.28 (ref 0.87–1.13)
POTASSIUM SERPL-SCNC: 3.8 MMOL/L (ref 3.6–5.5)
PROTHROMBIN TIME: 16.2 SEC (ref 12–14.6)
SODIUM SERPL-SCNC: 139 MMOL/L (ref 135–145)

## 2024-08-20 PROCEDURE — 700102 HCHG RX REV CODE 250 W/ 637 OVERRIDE(OP): Performed by: INTERNAL MEDICINE

## 2024-08-20 PROCEDURE — 99233 SBSQ HOSP IP/OBS HIGH 50: CPT | Performed by: INTERNAL MEDICINE

## 2024-08-20 PROCEDURE — 80048 BASIC METABOLIC PNL TOTAL CA: CPT

## 2024-08-20 PROCEDURE — 51798 US URINE CAPACITY MEASURE: CPT

## 2024-08-20 PROCEDURE — 85610 PROTHROMBIN TIME: CPT

## 2024-08-20 PROCEDURE — G0378 HOSPITAL OBSERVATION PER HR: HCPCS

## 2024-08-20 PROCEDURE — A9270 NON-COVERED ITEM OR SERVICE: HCPCS | Performed by: INTERNAL MEDICINE

## 2024-08-20 RX ORDER — NYSTATIN 100000 [USP'U]/G
POWDER TOPICAL 2 TIMES DAILY
Status: DISCONTINUED | OUTPATIENT
Start: 2024-08-20 | End: 2024-08-28 | Stop reason: HOSPADM

## 2024-08-20 RX ADMIN — NYSTATIN 100000 UNITS: 100000 CREAM TOPICAL at 13:16

## 2024-08-20 RX ADMIN — ATORVASTATIN CALCIUM 40 MG: 40 TABLET, FILM COATED ORAL at 21:49

## 2024-08-20 RX ADMIN — TAMSULOSIN HYDROCHLORIDE 0.4 MG: 0.4 CAPSULE ORAL at 09:27

## 2024-08-20 RX ADMIN — DULOXETINE HYDROCHLORIDE 60 MG: 30 CAPSULE, DELAYED RELEASE ORAL at 04:32

## 2024-08-20 RX ADMIN — SENNOSIDES AND DOCUSATE SODIUM 2 TABLET: 50; 8.6 TABLET ORAL at 16:47

## 2024-08-20 RX ADMIN — DULOXETINE HYDROCHLORIDE 60 MG: 30 CAPSULE, DELAYED RELEASE ORAL at 16:47

## 2024-08-20 RX ADMIN — NYSTATIN 100000 UNITS: 100000 CREAM TOPICAL at 04:32

## 2024-08-20 RX ADMIN — NYSTATIN: 100000 POWDER TOPICAL at 17:46

## 2024-08-20 RX ADMIN — PREGABALIN 150 MG: 150 CAPSULE ORAL at 04:32

## 2024-08-20 RX ADMIN — OMEPRAZOLE 20 MG: 20 CAPSULE, DELAYED RELEASE ORAL at 04:32

## 2024-08-20 RX ADMIN — METOPROLOL TARTRATE 25 MG: 25 TABLET, FILM COATED ORAL at 16:47

## 2024-08-20 RX ADMIN — MIRTAZAPINE 15 MG: 15 TABLET, FILM COATED ORAL at 21:49

## 2024-08-20 ASSESSMENT — ENCOUNTER SYMPTOMS
FEVER: 0
NAUSEA: 0
WEIGHT LOSS: 0
WEAKNESS: 1
EYES NEGATIVE: 1
SHORTNESS OF BREATH: 0
SORE THROAT: 0
PALPITATIONS: 0
DIZZINESS: 0
COUGH: 0
DEPRESSION: 1
CARDIOVASCULAR NEGATIVE: 1
MYALGIAS: 1
RESPIRATORY NEGATIVE: 1
HEADACHES: 0
CHILLS: 0
ABDOMINAL PAIN: 0
FLANK PAIN: 0
BLURRED VISION: 0
PSYCHIATRIC NEGATIVE: 1
DEPRESSION: 0
FLANK PAIN: 1
TREMORS: 1
VOMITING: 0
SPUTUM PRODUCTION: 0
BRUISES/BLEEDS EASILY: 0
FOCAL WEAKNESS: 0
GASTROINTESTINAL NEGATIVE: 1

## 2024-08-20 ASSESSMENT — LIFESTYLE VARIABLES: SUBSTANCE_ABUSE: 0

## 2024-08-20 ASSESSMENT — PAIN DESCRIPTION - PAIN TYPE: TYPE: ACUTE PAIN

## 2024-08-20 NOTE — PROGRESS NOTES
INFECTIOUS  DISEASE  OUTPATIENT CLINIC  NOTE   Subjective   Primary care provider: Manuel Dubose M.D..     Reason for Follow Up:   Follow-up for   No diagnosis found.    HPI: Patient previously seen and treated by ID team as inpatient during hospital admission.   Nydia Finch is a 64 y.o. woman with a history of brain aneurysm, prior stroke complicated by right hemiplegia and expressive aphasia, and history of nephrolithiasis admitted 7/25/2024 from [facility] secondary to abdominal pain where she was found to have moderate hydronephrosis and mild hydroureter.  Extensive review of emergency physician notes, hospital medicine notes and consultant notes performed.  The patient was last seen by the ID service back in February 2024 secondary to pyelonephritis.  On 1/26/2024 she underwent cystoscopy but they were unable to pass a wire due to extensive scarring so IR was consulted.  She underwent right-sided percutaneous nephrostomy tube placement on 1/28/2024.  Gross pus was aspirated.  Cultures grew ESBL E. coli.  At discharge she was switched to Bactrim through 2/10/2024.  Patient underwent right-sided percutaneous nephrostomy placement on 7/26/2024.  Fluid cultures are positive for ESBL E. coli only sensitive to Bactrim however she has rash and itching.  Patient states that there are plans for nephrectomy in approximately 2 weeks.  She is currently on IV meropenem.  Infectious disease service consulted for antibiotic recommendations.       08/21/24- Today Patient reports feeling well. Pt stating that the wound is healing well. Pt being followed by the [facility]. Wound pictures reviewed in EPIC. Denies drainage, pungent odor, redness, pain. Denies feeling generally ill, fevers/chills, general malaise, headache, n/v/d.     Current Antimicrobials:   Previous Antimicrobials:    Other Current Medications:  Home Medications    Medication Sig Taking? Last Dose Authorizing Provider   pregabalin  (LYRICA) 150 MG Cap Take 150 mg by mouth every day.   Physician Outpatient   mirtazapine (REMERON) 15 MG Tab Take 15 mg by mouth at bedtime.   Physician Outpatient   nystatin (MYCOSTATIN) 515018 UNIT/GM Cream topical cream Apply 1 Application topically 3 times a day. 10 days   Physician Outpatient   meropenem (MERREM) 500 MG Recon Soln Infuse 500 mg into a venous catheter every 6 hours.   Physician Outpatient   magnesium hydroxide (MILK OF MAGNESIA) 400 MG/5ML Suspension Take 30 mL by mouth 1 time a day as needed.   Physician Outpatient   metoprolol tartrate (LOPRESSOR) 25 MG Tab Take 1 Tablet by mouth 2 times a day.   Beverly Vasquez M.D.   tamsulosin (FLOMAX) 0.4 MG capsule Take 1 Capsule by mouth 1/2 hour after breakfast.   Beverly Vasquez M.D.   atorvastatin (LIPITOR) 40 MG Tab Take 40 mg by mouth every evening.   Physician Outpatient   omeprazole (PRILOSEC) 20 MG delayed-release capsule Take 20 mg by mouth every day.   Physician Outpatient   DULoxetine (CYMBALTA) 60 MG Cap DR Particles delayed-release capsule Take 60 mg by mouth 2 times a day.   Physician Outpatient        PMH:  Past Medical History:   Diagnosis Date    Aphagia 1994    Brain aneurysm     Chronic pain     Nephrolithiasis     Right hemiplegia (HCC) 1994    Stroke (HCC)      Past Surgical History:   Procedure Laterality Date    AR CYSTOSCOPY,INSERT URETERAL STENT Right 1/26/2024    Procedure: CYSTOSCOPY;  Surgeon: Mik Rm M.D.;  Location: [facility];  Service: Urology    CYSTOSCOPY N/A 7/4/2019    Procedure: CYSTOSCOPY;  Surgeon: Monico Killian M.D.;  Location: [facility];  Service: Urology    STENT PLACEMENT Right 7/4/2019    Procedure: STENT PLACEMENT;  Surgeon: Monico Killian M.D.;  Location: [facility];  Service: Urology    URETEROSCOPY Right 7/4/2019    Procedure: URETEROSCOPY;  Surgeon: Monico Killian M.D.;  Location: [facility];  Service: Urology    CYSTOSCOPY STENT PLACEMENT  2/13/2018     Procedure: CYSTOSCOPY STENT PLACEMENT;  Surgeon: Monico Killian M.D.;  Location: SURGERY [facility];  Service: Urology    GASTRIC RESECTION  2012    Duodenal Switch    OTHER      OTHER NEUROLOGICAL SURG       No family history on file.  Social History     Socioeconomic History    Marital status:      Spouse name: Not on file    Number of children: Not on file    Years of education: Not on file    Highest education level: Not on file   Occupational History    Not on file   Tobacco Use    Smoking status: Never    Smokeless tobacco: Never   Substance and Sexual Activity    Alcohol use: No    Drug use: Not on file     Comment: Marijauna    Sexual activity: Not on file   Other Topics Concern    Not on file   Social History Narrative    Not on file     Social Determinants of Health     Financial Resource Strain: Low Risk  (11/10/2022)    Received from [facility]    Financial Resource Strain     Difficulty of Paying Living Expenses: Not on file     Access to Reliable Phone: Not on file   Food Insecurity: No Food Insecurity (7/26/2024)    Hunger Vital Sign     Worried About Running Out of Food in the Last Year: Never true     Ran Out of Food in the Last Year: Never true   Transportation Needs: No Transportation Needs (7/26/2024)    PRAPARE - Transportation     Lack of Transportation (Medical): No     Lack of Transportation (Non-Medical): No   Physical Activity: Not on file   Stress: Not on file   Social Connections: Not on file   Intimate Partner Violence: Not At Risk (7/26/2024)    Humiliation, Afraid, Rape, and Kick questionnaire     Fear of Current or Ex-Partner: No     Emotionally Abused: No     Physically Abused: No     Sexually Abused: No   Housing Stability: Low Risk  (7/26/2024)    Housing Stability Vital Sign     Unable to Pay for Housing in the Last Year: No     Number of Places Lived in the Last Year: 1     Unstable Housing in the Last Year: No            Allergies/Intolerances:  Allergies   Allergen Reactions    Piperacillin Sod-Tazobactam So Rash     Gave her head to toe drug rash at [facility] 1/25/2024      Sulfamethoxazole W-Trimethoprim Itching     Rash ,itching    Ceftriaxone Itching     Generalized redness       ROS:   ROS   ROS was reviewed and were negative except as above.    Objective    Most Recent Vital Signs:  LMP  (LMP Unknown)     Physical Exam:  Physical Exam     Pertinent Lab/Imaging Results:  [unfilled]  @CMP@  WBC   Date/Time Value Ref Range Status   08/17/2024 02:02 PM 10.5 4.8 - 10.8 K/uL Final     RBC   Date/Time Value Ref Range Status   08/17/2024 02:02 PM 3.80 (L) 4.20 - 5.40 M/uL Final     Hemoglobin   Date/Time Value Ref Range Status   08/17/2024 02:02 PM 10.8 (L) 12.0 - 16.0 g/dL Final     Hematocrit   Date/Time Value Ref Range Status   08/17/2024 02:02 PM 33.4 (L) 37.0 - 47.0 % Final     MCV   Date/Time Value Ref Range Status   08/17/2024 02:02 PM 87.9 81.4 - 97.8 fL Final     MCH   Date/Time Value Ref Range Status   08/17/2024 02:02 PM 28.4 27.0 - 33.0 pg Final     MCHC   Date/Time Value Ref Range Status   08/17/2024 02:02 PM 32.3 32.2 - 35.5 g/dL Final     MPV   Date/Time Value Ref Range Status   08/17/2024 02:02 PM 11.2 9.0 - 12.9 fL Final      Sodium   Date/Time Value Ref Range Status   08/20/2024 12:20  135 - 145 mmol/L Final     Potassium   Date/Time Value Ref Range Status   08/20/2024 12:20 AM 3.8 3.6 - 5.5 mmol/L Final     Chloride   Date/Time Value Ref Range Status   08/20/2024 12:20  96 - 112 mmol/L Final     Co2   Date/Time Value Ref Range Status   08/20/2024 12:20 AM 19 (L) 20 - 33 mmol/L Final     Glucose   Date/Time Value Ref Range Status   08/20/2024 12:20  (H) 65 - 99 mg/dL Final     Bun   Date/Time Value Ref Range Status   08/20/2024 12:20 AM 21 8 - 22 mg/dL Final     Creatinine   Date/Time Value Ref Range Status   08/20/2024 12:20 AM 0.89 0.50 - 1.40 mg/dL Final     Alkaline Phosphatase  "  Date/Time Value Ref Range Status   08/17/2024 02:02  (H) 30 - 99 U/L Final     AST(SGOT)   Date/Time Value Ref Range Status   08/17/2024 02:02 PM 8 (L) 12 - 45 U/L Final     ALT(SGPT)   Date/Time Value Ref Range Status   08/17/2024 02:02 PM 6 2 - 50 U/L Final     Total Bilirubin   Date/Time Value Ref Range Status   08/17/2024 02:02 PM 0.4 0.1 - 1.5 mg/dL Final      No results found for: \"CPKTOTAL\"   Recent Labs     08/20/24  0020   INR 1.28*     Blood Culture   Date Value Ref Range Status   02/19/2018 No growth after 5 days of incubation.  Final       Blood Culture   Date Value Ref Range Status   02/19/2018 No growth after 5 days of incubation.  Final            Impression/Assessment    No diagnosis found.      PLAN:   -   - Repeat Labs CBC/CMP in ***   - Medication education provided and S/S of side effects discussed   - Recommend routine follow up with ***  - Continue wound care to ***  - Recommended to start po probiotic  - Education provided on S/S of infection and when to report to ER/ call 911             Return visit: ***. Follow up with primary care physician for chronic medical problems      I have performed a physical exam,  updated ROS and plan today. I have reviewed previous images, labs, and provider notes.      MASHA Mendoza.    All Patients should seek medical re-evaluation or report to the ER for new, increasing or worsening symptoms. In some circumstances medical conditions can change from the initial evaluation and may require emergent medical re-evaluation. This includes but is not limited to chest pain, shortness of breath, atypical abdominal pain, atypical headache, ALOC, fever >101, low blood pressure, high respiratory rate (above 30), low oxygen saturation (below 90%), acute delirium, abnormal bleeding, inability to tolerate any intake, weakness on one side of the body, any worsened or concerning conditions.    Please note that this dictation was created using voice " recognition software. I have worked with technical experts from [facility] to optimize the interface.  I have made every reasonable attempt to correct obvious errors, but there may be errors of grammar and possibly content that I did not discover before finalizing the note.

## 2024-08-20 NOTE — DISCHARGE PLANNING
Case Management Discharge Planning    Admission Date: 8/17/2024  GMLOS: 3.3  ALOS: 0    6-Clicks ADL Score:    6-Clicks Mobility Score:    PT and/or OT Eval ordered: No  Post-acute Referrals Ordered: Yes  Post-acute Choice Obtained: Yes  Has referral(s) been sent to post-acute provider:  Yes      Anticipated Discharge Dispo: Discharge Disposition: Disch to  rehab facility or distinct part unit (62)    DME Needed: No    Action(s) Taken: Updated Provider/Nurse on Discharge Plan   RNCM attended IDT rounds and reviewed chart. Plan discussed for possible ultra sound in 2 days to re attempt placement of tube.   @1215 Spouse, Marbin, contacted RNCM to inquire further regarding medical status.  was encouraged to speak with medical team ie: primary RN or physician. Marbin verbalized understanding that current discharge plan is still in place and goal to discharge to [facility] is pending medical clearance by MD. RNCM will continue to assist with discharge planning as needed.     Escalations Completed: None    Medically Clear: No    Next Steps: Follow-up with medical team to discuss DC needs and barriers.    Barriers to Discharge: Medical clearance

## 2024-08-20 NOTE — PROGRESS NOTES
Radiology Progress Note   Author: SARAH Gomez Date & Time created: 8/20/2024  8:58 AM   Date of admission  8/17/2024  Note to reader: this note follows the APSO format rather than the historical SOAP format. Assessment and plan located at the top of the note for ease of use.    Chief Complaint  64 y.o. female admitted 8/17/2024 with dislodged right nephrostomy tube      HPI   Nydia Finch is a 64-year-old female with  a PMH of cerebral aneurysm, CVA with residual right-sided hemiparesis and expressive aphasia, ESBL (urine), renal stones, recent ) right nephrostomy tube placement (07/26/24 Dr. Newton, IR) for worsening hydro who  presented back to [facility]  due to dislodgment of her R nephrostomy tube. IR  consulted for right nephrostomy tube replacement.      Interval History:   08/20/24-Nydia taken to IR yesterday for nephrostomy tube placement by Dr. Hampton (IR) however patient was without any hydronephrosis and the nondilated right renal collecting system could not be accessed, therefore procedure was  terminated.  Discussed plan with patient, hospital team,Dr. Abreu (IR).   I reviewed most recent labs: WBC 10.5; Hgb 10.8/33.4, Cr 0.89; Coags 1.28.      Principal Problem:    Nephrostomy tube displaced (HCC)  Active Problems:    History of stroke    Right hemiplegia (HCC)    Anemia    Urinary retention      Plan IR  -IR Recommends follow-up ultrasound of right kidney in a few days to assess for hydronephrosis.  If right hydronephrosis develops then right nephrostomy tube can be re attempted by IR. Recommend input from Urology.    -IR will be signing off.  Please contact us if patient develops hydronephrosis and requires right nephrostomy tube placement    -Thank you for allowing Interventional Radiology team to participate in the patients care, if any additional care or requests are needed in the future please do not hesitate to call or place IR order           Review of Systems  Physical Exam   Review of  "Systems   Constitutional:  Negative for fever.   Respiratory:  Negative for cough, sputum production and shortness of breath.    Cardiovascular:  Negative for chest pain.   Gastrointestinal:  Negative for abdominal pain, nausea and vomiting.   Genitourinary:  Negative for flank pain.   Musculoskeletal:  Positive for myalgias.   Skin:  Negative for rash.   Neurological:  Positive for tremors.        \"My right side is always like this cannot move it since my stroke \"   Psychiatric/Behavioral:  Positive for depression.       Vitals:    08/20/24 0620   BP: 107/60   Pulse: 86   Resp: 17   Temp: 36.9 °C (98.4 °F)   SpO2: 98%        Physical Exam  Vitals and nursing note reviewed.   HENT:      Head: Normocephalic and atraumatic.      Mouth/Throat:      Mouth: Mucous membranes are dry.      Pharynx: Oropharynx is clear.   Cardiovascular:      Rate and Rhythm: Normal rate and regular rhythm.   Pulmonary:      Effort: Pulmonary effort is normal. No respiratory distress.   Abdominal:      Palpations: Abdomen is soft.      Comments: Soft, round, nondistended   Skin:     General: Skin is warm and dry.      Capillary Refill: Capillary refill takes less than 2 seconds.   Neurological:      Mental Status: She is alert and oriented to person, place, and time.      Comments: Right side hemiplegia   Psychiatric:         Attention and Perception: Attention and perception normal.         Mood and Affect: Mood normal.         Behavior: Behavior normal. Behavior is cooperative.             Labs    Recent Labs     08/17/24  1402   WBC 10.5   RBC 3.80*   HEMOGLOBIN 10.8*   HEMATOCRIT 33.4*   MCV 87.9   MCH 28.4   MCHC 32.3   RDW 50.1*   PLATELETCT 310   MPV 11.2     Recent Labs     08/17/24  1402 08/20/24  0020   SODIUM 139 139   POTASSIUM 4.0 3.8   CHLORIDE 108 112   CO2 20 19*   GLUCOSE 82 108*   BUN 20 21   CREATININE 0.81 0.89   CALCIUM 8.4 7.8*     Recent Labs     08/17/24  1402 08/20/24  0020   ALBUMIN 2.9*  --    TBILIRUBIN 0.4  --  " "  ALKPHOSPHAT 157*  --    TOTPROTEIN 7.2  --    ALTSGPT 6  --    ASTSGOT 8*  --    CREATININE 0.81 0.89     IR-PERQ NEPH CATH NEW ACCESS (ALL RADIOLOGY) RIGHT   Final Result            1.  Mild pelviectasis of the right kidney without evidence of significant hydronephrosis.      2.  Aborted right nephrostomy tube placement. Recommend follow-up ultrasound of the right kidney in several days to assess if right-sided hydronephrosis recurs, and if right-sided hydronephrosis develops right nephrostomy tube placement can be    reattempted.      DX-CHEST-LIMITED (1 VIEW)   Final Result      Right sided central venous catheter tip projects at cavoatrial junction. No pneumothorax.      No acute cardiopulmonary abnormality.      Hiatal hernia.        INR   Date Value Ref Range Status   08/20/2024 1.28 (H) 0.87 - 1.13 Final     Comment:     INR - Non-therapeutic Reference Range: 0.87-1.13  INR - Therapeutic Reference Range: 2.0-4.0       No results found for: \"POCINR\"     Intake/Output Summary (Last 24 hours) at 8/20/2024 0858  Last data filed at 8/20/2024 0518  Gross per 24 hour   Intake 240 ml   Output 900 ml   Net -660 ml      I have personally reviewed the above labs and imaging      I have performed a physical exam and reviewed and updated ROS and Plan today (8/20/2024).     40 minutes in directly providing and coordinating care and extensive data review.  No time overlap and excludes procedures.    "

## 2024-08-20 NOTE — PROGRESS NOTES
Communication with hospitalist Alison Arriaga DO, review of POC for pt and need of US of bladder. Pt will need full bladder for study. MD states to DENEEN pascal RN to DENEEN pascal.

## 2024-08-20 NOTE — CONSULTS
UROLOGY Consult Note:    Mary Vivar P.A.-C.  Date & Time note created:    8/20/2024   11:55 AM     Referring MD:  Dr. Arriaga     Patient ID:   Name:             Nydia Finch     YOB: 1960  Age:                 64 y.o.  female   MRN:               [id number]                                                             Reason for Consult:      Dislodged nephrostomy tube     History of Present Illness:    63 yo F known to our office for right atropic kidney, previously managed by PCNT which became dislodged around 8/17. From review of notes: pt was unable to have NT replaced due to lack of hydronephrosis around R kidney. Pt sleeping comfortably when I arrived. She reports mild flank pain.     Review of Systems:      Constitutional: Denies fevers   Eyes: Denies changes in vision   Ears/Nose/Throat/Mouth: Denies nasal congestion   Cardiovascular: no chest pain   Respiratory: no shortness of breath   Gastrointestinal/Hepatic: Denies abdominal pain   Genitourinary: Denies hematuria, dysuria or frequency  Musculoskeletal/Rheum: Denies joint pain   Skin: Denies rash  Neurological: Denies headache   Psychiatric: denies mood disorder   Endocrine: Coleen thyroid problems  Heme/Oncology/Lymph Nodes: Denies enlarged lymph nodes   All other systems were reviewed and are negative (AMA/CMS criteria)                Past Medical History:   Past Medical History:   Diagnosis Date    Aphagia 1994    Brain aneurysm     Chronic pain     Nephrolithiasis     Right hemiplegia (HCC) 1994    Stroke (HCC)      Active Hospital Problems    Diagnosis     Urinary retention [R33.9]     Nephrostomy tube displaced (HCC) [T83.022A]     Right hemiplegia (HCC) [G81.91]     Anemia [D64.9]     History of stroke [Z86.73]        Past Surgical History:  Past Surgical History:   Procedure Laterality Date    TN CYSTOSCOPY,INSERT URETERAL STENT Right 1/26/2024    Procedure: CYSTOSCOPY;  Surgeon: Mik Rm M.D.;  Location:  SURGERY [facility];  Service: Urology    CYSTOSCOPY N/A 7/4/2019    Procedure: CYSTOSCOPY;  Surgeon: Monico Killian M.D.;  Location: SURGERY [facility];  Service: Urology    STENT PLACEMENT Right 7/4/2019    Procedure: STENT PLACEMENT;  Surgeon: Monico Killian M.D.;  Location: SURGERY [facility];  Service: Urology    URETEROSCOPY Right 7/4/2019    Procedure: URETEROSCOPY;  Surgeon: Monico Killian M.D.;  Location: SURGERY [facility];  Service: Urology    CYSTOSCOPY STENT PLACEMENT  2/13/2018    Procedure: CYSTOSCOPY STENT PLACEMENT;  Surgeon: Monico Killian M.D.;  Location: SURGERY [facility];  Service: Urology    GASTRIC RESECTION  2012    Duodenal Switch    OTHER      OTHER NEUROLOGICAL SURG         Hospital Medications:    Current Facility-Administered Medications:     atorvastatin (Lipitor) tablet 40 mg, 40 mg, Oral, Nightly, Antolin Cook D.O., 40 mg at 08/19/24 2012    DULoxetine (Cymbalta) capsule 60 mg, 60 mg, Oral, BID, Antolin Cook D.O., 60 mg at 08/20/24 0432    metoprolol tartrate (Lopressor) tablet 25 mg, 25 mg, Oral, BID, Antolin Cook D.O., 25 mg at 08/19/24 1750    omeprazole (PriLOSEC) capsule 20 mg, 20 mg, Oral, DAILY, Antolin Cook D.O., 20 mg at 08/20/24 0432    nystatin (Mycostatin) cream 100,000 Units, 1 Application, Topical, TID, Antolin Cook D.O., 100,000 Units at 08/20/24 0432    mirtazapine (Remeron) tablet 15 mg, 15 mg, Oral, QHS, Antolin Cook D.O., 15 mg at 08/19/24 2012    tamsulosin (Flomax) capsule 0.4 mg, 0.4 mg, Oral, AFTER BREAKFAST, SEVEN Santacruz.O., 0.4 mg at 08/20/24 0927    pregabalin (Lyrica) capsule 150 mg, 150 mg, Oral, DAILY, Antolin Cook D.O., 150 mg at 08/20/24 0432    acetaminophen (Tylenol) tablet 650 mg, 650 mg, Oral, Q6HRS PRN, CLAUDE SantacruzO., 650 mg at 08/19/24 1753    senna-docusate (Pericolace Or Senokot S) 8.6-50 MG per tablet 2 Tablet, 2 Tablet, Oral, Q EVENING, 2 Tablet at 08/19/24 1749 **AND** polyethylene  glycol/lytes (Miralax) Packet 1 Packet, 1 Packet, Oral, QDAY PRN, Antolin Cook D.O.    labetalol (Normodyne/Trandate) injection 10 mg, 10 mg, Intravenous, Q4HRS PRN, Antolin Cook D.O.    ondansetron (Zofran) syringe/vial injection 4 mg, 4 mg, Intravenous, Q4HRS PRN, Antolin Cook D.O.    ondansetron (Zofran ODT) dispertab 4 mg, 4 mg, Oral, Q4HRS PRN, Antolin Cook D.O.    promethazine (Phenergan) tablet 12.5-25 mg, 12.5-25 mg, Oral, Q4HRS PRN, Antolin Cook D.O.    promethazine (Phenergan) suppository 12.5-25 mg, 12.5-25 mg, Rectal, Q4HRS PRN, Antolin Cook D.O.    prochlorperazine (Compazine) injection 5-10 mg, 5-10 mg, Intravenous, Q4HRS PRN, Antolin Cook D.O.    Current Outpatient Medications:  Medications Prior to Admission   Medication Sig Dispense Refill Last Dose    nystatin (MYCOSTATIN) 081174 UNIT/GM Cream topical cream Apply 1 Application topically 3 times a day. 10 days   8/17/2024 at AM    metoprolol tartrate (LOPRESSOR) 25 MG Tab Take 1 Tablet by mouth 2 times a day.   8/17/2024 at AM    tamsulosin (FLOMAX) 0.4 MG capsule Take 1 Capsule by mouth 1/2 hour after breakfast.   8/17/2024 at AM    atorvastatin (LIPITOR) 40 MG Tab Take 40 mg by mouth every evening.   8/16/2024 at HS    omeprazole (PRILOSEC) 20 MG delayed-release capsule Take 20 mg by mouth every day.   8/17/2024 at AM    DULoxetine (CYMBALTA) 60 MG Cap DR Particles delayed-release capsule Take 60 mg by mouth 2 times a day.   8/17/2024 at AM    pregabalin (LYRICA) 150 MG Cap Take 150 mg by mouth every day.   8/16/2024 at HS    mirtazapine (REMERON) 15 MG Tab Take 15 mg by mouth at bedtime.   8/16/2024 at HS    meropenem (MERREM) 500 MG Recon Soln Infuse 500 mg into a venous catheter every 6 hours.   8/9/2024 at COMPLETE    magnesium hydroxide (MILK OF MAGNESIA) 400 MG/5ML Suspension Take 30 mL by mouth 1 time a day as needed.   8/4/2024 at PRN       Medication Allergy:  Allergies   Allergen Reactions    Piperacillin  Sod-Tazobactam So Rash     Gave her head to toe drug rash at [facility] 1/25/2024      Sulfamethoxazole W-Trimethoprim Itching     Rash ,itching    Ceftriaxone Itching     Generalized redness       Family History:  No family history on file.    Social History:  Social History     Socioeconomic History    Marital status:      Spouse name: Not on file    Number of children: Not on file    Years of education: Not on file    Highest education level: Not on file   Occupational History    Not on file   Tobacco Use    Smoking status: Never    Smokeless tobacco: Never   Substance and Sexual Activity    Alcohol use: No    Drug use: Not on file     Comment: Marijauna    Sexual activity: Not on file   Other Topics Concern    Not on file   Social History Narrative    Not on file     Social Determinants of Health     Financial Resource Strain: Low Risk  (11/10/2022)    Received from [facility]    Financial Resource Strain     Difficulty of Paying Living Expenses: Not on file     Access to Reliable Phone: Not on file   Food Insecurity: No Food Insecurity (7/26/2024)    Hunger Vital Sign     Worried About Running Out of Food in the Last Year: Never true     Ran Out of Food in the Last Year: Never true   Transportation Needs: No Transportation Needs (7/26/2024)    PRAPARE - Transportation     Lack of Transportation (Medical): No     Lack of Transportation (Non-Medical): No   Physical Activity: Not on file   Stress: Not on file   Social Connections: Not on file   Intimate Partner Violence: Not At Risk (7/26/2024)    Humiliation, Afraid, Rape, and Kick questionnaire     Fear of Current or Ex-Partner: No     Emotionally Abused: No     Physically Abused: No     Sexually Abused: No   Housing Stability: Low Risk  (7/26/2024)    Housing Stability Vital Sign     Unable to Pay for Housing in the Last Year: No     Number of Places Lived in the Last Year: 1     Unstable Housing in the Last Year: No         Physical  Exam:  Vitals/ General Appearance:   Weight/BMI: Body mass index is 27.76 kg/m².  /71   Pulse 94   Temp 36.2 °C (97.1 °F) (Temporal)   Resp 18   Wt 80.4 kg (177 lb 4 oz)   SpO2 95%   Vitals:    08/20/24 0227 08/20/24 0428 08/20/24 0620 08/20/24 1000   BP: 95/55 109/59 107/60 105/71   Pulse: 71 88 86 94   Resp: 17 17 17 18   Temp: 36.4 °C (97.5 °F) 36.3 °C (97.3 °F) 36.9 °C (98.4 °F) 36.2 °C (97.1 °F)   TempSrc: Temporal Temporal Temporal Temporal   SpO2: 94% 97% 98% 95%   Weight:         Oxygen Therapy:  Pulse Oximetry: 95 %, O2 (LPM): 0, O2 Delivery Device: None - Room Air    Constitutional:  No acute distress  HENMT:  Normocephalic, Atraumatic,  Neck:  Normal range of motion,   Lungs: normal effort     Abdomen:  Soft, No tenderness, No guarding, No rebound, No masses,   : -CVAT  Skin: Warm, Dry, No erythema, No rash, no induration        MDM (Data Review):     Records reviewed and summarized in current documentation    Lab Data Review:  Recent Results (from the past 24 hour(s))   POCT prothrombin time device results    Collection Time: 08/19/24  3:05 PM   Result Value Ref Range    Istat Inr 1.2    Prothrombin Time    Collection Time: 08/20/24 12:20 AM   Result Value Ref Range    PT 16.2 (H) 12.0 - 14.6 sec    INR 1.28 (H) 0.87 - 1.13   Basic Metabolic Panel    Collection Time: 08/20/24 12:20 AM   Result Value Ref Range    Sodium 139 135 - 145 mmol/L    Potassium 3.8 3.6 - 5.5 mmol/L    Chloride 112 96 - 112 mmol/L    Co2 19 (L) 20 - 33 mmol/L    Glucose 108 (H) 65 - 99 mg/dL    Bun 21 8 - 22 mg/dL    Creatinine 0.89 0.50 - 1.40 mg/dL    Calcium 7.8 (L) 8.5 - 10.5 mg/dL    Anion Gap 8.0 7.0 - 16.0   ESTIMATED GFR    Collection Time: 08/20/24 12:20 AM   Result Value Ref Range    GFR (CKD-EPI) 72 >60 mL/min/1.73 m 2       Imaging/Procedures Review:    Reviewed    MDM (Assessment and Plan):       63yo F with atrophic R kidney among other comorbid conditions. As pts kidney function is WNL no indications  for urgent ureteral stent placement at this time. Furthermore pt has been hypotensive during this admission. Given this, and that she is quiet frail, she is not medically optimized for nephrectomy at this time. Conservative mgmt may be the best course of action for this patient.     Plan:   Continue with IR plan to repeat RBUS in a few days time to reattempt NT  Monitor clinically  Continue to trend labs to include renal function.   Urology to follow.     Dr. Miles and Dr. Rm are aware of this consult and have      Mary Vivar, PBrittanyA.-ELI   [address] [place] [address]   [phone]

## 2024-08-20 NOTE — PROGRESS NOTES
Communication with hospitalist at bedside Alison Arriaga DO, states okay to change dressing to neph tube site, noted scant blood and yellow drainage to gauze of dressing, assessed by provider. Tyson changed at time.

## 2024-08-20 NOTE — PROGRESS NOTES
Communication with Alison Arriaga DO, inquiry if pt can use nystatin powder instead of cream, review of assessment of pt skin and moisture areas. Review of pt poc, hospitalist states plan for placing orders for bladder scan and straight cath parameters, f/u in round with hospitalist.

## 2024-08-20 NOTE — PROGRESS NOTES
Pt's BP for post-op vitals at 2115 was 87/50. This RN notified on call hospitalist BRYANT Escobedo who ordered a 500ml NS bolus. Recheck of BP was 85/57 after bolus was given. This RN then notified on call hospitalist Qamar Holloway PA-C who ordered another 500ml NS bolus. /55 after NS bolus was given.

## 2024-08-20 NOTE — PROGRESS NOTES
[facility] Medicine Daily Progress Note    Date of Service  8/20/2024    Chief Complaint  Nydia Finch is a 64 y.o. female admitted 8/17/2024 with a dislodged nephrostomy tube    Hospital Course  Nydia Finch is a 64 y.o. female who presented to [facility] on 8/17/2024 with dislodged nephrostomy tube.  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  She was recently admitted here from  7/25-08/01 for ESBL E. coli infection of urine.  A right sided nephrostomy tube replaced on 7/26.  ID consulted and recommended meropenem until 8/9 which patient has now completed.  Patient will ultimately require a nephrectomy as outpatient procedure.  In the meantime patient will need to have her nephrostomy tube replaced.     Interval Problem Update  8/19 Axox3, doing well, chronic expressive aphasia stable, doing well with pascal, denies pain, exam and vitals stable. I discussed plan with IR. They attempted to place a nephrostomy tube today and unable, they recommend repeat US in 2 days and if hydronephrosis at that time they will re attempt    8/20 axox 3, aphasia, Right flank ttp, min erythema and ttp  NT out  D/w IR    I have discussed this patient's plan of care and discharge plan at IDT rounds today with Case Management, Nursing, Nursing leadership, and other members of the IDT team.    Consultants/Specialty  IR     Code Status  Full Code    Disposition  The patient is not medically cleared for discharge to home or a post-acute facility.      I have placed the appropriate orders for post-discharge needs.    Review of Systems  Review of Systems   Constitutional:  Positive for malaise/fatigue. Negative for chills, fever and weight loss.   HENT: Negative.  Negative for sore throat.    Eyes: Negative.  Negative for blurred vision.   Respiratory: Negative.  Negative for shortness of breath.    Cardiovascular: Negative.  Negative for chest pain, palpitations and leg swelling.    Gastrointestinal: Negative.  Negative for abdominal pain, nausea and vomiting.   Genitourinary:  Positive for flank pain. Negative for dysuria.   Musculoskeletal:  Positive for myalgias.   Skin: Negative.  Negative for itching and rash.   Neurological:  Positive for weakness. Negative for dizziness, focal weakness and headaches.   Endo/Heme/Allergies: Negative.  Does not bruise/bleed easily.   Psychiatric/Behavioral: Negative.  Negative for depression, substance abuse and suicidal ideas.    All other systems reviewed and are negative.       Physical Exam  Temp:  [36.2 °C (97.1 °F)-37.7 °C (99.8 °F)] 37.7 °C (99.8 °F)  Pulse:  [] 100  Resp:  [10-32] 17  BP: ()/(50-77) 126/77  SpO2:  [94 %-100 %] 97 %    Physical Exam  Vitals and nursing note reviewed. Exam conducted with a chaperone present.   Constitutional:       General: She is not in acute distress.     Appearance: Normal appearance. She is ill-appearing. She is not toxic-appearing or diaphoretic.   HENT:      Head: Normocephalic.      Nose: Nose normal.      Mouth/Throat:      Mouth: Mucous membranes are moist.   Eyes:      Pupils: Pupils are equal, round, and reactive to light.   Cardiovascular:      Rate and Rhythm: Normal rate and regular rhythm.      Pulses: Normal pulses.      Heart sounds: Normal heart sounds.   Pulmonary:      Effort: Pulmonary effort is normal.      Breath sounds: Normal breath sounds.   Abdominal:      General: Abdomen is flat. Bowel sounds are normal.      Palpations: Abdomen is soft.      Tenderness: There is right CVA tenderness. There is no left CVA tenderness.   Genitourinary:     Comments: R sided nephrostomy tube site with no d/c    Olmedo in place  Musculoskeletal:         General: No swelling or deformity. Normal range of motion.   Skin:     General: Skin is warm and dry.      Capillary Refill: Capillary refill takes less than 2 seconds.      Coloration: Skin is not pale.      Findings: Erythema present.    Neurological:      General: No focal deficit present.      Mental Status: She is alert and oriented to person, place, and time.      Cranial Nerves: No cranial nerve deficit.      Sensory: No sensory deficit.      Motor: Weakness (chronic R sided) present.      Coordination: Coordination normal.   Psychiatric:         Mood and Affect: Mood normal.         Behavior: Behavior normal.         Fluids    Intake/Output Summary (Last 24 hours) at 8/20/2024 1408  Last data filed at 8/20/2024 0900  Gross per 24 hour   Intake 360 ml   Output 900 ml   Net -540 ml       Laboratory        Recent Labs     08/20/24  0020   SODIUM 139   POTASSIUM 3.8   CHLORIDE 112   CO2 19*   GLUCOSE 108*   BUN 21   CREATININE 0.89   CALCIUM 7.8*     Recent Labs     08/20/24  0020   INR 1.28*               Imaging  IR-PERQ NEPH CATH NEW ACCESS (ALL RADIOLOGY) RIGHT   Final Result            1.  Mild pelviectasis of the right kidney without evidence of significant hydronephrosis.      2.  Aborted right nephrostomy tube placement. Recommend follow-up ultrasound of the right kidney in several days to assess if right-sided hydronephrosis recurs, and if right-sided hydronephrosis develops right nephrostomy tube placement can be    reattempted.      DX-CHEST-LIMITED (1 VIEW)   Final Result      Right sided central venous catheter tip projects at cavoatrial junction. No pneumothorax.      No acute cardiopulmonary abnormality.      Hiatal hernia.           Assessment/Plan  * Nephrostomy tube displaced (HCC)- (present on admission)  Assessment & Plan  8/19 Recently had nephrostomy tube placed in January which was replaced in July  History of ESBL recently finished course of Merrem on 8/9  Patient is to eventually get an outpatient nephrectomy but temporarily has nephrostomy tube in place  IR attempted to replace today - unsuccessful, see above - they recommend repeat US in 2 days, if hydronephrosis at that time they will re attempt  Following renal  function  Bmp in am     8/20 d/w IR, f/u renal u/s eval for recurrent hydronephr  - pascal removed, bladder scan protocol  - urology Dr. Huang consulted, appreciate input  Will update family  - given xarelto for dvt prophy, now on hold      Dermatitis  Assessment & Plan  8/20 add nystatin powder    Urinary retention  Assessment & Plan  See above  Bmp in am     Anemia- (present on admission)  Assessment & Plan  Chronic   At baseline   Follow intermittently     Right hemiplegia (HCC)- (present on admission)  Assessment & Plan  From prior stroke    History of stroke- (present on admission)  Assessment & Plan  Residual right-sided hemiplegia and dysarthria  Supportive care  At baseline         VTE prophylaxis: xarelto    I have performed a physical exam and reviewed and updated ROS and Plan today (8/20/2024). In review of yesterday's note (8/19/2024), there are no changes except as documented above.    35 minutes spent with patient and communicating with IR and leadership to facilitate nephrostomy tube placement

## 2024-08-20 NOTE — CARE PLAN
The patient is Watcher - Medium risk of patient condition declining or worsening    Shift Goals  Clinical Goals: Pt will maintain or improve skin integrity by the end of shift.  Patient Goals: Get Neph Tube  Family Goals: ELIZABETH    Progress made toward(s) clinical / shift goals:  Pt continues with interventions for skin and preventions to promote skin integrity, communication with MD r/t pt skin and moisture, nystatin powder added for pt.     Patient is not progressing towards the following goals: progressing.

## 2024-08-20 NOTE — H&P (VIEW-ONLY)
UROLOGY Consult Note:    Mary Vivar P.A.-C.  Date & Time note created:    8/20/2024   11:55 AM     Referring MD:  Dr. Arriaga     Patient ID:   Name:             Nydia Finch     YOB: 1960  Age:                 64 y.o.  female   MRN:               [id number]                                                             Reason for Consult:      Dislodged nephrostomy tube     History of Present Illness:    63 yo F known to our office for right atropic kidney, previously managed by PCNT which became dislodged around 8/17. From review of notes: pt was unable to have NT replaced due to lack of hydronephrosis around R kidney. Pt sleeping comfortably when I arrived. She reports mild flank pain.     Review of Systems:      Constitutional: Denies fevers   Eyes: Denies changes in vision   Ears/Nose/Throat/Mouth: Denies nasal congestion   Cardiovascular: no chest pain   Respiratory: no shortness of breath   Gastrointestinal/Hepatic: Denies abdominal pain   Genitourinary: Denies hematuria, dysuria or frequency  Musculoskeletal/Rheum: Denies joint pain   Skin: Denies rash  Neurological: Denies headache   Psychiatric: denies mood disorder   Endocrine: Coleen thyroid problems  Heme/Oncology/Lymph Nodes: Denies enlarged lymph nodes   All other systems were reviewed and are negative (AMA/CMS criteria)                Past Medical History:   Past Medical History:   Diagnosis Date    Aphagia 1994    Brain aneurysm     Chronic pain     Nephrolithiasis     Right hemiplegia (HCC) 1994    Stroke (HCC)      Active Hospital Problems    Diagnosis     Urinary retention [R33.9]     Nephrostomy tube displaced (HCC) [T83.022A]     Right hemiplegia (HCC) [G81.91]     Anemia [D64.9]     History of stroke [Z86.73]        Past Surgical History:  Past Surgical History:   Procedure Laterality Date    AR CYSTOSCOPY,INSERT URETERAL STENT Right 1/26/2024    Procedure: CYSTOSCOPY;  Surgeon: Mik Rm M.D.;  Location:  SURGERY [facility];  Service: Urology    CYSTOSCOPY N/A 7/4/2019    Procedure: CYSTOSCOPY;  Surgeon: Monico Killian M.D.;  Location: SURGERY [facility];  Service: Urology    STENT PLACEMENT Right 7/4/2019    Procedure: STENT PLACEMENT;  Surgeon: Monico Killian M.D.;  Location: SURGERY [facility];  Service: Urology    URETEROSCOPY Right 7/4/2019    Procedure: URETEROSCOPY;  Surgeon: Monico Killian M.D.;  Location: SURGERY [facility];  Service: Urology    CYSTOSCOPY STENT PLACEMENT  2/13/2018    Procedure: CYSTOSCOPY STENT PLACEMENT;  Surgeon: Monico Killian M.D.;  Location: SURGERY [facility];  Service: Urology    GASTRIC RESECTION  2012    Duodenal Switch    OTHER      OTHER NEUROLOGICAL SURG         Hospital Medications:    Current Facility-Administered Medications:     atorvastatin (Lipitor) tablet 40 mg, 40 mg, Oral, Nightly, Antolin Cook D.O., 40 mg at 08/19/24 2012    DULoxetine (Cymbalta) capsule 60 mg, 60 mg, Oral, BID, Antolin Cook D.O., 60 mg at 08/20/24 0432    metoprolol tartrate (Lopressor) tablet 25 mg, 25 mg, Oral, BID, Antolin Cook D.O., 25 mg at 08/19/24 1750    omeprazole (PriLOSEC) capsule 20 mg, 20 mg, Oral, DAILY, Antolin Cook D.O., 20 mg at 08/20/24 0432    nystatin (Mycostatin) cream 100,000 Units, 1 Application, Topical, TID, Antolin Cook D.O., 100,000 Units at 08/20/24 0432    mirtazapine (Remeron) tablet 15 mg, 15 mg, Oral, QHS, Antolin Cook D.O., 15 mg at 08/19/24 2012    tamsulosin (Flomax) capsule 0.4 mg, 0.4 mg, Oral, AFTER BREAKFAST, SEVEN Santacruz.O., 0.4 mg at 08/20/24 0927    pregabalin (Lyrica) capsule 150 mg, 150 mg, Oral, DAILY, Antolin Cook D.O., 150 mg at 08/20/24 0432    acetaminophen (Tylenol) tablet 650 mg, 650 mg, Oral, Q6HRS PRN, CLAUDE SantacruzO., 650 mg at 08/19/24 1753    senna-docusate (Pericolace Or Senokot S) 8.6-50 MG per tablet 2 Tablet, 2 Tablet, Oral, Q EVENING, 2 Tablet at 08/19/24 1749 **AND** polyethylene  glycol/lytes (Miralax) Packet 1 Packet, 1 Packet, Oral, QDAY PRN, Antolin Cook D.O.    labetalol (Normodyne/Trandate) injection 10 mg, 10 mg, Intravenous, Q4HRS PRN, Antolin Cook D.O.    ondansetron (Zofran) syringe/vial injection 4 mg, 4 mg, Intravenous, Q4HRS PRN, Antolin Cook D.O.    ondansetron (Zofran ODT) dispertab 4 mg, 4 mg, Oral, Q4HRS PRN, Antolin Cook D.O.    promethazine (Phenergan) tablet 12.5-25 mg, 12.5-25 mg, Oral, Q4HRS PRN, Antolin Cook D.O.    promethazine (Phenergan) suppository 12.5-25 mg, 12.5-25 mg, Rectal, Q4HRS PRN, Antolin Cook D.O.    prochlorperazine (Compazine) injection 5-10 mg, 5-10 mg, Intravenous, Q4HRS PRN, Antolin Cook D.O.    Current Outpatient Medications:  Medications Prior to Admission   Medication Sig Dispense Refill Last Dose    nystatin (MYCOSTATIN) 248155 UNIT/GM Cream topical cream Apply 1 Application topically 3 times a day. 10 days   8/17/2024 at AM    metoprolol tartrate (LOPRESSOR) 25 MG Tab Take 1 Tablet by mouth 2 times a day.   8/17/2024 at AM    tamsulosin (FLOMAX) 0.4 MG capsule Take 1 Capsule by mouth 1/2 hour after breakfast.   8/17/2024 at AM    atorvastatin (LIPITOR) 40 MG Tab Take 40 mg by mouth every evening.   8/16/2024 at HS    omeprazole (PRILOSEC) 20 MG delayed-release capsule Take 20 mg by mouth every day.   8/17/2024 at AM    DULoxetine (CYMBALTA) 60 MG Cap DR Particles delayed-release capsule Take 60 mg by mouth 2 times a day.   8/17/2024 at AM    pregabalin (LYRICA) 150 MG Cap Take 150 mg by mouth every day.   8/16/2024 at HS    mirtazapine (REMERON) 15 MG Tab Take 15 mg by mouth at bedtime.   8/16/2024 at HS    meropenem (MERREM) 500 MG Recon Soln Infuse 500 mg into a venous catheter every 6 hours.   8/9/2024 at COMPLETE    magnesium hydroxide (MILK OF MAGNESIA) 400 MG/5ML Suspension Take 30 mL by mouth 1 time a day as needed.   8/4/2024 at PRN       Medication Allergy:  Allergies   Allergen Reactions    Piperacillin  Sod-Tazobactam So Rash     Gave her head to toe drug rash at [facility] 1/25/2024      Sulfamethoxazole W-Trimethoprim Itching     Rash ,itching    Ceftriaxone Itching     Generalized redness       Family History:  No family history on file.    Social History:  Social History     Socioeconomic History    Marital status:      Spouse name: Not on file    Number of children: Not on file    Years of education: Not on file    Highest education level: Not on file   Occupational History    Not on file   Tobacco Use    Smoking status: Never    Smokeless tobacco: Never   Substance and Sexual Activity    Alcohol use: No    Drug use: Not on file     Comment: Marijauna    Sexual activity: Not on file   Other Topics Concern    Not on file   Social History Narrative    Not on file     Social Determinants of Health     Financial Resource Strain: Low Risk  (11/10/2022)    Received from [facility]    Financial Resource Strain     Difficulty of Paying Living Expenses: Not on file     Access to Reliable Phone: Not on file   Food Insecurity: No Food Insecurity (7/26/2024)    Hunger Vital Sign     Worried About Running Out of Food in the Last Year: Never true     Ran Out of Food in the Last Year: Never true   Transportation Needs: No Transportation Needs (7/26/2024)    PRAPARE - Transportation     Lack of Transportation (Medical): No     Lack of Transportation (Non-Medical): No   Physical Activity: Not on file   Stress: Not on file   Social Connections: Not on file   Intimate Partner Violence: Not At Risk (7/26/2024)    Humiliation, Afraid, Rape, and Kick questionnaire     Fear of Current or Ex-Partner: No     Emotionally Abused: No     Physically Abused: No     Sexually Abused: No   Housing Stability: Low Risk  (7/26/2024)    Housing Stability Vital Sign     Unable to Pay for Housing in the Last Year: No     Number of Places Lived in the Last Year: 1     Unstable Housing in the Last Year: No         Physical  Exam:  Vitals/ General Appearance:   Weight/BMI: Body mass index is 27.76 kg/m².  /71   Pulse 94   Temp 36.2 °C (97.1 °F) (Temporal)   Resp 18   Wt 80.4 kg (177 lb 4 oz)   SpO2 95%   Vitals:    08/20/24 0227 08/20/24 0428 08/20/24 0620 08/20/24 1000   BP: 95/55 109/59 107/60 105/71   Pulse: 71 88 86 94   Resp: 17 17 17 18   Temp: 36.4 °C (97.5 °F) 36.3 °C (97.3 °F) 36.9 °C (98.4 °F) 36.2 °C (97.1 °F)   TempSrc: Temporal Temporal Temporal Temporal   SpO2: 94% 97% 98% 95%   Weight:         Oxygen Therapy:  Pulse Oximetry: 95 %, O2 (LPM): 0, O2 Delivery Device: None - Room Air    Constitutional:  No acute distress  HENMT:  Normocephalic, Atraumatic,  Neck:  Normal range of motion,   Lungs: normal effort     Abdomen:  Soft, No tenderness, No guarding, No rebound, No masses,   : -CVAT  Skin: Warm, Dry, No erythema, No rash, no induration        MDM (Data Review):     Records reviewed and summarized in current documentation    Lab Data Review:  Recent Results (from the past 24 hour(s))   POCT prothrombin time device results    Collection Time: 08/19/24  3:05 PM   Result Value Ref Range    Istat Inr 1.2    Prothrombin Time    Collection Time: 08/20/24 12:20 AM   Result Value Ref Range    PT 16.2 (H) 12.0 - 14.6 sec    INR 1.28 (H) 0.87 - 1.13   Basic Metabolic Panel    Collection Time: 08/20/24 12:20 AM   Result Value Ref Range    Sodium 139 135 - 145 mmol/L    Potassium 3.8 3.6 - 5.5 mmol/L    Chloride 112 96 - 112 mmol/L    Co2 19 (L) 20 - 33 mmol/L    Glucose 108 (H) 65 - 99 mg/dL    Bun 21 8 - 22 mg/dL    Creatinine 0.89 0.50 - 1.40 mg/dL    Calcium 7.8 (L) 8.5 - 10.5 mg/dL    Anion Gap 8.0 7.0 - 16.0   ESTIMATED GFR    Collection Time: 08/20/24 12:20 AM   Result Value Ref Range    GFR (CKD-EPI) 72 >60 mL/min/1.73 m 2       Imaging/Procedures Review:    Reviewed    MDM (Assessment and Plan):       63yo F with atrophic R kidney among other comorbid conditions. As pts kidney function is WNL no indications  for urgent ureteral stent placement at this time. Furthermore pt has been hypotensive during this admission. Given this, and that she is quiet frail, she is not medically optimized for nephrectomy at this time. Conservative mgmt may be the best course of action for this patient.     Plan:   Continue with IR plan to repeat RBUS in a few days time to reattempt NT  Monitor clinically  Continue to trend labs to include renal function.   Urology to follow.     Dr. Miles and Dr. Rm are aware of this consult and have      Mary Vivar, PBrittanyA.-ELI   [address] [place] [address]   [phone]

## 2024-08-21 ENCOUNTER — APPOINTMENT (OUTPATIENT)
Dept: RADIOLOGY | Facility: MEDICAL CENTER | Age: 64
DRG: 659 | End: 2024-08-21
Attending: INTERNAL MEDICINE
Payer: MEDICARE

## 2024-08-21 ENCOUNTER — APPOINTMENT (OUTPATIENT)
Dept: INFECTIOUS DISEASES | Facility: MEDICAL CENTER | Age: 64
End: 2024-08-21
Attending: NURSE PRACTITIONER
Payer: MEDICARE

## 2024-08-21 PROCEDURE — 700102 HCHG RX REV CODE 250 W/ 637 OVERRIDE(OP): Performed by: INTERNAL MEDICINE

## 2024-08-21 PROCEDURE — 97166 OT EVAL MOD COMPLEX 45 MIN: CPT

## 2024-08-21 PROCEDURE — 99233 SBSQ HOSP IP/OBS HIGH 50: CPT | Performed by: INTERNAL MEDICINE

## 2024-08-21 PROCEDURE — A9270 NON-COVERED ITEM OR SERVICE: HCPCS | Performed by: INTERNAL MEDICINE

## 2024-08-21 PROCEDURE — 51798 US URINE CAPACITY MEASURE: CPT

## 2024-08-21 PROCEDURE — 76775 US EXAM ABDO BACK WALL LIM: CPT

## 2024-08-21 PROCEDURE — G0378 HOSPITAL OBSERVATION PER HR: HCPCS

## 2024-08-21 PROCEDURE — 97162 PT EVAL MOD COMPLEX 30 MIN: CPT

## 2024-08-21 RX ADMIN — NYSTATIN: 100000 POWDER TOPICAL at 17:29

## 2024-08-21 RX ADMIN — ATORVASTATIN CALCIUM 40 MG: 40 TABLET, FILM COATED ORAL at 21:00

## 2024-08-21 RX ADMIN — TAMSULOSIN HYDROCHLORIDE 0.4 MG: 0.4 CAPSULE ORAL at 09:08

## 2024-08-21 RX ADMIN — OMEPRAZOLE 20 MG: 20 CAPSULE, DELAYED RELEASE ORAL at 05:43

## 2024-08-21 RX ADMIN — DULOXETINE HYDROCHLORIDE 60 MG: 30 CAPSULE, DELAYED RELEASE ORAL at 05:44

## 2024-08-21 RX ADMIN — PREGABALIN 150 MG: 150 CAPSULE ORAL at 05:44

## 2024-08-21 RX ADMIN — MIRTAZAPINE 15 MG: 15 TABLET, FILM COATED ORAL at 21:00

## 2024-08-21 RX ADMIN — SENNOSIDES AND DOCUSATE SODIUM 2 TABLET: 50; 8.6 TABLET ORAL at 17:29

## 2024-08-21 RX ADMIN — NYSTATIN: 100000 POWDER TOPICAL at 05:43

## 2024-08-21 RX ADMIN — DULOXETINE HYDROCHLORIDE 60 MG: 30 CAPSULE, DELAYED RELEASE ORAL at 17:29

## 2024-08-21 ASSESSMENT — COGNITIVE AND FUNCTIONAL STATUS - GENERAL
MOVING TO AND FROM BED TO CHAIR: A LOT
TURNING FROM BACK TO SIDE WHILE IN FLAT BAD: A LOT
HELP NEEDED FOR BATHING: A LOT
EATING MEALS: A LITTLE
CLIMB 3 TO 5 STEPS WITH RAILING: TOTAL
TOILETING: A LOT
MOVING FROM LYING ON BACK TO SITTING ON SIDE OF FLAT BED: A LOT
HELP NEEDED FOR BATHING: A LOT
SUGGESTED CMS G CODE MODIFIER MOBILITY: CM
DRESSING REGULAR LOWER BODY CLOTHING: A LOT
DRESSING REGULAR UPPER BODY CLOTHING: A LOT
SUGGESTED CMS G CODE MODIFIER DAILY ACTIVITY: CK
EATING MEALS: A LITTLE
DAILY ACTIVITIY SCORE: 14
STANDING UP FROM CHAIR USING ARMS: TOTAL
DRESSING REGULAR UPPER BODY CLOTHING: A LOT
WALKING IN HOSPITAL ROOM: TOTAL
MOVING FROM LYING ON BACK TO SITTING ON SIDE OF FLAT BED: A LOT
SUGGESTED CMS G CODE MODIFIER DAILY ACTIVITY: CL
MOBILITY SCORE: 9
DRESSING REGULAR LOWER BODY CLOTHING: TOTAL
CLIMB 3 TO 5 STEPS WITH RAILING: TOTAL
PERSONAL GROOMING: A LITTLE
SUGGESTED CMS G CODE MODIFIER MOBILITY: CM
MOBILITY SCORE: 8
STANDING UP FROM CHAIR USING ARMS: TOTAL
DAILY ACTIVITIY SCORE: 13
TURNING FROM BACK TO SIDE WHILE IN FLAT BAD: A LOT
PERSONAL GROOMING: A LITTLE
TOILETING: A LOT
WALKING IN HOSPITAL ROOM: TOTAL
MOVING TO AND FROM BED TO CHAIR: TOTAL

## 2024-08-21 ASSESSMENT — ENCOUNTER SYMPTOMS
ABDOMINAL PAIN: 0
TINGLING: 0
SHORTNESS OF BREATH: 0
CHILLS: 0
DIZZINESS: 0
BRUISES/BLEEDS EASILY: 0
DIARRHEA: 0
HEARTBURN: 0
PALPITATIONS: 0
VOMITING: 0
EYES NEGATIVE: 1
DIAPHORESIS: 0
MYALGIAS: 1
FLANK PAIN: 1
HEADACHES: 0
DEPRESSION: 0
CARDIOVASCULAR NEGATIVE: 1
RESPIRATORY NEGATIVE: 1
GASTROINTESTINAL NEGATIVE: 1
MEMORY LOSS: 0
COUGH: 0
FOCAL WEAKNESS: 0
PSYCHIATRIC NEGATIVE: 1
WEIGHT LOSS: 0
NAUSEA: 0
TREMORS: 0
WEAKNESS: 1
BACK PAIN: 0
FEVER: 0
NERVOUS/ANXIOUS: 0

## 2024-08-21 ASSESSMENT — GAIT ASSESSMENTS: GAIT LEVEL OF ASSIST: UNABLE TO PARTICIPATE

## 2024-08-21 ASSESSMENT — ACTIVITIES OF DAILY LIVING (ADL): TOILETING: REQUIRES ASSIST

## 2024-08-21 NOTE — PROGRESS NOTES
[facility] Medicine Daily Progress Note    Date of Service  8/21/2024    Chief Complaint  Nydia Finch is a 64 y.o. female admitted 8/17/2024 with a dislodged nephrostomy tube    Hospital Course  Nydia Finch is a 64 y.o. female who presented to [facility] on 8/17/2024 with dislodged nephrostomy tube.  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  She was recently admitted here from  7/25-08/01 for ESBL E. coli infection of urine.  A right sided nephrostomy tube replaced on 7/26.  ID consulted and recommended meropenem until 8/9 which patient has now completed.  Patient will ultimately require a nephrectomy as outpatient procedure.  In the meantime patient will need to have her nephrostomy tube replaced.     Interval Problem Update  8/19 Axox3, doing well, chronic expressive aphasia stable, doing well with pascal, denies pain, exam and vitals stable. I discussed plan with IR. They attempted to place a nephrostomy tube today and unable, they recommend repeat US in 2 days and if hydronephrosis at that time they will re attempt    8/20 axox 3, aphasia, Right flank ttp, min erythema and ttp  NT out  D/w IR    8/21 ANO x 3, per RN, patient able to urinate, PVR of less than 300  Minimal right flank pain  Ultrasound consistent with severe right hydronephrosis  Discussed with urology and IR, for nephrostomy tube replacement  N.p.o. after midnight    I have discussed this patient's plan of care and discharge plan at IDT rounds today with Case Management, Nursing, Nursing leadership, and other members of the IDT team.    Consultants/Specialty  IR     Code Status  Full Code    Disposition  The patient is not medically cleared for discharge to home or a post-acute facility.      I have placed the appropriate orders for post-discharge needs.    Review of Systems  Review of Systems   Constitutional:  Positive for malaise/fatigue. Negative for chills, diaphoresis, fever and  weight loss.   HENT: Negative.     Eyes: Negative.    Respiratory: Negative.  Negative for cough and shortness of breath.    Cardiovascular: Negative.  Negative for chest pain, palpitations and leg swelling.   Gastrointestinal: Negative.  Negative for abdominal pain, nausea and vomiting.   Genitourinary:  Positive for flank pain. Negative for dysuria.   Musculoskeletal:  Positive for myalgias. Negative for back pain and joint pain.   Skin: Negative.    Neurological:  Positive for weakness. Negative for dizziness, tingling, tremors, focal weakness and headaches.   Endo/Heme/Allergies: Negative.  Does not bruise/bleed easily.   Psychiatric/Behavioral: Negative.  Negative for depression and memory loss. The patient is not nervous/anxious.    All other systems reviewed and are negative.       Physical Exam  Temp:  [36.3 °C (97.3 °F)-37.7 °C (99.8 °F)] 36.6 °C (97.9 °F)  Pulse:  [] 95  Resp:  [16-18] 16  BP: ()/(46-77) 99/61  SpO2:  [92 %-97 %] 92 %    Physical Exam  Vitals and nursing note reviewed. Exam conducted with a chaperone present.   Constitutional:       General: She is not in acute distress.     Appearance: Normal appearance. She is ill-appearing. She is not diaphoretic.   HENT:      Head: Normocephalic.      Nose: Nose normal.      Mouth/Throat:      Mouth: Mucous membranes are moist.   Eyes:      Extraocular Movements: Extraocular movements intact.      Pupils: Pupils are equal, round, and reactive to light.   Cardiovascular:      Rate and Rhythm: Normal rate and regular rhythm.      Pulses: Normal pulses.      Heart sounds: Normal heart sounds.   Pulmonary:      Effort: Pulmonary effort is normal.      Breath sounds: Normal breath sounds.   Abdominal:      General: Abdomen is flat. Bowel sounds are normal. There is no distension.      Palpations: Abdomen is soft.      Tenderness: There is no abdominal tenderness. There is right CVA tenderness. There is no left CVA tenderness.   Genitourinary:      Comments: R sided nephrostomy tube site with no d/c    Olmedo in place  Musculoskeletal:         General: No swelling or tenderness. Normal range of motion.   Skin:     General: Skin is warm and dry.      Capillary Refill: Capillary refill takes less than 2 seconds.      Coloration: Skin is not pale.      Findings: Erythema present.   Neurological:      General: No focal deficit present.      Mental Status: She is alert and oriented to person, place, and time.      Cranial Nerves: No cranial nerve deficit.      Sensory: No sensory deficit.      Motor: Weakness (chronic R sided) present.      Coordination: Coordination normal.   Psychiatric:         Mood and Affect: Mood normal.         Behavior: Behavior normal.         Fluids    Intake/Output Summary (Last 24 hours) at 8/21/2024 1348  Last data filed at 8/21/2024 0930  Gross per 24 hour   Intake 360 ml   Output --   Net 360 ml       Laboratory        Recent Labs     08/20/24  0020   SODIUM 139   POTASSIUM 3.8   CHLORIDE 112   CO2 19*   GLUCOSE 108*   BUN 21   CREATININE 0.89   CALCIUM 7.8*     Recent Labs     08/20/24  0020   INR 1.28*               Imaging  US-RENAL   Final Result      Severe RIGHT hydronephrosis likely probably chronic with overlying cortical thinning      IR-PERQ NEPH CATH NEW ACCESS (ALL RADIOLOGY) RIGHT   Final Result            1.  Mild pelviectasis of the right kidney without evidence of significant hydronephrosis.      2.  Aborted right nephrostomy tube placement. Recommend follow-up ultrasound of the right kidney in several days to assess if right-sided hydronephrosis recurs, and if right-sided hydronephrosis develops right nephrostomy tube placement can be    reattempted.      DX-CHEST-LIMITED (1 VIEW)   Final Result      Right sided central venous catheter tip projects at cavoatrial junction. No pneumothorax.      No acute cardiopulmonary abnormality.      Hiatal hernia.      IR-CONSULT AND TREAT    (Results Pending)         Assessment/Plan  * Nephrostomy tube displaced (HCC)- (present on admission)  Assessment & Plan  8/19 Recently had nephrostomy tube placed in January which was replaced in July  History of ESBL recently finished course of Merrem on 8/9  Patient is to eventually get an outpatient nephrectomy but temporarily has nephrostomy tube in place  IR attempted to replace today - unsuccessful, see above - they recommend repeat US in 2 days, if hydronephrosis at that time they will re attempt  Following renal function  Bmp in am     8/20 d/w IR, f/u renal u/s eval for recurrent hydronephr  - pascal removed, bladder scan protocol  - urology Dr. Huang consulted, appreciate input  Will update family  - given xarelto for dvt prophy, now on hold    8/21 ultrasound consistent with severe right-sided hydronephrosis  Pascal catheter removed, as per RN, patient able to have small amounts of urination, PVR of less than 300  Will encourage urination  May need replacement of Pascal catheter as needed for known history of urinary retention  N.p.o. after midnight for IR evaluation for replacement of right nephrostomy tube  Discussed with urology, aware of patient's wishes for nephrectomy ASAP  Per urology concerned with patient's deconditioning and hypotensive on August 20  Patient's BP appears to be controlled at this time      Dermatitis  Assessment & Plan  8/20 add nystatin powder    Urinary retention  Assessment & Plan  See above  Bmp in am     Anemia- (present on admission)  Assessment & Plan  Chronic   At baseline   Follow intermittently     Right hemiplegia (HCC)- (present on admission)  Assessment & Plan  From prior stroke    History of stroke- (present on admission)  Assessment & Plan  Residual right-sided hemiplegia and dysarthria  Supportive care  At baseline         VTE prophylaxis: xarelto    I have performed a physical exam and reviewed and updated ROS and Plan today (8/21/2024). In review of yesterday's note (8/20/2024),  there are no changes except as documented above.    35 minutes spent with patient and communicating with IR and leadership to facilitate nephrostomy tube placement

## 2024-08-21 NOTE — THERAPY
Physical Therapy   Initial Evaluation     Patient Name: Nydia Finch  Age:  64 y.o., Sex:  female  Medical Record #: [id number]  Today's Date: 8/21/2024          Assessment  Patient is 64 y.o. female w/ hx of CVA 1994, obstructive pyelonephritis, hydronephrosis, encephalopathy.  Recent admite 2/2 E coli and ESBL in her urine.  Dc to snf w/ nephrostomy tube.  Subsequent re admit 2/2 neph tube dislodged.  She is rec'd alert, in bed, pleasant and agreeable to work w/ PT.  Significant right sided strength impairment since her stroke in 1994.  She reports that her  and adult son are able to transfer her into her w/c, and are, and have been available for 24/7 care.  Today, she is able to sit eob w/ mod assist, and maintain her sitting balance w/o assist.  She reports that this is her norm.  She feels confident that her family can take excellent care of her and she would like to d/c home.  Anticipate that pt is at her PLOF, and if her family is indeed able to care for her that she will be able to d/c home.  PT for d/c needs.  Plan    Physical Therapy Initial Treatment Plan   Duration: Evaluation only    DC Equipment Recommendations: None  Discharge Recommendations: Anticipate that the patient will have no further physical therapy needs after discharge from the hospital       Objective       08/21/24 0848   Prior Living Situation   Prior Services Continuous (24 Hour) Care Giving Family   Housing / Facility 1 Story House   Steps Into Home 0   Steps In Home 0   Equipment Owned Wheelchair   Lives with - Patient's Self Care Capacity Spouse;Adult Children   Prior Level of Functional Mobility   Bed Mobility Required Assist   Transfer Status Required Assist   Ambulation   (non ambulatory)   Wheelchair Required Assist   Strength Lower Body   Comments grossly trace   Balance Assessment   Sitting Balance (Static) Fair   Sitting Balance (Dynamic) Fair   Bed Mobility    Supine to Sit Moderate Assist   Sit to Supine Moderate  Assist   Gait Analysis   Gait Level Of Assist Unable to Participate   Functional Mobility   Sit to Stand Unable to Participate   Physical Therapy Initial Treatment Plan    Duration Evaluation only   Anticipated Discharge Equipment and Recommendations   DC Equipment Recommendations None   Discharge Recommendations Anticipate that the patient will have no further physical therapy needs after discharge from the hospital

## 2024-08-21 NOTE — THERAPY
"Occupational Therapy   Initial Evaluation     Patient Name: Nydia Finch  Age:  64 y.o., Sex:  female  Medical Record #: [id number]  Today's Date: 8/21/2024     Precautions  Precautions: (P) Fall Risk  Comments: (P) R hemiplegia, aphasia    Assessment    Patient is 64 y.o. female admitted from SNF due to dislodged nephrostomy tube recent admission for E coli in and ESBL in urine had been at SNF for antibiotics. Pt normally requires assistance with all ADLs and mobility from spouse and son living in a [facility] due to CVA in 1994. Pt required modA for bed mobility, maxA for lower body/upperbody ADLs. Anticipate pt is at her functional baseline, no acute OT needs pt adamant about discharge home.      Plan    DC Equipment Recommendations: (P) None  Discharge Recommendations: (P) Anticipate that the patient will have no further occupational therapy needs after discharge from the hospital     Subjective    \"Home!\"     Objective       08/21/24 0913   Prior Living Situation   Prior Services Continuous (24 Hour) Care Giving Family   Housing / Facility 1 Story House   Steps Into Home 0   Steps In Home 0   Bathroom Set up Walk In Shower;Shower Chair   Equipment Owned Power Wheel Chair;Tub / Shower Seat   Lives with - Patient's Self Care Capacity Spouse;Adult Children   Comments lives with spouse and son who assist 24/7   Prior Level of ADL Function   Self Feeding Requires Assist   Grooming / Hygiene Requires Assist   Bathing Requires Assist   Dressing Requires Assist   Toileting Requires Assist   Prior Level of IADL Function   Medication Management Requires Assist   Laundry Requires Assist   Kitchen Mobility Requires Assist   Finances Requires Assist   Home Management Requires Assist   Shopping Requires Assist   Prior Level Of Mobility Uses Wheel Chair for Community Mobility;Uses Wheel Chair for in Home Mobility   Precautions   Precautions Fall Risk   Comments R hemiplegia, aphasia   Pain 0 - 10 Group   Therapist Pain Assessment " Post Activity Pain Same as Prior to Activity;Nurse Notified   Cognition    Cognition / Consciousness X   Speech/ Communication Expressive Aphasia   Level of Consciousness Alert   Active ROM Upper Body   Active ROM Upper Body  X   Flaccid Upper Extremity Right Upper Extremity Flaccid   Strength Upper Body   Upper Body Strength  X   Comments RUE deficits at baseline   Upper Body Muscle Tone   Upper Body Muscle Tone  X   Rt Upper Extremity Muscle Tone Hypotonic   Balance Assessment   Sitting Balance (Static) Fair   Sitting Balance (Dynamic) Fair   Bed Mobility    Supine to Sit Moderate Assist   Sit to Supine Moderate Assist   ADL Assessment   Eating Supervision   Grooming Supervision   Upper Body Dressing Maximal Assist   Lower Body Dressing Maximal Assist   How much help from another person does the patient currently need...   Putting on and taking off regular lower body clothing? 2   Bathing (including washing, rinsing, and drying)? 2   Toileting, which includes using a toilet, bedpan, or urinal? 2   Putting on and taking off regular upper body clothing? 2   Taking care of personal grooming such as brushing teeth? 3   Eating meals? 3   6 Clicks Daily Activity Score 14   Functional Mobility   Sit to Stand Unable to Participate   Mobility EOB only, returned to EOB   Activity Tolerance   Sitting Edge of Bed 8 min   Education Group   Education Provided Role of Occupational Therapist   Role of Occupational Therapist Patient Response Patient;Acceptance;Explanation   Problem List   Problem List None   Anticipated Discharge Equipment and Recommendations   DC Equipment Recommendations None   Discharge Recommendations Anticipate that the patient will have no further occupational therapy needs after discharge from the hospital   Interdisciplinary Plan of Care Collaboration   IDT Collaboration with  Nursing   Patient Position at End of Therapy In Bed;Bed Alarm On;Call Light within Reach;Tray Table within Reach;Phone within Reach    Collaboration Comments RN updated

## 2024-08-21 NOTE — PROGRESS NOTES
Assumed care of patient. Assessment performed. Patient is A&O x4. She reports not pain, numbness, or tingling. Mepilex placed to heels and elbow. Interdry and powder to pannus. DAVI pump, Q2 turns, and bed alarm in place. Call light and belongins within reach. All needs met at this time.     Bladder scan 229. She does not report feeling distention.

## 2024-08-21 NOTE — DISCHARGE PLANNING
Case Management Discharge Planning    Admission Date: 8/17/2024  GMLOS: 3.3  ALOS: 0    6-Clicks ADL Score: 14  6-Clicks Mobility Score: 8  PT and/or OT Eval ordered: Yes  Post-acute Referrals Ordered: No  Post-acute Choice Obtained: No  Has referral(s) been sent to post-acute provider:  NA      Anticipated Discharge Dispo: Discharge Disposition: Disch to  rehab facility or distinct part unit (62)    DME Needed: No    Action(s) Taken: Updated Provider/Nurse on Discharge Plan RNCM attended IDT rounds and reviewed chart. Discharge plan discussed with Dr. Arriaga. Pt desires to discharge to home once medically cleared. Pt has adequate family support and according to primary RN, she is returning to her baseline function. No CM needs noted at this time.     Escalations Completed: None    Medically Clear: No    Next Steps: Follow-up with medical team to discuss DC needs and barriers.    Barriers to Discharge: Medical clearance

## 2024-08-21 NOTE — CARE PLAN
The patient is Stable - Low risk of patient condition declining or worsening    Shift Goals  Clinical Goals: Patient will not have further skin breakdown this shift.  Patient Goals: None stated.  Family Goals: Not present.    Progress made toward(s) clinical / shift goals:    Problem: Knowledge Deficit - Standard  Goal: Patient and family/care givers will demonstrate understanding of plan of care, disease process/condition, diagnostic tests and medications  Description: Target End Date:  1-3 days or as soon as patient condition allows    Document in Patient Education    1.  Patient and family/caregiver oriented to unit, equipment, visitation policy and means for communicating concern  2.  Complete/review Learning Assessment  3.  Assess knowledge level of disease process/condition, treatment plan, diagnostic tests and medications  4.  Explain disease process/condition, treatment plan, diagnostic tests and medications  Outcome: Progressing     Problem: Skin Integrity  Goal: Skin integrity is maintained or improved  Description: Target End Date:  Prior to discharge or change in level of care    Document interventions on Skin Risk/Juan David flowsheet groups and corresponding LDA    1.  Assess and monitor skin integrity, appearance and/or temperature  2.  Assess risk factors for impaired skin integrity and/or pressures ulcers  3.  Implement precautions to protect skin integrity in collaboration with interdisciplinary team  4.  Implement pressure ulcer prevention protocol if at risk for skin breakdown  5.  Confirm wound care consult if at risk for skin breakdown  6.  Ensure patient use of pressure relieving devices  (Low air loss bed, waffle overlay, heel protectors, ROHO cushion, etc)  Outcome: Progressing  Note: Q2 turns, DAVI, mepilex, interdry, antifungal powder, baby powder       Patient is not progressing towards the following goals:

## 2024-08-21 NOTE — PROGRESS NOTES
Note to reader: this note follows the APSO format rather than the historical SOAP format. Assessment and plan located at the top of the note for ease of use.    Chief Complaint  64 y.o. year old female here with atrophic kidney, previously managed by NT. NT fell out 8/17.    Assessment/Plan  Interval History       Plan:   IR to replace NT   Urology to follow       Case discussed with Dr. Rm and Dr. Miles     Patient seen and examined    8/21-  Pt with no complaints, eating breakfast. Pt was unable to empty bladder completely, CICed last night but has been able to empty bladder since. Repeat RBUS with severe hydronephrosis. Kidney function remains normal at this time.          Review of Systems  Physical Exam   Review of Systems   Constitutional:  Negative for chills and fever.   Respiratory:  Negative for cough.    Cardiovascular:  Negative for chest pain and palpitations.   Gastrointestinal:  Negative for diarrhea, heartburn, nausea and vomiting.   Genitourinary: Negative.    Skin:  Negative for rash.   All other systems reviewed and are negative.    Vitals:    08/20/24 1800 08/20/24 2200 08/21/24 0200 08/21/24 0800   BP: 109/73 92/46 107/63 99/61   Pulse: (!) 110 90 84 95   Resp: 18 18 18 16   Temp: 37.6 °C (99.6 °F) 36.3 °C (97.3 °F) 36.7 °C (98 °F) 36.6 °C (97.9 °F)   TempSrc: Temporal Temporal Temporal Temporal   SpO2: 92% 95% 95% 92%   Weight:         Physical Exam  Constitutional:       Appearance: Normal appearance.   HENT:      Head: Normocephalic.      Mouth/Throat:      Mouth: Mucous membranes are moist.   Pulmonary:      Effort: Pulmonary effort is normal.   Abdominal:      General: Abdomen is flat.      Palpations: Abdomen is soft.   Skin:     General: Skin is warm and dry.   Neurological:      Mental Status: She is alert.   Psychiatric:         Mood and Affect: Mood normal.         Behavior: Behavior normal.          Hematology Chemistry   Lab Results   Component Value Date/Time    WBC 10.5  08/17/2024 02:02 PM    HEMOGLOBIN 10.8 (L) 08/17/2024 02:02 PM    HEMATOCRIT 33.4 (L) 08/17/2024 02:02 PM    PLATELETCT 310 08/17/2024 02:02 PM     Lab Results   Component Value Date/Time    SODIUM 139 08/20/2024 12:20 AM    POTASSIUM 3.8 08/20/2024 12:20 AM    CHLORIDE 112 08/20/2024 12:20 AM    CO2 19 (L) 08/20/2024 12:20 AM    GLUCOSE 108 (H) 08/20/2024 12:20 AM    BUN 21 08/20/2024 12:20 AM    CREATININE 0.89 08/20/2024 12:20 AM         Labs not explicitly included in this progress note were reviewed by the author.   Radiology/imaging not explicitly included in this progress note was reviewed by the author.     Medications reviewed, Radiology images reviewed and Labs reviewed                          Mary Vivar P.A.-C.   [id number] [place] [id number]   [phone]

## 2024-08-22 ENCOUNTER — APPOINTMENT (OUTPATIENT)
Dept: RADIOLOGY | Facility: MEDICAL CENTER | Age: 64
DRG: 659 | End: 2024-08-22
Attending: INTERNAL MEDICINE
Payer: MEDICARE

## 2024-08-22 LAB
ALBUMIN SERPL BCP-MCNC: 3 G/DL (ref 3.2–4.9)
ALBUMIN/GLOB SERPL: 0.6 G/DL
ALP SERPL-CCNC: 192 U/L (ref 30–99)
ALT SERPL-CCNC: 6 U/L (ref 2–50)
ANION GAP SERPL CALC-SCNC: 10 MMOL/L (ref 7–16)
ANION GAP SERPL CALC-SCNC: 17 MMOL/L (ref 7–16)
APPEARANCE UR: ABNORMAL
AST SERPL-CCNC: 16 U/L (ref 12–45)
BACTERIA #/AREA URNS HPF: NEGATIVE /HPF
BASOPHILS # BLD AUTO: 0.4 % (ref 0–1.8)
BASOPHILS # BLD AUTO: 0.6 % (ref 0–1.8)
BASOPHILS # BLD: 0.03 K/UL (ref 0–0.12)
BASOPHILS # BLD: 0.07 K/UL (ref 0–0.12)
BILIRUB SERPL-MCNC: 0.8 MG/DL (ref 0.1–1.5)
BILIRUB UR QL STRIP.AUTO: NEGATIVE
BUN SERPL-MCNC: 18 MG/DL (ref 8–22)
BUN SERPL-MCNC: 19 MG/DL (ref 8–22)
CALCIUM ALBUM COR SERPL-MCNC: 10.2 MG/DL (ref 8.5–10.5)
CALCIUM SERPL-MCNC: 8.3 MG/DL (ref 8.5–10.5)
CALCIUM SERPL-MCNC: 9.4 MG/DL (ref 8.5–10.5)
CHLORIDE SERPL-SCNC: 106 MMOL/L (ref 96–112)
CHLORIDE SERPL-SCNC: 107 MMOL/L (ref 96–112)
CO2 SERPL-SCNC: 13 MMOL/L (ref 20–33)
CO2 SERPL-SCNC: 19 MMOL/L (ref 20–33)
COLOR UR: YELLOW
CREAT SERPL-MCNC: 0.91 MG/DL (ref 0.5–1.4)
CREAT SERPL-MCNC: 0.98 MG/DL (ref 0.5–1.4)
EKG IMPRESSION: NORMAL
EOSINOPHIL # BLD AUTO: 0.01 K/UL (ref 0–0.51)
EOSINOPHIL # BLD AUTO: 0.09 K/UL (ref 0–0.51)
EOSINOPHIL NFR BLD: 0.1 % (ref 0–6.9)
EOSINOPHIL NFR BLD: 0.8 % (ref 0–6.9)
EPI CELLS #/AREA URNS HPF: ABNORMAL /HPF
ERYTHROCYTE [DISTWIDTH] IN BLOOD BY AUTOMATED COUNT: 49.4 FL (ref 35.9–50)
ERYTHROCYTE [DISTWIDTH] IN BLOOD BY AUTOMATED COUNT: 50.4 FL (ref 35.9–50)
GFR SERPLBLD CREATININE-BSD FMLA CKD-EPI: 64 ML/MIN/1.73 M 2
GFR SERPLBLD CREATININE-BSD FMLA CKD-EPI: 70 ML/MIN/1.73 M 2
GLOBULIN SER CALC-MCNC: 5.1 G/DL (ref 1.9–3.5)
GLUCOSE SERPL-MCNC: 108 MG/DL (ref 65–99)
GLUCOSE SERPL-MCNC: 111 MG/DL (ref 65–99)
GLUCOSE UR STRIP.AUTO-MCNC: NEGATIVE MG/DL
HCT VFR BLD AUTO: 30 % (ref 37–47)
HCT VFR BLD AUTO: 34.6 % (ref 37–47)
HGB BLD-MCNC: 11.4 G/DL (ref 12–16)
HGB BLD-MCNC: 9.8 G/DL (ref 12–16)
HYALINE CASTS #/AREA URNS LPF: ABNORMAL /LPF
IMM GRANULOCYTES # BLD AUTO: 0.07 K/UL (ref 0–0.11)
IMM GRANULOCYTES # BLD AUTO: 0.11 K/UL (ref 0–0.11)
IMM GRANULOCYTES NFR BLD AUTO: 0.6 % (ref 0–0.9)
IMM GRANULOCYTES NFR BLD AUTO: 1.3 % (ref 0–0.9)
INR PPP: 1.26 (ref 0.87–1.13)
KETONES UR STRIP.AUTO-MCNC: NEGATIVE MG/DL
LACTATE SERPL-SCNC: 0.6 MMOL/L (ref 0.5–2)
LACTATE SERPL-SCNC: 1.4 MMOL/L (ref 0.5–2)
LACTATE SERPL-SCNC: 3.6 MMOL/L (ref 0.5–2)
LACTATE SERPL-SCNC: 4.9 MMOL/L (ref 0.5–2)
LEUKOCYTE ESTERASE UR QL STRIP.AUTO: ABNORMAL
LYMPHOCYTES # BLD AUTO: 0.37 K/UL (ref 1–4.8)
LYMPHOCYTES # BLD AUTO: 2.16 K/UL (ref 1–4.8)
LYMPHOCYTES NFR BLD: 18.1 % (ref 22–41)
LYMPHOCYTES NFR BLD: 4.5 % (ref 22–41)
MCH RBC QN AUTO: 28 PG (ref 27–33)
MCH RBC QN AUTO: 28.4 PG (ref 27–33)
MCHC RBC AUTO-ENTMCNC: 32.7 G/DL (ref 32.2–35.5)
MCHC RBC AUTO-ENTMCNC: 32.9 G/DL (ref 32.2–35.5)
MCV RBC AUTO: 85.7 FL (ref 81.4–97.8)
MCV RBC AUTO: 86.3 FL (ref 81.4–97.8)
MICRO URNS: ABNORMAL
MONOCYTES # BLD AUTO: 0.08 K/UL (ref 0–0.85)
MONOCYTES # BLD AUTO: 1.27 K/UL (ref 0–0.85)
MONOCYTES NFR BLD AUTO: 1 % (ref 0–13.4)
MONOCYTES NFR BLD AUTO: 10.7 % (ref 0–13.4)
NEUTROPHILS # BLD AUTO: 7.69 K/UL (ref 1.82–7.42)
NEUTROPHILS # BLD AUTO: 8.25 K/UL (ref 1.82–7.42)
NEUTROPHILS NFR BLD: 69.2 % (ref 44–72)
NEUTROPHILS NFR BLD: 92.7 % (ref 44–72)
NITRITE UR QL STRIP.AUTO: NEGATIVE
NRBC # BLD AUTO: 0 K/UL
NRBC # BLD AUTO: 0 K/UL
NRBC BLD-RTO: 0 /100 WBC (ref 0–0.2)
NRBC BLD-RTO: 0 /100 WBC (ref 0–0.2)
PH UR STRIP.AUTO: 5.5 [PH] (ref 5–8)
PLATELET # BLD AUTO: 224 K/UL (ref 164–446)
PLATELET # BLD AUTO: 256 K/UL (ref 164–446)
PMV BLD AUTO: 11 FL (ref 9–12.9)
PMV BLD AUTO: 11 FL (ref 9–12.9)
POTASSIUM SERPL-SCNC: 3.5 MMOL/L (ref 3.6–5.5)
POTASSIUM SERPL-SCNC: 4.5 MMOL/L (ref 3.6–5.5)
PROCALCITONIN SERPL-MCNC: 2.08 NG/ML
PROT SERPL-MCNC: 8.1 G/DL (ref 6–8.2)
PROT UR QL STRIP: 30 MG/DL
PROTHROMBIN TIME: 15.9 SEC (ref 12–14.6)
RBC # BLD AUTO: 3.5 M/UL (ref 4.2–5.4)
RBC # BLD AUTO: 4.01 M/UL (ref 4.2–5.4)
RBC # URNS HPF: ABNORMAL /HPF
RBC UR QL AUTO: NEGATIVE
SODIUM SERPL-SCNC: 136 MMOL/L (ref 135–145)
SODIUM SERPL-SCNC: 136 MMOL/L (ref 135–145)
SP GR UR STRIP.AUTO: 1.03
UROBILINOGEN UR STRIP.AUTO-MCNC: 1 MG/DL
WBC # BLD AUTO: 11.9 K/UL (ref 4.8–10.8)
WBC # BLD AUTO: 8.3 K/UL (ref 4.8–10.8)
WBC #/AREA URNS HPF: ABNORMAL /HPF
YEAST BUDDING URNS QL: PRESENT /HPF

## 2024-08-22 PROCEDURE — 36415 COLL VENOUS BLD VENIPUNCTURE: CPT

## 2024-08-22 PROCEDURE — 700111 HCHG RX REV CODE 636 W/ 250 OVERRIDE (IP): Performed by: INTERNAL MEDICINE

## 2024-08-22 PROCEDURE — 700105 HCHG RX REV CODE 258: Performed by: INTERNAL MEDICINE

## 2024-08-22 PROCEDURE — C1729 CATH, DRAINAGE: HCPCS

## 2024-08-22 PROCEDURE — 85610 PROTHROMBIN TIME: CPT

## 2024-08-22 PROCEDURE — 87186 SC STD MICRODIL/AGAR DIL: CPT

## 2024-08-22 PROCEDURE — 85025 COMPLETE CBC W/AUTO DIFF WBC: CPT | Mod: 91

## 2024-08-22 PROCEDURE — 83605 ASSAY OF LACTIC ACID: CPT | Mod: 91

## 2024-08-22 PROCEDURE — A9270 NON-COVERED ITEM OR SERVICE: HCPCS | Performed by: INTERNAL MEDICINE

## 2024-08-22 PROCEDURE — 87086 URINE CULTURE/COLONY COUNT: CPT

## 2024-08-22 PROCEDURE — 93010 ELECTROCARDIOGRAM REPORT: CPT | Performed by: INTERNAL MEDICINE

## 2024-08-22 PROCEDURE — 84145 PROCALCITONIN (PCT): CPT

## 2024-08-22 PROCEDURE — 0TJ53ZZ INSPECTION OF KIDNEY, PERCUTANEOUS APPROACH: ICD-10-PCS | Performed by: STUDENT IN AN ORGANIZED HEALTH CARE EDUCATION/TRAINING PROGRAM

## 2024-08-22 PROCEDURE — 80053 COMPREHEN METABOLIC PANEL: CPT

## 2024-08-22 PROCEDURE — 81001 URINALYSIS AUTO W/SCOPE: CPT

## 2024-08-22 PROCEDURE — 87015 SPECIMEN INFECT AGNT CONCNTJ: CPT

## 2024-08-22 PROCEDURE — 80048 BASIC METABOLIC PNL TOTAL CA: CPT

## 2024-08-22 PROCEDURE — 700102 HCHG RX REV CODE 250 W/ 637 OVERRIDE(OP): Performed by: INTERNAL MEDICINE

## 2024-08-22 PROCEDURE — 93005 ELECTROCARDIOGRAM TRACING: CPT | Performed by: INTERNAL MEDICINE

## 2024-08-22 PROCEDURE — 51798 US URINE CAPACITY MEASURE: CPT

## 2024-08-22 PROCEDURE — 770020 HCHG ROOM/CARE - TELE (206)

## 2024-08-22 PROCEDURE — 700105 HCHG RX REV CODE 258

## 2024-08-22 PROCEDURE — 99291 CRITICAL CARE FIRST HOUR: CPT | Performed by: INTERNAL MEDICINE

## 2024-08-22 PROCEDURE — 700111 HCHG RX REV CODE 636 W/ 250 OVERRIDE (IP)

## 2024-08-22 PROCEDURE — 700111 HCHG RX REV CODE 636 W/ 250 OVERRIDE (IP): Performed by: STUDENT IN AN ORGANIZED HEALTH CARE EDUCATION/TRAINING PROGRAM

## 2024-08-22 PROCEDURE — 87077 CULTURE AEROBIC IDENTIFY: CPT | Mod: 91

## 2024-08-22 PROCEDURE — 87040 BLOOD CULTURE FOR BACTERIA: CPT

## 2024-08-22 PROCEDURE — 700117 HCHG RX CONTRAST REV CODE 255: Performed by: STUDENT IN AN ORGANIZED HEALTH CARE EDUCATION/TRAINING PROGRAM

## 2024-08-22 RX ORDER — MIDAZOLAM HYDROCHLORIDE 1 MG/ML
INJECTION INTRAMUSCULAR; INTRAVENOUS
Status: DISPENSED
Start: 2024-08-22 | End: 2024-08-22

## 2024-08-22 RX ORDER — SODIUM CHLORIDE 9 MG/ML
30 INJECTION, SOLUTION INTRAVENOUS ONCE
Status: DISCONTINUED | OUTPATIENT
Start: 2024-08-22 | End: 2024-08-22

## 2024-08-22 RX ORDER — SODIUM CHLORIDE 9 MG/ML
30 INJECTION, SOLUTION INTRAVENOUS
Status: DISCONTINUED | OUTPATIENT
Start: 2024-08-22 | End: 2024-08-28 | Stop reason: HOSPADM

## 2024-08-22 RX ORDER — SODIUM CHLORIDE 9 MG/ML
INJECTION, SOLUTION INTRAVENOUS CONTINUOUS
Status: DISCONTINUED | OUTPATIENT
Start: 2024-08-22 | End: 2024-08-27

## 2024-08-22 RX ORDER — MIDAZOLAM HYDROCHLORIDE 1 MG/ML
INJECTION INTRAMUSCULAR; INTRAVENOUS
Status: COMPLETED
Start: 2024-08-22 | End: 2024-08-22

## 2024-08-22 RX ORDER — SODIUM CHLORIDE 9 MG/ML
30 INJECTION, SOLUTION INTRAVENOUS
Status: COMPLETED | OUTPATIENT
Start: 2024-08-22 | End: 2024-08-22

## 2024-08-22 RX ORDER — SODIUM CHLORIDE 9 MG/ML
1000 INJECTION, SOLUTION INTRAVENOUS ONCE
Status: COMPLETED | OUTPATIENT
Start: 2024-08-22 | End: 2024-08-22

## 2024-08-22 RX ORDER — MIDAZOLAM HYDROCHLORIDE 1 MG/ML
.5-2 INJECTION INTRAMUSCULAR; INTRAVENOUS PRN
Status: ACTIVE | OUTPATIENT
Start: 2024-08-22 | End: 2024-08-22

## 2024-08-22 RX ORDER — SODIUM CHLORIDE 9 MG/ML
500 INJECTION, SOLUTION INTRAVENOUS
Status: ACTIVE | OUTPATIENT
Start: 2024-08-22 | End: 2024-08-22

## 2024-08-22 RX ORDER — LABETALOL HYDROCHLORIDE 5 MG/ML
10 INJECTION, SOLUTION INTRAVENOUS EVERY 4 HOURS PRN
Status: DISCONTINUED | OUTPATIENT
Start: 2024-08-22 | End: 2024-08-28 | Stop reason: HOSPADM

## 2024-08-22 RX ORDER — SODIUM CHLORIDE, SODIUM LACTATE, POTASSIUM CHLORIDE, AND CALCIUM CHLORIDE .6; .31; .03; .02 G/100ML; G/100ML; G/100ML; G/100ML
500 INJECTION, SOLUTION INTRAVENOUS
Status: COMPLETED | OUTPATIENT
Start: 2024-08-22 | End: 2024-08-22

## 2024-08-22 RX ORDER — ONDANSETRON 2 MG/ML
4 INJECTION INTRAMUSCULAR; INTRAVENOUS PRN
Status: ACTIVE | OUTPATIENT
Start: 2024-08-22 | End: 2024-08-22

## 2024-08-22 RX ADMIN — DULOXETINE HYDROCHLORIDE 60 MG: 30 CAPSULE, DELAYED RELEASE ORAL at 05:25

## 2024-08-22 RX ADMIN — NYSTATIN: 100000 POWDER TOPICAL at 18:24

## 2024-08-22 RX ADMIN — FENTANYL CITRATE 50 MCG: 50 INJECTION, SOLUTION INTRAMUSCULAR; INTRAVENOUS at 10:55

## 2024-08-22 RX ADMIN — SODIUM CHLORIDE: 9 INJECTION, SOLUTION INTRAVENOUS at 13:38

## 2024-08-22 RX ADMIN — MIDAZOLAM HYDROCHLORIDE 1 MG: 1 INJECTION, SOLUTION INTRAMUSCULAR; INTRAVENOUS at 10:48

## 2024-08-22 RX ADMIN — TAMSULOSIN HYDROCHLORIDE 0.4 MG: 0.4 CAPSULE ORAL at 11:59

## 2024-08-22 RX ADMIN — OMEPRAZOLE 20 MG: 20 CAPSULE, DELAYED RELEASE ORAL at 05:25

## 2024-08-22 RX ADMIN — SODIUM CHLORIDE 1000 ML: 9 INJECTION, SOLUTION INTRAVENOUS at 20:30

## 2024-08-22 RX ADMIN — LABETALOL HYDROCHLORIDE 10 MG: 5 INJECTION, SOLUTION INTRAVENOUS at 16:10

## 2024-08-22 RX ADMIN — MIRTAZAPINE 15 MG: 15 TABLET, FILM COATED ORAL at 21:25

## 2024-08-22 RX ADMIN — FENTANYL CITRATE 50 MCG: 50 INJECTION, SOLUTION INTRAMUSCULAR; INTRAVENOUS at 10:48

## 2024-08-22 RX ADMIN — NYSTATIN: 100000 POWDER TOPICAL at 05:25

## 2024-08-22 RX ADMIN — MEROPENEM 500 MG: 500 INJECTION, POWDER, FOR SOLUTION INTRAVENOUS at 17:17

## 2024-08-22 RX ADMIN — MEROPENEM 500 MG: 500 INJECTION, POWDER, FOR SOLUTION INTRAVENOUS at 23:30

## 2024-08-22 RX ADMIN — SODIUM CHLORIDE, POTASSIUM CHLORIDE, SODIUM LACTATE AND CALCIUM CHLORIDE 500 ML: 600; 310; 30; 20 INJECTION, SOLUTION INTRAVENOUS at 14:48

## 2024-08-22 RX ADMIN — SODIUM CHLORIDE 2412 ML: 9 INJECTION, SOLUTION INTRAVENOUS at 13:47

## 2024-08-22 RX ADMIN — IOHEXOL 15 ML: 300 INJECTION, SOLUTION INTRAVENOUS at 11:30

## 2024-08-22 RX ADMIN — DULOXETINE HYDROCHLORIDE 60 MG: 30 CAPSULE, DELAYED RELEASE ORAL at 18:04

## 2024-08-22 RX ADMIN — MEROPENEM 500 MG: 500 INJECTION, POWDER, FOR SOLUTION INTRAVENOUS at 14:04

## 2024-08-22 RX ADMIN — PREGABALIN 150 MG: 150 CAPSULE ORAL at 05:25

## 2024-08-22 RX ADMIN — MIDAZOLAM HYDROCHLORIDE 1 MG: 2 INJECTION, SOLUTION INTRAMUSCULAR; INTRAVENOUS at 10:48

## 2024-08-22 RX ADMIN — ATORVASTATIN CALCIUM 40 MG: 40 TABLET, FILM COATED ORAL at 21:26

## 2024-08-22 RX ADMIN — ACETAMINOPHEN 650 MG: 325 TABLET ORAL at 18:37

## 2024-08-22 RX ADMIN — SODIUM CHLORIDE 1000 ML: 9 INJECTION, SOLUTION INTRAVENOUS at 22:32

## 2024-08-22 RX ADMIN — MIDAZOLAM HYDROCHLORIDE 1 MG: 2 INJECTION, SOLUTION INTRAMUSCULAR; INTRAVENOUS at 10:55

## 2024-08-22 RX ADMIN — SODIUM CHLORIDE: 9 INJECTION, SOLUTION INTRAVENOUS at 15:28

## 2024-08-22 ASSESSMENT — ENCOUNTER SYMPTOMS
HEADACHES: 0
DIZZINESS: 0
WEIGHT LOSS: 0
PSYCHIATRIC NEGATIVE: 1
BACK PAIN: 0
DEPRESSION: 0
COUGH: 0
TINGLING: 0
NERVOUS/ANXIOUS: 0
FEVER: 0
RESPIRATORY NEGATIVE: 1
BRUISES/BLEEDS EASILY: 0
SHORTNESS OF BREATH: 0
MYALGIAS: 1
ABDOMINAL PAIN: 0
TREMORS: 0
VOMITING: 0
NAUSEA: 0
EYES NEGATIVE: 1
DIAPHORESIS: 0
FLANK PAIN: 1
MEMORY LOSS: 0
WEAKNESS: 1
GASTROINTESTINAL NEGATIVE: 1
PALPITATIONS: 0
FOCAL WEAKNESS: 0
CARDIOVASCULAR NEGATIVE: 1
CHILLS: 0

## 2024-08-22 ASSESSMENT — PAIN DESCRIPTION - PAIN TYPE: TYPE: ACUTE PAIN

## 2024-08-22 ASSESSMENT — PATIENT HEALTH QUESTIONNAIRE - PHQ9
SUM OF ALL RESPONSES TO PHQ9 QUESTIONS 1 AND 2: 0
SUM OF ALL RESPONSES TO PHQ9 QUESTIONS 1 AND 2: 0
2. FEELING DOWN, DEPRESSED, IRRITABLE, OR HOPELESS: NOT AT ALL
1. LITTLE INTEREST OR PLEASURE IN DOING THINGS: NOT AT ALL
2. FEELING DOWN, DEPRESSED, IRRITABLE, OR HOPELESS: NOT AT ALL
1. LITTLE INTEREST OR PLEASURE IN DOING THINGS: NOT AT ALL

## 2024-08-22 NOTE — PROGRESS NOTES
4 Eyes Skin Assessment Completed by FERNANDO Ojeda and FERNANDO Brown.    Head WDL  Ears WDL  Nose WDL  Mouth WDL  Neck WDL  Breast/Chest WDL  Shoulder Blades Redness and Blanching  Spine Redness and Blanching  (R) Arm/Elbow/Hand WDL, elbow mepilex  (L) Arm/Elbow/Hand WDL, elbow mepilex  Abdomen Redness, excoriated pannus  Groin Redness, excoriated   Scrotum/Coccyx/Buttocks Redness and Blanching  (R) Leg WDL, knee scar  (L) Leg WDL, knee scar  (R) Heel/Foot/Toe WDL, heel mepilex  (L) Heel/Foot/Toe WDL, heel mepilex          Devices In Places Tele Box, Blood Pressure Cuff, Pulse Ox, and [place]      Interventions In Place Heel Mepilex, Sacral Mepilex, TAP System, Pillows, Elbow Mepilex, and Low Air Loss Mattress    Possible Skin Injury No    Pictures Uploaded Into Epic Yes  Wound Consult Placed N/A  RN Wound Prevention Protocol Ordered Yes

## 2024-08-22 NOTE — PROGRESS NOTES
Pt presents to IR 1. Patient was consented by MD at bedside, confirmed by this RN and consent at bedside. Pt transferred to IR 1 table in prone position. Patient underwent an attempted Right Nephrostomy Tube Replacement by Dr. Abreu, but no tube was placed per MD discretion.  Procedure site was marked by MD and verified using imaging guidance. Pt placed on monitor, prepped and draped in a sterile fashion. Vitals were taken every 5 minutes and remained stable during procedure (see doc flow sheet for results). CO2 waveform capnography was monitored and remained WNL throughout procedure. Report called to Lorraine KING. Pt transported by stretcher with RN to S629-1.

## 2024-08-22 NOTE — PROGRESS NOTES
Communication with hospitalist, review of pt fluids and vitals. MD to state to keep pt NPO for now, okay to continue monitor on this floor. States may need emergent urologic stent placement, urology to f/u. Communication in person with urology previously with rapid, states plan to wait for pt to be more stable prior to procedures.

## 2024-08-22 NOTE — PROGRESS NOTES
Assumed care of patient. Assessment performed. Patient reports no pain, numbness, or tingling at this time. She is in good spirits but expresses frustration with her kidney issues. Q2 turns, mepilex, DAVI, frame alarm in place. Call light and belongings within reach. All needs met at this time.     2020 Bladder scan 197 mL.    0540 Patient unable to void. Bladder scan reports greater than 387 mL. Straight cath as per order with output of 400 mL. Patient tolerated well.

## 2024-08-22 NOTE — PROGRESS NOTES
Communication with hospitalist, review of pt bolus needs and notification of continuous fluids seen. Notification rapid at bedside assisting this RN.

## 2024-08-22 NOTE — OR SURGEON
Immediate Post- Operative Note        Findings: Nondilated right kidney      Procedure(s): Aborted neph tube placement      Estimated Blood Loss: Less than 5 ml        Complications: None            8/22/2024     11:18 AM     Govind Abreu M.D.

## 2024-08-22 NOTE — CARE PLAN
The patient is Watcher - Medium risk of patient condition declining or worsening    Shift Goals  Clinical Goals: Pt will have all needs met prior and post planned US to assist with pt developing POC throughout the shift.  Patient Goals: Bladder/Neph tube, whatever I need.  Family Goals: Updates.    Progress made toward(s) clinical / shift goals:  US completed with needs met for pt, pt continues to urinate intermittently throughout day. Pt plan of NPO at midnight for IR tomorrow, communication with IR with plan of NPO for pt.     Patient is not progressing towards the following goals: progressing.

## 2024-08-22 NOTE — PROGRESS NOTES
Patient higher leveled from S6, report received from FERNANDO Geronimo. Patient arrived to floor, monitors notified, and patient oriented to room and use of call light. All safety precautions in place.

## 2024-08-22 NOTE — PROGRESS NOTES
[facility] Medicine Daily Progress Note    Date of Service  8/22/2024    Chief Complaint  Nydia Finch is a 64 y.o. female admitted 8/17/2024 with a dislodged nephrostomy tube    Hospital Course  Nydia Finch is a 64 y.o. female who presented to [facility] on 8/17/2024 with dislodged nephrostomy tube.  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  She was recently admitted here from  7/25-08/01 for ESBL E. coli infection of urine.  A right sided nephrostomy tube replaced on 7/26.  ID consulted and recommended meropenem until 8/9 which patient has now completed.  Patient will ultimately require a nephrectomy as outpatient procedure.  In the meantime patient will need to have her nephrostomy tube replaced.     Interval Problem Update  8/19 Axox3, doing well, chronic expressive aphasia stable, doing well with pascal, denies pain, exam and vitals stable. I discussed plan with IR. They attempted to place a nephrostomy tube today and unable, they recommend repeat US in 2 days and if hydronephrosis at that time they will re attempt    8/20 axox 3, aphasia, Right flank ttp, min erythema and ttp  NT out  D/w IR    8/21 ANO x 3, per RN, patient able to urinate, PVR of less than 300  Minimal right flank pain  Ultrasound consistent with severe right hydronephrosis  Discussed with urology and IR, for nephrostomy tube replacement  N.p.o. after midnight    8/22 status post aborted nephrostomy tube placement, with tachycardia and hypotension, now starting on septic protocol  Blood culture and urine culture obtained  History of ESBL, started meropenem, urology updated, states patient will need stent placement  May need transfer to intensive care unit if condition declines, lactic acid of 4.9, started on septic protocol including IV fluid bolus    Patient is at baseline, awake and alert    Patient is in critical condition and may continue to clinically decline requiring  intensive care unit transfer for pressor assistance  Now on aggressive IV fluid support and antibiotics  Condition is critical and may lead to worsening septic shock and death,   I spent a total of 75 of critical care time during this clinical encounter.  This time includes no procedures or overlap with other providers.   Including starting septic protocol including aggressive IV fluid hydration, IV antibiotics and discussion with consultants what your're doing to prevent organ failure including renal failure.      I have discussed this patient's plan of care and discharge plan at IDT rounds today with Case Management, Nursing, Nursing leadership, and other members of the IDT team.    Consultants/Specialty  IR     Code Status  Full Code    Disposition  Medically Cleared  I have placed the appropriate orders for post-discharge needs.    Review of Systems  Review of Systems   Constitutional:  Positive for malaise/fatigue. Negative for chills, diaphoresis, fever and weight loss.   HENT: Negative.     Eyes: Negative.    Respiratory: Negative.  Negative for cough and shortness of breath.    Cardiovascular: Negative.  Negative for chest pain, palpitations and leg swelling.   Gastrointestinal: Negative.  Negative for abdominal pain, nausea and vomiting.   Genitourinary:  Positive for flank pain. Negative for dysuria.   Musculoskeletal:  Positive for myalgias. Negative for back pain and joint pain.   Skin: Negative.    Neurological:  Positive for weakness. Negative for dizziness, tingling, tremors, focal weakness and headaches.   Endo/Heme/Allergies: Negative.  Does not bruise/bleed easily.   Psychiatric/Behavioral: Negative.  Negative for depression and memory loss. The patient is not nervous/anxious.    All other systems reviewed and are negative.       Physical Exam  Temp:  [36.1 °C (97 °F)-37.7 °C (99.9 °F)] 37.7 °C (99.9 °F)  Pulse:  [] 160  Resp:  [12-27] 24  BP: ()/() 132/70  SpO2:  [92 %-100 %] 94  %    Physical Exam  Vitals and nursing note reviewed. Exam conducted with a chaperone present.   Constitutional:       General: She is not in acute distress.     Appearance: Normal appearance. She is ill-appearing. She is not diaphoretic.   HENT:      Head: Normocephalic.      Nose: Nose normal.      Mouth/Throat:      Mouth: Mucous membranes are moist.   Eyes:      Extraocular Movements: Extraocular movements intact.      Pupils: Pupils are equal, round, and reactive to light.   Cardiovascular:      Rate and Rhythm: Normal rate and regular rhythm.      Pulses: Normal pulses.      Heart sounds: Normal heart sounds.   Pulmonary:      Effort: Pulmonary effort is normal.      Breath sounds: Normal breath sounds.   Abdominal:      General: Abdomen is flat. Bowel sounds are normal. There is no distension.      Palpations: Abdomen is soft.      Tenderness: There is no abdominal tenderness. There is right CVA tenderness. There is no left CVA tenderness.   Genitourinary:     Comments: R sided nephrostomy tube site with no d/c    Olmedo in place  Musculoskeletal:         General: No swelling or tenderness. Normal range of motion.   Skin:     General: Skin is warm and dry.      Capillary Refill: Capillary refill takes less than 2 seconds.      Coloration: Skin is not pale.      Findings: Erythema present.   Neurological:      General: No focal deficit present.      Mental Status: She is alert and oriented to person, place, and time.      Cranial Nerves: No cranial nerve deficit.      Sensory: No sensory deficit.      Motor: Weakness (chronic R sided) present.      Coordination: Coordination normal.   Psychiatric:         Mood and Affect: Mood normal.         Behavior: Behavior normal.         Fluids    Intake/Output Summary (Last 24 hours) at 8/22/2024 1316  Last data filed at 8/22/2024 0500  Gross per 24 hour   Intake 240 ml   Output 400 ml   Net -160 ml       Laboratory  Recent Labs     08/22/24  0040   WBC 11.9*   RBC 3.50*    HEMOGLOBIN 9.8*   HEMATOCRIT 30.0*   MCV 85.7   MCH 28.0   MCHC 32.7   RDW 49.4   PLATELETCT 256   MPV 11.0       Recent Labs     08/20/24  0020 08/22/24  0040   SODIUM 139 136   POTASSIUM 3.8 3.5*   CHLORIDE 112 107   CO2 19* 19*   GLUCOSE 108* 111*   BUN 21 18   CREATININE 0.89 0.98   CALCIUM 7.8* 8.3*     Recent Labs     08/20/24  0020   INR 1.28*               Imaging  US-RENAL   Final Result      Severe RIGHT hydronephrosis likely probably chronic with overlying cortical thinning      IR-PERQ NEPH CATH NEW ACCESS (ALL RADIOLOGY) RIGHT   Final Result            1.  Mild pelviectasis of the right kidney without evidence of significant hydronephrosis.      2.  Aborted right nephrostomy tube placement. Recommend follow-up ultrasound of the right kidney in several days to assess if right-sided hydronephrosis recurs, and if right-sided hydronephrosis develops right nephrostomy tube placement can be    reattempted.      DX-CHEST-LIMITED (1 VIEW)   Final Result      Right sided central venous catheter tip projects at cavoatrial junction. No pneumothorax.      No acute cardiopulmonary abnormality.      Hiatal hernia.      IR-EXCHANGE PERQ NEPH CATH (ALL RADIOLOGY) RIGHT    (Results Pending)        Assessment/Plan  * Nephrostomy tube displaced (HCC)- (present on admission)  Assessment & Plan  8/19 Recently had nephrostomy tube placed in January which was replaced in July  History of ESBL recently finished course of Merrem on 8/9  Patient is to eventually get an outpatient nephrectomy but temporarily has nephrostomy tube in place  IR attempted to replace today - unsuccessful, see above - they recommend repeat US in 2 days, if hydronephrosis at that time they will re attempt  Following renal function  Bmp in am     8/20 d/w IR, f/u renal u/s eval for recurrent hydronephr  - pascal removed, bladder scan protocol  - urology Dr. Huang consulted, appreciate input  Will update family  - given xarelto for dvt prophy, now on  hold    8/21 ultrasound consistent with severe right-sided hydronephrosis  Olmedo catheter removed, as per RN, patient able to have small amounts of urination, PVR of less than 300  Will encourage urination  May need replacement of Olmedo catheter as needed for known history of urinary retention  N.p.o. after midnight for IR evaluation for replacement of right nephrostomy tube  Discussed with urology, aware of patient's wishes for nephrectomy ASAP  Per urology concerned with patient's deconditioning and hypotensive on August 20  Patient's BP appears to be controlled at this time      Dermatitis  Assessment & Plan  8/20 add nystatin powder    Urinary retention  Assessment & Plan  See above  Bmp in am     Anemia- (present on admission)  Assessment & Plan  Chronic   At baseline   Follow intermittently     Right hemiplegia (HCC)- (present on admission)  Assessment & Plan  From prior stroke    History of stroke- (present on admission)  Assessment & Plan  Residual right-sided hemiplegia and dysarthria  Supportive care  At baseline    Sepsis without acute organ dysfunction (HCC)- (present on admission)  Assessment & Plan  SIRS criteria identified on my evaluation include:  Hypothermia, with temperature less than 96.8 deg F and Tachycardia, with heart rate greater than 90 BPM  SIRS is non-infectious, the patient does not have sepsis    8/22 status post attempts at nephrostomy tube placement, aborted, per IR, not enough fluid for placement  Urology aware, recommending stent placement when stable  Now with associated tachycardia and hypotension, systolic in the 90s, septic protocol initiated  History of ESBL in the past  Started on meropenem  Septic protocol, IV fluid hydration  Will monitor closely  CBC, BMP and lactic acid  ID consulted due to history of ESBL, may need transfer to IMCU for pressor support, will continue to monitor  Patient is in a critical condition, family updated, spoke with patient's , Abdoulaye,  updated on condition  Start fluid bolus  Lactic acid of 4.9  Repeat urine and blood culture             VTE prophylaxis: xarelto    I have performed a physical exam and reviewed and updated ROS and Plan today (8/22/2024). In review of yesterday's note (8/21/2024), there are no changes except as documented above.    35 minutes spent with patient and communicating with IR and leadership to facilitate nephrostomy tube placement

## 2024-08-22 NOTE — CARE PLAN
"The patient is Watcher - Medium risk of patient condition declining or worsening    Shift Goals  Clinical Goals: Patient will have appopriate urinary output this shift.  Patient Goals: \"be done\" with kidney.  Family Goals: Not present.    Progress made toward(s) clinical / shift goals:    Problem: Knowledge Deficit - Standard  Goal: Patient and family/care givers will demonstrate understanding of plan of care, disease process/condition, diagnostic tests and medications  Description: Target End Date:  1-3 days or as soon as patient condition allows    Document in Patient Education    1.  Patient and family/caregiver oriented to unit, equipment, visitation policy and means for communicating concern  2.  Complete/review Learning Assessment  3.  Assess knowledge level of disease process/condition, treatment plan, diagnostic tests and medications  4.  Explain disease process/condition, treatment plan, diagnostic tests and medications  Outcome: Progressing     Problem: Skin Integrity  Goal: Skin integrity is maintained or improved  Description: Target End Date:  Prior to discharge or change in level of care    Document interventions on Skin Risk/Juan David flowsheet groups and corresponding LDA    1.  Assess and monitor skin integrity, appearance and/or temperature  2.  Assess risk factors for impaired skin integrity and/or pressures ulcers  3.  Implement precautions to protect skin integrity in collaboration with interdisciplinary team  4.  Implement pressure ulcer prevention protocol if at risk for skin breakdown  5.  Confirm wound care consult if at risk for skin breakdown  6.  Ensure patient use of pressure relieving devices  (Low air loss bed, waffle overlay, heel protectors, ROHO cushion, etc)  Outcome: Progressing  Note: Q2 turns, DAVI, mepilex, interdry, baby powder in place.     Problem: Communication  Goal: The ability to communicate needs accurately and effectively will improve  Description: Target End Date:  End of " day 1    1.  Assess ability to communicate and understand  2.  Provide augmentative or alternative methods of communication devices  3.  Use /language line as appropriate  4.  Collaborate with Speech Therapy as needed  Outcome: Progressing       Patient is not progressing towards the following goals:

## 2024-08-22 NOTE — PROGRESS NOTES
Notification to Alison Arriaga DO, jay RN to call rapid for pt,  temp 99, 96% on RA, pt is shivering and wrapped in warm blankets. Notified BP currently being taken. Hospitalist to request orders placed for stat labs, cbc, cmp, blood cx x2, lactic acid. Notification that BP was 134/68. MD to request orders for EKG stat and inquire of pt pain, pt with no pain. Clarified orders for CBC are with differential. 1227, RN to ask for MD to please come to bedside for pt, rapid at bedside.

## 2024-08-22 NOTE — PROGRESS NOTES
Communication with hospitalist if RN can place pascal for I/O plus she no longer is in need of bladder MD YOGI to state please place and to do strict I/O for pt.

## 2024-08-22 NOTE — DISCHARGE PLANNING
Case Management Discharge Planning    Admission Date: 8/17/2024  GMLOS: 3.3  ALOS: 0    6-Clicks ADL Score: 14  6-Clicks Mobility Score: 8  PT and/or OT Eval ordered: Yes  Post-acute Referrals Ordered: Yes  Post-acute Choice Obtained: Yes  Has referral(s) been sent to post-acute provider:  Yes      Anticipated Discharge Dispo: Discharge Disposition: D/T to SNF with Medicare cert in anticipation of skilled care (03)    DME Needed: No    Action(s) Taken: Updated Provider/Nurse on Discharge Plan, DC Assessment Complete (See below), and Completed PASSR/LOC RNCM attended IDT rounds and reviewed chart. According to medical team, urology will follow up.   @1420 RNCM updated [facility] to cancel transport at this time.     Escalations Completed: None    Medically Clear: No    Next Steps: Follow-up with medical team to discuss DC needs and barriers.    Barriers to Discharge: Medical clearance and Pending Procedures

## 2024-08-23 LAB
ALBUMIN SERPL BCP-MCNC: 2 G/DL (ref 3.2–4.9)
ALBUMIN/GLOB SERPL: 0.5 G/DL
ALP SERPL-CCNC: 128 U/L (ref 30–99)
ALT SERPL-CCNC: <5 U/L (ref 2–50)
ANION GAP SERPL CALC-SCNC: 12 MMOL/L (ref 7–16)
AST SERPL-CCNC: 11 U/L (ref 12–45)
BASOPHILS # BLD AUTO: 0.4 % (ref 0–1.8)
BASOPHILS # BLD: 0.08 K/UL (ref 0–0.12)
BILIRUB SERPL-MCNC: 0.4 MG/DL (ref 0.1–1.5)
BUN SERPL-MCNC: 18 MG/DL (ref 8–22)
CALCIUM ALBUM COR SERPL-MCNC: 9.4 MG/DL (ref 8.5–10.5)
CALCIUM SERPL-MCNC: 7.8 MG/DL (ref 8.5–10.5)
CHLORIDE SERPL-SCNC: 112 MMOL/L (ref 96–112)
CO2 SERPL-SCNC: 14 MMOL/L (ref 20–33)
CREAT SERPL-MCNC: 0.82 MG/DL (ref 0.5–1.4)
EOSINOPHIL # BLD AUTO: 0.03 K/UL (ref 0–0.51)
EOSINOPHIL NFR BLD: 0.1 % (ref 0–6.9)
ERYTHROCYTE [DISTWIDTH] IN BLOOD BY AUTOMATED COUNT: 52.1 FL (ref 35.9–50)
GFR SERPLBLD CREATININE-BSD FMLA CKD-EPI: 80 ML/MIN/1.73 M 2
GLOBULIN SER CALC-MCNC: 3.7 G/DL (ref 1.9–3.5)
GLUCOSE SERPL-MCNC: 95 MG/DL (ref 65–99)
HCT VFR BLD AUTO: 26.2 % (ref 37–47)
HCT VFR BLD AUTO: 30.2 % (ref 37–47)
HGB BLD-MCNC: 8.3 G/DL (ref 12–16)
HGB BLD-MCNC: 9.5 G/DL (ref 12–16)
IMM GRANULOCYTES # BLD AUTO: 0.15 K/UL (ref 0–0.11)
IMM GRANULOCYTES NFR BLD AUTO: 0.7 % (ref 0–0.9)
LYMPHOCYTES # BLD AUTO: 1.53 K/UL (ref 1–4.8)
LYMPHOCYTES NFR BLD: 7.4 % (ref 22–41)
MCH RBC QN AUTO: 28.1 PG (ref 27–33)
MCHC RBC AUTO-ENTMCNC: 31.7 G/DL (ref 32.2–35.5)
MCV RBC AUTO: 88.8 FL (ref 81.4–97.8)
MONOCYTES # BLD AUTO: 1.91 K/UL (ref 0–0.85)
MONOCYTES NFR BLD AUTO: 9.3 % (ref 0–13.4)
NEUTROPHILS # BLD AUTO: 16.94 K/UL (ref 1.82–7.42)
NEUTROPHILS NFR BLD: 82.1 % (ref 44–72)
NRBC # BLD AUTO: 0 K/UL
NRBC BLD-RTO: 0 /100 WBC (ref 0–0.2)
PLATELET # BLD AUTO: 200 K/UL (ref 164–446)
PMV BLD AUTO: 11.5 FL (ref 9–12.9)
POTASSIUM SERPL-SCNC: 3.5 MMOL/L (ref 3.6–5.5)
PROT SERPL-MCNC: 5.7 G/DL (ref 6–8.2)
RBC # BLD AUTO: 2.95 M/UL (ref 4.2–5.4)
SODIUM SERPL-SCNC: 138 MMOL/L (ref 135–145)
WBC # BLD AUTO: 20.6 K/UL (ref 4.8–10.8)

## 2024-08-23 PROCEDURE — 770020 HCHG ROOM/CARE - TELE (206)

## 2024-08-23 PROCEDURE — 80053 COMPREHEN METABOLIC PANEL: CPT

## 2024-08-23 PROCEDURE — 99223 1ST HOSP IP/OBS HIGH 75: CPT | Mod: GC | Performed by: INTERNAL MEDICINE

## 2024-08-23 PROCEDURE — 97602 WOUND(S) CARE NON-SELECTIVE: CPT

## 2024-08-23 PROCEDURE — A9270 NON-COVERED ITEM OR SERVICE: HCPCS | Performed by: INTERNAL MEDICINE

## 2024-08-23 PROCEDURE — 700105 HCHG RX REV CODE 258: Performed by: INTERNAL MEDICINE

## 2024-08-23 PROCEDURE — 99233 SBSQ HOSP IP/OBS HIGH 50: CPT | Performed by: INTERNAL MEDICINE

## 2024-08-23 PROCEDURE — 700102 HCHG RX REV CODE 250 W/ 637 OVERRIDE(OP): Performed by: INTERNAL MEDICINE

## 2024-08-23 PROCEDURE — 85025 COMPLETE CBC W/AUTO DIFF WBC: CPT

## 2024-08-23 PROCEDURE — 36415 COLL VENOUS BLD VENIPUNCTURE: CPT

## 2024-08-23 PROCEDURE — 700105 HCHG RX REV CODE 258

## 2024-08-23 PROCEDURE — 700111 HCHG RX REV CODE 636 W/ 250 OVERRIDE (IP): Performed by: INTERNAL MEDICINE

## 2024-08-23 PROCEDURE — 700102 HCHG RX REV CODE 250 W/ 637 OVERRIDE(OP): Mod: JZ

## 2024-08-23 PROCEDURE — A9270 NON-COVERED ITEM OR SERVICE: HCPCS | Mod: JZ

## 2024-08-23 PROCEDURE — 85018 HEMOGLOBIN: CPT

## 2024-08-23 PROCEDURE — 85014 HEMATOCRIT: CPT

## 2024-08-23 RX ORDER — MIDODRINE HYDROCHLORIDE 5 MG/1
5 TABLET ORAL
Status: DISCONTINUED | OUTPATIENT
Start: 2024-08-23 | End: 2024-08-25

## 2024-08-23 RX ORDER — SODIUM CHLORIDE 9 MG/ML
1000 INJECTION, SOLUTION INTRAVENOUS ONCE
Status: COMPLETED | OUTPATIENT
Start: 2024-08-23 | End: 2024-08-24

## 2024-08-23 RX ORDER — POTASSIUM CHLORIDE 1500 MG/1
20 TABLET, EXTENDED RELEASE ORAL ONCE
Status: COMPLETED | OUTPATIENT
Start: 2024-08-23 | End: 2024-08-23

## 2024-08-23 RX ADMIN — SODIUM CHLORIDE: 9 INJECTION, SOLUTION INTRAVENOUS at 08:42

## 2024-08-23 RX ADMIN — SODIUM CHLORIDE: 9 INJECTION, SOLUTION INTRAVENOUS at 05:20

## 2024-08-23 RX ADMIN — MIDODRINE HYDROCHLORIDE 5 MG: 5 TABLET ORAL at 11:19

## 2024-08-23 RX ADMIN — DULOXETINE HYDROCHLORIDE 60 MG: 30 CAPSULE, DELAYED RELEASE ORAL at 17:19

## 2024-08-23 RX ADMIN — SENNOSIDES AND DOCUSATE SODIUM 2 TABLET: 50; 8.6 TABLET ORAL at 17:19

## 2024-08-23 RX ADMIN — POTASSIUM CHLORIDE 20 MEQ: 1500 TABLET, EXTENDED RELEASE ORAL at 05:21

## 2024-08-23 RX ADMIN — SODIUM CHLORIDE 1000 ML: 9 INJECTION, SOLUTION INTRAVENOUS at 08:00

## 2024-08-23 RX ADMIN — OMEPRAZOLE 20 MG: 20 CAPSULE, DELAYED RELEASE ORAL at 05:21

## 2024-08-23 RX ADMIN — MIDODRINE HYDROCHLORIDE 5 MG: 5 TABLET ORAL at 17:19

## 2024-08-23 RX ADMIN — MIRTAZAPINE 15 MG: 15 TABLET, FILM COATED ORAL at 19:56

## 2024-08-23 RX ADMIN — MEROPENEM 500 MG: 500 INJECTION, POWDER, FOR SOLUTION INTRAVENOUS at 11:19

## 2024-08-23 RX ADMIN — SODIUM CHLORIDE: 9 INJECTION, SOLUTION INTRAVENOUS at 17:22

## 2024-08-23 RX ADMIN — SODIUM CHLORIDE: 9 INJECTION, SOLUTION INTRAVENOUS at 23:52

## 2024-08-23 RX ADMIN — ATORVASTATIN CALCIUM 40 MG: 40 TABLET, FILM COATED ORAL at 19:55

## 2024-08-23 RX ADMIN — MEROPENEM 500 MG: 500 INJECTION, POWDER, FOR SOLUTION INTRAVENOUS at 05:22

## 2024-08-23 RX ADMIN — NYSTATIN: 100000 POWDER TOPICAL at 06:00

## 2024-08-23 RX ADMIN — PREGABALIN 150 MG: 150 CAPSULE ORAL at 05:24

## 2024-08-23 RX ADMIN — NYSTATIN: 100000 POWDER TOPICAL at 17:19

## 2024-08-23 RX ADMIN — MEROPENEM 500 MG: 500 INJECTION, POWDER, FOR SOLUTION INTRAVENOUS at 23:57

## 2024-08-23 RX ADMIN — TAMSULOSIN HYDROCHLORIDE 0.4 MG: 0.4 CAPSULE ORAL at 07:57

## 2024-08-23 RX ADMIN — MIDODRINE HYDROCHLORIDE 5 MG: 5 TABLET ORAL at 07:57

## 2024-08-23 RX ADMIN — DULOXETINE HYDROCHLORIDE 60 MG: 30 CAPSULE, DELAYED RELEASE ORAL at 05:21

## 2024-08-23 RX ADMIN — MEROPENEM 500 MG: 500 INJECTION, POWDER, FOR SOLUTION INTRAVENOUS at 17:23

## 2024-08-23 ASSESSMENT — ENCOUNTER SYMPTOMS
CHILLS: 0
PALPITATIONS: 0
WEIGHT LOSS: 0
BRUISES/BLEEDS EASILY: 0
FOCAL WEAKNESS: 0
FEVER: 0
DIZZINESS: 0
BACK PAIN: 0
PSYCHIATRIC NEGATIVE: 1
EYES NEGATIVE: 1
TREMORS: 0
DIAPHORESIS: 0
GASTROINTESTINAL NEGATIVE: 1
NAUSEA: 0
CARDIOVASCULAR NEGATIVE: 1
VOMITING: 0
HEADACHES: 0
RESPIRATORY NEGATIVE: 1
DEPRESSION: 0
WEAKNESS: 1
SHORTNESS OF BREATH: 0
TINGLING: 0
MYALGIAS: 1
FLANK PAIN: 1
MEMORY LOSS: 0
COUGH: 0
ABDOMINAL PAIN: 0
NERVOUS/ANXIOUS: 0

## 2024-08-23 ASSESSMENT — COGNITIVE AND FUNCTIONAL STATUS - GENERAL
STANDING UP FROM CHAIR USING ARMS: TOTAL
DAILY ACTIVITIY SCORE: 10
TURNING FROM BACK TO SIDE WHILE IN FLAT BAD: TOTAL
CLIMB 3 TO 5 STEPS WITH RAILING: TOTAL
DRESSING REGULAR UPPER BODY CLOTHING: A LOT
WALKING IN HOSPITAL ROOM: TOTAL
PERSONAL GROOMING: A LOT
MOBILITY SCORE: 6
MOVING FROM LYING ON BACK TO SITTING ON SIDE OF FLAT BED: TOTAL
SUGGESTED CMS G CODE MODIFIER DAILY ACTIVITY: CL
EATING MEALS: A LITTLE
TOILETING: TOTAL
SUGGESTED CMS G CODE MODIFIER MOBILITY: CN
DRESSING REGULAR LOWER BODY CLOTHING: TOTAL
HELP NEEDED FOR BATHING: TOTAL
MOVING TO AND FROM BED TO CHAIR: TOTAL

## 2024-08-23 ASSESSMENT — PATIENT HEALTH QUESTIONNAIRE - PHQ9
1. LITTLE INTEREST OR PLEASURE IN DOING THINGS: NOT AT ALL
2. FEELING DOWN, DEPRESSED, IRRITABLE, OR HOPELESS: NOT AT ALL
SUM OF ALL RESPONSES TO PHQ9 QUESTIONS 1 AND 2: 0
1. LITTLE INTEREST OR PLEASURE IN DOING THINGS: NOT AT ALL
2. FEELING DOWN, DEPRESSED, IRRITABLE, OR HOPELESS: NOT AT ALL
SUM OF ALL RESPONSES TO PHQ9 QUESTIONS 1 AND 2: 0

## 2024-08-23 ASSESSMENT — PAIN DESCRIPTION - PAIN TYPE: TYPE: ACUTE PAIN

## 2024-08-23 NOTE — CARE PLAN
The patient is Watcher - Medium risk of patient condition declining or worsening    Shift Goals  Clinical Goals: VSS, skin integrity  Patient Goals: to feel well  Family Goals: ELIZABETH    Progress made toward(s) clinical / shift goals:    Problem: Skin Integrity  Goal: Skin integrity is maintained or improved  Outcome: Progressing  Note: Q2 turns implemented  Heel float boots put in place       Problem: Fall Risk  Goal: Patient will remain free from falls  Outcome: Progressing  Note: Pt educated on need for use of call light. Pt call appropriately      Problem: Hemodynamics  Goal: Patient's hemodynamics, fluid balance and neurologic status will be stable or improve  Outcome: Progressing  Note: Pt BP 88/48 BP at start of shift. Pt received 1L bolus. Pt also receiving midodrine per provider order to elevate BP. Tamsulosin (Flowmax) discontinued per provider order. BP BP remained above 120 systolic for rest of shift.      Problem: Respiratory  Goal: Patient will achieve/maintain optimum respiratory ventilation and gas exchange  Outcome: Progressing  Note: Pt remains on room air and oxygen saturation above 90       Patient is not progressing towards the following goals:      Problem: Urinary - Renal Perfusion  Goal: Ability to achieve and maintain adequate renal perfusion and functioning will improve  Outcome: Not Progressing  Note: Pt received 1400 in this shift and output 400 ml.  Urine dark jerzy color

## 2024-08-23 NOTE — PROGRESS NOTES
"      Note to reader: this note follows the APSO format rather than the historical SOAP format. Assessment and plan located at the top of the note for ease of use.    Chief Complaint  64 y.o. year old female here with atrophic kidney, previously managed by NT. NT fell out 8/17.    Assessment/Plan  Interval History       Plan:   NPO mn for possible stent placement tomorrow if pt stable enough tomorrow.   Continue to trend labs to include renal function  Abx per ID   Urology to follow       Case discussed with Dr. Rm and Dr. Miles     Patient seen and examined    8/22- Pt was in IR during rounds. Was called by nursing team when she returned as a \"rapid\" was called for her for hypotension and tachycardia. Reviewed case with Dr. Miles and Dr. Rm. Dr. Rm does not feel that the pt is medically optimized for large surgery such as nephrectomy. As IR was unable to replace NT, the next step would be to place ureteral stent.      8/21-  Pt with no complaints, eating breakfast. Pt was unable to empty bladder completely, CICed last night but has been able to empty bladder since. Repeat RBUS with severe hydronephrosis. Kidney function remains normal at this time.          Review of Systems  Physical Exam   Review of Systems   Unable to perform ROS: Other     Vitals:    08/22/24 1400 08/22/24 1421 08/22/24 1451 08/22/24 1521   BP: 117/64 120/74 113/70 118/66   Pulse: (!) 142 (!) 144 (!) 150 (!) 139   Resp: (!) 26 (!) 27 (!) 24 20   Temp: (!) 38.5 °C (101.3 °F) (!) 38.1 °C (100.6 °F) (!) 38.1 °C (100.6 °F) 37.1 °C (98.8 °F)   TempSrc: Temporal Temporal Temporal Temporal   SpO2: 93% 92% 93% 93%   Weight:         Physical Exam  HENT:      Mouth/Throat:      Mouth: Mucous membranes are moist.          Hematology Chemistry   Lab Results   Component Value Date/Time    WBC 8.3 08/22/2024 01:51 PM    HEMOGLOBIN 11.4 (L) 08/22/2024 01:51 PM    HEMATOCRIT 34.6 (L) 08/22/2024 01:51 PM    PLATELETCT 224 08/22/2024 01:51 PM     Lab " Results   Component Value Date/Time    SODIUM 136 08/22/2024 12:24 PM    POTASSIUM 4.5 08/22/2024 12:24 PM    CHLORIDE 106 08/22/2024 12:24 PM    CO2 13 (L) 08/22/2024 12:24 PM    GLUCOSE 108 (H) 08/22/2024 12:24 PM    BUN 19 08/22/2024 12:24 PM    CREATININE 0.91 08/22/2024 12:24 PM         Labs not explicitly included in this progress note were reviewed by the author.   Radiology/imaging not explicitly included in this progress note was reviewed by the author.     Medications reviewed, Radiology images reviewed, Labs reviewed and EKG reviewed                          Mary Vivar, P.A.-KUNAL.   [address]  [place] [id number] [phone]

## 2024-08-23 NOTE — PROGRESS NOTES
"      Note to reader: this note follows the APSO format rather than the historical SOAP format. Assessment and plan located at the top of the note for ease of use.    Chief Complaint  64 y.o. year old female here with atrophic kidney, previously managed by NT. NT fell out 8/17.    Assessment/Plan  Interval History       Plan:   Continued cares per primary team  Pt needs to stabilized further prior to surgical interventions.   Eventual stent placement if pt stabilizes   Continue to trend labs to include renal function  Abx per ID   Urology to follow       Case discussed with Dr. Simpson, Dr. Rm and Dr. Miles     Patient seen and examined    8/23- Pt not stable enough this morning for surgical interventions. Kidney function continues to remain normal. No indication for stent. Pt only reports mild back pain.     8/22- Pt was in IR during rounds. Was called by nursing team when she returned as a \"rapid\" was called for her for hypotension and tachycardia. Reviewed case with Dr. Miles and Dr. Rm. Dr. Rm does not feel that the pt is medically optimized for large surgery such as nephrectomy. As IR was unable to replace NT, the next step would be to place ureteral stent.      8/21-  Pt with no complaints, eating breakfast. Pt was unable to empty bladder completely, CICed last night but has been able to empty bladder since. Repeat RBUS with severe hydronephrosis. Kidney function remains normal at this time.          Review of Systems  Physical Exam   Review of Systems   Unable to perform ROS: Other   Constitutional:  Negative for chills and fever.   Respiratory:  Negative for cough.    Cardiovascular:  Negative for chest pain.   Gastrointestinal:  Negative for nausea and vomiting.   Skin:  Negative for rash.     Vitals:    08/23/24 0606 08/23/24 0607 08/23/24 0608 08/23/24 0711   BP:  112/66  (!) 88/48   Pulse:  90  85   Resp:    17   Temp:    36.5 °C (97.7 °F)   TempSrc:    Temporal   SpO2: 95% 98% 98% 98%   Weight: "         Physical Exam  HENT:      Mouth/Throat:      Mouth: Mucous membranes are moist.          Hematology Chemistry   Lab Results   Component Value Date/Time    WBC 20.6 (H) 08/23/2024 02:51 AM    HEMOGLOBIN 8.3 (L) 08/23/2024 02:51 AM    HEMATOCRIT 26.2 (L) 08/23/2024 02:51 AM    PLATELETCT 200 08/23/2024 02:51 AM     Lab Results   Component Value Date/Time    SODIUM 138 08/23/2024 01:51 AM    POTASSIUM 3.5 (L) 08/23/2024 01:51 AM    CHLORIDE 112 08/23/2024 01:51 AM    CO2 14 (L) 08/23/2024 01:51 AM    GLUCOSE 95 08/23/2024 01:51 AM    BUN 18 08/23/2024 01:51 AM    CREATININE 0.82 08/23/2024 01:51 AM         Labs not explicitly included in this progress note were reviewed by the author.   Radiology/imaging not explicitly included in this progress note was reviewed by the author.     Core Measures        Mary Vivar, P.A.-C.   [address] [place] [id number]   [phone]

## 2024-08-23 NOTE — PROGRESS NOTES
[facility] Medicine Daily Progress Note    Date of Service  8/23/2024    Chief Complaint  Nydia Finch is a 64 y.o. female admitted 8/17/2024 with a dislodged nephrostomy tube    Hospital Course  Nydia Finch is a 64 y.o. female who presented to [facility] on 8/17/2024 with dislodged nephrostomy tube.  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  She was recently admitted here from  7/25-08/01 for ESBL E. coli infection of urine.  A right sided nephrostomy tube replaced on 7/26.  ID consulted and recommended meropenem until 8/9 which patient has now completed.  Patient will ultimately require a nephrectomy as outpatient procedure.  In the meantime patient will need to have her nephrostomy tube replaced.     Interval Problem Update  8/19 Axox3, doing well, chronic expressive aphasia stable, doing well with pascal, denies pain, exam and vitals stable. I discussed plan with IR. They attempted to place a nephrostomy tube today and unable, they recommend repeat US in 2 days and if hydronephrosis at that time they will re attempt    8/20 axox 3, aphasia, Right flank ttp, min erythema and ttp  NT out  D/w IR    8/21 ANO x 3, per RN, patient able to urinate, PVR of less than 300  Minimal right flank pain  Ultrasound consistent with severe right hydronephrosis  Discussed with urology and IR, for nephrostomy tube replacement  N.p.o. after midnight  8/22 status post aborted nephrostomy tube placement, with tachycardia and hypotension, now starting on septic protocol  Blood culture and urine culture obtained  History of ESBL, started meropenem, urology updated, states patient will need stent placement  May need transfer to intensive care unit if condition declines, lactic acid of 4.9, started on septic protocol including IV fluid bolus  Patient is at baseline, awake and alert  8/23: Patient seen and examined, transferred overnight to telemetry. For hypotension and  sepsis.  Started on meropenem. Her blood culture now growing gram negative rods and she has h/o ESBL. Infection dise has been consulted urology also following and case discussed no ureteral stent today , she has failed nephrostomy tube   Patient also hypotensive this AM, si IV fluid bolus was given   Also started on midodrine   Close monitor on telemetry for decompensation   I have discussed this patient's plan of care and discharge plan at IDT rounds today with Case Management, Nursing, Nursing leadership, and other members of the IDT team.    Consultants/Specialty  IR     Code Status  Full Code    Disposition  The patient is not medically cleared for discharge to home or a post-acute facility.      I have placed the appropriate orders for post-discharge needs.    Review of Systems  Review of Systems   Constitutional:  Positive for malaise/fatigue. Negative for chills, diaphoresis, fever and weight loss.   HENT: Negative.     Eyes: Negative.    Respiratory: Negative.  Negative for cough and shortness of breath.    Cardiovascular: Negative.  Negative for chest pain, palpitations and leg swelling.   Gastrointestinal: Negative.  Negative for abdominal pain, nausea and vomiting.   Genitourinary:  Positive for flank pain. Negative for dysuria.   Musculoskeletal:  Positive for myalgias. Negative for back pain and joint pain.   Skin: Negative.    Neurological:  Positive for weakness. Negative for dizziness, tingling, tremors, focal weakness and headaches.   Endo/Heme/Allergies: Negative.  Does not bruise/bleed easily.   Psychiatric/Behavioral: Negative.  Negative for depression and memory loss. The patient is not nervous/anxious.    All other systems reviewed and are negative.       Physical Exam  Temp:  [36.2 °C (97.2 °F)-39.1 °C (102.4 °F)] 36.7 °C (98.1 °F)  Pulse:  [] 98  Resp:  [16-27] 16  BP: ()/(44-88) 134/88  SpO2:  [82 %-100 %] 96 %    Physical Exam  Vitals and nursing note reviewed. Exam conducted  with a chaperone present.   Constitutional:       General: She is not in acute distress.     Appearance: Normal appearance. She is ill-appearing. She is not diaphoretic.   HENT:      Head: Normocephalic.      Nose: Nose normal.      Mouth/Throat:      Mouth: Mucous membranes are moist.   Eyes:      Extraocular Movements: Extraocular movements intact.      Pupils: Pupils are equal, round, and reactive to light.   Cardiovascular:      Rate and Rhythm: Normal rate and regular rhythm.      Pulses: Normal pulses.      Heart sounds: Normal heart sounds.   Pulmonary:      Effort: Pulmonary effort is normal.      Breath sounds: Normal breath sounds.   Abdominal:      General: Abdomen is flat. Bowel sounds are normal. There is no distension.      Palpations: Abdomen is soft.      Tenderness: There is no abdominal tenderness. There is right CVA tenderness. There is no left CVA tenderness.   Genitourinary:     Comments: R sided nephrostomy tube site with no d/c    Olmedo in place  Musculoskeletal:         General: No swelling or tenderness. Normal range of motion.   Skin:     General: Skin is warm and dry.      Capillary Refill: Capillary refill takes less than 2 seconds.      Coloration: Skin is not pale.      Findings: Erythema present.   Neurological:      General: No focal deficit present.      Mental Status: She is alert and oriented to person, place, and time.      Cranial Nerves: No cranial nerve deficit.      Sensory: No sensory deficit.      Motor: Weakness (chronic R sided) present.      Coordination: Coordination normal.   Psychiatric:         Mood and Affect: Mood normal.         Behavior: Behavior normal.         Fluids    Intake/Output Summary (Last 24 hours) at 8/23/2024 1249  Last data filed at 8/23/2024 0434  Gross per 24 hour   Intake --   Output 1200 ml   Net -1200 ml       Laboratory  Recent Labs     08/22/24  0040 08/22/24  1351 08/23/24  0251 08/23/24  0815   WBC 11.9* 8.3 20.6*  --    RBC 3.50* 4.01*  2.95*  --    HEMOGLOBIN 9.8* 11.4* 8.3* 9.5*   HEMATOCRIT 30.0* 34.6* 26.2* 30.2*   MCV 85.7 86.3 88.8  --    MCH 28.0 28.4 28.1  --    MCHC 32.7 32.9 31.7*  --    RDW 49.4 50.4* 52.1*  --    PLATELETCT 256 224 200  --    MPV 11.0 11.0 11.5  --        Recent Labs     08/22/24  0040 08/22/24  1224 08/23/24  0151   SODIUM 136 136 138   POTASSIUM 3.5* 4.5 3.5*   CHLORIDE 107 106 112   CO2 19* 13* 14*   GLUCOSE 111* 108* 95   BUN 18 19 18   CREATININE 0.98 0.91 0.82   CALCIUM 8.3* 9.4 7.8*     Recent Labs     08/22/24  1351   INR 1.26*               Imaging  IR-EXCHANGE PERQ NEPH CATH (ALL RADIOLOGY) RIGHT   Final Result      1.  Unsuccessful image guided RIGHT nephrostomy tube placement. No significant hydronephrosis seen on ultrasound.   2.  If additional attempts are indicated, recommend preprocedure CT and possible CT for targeting.      US-RENAL   Final Result      Severe RIGHT hydronephrosis likely probably chronic with overlying cortical thinning      IR-PERQ NEPH CATH NEW ACCESS (ALL RADIOLOGY) RIGHT   Final Result            1.  Mild pelviectasis of the right kidney without evidence of significant hydronephrosis.      2.  Aborted right nephrostomy tube placement. Recommend follow-up ultrasound of the right kidney in several days to assess if right-sided hydronephrosis recurs, and if right-sided hydronephrosis develops right nephrostomy tube placement can be    reattempted.      DX-CHEST-LIMITED (1 VIEW)   Final Result      Right sided central venous catheter tip projects at cavoatrial junction. No pneumothorax.      No acute cardiopulmonary abnormality.      Hiatal hernia.           Assessment/Plan  * Nephrostomy tube displaced (HCC)- (present on admission)  Assessment & Plan  8/19 Recently had nephrostomy tube placed in January which was replaced in July  History of ESBL recently finished course of Merrem on 8/9  Patient is to eventually get an outpatient nephrectomy but temporarily has nephrostomy tube in  place  IR attempted to replace today - unsuccessful, see above - they recommend repeat US in 2 days, if hydronephrosis at that time they will re attempt  Following renal function  Bmp in am     8/20 d/w IR, f/u renal u/s eval for recurrent hydronephr  - pascal removed, bladder scan protocol  - urology Dr. Huang consulted, appreciate input  Will update family  - given xarelto for dvt prophy, now on hold    8/21 ultrasound consistent with severe right-sided hydronephrosis  Pascal catheter removed, as per RN, patient able to have small amounts of urination, PVR of less than 300  Will encourage urination  May need replacement of Pascal catheter as needed for known history of urinary retention  N.p.o. after midnight for IR evaluation for replacement of right nephrostomy tube  Discussed with urology, aware of patient's wishes for nephrectomy ASAP  Per urology concerned with patient's deconditioning and hypotensive on August 20  Patient's BP appears to be controlled at this time      Patient had failed nephrostomy tube and urology is planing for possibel ureteral stent once patient more stable as she was hypotensive yesterday     Dermatitis  Assessment & Plan  8/20 add nystatin powder    Urinary retention  Assessment & Plan  See above  Bmp in am     Anemia- (present on admission)  Assessment & Plan  Chronic   At baseline   Follow intermittently     Right hemiplegia (HCC)- (present on admission)  Assessment & Plan  From prior stroke    History of stroke- (present on admission)  Assessment & Plan  Residual right-sided hemiplegia and dysarthria  Supportive care  At baseline    Sepsis without acute organ dysfunction (HCC)- (present on admission)  Assessment & Plan  SIRS criteria identified on my evaluation include:  Hypothermia, with temperature less than 96.8 deg F and Tachycardia, with heart rate greater than 90 BPM  SIRS is non-infectious, the patient does not have sepsis    8/22 status post attempts at nephrostomy tube  placement, aborted, per IR, not enough fluid for placement  Urology aware, recommending stent placement when stable  Now with associated tachycardia and hypotension, systolic in the 90s, septic protocol initiated  History of ESBL in the past  Started on meropenem  Septic protocol, IV fluid hydration  Will monitor closely  CBC, BMP and lactic acid  ID consulted due to history of ESBL, may need transfer to [facility] for pressor support, will continue to monitor  Patient is in a critical condition, family updated, spoke with patient's , Abdoulaye, updated on condition  Start fluid bolus  Lactic acid of 4.9  Repeat urine and blood culture     Blood culture growing gram negative rods, ID consulted,   On meropenem          VTE prophylaxis: scd    I have performed a physical exam and reviewed and updated ROS and Plan today (8/23/2024). In review of yesterday's note (8/22/2024), there are no changes except as documented above.    Greater than 51 minutes spent prepping to see patient (e.g. review of tests) obtaining and/or reviewing separately obtained history. Performing a medically appropriate examination and/ evaluation.  Counseling and educating the patient/family/caregiver.  Ordering medications, tests, or procedures.  Referring and communicating with other health care professionals.  Documenting clinical information in EPIC.  Independently interpreting results and communicating results to patient/family/caregiver.  Care coordination.

## 2024-08-23 NOTE — WOUND TEAM
[facility]  Inpatient Services  Initial Wound and Skin Care Evaluation    Admission Date: 8/17/2024     Last order of IP CONSULT TO WOUND CARE was found on 8/23/2024 from Hospital Encounter on 8/17/2024     HPI, PMH, SH: Reviewed    Past Surgical History:   Procedure Laterality Date    TX CYSTOSCOPY,INSERT URETERAL STENT Right 1/26/2024    Procedure: CYSTOSCOPY;  Surgeon: Mik Rm M.D.;  Location: SURGERY [facility];  Service: Urology    CYSTOSCOPY N/A 7/4/2019    Procedure: CYSTOSCOPY;  Surgeon: Monico Killian M.D.;  Location: SURGERY [facility];  Service: Urology    STENT PLACEMENT Right 7/4/2019    Procedure: STENT PLACEMENT;  Surgeon: Monico Killian M.D.;  Location: SURGERY [facility];  Service: Urology    URETEROSCOPY Right 7/4/2019    Procedure: URETEROSCOPY;  Surgeon: Monico Killian M.D.;  Location: SURGERY [facility];  Service: Urology    CYSTOSCOPY STENT PLACEMENT  2/13/2018    Procedure: CYSTOSCOPY STENT PLACEMENT;  Surgeon: Monico Killian M.D.;  Location: SURGERY [facility];  Service: Urology    GASTRIC RESECTION  2012    Duodenal Switch    OTHER      OTHER NEUROLOGICAL SURG       Social History     Tobacco Use    Smoking status: Never    Smokeless tobacco: Never   Substance Use Topics    Alcohol use: No     No chief complaint on file.    Diagnosis: Nephrostomy tube displaced (HCC) [T83.022A]  Pyelonephritis [N12]    Unit where seen by Wound Team: T702/01     WOUND CONSULT RELATED TO:  sacrum, buttocks    WOUND TEAM PLAN OF CARE - Frequency of Follow-up:   Nursing to follow dressing orders written for wound care. Contact wound team if area fails to progress, deteriorates or with any questions/concerns if something comes up before next scheduled follow up (See below as to whether wound is following and frequency of wound follow up)     Not following, consult as needed  - sacrum, buttocks, thighs, pannus - MASD    WOUND HISTORY:     Nydia Finch is a 64  y.o. female who presented to [facility] on 8/17/2024 with dislodged nephrostomy tube. 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 She was recently admitted here from 7/25-08/01 for ESBL E. coli infection of urine. A right sided nephrostomy tube replaced on 7/26. ID consulted and recommended meropenem until 8/9 which patient has now completed. Patient will ultimately require a nephrectomy as outpatient procedure. In the meantime patient will need to have her nephrostomy tube replaced.      WOUND ASSESSMENT/LDA  Moisture Associated Skin Damage 07/25/24 Medial;Midline Sacrum;Thigh (Active)   First Observed Date/First Observed Time: 07/25/24 2200   Present on Original Admission: Yes  Wound Orientation: Medial;Midline  Wound Location : Sacrum;Thigh      Assessments 8/23/2024 10:00 AM   Wound Image      NEXT Weekly Photo (Inpatient Only) 08/28/24   Drainage Amount Scant   Drainage Description Serous   Periwound Assessment Red;Pink;Excoriated;Rash   IAD Cleansing Foam Cleanser/Washcloth   Periwound Protectant Antifungal Therapy   IAD Containment Device Indwelling Catheter   Length (cm) 4   Width (cm) 4   WOUND NURSE ONLY - Time Spent with Patient (mins) 30        Vascular:    ALEJANDRO:   No results found.    Lab Values:    Lab Results   Component Value Date/Time    WBC 20.6 (H) 08/23/2024 02:51 AM    RBC 2.95 (L) 08/23/2024 02:51 AM    HEMOGLOBIN 9.5 (L) 08/23/2024 08:15 AM    HEMATOCRIT 30.2 (L) 08/23/2024 08:15 AM    HBA1C 6.2 (H) 02/14/2018 04:45 AM         Culture Results show:  Recent Results (from the past 720 hour(s))   CULTURE WOUND W/ GRAM STAIN    Collection Time: 07/26/24 11:08 AM    Specimen: Wound   Result Value Ref Range    Significant Indicator POS (POS)     Source WND     Site Right kidney fluid     Culture Result - (A)     Gram Stain Result Many WBCs.  Moderate Gram negative rods.       Culture Result (A)      Escherichia coli ESBL  Heavy  growth  Extended Spectrum Beta-lactamase (ESBL) isolated.  ESBL's may be clinically resistant to therapy with  Penicillins,Cephalosporins or Aztreonam despite  apparent in vitro susceptibility to some of these agents.  The patient requires contact isolation.  Please contact pharmacy or an Infectious Disease Specialist  if you have any questions about appropriate therapy.         Susceptibility    Escherichia coli esbl - ANISH     Ampicillin/sulbactam 16/8 Intermediate mcg/mL     Ampicillin >16 Resistant mcg/mL     Ceftriaxone >32 Resistant mcg/mL     Cefazolin >16 Resistant mcg/mL     Ciprofloxacin >2 Resistant mcg/mL     Cefepime >16 Resistant mcg/mL     Cefuroxime >16 Resistant mcg/mL     Ertapenem <=0.5 Sensitive mcg/mL     Gentamicin >8 Resistant mcg/mL     Levofloxacin >4 Resistant mcg/mL     Minocycline <=4 Sensitive mcg/mL     Moxifloxacin >4 Resistant mcg/mL     Pip/Tazobactam <=8 Sensitive mcg/mL     Trimeth/Sulfa <=0.5/9.5 Sensitive mcg/mL     Tigecycline <=2 Sensitive mcg/mL     Tobramycin >8 Resistant mcg/mL       Pain Level/Medicated:  Patient denies pain       INTERVENTIONS BY WOUND TEAM:  Chart and images reviewed. Discussed with bedside RN. All areas of concern (based on picture review, LDA review and discussion with bedside RN) have been thoroughly assessed. Documentation of areas based on significant findings. This RN in to assess patient. Performed standard wound care which includes appropriate positioning, dressing removal and non-selective debridement. Pictures and measurements obtained weekly if/when required.    Wound:  Pannus, sacrum, buttocks, upper thighs - suspected moisture dermatitis  Preparation for Dressing removal: Open to air  Cleansed/Non-selectively Debrided with:  Perineal Wipes (Barrier wipes)  Ibeth wound: Cleansed with Perineal Wipes (Barrier wipes), Prepped with Nystatin Powder  Primary Dressing:  Interdry cloths ordered for pannus    Advanced Wound Care Discharge  Planning  Number of Clinicians necessary to complete wound care: 1  Is patient requiring IV pain medications for dressing changes:  No   Length of time for dressing change 30 min. (This does not include chart review, pre-medication time, set up, clean up or time spent charting.)    Interdisciplinary consultation: Patient, Bedside RN (Lynette)    EVALUATION / RATIONALE FOR TREATMENT:     Date:  08/23/24  Wound Status:  Initial evaluation    Patient with red pink excoriated blanching tissue to sacrum, buttocks, upper thighs and pannus. Nystatin powder in place at time of assessment. Interdry cloths ordered for pannus.       Goals: Steady decrease in wound area and depth weekly.    NURSING PLAN OF CARE ORDERS:  Skin care: See Skin Care orders    NUTRITION RECOMMENDATIONS   Wound Team Recommendations:  N/A    DIET ORDERS (From admission to next 24h)       Start     Ordered    08/22/24 7025  Diet NPO Restrict to: Sips with Medications  AT MIDNIGHT      Question:  Diet NPO Restrict to:  Answer:  Sips with Medications    08/22/24 2606                    PREVENTATIVE INTERVENTIONS:    Q shift Juan David - performed per nursing policy  Q shift pressure point assessments - performed per nursing policy    Surface/Positioning  Low Airloss - Currently in Place  Reposition q 2 hours - Currently in Place  TAPs Turning system - Currently in Place    Offloading/Redistribution  Heel offloading dressing (Silicone dressing) - Currently in Place  Heel float boots (Prevalon boot) - Ordered  Float Heels off Bed with Pillows - Currently in Place           Containment/Moisture Prevention    Antifungal treatment - Currently in Place  Interdry - Ordered    Anticipated discharge plans:  TBD        Vac Discharge Needs:  Vac Discharge plan is purely a recommendation from wound team and not a requirement for discharge unless otherwise stated by physician.  Not Applicable Pt not on a wound vac

## 2024-08-23 NOTE — PROGRESS NOTES
Bedside report received and patient care assumed. Pt is resting in bed, A&O 4, with no complaints of pain, and is on RA. Tele box on. All fall precautions are in place, belongings at bedside table.  Pt was updated on POC, no questions or concerns. Pt educated on use of call light for assistance.

## 2024-08-23 NOTE — CARE PLAN
Problem: Skin Integrity  Goal: Skin integrity is maintained or improved  Description: Target End Date:  Prior to discharge or change in level of care    Document interventions on Skin Risk/Juan David flowsheet groups and corresponding LDA    1.  Assess and monitor skin integrity, appearance and/or temperature  2.  Assess risk factors for impaired skin integrity and/or pressures ulcers  3.  Implement precautions to protect skin integrity in collaboration with interdisciplinary team  4.  Implement pressure ulcer prevention protocol if at risk for skin breakdown  5.  Confirm wound care consult if at risk for skin breakdown  6.  Ensure patient use of pressure relieving devices  (Low air loss bed, waffle overlay, heel protectors, ROHO cushion, etc)  8/22/2024 2352 by Lydia El, R.N.  Outcome: Progressing  8/22/2024 2352 by Lydia El R.N.  Outcome: Progressing     Problem: Fall Risk  Goal: Patient will remain free from falls  Description: Target End Date:  Prior to discharge or change in level of care    Document interventions on the [facility] Sohail Fall Risk Assessment    1.  Assess for fall risk factors  2.  Implement fall precautions  8/22/2024 2352 by Lydia El, R.N.  Outcome: Progressing  8/22/2024 2352 by Lydia El, R.N.  Outcome: Progressing     Problem: Hemodynamics  Goal: Patient's hemodynamics, fluid balance and neurologic status will be stable or improve  Description: Target End Date:  Prior to discharge or change in level of care    Document on Assessment and I/O flowsheet templates    1.  Monitor vital signs, pulse oximetry and cardiac monitor per provider order and/or policy  2.  Maintain blood pressure per provider order  3.  Hemodynamic monitoring per provider order  4.  Manage IV fluids and IV infusions  5.  Monitor intake and output  6.  Daily weights per unit policy or provider order  7.  Assess peripheral pulses and capillary refill  8.  Assess color and body temperature  9.  Position patient for  maximum circulation/cardiac output  10. Monitor for signs/symptoms of excessive bleeding  11. Assess mental status, restlessness and changes in level of consciousness  12. Monitor temperature and report fever or hypothermia to provider immediately. Consideration of targeted temperature management.  8/22/2024 2352 by Lydia El R.N.  Outcome: Progressing  8/22/2024 2352 by Lydia El R.N.  Outcome: Progressing     Problem: Fluid Volume  Goal: Fluid volume balance will be maintained  Description: Target End Date:  Prior to discharge or change in level of care    Document on I/O flowsheet    1.  Monitor intake and output as ordered  2.  Promote oral intake as appropriate  3.  Report inadequate intake or output to physician  4.  Administer IV therapy as ordered  5.  Weights per provider order  6.  Assess for signs and symptoms of bleeding  7.  Monitor for signs of fluid overload (respiratory changes, edema, weight gain, increased abdominal girth)  8.  Monitor of signs for inadequate fluid volume (poor skin turgor, dry mucous membranes)  9.  Instruct patient on adherence to fluid restrictions  8/22/2024 2352 by INDIRA MoseleyN.  Outcome: Progressing  8/22/2024 2352 by Lydia El R.N.  Outcome: Progressing     Problem: Urinary - Renal Perfusion  Goal: Ability to achieve and maintain adequate renal perfusion and functioning will improve  Description: Target End Date:  Prior to discharge or change in level of care    Document on I/O and Assessment flowsheet    1.  Urine output will remain greater than 0.5ml/Kg/HR  2.  Monitor amount and/or characteristics of urine per order/policy. Specific gravity per order/policy  3.  Assess signs and symptoms of renal dysfunction  8/22/2024 2352 by Lydia El R.N.  Outcome: Progressing  8/22/2024 2352 by PETER Moseley.N.  Outcome: Progressing     Problem: Physical Regulation  Goal: Diagnostic test results will improve  Description: Target End Date:  Prior to discharge or change  in level of care    1.  Monitor lactic acid levels  2.  Monitor ABG's  3.  Monitor diagnostic test results  8/22/2024 2352 by Lydia El, R.N.  Outcome: Progressing  8/22/2024 2352 by Lydia El, R.N.  Outcome: Progressing   The patient is Watcher - Medium risk of patient condition declining or worsening    Shift Goals  Clinical Goals: VSS, safety  Patient Goals: to get better  Family Goals: ELIZABETH    Progress made toward(s) clinical / shift goals:  VSS, safety    Patient is not progressing towards the following goals:

## 2024-08-23 NOTE — PROGRESS NOTES
Report received from AM nurse, care assumed.  Received patient resting in bed, shows no signs and symptoms of distress, pain, and discomfort. Patient appears hypotensive upon start of shift, notified Luis Manuel Landin. 1 L of bolus ordered.     Patient had another hypotensive episode, afebrile, Hospitalist Lynette Holt at bedside to assess patient, another 1 L of bolus ordered, increased continuous fluid to   150 ml/hr    Notified Lynette of both blood culture + gram negative rods

## 2024-08-23 NOTE — PROGRESS NOTES
Notified provider of patient continuing to be Hypotensive this morning after several boluses last night. Orders received for 1 L NS bolus and midodrine.

## 2024-08-23 NOTE — PROGRESS NOTES
NOC APRN CROSS COVER NOTE      Notified by primary RN regarding hypotension following completion of fluid bolus.     To bedside to assess patient. Patient wakes easily to voice but is lethargic. Patient complain of being tired. Patient is warm and appears well perfused. Urine output is low.     Order for additional 1 L NS bolus and increase in continuous fluids to 150 ml/hr.    Lynette Holt ACNPC-AG, NOC Hospitalist APRN

## 2024-08-23 NOTE — CONSULTS
INFECTIOUS DISEASES INPATIENT CONSULT NOTE     Date of Service: 08/23/2024    Consult Requested By: Leatha Vasquez M.D.    Reason for Consultation: ESBL E.coli bacteremia    History of Present Illness:   Nydia Finch is a 64 y.o. female with past medical history of cerebral aneurysm, CVA with right sided hemiparaesis and expressive aphasia, and atrophic right kidney admitted 8/17/2024 with dislodged nephrostomy tube.  Patient was previously admitted in January 2020 for for pyelonephritis and hydronephrosis of the right kidney due to ureteral obstruction and sepsis.  Patient underwent right percutaneous nephrostomy tube placement in January 2020 for with drainage of purulent fluid which grew ESBL E. coli.  Patient was initially on Rocephin and then later switched to meropenem.  Patient was later admitted on 7/25/2020 for a nephrostomy stent obstruction which was replaced during the visit.  Nephrostomy wound was oozing pus, cultures grew ESBL positive E. Coli and patient was on meropenem.  On 8/22, patient was scheduled for replacement of the nephrostomy tube.  Patient became hypotensive and tachycardic and NT could not be placed, the neck step would be to place a ureteral stent.  Patient is currently not medically optimized for a nephrectomy, per IR.  Blood culture on 8/22 grew gram-negative rods x 2.  Renal ultrasound showed severe right hydronephrosis.  Chest x-ray showed no acute abnormalities.  CT abdomen showed right hydronephrosis with chronic urothelial thickening.    Review Of Systems:  All other systems are reviewed and are negative     PMH:   Past Medical History:   Diagnosis Date    Aphagia 1994    Brain aneurysm     Chronic pain     Nephrolithiasis     Right hemiplegia (HCC) 1994    Stroke (HCC)          PSH:  Past Surgical History:   Procedure Laterality Date    CT CYSTOSCOPY,INSERT URETERAL STENT Right 1/26/2024    Procedure: CYSTOSCOPY;  Surgeon: Mik Rm M.D.;  Location: SURGERY [facility];   Service: Urology    CYSTOSCOPY N/A 7/4/2019    Procedure: CYSTOSCOPY;  Surgeon: Monico Killian M.D.;  Location: SURGERY [facility];  Service: Urology    STENT PLACEMENT Right 7/4/2019    Procedure: STENT PLACEMENT;  Surgeon: Monico Killian M.D.;  Location: SURGERY [facility];  Service: Urology    URETEROSCOPY Right 7/4/2019    Procedure: URETEROSCOPY;  Surgeon: Monico Killian M.D.;  Location: SURGERY [facility];  Service: Urology    CYSTOSCOPY STENT PLACEMENT  2/13/2018    Procedure: CYSTOSCOPY STENT PLACEMENT;  Surgeon: Monico Killian M.D.;  Location: SURGERY [facility];  Service: Urology    GASTRIC RESECTION  2012    Duodenal Switch    OTHER      OTHER NEUROLOGICAL SURG         FAMILY HX:  No family history on file.    SOCIAL HX:  Social History     Socioeconomic History    Marital status:      Spouse name: Not on file    Number of children: Not on file    Years of education: Not on file    Highest education level: Not on file   Occupational History    Not on file   Tobacco Use    Smoking status: Never    Smokeless tobacco: Never   Substance and Sexual Activity    Alcohol use: No    Drug use: Not on file     Comment: Marijauna    Sexual activity: Not on file   Other Topics Concern    Not on file   Social History Narrative    Not on file     Social Determinants of Health     Financial Resource Strain: Low Risk  (11/10/2022)    Received from [facility]    Financial Resource Strain     Difficulty of Paying Living Expenses: Not on file     Access to Reliable Phone: Not on file   Food Insecurity: No Food Insecurity (7/26/2024)    Hunger Vital Sign     Worried About Running Out of Food in the Last Year: Never true     Ran Out of Food in the Last Year: Never true   Transportation Needs: No Transportation Needs (7/26/2024)    PRAPARE - Transportation     Lack of Transportation (Medical): No     Lack of Transportation (Non-Medical): No   Physical Activity: Not on file   Stress: Not on  file   Social Connections: Not on file   Intimate Partner Violence: Not At Risk (7/26/2024)    Humiliation, Afraid, Rape, and Kick questionnaire     Fear of Current or Ex-Partner: No     Emotionally Abused: No     Physically Abused: No     Sexually Abused: No   Housing Stability: Low Risk  (7/26/2024)    Housing Stability Vital Sign     Unable to Pay for Housing in the Last Year: No     Number of Places Lived in the Last Year: 1     Unstable Housing in the Last Year: No     Social History     Tobacco Use   Smoking Status Never   Smokeless Tobacco Never     Social History     Substance and Sexual Activity   Alcohol Use No       Allergies/Intolerances:  Allergies   Allergen Reactions    Piperacillin Sod-Tazobactam So Rash     Gave her head to toe drug rash at [facility] 1/25/2024      Sulfamethoxazole W-Trimethoprim Itching     Rash ,itching    Ceftriaxone Itching     Generalized redness         Other Current Medications:    Current Facility-Administered Medications:     midodrine (Proamatine) tablet 5 mg, 5 mg, Oral, TID WITH MEALS, Leatha Vasquez M.D., 5 mg at 08/23/24 1119    NS (Bolus) 0.9 % infusion 2,412 mL, 30 mL/kg, Intravenous, Once PRN, Alison Arriaga D.O., Stopped at 08/22/24 1345    NS infusion, , Intravenous, Continuous, MAN Caballero.P.R.N., Last Rate: 150 mL/hr at 08/23/24 0842, New Bag at 08/23/24 0842    meropenem (Merrem) 500 mg in  mL IV-MBP, 500 mg, Intravenous, Q6HRS, SEVEN Kline.O., Stopped at 08/23/24 0552    labetalol (Normodyne/Trandate) injection 10 mg, 10 mg, Intravenous, Q4HRS PRN, CLAUDE KlineO., 10 mg at 08/22/24 1610    nystatin (Mycostatin) powder, , Topical, BID, SEVEN Kline.O., Given at 08/23/24 0600    atorvastatin (Lipitor) tablet 40 mg, 40 mg, Oral, Nightly, CLAUDE SantacruzOBrittany, 40 mg at 08/22/24 2126    DULoxetine (Cymbalta) capsule 60 mg, 60 mg, Oral, BID, SEVEN Santacruz.MONSERRAT, 60 mg at 08/23/24 0521    omeprazole (PriLOSEC) capsule 20 mg, 20 mg,  Oral, DAILY, CLAUDE SantacruzO., 20 mg at 24 0521    mirtazapine (Remeron) tablet 15 mg, 15 mg, Oral, QHS, SEVEN Santacruz.O., 15 mg at 24    [Held by provider] tamsulosin (Flomax) capsule 0.4 mg, 0.4 mg, Oral, AFTER BREAKFAST, CLAUDE SantacruzO., 0.4 mg at 24 0757    pregabalin (Lyrica) capsule 150 mg, 150 mg, Oral, DAILY, SEVEN Santacruz.O., 150 mg at 24 0524    acetaminophen (Tylenol) tablet 650 mg, 650 mg, Oral, Q6HRS PRN, CLAUDE SantacruzO., 650 mg at 24 1837    senna-docusate (Pericolace Or Senokot S) 8.6-50 MG per tablet 2 Tablet, 2 Tablet, Oral, Q EVENING, 2 Tablet at 24 1729 **AND** polyethylene glycol/lytes (Miralax) Packet 1 Packet, 1 Packet, Oral, QDAY PRN, Antolin Cook D.O.    ondansetron (Zofran) syringe/vial injection 4 mg, 4 mg, Intravenous, Q4HRS PRN, Antolin Cook D.O.    ondansetron (Zofran ODT) dispertab 4 mg, 4 mg, Oral, Q4HRS PRN, Antolin Cook D.O.    promethazine (Phenergan) tablet 12.5-25 mg, 12.5-25 mg, Oral, Q4HRS PRN, Antolin Cook D.O.    promethazine (Phenergan) suppository 12.5-25 mg, 12.5-25 mg, Rectal, Q4HRS PRN, Antolin Cook D.O.    prochlorperazine (Compazine) injection 5-10 mg, 5-10 mg, Intravenous, Q4HRS PRN, Antolin Cook D.O.      Most Recent Vital Signs:  /79 Comment: Post 1 L NS bolus  Pulse 95   Temp 36.5 °C (97.7 °F) (Temporal)   Resp 17   Wt 80.4 kg (177 lb 4 oz)   LMP  (LMP Unknown)   SpO2 98%   BMI 27.76 kg/m²   Temp  Av.9 °C (98.5 °F)  Min: 36 °C (96.8 °F)  Max: 39.1 °C (102.4 °F)    Physical Exam:  General: well-appearing, well nourished no acute distress  HEENT: NCAT, PERRLA, sclera anicteric, PERRL, EOMI,  no oral lesions Dentition  Neck: supple, no lymphadenopathy  Chest: CTAB, unlabored.  Cardiac: RRR,  no m/r/g   Abdomen: + bowel sounds, soft, non-tender, non-distended  Extremities: No cyanosis, clubbing. no edema, 2+ pulses, right-sided hemiparesis.  Skin: no rashes   Neuro: Alert  and oriented times 3, Speech fluent CN intact MENDOZA  Psych: Mood normal Affect normal    Pertinent Lab Results:  Recent Labs     08/22/24  0040 08/22/24  1351 08/23/24  0251   WBC 11.9* 8.3 20.6*      Recent Labs     08/22/24  0040 08/22/24  1351 08/23/24  0251 08/23/24  0815   HEMOGLOBIN 9.8* 11.4* 8.3* 9.5*   HEMATOCRIT 30.0* 34.6* 26.2* 30.2*   MCV 85.7 86.3 88.8  --    MCH 28.0 28.4 28.1  --    PLATELETCT 256 224 200  --          Recent Labs     08/22/24  0040 08/22/24  1224 08/23/24  0151   SODIUM 136 136 138   POTASSIUM 3.5* 4.5 3.5*   CHLORIDE 107 106 112   CO2 19* 13* 14*   CREATININE 0.98 0.91 0.82        Recent Labs     08/22/24  1224 08/23/24  0151   ALBUMIN 3.0* 2.0*        Pertinent Micro:  Results       Procedure Component Value Units Date/Time    BLOOD CULTURE [[id number]]  (Abnormal) Collected: 08/22/24 1224    Order Status: Completed Specimen: Blood from Peripheral Updated: 08/23/24 0348     Significant Indicator POS     Source BLD     Site PERIPHERAL     Culture Result Growth detected by Bactec instrument. 08/23/2024  03:47  Gram Stain: Gram negative rods.      BLOOD CULTURE [[id number]]  (Abnormal) Collected: 08/22/24 1224    Order Status: Completed Specimen: Blood from Peripheral Updated: 08/23/24 0344     Significant Indicator POS     Source BLD     Site PERIPHERAL     Culture Result Growth detected by Bactec instrument. 08/23/2024  00:24  Gram Stain: Gram negative rods.      Urinalysis [[id number]]  (Abnormal) Collected: 08/22/24 1303    Order Status: Completed Specimen: Urine, Olmedo Cath Updated: 08/22/24 1335     Color Yellow     Character Cloudy     Specific Gravity 1.030     Ph 5.5     Glucose Negative mg/dL      Ketones Negative mg/dL      Protein 30 mg/dL      Bilirubin Negative     Urobilinogen, Urine 1.0     Nitrite Negative     Leukocyte Esterase Moderate     Occult Blood Negative     Micro Urine Req Microscopic    Urine Culture [[id number]] Collected: 08/22/24 1303    Order Status: Sent  Specimen: Urine, Olmedo Cath Updated: 08/22/24 1322    Blood Culture [[id number]]     Order Status: No result Specimen: Blood from Peripheral     Blood Culture [[id number]]     Order Status: No result Specimen: Blood from Line     Urinalysis [[id number]]     Order Status: Canceled Specimen: Urine, Olmedo Cath           Blood Culture   Date Value Ref Range Status   02/19/2018 No growth after 5 days of incubation.  Final        Studies:    IMPRESSION:   Nydia Finch is a 64-year-old female with past medical history erebral aneurysm, CVA with right sided hemiparaesis and expressive aphasia, and atrophic right kidney admitted 8/17/2024 with dislodged nephrostomy tube, gram-negative bacteremia.    # Gram-negative bacteremia  # Previous right nephrostomy tube  # Nephrostomy tube replacement pending  # Dermatitis  # Sepsis    PLAN:   # Gram-negative bacteremia  # Previous right nephrostomy tube  # Nephrostomy tube replacement pending  # Sepsis    - Continue meropenem.  - Repeat blood cultures if clinically unstable.   - Monitor CBC, lactic acid.  - Monitor vital signs.  - Monitor kidney function.   - Follow up with urology and IR, pending ureteral stent placement.     # Dermatitis  - Continue nystatin powder.     Plan of care discussed with SHANITA Vasquez M.D.. Will continue to follow    David Ty M.D.

## 2024-08-24 LAB
ANION GAP SERPL CALC-SCNC: 9 MMOL/L (ref 7–16)
BACTERIA UR CULT: ABNORMAL
BACTERIA UR CULT: ABNORMAL
BUN SERPL-MCNC: 15 MG/DL (ref 8–22)
CALCIUM SERPL-MCNC: 8 MG/DL (ref 8.5–10.5)
CHLORIDE SERPL-SCNC: 116 MMOL/L (ref 96–112)
CO2 SERPL-SCNC: 15 MMOL/L (ref 20–33)
CREAT SERPL-MCNC: 0.65 MG/DL (ref 0.5–1.4)
ERYTHROCYTE [DISTWIDTH] IN BLOOD BY AUTOMATED COUNT: 50.4 FL (ref 35.9–50)
GFR SERPLBLD CREATININE-BSD FMLA CKD-EPI: 98 ML/MIN/1.73 M 2
GLUCOSE SERPL-MCNC: 79 MG/DL (ref 65–99)
HCT VFR BLD AUTO: 26.7 % (ref 37–47)
HGB BLD-MCNC: 8.8 G/DL (ref 12–16)
MCH RBC QN AUTO: 28.4 PG (ref 27–33)
MCHC RBC AUTO-ENTMCNC: 33 G/DL (ref 32.2–35.5)
MCV RBC AUTO: 86.1 FL (ref 81.4–97.8)
PLATELET # BLD AUTO: 214 K/UL (ref 164–446)
PMV BLD AUTO: 11.6 FL (ref 9–12.9)
POTASSIUM SERPL-SCNC: 3.3 MMOL/L (ref 3.6–5.5)
RBC # BLD AUTO: 3.1 M/UL (ref 4.2–5.4)
SIGNIFICANT IND 70042: ABNORMAL
SITE SITE: ABNORMAL
SODIUM SERPL-SCNC: 140 MMOL/L (ref 135–145)
SOURCE SOURCE: ABNORMAL
WBC # BLD AUTO: 12.2 K/UL (ref 4.8–10.8)

## 2024-08-24 PROCEDURE — 700102 HCHG RX REV CODE 250 W/ 637 OVERRIDE(OP): Performed by: INTERNAL MEDICINE

## 2024-08-24 PROCEDURE — 85027 COMPLETE CBC AUTOMATED: CPT

## 2024-08-24 PROCEDURE — 99233 SBSQ HOSP IP/OBS HIGH 50: CPT | Performed by: INTERNAL MEDICINE

## 2024-08-24 PROCEDURE — A9270 NON-COVERED ITEM OR SERVICE: HCPCS | Performed by: INTERNAL MEDICINE

## 2024-08-24 PROCEDURE — 700105 HCHG RX REV CODE 258: Performed by: INTERNAL MEDICINE

## 2024-08-24 PROCEDURE — 700105 HCHG RX REV CODE 258

## 2024-08-24 PROCEDURE — 700111 HCHG RX REV CODE 636 W/ 250 OVERRIDE (IP): Performed by: INTERNAL MEDICINE

## 2024-08-24 PROCEDURE — 36415 COLL VENOUS BLD VENIPUNCTURE: CPT

## 2024-08-24 PROCEDURE — 700102 HCHG RX REV CODE 250 W/ 637 OVERRIDE(OP): Mod: JZ

## 2024-08-24 PROCEDURE — 770020 HCHG ROOM/CARE - TELE (206)

## 2024-08-24 PROCEDURE — A9270 NON-COVERED ITEM OR SERVICE: HCPCS | Mod: JZ

## 2024-08-24 PROCEDURE — 80048 BASIC METABOLIC PNL TOTAL CA: CPT

## 2024-08-24 RX ORDER — POTASSIUM CHLORIDE 1500 MG/1
20 TABLET, EXTENDED RELEASE ORAL ONCE
Status: COMPLETED | OUTPATIENT
Start: 2024-08-24 | End: 2024-08-24

## 2024-08-24 RX ADMIN — OMEPRAZOLE 20 MG: 20 CAPSULE, DELAYED RELEASE ORAL at 04:52

## 2024-08-24 RX ADMIN — MIDODRINE HYDROCHLORIDE 5 MG: 5 TABLET ORAL at 08:11

## 2024-08-24 RX ADMIN — MIRTAZAPINE 15 MG: 15 TABLET, FILM COATED ORAL at 21:24

## 2024-08-24 RX ADMIN — PREGABALIN 150 MG: 150 CAPSULE ORAL at 04:52

## 2024-08-24 RX ADMIN — SODIUM CHLORIDE: 9 INJECTION, SOLUTION INTRAVENOUS at 23:00

## 2024-08-24 RX ADMIN — DULOXETINE HYDROCHLORIDE 60 MG: 30 CAPSULE, DELAYED RELEASE ORAL at 04:52

## 2024-08-24 RX ADMIN — ATORVASTATIN CALCIUM 40 MG: 40 TABLET, FILM COATED ORAL at 21:24

## 2024-08-24 RX ADMIN — MEROPENEM 500 MG: 500 INJECTION, POWDER, FOR SOLUTION INTRAVENOUS at 23:26

## 2024-08-24 RX ADMIN — POTASSIUM CHLORIDE 20 MEQ: 1500 TABLET, EXTENDED RELEASE ORAL at 04:52

## 2024-08-24 RX ADMIN — MIDODRINE HYDROCHLORIDE 5 MG: 5 TABLET ORAL at 11:16

## 2024-08-24 RX ADMIN — MEROPENEM 500 MG: 500 INJECTION, POWDER, FOR SOLUTION INTRAVENOUS at 17:11

## 2024-08-24 RX ADMIN — MEROPENEM 500 MG: 500 INJECTION, POWDER, FOR SOLUTION INTRAVENOUS at 11:15

## 2024-08-24 RX ADMIN — SODIUM CHLORIDE: 9 INJECTION, SOLUTION INTRAVENOUS at 16:00

## 2024-08-24 RX ADMIN — POLYETHYLENE GLYCOL 3350 1 PACKET: 17 POWDER, FOR SOLUTION ORAL at 09:08

## 2024-08-24 RX ADMIN — DULOXETINE HYDROCHLORIDE 60 MG: 30 CAPSULE, DELAYED RELEASE ORAL at 17:10

## 2024-08-24 RX ADMIN — ACETAMINOPHEN 650 MG: 325 TABLET ORAL at 09:06

## 2024-08-24 RX ADMIN — MEROPENEM 500 MG: 500 INJECTION, POWDER, FOR SOLUTION INTRAVENOUS at 04:52

## 2024-08-24 RX ADMIN — NYSTATIN: 100000 POWDER TOPICAL at 17:10

## 2024-08-24 RX ADMIN — NYSTATIN: 100000 POWDER TOPICAL at 04:55

## 2024-08-24 RX ADMIN — SODIUM CHLORIDE: 9 INJECTION, SOLUTION INTRAVENOUS at 09:09

## 2024-08-24 ASSESSMENT — ENCOUNTER SYMPTOMS
DEPRESSION: 0
DIZZINESS: 0
WEAKNESS: 1
GASTROINTESTINAL NEGATIVE: 1
VOMITING: 0
PALPITATIONS: 0
CARDIOVASCULAR NEGATIVE: 1
ABDOMINAL PAIN: 0
BACK PAIN: 0
SHORTNESS OF BREATH: 0
EYES NEGATIVE: 1
DIAPHORESIS: 0
WEIGHT LOSS: 0
NAUSEA: 0
FLANK PAIN: 1
FEVER: 0
PSYCHIATRIC NEGATIVE: 1
MYALGIAS: 1
FOCAL WEAKNESS: 0
RESPIRATORY NEGATIVE: 1
NERVOUS/ANXIOUS: 0
TINGLING: 0
BRUISES/BLEEDS EASILY: 0
COUGH: 0
CHILLS: 0
HEADACHES: 0
MEMORY LOSS: 0
TREMORS: 0

## 2024-08-24 ASSESSMENT — PAIN DESCRIPTION - PAIN TYPE: TYPE: CHRONIC PAIN

## 2024-08-24 ASSESSMENT — FIBROSIS 4 INDEX: FIB4 SCORE: 1.55

## 2024-08-24 NOTE — PROGRESS NOTES
Pt on isolation precautions, signs outside door and PPE available. Pt states understanding of isolation precautions.

## 2024-08-24 NOTE — PROGRESS NOTES
Reviewed chart including vitals labs and patient appears to have stabilized, continue meropenem for now while following up repeat cultures.  Noted the positive Candida glabrata from the Olmedo catheter.  Will not treat as she is improving and this is likely a colonizer rather than actual infection.    Serina Mcwilliams MD

## 2024-08-24 NOTE — CARE PLAN
The patient is Stable - Low risk of patient condition declining or worsening    Shift Goals  Clinical Goals: VSS, q2 turns, bedbath, BM, monitor I&Os, plan for nephrectomy?  Patient Goals: talk to  on phone  Family Goals: talk to wife on phone    Progress made toward(s) clinical / shift goals:    Problem: Skin Integrity  Goal: Skin integrity is maintained or improved  Outcome: Progressing  Note: Interventions in place.  Pt requested removal of heal float boots and mepilex heal protectors.  Educated pt on importance of protecting bony prominences.  Heals floated with pillow.     Problem: Fall Risk  Goal: Patient will remain free from falls  Outcome: Progressing  Note: Interventions in place.     Problem: Pain - Standard  Goal: Alleviation of pain or a reduction in pain to the patient’s comfort goal  Outcome: Progressing  Note: Pt only c/o pain was RUE, effectively managed initially with tylenol, then frequent ROM and repositioning.     Problem: Communication  Goal: The ability to communicate needs accurately and effectively will improve  Outcome: Progressing  Note: Pt's frustration at inability to find words is palpable.  With reassurances that there is no rush, pt was often able to get out the one or two words necessary to communicate her desires.... and she was truly appreciative.  It's seems to this RN that pt is still mentally there, just wholly unable to express herself.     Problem: Respiratory  Goal: Patient will achieve/maintain optimum respiratory ventilation and gas exchange  Outcome: Progressing  Note: Pt satting mid 90s on RA.

## 2024-08-24 NOTE — PROGRESS NOTES
[facility] Medicine Daily Progress Note    Date of Service  8/24/2024    Chief Complaint  Nydia Finch is a 64 y.o. female admitted 8/17/2024 with a dislodged nephrostomy tube    Hospital Course  Nydia Finch is a 64 y.o. female who presented to [facility] on 8/17/2024 with dislodged nephrostomy tube.  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  She was recently admitted here from  7/25-08/01 for ESBL E. coli infection of urine.  A right sided nephrostomy tube replaced on 7/26.  ID consulted and recommended meropenem until 8/9 which patient has now completed.  Patient will ultimately require a nephrectomy as outpatient procedure.  In the meantime patient will need to have her nephrostomy tube replaced.     Interval Problem Update  8/19 Axox3, doing well, chronic expressive aphasia stable, doing well with pascal, denies pain, exam and vitals stable. I discussed plan with IR. They attempted to place a nephrostomy tube today and unable, they recommend repeat US in 2 days and if hydronephrosis at that time they will re attempt    8/20 axox 3, aphasia, Right flank ttp, min erythema and ttp  NT out  D/w IR    8/21 ANO x 3, per RN, patient able to urinate, PVR of less than 300  Minimal right flank pain  Ultrasound consistent with severe right hydronephrosis  Discussed with urology and IR, for nephrostomy tube replacement  N.p.o. after midnight  8/22 status post aborted nephrostomy tube placement, with tachycardia and hypotension, now starting on septic protocol  Blood culture and urine culture obtained  History of ESBL, started meropenem, urology updated, states patient will need stent placement  May need transfer to intensive care unit if condition declines, lactic acid of 4.9, started on septic protocol including IV fluid bolus  Patient is at baseline, awake and alert  8/23: Patient seen and examined, transferred overnight to telemetry. For hypotension and  sepsis.  Started on meropenem. Her blood culture now growing gram negative rods and she has h/o ESBL. Infection dise has been consulted urology also following and case discussed no ureteral stent today , she has failed nephrostomy tube   Patient also hypotensive this AM, si IV fluid bolus was given   Also started on midodrine   Close monitor on telemetry for decompensation   8/24: Patient seen and examined, her hypotension has improved, cont on IV abx for her bacteremia, now on meropenem as per ID rec.   Urology following in regards to ureteral stent vs nephrectomy   I have discussed this patient's plan of care and discharge plan at IDT rounds today with Case Management, Nursing, Nursing leadership, and other members of the IDT team.    Consultants/Specialty  IR     Code Status  Full Code    Disposition  The patient is not medically cleared for discharge to home or a post-acute facility.      I have placed the appropriate orders for post-discharge needs.    Review of Systems  Review of Systems   Constitutional:  Positive for malaise/fatigue. Negative for chills, diaphoresis, fever and weight loss.   HENT: Negative.     Eyes: Negative.    Respiratory: Negative.  Negative for cough and shortness of breath.    Cardiovascular: Negative.  Negative for chest pain, palpitations and leg swelling.   Gastrointestinal: Negative.  Negative for abdominal pain, nausea and vomiting.   Genitourinary:  Positive for flank pain. Negative for dysuria.   Musculoskeletal:  Positive for myalgias. Negative for back pain and joint pain.   Skin: Negative.    Neurological:  Positive for weakness. Negative for dizziness, tingling, tremors, focal weakness and headaches.   Endo/Heme/Allergies: Negative.  Does not bruise/bleed easily.   Psychiatric/Behavioral: Negative.  Negative for depression and memory loss. The patient is not nervous/anxious.    All other systems reviewed and are negative.       Physical Exam  Temp:  [36.5 °C (97.7 °F)-36.7  °C (98.1 °F)] 36.7 °C (98.1 °F)  Pulse:  [] 98  Resp:  [17-19] 17  BP: (111-129)/(65-83) 129/83  SpO2:  [95 %-98 %] 95 %    Physical Exam  Vitals and nursing note reviewed. Exam conducted with a chaperone present.   Constitutional:       General: She is not in acute distress.     Appearance: Normal appearance. She is ill-appearing. She is not diaphoretic.   HENT:      Head: Normocephalic.      Nose: Nose normal.      Mouth/Throat:      Mouth: Mucous membranes are moist.   Eyes:      Extraocular Movements: Extraocular movements intact.      Pupils: Pupils are equal, round, and reactive to light.   Cardiovascular:      Rate and Rhythm: Normal rate and regular rhythm.      Pulses: Normal pulses.      Heart sounds: Normal heart sounds.   Pulmonary:      Effort: Pulmonary effort is normal.      Breath sounds: Normal breath sounds.   Abdominal:      General: Abdomen is flat. Bowel sounds are normal. There is no distension.      Palpations: Abdomen is soft.      Tenderness: There is no abdominal tenderness. There is right CVA tenderness. There is no left CVA tenderness.   Genitourinary:     Comments: R sided nephrostomy tube site with no d/c    Olmedo in place  Musculoskeletal:         General: No swelling or tenderness. Normal range of motion.   Skin:     General: Skin is warm and dry.      Capillary Refill: Capillary refill takes less than 2 seconds.      Coloration: Skin is not pale.      Findings: Erythema present.   Neurological:      General: No focal deficit present.      Mental Status: She is alert and oriented to person, place, and time.      Cranial Nerves: No cranial nerve deficit.      Sensory: No sensory deficit.      Motor: Weakness (chronic R sided) present.      Coordination: Coordination normal.   Psychiatric:         Mood and Affect: Mood normal.         Behavior: Behavior normal.         Fluids    Intake/Output Summary (Last 24 hours) at 8/24/2024 1256  Last data filed at 8/24/2024 1200  Gross per  24 hour   Intake 9506.13 ml   Output 1300 ml   Net 8206.13 ml       Laboratory  Recent Labs     08/22/24  1351 08/23/24  0251 08/23/24  0815 08/24/24  0250   WBC 8.3 20.6*  --  12.2*   RBC 4.01* 2.95*  --  3.10*   HEMOGLOBIN 11.4* 8.3* 9.5* 8.8*   HEMATOCRIT 34.6* 26.2* 30.2* 26.7*   MCV 86.3 88.8  --  86.1   MCH 28.4 28.1  --  28.4   MCHC 32.9 31.7*  --  33.0   RDW 50.4* 52.1*  --  50.4*   PLATELETCT 224 200  --  214   MPV 11.0 11.5  --  11.6       Recent Labs     08/22/24  1224 08/23/24  0151 08/24/24  0250   SODIUM 136 138 140   POTASSIUM 4.5 3.5* 3.3*   CHLORIDE 106 112 116*   CO2 13* 14* 15*   GLUCOSE 108* 95 79   BUN 19 18 15   CREATININE 0.91 0.82 0.65   CALCIUM 9.4 7.8* 8.0*     Recent Labs     08/22/24  1351   INR 1.26*               Imaging  IR-EXCHANGE PERQ NEPH CATH (ALL RADIOLOGY) RIGHT   Final Result      1.  Unsuccessful image guided RIGHT nephrostomy tube placement. No significant hydronephrosis seen on ultrasound.   2.  If additional attempts are indicated, recommend preprocedure CT and possible CT for targeting.      US-RENAL   Final Result      Severe RIGHT hydronephrosis likely probably chronic with overlying cortical thinning      IR-PERQ NEPH CATH NEW ACCESS (ALL RADIOLOGY) RIGHT   Final Result            1.  Mild pelviectasis of the right kidney without evidence of significant hydronephrosis.      2.  Aborted right nephrostomy tube placement. Recommend follow-up ultrasound of the right kidney in several days to assess if right-sided hydronephrosis recurs, and if right-sided hydronephrosis develops right nephrostomy tube placement can be    reattempted.      DX-CHEST-LIMITED (1 VIEW)   Final Result      Right sided central venous catheter tip projects at cavoatrial junction. No pneumothorax.      No acute cardiopulmonary abnormality.      Hiatal hernia.           Assessment/Plan  * Nephrostomy tube displaced (HCC)- (present on admission)  Assessment & Plan  8/19 Recently had nephrostomy tube  placed in January which was replaced in July  History of ESBL recently finished course of Merrem on 8/9  Patient is to eventually get an outpatient nephrectomy but temporarily has nephrostomy tube in place  IR attempted to replace today - unsuccessful, see above - they recommend repeat US in 2 days, if hydronephrosis at that time they will re attempt  Following renal function  Bmp in am     8/20 d/w IR, f/u renal u/s eval for recurrent hydronephr  - pascal removed, bladder scan protocol  - urology Dr. Huang consulted, appreciate input  Will update family  - given xarelto for dvt prophy, now on hold    8/21 ultrasound consistent with severe right-sided hydronephrosis  Pascal catheter removed, as per RN, patient able to have small amounts of urination, PVR of less than 300  Will encourage urination  May need replacement of Pascal catheter as needed for known history of urinary retention  N.p.o. after midnight for IR evaluation for replacement of right nephrostomy tube  Discussed with urology, aware of patient's wishes for nephrectomy ASAP  Per urology concerned with patient's deconditioning and hypotensive on August 20  Patient's BP appears to be controlled at this time      Patient had failed nephrostomy tube and urology is planing for possibel ureteral stent once patient more stable as she was hypotensive yesterday     Dermatitis  Assessment & Plan  8/20 add nystatin powder    Urinary retention  Assessment & Plan  See above  Bmp in am     Anemia- (present on admission)  Assessment & Plan  Chronic   At baseline   Follow intermittently     Right hemiplegia (HCC)- (present on admission)  Assessment & Plan  From prior stroke    History of stroke- (present on admission)  Assessment & Plan  Residual right-sided hemiplegia and dysarthria  Supportive care  At baseline    Sepsis without acute organ dysfunction (HCC)- (present on admission)  Assessment & Plan  SIRS criteria identified on my evaluation include:  Hypothermia,  with temperature less than 96.8 deg F and Tachycardia, with heart rate greater than 90 BPM  SIRS is non-infectious, the patient does not have sepsis    8/22 status post attempts at nephrostomy tube placement, aborted, per IR, not enough fluid for placement  Urology aware, recommending stent placement when stable  Now with associated tachycardia and hypotension, systolic in the 90s, septic protocol initiated  History of ESBL in the past  Started on meropenem  Septic protocol, IV fluid hydration  Will monitor closely  CBC, BMP and lactic acid  ID consulted due to history of ESBL, may need transfer to [facility] for pressor support, will continue to monitor  Patient is in a critical condition, family updated, spoke with patient's , Abdoulaye, updated on condition  Start fluid bolus  Lactic acid of 4.9  Repeat urine and blood culture     Blood culture growing gram negative rods, ID consulted,   On meropenem          VTE prophylaxis: xarelto    Greater than 51 minutes spent prepping to see patient (e.g. review of tests) obtaining and/or reviewing separately obtained history. Performing a medically appropriate examination and/ evaluation.  Counseling and educating the patient/family/caregiver.  Ordering medications, tests, or procedures.  Referring and communicating with other health care professionals.  Documenting clinical information in EPIC.  Independently interpreting results and communicating results to patient/family/caregiver.  Care coordination.     I have performed a physical exam and reviewed and updated ROS and Plan today (8/24/2024). In review of yesterday's note (8/23/2024), there are no changes except as documented above.

## 2024-08-24 NOTE — PROGRESS NOTES
"      Note to reader: this note follows the APSO format rather than the historical SOAP format. Assessment and plan located at the top of the note for ease of use.    Chief Complaint  64 y.o. year old female here with atrophic kidney, previously managed by NT. NT fell out 8/17.    Assessment/Plan  Interval History       Plan:   NPO Mn for stent placement tomorrow.   Continued cares per primary team  Continue to trend labs to include renal function  Abx per ID   Urology to follow       Case discussed with Dr. Simpson, Dr. Rm and Dr. Miles     Patient seen and examined    8/24- Pts kidney function continues to stay normal. Pt more stable today. Dr. Robles feels that stent placement will help pt stablize further.     8/23- Pt not stable enough this morning for surgical interventions. Kidney function continues to remain normal. No indication for stent. Pt only reports mild back pain. WBC elevated, on Meropenum     8/22- Pt was in IR during rounds. Was called by nursing team when she returned as a \"rapid\" was called for her for hypotension and tachycardia. Reviewed case with Dr. Miles and Dr. Rm. Dr. Rm does not feel that the pt is medically optimized for large surgery such as nephrectomy. As IR was unable to replace NT, the next step would be to place ureteral stent.      8/21-  Pt with no complaints, eating breakfast. Pt was unable to empty bladder completely, CICed last night but has been able to empty bladder since. Repeat RBUS with severe hydronephrosis. Kidney function remains normal at this time.          Review of Systems  Physical Exam   Review of Systems   Unable to perform ROS: Other   Constitutional:  Negative for chills and fever.   Respiratory:  Negative for cough.    Cardiovascular:  Negative for chest pain.   Gastrointestinal:  Negative for nausea and vomiting.   Skin:  Negative for rash.     Vitals:    08/23/24 2000 08/23/24 2302 08/24/24 0400 08/24/24 0739   BP: 127/79 125/75 126/78 126/81 "   Pulse: 95 85 85 96   Resp: 18 18 18 19   Temp: 36.6 °C (97.9 °F) 36.7 °C (98 °F) 36.5 °C (97.7 °F) 36.7 °C (98.1 °F)   TempSrc: Temporal Temporal Temporal Temporal   SpO2: 95% 96% 98% 97%   Weight:   80 kg (176 lb 5.9 oz)      Physical Exam  HENT:      Mouth/Throat:      Mouth: Mucous membranes are moist.          Hematology Chemistry   Lab Results   Component Value Date/Time    WBC 12.2 (H) 08/24/2024 02:50 AM    HEMOGLOBIN 8.8 (L) 08/24/2024 02:50 AM    HEMATOCRIT 26.7 (L) 08/24/2024 02:50 AM    PLATELETCT 214 08/24/2024 02:50 AM     Lab Results   Component Value Date/Time    SODIUM 140 08/24/2024 02:50 AM    POTASSIUM 3.3 (L) 08/24/2024 02:50 AM    CHLORIDE 116 (H) 08/24/2024 02:50 AM    CO2 15 (L) 08/24/2024 02:50 AM    GLUCOSE 79 08/24/2024 02:50 AM    BUN 15 08/24/2024 02:50 AM    CREATININE 0.65 08/24/2024 02:50 AM         Labs not explicitly included in this progress note were reviewed by the author.   Radiology/imaging not explicitly included in this progress note was reviewed by the author.     [employer] Measures        Mary Vivar P.A.-C.   [id number] [place] [id number]   [phone]

## 2024-08-24 NOTE — PROGRESS NOTES
Monitor Summary  Rhythm: SR, ST  Rate:   Ectopy: (R) PVC (R)PAC (R) TRIG  Measurements: .15/.04/.11  ---12 hr Chart ---

## 2024-08-24 NOTE — PROGRESS NOTES
Monitor Summary  Rhythm: SR, ST  Rate:   Ectopy: PVC (R), PAC (R)  Measurements: .16/.12/.38  ---12 hr Chart Review---

## 2024-08-25 ENCOUNTER — APPOINTMENT (OUTPATIENT)
Dept: RADIOLOGY | Facility: MEDICAL CENTER | Age: 64
DRG: 659 | End: 2024-08-25
Attending: UROLOGY
Payer: MEDICARE

## 2024-08-25 ENCOUNTER — ANESTHESIA (OUTPATIENT)
Dept: SURGERY | Facility: MEDICAL CENTER | Age: 64
DRG: 659 | End: 2024-08-25
Payer: MEDICARE

## 2024-08-25 ENCOUNTER — ANESTHESIA EVENT (OUTPATIENT)
Dept: SURGERY | Facility: MEDICAL CENTER | Age: 64
DRG: 659 | End: 2024-08-25
Payer: MEDICARE

## 2024-08-25 LAB
ANION GAP SERPL CALC-SCNC: 8 MMOL/L (ref 7–16)
BACTERIA BLD CULT: ABNORMAL
BUN SERPL-MCNC: 11 MG/DL (ref 8–22)
CALCIUM SERPL-MCNC: 7.9 MG/DL (ref 8.5–10.5)
CHLORIDE SERPL-SCNC: 116 MMOL/L (ref 96–112)
CO2 SERPL-SCNC: 16 MMOL/L (ref 20–33)
CREAT SERPL-MCNC: 0.62 MG/DL (ref 0.5–1.4)
ERYTHROCYTE [DISTWIDTH] IN BLOOD BY AUTOMATED COUNT: 50 FL (ref 35.9–50)
GFR SERPLBLD CREATININE-BSD FMLA CKD-EPI: 99 ML/MIN/1.73 M 2
GLUCOSE SERPL-MCNC: 71 MG/DL (ref 65–99)
HCT VFR BLD AUTO: 25.1 % (ref 37–47)
HGB BLD-MCNC: 8.3 G/DL (ref 12–16)
MCH RBC QN AUTO: 28.2 PG (ref 27–33)
MCHC RBC AUTO-ENTMCNC: 33.1 G/DL (ref 32.2–35.5)
MCV RBC AUTO: 85.4 FL (ref 81.4–97.8)
PLATELET # BLD AUTO: 223 K/UL (ref 164–446)
PMV BLD AUTO: 11.3 FL (ref 9–12.9)
POTASSIUM SERPL-SCNC: 3.4 MMOL/L (ref 3.6–5.5)
RBC # BLD AUTO: 2.94 M/UL (ref 4.2–5.4)
SIGNIFICANT IND 70042: ABNORMAL
SIGNIFICANT IND 70042: ABNORMAL
SITE SITE: ABNORMAL
SITE SITE: ABNORMAL
SODIUM SERPL-SCNC: 140 MMOL/L (ref 135–145)
SOURCE SOURCE: ABNORMAL
SOURCE SOURCE: ABNORMAL
WBC # BLD AUTO: 9.1 K/UL (ref 4.8–10.8)

## 2024-08-25 PROCEDURE — 99233 SBSQ HOSP IP/OBS HIGH 50: CPT | Performed by: INTERNAL MEDICINE

## 2024-08-25 PROCEDURE — 700105 HCHG RX REV CODE 258: Performed by: INTERNAL MEDICINE

## 2024-08-25 PROCEDURE — 770001 HCHG ROOM/CARE - MED/SURG/GYN PRIV*

## 2024-08-25 PROCEDURE — 160002 HCHG RECOVERY MINUTES (STAT): Performed by: UROLOGY

## 2024-08-25 PROCEDURE — 160035 HCHG PACU - 1ST 60 MINS PHASE I: Performed by: UROLOGY

## 2024-08-25 PROCEDURE — 74018 RADEX ABDOMEN 1 VIEW: CPT

## 2024-08-25 PROCEDURE — 700105 HCHG RX REV CODE 258: Performed by: ANESTHESIOLOGY

## 2024-08-25 PROCEDURE — 88305 TISSUE EXAM BY PATHOLOGIST: CPT

## 2024-08-25 PROCEDURE — 0TBB8ZZ EXCISION OF BLADDER, VIA NATURAL OR ARTIFICIAL OPENING ENDOSCOPIC: ICD-10-PCS | Performed by: UROLOGY

## 2024-08-25 PROCEDURE — 160028 HCHG SURGERY MINUTES - 1ST 30 MINS LEVEL 3: Performed by: UROLOGY

## 2024-08-25 PROCEDURE — C2617 STENT, NON-COR, TEM W/O DEL: HCPCS | Performed by: UROLOGY

## 2024-08-25 PROCEDURE — 85027 COMPLETE CBC AUTOMATED: CPT

## 2024-08-25 PROCEDURE — 700102 HCHG RX REV CODE 250 W/ 637 OVERRIDE(OP): Performed by: INTERNAL MEDICINE

## 2024-08-25 PROCEDURE — 160036 HCHG PACU - EA ADDL 30 MINS PHASE I: Performed by: UROLOGY

## 2024-08-25 PROCEDURE — 700111 HCHG RX REV CODE 636 W/ 250 OVERRIDE (IP): Mod: JG | Performed by: ANESTHESIOLOGY

## 2024-08-25 PROCEDURE — C1769 GUIDE WIRE: HCPCS | Performed by: UROLOGY

## 2024-08-25 PROCEDURE — 700111 HCHG RX REV CODE 636 W/ 250 OVERRIDE (IP): Mod: JZ | Performed by: ANESTHESIOLOGY

## 2024-08-25 PROCEDURE — A9270 NON-COVERED ITEM OR SERVICE: HCPCS | Mod: JZ

## 2024-08-25 PROCEDURE — 700105 HCHG RX REV CODE 258

## 2024-08-25 PROCEDURE — 700117 HCHG RX CONTRAST REV CODE 255: Performed by: UROLOGY

## 2024-08-25 PROCEDURE — 74176 CT ABD & PELVIS W/O CONTRAST: CPT

## 2024-08-25 PROCEDURE — 160039 HCHG SURGERY MINUTES - EA ADDL 1 MIN LEVEL 3: Performed by: UROLOGY

## 2024-08-25 PROCEDURE — 80048 BASIC METABOLIC PNL TOTAL CA: CPT

## 2024-08-25 PROCEDURE — 160048 HCHG OR STATISTICAL LEVEL 1-5: Performed by: UROLOGY

## 2024-08-25 PROCEDURE — A9270 NON-COVERED ITEM OR SERVICE: HCPCS | Performed by: INTERNAL MEDICINE

## 2024-08-25 PROCEDURE — 36415 COLL VENOUS BLD VENIPUNCTURE: CPT

## 2024-08-25 PROCEDURE — 0T768DZ DILATION OF RIGHT URETER WITH INTRALUMINAL DEVICE, VIA NATURAL OR ARTIFICIAL OPENING ENDOSCOPIC: ICD-10-PCS | Performed by: UROLOGY

## 2024-08-25 PROCEDURE — 700102 HCHG RX REV CODE 250 W/ 637 OVERRIDE(OP): Mod: JZ

## 2024-08-25 PROCEDURE — A9270 NON-COVERED ITEM OR SERVICE: HCPCS

## 2024-08-25 PROCEDURE — 700102 HCHG RX REV CODE 250 W/ 637 OVERRIDE(OP)

## 2024-08-25 PROCEDURE — 160009 HCHG ANES TIME/MIN: Performed by: UROLOGY

## 2024-08-25 PROCEDURE — 700111 HCHG RX REV CODE 636 W/ 250 OVERRIDE (IP): Performed by: INTERNAL MEDICINE

## 2024-08-25 DEVICE — STENT UROLOGICAL POLARIS 6X24 ULTRA: Type: IMPLANTABLE DEVICE | Site: URETER | Status: FUNCTIONAL

## 2024-08-25 RX ORDER — MIDAZOLAM HYDROCHLORIDE 1 MG/ML
1 INJECTION INTRAMUSCULAR; INTRAVENOUS
Status: DISCONTINUED | OUTPATIENT
Start: 2024-08-25 | End: 2024-08-25 | Stop reason: HOSPADM

## 2024-08-25 RX ORDER — DIPHENHYDRAMINE HYDROCHLORIDE 50 MG/ML
INJECTION INTRAMUSCULAR; INTRAVENOUS PRN
Status: DISCONTINUED | OUTPATIENT
Start: 2024-08-25 | End: 2024-08-25 | Stop reason: SURG

## 2024-08-25 RX ORDER — OXYCODONE HCL 5 MG/5 ML
5 SOLUTION, ORAL ORAL
Status: DISCONTINUED | OUTPATIENT
Start: 2024-08-25 | End: 2024-08-25 | Stop reason: HOSPADM

## 2024-08-25 RX ORDER — ONDANSETRON 2 MG/ML
INJECTION INTRAMUSCULAR; INTRAVENOUS PRN
Status: DISCONTINUED | OUTPATIENT
Start: 2024-08-25 | End: 2024-08-25 | Stop reason: SURG

## 2024-08-25 RX ORDER — HYDROMORPHONE HYDROCHLORIDE 1 MG/ML
0.4 INJECTION, SOLUTION INTRAMUSCULAR; INTRAVENOUS; SUBCUTANEOUS
Status: DISCONTINUED | OUTPATIENT
Start: 2024-08-25 | End: 2024-08-25 | Stop reason: HOSPADM

## 2024-08-25 RX ORDER — MEPERIDINE HYDROCHLORIDE 25 MG/ML
12.5 INJECTION INTRAMUSCULAR; INTRAVENOUS; SUBCUTANEOUS
Status: DISCONTINUED | OUTPATIENT
Start: 2024-08-25 | End: 2024-08-25 | Stop reason: HOSPADM

## 2024-08-25 RX ORDER — HALOPERIDOL 5 MG/ML
1 INJECTION INTRAMUSCULAR
Status: DISCONTINUED | OUTPATIENT
Start: 2024-08-25 | End: 2024-08-25 | Stop reason: HOSPADM

## 2024-08-25 RX ORDER — ONDANSETRON 2 MG/ML
4 INJECTION INTRAMUSCULAR; INTRAVENOUS
Status: DISCONTINUED | OUTPATIENT
Start: 2024-08-25 | End: 2024-08-25 | Stop reason: HOSPADM

## 2024-08-25 RX ORDER — SODIUM CHLORIDE, SODIUM LACTATE, POTASSIUM CHLORIDE, CALCIUM CHLORIDE 600; 310; 30; 20 MG/100ML; MG/100ML; MG/100ML; MG/100ML
INJECTION, SOLUTION INTRAVENOUS CONTINUOUS
Status: DISCONTINUED | OUTPATIENT
Start: 2024-08-25 | End: 2024-08-25 | Stop reason: HOSPADM

## 2024-08-25 RX ORDER — SODIUM CHLORIDE, SODIUM LACTATE, POTASSIUM CHLORIDE, CALCIUM CHLORIDE 600; 310; 30; 20 MG/100ML; MG/100ML; MG/100ML; MG/100ML
INJECTION, SOLUTION INTRAVENOUS
Status: DISCONTINUED | OUTPATIENT
Start: 2024-08-25 | End: 2024-08-25 | Stop reason: SURG

## 2024-08-25 RX ORDER — POTASSIUM CHLORIDE 1500 MG/1
20 TABLET, EXTENDED RELEASE ORAL ONCE
Status: COMPLETED | OUTPATIENT
Start: 2024-08-25 | End: 2024-08-25

## 2024-08-25 RX ORDER — POTASSIUM CHLORIDE 1500 MG/1
40 TABLET, EXTENDED RELEASE ORAL ONCE
Status: COMPLETED | OUTPATIENT
Start: 2024-08-25 | End: 2024-08-25

## 2024-08-25 RX ORDER — DIPHENHYDRAMINE HYDROCHLORIDE 50 MG/ML
12.5 INJECTION INTRAMUSCULAR; INTRAVENOUS
Status: DISCONTINUED | OUTPATIENT
Start: 2024-08-25 | End: 2024-08-25 | Stop reason: HOSPADM

## 2024-08-25 RX ORDER — ALBUTEROL SULFATE 5 MG/ML
2.5 SOLUTION RESPIRATORY (INHALATION)
Status: DISCONTINUED | OUTPATIENT
Start: 2024-08-25 | End: 2024-08-25 | Stop reason: HOSPADM

## 2024-08-25 RX ORDER — METOPROLOL TARTRATE 1 MG/ML
1 INJECTION, SOLUTION INTRAVENOUS
Status: DISCONTINUED | OUTPATIENT
Start: 2024-08-25 | End: 2024-08-25 | Stop reason: HOSPADM

## 2024-08-25 RX ORDER — MIDAZOLAM HYDROCHLORIDE 1 MG/ML
INJECTION INTRAMUSCULAR; INTRAVENOUS PRN
Status: DISCONTINUED | OUTPATIENT
Start: 2024-08-25 | End: 2024-08-25 | Stop reason: SURG

## 2024-08-25 RX ORDER — LABETALOL HYDROCHLORIDE 5 MG/ML
5 INJECTION, SOLUTION INTRAVENOUS
Status: DISCONTINUED | OUTPATIENT
Start: 2024-08-25 | End: 2024-08-25 | Stop reason: HOSPADM

## 2024-08-25 RX ORDER — HYDROMORPHONE HYDROCHLORIDE 1 MG/ML
0.2 INJECTION, SOLUTION INTRAMUSCULAR; INTRAVENOUS; SUBCUTANEOUS
Status: DISCONTINUED | OUTPATIENT
Start: 2024-08-25 | End: 2024-08-25 | Stop reason: HOSPADM

## 2024-08-25 RX ORDER — MIDODRINE HYDROCHLORIDE 5 MG/1
5 TABLET ORAL EVERY 8 HOURS
Status: DISCONTINUED | OUTPATIENT
Start: 2024-08-25 | End: 2024-08-28

## 2024-08-25 RX ORDER — OXYCODONE HCL 5 MG/5 ML
10 SOLUTION, ORAL ORAL
Status: DISCONTINUED | OUTPATIENT
Start: 2024-08-25 | End: 2024-08-25 | Stop reason: HOSPADM

## 2024-08-25 RX ORDER — EPHEDRINE SULFATE 50 MG/ML
5 INJECTION, SOLUTION INTRAVENOUS
Status: DISCONTINUED | OUTPATIENT
Start: 2024-08-25 | End: 2024-08-25 | Stop reason: HOSPADM

## 2024-08-25 RX ORDER — HYDROMORPHONE HYDROCHLORIDE 1 MG/ML
0.1 INJECTION, SOLUTION INTRAMUSCULAR; INTRAVENOUS; SUBCUTANEOUS
Status: DISCONTINUED | OUTPATIENT
Start: 2024-08-25 | End: 2024-08-25 | Stop reason: HOSPADM

## 2024-08-25 RX ADMIN — PROPOFOL 70 MG: 10 INJECTION, EMULSION INTRAVENOUS at 10:22

## 2024-08-25 RX ADMIN — DIPHENHYDRAMINE HYDROCHLORIDE 10 MG: 50 INJECTION, SOLUTION INTRAMUSCULAR; INTRAVENOUS at 10:39

## 2024-08-25 RX ADMIN — FENTANYL CITRATE 50 MCG: 50 INJECTION, SOLUTION INTRAMUSCULAR; INTRAVENOUS at 11:08

## 2024-08-25 RX ADMIN — MEROPENEM 500 MG: 500 INJECTION, POWDER, FOR SOLUTION INTRAVENOUS at 20:09

## 2024-08-25 RX ADMIN — FENTANYL CITRATE 50 MCG: 50 INJECTION, SOLUTION INTRAMUSCULAR; INTRAVENOUS at 10:41

## 2024-08-25 RX ADMIN — MEROPENEM 500 MG: 500 INJECTION, POWDER, FOR SOLUTION INTRAVENOUS at 04:18

## 2024-08-25 RX ADMIN — FENTANYL CITRATE 50 MCG: 50 INJECTION, SOLUTION INTRAMUSCULAR; INTRAVENOUS at 10:31

## 2024-08-25 RX ADMIN — FENTANYL CITRATE 50 MCG: 50 INJECTION, SOLUTION INTRAMUSCULAR; INTRAVENOUS at 11:21

## 2024-08-25 RX ADMIN — OMEPRAZOLE 20 MG: 20 CAPSULE, DELAYED RELEASE ORAL at 04:17

## 2024-08-25 RX ADMIN — MIDODRINE HYDROCHLORIDE 5 MG: 5 TABLET ORAL at 22:27

## 2024-08-25 RX ADMIN — ATORVASTATIN CALCIUM 40 MG: 40 TABLET, FILM COATED ORAL at 20:39

## 2024-08-25 RX ADMIN — MIDAZOLAM HYDROCHLORIDE 2 MG: 2 INJECTION, SOLUTION INTRAMUSCULAR; INTRAVENOUS at 10:18

## 2024-08-25 RX ADMIN — MEROPENEM 500 MG: 500 INJECTION, POWDER, FOR SOLUTION INTRAVENOUS at 10:47

## 2024-08-25 RX ADMIN — LABETALOL HYDROCHLORIDE 5 MG: 5 INJECTION, SOLUTION INTRAVENOUS at 12:07

## 2024-08-25 RX ADMIN — MIRTAZAPINE 15 MG: 15 TABLET, FILM COATED ORAL at 20:39

## 2024-08-25 RX ADMIN — POTASSIUM CHLORIDE 20 MEQ: 1500 TABLET, EXTENDED RELEASE ORAL at 06:06

## 2024-08-25 RX ADMIN — MIDODRINE HYDROCHLORIDE 5 MG: 5 TABLET ORAL at 16:51

## 2024-08-25 RX ADMIN — NYSTATIN: 100000 POWDER TOPICAL at 06:00

## 2024-08-25 RX ADMIN — SODIUM CHLORIDE, POTASSIUM CHLORIDE, SODIUM LACTATE AND CALCIUM CHLORIDE: 600; 310; 30; 20 INJECTION, SOLUTION INTRAVENOUS at 10:15

## 2024-08-25 RX ADMIN — DULOXETINE HYDROCHLORIDE 60 MG: 30 CAPSULE, DELAYED RELEASE ORAL at 16:54

## 2024-08-25 RX ADMIN — LABETALOL HYDROCHLORIDE 5 MG: 5 INJECTION, SOLUTION INTRAVENOUS at 11:57

## 2024-08-25 RX ADMIN — SODIUM CHLORIDE: 9 INJECTION, SOLUTION INTRAVENOUS at 06:05

## 2024-08-25 RX ADMIN — MIDODRINE HYDROCHLORIDE 5 MG: 5 TABLET ORAL at 07:36

## 2024-08-25 RX ADMIN — PREGABALIN 150 MG: 150 CAPSULE ORAL at 04:17

## 2024-08-25 RX ADMIN — DULOXETINE HYDROCHLORIDE 60 MG: 30 CAPSULE, DELAYED RELEASE ORAL at 04:17

## 2024-08-25 RX ADMIN — NYSTATIN: 100000 POWDER TOPICAL at 16:59

## 2024-08-25 RX ADMIN — POTASSIUM CHLORIDE 40 MEQ: 1500 TABLET, EXTENDED RELEASE ORAL at 15:21

## 2024-08-25 RX ADMIN — ONDANSETRON 4 MG: 2 INJECTION INTRAMUSCULAR; INTRAVENOUS at 10:36

## 2024-08-25 RX ADMIN — ACETAMINOPHEN 650 MG: 325 TABLET ORAL at 15:21

## 2024-08-25 ASSESSMENT — ENCOUNTER SYMPTOMS
EYES NEGATIVE: 1
CHILLS: 0
VOMITING: 0
HEADACHES: 0
FLANK PAIN: 1
WEIGHT LOSS: 0
DIZZINESS: 0
RESPIRATORY NEGATIVE: 1
NAUSEA: 0
FOCAL WEAKNESS: 0
MEMORY LOSS: 0
PALPITATIONS: 0
NERVOUS/ANXIOUS: 0
PSYCHIATRIC NEGATIVE: 1
SHORTNESS OF BREATH: 0
MYALGIAS: 1
DEPRESSION: 0
BACK PAIN: 0
BRUISES/BLEEDS EASILY: 0
FEVER: 0
TINGLING: 0
CARDIOVASCULAR NEGATIVE: 1
GASTROINTESTINAL NEGATIVE: 1
TREMORS: 0
ABDOMINAL PAIN: 0
DIAPHORESIS: 0
WEAKNESS: 1
COUGH: 0

## 2024-08-25 ASSESSMENT — PAIN DESCRIPTION - PAIN TYPE
TYPE: ACUTE PAIN
TYPE: ACUTE PAIN

## 2024-08-25 NOTE — PROGRESS NOTES
[facility] Medicine Daily Progress Note    Date of Service  8/25/2024    Chief Complaint  Nydia Finch is a 64 y.o. female admitted 8/17/2024 with a dislodged nephrostomy tube    Hospital Course  Nydia Finch is a 64 y.o. female who presented to [facility] on 8/17/2024 with dislodged nephrostomy tube.  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  She was recently admitted here from  7/25-08/01 for ESBL E. coli infection of urine.  A right sided nephrostomy tube replaced on 7/26.  ID consulted and recommended meropenem until 8/9 which patient has now completed.  Patient will ultimately require a nephrectomy as outpatient procedure.  In the meantime patient will need to have her nephrostomy tube replaced.     Interval Problem Update  8/19 Axox3, doing well, chronic expressive aphasia stable, doing well with pascal, denies pain, exam and vitals stable. I discussed plan with IR. They attempted to place a nephrostomy tube today and unable, they recommend repeat US in 2 days and if hydronephrosis at that time they will re attempt    8/20 axox 3, aphasia, Right flank ttp, min erythema and ttp  NT out  D/w IR    8/21 ANO x 3, per RN, patient able to urinate, PVR of less than 300  Minimal right flank pain  Ultrasound consistent with severe right hydronephrosis  Discussed with urology and IR, for nephrostomy tube replacement  N.p.o. after midnight  8/22 status post aborted nephrostomy tube placement, with tachycardia and hypotension, now starting on septic protocol  Blood culture and urine culture obtained  History of ESBL, started meropenem, urology updated, states patient will need stent placement  May need transfer to intensive care unit if condition declines, lactic acid of 4.9, started on septic protocol including IV fluid bolus  Patient is at baseline, awake and alert  8/23: Patient seen and examined, transferred overnight to telemetry. For hypotension and  sepsis.  Started on meropenem. Her blood culture now growing gram negative rods and she has h/o ESBL. Infection dise has been consulted urology also following and case discussed no ureteral stent today , she has failed nephrostomy tube   Patient also hypotensive this AM, si IV fluid bolus was given   Also started on midodrine   Close monitor on telemetry for decompensation   8/24: Patient seen and examined, her hypotension has improved, cont on IV abx for her bacteremia, now on meropenem as per ID rec.   Urology following in regards to ureteral stent vs nephrectomy   8/25: Patient seen and examined, afebrile, on Meropenem as per urology rec.   Had ureteral stent done today, CT scan ordered by urology to evaluate the stent position   Will keep NPO till CT is done  Appreciate urology rec.   I have discussed this patient's plan of care and discharge plan at IDT rounds today with Case Management, Nursing, Nursing leadership, and other members of the IDT team.    Consultants/Specialty  IR   Urology   Code Status  Full Code    Disposition  The patient is not medically cleared for discharge to home or a post-acute facility.      I have placed the appropriate orders for post-discharge needs.    Review of Systems  Review of Systems   Constitutional:  Positive for malaise/fatigue. Negative for chills, diaphoresis, fever and weight loss.   HENT: Negative.     Eyes: Negative.    Respiratory: Negative.  Negative for cough and shortness of breath.    Cardiovascular: Negative.  Negative for chest pain, palpitations and leg swelling.   Gastrointestinal: Negative.  Negative for abdominal pain, nausea and vomiting.   Genitourinary:  Positive for flank pain. Negative for dysuria.   Musculoskeletal:  Positive for myalgias. Negative for back pain and joint pain.   Skin: Negative.    Neurological:  Positive for weakness. Negative for dizziness, tingling, tremors, focal weakness and headaches.   Endo/Heme/Allergies: Negative.  Does not  bruise/bleed easily.   Psychiatric/Behavioral: Negative.  Negative for depression and memory loss. The patient is not nervous/anxious.    All other systems reviewed and are negative.       Physical Exam  Temp:  [36.4 °C (97.5 °F)-36.8 °C (98.3 °F)] 36.7 °C (98.1 °F)  Pulse:  [] 101  Resp:  [18-28] 18  BP: (107-176)/() 123/82  SpO2:  [94 %-99 %] 97 %    Physical Exam  Vitals and nursing note reviewed. Exam conducted with a chaperone present.   Constitutional:       General: She is not in acute distress.     Appearance: Normal appearance. She is ill-appearing. She is not diaphoretic.   HENT:      Head: Normocephalic.      Nose: Nose normal.      Mouth/Throat:      Mouth: Mucous membranes are moist.   Eyes:      Extraocular Movements: Extraocular movements intact.      Pupils: Pupils are equal, round, and reactive to light.   Cardiovascular:      Rate and Rhythm: Normal rate and regular rhythm.      Pulses: Normal pulses.      Heart sounds: Normal heart sounds.   Pulmonary:      Effort: Pulmonary effort is normal.      Breath sounds: Normal breath sounds.   Abdominal:      General: Abdomen is flat. Bowel sounds are normal. There is no distension.      Palpations: Abdomen is soft.      Tenderness: There is no abdominal tenderness. There is right CVA tenderness. There is no left CVA tenderness.   Genitourinary:     Comments: R sided nephrostomy tube site with no d/c    Olmedo in place  Musculoskeletal:         General: No swelling or tenderness. Normal range of motion.   Skin:     General: Skin is warm and dry.      Capillary Refill: Capillary refill takes less than 2 seconds.      Coloration: Skin is not pale.      Findings: Erythema present.   Neurological:      General: No focal deficit present.      Mental Status: She is alert and oriented to person, place, and time.      Cranial Nerves: No cranial nerve deficit.      Sensory: No sensory deficit.      Motor: Weakness (chronic R sided) present.       Coordination: Coordination normal.   Psychiatric:         Mood and Affect: Mood normal.         Behavior: Behavior normal.         Fluids    Intake/Output Summary (Last 24 hours) at 8/25/2024 1331  Last data filed at 8/25/2024 1135  Gross per 24 hour   Intake 1489.24 ml   Output 1130 ml   Net 359.24 ml       Laboratory  Recent Labs     08/23/24  0251 08/23/24  0815 08/24/24  0250 08/25/24  0458   WBC 20.6*  --  12.2* 9.1   RBC 2.95*  --  3.10* 2.94*   HEMOGLOBIN 8.3* 9.5* 8.8* 8.3*   HEMATOCRIT 26.2* 30.2* 26.7* 25.1*   MCV 88.8  --  86.1 85.4   MCH 28.1  --  28.4 28.2   MCHC 31.7*  --  33.0 33.1   RDW 52.1*  --  50.4* 50.0   PLATELETCT 200  --  214 223   MPV 11.5  --  11.6 11.3       Recent Labs     08/23/24  0151 08/24/24  0250 08/25/24  0458   SODIUM 138 140 140   POTASSIUM 3.5* 3.3* 3.4*   CHLORIDE 112 116* 116*   CO2 14* 15* 16*   GLUCOSE 95 79 71   BUN 18 15 11   CREATININE 0.82 0.65 0.62   CALCIUM 7.8* 8.0* 7.9*     Recent Labs     08/22/24  1351   INR 1.26*               Imaging  IR-EXCHANGE PERQ NEPH CATH (ALL RADIOLOGY) RIGHT   Final Result      1.  Unsuccessful image guided RIGHT nephrostomy tube placement. No significant hydronephrosis seen on ultrasound.   2.  If additional attempts are indicated, recommend preprocedure CT and possible CT for targeting.      US-RENAL   Final Result      Severe RIGHT hydronephrosis likely probably chronic with overlying cortical thinning      IR-PERQ NEPH CATH NEW ACCESS (ALL RADIOLOGY) RIGHT   Final Result            1.  Mild pelviectasis of the right kidney without evidence of significant hydronephrosis.      2.  Aborted right nephrostomy tube placement. Recommend follow-up ultrasound of the right kidney in several days to assess if right-sided hydronephrosis recurs, and if right-sided hydronephrosis develops right nephrostomy tube placement can be    reattempted.      DX-CHEST-LIMITED (1 VIEW)   Final Result      Right sided central venous catheter tip projects at  cavoatrial junction. No pneumothorax.      No acute cardiopulmonary abnormality.      Hiatal hernia.      DX-PORTABLE FLUOROSCOPY < 1 HOUR Reason For Exam: Main OR    (Results Pending)   ZO-[id number] VIEW    (Results Pending)   CT-ABDOMEN-PELVIS W/O    (Results Pending)        Assessment/Plan  * Nephrostomy tube displaced (HCC)- (present on admission)  Assessment & Plan  8/19 Recently had nephrostomy tube placed in January which was replaced in July  History of ESBL recently finished course of Merrem on 8/9  Patient is to eventually get an outpatient nephrectomy but temporarily has nephrostomy tube in place  IR attempted to replace today - unsuccessful, see above - they recommend repeat US in 2 days, if hydronephrosis at that time they will re attempt  Following renal function  Bmp in am     8/20 d/w IR, f/u renal u/s eval for recurrent hydronephr  - pascal removed, bladder scan protocol  - urology Dr. Huang consulted, appreciate input  Will update family  - given xarelto for dvt prophy, now on hold    8/21 ultrasound consistent with severe right-sided hydronephrosis  Pascal catheter removed, as per RN, patient able to have small amounts of urination, PVR of less than 300  Will encourage urination  May need replacement of Pascal catheter as needed for known history of urinary retention  N.p.o. after midnight for IR evaluation for replacement of right nephrostomy tube  Discussed with urology, aware of patient's wishes for nephrectomy ASAP  Per urology concerned with patient's deconditioning and hypotensive on August 20  Patient's BP appears to be controlled at this time      Patient had failed nephrostomy tube and urology is planing for possibel ureteral stent once patient more stable as she was hypotensive yesterday     Dermatitis  Assessment & Plan  8/20 add nystatin powder    Urinary retention  Assessment & Plan  See above  Bmp in am     Anemia- (present on admission)  Assessment & Plan  Chronic   At baseline    Follow intermittently     Right hemiplegia (HCC)- (present on admission)  Assessment & Plan  From prior stroke    History of stroke- (present on admission)  Assessment & Plan  Residual right-sided hemiplegia and dysarthria  Supportive care  At baseline    Sepsis without acute organ dysfunction (HCC)- (present on admission)  Assessment & Plan  SIRS criteria identified on my evaluation include:  Hypothermia, with temperature less than 96.8 deg F and Tachycardia, with heart rate greater than 90 BPM  SIRS is non-infectious, the patient does not have sepsis    8/22 status post attempts at nephrostomy tube placement, aborted, per IR, not enough fluid for placement  Urology aware, recommending stent placement when stable  Now with associated tachycardia and hypotension, systolic in the 90s, septic protocol initiated  History of ESBL in the past  Started on meropenem  Septic protocol, IV fluid hydration  Will monitor closely  CBC, BMP and lactic acid  ID consulted due to history of ESBL, may need transfer to [facility] for pressor support, will continue to monitor  Patient is in a critical condition, family updated, spoke with patient's , Abdoulaye, updated on condition  Start fluid bolus  Lactic acid of 4.9  Repeat urine and blood culture     Blood culture growing gram negative rods, ID consulted,   On meropenem        Greater than 51 minutes spent prepping to see patient (e.g. review of tests) obtaining and/or reviewing separately obtained history. Performing a medically appropriate examination and/ evaluation.  Counseling and educating the patient/family/caregiver.  Ordering medications, tests, or procedures.  Referring and communicating with other health care professionals.  Documenting clinical information in EPIC.  Independently interpreting results and communicating results to patient/family/caregiver.  Care coordination.      VTE prophylaxis: xarelto    I have performed a physical exam and reviewed and updated ROS  and Plan today (8/25/2024). In review of yesterday's note (8/24/2024), there are no changes except as documented above.

## 2024-08-25 NOTE — OR NURSING
PACU note- Respiations easy.  Olmedo in place to gravity drainage, urine is clear dark pink Hawaiian punch colored.  She denies pain and nausea.

## 2024-08-25 NOTE — INTERVAL H&P NOTE
Due to IR inability to place perc NT, plan for cystoscopy and right stent placement.     I discussed the risks with the patient in preop and she expressed understanding's of these risks.  She had an opportunity to ask any questions and signed consent.

## 2024-08-25 NOTE — ANESTHESIA TIME REPORT
Anesthesia Start and Stop Event Times       Date Time Event    8/25/2024 1015 Anesthesia Start     1136 Anesthesia Stop          Responsible Staff  08/25/24      Name Role Begin End    Maurilio Mckeon M.D. Anesth 1015 1136          Overtime Reason:  no overtime (within assigned shift)    Comments:

## 2024-08-25 NOTE — ANESTHESIA PROCEDURE NOTES
Airway    Date/Time: 8/25/2024 10:24 AM    Performed by: Maurilio Mckeon M.D.  Authorized by: Maurilio Mckeon M.D.    Location:  OR  Urgency:  Elective  Indications for Airway Management:  Anesthesia      Spontaneous Ventilation: absent    Sedation Level:  Deep  Preoxygenated: Yes    Mask Difficulty Assessment:  0 - not attempted  Final Airway Type:  Supraglottic airway  Final Supraglottic Airway:  Standard LMA    SGA Size:  4  Number of Attempts at Approach:  1

## 2024-08-25 NOTE — PROGRESS NOTES
Patient taken with transport to pre-op on tele box.. Monitors notified. Patient taken off the floor.

## 2024-08-25 NOTE — ANESTHESIA PREPROCEDURE EVALUATION
Case: [id number] Date/Time: 08/25/24 0923    Procedure: CYSTOSCOPY, WITH URETERAL STENT INSERTION (Right: Ureter)    Anesthesia type: General    Location: [facility] OR  / SURGERY [facility]    Surgeons: Ed Rolbes M.D.            Relevant Problems   PULMONARY   (positive) COPD (chronic obstructive pulmonary disease) (HCC)         (positive) Hydronephrosis   (positive) Hydronephrosis of right kidney   (positive) Hydronephrosis with urinary obstruction due to renal calculus   (positive) Obstructive pyelonephritis   (positive) Renal and perinephric abscess       Physical Exam    Airway   Mallampati: II  TM distance: >3 FB  Neck ROM: full       Cardiovascular - normal exam  Rhythm: regular  Rate: normal  (-) murmur     Dental - normal exam           Pulmonary - normal exam  Breath sounds clear to auscultation     Abdominal    Neurological - normal exam                   Anesthesia Plan    ASA 3   ASA physical status 3 criteria: CVA or TIA - history (> 3 months)    Plan - general       Airway plan will be LMA          Induction: intravenous    Postoperative Plan: Postoperative administration of opioids is intended.    Pertinent diagnostic labs and testing reviewed    Informed Consent:    Anesthetic plan and risks discussed with patient.    Use of blood products discussed with: patient whom consented to blood products.

## 2024-08-25 NOTE — CARE PLAN
Problem: Skin Integrity  Goal: Skin integrity is maintained or improved  Description: Target End Date:  Prior to discharge or change in level of care    Document interventions on Skin Risk/Juan David flowsheet groups and corresponding LDA    1.  Assess and monitor skin integrity, appearance and/or temperature  2.  Assess risk factors for impaired skin integrity and/or pressures ulcers  3.  Implement precautions to protect skin integrity in collaboration with interdisciplinary team  4.  Implement pressure ulcer prevention protocol if at risk for skin breakdown  5.  Confirm wound care consult if at risk for skin breakdown  6.  Ensure patient use of pressure relieving devices  (Low air loss bed, waffle overlay, heel protectors, ROHO cushion, etc)  Outcome: Progressing     Problem: Fall Risk  Goal: Patient will remain free from falls  Description: Target End Date:  Prior to discharge or change in level of care    Document interventions on the [facility] Sohail Fall Risk Assessment    1.  Assess for fall risk factors  2.  Implement fall precautions  Outcome: Progressing     Problem: Hemodynamics  Goal: Patient's hemodynamics, fluid balance and neurologic status will be stable or improve  Description: Target End Date:  Prior to discharge or change in level of care    Document on Assessment and I/O flowsheet templates    1.  Monitor vital signs, pulse oximetry and cardiac monitor per provider order and/or policy  2.  Maintain blood pressure per provider order  3.  Hemodynamic monitoring per provider order  4.  Manage IV fluids and IV infusions  5.  Monitor intake and output  6.  Daily weights per unit policy or provider order  7.  Assess peripheral pulses and capillary refill  8.  Assess color and body temperature  9.  Position patient for maximum circulation/cardiac output  10. Monitor for signs/symptoms of excessive bleeding  11. Assess mental status, restlessness and changes in level of consciousness  12. Monitor temperature  and report fever or hypothermia to provider immediately. Consideration of targeted temperature management.  Outcome: Progressing   The patient is Stable - Low risk of patient condition declining or worsening    Shift Goals  Clinical Goals: VSS, safety  Patient Goals: to get better  Family Goals: ELIZABETH    Progress made toward(s) clinical / shift goals:  VSS, safety    Patient is not progressing towards the following goals:

## 2024-08-25 NOTE — PROGRESS NOTES
ID Brief Note     Reviewed chart including vitals and labs and patient appears to be stable and leukocytosis has not resolved.  She going to the OR for stent placement.    --- Continue meropenem for now  --- F/up cultures   --- See prior ID notes for full assessment and plan     Serina Mcwilliams MD

## 2024-08-25 NOTE — OP REPORT
[facility] Operative Report    Pre-operative Diagnosis: 1. Right non-functional kidney  2. Urosepsis  3. Ureteral obstruction   Post-operative Diagnosis: Same as above   Procedure: 1. Cystoscopy  2. Transurethral resection of bladder 2cm  3. Right ureteral stent placement  4. Retrograde pyelogram  5. 22 modifier   Surgeon Ed Robles M.D.   Assistant: None   Anesthesia: LMA, General, MD Mike   Estimated Blood Loss: minimal       Specimens: 1. Right hemitrogone, bladder   Drains: 20F pascal catheter with 10cc in balloon  [id number] ureteral stent    Wound class Infected   Condition and complications Stable, procedure well tolerated without complications   Disposition:  PACU, needs Ct scan to assess placement of ureteral stent, ultimately need nephrectomy after she has recovered from this acute illness   Findings: 1. Cystoscopy findings: Normal urethral anatomy, the left ureteral orifice was in the normal anatomic location and normal caliber.  The right ureteral orifice was completely obliterated but in the normal anatomic location.  Likely from no urine and ureteral stricture.  I was unable to place a wire.  Therefore transurethral resection of the right ureteral orifice was performed.  I repeated this approximately 3 or 4 times to get to a deeper layer where I thought that there was actually a lumen to the ureteral orifice.  2.  After cannulating the right ureteral orifice following resection the wire appeared to follow along the ureter.  Retrograde pyelogram demonstrated near obliterated ureter until a dilated pocket, likely renal pelvis was identified with retrograde pyelogram.  Interestingly the contrast seemed to go into another area more lateral through an upper and lower pole channel.  There was no discernible calyces.  Likely from long-term obstruction and blown out kidney.  It is unclear if the ultimate position of the ureteral stent proximal curl is within the renal pelvis or in the distal ureter  based on the challenging retrograde pyelogram.  I attempted multiple different types of wires and access catheters to be able to guide the wire into the other collection more laterally but this was ultimately not successful.  Adequate curl in the right proximal ureter and/or renal pelvis.  3.  22 modifier: This was extremely challenging to place a ureteral stent given the obliterated ureteral orifice and very challenging anatomy on retrograde pyelogram.  Therefore this required an additional 75 minutes compared to a typical 1 to 2-minute right ureteral stent.  4.  Total area resected estimated 2 cm overlying the right hemitrigone.     Indications for Procedure:  This patient is a 64-year-old female with recurrent infections, admission for urosepsis, known right obstructed kidney that is mostly nonfunctional and was previously managed with nephrostomy tube.  This was removed inadvertently and then interventional radiology was unable to place nephrostomy tube on multiple attempts.  Therefore we discussed attempting to place a right ureteral stent.     Risks discussed, but not limited to, were infection, sepsis, bleeding, bladder injury, need for secondary procedure, possible need for pascal post op, possible admission post operatively, and the cardiovascular and pulmonary complications of anesthesia/surgery including DVT, Mi, PE, and death.      Procedure in Detail:  Patient was explained the risks and benefits of the procedure elects to proceed. In the lithotomy position, she was prepped and draped in the usual sterile fashion, given her dose of meropenem that was scheduled.  A 22-Tajik rigid cystoscope with 30-degree  lens was introduced per urethra sterilely.     See above cystoscopy findings.  I was unable to pass a wire up the right ureteral orifice because it was completely obliterated.  I attempted a Kumpe catheter to help guide the wire and this was unsuccessful.  I therefore transition to bipolar  transurethral resection of bladder.  I resected multiple times into deeper layers of the retroperitoneal ureter.  Eventually this appeared to open the ureter enough to attempt guidewire placement.  I was able to eventually place a sensor tip wire up what appeared to be the ureter.  I then performed retrograde pyelogram starting in the distal ureter and then continued as I advanced the wire into what I thought was the renal pelvis.  Please see above findings.  I attempted multiple different ways to gain access into the more lateral fluid collection including sensor tip wire, zip wire, Kumpe catheter, ureteral access catheter trying to direct the different wires in multiple different directions but this ultimately was not successful.  I therefore aborted these efforts and placed the stent using the rigid cystoscope.  This did demonstrate adequate position in the collection that was believed to be the renal pelvis.  The strings were removed.      I returned to the rigid resectoscope and sent the specimen for final pathology.  I then obtained adequate hemostasis overlying the right ureteral orifice.  20 English Olmedo catheter was then placed.    Final sponge and instrument counts correct x2. The patient tolerated the procedure well.          Ed Robles M.D.   [id number]  [place] [id number]   [phone]

## 2024-08-26 ENCOUNTER — APPOINTMENT (OUTPATIENT)
Dept: RADIOLOGY | Facility: MEDICAL CENTER | Age: 64
DRG: 659 | End: 2024-08-26
Attending: INTERNAL MEDICINE
Payer: MEDICARE

## 2024-08-26 LAB
ANION GAP SERPL CALC-SCNC: 9 MMOL/L (ref 7–16)
BUN SERPL-MCNC: 11 MG/DL (ref 8–22)
CALCIUM SERPL-MCNC: 8.4 MG/DL (ref 8.5–10.5)
CHLORIDE SERPL-SCNC: 112 MMOL/L (ref 96–112)
CO2 SERPL-SCNC: 15 MMOL/L (ref 20–33)
CREAT SERPL-MCNC: 0.74 MG/DL (ref 0.5–1.4)
ERYTHROCYTE [DISTWIDTH] IN BLOOD BY AUTOMATED COUNT: 51.8 FL (ref 35.9–50)
GFR SERPLBLD CREATININE-BSD FMLA CKD-EPI: 90 ML/MIN/1.73 M 2
GLUCOSE SERPL-MCNC: 100 MG/DL (ref 65–99)
HCT VFR BLD AUTO: 32.5 % (ref 37–47)
HGB BLD-MCNC: 10.3 G/DL (ref 12–16)
MAGNESIUM SERPL-MCNC: 1.5 MG/DL (ref 1.5–2.5)
MCH RBC QN AUTO: 27.7 PG (ref 27–33)
MCHC RBC AUTO-ENTMCNC: 31.7 G/DL (ref 32.2–35.5)
MCV RBC AUTO: 87.4 FL (ref 81.4–97.8)
PATHOLOGY CONSULT NOTE: NORMAL
PLATELET # BLD AUTO: 252 K/UL (ref 164–446)
PMV BLD AUTO: 11.4 FL (ref 9–12.9)
POTASSIUM SERPL-SCNC: 3.7 MMOL/L (ref 3.6–5.5)
RBC # BLD AUTO: 3.72 M/UL (ref 4.2–5.4)
SODIUM SERPL-SCNC: 136 MMOL/L (ref 135–145)
WBC # BLD AUTO: 19.1 K/UL (ref 4.8–10.8)

## 2024-08-26 PROCEDURE — 700105 HCHG RX REV CODE 258: Performed by: INTERNAL MEDICINE

## 2024-08-26 PROCEDURE — 99233 SBSQ HOSP IP/OBS HIGH 50: CPT | Performed by: INTERNAL MEDICINE

## 2024-08-26 PROCEDURE — 700111 HCHG RX REV CODE 636 W/ 250 OVERRIDE (IP): Performed by: INTERNAL MEDICINE

## 2024-08-26 PROCEDURE — A9270 NON-COVERED ITEM OR SERVICE: HCPCS | Performed by: INTERNAL MEDICINE

## 2024-08-26 PROCEDURE — 700102 HCHG RX REV CODE 250 W/ 637 OVERRIDE(OP): Performed by: INTERNAL MEDICINE

## 2024-08-26 PROCEDURE — 83735 ASSAY OF MAGNESIUM: CPT

## 2024-08-26 PROCEDURE — 700102 HCHG RX REV CODE 250 W/ 637 OVERRIDE(OP)

## 2024-08-26 PROCEDURE — A9270 NON-COVERED ITEM OR SERVICE: HCPCS

## 2024-08-26 PROCEDURE — 85027 COMPLETE CBC AUTOMATED: CPT

## 2024-08-26 PROCEDURE — 770001 HCHG ROOM/CARE - MED/SURG/GYN PRIV*

## 2024-08-26 PROCEDURE — 80048 BASIC METABOLIC PNL TOTAL CA: CPT

## 2024-08-26 PROCEDURE — 36415 COLL VENOUS BLD VENIPUNCTURE: CPT

## 2024-08-26 RX ORDER — MAGNESIUM SULFATE HEPTAHYDRATE 40 MG/ML
2 INJECTION, SOLUTION INTRAVENOUS ONCE
Status: COMPLETED | OUTPATIENT
Start: 2024-08-26 | End: 2024-08-26

## 2024-08-26 RX ORDER — OXYCODONE HYDROCHLORIDE 5 MG/1
2.5 TABLET ORAL ONCE
Status: COMPLETED | OUTPATIENT
Start: 2024-08-26 | End: 2024-08-26

## 2024-08-26 RX ORDER — METOPROLOL TARTRATE 25 MG/1
12.5 TABLET, FILM COATED ORAL 2 TIMES DAILY
Status: DISCONTINUED | OUTPATIENT
Start: 2024-08-26 | End: 2024-08-28 | Stop reason: HOSPADM

## 2024-08-26 RX ADMIN — MEROPENEM 500 MG: 500 INJECTION, POWDER, FOR SOLUTION INTRAVENOUS at 17:45

## 2024-08-26 RX ADMIN — DULOXETINE HYDROCHLORIDE 60 MG: 30 CAPSULE, DELAYED RELEASE ORAL at 17:45

## 2024-08-26 RX ADMIN — MIDODRINE HYDROCHLORIDE 5 MG: 5 TABLET ORAL at 05:34

## 2024-08-26 RX ADMIN — MEROPENEM 500 MG: 500 INJECTION, POWDER, FOR SOLUTION INTRAVENOUS at 13:30

## 2024-08-26 RX ADMIN — SODIUM CHLORIDE: 9 INJECTION, SOLUTION INTRAVENOUS at 23:38

## 2024-08-26 RX ADMIN — ATORVASTATIN CALCIUM 40 MG: 40 TABLET, FILM COATED ORAL at 21:31

## 2024-08-26 RX ADMIN — MIDODRINE HYDROCHLORIDE 5 MG: 5 TABLET ORAL at 21:31

## 2024-08-26 RX ADMIN — NYSTATIN: 100000 POWDER TOPICAL at 05:38

## 2024-08-26 RX ADMIN — MAGNESIUM SULFATE HEPTAHYDRATE 2 G: 2 INJECTION, SOLUTION INTRAVENOUS at 14:17

## 2024-08-26 RX ADMIN — ACETAMINOPHEN 650 MG: 325 TABLET ORAL at 15:35

## 2024-08-26 RX ADMIN — OMEPRAZOLE 20 MG: 20 CAPSULE, DELAYED RELEASE ORAL at 05:34

## 2024-08-26 RX ADMIN — SENNOSIDES AND DOCUSATE SODIUM 2 TABLET: 50; 8.6 TABLET ORAL at 17:47

## 2024-08-26 RX ADMIN — MEROPENEM 500 MG: 500 INJECTION, POWDER, FOR SOLUTION INTRAVENOUS at 01:10

## 2024-08-26 RX ADMIN — DULOXETINE HYDROCHLORIDE 60 MG: 30 CAPSULE, DELAYED RELEASE ORAL at 05:34

## 2024-08-26 RX ADMIN — MIRTAZAPINE 15 MG: 15 TABLET, FILM COATED ORAL at 21:31

## 2024-08-26 RX ADMIN — MIDODRINE HYDROCHLORIDE 5 MG: 5 TABLET ORAL at 13:28

## 2024-08-26 RX ADMIN — MEROPENEM 500 MG: 500 INJECTION, POWDER, FOR SOLUTION INTRAVENOUS at 05:34

## 2024-08-26 RX ADMIN — OXYCODONE HYDROCHLORIDE 2.5 MG: 5 TABLET ORAL at 19:36

## 2024-08-26 RX ADMIN — PREGABALIN 150 MG: 150 CAPSULE ORAL at 05:34

## 2024-08-26 RX ADMIN — NYSTATIN: 100000 POWDER TOPICAL at 17:47

## 2024-08-26 ASSESSMENT — ENCOUNTER SYMPTOMS
RESPIRATORY NEGATIVE: 1
VOMITING: 0
EYES NEGATIVE: 1
TREMORS: 0
CHILLS: 0
FOCAL WEAKNESS: 0
NERVOUS/ANXIOUS: 0
WEAKNESS: 1
DIZZINESS: 0
BACK PAIN: 0
CARDIOVASCULAR NEGATIVE: 1
MYALGIAS: 1
NAUSEA: 0
TINGLING: 0
DEPRESSION: 0
GASTROINTESTINAL NEGATIVE: 1
MEMORY LOSS: 0
FLANK PAIN: 1
BRUISES/BLEEDS EASILY: 0
COUGH: 0
PALPITATIONS: 0
DIAPHORESIS: 0
ABDOMINAL PAIN: 0
HEADACHES: 0
WEIGHT LOSS: 0
FEVER: 0
PSYCHIATRIC NEGATIVE: 1
SHORTNESS OF BREATH: 0

## 2024-08-26 ASSESSMENT — PAIN DESCRIPTION - PAIN TYPE
TYPE: ACUTE PAIN

## 2024-08-26 ASSESSMENT — FIBROSIS 4 INDEX: FIB4 SCORE: 1.49

## 2024-08-26 NOTE — PROGRESS NOTES
Order received for midline placement for IV abx. VAT RN at bedside to assess. Pt unable to move RUE, contraindicated to place midline. LUE pt unable to rotate arm externally for assessment, L cephalic vein not large enough for midline catheter. Bedside RN updated. Order cancelled.

## 2024-08-26 NOTE — PROGRESS NOTES
Bedside report received, assumed care of patient. Resting in bed, complained of pain, declined intervention.. A&O x4. Tele monitoring in place. Educated on fall risk, all fall precautions in place. Call light within reach, bed locked and in lowest position, denied other needs at this time.

## 2024-08-26 NOTE — DOCUMENTATION QUERY
"                                                                         Formerly [facility]                                                                       Query Response Note      PATIENT:               ANGELINA ESCOBAR  ACCT #:                  [id number]  MRN:                     [id number]  :                      1960  ADMIT DATE:       2024 5:55 PM  DISCH DATE:          RESPONDING  PROVIDER #:        [id number]           QUERY TEXT:    There's a conflicting documentation with regards to the diagnosis of sepsis. HM notes dated - indicate the patient does not have sepsis; while \"sepsis with fever, leukocytosis and blood cultures positive for E. Coli\" is documented in ID Consult note dated .     Please clarify which diagnosis best describes patient's condition after study.    The patient's Clinical Indicators include:  Patient initially presented on  for dislodged nephrostomy and was admitted to inpatient on . Pt found to be SIRS positive on  with elevated lactic acid and hypotension. Infectious work up revealed ESBL E. Coli bacteremia per ID.     -  Notes: Sepsis without acute organ dysfunction... SIRS is non-infectious, the patient does not have sepsis.     ID:   - (Pt) rapidly developed sepsis with fevers, leukocytosis and blood cultures positive for E. coli.  - DXs: Sepsis, bacteremia, pyelonephritis    CLINICAL FINDINGS  Day 1 of inpatient admission:  - SIRS (3/4): TMax 102.4, HR up to 160, RR up to 27  - Labs: Lactic acid 4.9, 3.6, 1.4, 0.6; procal 2.08, blood culture (+) ESBL E. Coli x 2 bottles   - Lowest documented BP 66/44    Risk Factors: Bacteremia, pyelonephritis, severe R hydronephrosis, dislodged nephrostomy tube   Treatment: Sepsis protocol (Merrem, IVF bolus, lactic acid trend), s/p R ureteral stent placement per , ID consult, labs/imaging    Thank you for your time and attention,  Maura Fernandez, MSN RN CCDS    Note: If you agree with a diagnosis " listed, please remember to include it in your concurrent daily documentation and onto the Discharge Summary.  Options provided:   -- Sepsis with lactic acidosis, present on admission   -- Sepsis with other acute organ dysfunction, present on admission, Please specify sepsis-related organ dysfunction   -- SIRS of non-infectious origin only, sepsis ruled out   -- Other explanation, Please specify other explanation      Query created by: Maura Fernandez on 8/26/2024 1:38 PM    RESPONSE TEXT:    Sepsis with lactic acidosis, present on admission          Electronically signed by:  VANGIE GUNN MD 8/26/2024 3:38 PM

## 2024-08-26 NOTE — CARE PLAN
The patient is Stable - Low risk of patient condition declining or worsening    Shift Goals  Clinical Goals: Monitor vitals and labs, safety  Patient Goals: Comfort  Family Goals: ELIZABETH    Progress made toward(s) clinical / shift goals:    Problem: Knowledge Deficit - Standard  Goal: Patient and family/care givers will demonstrate understanding of plan of care, disease process/condition, diagnostic tests and medications  Outcome: Progressing     Problem: Skin Integrity  Goal: Skin integrity is maintained or improved  Outcome: Progressing     Problem: Fall Risk  Goal: Patient will remain free from falls  Outcome: Progressing     Problem: Pain - Standard  Goal: Alleviation of pain or a reduction in pain to the patient’s comfort goal  Outcome: Progressing     Problem: Urinary - Renal Perfusion  Goal: Ability to achieve and maintain adequate renal perfusion and functioning will improve  Outcome: Progressing       Patient is not progressing towards the following goals:

## 2024-08-26 NOTE — PROGRESS NOTES
[facility] Medicine Daily Progress Note    Date of Service  8/26/2024    Chief Complaint  Nydia Finch is a 64 y.o. female admitted 8/17/2024 with a dislodged nephrostomy tube    Hospital Course  Nydia Finch is a 64 y.o. female who presented to [facility] on 8/17/2024 with dislodged nephrostomy tube.  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  She was recently admitted here from  7/25-08/01 for ESBL E. coli infection of urine.  A right sided nephrostomy tube replaced on 7/26.  ID consulted and recommended meropenem until 8/9 which patient has now completed.  Patient will ultimately require a nephrectomy as outpatient procedure.  In the meantime patient will need to have her nephrostomy tube replaced.     Interval Problem Update  8/19 Axox3, doing well, chronic expressive aphasia stable, doing well with pascal, denies pain, exam and vitals stable. I discussed plan with IR. They attempted to place a nephrostomy tube today and unable, they recommend repeat US in 2 days and if hydronephrosis at that time they will re attempt    8/20 axox 3, aphasia, Right flank ttp, min erythema and ttp  NT out  D/w IR    8/21 ANO x 3, per RN, patient able to urinate, PVR of less than 300  Minimal right flank pain  Ultrasound consistent with severe right hydronephrosis  Discussed with urology and IR, for nephrostomy tube replacement  N.p.o. after midnight  8/22 status post aborted nephrostomy tube placement, with tachycardia and hypotension, now starting on septic protocol  Blood culture and urine culture obtained  History of ESBL, started meropenem, urology updated, states patient will need stent placement  May need transfer to intensive care unit if condition declines, lactic acid of 4.9, started on septic protocol including IV fluid bolus  Patient is at baseline, awake and alert  8/23: Patient seen and examined, transferred overnight to telemetry. For hypotension and  sepsis.  Started on meropenem. Her blood culture now growing gram negative rods and she has h/o ESBL. Infection dise has been consulted urology also following and case discussed no ureteral stent today , she has failed nephrostomy tube   Patient also hypotensive this AM, si IV fluid bolus was given   Also started on midodrine   Close monitor on telemetry for decompensation   8/24: Patient seen and examined, her hypotension has improved, cont on IV abx for her bacteremia, now on meropenem as per ID rec.   Urology following in regards to ureteral stent vs nephrectomy   8/25: Patient seen and examined, afebrile, on Meropenem as per urology rec.   Had ureteral stent done today, CT scan ordered by urology to evaluate the stent position   Will keep NPO till CT is done  Appreciate urology rec.   8/26: Patient seen and examined, afebrile had ureteral stent placed by urology yesterday still with some blood on the pascal.appreciate rec.  Case also discussed with ID cont on meropenem stop date 9/8/24    I have discussed this patient's plan of care and discharge plan at IDT rounds today with Case Management, Nursing, Nursing leadership, and other members of the IDT team.    Consultants/Specialty  IR   Urology   Code Status  Full Code    Disposition  The patient is not medically cleared for discharge to home or a post-acute facility.      I have placed the appropriate orders for post-discharge needs.    Review of Systems  Review of Systems   Constitutional:  Positive for malaise/fatigue. Negative for chills, diaphoresis, fever and weight loss.   HENT: Negative.     Eyes: Negative.    Respiratory: Negative.  Negative for cough and shortness of breath.    Cardiovascular: Negative.  Negative for chest pain, palpitations and leg swelling.   Gastrointestinal: Negative.  Negative for abdominal pain, nausea and vomiting.   Genitourinary:  Positive for flank pain. Negative for dysuria.   Musculoskeletal:  Positive for myalgias. Negative  for back pain and joint pain.   Skin: Negative.    Neurological:  Positive for weakness. Negative for dizziness, tingling, tremors, focal weakness and headaches.   Endo/Heme/Allergies: Negative.  Does not bruise/bleed easily.   Psychiatric/Behavioral: Negative.  Negative for depression and memory loss. The patient is not nervous/anxious.    All other systems reviewed and are negative.       Physical Exam  Temp:  [36.7 °C (98.1 °F)-36.8 °C (98.2 °F)] 36.7 °C (98.1 °F)  Pulse:  [] 115  Resp:  [16-18] 16  BP: (110-131)/(64-82) 124/76  SpO2:  [94 %-97 %] 94 %    Physical Exam  Vitals and nursing note reviewed. Exam conducted with a chaperone present.   Constitutional:       General: She is not in acute distress.     Appearance: Normal appearance. She is ill-appearing. She is not diaphoretic.   HENT:      Head: Normocephalic.      Nose: Nose normal.      Mouth/Throat:      Mouth: Mucous membranes are moist.   Eyes:      Extraocular Movements: Extraocular movements intact.      Pupils: Pupils are equal, round, and reactive to light.   Cardiovascular:      Rate and Rhythm: Normal rate and regular rhythm.      Pulses: Normal pulses.      Heart sounds: Normal heart sounds.   Pulmonary:      Effort: Pulmonary effort is normal.      Breath sounds: Normal breath sounds.   Abdominal:      General: Abdomen is flat. Bowel sounds are normal. There is no distension.      Palpations: Abdomen is soft.      Tenderness: There is no abdominal tenderness. There is right CVA tenderness. There is no left CVA tenderness.   Genitourinary:     Comments: R sided nephrostomy tube site with no d/c    Olmedo in place  Musculoskeletal:         General: No swelling or tenderness. Normal range of motion.   Skin:     General: Skin is warm and dry.      Capillary Refill: Capillary refill takes less than 2 seconds.      Coloration: Skin is not pale.      Findings: Erythema present.   Neurological:      General: No focal deficit present.      Mental  Status: She is alert and oriented to person, place, and time.      Cranial Nerves: No cranial nerve deficit.      Sensory: No sensory deficit.      Motor: Weakness (chronic R sided) present.      Coordination: Coordination normal.   Psychiatric:         Mood and Affect: Mood normal.         Behavior: Behavior normal.         Fluids    Intake/Output Summary (Last 24 hours) at 8/26/2024 1250  Last data filed at 8/26/2024 0938  Gross per 24 hour   Intake 690 ml   Output 1000 ml   Net -310 ml       Laboratory  Recent Labs     08/24/24  0250 08/25/24  0458   WBC 12.2* 9.1   RBC 3.10* 2.94*   HEMOGLOBIN 8.8* 8.3*   HEMATOCRIT 26.7* 25.1*   MCV 86.1 85.4   MCH 28.4 28.2   MCHC 33.0 33.1   RDW 50.4* 50.0   PLATELETCT 214 223   MPV 11.6 11.3       Recent Labs     08/24/24  0250 08/25/24  0458   SODIUM 140 140   POTASSIUM 3.3* 3.4*   CHLORIDE 116* 116*   CO2 15* 16*   GLUCOSE 79 71   BUN 15 11   CREATININE 0.65 0.62   CALCIUM 8.0* 7.9*                     Imaging  CT-ABDOMEN-PELVIS W/O   Final Result         Limited exam due to lack of oral or IV contrast      1. A right double-J ureteral stent is in place, with the proximal loop located in the renal pelvis and the distal loop in the urinary bladder. There is a filling defect in the renal pelvis mixed with contrast, likely representing hemorrhage.      2. There is contrast surrounding the right kidney, likely due to prior procedure.      DX-PORTABLE FLUOROSCOPY < 1 HOUR Reason For Exam: Main OR   Preliminary Result      Portable fluoroscopy utilized for 2 minutes 1 second.         INTERPRETING LOCATION: [address] [place] [address], [id number]      [id number] VIEW   Preliminary Result      Digitized intraoperative radiograph is submitted for review. This examination is not for diagnostic purpose but for guidance during a surgical procedure. Please see the patient's chart for full procedural details.         INTERPRETING LOCATION: [address] [place] [address], [id number]      IR-EXCHANGE PERQ NEPH  CATH (ALL RADIOLOGY) RIGHT   Final Result      1.  Unsuccessful image guided RIGHT nephrostomy tube placement. No significant hydronephrosis seen on ultrasound.   2.  If additional attempts are indicated, recommend preprocedure CT and possible CT for targeting.      US-RENAL   Final Result      Severe RIGHT hydronephrosis likely probably chronic with overlying cortical thinning      IR-PERQ NEPH CATH NEW ACCESS (ALL RADIOLOGY) RIGHT   Final Result            1.  Mild pelviectasis of the right kidney without evidence of significant hydronephrosis.      2.  Aborted right nephrostomy tube placement. Recommend follow-up ultrasound of the right kidney in several days to assess if right-sided hydronephrosis recurs, and if right-sided hydronephrosis develops right nephrostomy tube placement can be    reattempted.      DX-CHEST-LIMITED (1 VIEW)   Final Result      Right sided central venous catheter tip projects at cavoatrial junction. No pneumothorax.      No acute cardiopulmonary abnormality.      Hiatal hernia.           Assessment/Plan  * Nephrostomy tube displaced (HCC)- (present on admission)  Assessment & Plan  8/19 Recently had nephrostomy tube placed in January which was replaced in July  History of ESBL recently finished course of Merrem on 8/9  Patient is to eventually get an outpatient nephrectomy but temporarily has nephrostomy tube in place  IR attempted to replace today - unsuccessful, see above - they recommend repeat US in 2 days, if hydronephrosis at that time they will re attempt  Following renal function  Bmp in am     8/20 d/w IR, f/u renal u/s eval for recurrent hydronephr  - pascal removed, bladder scan protocol  - urology Dr. Huang consulted, appreciate input  Will update family  - given xarelto for dvt prophy, now on hold    8/21 ultrasound consistent with severe right-sided hydronephrosis  Pascal catheter removed, as per RN, patient able to have small amounts of urination, PVR of less than  300  Will encourage urination  May need replacement of Olmedo catheter as needed for known history of urinary retention  N.p.o. after midnight for IR evaluation for replacement of right nephrostomy tube  Discussed with urology, aware of patient's wishes for nephrectomy ASAP  Per urology concerned with patient's deconditioning and hypotensive on August 20  Patient's BP appears to be controlled at this time      Patient had failed nephrostomy tube and urology idid place a ureteral stent on 8/25.   Urology following, case discussed appreciate rec.      Dermatitis  Assessment & Plan  8/20 add nystatin powder    Urinary retention  Assessment & Plan  See above  Bmp in am     Anemia- (present on admission)  Assessment & Plan  Chronic   At baseline   Follow intermittently     Right hemiplegia (HCC)- (present on admission)  Assessment & Plan  From prior stroke    History of stroke- (present on admission)  Assessment & Plan  Residual right-sided hemiplegia and dysarthria  Supportive care  At baseline    Sepsis without acute organ dysfunction (HCC)- (present on admission)  Assessment & Plan  SIRS criteria identified on my evaluation include:  Hypothermia, with temperature less than 96.8 deg F and Tachycardia, with heart rate greater than 90 BPM  SIRS is non-infectious, the patient does not have sepsis    8/22 status post attempts at nephrostomy tube placement, aborted, per IR, not enough fluid for placement  Urology aware, recommending stent placement when stable  Now with associated tachycardia and hypotension, systolic in the 90s, septic protocol initiated  History of ESBL in the past  Started on meropenem  Septic protocol, IV fluid hydration  Will monitor closely  CBC, BMP and lactic acid  ID consulted due to history of ESBL, may need transfer to IMCU for pressor support, will continue to monitor  Patient is in a critical condition, family updated, spoke with patient's , Abdoulaye, updated on condition  Start fluid  bolus  Lactic acid of 4.9  Repeat urine and blood culture     Blood culture growing ESBL case discussed with ID plan is to be on meropenem till 9/8/24         VTE prophylaxis: scd    Greater than 51 minutes spent prepping to see patient (e.g. review of tests) obtaining and/or reviewing separately obtained history. Performing a medically appropriate examination and/ evaluation.  Counseling and educating the patient/family/caregiver.  Ordering medications, tests, or procedures.  Referring and communicating with other health care professionals.  Documenting clinical information in EPIC.  Independently interpreting results and communicating results to patient/family/caregiver.  Care coordination.      I have performed a physical exam and reviewed and updated ROS and Plan today (8/26/2024). In review of yesterday's note (8/25/2024), there are no changes except as documented above.

## 2024-08-26 NOTE — PROGRESS NOTES
Infectious Disease Progress Note    Author: Pauline Pittman M.D. Date & Time of service: 2024  7:52 AM    Chief Complaint:  Follow-up for ESBL E. coli bacteremia    Interval History:   afebrile, patient underwent cystoscopy with transurethral resection of the bladder, right ureteral stent placement yesterday.  Tolerating IV antibiotics without any issues.  Plan of care regarding IV antibiotics discussed with patient in detail    Labs Reviewed and Medications Reviewed.    Review of Systems:  Review of Systems   Constitutional:  Negative for chills and fever.       Hemodynamics:  Temp (24hrs), Av.7 °C (98 °F), Min:36.4 °C (97.5 °F), Max:36.8 °C (98.3 °F)  Temperature: 36.8 °C (98.2 °F), Monitored Temp: 36.5 °C (97.7 °F)  Pulse  Av.9  Min: 63  Max: 160   Blood Pressure: 131/75       Physical Exam:  Physical Exam  Vitals and nursing note reviewed.   HENT:      Mouth/Throat:      Mouth: Mucous membranes are moist.   Eyes:      Pupils: Pupils are equal, round, and reactive to light.   Cardiovascular:      Rate and Rhythm: Normal rate.   Pulmonary:      Effort: Pulmonary effort is normal. No respiratory distress.   Neurological:      Mental Status: She is alert and oriented to person, place, and time.   Psychiatric:         Behavior: Behavior normal.         Meds:    Current Facility-Administered Medications:     midodrine    NS    NS    meropenem    labetalol    nystatin    atorvastatin    DULoxetine    omeprazole    mirtazapine    [Held by provider] tamsulosin    pregabalin    acetaminophen    senna-docusate **AND** polyethylene glycol/lytes    ondansetron    ondansetron    promethazine    promethazine    prochlorperazine    Labs:  Recent Labs     24  0815 24  0250 24  0458   WBC  --  12.2* 9.1   RBC  --  3.10* 2.94*   HEMOGLOBIN 9.5* 8.8* 8.3*   HEMATOCRIT 30.2* 26.7* 25.1*   MCV  --  86.1 85.4   MCH  --  28.4 28.2   RDW  --  50.4* 50.0   PLATELETCT  --  214 223   MPV  --  11.6  11.3     Recent Labs     08/24/24  0250 08/25/24  0458   SODIUM 140 140   POTASSIUM 3.3* 3.4*   CHLORIDE 116* 116*   CO2 15* 16*   GLUCOSE 79 71   BUN 15 11     Recent Labs     08/24/24  0250 08/25/24  0458   CREATININE 0.65 0.62       Imaging:  CT-ABDOMEN-PELVIS W/O    Result Date: 8/25/2024 8/25/2024 2:23 PM HISTORY/REASON FOR EXAM:  evaluate stent placement position. TECHNIQUE/EXAM DESCRIPTION: CT scan of the abdomen and pelvis without contrast. Noncontrast helical scanning was obtained from the diaphragmatic domes through the pubic symphysis. Low dose optimization technique was utilized for this CT exam including automated exposure control and adjustment of the mA and/or kV according to patient size. COMPARISON: 8/17/2024. FINDINGS: Lower Chest: Small bilateral pleural effusions. Patchy bibasilar opacities.. Small hiatal hernia. Liver: Normal. Spleen: Unremarkable. Pancreas: Unremarkable. Gallbladder: The gallbladder has been resected. Biliary: No biliary dilatation.. Adrenal glands: Nonenlarged. Kidneys: There is contrast surrounding the right kidney, likely due to prior procedure. Right double-J ureteral stent in place. The proximal loop is in the renal pelvis. The distal loop in the urinary bladder. There is filling defect in the renal pelvis mixed with contrast. Bowel: Postsurgical change in the stomach. No bowel wall thickening or bowel dilatation. Lymph nodes: No adenopathy. Vasculature: The abdominal aorta is normal in caliber Peritoneum: Unremarkable without ascites. Musculoskeletal: No aggressive osseous lesion. Pelvis: Olmedo catheter in place.. Fat-containing bilateral inguinal hernias.     Limited exam due to lack of oral or IV contrast 1. A right double-J ureteral stent is in place, with the proximal loop located in the renal pelvis and the distal loop in the urinary bladder. There is a filling defect in the renal pelvis mixed with contrast, likely representing hemorrhage. 2. There is contrast  surrounding the right kidney, likely due to prior procedure.    IR-EXCHANGE PERQ NEPH CATH (ALL RADIOLOGY) RIGHT    Result Date: 8/23/2024  HISTORY/REASON FOR EXAM: Right hydronephrosis. Dislodged right nephrostomy tube. TECHNIQUE/EXAM DESCRIPTION: Attempted RIGHT Percutaneous Nephrostomy and Nephrostogram with ultrasound and fluoroscopic guidance. Contrast: 15 mL Omnipaque-300. 60 mL Omnipaque 300 IV. FLUOROSCOPIC IMAGES: 1 fluoroscopic images obtained. FLUOROSCOPY TIME: 1.9 minutes FLUOROSCOPY DOSE(DAP): 1.45 Gy*cm^2 MEDICATIONS: Moderate sedation was provided. Pulse oximetry and continuous cardiac monitoring by the nurse was performed throughout the exam. Intraservice time was 20 minutes. PROCEDURE:     The risks, benefits, goals and objectives and alternatives were discussed. Risks were specified as including but not limited to bleeding, infection, damage to vessels or nerves, pain and discomfort as well as the possibility of incomplete resolution of underlying disease. Issues related to catheter care were discussed. The patient's questions were answered. Informed oral and written consent were obtained. The patient was placed prone on the angiography table. The skin was prepped and draped in the usual sterile manner.  A timeout was performed. Local anesthetic result was achieved with administration of 1% lidocaine. Pelviectasis without significant hydronephrosis was seen on preprocedure ultrasound. Attempts were made to access the renal pelvis, though this was extremely difficult given patient body habitus and lack of hydronephrosis. IV contrast was given without significant opacification of the right renal collecting system. The patient tolerated the procedure well with no evidence of complication. The skin was cleaned and a dressing was applied. FINDINGS: RIGHT urinary collecting system: Pelviectasis without significant hydronephrosis.     1.  Unsuccessful image guided RIGHT nephrostomy tube placement. No  significant hydronephrosis seen on ultrasound. 2.  If additional attempts are indicated, recommend preprocedure CT and possible CT for targeting.    US-RENAL    Result Date: 8/21/2024 8/21/2024 8:37 AM HISTORY/REASON FOR EXAM:  Hydronephrosis TECHNIQUE/EXAM DESCRIPTION: Renal ultrasound. COMPARISON:  Unenhanced CT 8/17/2024; images from nephrostomy tube placement 7/26/2024 FINDINGS: The right kidney measures 9.62 cm. The left kidney measures 9.82 cm. There is apparently severe RIGHT renal cortical thinning with multiloculated cystic change centrally which is probably severe hydronephrosis. There are no abnormal calcifications. The bladder demonstrates no focal wall abnormality.     Severe RIGHT hydronephrosis likely probably chronic with overlying cortical thinning    IR-PERQ NEPH CATH NEW ACCESS (ALL RADIOLOGY) RIGHT    Result Date: 8/19/2024 8/19/2024 2:54 PM HISTORY/REASON FOR EXAM: Right nephrostomy tube has pulled out of right renal collecting system. TECHNIQUE/EXAM DESCRIPTION: Following informed consent the patient was prepped and draped in the usual fashion 10 cc of 1% lidocaine was utilized for local and subcutaneous anesthesia. SEDATION: Moderate sedation was provided. Pulse oximetry and continuous cardiac monitoring by the nurse was performed throughout the exam. Intraservice time was 30 minutes. Fluoroscopy images: 2 fluoroscopic images obtained. Fluoroscopy time: 1.4 minutes Fluoroscopy dose(DAP): 1.29 Gycm2 CONTRAST: 5 mL of Omnipaque 300 were utilized for the procedure. The procedure was performed utilizing MAXIMUM STERILE BARRIER technique including sterile gown, mass, cap, and under sterile gloves following appropriate hand hygiene and/or sterile scrub. The patient's skin site was prepped with 2% chlorhexidine Initial limited ultrasound images of the right kidney demonstrated mild pelviectasis but no significant hydronephrosis. Utilizing fluoroscopic and ultrasonic guidance the right kidney was  accessed several times utilizing an 18 introducer needle. Nonionic contrast was injected, however the nondilated right renal collecting system could not be accessed. The procedure was subsequently terminated. The ultrasound technologist was summoned to the angiographic suite and additional ultrasound images were obtained of the right kidney demonstrating no evidence of hydronephrosis. The patient tolerated procedure well and was sent to the floor in good condition. FINDINGS: There is mild pelviectasis of the right kidney but no evidence of significant hydronephrosis. Several attempts were made to access the right renal collecting system utilizing ultrasonic and fluoroscopic guidance without success.     1.  Mild pelviectasis of the right kidney without evidence of significant hydronephrosis. 2.  Aborted right nephrostomy tube placement. Recommend follow-up ultrasound of the right kidney in several days to assess if right-sided hydronephrosis recurs, and if right-sided hydronephrosis develops right nephrostomy tube placement can be reattempted.    DX-CHEST-LIMITED (1 VIEW)    Result Date: 8/17/2024 8/17/2024 6:51 PM HISTORY/REASON FOR EXAM:  prior CVC placement Central line placement TECHNIQUE/EXAM DESCRIPTION AND NUMBER OF VIEWS: Single portable view of the chest. COMPARISON: 7/31/2024 chest CT FINDINGS: Right IJ central venous catheter tip projects at the cavoatrial junction. Cardiomediastinal silhouette is stable. Thoracic aorta is tortuous with calcified plaque. There is a hiatal hernia. No focal consolidation, pleural effusion, pulmonary edema or pneumothorax. Generalized osteopenia.     Right sided central venous catheter tip projects at cavoatrial junction. No pneumothorax. No acute cardiopulmonary abnormality. Hiatal hernia.    CT-ABDOMEN W/O    Result Date: 8/17/2024 8/17/2024 1:41 PM HISTORY/REASON FOR EXAM:  Right nephrostomy tube accidentally removed eval for abscesses etc. R kidney. TECHNIQUE/EXAM  DESCRIPTION: CT scan of the abdomen without contrast. Noncontrast helical sections were obtained from the diaphragmatic domes through the iliac crests Low dose optimization technique was utilized for this CT exam including automated exposure control and adjustment of the mA and/or kV according to patient size. COMPARISON: 7/25/2024 FINDINGS: Lower Chest: Right basilar pleural parenchymal scarring. Linear atelectasis/scarring in the left lower lobe.. Liver: Normal. Spleen: Unremarkable. Pancreas: Unremarkable. Gallbladder: No calcified stones. Biliary: Nondilated. Adrenal glands: Normal. Kidneys: The right nephrostomy tube terminates in the subcutaneous tissues of the right flank. Mild right hydronephrosis with slightly improved dilatation of the right renal pelvis from prior study. There is chronic uroepithelial thickening. No significant left hydronephrosis.. Bowel: Gastric sleeve surgery with hiatal hernia.. Lymph nodes: No adenopathy. Vasculature: Atherosclerosis. Peritoneum: Unremarkable without ascites. Musculoskeletal: No acute or destructive process.     Right nephrostomy tube pigtail loop terminates in the peripheral subcutaneous tissues of the right flank. Recommend replacement. There is mild right hydronephrosis with chronic urothelial thickening.    CT-CTA CHEST PULMONARY ARTERY W/ RECONS    Result Date: 7/31/2024 7/31/2024 8:00 PM HISTORY/REASON FOR EXAM:  Tachycardia, shortness of breath TECHNIQUE/EXAM DESCRIPTION:  CT angiogram of the chest with contrast. Transaxial MDCT scan of chest post bolus 57 cc Omnipaque 350 IV administration. MIP reconstructed images were created and reviewed. Low dose optimization technique was utilized for this CT exam including automated exposure control and adjustment of the mA and/or kV according to patient size. COMPARISON: None FINDINGS: The visualized portion of the thyroid appear within normal limits.  The trachea and main stem airways are normal in caliber. There  are no pathologically enlarged mediastinal lymph nodes. The heart and pericardium appear within normal limits.  4.1 cm dilatation of the ascending thoracic aorta seen, otherwise the aorta and its main branch vessels are normal in caliber and configuration.  Atherosclerotic changes are seen including atherosclerotic coronary artery calcifications. The pulmonary arteries are well opacified without visualized filling defect. The pulmonary parenchyma demonstrates bibasilar linear densities favoring atelectasis. Baseline right pleural effusion is seen. 5.0 mm right apical pulmonary nodule is seen on image 36. 4.5 mm right lower lobe pulmonary nodule seen on image 107. Limited views of the abdomen demonstrate no acute abnormality. Large hiatal hernia is seen. The bony structures are age appropriate.     1.  No pulmonary embolus appreciated. 2.  Trace layering bilateral pleural effusions 3.  Linear densities in the bilateral lung bases favor changes of atelectasis 4.  4.1 cm ascending thoracic aortic aneurysm, radiographic follow-up and surveillance recommended as clinically appropriate 5.  Large hiatal hernia 6.  Atherosclerosis and atherosclerotic coronary artery disease. 7.  Pulmonary nodules, see nodule follow-up recommendations below. Fleischner Society pulmonary nodule recommendations: Low Risk: No routine follow-up High Risk: Optional CT at 12 months Comments: Use most suspicious nodule as guide to management. Follow-up intervals may vary according to size and risk. Low Risk - Minimal or absent history of smoking and of other known risk factors. High Risk - History of smoking or of other known risk factors. Note: These recommendations do not apply to lung cancer screening, patients with immunosuppression, or patients with known primary cancer. Fleischner Society 2017 Guidelines for Management of Incidentally Detected Pulmonary Nodules in Adults     IR-CVC, SMALL BORE CATHETER, TUNNELED>AGE 5    Result Date:  7/31/2024 7/31/2024 12:02 PM HISTORY/REASON FOR EXAM: Needs long-term IV antibiotics. TECHNIQUE/EXAM DESCRIPTION: Internal Jugular Tunneled Small Bore Catheter Placement, Ultrasound and Fluoroscopic Guidance. PROCEDURE:  Informed consent was obtained. The Right internal jugular vein was selected for the catheter entry site. Vein patency was documented and the recorded image was entered into the patient?s medical record. The procedure was performed using MAXIMAL STERILE BARRIER technique including sterile gown, mask, cap, and donning of sterile gloves following appropriate hand hygiene and/or sterile scrub. Patient skin site was prepped with 2% Chlorhexidine solution. The neck and anterior chest wall were prepped and draped in a sterile fashion using chlorhexidine antiseptic. SEDATION: Moderate sedation was provided. Pulse oximetry and continuous cardiac monitoring by the nurse was performed throughout the exam. Intraservice time was 30 minutes. FLUOROSCOPY IMAGES: 2 fluoroscopic images obtained. FLUOROSCOPY TIME: 0.2 minutes FLUOROSCOPY DOSE(DAP): 0.878 Gy*cm^2 Following local anesthesia with 7 mL 1% lidocaine, the internal jugular vein was punctured under real-time ultrasound guidance and access obtained with single wall micropuncture technique. A 0.018 guidewire was placed followed by conversion to a 0.035? guidewire. Next, following local anesthesia with 12 mL 1% lidocaine with epinephrine, a subcutaneous tunnel was created over the anterior chest wall and the hemodialysis catheter pulled through the tunnel. Following tract dilatation, a peel-away sheath was placed and the 6 [place] smallbore catheter was advanced into the central venous circulation. Both lumens of the catheter were successfully aspirated and flushed with heparin solution. Sterile caps were attached to the catheter hubs. The skin nick at the jugular puncture site was closed with Dermabond. The catheter wings were sutured to the skin following  local anesthesia with 1% lidocaine. Sterile dressings were applied. A digital film was obtained documenting catheter position. The patient tolerated the procedure well with no evidence of complication. COMPARISON:  None FINDINGS:  The digital film shows the catheter to be in satisfactory position in the central venous circulation at the level of the right atrium.     1. ULTRASOUND AND FLUOROSCOPIC GUIDED PLACEMENT OF A Right INTERNAL JUGULAR 6 [place] SMALL BORE CENTRAL CATHETER. 2. THE CENTRAL CATHETER MAY BE USED IMMEDIATELY AS CLINICALLY INDICATED. FLUSHES PER PROTOCOL.       Micro:  Results       Procedure Component Value Units Date/Time    BLOOD CULTURE [[id number]]  (Abnormal) Collected: 08/22/24 1224    Order Status: Completed Specimen: Blood from Peripheral Updated: 08/25/24 0937     Significant Indicator POS     Source BLD     Site PERIPHERAL     Culture Result Growth detected by Bactec instrument. 08/23/2024  03:47      Escherichia coli ESBL  See previous culture for sensitivity report.  Extended Spectrum Beta-lactamase (ESBL) isolated.  ESBL's may be clinically resistant to therapy with  Penicillins,Cephalosporins or Aztreonam despite  apparent in vitro susceptibility to some of these agents.  The patient requires contact isolation.  Please contact pharmacy or an Infectious Disease Specialist  if you have any questions about appropriate therapy.        Coagulase-negative Staphylococcus species  Possible Contaminant  POSSIBLE CONTAMINANT: Isolated from one set only, please  correlate with clinical condition. Contact the Microbiology  department within 48 hr if identification and susceptibility  are needed.      BLOOD CULTURE [[id number]]  (Abnormal)  (Susceptibility) Collected: 08/22/24 1224    Order Status: Completed Specimen: Blood from Peripheral Updated: 08/25/24 0934     Significant Indicator POS     Source BLD     Site PERIPHERAL     Culture Result Growth detected by Bactec instrument. 08/23/2024  00:24       Escherichia coli ESBL  Extended Spectrum Beta-lactamase (ESBL) isolated.  ESBL's may be clinically resistant to therapy with  Penicillins,Cephalosporins or Aztreonam despite  apparent in vitro susceptibility to some of these agents.  The patient requires contact isolation.  Please contact pharmacy or an Infectious Disease Specialist  if you have any questions about appropriate therapy.      Susceptibility       Escherichia coli esbl (1)       Antibiotic Interpretation Microscan   Method Status    Ampicillin/sulbactam Intermediate 16/8 mcg/mL ANISH Final    Ampicillin Resistant >16 mcg/mL ANISH Final    Ceftriaxone Resistant >32 mcg/mL ANISH Final    Cefazolin Resistant >16 mcg/mL ANISH Final    Ciprofloxacin Resistant >2 mcg/mL ANISH Final    Cefepime Resistant >16 mcg/mL ANISH Final    Cefuroxime Resistant >16 mcg/mL ANISH Final    Ertapenem Sensitive <=0.5 mcg/mL ANISH Final    Gentamicin Resistant >8 mcg/mL ANISH Final    Levofloxacin Resistant >4 mcg/mL ANISH Final    Minocycline Sensitive <=4 mcg/mL ANISH Final    Moxifloxacin Resistant >4 mcg/mL ANISH Final    Pip/Tazobactam Sensitive 16 mcg/mL ANISH Final    Trimeth/Sulfa Sensitive <=0.5/9.5 mcg/mL ANISH Final    Tigecycline Sensitive <=2 mcg/mL ANISH Final    Tobramycin Resistant >8 mcg/mL ANISH Final                       Urine Culture [[id number]]  (Abnormal) Collected: 08/22/24 1303    Order Status: Completed Specimen: Urine, Garcia Cath Updated: 08/24/24 0957     Significant Indicator POS     Source UR     Site URINE, GARCIA CATH     Culture Result -      Candida glabrata  >100,000 cfu/mL      Urinalysis [[id number]]  (Abnormal) Collected: 08/22/24 1303    Order Status: Completed Specimen: Urine, Garcia Cath Updated: 08/22/24 1335     Color Yellow     Character Cloudy     Specific Gravity 1.030     Ph 5.5     Glucose Negative mg/dL      Ketones Negative mg/dL      Protein 30 mg/dL      Bilirubin Negative     Urobilinogen, Urine 1.0     Nitrite Negative     Leukocyte Esterase Moderate      Occult Blood Negative     Micro Urine Req Microscopic    Blood Culture [[id number]]     Order Status: No result Specimen: Blood from Peripheral     Blood Culture [[id number]]     Order Status: No result Specimen: Blood from Line     Urinalysis [[id number]]     Order Status: Canceled Specimen: Urine, Olmedo Cath             Assessment:  64-year-old woman with a history of CVA, expressive aphasia and right-sided hemiparesis, and atrophic right kidney admitted on 8/17/2024 secondary to dislodged nephrostomy tube.  She has a history of pyelonephritis with prior cultures positive for ESBL E. coli.  Hospital course complicated by sepsis and blood cultures positive for E. coli, ESBL.  She is now status post cystoscopy, transurethral resection of bladder and right ureteral stent placement on 8/25/2024.    Patient diagnoses:  Sepsis, improving  ESBL E. coli bacteremia  Pyelonephritis status post right ureteral stent placement on 8/25  History of pyelonephritis with ESBL E. coli      Plan:  -Continue IV meropenem  -Blood cultures on 8/22+ ESBL E. Coli (resistant to fluoroquinolone, susceptible to Bactrim)  -Urine culture from Olmedo catheter+ Candida glabrata.  Will not treat as patient clinically improving on meropenem  -Status post cystoscopy, transurethral resection of bladder and right ureteral stent placement on 8/25/2024.  -Anticipate a 14-day course of antibiotics from 8/25 with stop date of 09/08/2024  -Midline ordered today    Disposition: SNF    Discussed with internal medicine/Dr. Vasquez.  ID signing off.  Please reconsult if needed

## 2024-08-26 NOTE — CARE PLAN
The patient is Stable - Low risk of patient condition declining or worsening    Shift Goals  Clinical Goals: VSS, IV abx  Patient Goals: rest  Family Goals: ELIZABETH    Progress made toward(s) clinical / shift goals:    Problem: Fall Risk  Goal: Patient will remain free from falls  Description: Target End Date:  Prior to discharge or change in level of care    Document interventions on the [facility] Fall Risk Assessment    1.  Assess for fall risk factors  2.  Implement fall precautions  Outcome: Progressing     Problem: Pain - Standard  Goal: Alleviation of pain or a reduction in pain to the patient’s comfort goal  Description: Target End Date:  Prior to discharge or change in level of care    Document on Vitals flowsheet    1.  Document pain using the appropriate pain scale per order or unit policy  2.  Educate and implement non-pharmacologic comfort measures (i.e. relaxation, distraction, massage, cold/heat therapy, etc.)  3.  Pain management medications as ordered  4.  Reassess pain after pain med administration per policy  5.  If opiods administered assess patient's response to pain medication is appropriate per POSS sedation scale  6.  Follow pain management plan developed in collaboration with patient and interdisciplinary team (including palliative care or pain specialists if applicable)  Outcome: Progressing       Patient is not progressing towards the following goals:

## 2024-08-26 NOTE — PROGRESS NOTES
Urology Progress Note    Post op Day # 1 S/p TURBT, cystoscopy with right ureteral stent insertion.     Overnight Events: None    S: Patient denies fevers, chills, nausea, vomiting or right stent colic. She has been slowly advancing her diet as tolerated. Urine culture from 8/22/24 was positive for candida glabrata. Patient is currently on IV meropenem for ESBL E. Coli bacteriemia. Olmedo catheter in placed draining jerzy colored urine. Labs today reveal a Creatinine is 0.72, WBC: 19.1, Hgb: 10.3.     O:   /75   Pulse (!) 109   Temp 36.8 °C (98.2 °F) (Temporal)   Resp 17   Wt 102 kg (224 lb 6.9 oz)   SpO2 95%   Recent Labs     08/24/24  0250 08/25/24  0458   SODIUM 140 140   POTASSIUM 3.3* 3.4*   CHLORIDE 116* 116*   CO2 15* 16*   GLUCOSE 79 71   BUN 15 11   CREATININE 0.65 0.62   CALCIUM 8.0* 7.9*     Recent Labs     08/24/24  0250 08/25/24  0458   WBC 12.2* 9.1   RBC 3.10* 2.94*   HEMOGLOBIN 8.8* 8.3*   HEMATOCRIT 26.7* 25.1*   MCV 86.1 85.4   MCH 28.4 28.2   MCHC 33.0 33.1   RDW 50.4* 50.0   PLATELETCT 214 223   MPV 11.6 11.3         Intake/Output Summary (Last 24 hours) at 8/26/2024 1034  Last data filed at 8/26/2024 0938  Gross per 24 hour   Intake 1488.67 ml   Output 1000 ml   Net 488.67 ml       Exam:    -Abdomen soft, benign.   -Urine: Olmedo catheter in placed draining jerzy colored urine.     -No R CVA tenderness    A/P:    Active Hospital Problems    Diagnosis     Dermatitis [L30.9]     Urinary retention [R33.9]     Nephrostomy tube displaced (HCC) [T83.022A]     Right hemiplegia (HCC) [G81.91]     Anemia [D64.9]      Patient with hemoglobin 11.6.  At this point etiology is unknown however patient had been taking ferrous sulfate at home.  Consider rechecking ferritin, serum iron, TIBC.  Continue with ferrous sulfate for now.      History of stroke [Z86.73]     Sepsis without acute organ dysfunction (HCC) [A41.9]     Pyelonephritis [N12]          -Will continue to monitor patient's renal  function.   -Continue abx per ID and hospitalist.   -I spoke with the hospitalist who advised that the patient will continue on a 14 day course of antibiotics that will end on 9/8/24. She will most likely be discharged to SNF.   -[facility] [place] office to schedule a follow up for a potential right nephrectomy.       JOSSELYN Ramirez.-C.   [address]  [place] [address]   [phone]

## 2024-08-27 ENCOUNTER — APPOINTMENT (OUTPATIENT)
Dept: RADIOLOGY | Facility: MEDICAL CENTER | Age: 64
DRG: 659 | End: 2024-08-27
Attending: STUDENT IN AN ORGANIZED HEALTH CARE EDUCATION/TRAINING PROGRAM
Payer: MEDICARE

## 2024-08-27 PROBLEM — I95.9 HYPOTENSION: Status: ACTIVE | Noted: 2024-08-27

## 2024-08-27 PROBLEM — Z16.12 ESBL (EXTENDED SPECTRUM BETA-LACTAMASE) PRODUCING BACTERIA INFECTION: Status: ACTIVE | Noted: 2024-08-27

## 2024-08-27 PROBLEM — A49.9 ESBL (EXTENDED SPECTRUM BETA-LACTAMASE) PRODUCING BACTERIA INFECTION: Status: ACTIVE | Noted: 2024-08-27

## 2024-08-27 LAB
ANION GAP SERPL CALC-SCNC: 8 MMOL/L (ref 7–16)
BUN SERPL-MCNC: 11 MG/DL (ref 8–22)
CALCIUM SERPL-MCNC: 7.9 MG/DL (ref 8.5–10.5)
CHLORIDE SERPL-SCNC: 114 MMOL/L (ref 96–112)
CO2 SERPL-SCNC: 15 MMOL/L (ref 20–33)
CREAT SERPL-MCNC: 0.7 MG/DL (ref 0.5–1.4)
ERYTHROCYTE [DISTWIDTH] IN BLOOD BY AUTOMATED COUNT: 52.6 FL (ref 35.9–50)
GFR SERPLBLD CREATININE-BSD FMLA CKD-EPI: 96 ML/MIN/1.73 M 2
GLUCOSE SERPL-MCNC: 93 MG/DL (ref 65–99)
HCT VFR BLD AUTO: 26 % (ref 37–47)
HGB BLD-MCNC: 8.3 G/DL (ref 12–16)
MAGNESIUM SERPL-MCNC: 2 MG/DL (ref 1.5–2.5)
MCH RBC QN AUTO: 28 PG (ref 27–33)
MCHC RBC AUTO-ENTMCNC: 31.9 G/DL (ref 32.2–35.5)
MCV RBC AUTO: 87.8 FL (ref 81.4–97.8)
PLATELET # BLD AUTO: 225 K/UL (ref 164–446)
PMV BLD AUTO: 11.6 FL (ref 9–12.9)
POTASSIUM SERPL-SCNC: 3.6 MMOL/L (ref 3.6–5.5)
RBC # BLD AUTO: 2.96 M/UL (ref 4.2–5.4)
SODIUM SERPL-SCNC: 137 MMOL/L (ref 135–145)
WBC # BLD AUTO: 16.3 K/UL (ref 4.8–10.8)

## 2024-08-27 PROCEDURE — A9270 NON-COVERED ITEM OR SERVICE: HCPCS | Performed by: INTERNAL MEDICINE

## 2024-08-27 PROCEDURE — 83735 ASSAY OF MAGNESIUM: CPT

## 2024-08-27 PROCEDURE — A9270 NON-COVERED ITEM OR SERVICE: HCPCS | Mod: JZ | Performed by: STUDENT IN AN ORGANIZED HEALTH CARE EDUCATION/TRAINING PROGRAM

## 2024-08-27 PROCEDURE — 36415 COLL VENOUS BLD VENIPUNCTURE: CPT

## 2024-08-27 PROCEDURE — 80048 BASIC METABOLIC PNL TOTAL CA: CPT

## 2024-08-27 PROCEDURE — 85027 COMPLETE CBC AUTOMATED: CPT

## 2024-08-27 PROCEDURE — 700102 HCHG RX REV CODE 250 W/ 637 OVERRIDE(OP): Mod: JZ | Performed by: STUDENT IN AN ORGANIZED HEALTH CARE EDUCATION/TRAINING PROGRAM

## 2024-08-27 PROCEDURE — 700102 HCHG RX REV CODE 250 W/ 637 OVERRIDE(OP): Performed by: INTERNAL MEDICINE

## 2024-08-27 PROCEDURE — 700102 HCHG RX REV CODE 250 W/ 637 OVERRIDE(OP)

## 2024-08-27 PROCEDURE — 770001 HCHG ROOM/CARE - MED/SURG/GYN PRIV*

## 2024-08-27 PROCEDURE — 99233 SBSQ HOSP IP/OBS HIGH 50: CPT | Mod: 25 | Performed by: STUDENT IN AN ORGANIZED HEALTH CARE EDUCATION/TRAINING PROGRAM

## 2024-08-27 PROCEDURE — 700111 HCHG RX REV CODE 636 W/ 250 OVERRIDE (IP): Performed by: INTERNAL MEDICINE

## 2024-08-27 PROCEDURE — 02HV33Z INSERTION OF INFUSION DEVICE INTO SUPERIOR VENA CAVA, PERCUTANEOUS APPROACH: ICD-10-PCS | Performed by: STUDENT IN AN ORGANIZED HEALTH CARE EDUCATION/TRAINING PROGRAM

## 2024-08-27 PROCEDURE — 700105 HCHG RX REV CODE 258: Performed by: INTERNAL MEDICINE

## 2024-08-27 PROCEDURE — 71045 X-RAY EXAM CHEST 1 VIEW: CPT

## 2024-08-27 PROCEDURE — A9270 NON-COVERED ITEM OR SERVICE: HCPCS

## 2024-08-27 PROCEDURE — 36556 INSERT NON-TUNNEL CV CATH: CPT | Performed by: STUDENT IN AN ORGANIZED HEALTH CARE EDUCATION/TRAINING PROGRAM

## 2024-08-27 RX ORDER — POTASSIUM CHLORIDE 1500 MG/1
40 TABLET, EXTENDED RELEASE ORAL ONCE
Status: COMPLETED | OUTPATIENT
Start: 2024-08-27 | End: 2024-08-27

## 2024-08-27 RX ADMIN — POTASSIUM CHLORIDE 40 MEQ: 1500 TABLET, EXTENDED RELEASE ORAL at 13:42

## 2024-08-27 RX ADMIN — MEROPENEM 500 MG: 500 INJECTION, POWDER, FOR SOLUTION INTRAVENOUS at 18:41

## 2024-08-27 RX ADMIN — ACETAMINOPHEN 650 MG: 325 TABLET ORAL at 19:31

## 2024-08-27 RX ADMIN — MIDODRINE HYDROCHLORIDE 5 MG: 5 TABLET ORAL at 06:13

## 2024-08-27 RX ADMIN — DULOXETINE HYDROCHLORIDE 60 MG: 30 CAPSULE, DELAYED RELEASE ORAL at 18:42

## 2024-08-27 RX ADMIN — METOPROLOL TARTRATE 12.5 MG: 25 TABLET, FILM COATED ORAL at 06:12

## 2024-08-27 RX ADMIN — ATORVASTATIN CALCIUM 40 MG: 40 TABLET, FILM COATED ORAL at 21:51

## 2024-08-27 RX ADMIN — NYSTATIN: 100000 POWDER TOPICAL at 18:44

## 2024-08-27 RX ADMIN — DULOXETINE HYDROCHLORIDE 60 MG: 30 CAPSULE, DELAYED RELEASE ORAL at 06:13

## 2024-08-27 RX ADMIN — MEROPENEM 500 MG: 500 INJECTION, POWDER, FOR SOLUTION INTRAVENOUS at 00:23

## 2024-08-27 RX ADMIN — MIDODRINE HYDROCHLORIDE 5 MG: 5 TABLET ORAL at 21:51

## 2024-08-27 RX ADMIN — MIRTAZAPINE 15 MG: 15 TABLET, FILM COATED ORAL at 21:51

## 2024-08-27 RX ADMIN — MIDODRINE HYDROCHLORIDE 5 MG: 5 TABLET ORAL at 13:42

## 2024-08-27 RX ADMIN — MEROPENEM 500 MG: 500 INJECTION, POWDER, FOR SOLUTION INTRAVENOUS at 06:18

## 2024-08-27 RX ADMIN — METOPROLOL TARTRATE 12.5 MG: 25 TABLET, FILM COATED ORAL at 18:42

## 2024-08-27 RX ADMIN — PREGABALIN 150 MG: 150 CAPSULE ORAL at 06:13

## 2024-08-27 RX ADMIN — NYSTATIN: 100000 POWDER TOPICAL at 06:13

## 2024-08-27 RX ADMIN — SENNOSIDES AND DOCUSATE SODIUM 2 TABLET: 50; 8.6 TABLET ORAL at 18:42

## 2024-08-27 RX ADMIN — OMEPRAZOLE 20 MG: 20 CAPSULE, DELAYED RELEASE ORAL at 06:13

## 2024-08-27 RX ADMIN — MEROPENEM 500 MG: 500 INJECTION, POWDER, FOR SOLUTION INTRAVENOUS at 12:12

## 2024-08-27 ASSESSMENT — FIBROSIS 4 INDEX: FIB4 SCORE: 1.47

## 2024-08-27 ASSESSMENT — ENCOUNTER SYMPTOMS
WEAKNESS: 1
SHORTNESS OF BREATH: 0
VOMITING: 0
NAUSEA: 0
FEVER: 0
ABDOMINAL PAIN: 0

## 2024-08-27 ASSESSMENT — PAIN DESCRIPTION - PAIN TYPE
TYPE: ACUTE PAIN
TYPE: ACUTE PAIN

## 2024-08-27 NOTE — DISCHARGE PLANNING
Case Management Discharge Planning    Admission Date: 8/17/2024  GMLOS: 5.9  ALOS: 5    6-Clicks ADL Score: 10  6-Clicks Mobility Score: 6  PT and/or OT Eval ordered: Yes  Post-acute Referrals Ordered: Yes  Post-acute Choice Obtained: Yes  Has referral(s) been sent to post-acute provider:  Yes      Anticipated Discharge Dispo: Discharge Disposition: D/T to SNF with Medicare cert in anticipation of skilled care (03)    DME Needed: No    Action(s) Taken: Updated Provider/Nurse on Discharge Plan    Escalations Completed: None    Medically Clear: No    Next Steps: Discussed in rounds. Anticiapate return to [facility] tomorrow. Called Luis Manuel to inform of Merrum IV Q 6hrs.    Barriers to Discharge: Medical clearance    Is the patient up for discharge tomorrow: Yes    Is transport arranged for discharge disposition: No, await med clearance.

## 2024-08-27 NOTE — PROGRESS NOTES
Bedside report received from Magui KING. Pt assessment complete. Pt AO x 4. Reviewed plan of care with pt. Pt is tele monitored. Chart and labs reviewed. Bed in lowest position, and 3 side rails up. Pt educated on call lights, call light within reach. Hourly rounding in place

## 2024-08-27 NOTE — PROGRESS NOTES
Hospital Medicine Daily Progress Note    Date of Service  8/27/2024    Chief Complaint  Nydia Finch is a 64 y.o. female admitted 8/17/2024 with nephrostomy tube displacement.    Hospital Course  Nydia Finch is a 64-year-old female with a PMHx cerebral aneurysm, CVA with residual right-sided hemiparesis, expressive aphasia.  Admitted 8/17 for nephrostomy tube displacement.    Patient was admitted to [facility] 7/25 through 8/1 for ESBL E. coli urinary infection with right hydronephrosis and nephrolithiasis.  She underwent right sided nephrostomy tube placement 8/26.  Patient was to continue meropenem until 8/9; which she was compliant with and has since completed.    Patient was scheduled to have nephrostomy tube placed on 8/21.  However this procedure was aborted due to hypotension and tachycardia.  She was diagnosed with urosepsis and restarted on meropenem.  Urology was consulted for failed nephrostomy tube.  She underwent ureteral stent placement 8/25.    Blood cultures from 8/22 were positive for ESBL.  Infectious disease was consulted.  Recommend IV meropenem with a stop date of 9/8/2024.  Patient will need the central line for ongoing antibiotic access.    Interval Problem Update  8/27: Vitals notable for intermittent tachycardia.  SBP ranging .  Patient resting comfortably on room air.  WBC 16.9 from 19.1.  Hb 8.3 from 10.3. Creatinine 0.70.  Potassium 3.6; replaced. Central line placed today for ongoing IV antibiotic needs. Discontinue IVF.     I have discussed this patient's plan of care and discharge plan at IDT rounds today with Case Management, Nursing, Nursing leadership, and other members of the IDT team.    Consultants/Specialty  infectious disease, urology, and IR    Code Status  Full Code    Disposition  The patient is medically cleared for discharge to home or a post-acute facility.  Anticipate discharge to: skilled nursing facility    I have placed the appropriate orders for post-discharge  needs.    Review of Systems  Review of Systems   Constitutional:  Negative for fever and malaise/fatigue.   Respiratory:  Negative for shortness of breath.    Cardiovascular:  Negative for chest pain and leg swelling.   Gastrointestinal:  Negative for abdominal pain, nausea and vomiting.   Neurological:  Positive for weakness.        Physical Exam  Temp:  [36.5 °C (97.7 °F)-37 °C (98.6 °F)] 37 °C (98.6 °F)  Pulse:  [] 88  Resp:  [12-18] 18  BP: ()/(47-80) 130/78  SpO2:  [94 %-98 %] 98 %    Physical Exam  Vitals and nursing note reviewed.   Constitutional:       General: She is not in acute distress.     Appearance: Normal appearance. She is ill-appearing.   Cardiovascular:      Rate and Rhythm: Normal rate and regular rhythm.   Pulmonary:      Effort: Pulmonary effort is normal.      Breath sounds: Normal breath sounds.   Skin:     General: Skin is warm and dry.   Neurological:      Mental Status: She is alert and oriented to person, place, and time. Mental status is at baseline.      Motor: Weakness present.      Comments: Flaccid paralysis to right UE and LE   Psychiatric:         Mood and Affect: Mood normal.         Behavior: Behavior normal.         Fluids    Intake/Output Summary (Last 24 hours) at 8/27/2024 1335  Last data filed at 8/27/2024 0933  Gross per 24 hour   Intake 0 ml   Output 1375 ml   Net -1375 ml        Laboratory  Recent Labs     08/25/24  0458 08/26/24  1351 08/27/24  0059   WBC 9.1 19.1* 16.3*   RBC 2.94* 3.72* 2.96*   HEMOGLOBIN 8.3* 10.3* 8.3*   HEMATOCRIT 25.1* 32.5* 26.0*   MCV 85.4 87.4 87.8   MCH 28.2 27.7 28.0   MCHC 33.1 31.7* 31.9*   RDW 50.0 51.8* 52.6*   PLATELETCT 223 252 225   MPV 11.3 11.4 11.6     Recent Labs     08/25/24  0458 08/26/24  1351 08/27/24  0059   SODIUM 140 136 137   POTASSIUM 3.4* 3.7 3.6   CHLORIDE 116* 112 114*   CO2 16* 15* 15*   GLUCOSE 71 100* 93   BUN 11 11 11   CREATININE 0.62 0.74 0.70   CALCIUM 7.9* 8.4* 7.9*                    Imaging  CT-ABDOMEN-PELVIS W/O   Final Result         Limited exam due to lack of oral or IV contrast      1. A right double-J ureteral stent is in place, with the proximal loop located in the renal pelvis and the distal loop in the urinary bladder. There is a filling defect in the renal pelvis mixed with contrast, likely representing hemorrhage.      2. There is contrast surrounding the right kidney, likely due to prior procedure.      DX-PORTABLE FLUOROSCOPY < 1 HOUR Reason For Exam: Main OR   Final Result      Portable fluoroscopy utilized for 2 minutes 1 second.         INTERPRETING LOCATION: [address], [place], [address]      [id number] VIEW   Final Result      Digitized intraoperative radiograph is submitted for review. This examination is not for diagnostic purpose but for guidance during a surgical procedure. Please see the patient's chart for full procedural details.         INTERPRETING LOCATION: [address], [place], [address]      IR-EXCHANGE PERQ NEPH CATH (ALL RADIOLOGY) RIGHT   Final Result      1.  Unsuccessful image guided RIGHT nephrostomy tube placement. No significant hydronephrosis seen on ultrasound.   2.  If additional attempts are indicated, recommend preprocedure CT and possible CT for targeting.      US-RENAL   Final Result      Severe RIGHT hydronephrosis likely probably chronic with overlying cortical thinning      IR-PERQ NEPH CATH NEW ACCESS (ALL RADIOLOGY) RIGHT   Final Result            1.  Mild pelviectasis of the right kidney without evidence of significant hydronephrosis.      2.  Aborted right nephrostomy tube placement. Recommend follow-up ultrasound of the right kidney in several days to assess if right-sided hydronephrosis recurs, and if right-sided hydronephrosis develops right nephrostomy tube placement can be    reattempted.      DX-CHEST-LIMITED (1 VIEW)   Final Result      Right sided central venous catheter tip projects at cavoatrial junction. No pneumothorax.      No  acute cardiopulmonary abnormality.      Hiatal hernia.           Assessment/Plan  * Nephrostomy tube displaced (HCC)- (present on admission)  Assessment & Plan  8/19 Recently had nephrostomy tube placed in January which was replaced in July  History of ESBL recently finished course of Merrem on 8/9  Patient is to eventually get an outpatient nephrectomy but temporarily has nephrostomy tube in place  IR attempted to replace today - unsuccessful, see above - they recommend repeat US in 2 days, if hydronephrosis at that time they will re attempt    8/20 d/w IR, f/u renal u/s eval for recurrent hydronephrosis  - pascal removed, bladder scan protocol  - urology Dr. Huang consulted, appreciate input    8/21 ultrasound consistent with severe right-sided hydronephrosis  Pascal catheter removed, as per RN, patient able to have small amounts of urination, PVR of less than 300  Will encourage urination  May need replacement of Pascal catheter as needed for known history of urinary retention  N.p.o. after midnight for IR evaluation for replacement of right nephrostomy tube  Discussed with urology, aware of patient's wishes for nephrectomy ASAP  Per urology concerned with patient's deconditioning and hypotensive on August 20 8/27: Ureteral stent placed 8/25. Tolerating well.   Repeat CT 8/25: A right double-J ureteral stent is in place, with the proximal loop located in the renal pelvis and the distal loop in the urinary bladder. There is a filling defect in the renal pelvis mixed with contrast, likely representing hemorrhage.   - Will need outpatient follow up with [facility] [place] for possible nephrectomy in the future   - Continue IV meropenem until 9/8      Hypotension  Assessment & Plan  SBP ranging   Currently on Midodrine 5mg TID   - Discontinue IVF   - Monitor blood pressures closely     ESBL (extended spectrum beta-lactamase) producing bacteria infection  Assessment & Plan  Blood cultures positive 8/22  ID  consulted  Recommending IV meropenem until 9/8  Central line placed today for ongoing IV access    Dermatitis  Assessment & Plan  Continue nystatin powder    Urinary retention  Assessment & Plan  Continue pascal catheter  Will have close outpatient follow up with urology     Anemia- (present on admission)  Assessment & Plan  Baseline 8-10  Hb today 8.3    Right hemiplegia (HCC)- (present on admission)  Assessment & Plan  From prior stroke    History of stroke- (present on admission)  Assessment & Plan  Residual right-sided hemiplegia and dysarthria  Supportive care  At baseline    Sepsis without acute organ dysfunction (HCC)- (present on admission)  Assessment & Plan  Resolved       My total time spent caring for the patient on the day of the encounter was 56 minutes.   This does not include time spent on separately billable procedures/tests.      VTE prophylaxis:   SCDs/TEDs      I have performed a physical exam and reviewed and updated ROS and Plan today (8/27/2024). In review of yesterday's note (8/26/2024), there are no changes except as documented above.

## 2024-08-27 NOTE — PROGRESS NOTES
Urology Progress Note    Post op Day # 2 S/p TURBT, cystoscopy with right ureteral stent insertion.     Overnight Events: None    S: Patient denies fevers, chills, nausea, vomiting or right stent colic. She has been slowly advancing her diet as tolerated, but does not have much of an appetite. Patient remains on IV meropenem for ESBL E. Coli bacteriemia. Olmedo catheter in place draining jerzy colored urine. Labs today reveal a Creatinine is 0.70, WBC: 16.3, Hgb: 8.3.     O:   /74   Pulse 95   Temp 37 °C (98.6 °F) (Temporal)   Resp 18   Wt 102 kg (224 lb 6.9 oz)   SpO2 98%   Recent Labs     08/25/24  0458 08/26/24  1351 08/27/24  0059   SODIUM 140 136 137   POTASSIUM 3.4* 3.7 3.6   CHLORIDE 116* 112 114*   CO2 16* 15* 15*   GLUCOSE 71 100* 93   BUN 11 11 11   CREATININE 0.62 0.74 0.70   CALCIUM 7.9* 8.4* 7.9*     Recent Labs     08/25/24  0458 08/26/24  1351 08/27/24  0059   WBC 9.1 19.1* 16.3*   RBC 2.94* 3.72* 2.96*   HEMOGLOBIN 8.3* 10.3* 8.3*   HEMATOCRIT 25.1* 32.5* 26.0*   MCV 85.4 87.4 87.8   MCH 28.2 27.7 28.0   MCHC 33.1 31.7* 31.9*   RDW 50.0 51.8* 52.6*   PLATELETCT 223 252 225   MPV 11.3 11.4 11.6         Intake/Output Summary (Last 24 hours) at 8/26/2024 1034  Last data filed at 8/26/2024 0938  Gross per 24 hour   Intake 1488.67 ml   Output 1000 ml   Net 488.67 ml       Exam:    -Abdomen soft, benign.   -Urine: Olmedo catheter in placed draining jerzy colored urine.     -No R CVA tenderness    A/P:    Active Hospital Problems    Diagnosis     ESBL (extended spectrum beta-lactamase) producing bacteria infection [A49.9, Z16.12]     Hypotension [I95.9]     Dermatitis [L30.9]     Urinary retention [R33.9]     Nephrostomy tube displaced (HCC) [T83.022A]     Right hemiplegia (HCC) [G81.91]     Anemia [D64.9]      Patient with hemoglobin 11.6.  At this point etiology is unknown however patient had been taking ferrous sulfate at home.  Consider rechecking ferritin, serum iron, TIBC.  Continue with  ferrous sulfate for now.      History of stroke [Z86.73]     Sepsis without acute organ dysfunction (HCC) [A41.9]     Pyelonephritis [N12]          -Will continue to monitor patient's renal function.   -Olmedo catheter to remain in place for medical necessity while inpatient.   -Continue abx per ID and hospitalist.   -Patient will continue on a 14 day course of antibiotics that will end on 9/8/24 per hospitalist and ID. She will most likely be discharged to SNF.   -[facility] [place] office to schedule a follow up for a potential right nephrectomy.       Simran Vázquez, PIVÁN.-C.   [address]  [place] [address]   [phone]

## 2024-08-27 NOTE — CARE PLAN
The patient is Stable - Low risk of patient condition declining or worsening    Shift Goals  Clinical Goals: IV abx, maintian/improve skin integrity  Patient Goals: pain management  Family Goals: Updates    Progress made toward(s) clinical / shift goals:    Problem: Knowledge Deficit - Standard  Goal: Patient and family/care givers will demonstrate understanding of plan of care, disease process/condition, diagnostic tests and medications  Outcome: Progressing     Problem: Skin Integrity  Goal: Skin integrity is maintained or improved  Outcome: Progressing     Problem: Fall Risk  Goal: Patient will remain free from falls  Outcome: Progressing     Problem: Pain - Standard  Goal: Alleviation of pain or a reduction in pain to the patient’s comfort goal  Outcome: Progressing     Problem: Urinary - Renal Perfusion  Goal: Ability to achieve and maintain adequate renal perfusion and functioning will improve  Outcome: Progressing       Patient is not progressing towards the following goals:

## 2024-08-27 NOTE — ASSESSMENT & PLAN NOTE
SBP ranging   Currently on Midodrine 5mg TID   - Discontinue IVF   - Monitor blood pressures closely

## 2024-08-27 NOTE — CARE PLAN
The patient is Stable - Low risk of patient condition declining or worsening    Shift Goals  Clinical Goals: safety, q2 turns, IV abx  Patient Goals: comfort  Family Goals: mari    Progress made toward(s) clinical / shift goals:  pt safety maintained. Q2 turns per flowsheets, IV abx per MAR. Pt able to rest comfortably in bed.       Problem: Knowledge Deficit - Standard  Goal: Patient and family/care givers will demonstrate understanding of plan of care, disease process/condition, diagnostic tests and medications  Outcome: Progressing  Note: Pt participating in plan of care with questions for care team. Plan of care reviewed with patient. All questions addressed.      Problem: Pain - Standard  Goal: Alleviation of pain or a reduction in pain to the patient’s comfort goal  Outcome: Progressing  Note: Pt able to verbalize pain on 0-10 scale when prompted. Pt given comfort measures of repositioning with extra blankets and pillows, as well as wedges       Patient is not progressing towards the following goals:

## 2024-08-27 NOTE — CARE PLAN
The patient is Stable - Low risk of patient condition declining or worsening    Shift Goals  Clinical Goals: IV abx, monitor labs and vitals, Q2 turns  Patient Goals: Comfort  Family Goals: ELIZABETH    Progress made toward(s) clinical / shift goals:    Problem: Knowledge Deficit - Standard  Goal: Patient and family/care givers will demonstrate understanding of plan of care, disease process/condition, diagnostic tests and medications  Outcome: Progressing     Problem: Skin Integrity  Goal: Skin integrity is maintained or improved  Outcome: Progressing     Problem: Fall Risk  Goal: Patient will remain free from falls  Outcome: Progressing     Problem: Pain - Standard  Goal: Alleviation of pain or a reduction in pain to the patient’s comfort goal  Outcome: Progressing     Problem: Urinary - Renal Perfusion  Goal: Ability to achieve and maintain adequate renal perfusion and functioning will improve  Outcome: Progressing       Patient is not progressing towards the following goals:

## 2024-08-27 NOTE — HOSPITAL COURSE
Nydia Finch is a 64-year-old female with a PMHx cerebral aneurysm, CVA with residual right-sided hemiparesis, expressive aphasia.  Admitted 8/17 for nephrostomy tube displacement.    Patient was admitted to [facility] 7/25 - 8/1 for ESBL E. coli urinary infection with right hydronephrosis and nephrolithiasis.  She underwent right sided nephrostomy tube placement 8/26.  Patient was to continue meropenem until 8/9; which she was compliant with and has since completed. AT that time Urology recommending close outpatient follow up for a possible nephrectomy in the future.     Patient was scheduled to have nephrostomy tube placed on 8/21.  However this procedure was aborted due to hypotension and tachycardia.  She was diagnosed with urosepsis and restarted on meropenem.  Urology was consulted for failed nephrostomy tube.  She underwent ureteral stent placement 8/25.    Blood cultures from 8/22 were positive for ESBL.  Infectious disease was consulted.  Recommend IV meropenem with a stop date of 9/8/2024.  Due to right sided hemiplegia from previous stroke, patient was not a candidate for midline placement to RUE. Left UE reveled a cephalic vein that was too small for midline placement. Ultimately, a central line was placed to the left IJ prior to discharge. Patient will continue IV Meropenem until 9/8/2024 to treated underlying ESBL UTI and bacteremia. Patient is charged back to [facility] for ongoing care needs and IV antibiotics.     Patient should continue with nystatin cream and powder for groin candida infection.     Patient will need to follow up with [facility] [place] for pascal management and possible nephrectomy in the future.

## 2024-08-27 NOTE — ASSESSMENT & PLAN NOTE
Blood cultures positive 8/22  ID consulted  Recommending IV meropenem until 9/8  Central line placed today for ongoing IV access

## 2024-08-27 NOTE — PROGRESS NOTES
Bedside report received, assumed care of patient. Resting in bed, complained of pain declined intervention. A&O x4. Tele monitoring in place. Educated on fall risk, all fall precautions in place. Call light within reach, bed locked and in lowest position, denied other needs at this time.

## 2024-08-28 VITALS
WEIGHT: 224.87 LBS | TEMPERATURE: 97.5 F | DIASTOLIC BLOOD PRESSURE: 71 MMHG | SYSTOLIC BLOOD PRESSURE: 129 MMHG | RESPIRATION RATE: 13 BRPM | OXYGEN SATURATION: 96 % | HEART RATE: 65 BPM | BODY MASS INDEX: 35.22 KG/M2

## 2024-08-28 LAB
ANION GAP SERPL CALC-SCNC: 8 MMOL/L (ref 7–16)
BUN SERPL-MCNC: 11 MG/DL (ref 8–22)
CALCIUM SERPL-MCNC: 8.1 MG/DL (ref 8.5–10.5)
CHLORIDE SERPL-SCNC: 111 MMOL/L (ref 96–112)
CO2 SERPL-SCNC: 17 MMOL/L (ref 20–33)
CREAT SERPL-MCNC: 0.7 MG/DL (ref 0.5–1.4)
GFR SERPLBLD CREATININE-BSD FMLA CKD-EPI: 96 ML/MIN/1.73 M 2
GLUCOSE SERPL-MCNC: 60 MG/DL (ref 65–99)
POTASSIUM SERPL-SCNC: 4.2 MMOL/L (ref 3.6–5.5)
SODIUM SERPL-SCNC: 136 MMOL/L (ref 135–145)

## 2024-08-28 PROCEDURE — 700102 HCHG RX REV CODE 250 W/ 637 OVERRIDE(OP): Performed by: INTERNAL MEDICINE

## 2024-08-28 PROCEDURE — A9270 NON-COVERED ITEM OR SERVICE: HCPCS

## 2024-08-28 PROCEDURE — A9270 NON-COVERED ITEM OR SERVICE: HCPCS | Performed by: INTERNAL MEDICINE

## 2024-08-28 PROCEDURE — 700105 HCHG RX REV CODE 258: Performed by: INTERNAL MEDICINE

## 2024-08-28 PROCEDURE — 700111 HCHG RX REV CODE 636 W/ 250 OVERRIDE (IP): Performed by: INTERNAL MEDICINE

## 2024-08-28 PROCEDURE — 99239 HOSP IP/OBS DSCHRG MGMT >30: CPT | Performed by: STUDENT IN AN ORGANIZED HEALTH CARE EDUCATION/TRAINING PROGRAM

## 2024-08-28 PROCEDURE — 700102 HCHG RX REV CODE 250 W/ 637 OVERRIDE(OP)

## 2024-08-28 PROCEDURE — 80048 BASIC METABOLIC PNL TOTAL CA: CPT

## 2024-08-28 RX ORDER — MEROPENEM 500 MG/1
500 INJECTION, POWDER, FOR SOLUTION INTRAVENOUS EVERY 6 HOURS
Qty: 22 G | Refills: 0 | Status: ACTIVE
Start: 2024-08-28 | End: 2024-09-08

## 2024-08-28 RX ADMIN — PREGABALIN 150 MG: 150 CAPSULE ORAL at 05:54

## 2024-08-28 RX ADMIN — MEROPENEM 500 MG: 500 INJECTION, POWDER, FOR SOLUTION INTRAVENOUS at 00:16

## 2024-08-28 RX ADMIN — NYSTATIN: 100000 POWDER TOPICAL at 05:59

## 2024-08-28 RX ADMIN — METOPROLOL TARTRATE 12.5 MG: 25 TABLET, FILM COATED ORAL at 05:54

## 2024-08-28 RX ADMIN — MEROPENEM 500 MG: 500 INJECTION, POWDER, FOR SOLUTION INTRAVENOUS at 05:53

## 2024-08-28 RX ADMIN — OMEPRAZOLE 20 MG: 20 CAPSULE, DELAYED RELEASE ORAL at 05:54

## 2024-08-28 RX ADMIN — MIDODRINE HYDROCHLORIDE 5 MG: 5 TABLET ORAL at 05:54

## 2024-08-28 RX ADMIN — ACETAMINOPHEN 650 MG: 325 TABLET ORAL at 08:39

## 2024-08-28 RX ADMIN — MEROPENEM 500 MG: 500 INJECTION, POWDER, FOR SOLUTION INTRAVENOUS at 12:25

## 2024-08-28 RX ADMIN — DULOXETINE HYDROCHLORIDE 60 MG: 30 CAPSULE, DELAYED RELEASE ORAL at 05:54

## 2024-08-28 ASSESSMENT — ENCOUNTER SYMPTOMS
FEVER: 0
FLANK PAIN: 0

## 2024-08-28 ASSESSMENT — PAIN DESCRIPTION - PAIN TYPE
TYPE: ACUTE PAIN
TYPE: ACUTE PAIN

## 2024-08-28 ASSESSMENT — FIBROSIS 4 INDEX: FIB4 SCORE: 1.47

## 2024-08-28 NOTE — PROGRESS NOTES
Bedside report received at approx. 0700, assumed care of patient. Resting in bed, complained of pain, medicated per MAR. A&O x4. Tele monitoring in place. Educated on fall risk, all fall precautions in place. Call light within reach, bed locked and in lowest position, denied other needs at this time.

## 2024-08-28 NOTE — DISCHARGE SUMMARY
Discharge Summary    CHIEF COMPLAINT ON ADMISSION  No chief complaint on file.      Reason for Admission  Obstructed kidney    Admission Date  8/17/2024     CODE STATUS  Full Code    HPI & HOSPITAL COURSE    Nydia Finch is a 64-year-old female with a PMHx cerebral aneurysm, CVA with residual right-sided hemiparesis, expressive aphasia.  Admitted 8/17 for nephrostomy tube displacement.    Patient was admitted to [facility] 7/25 - 8/1 for ESBL E. coli urinary infection with right hydronephrosis and nephrolithiasis.  She underwent right sided nephrostomy tube placement 8/26.  Patient was to continue meropenem until 8/9; which she was compliant with and has since completed. AT that time Urology recommending close outpatient follow up for a possible nephrectomy in the future.     Patient was scheduled to have nephrostomy tube placed on 8/21.  However this procedure was aborted due to hypotension and tachycardia.  She was diagnosed with urosepsis and restarted on meropenem.  Urology was consulted for failed nephrostomy tube.  She underwent ureteral stent placement 8/25.    Blood cultures from 8/22 were positive for ESBL.  Infectious disease was consulted.  Recommend IV meropenem with a stop date of 9/8/2024.  Due to right sided hemiplegia from previous stroke, patient was not a candidate for midline placement to E. Left UE reveled a cephalic vein that was too small for midline placement. Ultimately, a central line was placed to the left IJ prior to discharge. Patient will continue IV Meropenem until 9/8/2024 to treated underlying ESBL UTI and bacteremia. Patient is [facility] back to [facility] for ongoing care needs and IV antibiotics.     Patient should continue with nystatin cream and powder for groin candida infection.     Patient will need to follow up with [facility] for pascal management and possible nephrectomy in the future.     Therefore, she is discharged in fair and stable condition to skilled nursing  facility.    The patient met 2-midnight criteria for an inpatient stay at the time of discharge.    Discharge date: 8/28/2024    FOLLOW UP ITEMS POST DISCHARGE  Follow up with [facility] 1 week  Follow up with PCP 2-3 days     DISCHARGE DIAGNOSES  Principal Problem:    Nephrostomy tube displaced (HCC) (POA: Yes)  Active Problems:    Sepsis without acute organ dysfunction (HCC) (POA: Yes)    Pyelonephritis (POA: Yes)    History of stroke (POA: Yes)    Right hemiplegia (HCC) (POA: Yes)    Anemia (POA: Yes)      Overview: Patient with hemoglobin 11.6.  At this point etiology is unknown however       patient had been taking ferrous sulfate at home.      Consider rechecking ferritin, serum iron, TIBC.      Continue with ferrous sulfate for now.    Urinary retention (POA: Unknown)    Dermatitis (POA: Unknown)    ESBL (extended spectrum beta-lactamase) producing bacteria infection (POA: Unknown)    Hypotension (POA: Unknown)  Resolved Problems:    * No resolved hospital problems. *      FOLLOW UP  Future Appointments   Date Time Provider Department Center   9/4/2024 10:00 AM SARAH Mendoza [address]     No follow-up provider specified.    MEDICATIONS ON DISCHARGE     Medication List        CONTINUE taking these medications        Instructions   atorvastatin 40 MG Tabs  Commonly known as: Lipitor   Take 40 mg by mouth every evening.  Dose: 40 mg     DULoxetine 60 MG Cpep delayed-release capsule  Commonly known as: Cymbalta   Take 60 mg by mouth 2 times a day.  Dose: 60 mg     magnesium hydroxide 400 MG/5ML Susp  Commonly known as: Milk Of Magnesia   Take 30 mL by mouth 1 time a day as needed.  Dose: 30 mL     meropenem 500 MG Solr  Commonly known as: Merrem   Infuse 500 mg into a venous catheter every 6 hours for 11 days.  Dose: 500 mg     metoprolol tartrate 25 MG Tabs  Commonly known as: Lopressor   Take 1 Tablet by mouth 2 times a day.  Dose: 25 mg     mirtazapine 15 MG Tabs  Commonly known as: Remeron    Take 15 mg by mouth at bedtime.  Dose: 15 mg     nystatin [id number] UNIT/GM Crea topical cream  Commonly known as: Mycostatin   Apply 1 Application topically 3 times a day. 10 days  Dose: 1 Application     omeprazole 20 MG delayed-release capsule  Commonly known as: PriLOSEC   Take 20 mg by mouth every day.  Dose: 20 mg     pregabalin 150 MG Caps  Commonly known as: Lyrica   Take 150 mg by mouth every day.  Dose: 150 mg     tamsulosin 0.4 MG capsule  Commonly known as: Flomax   Take 1 Capsule by mouth 1/2 hour after breakfast.  Dose: 0.4 mg              Allergies  Allergies   Allergen Reactions    Piperacillin Sod-Tazobactam So Rash     Gave her head to toe drug rash at [facility] 1/25/2024      Sulfamethoxazole W-Trimethoprim Itching     Rash ,itching    Ceftriaxone Itching     Generalized redness       DIET  Orders Placed This Encounter   Procedures    Diet Order Diet: Regular     Standing Status:   Standing     Number of Occurrences:   1     Order Specific Question:   Diet:     Answer:   Regular [1]       ACTIVITY  As tolerated and directed by skilled nursing.  Weight bearing as tolerated    LINES, DRAINS, AND WOUNDS  This is an automated list. Peripheral IVs will be removed prior to discharge.  Peripheral IV 08/25/24 20 G Anterior;Right Forearm (Active)   Site Assessment Clean;Dry;Intact 08/27/24 2300   Dressing Type Transparent Film 08/27/24 2300   Line Status Saline locked;Scrubbed the hub prior to access;Flushed 08/27/24 2300   Dressing Status Clean;Dry;Intact 08/27/24 2300   Dressing Intervention N/A 08/26/24 2058   Date Primary Tubing Changed 08/28/24 08/28/24 0000   Date Secondary Tubing Changed 08/28/24 08/28/24 0000   Infiltration Grading ([facility], [facility]) 0 08/27/24 2300   Phlebitis Scale ([facility] Only) 0 08/27/24 2300       CVC Triple Lumen 08/27/24 Lumen 1: Red Lumen 2: White Lumen 3: Blue Left Internal jugular (Active)   Site Assessment Clean;Dry;Intact 08/27/24 2300   Lumen 1 Status Blood return  noted;Normal saline locked;Scrubbed the hub prior to access;Flushed 08/27/24 2300   Lumen 3 Status Blood return noted;Flushed;Scrubbed the hub prior to access;Normal saline locked 08/27/24 2300   Lumen 2 Status Blood return noted;Normal saline locked;Scrubbed the hub prior to access;Flushed 08/27/24 2300   Dressing Type Transparent;Occlusive 08/27/24 2300   Dressing Status Clean;Dry;Intact 08/27/24 2300   Dressing Intervention N/A 08/27/24 2300   Dressing Change Due 08/31/24 08/27/24 2300     Urethral Catheter Non-latex 20 Fr. (Active)   Site Assessment Clean;Skin intact 08/27/24 2300   Collection Container Standard drainage bag 08/27/24 2300   Urinary Catheter Care Tamper Evident Seal in Place;Drainage Tube Extended;Drainage Bag Not Overfilled;Drainage Tubing Properly Secured;Drainage Bag Below Bladder Level and Not on Floor 08/27/24 2300   Securement Method Securing device (Describe) 08/27/24 2300   Output (mL) 600 mL 08/27/24 1800      Moisture Associated Skin Damage 07/25/24 Medial;Midline Sacrum;Thigh (Active)   Wound Image    08/23/24 1000   NEXT Weekly Photo (Inpatient Only) 08/28/24 08/23/24 1000   Drainage Amount Scant 08/24/24 2000   Drainage Description Serosanguineous 08/24/24 0909   Periwound Assessment Pink;Red;Excoriated;Blanchable erythema;Fragile;Painful;Rash 08/24/24 0909   IAD Cleansing Foam Cleanser/Washcloth 08/24/24 2000   Periwound Protectant Skin Protectant Wipes to Periwound;Barrier Paste 08/24/24 1600   IAD Containment Device None 08/24/24 2000   Length (cm) 4 08/23/24 1000   Width (cm) 4 08/23/24 1000   WOUND NURSE ONLY - Time Spent with Patient (mins) 30 08/23/24 1000       Moisture Associated Skin Damage 07/26/24 Panus;Groin (Active)   Wound Image      08/20/24 0900   Drainage Amount None 08/27/24 2000   Drainage Description Yellow 08/19/24 0832   Periwound Assessment Clean;Pink 08/27/24 2000   IAD Cleansing Foam Cleanser/Washcloth 08/26/24 0740   Periwound Protectant Antifungal Therapy  08/27/24 2000   IAD Containment Device Indwelling Catheter 08/26/24 0740     CVC Triple Lumen 08/27/24 Lumen 1: Red Lumen 2: White Lumen 3: Blue Left Internal jugular (Active)   Site Assessment Clean;Dry;Intact 08/27/24 2300   Lumen 1 Status Blood return noted;Normal saline locked;Scrubbed the hub prior to access;Flushed 08/27/24 2300   Lumen 3 Status Blood return noted;Flushed;Scrubbed the hub prior to access;Normal saline locked 08/27/24 2300   Lumen 2 Status Blood return noted;Normal saline locked;Scrubbed the hub prior to access;Flushed 08/27/24 2300   Dressing Type Transparent;Occlusive 08/27/24 2300   Dressing Status Clean;Dry;Intact 08/27/24 2300   Dressing Intervention N/A 08/27/24 2300   Dressing Change Due 08/31/24 08/27/24 2300      Peripheral IV 08/25/24 20 G Anterior;Right Forearm (Active)   Site Assessment Clean;Dry;Intact 08/27/24 2300   Dressing Type Transparent Film 08/27/24 2300   Line Status Saline locked;Scrubbed the hub prior to access;Flushed 08/27/24 2300   Dressing Status Clean;Dry;Intact 08/27/24 2300   Dressing Intervention N/A 08/26/24 2058   Date Primary Tubing Changed 08/28/24 08/28/24 0000   Date Secondary Tubing Changed 08/28/24 08/28/24 0000   Infiltration Grading ([facility]) 0 08/27/24 2300   Phlebitis Scale ([facility] Only) 0 08/27/24 2300           Urethral Catheter Non-latex 20 Fr. (Active)   Site Assessment Clean;Skin intact 08/27/24 2300   Collection Container Standard drainage bag 08/27/24 2300   Urinary Catheter Care Tamper Evident Seal in Place;Drainage Tube Extended;Drainage Bag Not Overfilled;Drainage Tubing Properly Secured;Drainage Bag Below Bladder Level and Not on Floor 08/27/24 2300   Securement Method Securing device (Describe) 08/27/24 2300   Output (mL) 600 mL 08/27/24 1800        MENTAL STATUS ON TRANSFER   Patient is at her baseline mental status - A&Ox4       CONSULTATIONS  Urology   ID    PROCEDURES  8/19: aborted nephrostomy tube placement  8/22: Aborted neph  tube placement   8/25: CYSTOSCOPY, WITH RIGHT URETERAL STENT INSERTION   8/27: central line placement for IV antibiotics     LABORATORY  Lab Results   Component Value Date    SODIUM 136 08/28/2024    POTASSIUM 4.2 08/28/2024    CHLORIDE 111 08/28/2024    CO2 17 (L) 08/28/2024    GLUCOSE 60 (L) 08/28/2024    BUN 11 08/28/2024    CREATININE 0.70 08/28/2024        Lab Results   Component Value Date    WBC 16.3 (H) 08/27/2024    HEMOGLOBIN 8.3 (L) 08/27/2024    HEMATOCRIT 26.0 (L) 08/27/2024    PLATELETCT 225 08/27/2024      DX-CHEST-PORTABLE (1 VIEW)   Final Result         1.  Left IJ catheter with distal tip over the distal cervical vena cava.      2.  Bilateral elliptical opacities in the perihilar regions bilaterally consistent with atelectasis, pneumonitis, or mediastinal mass.      CT-ABDOMEN-PELVIS W/O   Final Result         Limited exam due to lack of oral or IV contrast      1. A right double-J ureteral stent is in place, with the proximal loop located in the renal pelvis and the distal loop in the urinary bladder. There is a filling defect in the renal pelvis mixed with contrast, likely representing hemorrhage.      2. There is contrast surrounding the right kidney, likely due to prior procedure.      DX-PORTABLE FLUOROSCOPY < 1 HOUR Reason For Exam: Main OR   Final Result      Portable fluoroscopy utilized for 2 minutes 1 second.         INTERPRETING LOCATION: [address] [place] [address], [id number]      [id number] VIEW   Final Result      Digitized intraoperative radiograph is submitted for review. This examination is not for diagnostic purpose but for guidance during a surgical procedure. Please see the patient's chart for full procedural details.         INTERPRETING LOCATION: [address] [place] [address], [id number]      IR-EXCHANGE PERQ NEPH CATH (ALL RADIOLOGY) RIGHT   Final Result      1.  Unsuccessful image guided RIGHT nephrostomy tube placement. No significant hydronephrosis seen on ultrasound.   2.  If additional  attempts are indicated, recommend preprocedure CT and possible CT for targeting.      US-RENAL   Final Result      Severe RIGHT hydronephrosis likely probably chronic with overlying cortical thinning      IR-PERQ NEPH CATH NEW ACCESS (ALL RADIOLOGY) RIGHT   Final Result            1.  Mild pelviectasis of the right kidney without evidence of significant hydronephrosis.      2.  Aborted right nephrostomy tube placement. Recommend follow-up ultrasound of the right kidney in several days to assess if right-sided hydronephrosis recurs, and if right-sided hydronephrosis develops right nephrostomy tube placement can be    reattempted.      DX-CHEST-LIMITED (1 VIEW)   Final Result      Right sided central venous catheter tip projects at cavoatrial junction. No pneumothorax.      No acute cardiopulmonary abnormality.      Hiatal hernia.        Total time of the discharge process exceeds 54 minutes.

## 2024-08-28 NOTE — PROCEDURES
Central Line Insertion    Date/Time: 8/27/2024 5:59 PM    Performed by: Luis Thompson M.D.  Authorized by: Luis Thompson M.D.    Central Line Insertion    Date/Time: 8/27/2024 6:00 PM    Performed by: Luis Thompson M.D.  Authorized by: Luis Thompson M.D.    Consent:     Consent obtained:  Verbal and written    Consent given by:  Patient    Risks discussed:  Pneumothorax, infection, bleeding, incorrect placement and arterial puncture    Alternatives discussed:  No treatment  Universal protocol:     Procedure explained and questions answered to patient or proxy's satisfaction: yes      Immediately prior to procedure, a time out was called: yes      Patient identity confirmed:  Provided demographic data  Pre-procedure details:     Hand hygiene: Hand hygiene performed prior to insertion      Sterile barrier technique: All elements of maximal sterile technique followed      Skin preparation:  2% chlorhexidine    Skin preparation agent: Skin preparation agent completely dried prior to procedure    Sedation:     Sedation type:  None  Anesthesia:     Anesthesia method:  Local infiltration    Local anesthetic:  Lidocaine 1% w/o epi  Procedure details:     Location:  L internal jugular    Patient position:  Reverse Trendelenburg    Procedural supplies:  Triple lumen    Catheter size:  7 Fr    Landmarks identified: yes      Ultrasound guidance: yes      Sterile ultrasound techniques: Sterile gel and sterile probe covers were used      Number of attempts:  1    Successful placement: yes    Post-procedure details:     Post-procedure:  Dressing applied and line sutured    Guidewire: guidewire removal confirmed      Assessment:  Blood return through all ports, no pneumothorax on x-ray, free fluid flow and placement verified by x-ray    Patient tolerance of procedure:  Tolerated well, no immediate complications

## 2024-08-28 NOTE — DISCHARGE PLANNING
DC Transport Scheduled    Transport Company Scheduled:  [employer]  Spoke with Serina at [facility] to schedule transport.    Scheduled Date: 8/28/2024  Scheduled Time: 1400    Destination: [facility]  Destination address: [address] [place] [address]    Notified care team of scheduled transport via [employer].     If there are any changes needed to the DC transportation scheduled, please contact [facility] at ext. [phone] between the hours of 5865-8972 Mon-Fri. If outside those hours, contact the ED Case Manager at ext. [phone].

## 2024-08-28 NOTE — PROGRESS NOTES
Monitor Summary  SR 69-91  (O) PVC, (R) triplet  .16/.08/.36    Monitor strip to be printed and uploaded to chart

## 2024-08-28 NOTE — DISCHARGE PLANNING
Case Management Discharge Planning    Admission Date: 8/17/2024  GMLOS: 5.9  ALOS: 6    6-Clicks ADL Score: 10  6-Clicks Mobility Score: 6  PT and/or OT Eval ordered: Yes  Post-acute Referrals Ordered: Yes  Post-acute Choice Obtained: Yes  Has referral(s) been sent to post-acute provider:  Yes      Anticipated Discharge Dispo: Discharge Disposition: D/T to SNF with Medicare cert in anticipation of skilled care (03)    DME Needed: No    Action(s) Taken: Updated Provider/Nurse on Discharge Plan    Escalations Completed: None    Medically Clear: Yes    Next Steps: Pt has been medically cleared for transfer to SNF and [facility] accepted. [facility] transport for 2 pm pending. Spouse, Marbin gave phone consent to transfer. Discussed IMM , he agrees with transfer.    Barriers to Discharge: Transportation    Is the patient up for discharge tomorrow: No

## 2024-08-28 NOTE — CARE PLAN
The patient is Stable - Low risk of patient condition declining or worsening    Shift Goals  Clinical Goals: IV abx, Q2 turns, VSS  Patient Goals: sleep  Family Goals: Updates    Progress made toward(s) clinical / shift goals:    Problem: Pain - Standard  Goal: Alleviation of pain or a reduction in pain to the patient’s comfort goal  Outcome: Progressing  Note: Pt in 0/10 pain.      Problem: Urinary - Renal Perfusion  Goal: Ability to achieve and maintain adequate renal perfusion and functioning will improve  Outcome: Progressing  Note: Pascal in place to aid in urinary elimination. Recorded output from pascal.      Problem: Respiratory  Goal: Patient will achieve/maintain optimum respiratory ventilation and gas exchange  Note: Pt remained on room air with adequate O2 sat >95%.        Patient is not progressing towards the following goals:

## 2024-08-29 NOTE — PROGRESS NOTES
Note to reader: this note follows the APSO format rather than the historical SOAP format. Assessment and plan located at the top of the note for ease of use.    Chief Complaint  64 y.o. year old female here with atrophic right kidney, recurrent UTI, right hydronephrosis    Assessment/Plan  Interval History   Atrophic hydronephrotic right kidney s/p right ureteral stent placement 8/25  UTI      OK to DC to SNF  Right ureteral stent to remain  Antibiotics per ID  We will arrange outpatient follow up to discuss right nephrectomy      Case discussed with patient and Urology-Dr. Jag MURILLO  Patient seen and examined    8/28. Anticipates DC to SNF. Remain on Meropenem per ID. Afebrile. WBC improved. Creat normal. Olmedo remains with good UOP           Review of Systems  Physical Exam   Review of Systems   Constitutional:  Positive for malaise/fatigue. Negative for fever.   Genitourinary:  Negative for flank pain.     Vitals:    08/28/24 0455 08/28/24 0554 08/28/24 0700 08/28/24 1100   BP: (!) 160/94 (!) 142/74 (!) 140/74 129/71   Pulse: 81 75 74 65   Resp: 16  14 13   Temp: 36.6 °C (97.9 °F)  36.5 °C (97.7 °F) 36.4 °C (97.5 °F)   TempSrc: Temporal  Temporal Temporal   SpO2: 97%  97% 96%   Weight: 102 kg (224 lb 13.9 oz)        Physical Exam  Vitals and nursing note reviewed.   Constitutional:       Appearance: Normal appearance.   HENT:      Head: Normocephalic and atraumatic.      Mouth/Throat:      Mouth: Mucous membranes are moist.   Eyes:      Pupils: Pupils are equal, round, and reactive to light.   Pulmonary:      Effort: Pulmonary effort is normal.   Abdominal:      General: Abdomen is flat. There is no distension.      Palpations: Abdomen is soft.      Tenderness: There is no abdominal tenderness.   Neurological:      General: No focal deficit present.      Mental Status: She is alert.   Psychiatric:         Mood and Affect: Mood normal.          Hematology Chemistry   Lab Results   Component Value Date/Time    WBC  16.3 (H) 08/27/2024 12:59 AM    HEMOGLOBIN 8.3 (L) 08/27/2024 12:59 AM    HEMATOCRIT 26.0 (L) 08/27/2024 12:59 AM    PLATELETCT 225 08/27/2024 12:59 AM     Lab Results   Component Value Date/Time    SODIUM 136 08/28/2024 08:07 AM    POTASSIUM 4.2 08/28/2024 08:07 AM    CHLORIDE 111 08/28/2024 08:07 AM    CO2 17 (L) 08/28/2024 08:07 AM    GLUCOSE 60 (L) 08/28/2024 08:07 AM    BUN 11 08/28/2024 08:07 AM    CREATININE 0.70 08/28/2024 08:07 AM         Labs not explicitly included in this progress note were reviewed by the author.   Radiology/imaging not explicitly included in this progress note was reviewed by the author.     Medications reviewed and Labs reviewed

## 2024-09-05 ENCOUNTER — APPOINTMENT (OUTPATIENT)
Dept: INFECTIOUS DISEASES | Facility: MEDICAL CENTER | Age: 64
End: 2024-09-05
Attending: NURSE PRACTITIONER
Payer: MEDICARE

## 2024-09-05 ENCOUNTER — TELEPHONE (OUTPATIENT)
Dept: INFECTIOUS DISEASES | Facility: MEDICAL CENTER | Age: 64
End: 2024-09-05
Payer: MEDICARE

## 2024-09-05 DIAGNOSIS — Z16.12 ESBL (EXTENDED SPECTRUM BETA-LACTAMASE) PRODUCING BACTERIA INFECTION: ICD-10-CM

## 2024-09-05 DIAGNOSIS — N12 PYELONEPHRITIS: ICD-10-CM

## 2024-09-05 DIAGNOSIS — B96.20 E COLI BACTEREMIA: ICD-10-CM

## 2024-09-05 DIAGNOSIS — A49.9 ESBL (EXTENDED SPECTRUM BETA-LACTAMASE) PRODUCING BACTERIA INFECTION: ICD-10-CM

## 2024-09-05 DIAGNOSIS — R78.81 E COLI BACTEREMIA: ICD-10-CM

## 2024-09-05 ASSESSMENT — ENCOUNTER SYMPTOMS
HEADACHES: 0
BLURRED VISION: 0
DIZZINESS: 0
VOMITING: 0
NERVOUS/ANXIOUS: 0
CHILLS: 0
PALPITATIONS: 0
SPUTUM PRODUCTION: 0
SHORTNESS OF BREATH: 0
MYALGIAS: 0
FEVER: 0
ABDOMINAL PAIN: 0
DOUBLE VISION: 0
NAUSEA: 0
WHEEZING: 0
FOCAL WEAKNESS: 0
BRUISES/BLEEDS EASILY: 0
WEIGHT LOSS: 0
COUGH: 0

## 2024-09-05 NOTE — TELEPHONE ENCOUNTER
Dr Teixeira from [facility] called this morning , asking for patient to be seen as soon as possible. Patient was scheduled 9/4/24 and per transportation refused to come in because appt was too early. I added patient per Dr Teixeira today at 2:30pm and he would arrange transportation. I received a call from patients  saying patient doesn't have transportation to this appt and also received message from Lynette GRAHAM/Sophia cancelling appt today for no transportation. I called Lynette and left a message for return call to arrange a different date for patient

## 2024-09-05 NOTE — PROGRESS NOTES
INFECTIOUS  DISEASE  OUTPATIENT CLINIC  NOTE   Subjective   Primary care provider: Manuel Dubose M.D..     Reason for Follow Up:   Follow-up for   1. ESBL (extended spectrum beta-lactamase) producing bacteria infection        2. Pyelonephritis        3. E coli bacteremia          HPI: Patient previously seen and treated by ID team as inpatient during hospital admission.   Nydia Finch is a 64 y.o. female with a history of CVA, expressive aphasia and right-sided hemiparesis, and atrophic right kidney admitted on 8/17/2024 secondary to dislodged nephrostomy tube. She has a history of pyelonephritis with prior cultures positive for ESBL E. coli. Hospital course complicated by sepsis and blood cultures positive for E. coli, ESBL. Blood cultures on 8/22+ ESBL E. Coli (resistant to fluoroquinolone, susceptible to Bactrim). Urine culture from [facility] catheter+ Candida glabrata.  Not specifically treating Candida glabrata as patient clinically improving on meropenem. S/P cystoscopy, transurethral resection of bladder and right ureteral stent placement on 8/25/2024. 14-day course of antibiotics from 8/25 with stop date of 09/08/2024.     09/05/24- Today Patient reports feeling well. Denies drainage, pungent odor, redness, pain. Denies feeling generally ill, fevers/chills, general malaise, headache, n/v/d.  Pending follow-up with outpatient urology to discuss right nephrectomy.  In the setting of atrophic right kidney along with ureteral stent can possibly become a nidus of infection.  Recommend removal of ureteral stent and nephrectomy completed ASAP further decrease chances of recurrence of infection.  Most recent labs reviewed from 8/27/2024, leukocytosis 16.3, mild decrease compared to previous lab at 19.1 but still elevated, stable anemia 8.3/26.0, CMP with mild hypocalcemia 8.1, GFR 96, creatinine 0.7.  Repeat labs today CBC/CMP for close monitoring.     Current Antimicrobials: IV meropenem 500 mg every 6 hours,  end date 9/8/2024  Previous Antimicrobials:    Other Current Medications:  Home Medications    Medication Sig Taking? Last Dose Authorizing Provider   meropenem (MERREM) 500 MG Recon Soln Infuse 500 mg into a venous catheter every 6 hours for 11 days.   Cat Mcwilliams M.D.   pregabalin (LYRICA) 150 MG Cap Take 150 mg by mouth every day.   Physician Outpatient   mirtazapine (REMERON) 15 MG Tab Take 15 mg by mouth at bedtime.   Physician Outpatient   nystatin (MYCOSTATIN) 499561 UNIT/GM Cream topical cream Apply 1 Application topically 3 times a day. 10 days   Physician Outpatient   magnesium hydroxide (MILK OF MAGNESIA) 400 MG/5ML Suspension Take 30 mL by mouth 1 time a day as needed.   Physician Outpatient   metoprolol tartrate (LOPRESSOR) 25 MG Tab Take 1 Tablet by mouth 2 times a day.   Beverly Vasquez M.D.   tamsulosin (FLOMAX) 0.4 MG capsule Take 1 Capsule by mouth 1/2 hour after breakfast.   Beverly Vasquez M.D.   atorvastatin (LIPITOR) 40 MG Tab Take 40 mg by mouth every evening.   Physician Outpatient   omeprazole (PRILOSEC) 20 MG delayed-release capsule Take 20 mg by mouth every day.   Physician Outpatient   DULoxetine (CYMBALTA) 60 MG Cap DR Particles delayed-release capsule Take 60 mg by mouth 2 times a day.   Physician Outpatient        PMH:  Past Medical History:   Diagnosis Date    Aphagia 1994    Brain aneurysm     Chronic pain     Nephrolithiasis     Right hemiplegia (HCC) 1994    Stroke (HCC)      Past Surgical History:   Procedure Laterality Date    IN CYSTOSCOPY,INSERT URETERAL STENT Right 8/25/2024    Procedure: TURBT, CYSTOSCOPY, WITH RIGHT URETERAL STENT INSERTION;  Surgeon: Ed Robles M.D.;  Location: SURGERY [facility];  Service: Urology    IN CYSTOSCOPY,INSERT URETERAL STENT Right 1/26/2024    Procedure: CYSTOSCOPY;  Surgeon: Mik Rm M.D.;  Location: SURGERY [facility];  Service: Urology    CYSTOSCOPY N/A 7/4/2019    Procedure: CYSTOSCOPY;  Surgeon: Monico Killian M.D.;   Location: SURGERY [facility];  Service: Urology    STENT PLACEMENT Right 7/4/2019    Procedure: STENT PLACEMENT;  Surgeon: Monico Killian M.D.;  Location: SURGERY [facility];  Service: Urology    URETEROSCOPY Right 7/4/2019    Procedure: URETEROSCOPY;  Surgeon: Monico Killian M.D.;  Location: SURGERY [facility];  Service: Urology    CYSTOSCOPY STENT PLACEMENT  2/13/2018    Procedure: CYSTOSCOPY STENT PLACEMENT;  Surgeon: Monico Killian M.D.;  Location: SURGERY [facility];  Service: Urology    GASTRIC RESECTION  2012    Duodenal Switch    OTHER      OTHER NEUROLOGICAL SURG       No family history on file.  Social History     Socioeconomic History    Marital status:      Spouse name: Not on file    Number of children: Not on file    Years of education: Not on file    Highest education level: Not on file   Occupational History    Not on file   Tobacco Use    Smoking status: Never    Smokeless tobacco: Never   Substance and Sexual Activity    Alcohol use: No    Drug use: Not on file     Comment: Marijauna    Sexual activity: Not on file   Other Topics Concern    Not on file   Social History Narrative    Not on file     Social Determinants of Health     Financial Resource Strain: Low Risk  (11/10/2022)    [facility]    Financial Resource Strain     Difficulty of Paying Living Expenses: Not on file     Access to Reliable Phone: Not on file   Food Insecurity: No Food Insecurity (7/26/2024)    Hunger Vital Sign     Worried About Running Out of Food in the Last Year: Never true     Ran Out of Food in the Last Year: Never true   Transportation Needs: No Transportation Needs (7/26/2024)    PRAPARE - Transportation     Lack of Transportation (Medical): No     Lack of Transportation (Non-Medical): No   Physical Activity: Not on file   Stress: Not on file   Social Connections: Not on file   Intimate Partner Violence: Not At Risk (7/26/2024)    Humiliation,  Afraid, Rape, and Kick questionnaire     Fear of Current or Ex-Partner: No     Emotionally Abused: No     Physically Abused: No     Sexually Abused: No   Housing Stability: Low Risk  (7/26/2024)    Housing Stability Vital Sign     Unable to Pay for Housing in the Last Year: No     Number of Places Lived in the Last Year: 1     Unstable Housing in the Last Year: No           Allergies/Intolerances:  Allergies   Allergen Reactions    Piperacillin Sod-Tazobactam So Rash     Gave her head to toe drug rash at [facility] 1/25/2024      Sulfamethoxazole W-Trimethoprim Itching     Rash ,itching    Ceftriaxone Itching     Generalized redness       ROS:   Review of Systems   Constitutional:  Negative for chills, fever, malaise/fatigue and weight loss.   HENT:  Negative for congestion and hearing loss.    Eyes:  Negative for blurred vision and double vision.   Respiratory:  Negative for cough, sputum production, shortness of breath and wheezing.    Cardiovascular:  Negative for chest pain and palpitations.   Gastrointestinal:  Negative for abdominal pain, nausea and vomiting.   Genitourinary:  Negative for dysuria.   Musculoskeletal:  Negative for myalgias.   Skin:  Negative for itching and rash.   Neurological:  Negative for dizziness, focal weakness and headaches.   Endo/Heme/Allergies:  Does not bruise/bleed easily.   Psychiatric/Behavioral:  The patient is not nervous/anxious.       ROS was reviewed and were negative except as above.    Objective    Most Recent Vital Signs:  LMP  (LMP Unknown)     Physical Exam:  Physical Exam  Vitals and nursing note reviewed.   Constitutional:       General: She is not in acute distress.     Appearance: Normal appearance. She is not ill-appearing.   HENT:      Head: Normocephalic and atraumatic.      Nose: Nose normal.      Mouth/Throat:      Mouth: Mucous membranes are moist.   Eyes:      Pupils: Pupils are equal, round, and reactive to light.   Cardiovascular:      Rate and  Rhythm: Normal rate and regular rhythm.   Pulmonary:      Effort: Pulmonary effort is normal. No respiratory distress.      Breath sounds: Normal breath sounds. No stridor.   Musculoskeletal:      Cervical back: Normal range of motion.      Right lower leg: No edema.      Left lower leg: No edema.   Skin:     General: Skin is warm and dry.      Capillary Refill: Capillary refill takes less than 2 seconds.      Coloration: Skin is not jaundiced or pale.   Neurological:      General: No focal deficit present.      Mental Status: She is alert and oriented to person, place, and time.   Psychiatric:         Mood and Affect: Mood normal.         Behavior: Behavior normal.          Pertinent Lab/Imaging Results:  [unfilled]  @CMP@  WBC   Date/Time Value Ref Range Status   08/27/2024 12:59 AM 16.3 (H) 4.8 - 10.8 K/uL Final     RBC   Date/Time Value Ref Range Status   08/27/2024 12:59 AM 2.96 (L) 4.20 - 5.40 M/uL Final     Hemoglobin   Date/Time Value Ref Range Status   08/27/2024 12:59 AM 8.3 (L) 12.0 - 16.0 g/dL Final     Hematocrit   Date/Time Value Ref Range Status   08/27/2024 12:59 AM 26.0 (L) 37.0 - 47.0 % Final     MCV   Date/Time Value Ref Range Status   08/27/2024 12:59 AM 87.8 81.4 - 97.8 fL Final     MCH   Date/Time Value Ref Range Status   08/27/2024 12:59 AM 28.0 27.0 - 33.0 pg Final     MCHC   Date/Time Value Ref Range Status   08/27/2024 12:59 AM 31.9 (L) 32.2 - 35.5 g/dL Final     MPV   Date/Time Value Ref Range Status   08/27/2024 12:59 AM 11.6 9.0 - 12.9 fL Final      Sodium   Date/Time Value Ref Range Status   08/28/2024 08:07  135 - 145 mmol/L Final     Potassium   Date/Time Value Ref Range Status   08/28/2024 08:07 AM 4.2 3.6 - 5.5 mmol/L Final     Chloride   Date/Time Value Ref Range Status   08/28/2024 08:07  96 - 112 mmol/L Final     Co2   Date/Time Value Ref Range Status   08/28/2024 08:07 AM 17 (L) 20 - 33 mmol/L Final     Glucose   Date/Time Value Ref Range Status   08/28/2024 08:07 AM  "60 (L) 65 - 99 mg/dL Final     Bun   Date/Time Value Ref Range Status   08/28/2024 08:07 AM 11 8 - 22 mg/dL Final     Creatinine   Date/Time Value Ref Range Status   08/28/2024 08:07 AM 0.70 0.50 - 1.40 mg/dL Final     Alkaline Phosphatase   Date/Time Value Ref Range Status   08/23/2024 01:51  (H) 30 - 99 U/L Final     AST(SGOT)   Date/Time Value Ref Range Status   08/23/2024 01:51 AM 11 (L) 12 - 45 U/L Final     ALT(SGPT)   Date/Time Value Ref Range Status   08/23/2024 01:51 AM <5 2 - 50 U/L Final     Total Bilirubin   Date/Time Value Ref Range Status   08/23/2024 01:51 AM 0.4 0.1 - 1.5 mg/dL Final      No results found for: \"CPKTOTAL\"       Blood Culture   Date Value Ref Range Status   02/19/2018 No growth after 5 days of incubation.  Final       Blood Culture   Date Value Ref Range Status   02/19/2018 No growth after 5 days of incubation.  Final    BLOOD CULTURE  Order: [id number]   Status: Edited Result - FINAL       Visible to patient: No (inaccessible in MyChart)       Next appt: None    Specimen Information: Peripheral; Blood   0 Result Notes      Component 13 d ago   Significant Indicator POS Positive (POS)   Source BLD   Site PERIPHERAL   Culture Result Growth detected by Bactec instrument. 08/23/2024  03:47 Abnormal    Culture Result  Abnormal   Escherichia coli ESBL  See previous culture for sensitivity report.  Extended Spectrum Beta-lactamase (ESBL) isolated.  ESBL's may be clinically resistant to therapy with  Penicillins,Cephalosporins or Aztreonam despite  apparent in vitro susceptibility to some of these agents.  The patient requires contact isolation.  Please contact pharmacy or an Infectious Disease Specialist  if you have any questions about appropriate therapy.    Culture Result  Abnormal   Coagulase-negative Staphylococcus species  Possible Contaminant  POSSIBLE CONTAMINANT: Isolated from one set only, please  correlate with clinical condition. Contact the Microbiology  department within " 48 hr if identification and susceptibility  are needed.    Resulting Agency [facility]              Specimen Collected: 08/22/24 12:24 PM Last Resulted: 08/25/24  9:37 AM            ntains abnormal data BLOOD CULTURE  Order: [id number]   Status: Final result       Visible to patient: No (inaccessible in MyChart)       Next appt: None    Specimen Information: Peripheral; Blood   0 Result Notes      Component 13 d ago   Significant Indicator POS Positive (POS)   Source BLD   Site PERIPHERAL   Culture Result Growth detected by Bactec instrument. 08/23/2024  00:24 Abnormal    Culture Result  Abnormal   Escherichia coli ESBL  Extended Spectrum Beta-lactamase (ESBL) isolated.  ESBL's may be clinically resistant to therapy with  Penicillins,Cephalosporins or Aztreonam despite  apparent in vitro susceptibility to some of these agents.  The patient requires contact isolation.  Please contact pharmacy or an Infectious Disease Specialist  if you have any questions about appropriate therapy.    Resulting Agency [facility]        Susceptibility     Escherichia coli esbl     ANISH     Ampicillin >16 mcg/mL Resistant     Ampicillin/sulbactam 16/8 mcg/mL Intermediate     Cefazolin >16 mcg/mL Resistant     Cefepime >16 mcg/mL Resistant     Ceftriaxone >32 mcg/mL Resistant     Cefuroxime >16 mcg/mL Resistant     Ciprofloxacin >2 mcg/mL Resistant     Ertapenem <=0.5 mcg/mL Sensitive     Gentamicin >8 mcg/mL Resistant     Levofloxacin >4 mcg/mL Resistant     Minocycline <=4 mcg/mL Sensitive     Moxifloxacin >4 mcg/mL Resistant     Pip/Tazobactam 16 mcg/mL Sensitive     Tigecycline <=2 mcg/mL Sensitive     Tobramycin >8 mcg/mL Resistant     Trimeth/Sulfa <=0.5/9.5 m... Sensitive                  Specimen Collected: 08/22/24 12:24 PM Last Resulted: 08/25/24  9:34 AM          ntains abnormal data Urine Culture  Order: [id number]   Status: Final result       Visible to patient: No (inaccessible in MyChart)       Next appt: None    Specimen Information: Urine,  Garcia Cath   0 Result Notes      Component 13 d ago   Significant Indicator POS Positive (POS)   Source UR   Site URINE, GARCIA CATH   Culture Result - Abnormal    Culture Result  Abnormal   Candida glabrata  >100,000 cfu/mL    Resulting Agency [facility]              Specimen Collected: 08/22/24  1:03 PM Last Resulted: 08/24/24  9:57 AM            Impression/Assessment      1. ESBL (extended spectrum beta-lactamase) producing bacteria infection        2. Pyelonephritis        3. E coli bacteremia        Nydia Finch is a 64 y.o. female with a history of CVA, expressive aphasia and right-sided hemiparesis, and atrophic right kidney admitted on 8/17/2024 secondary to dislodged nephrostomy tube. She has a history of pyelonephritis with prior cultures positive for ESBL E. coli. Hospital course complicated by sepsis and blood cultures positive for E. coli, ESBL. Blood cultures on 8/22+ ESBL E. Coli (resistant to fluoroquinolone, susceptible to Bactrim). Urine culture from [facility] catheter+ Candida glabrata.  Not specifically treating Candida glabrata as patient clinically improving on meropenem. S/P cystoscopy, transurethral resection of bladder and right ureteral stent placement on 8/25/2024. 14-day course of antibiotics from 8/25 with stop date of 09/08/2024.     09/05/24- Today Patient reports feeling well. Denies drainage, pungent odor, redness, pain. Denies feeling generally ill, fevers/chills, general malaise, headache, n/v/d.  Pending follow-up with outpatient urology to discuss right nephrectomy.  In the setting of atrophic right kidney along with ureteral stent can possibly become a nidus of infection.  Recommend removal of ureteral stent and nephrectomy completed ASAP further decrease chances of recurrence of infection.  Most recent labs reviewed from 8/27/2024, leukocytosis 16.3, mild decrease compared to previous lab at 19.1 but still elevated, stable anemia 8.3/26.0, CMP with mild hypocalcemia 8.1, GFR 96, creatinine  0.7.  Repeat labs today CBC/CMP for close monitoring.     - This infection/ chronic illness posses a threat to life, bodily function, and limb preservation   PLAN:   - Continue IV meropenem 500 mg every 6 hours, 2-week course with end date of 9/8/2024  - Okay to remove midline after last IV infusion  - Repeat Labs CBC/CMP today for monitoring of side effects, medication toxicity, medication interactions, and for infection  - Medication education provided and S/S of side effects discussed   - Recommend routine follow up with nephrology.  Pending surgical date for right nephrectomy  - Continue wound care to right kidney nephrostomy tube  - Recommended to start po probiotic      Return visit: PRN. Follow up with primary care physician for chronic medical problems    I have performed a physical exam,  updated ROS and plan today. I have reviewed previous images, labs, and provider notes.      MASHA Mendoza.    All Patients should seek medical re-evaluation or report to the ER for new, increasing or worsening symptoms. In some circumstances medical conditions can change from the initial evaluation and may require emergent medical re-evaluation. This includes but is not limited to chest pain, shortness of breath, atypical abdominal pain, atypical headache, ALOC, fever >101, low blood pressure, high respiratory rate (above 30), low oxygen saturation (below 90%), acute delirium, abnormal bleeding, inability to tolerate any intake, weakness on one side of the body, any worsened or concerning conditions.    Please note that this dictation was created using voice recognition software. I have worked with technical experts from [facility] to optimize the interface.  I have made every reasonable attempt to correct obvious errors, but there may be errors of grammar and possibly content that I did not discover before finalizing the note.

## 2024-09-06 ENCOUNTER — OFFICE VISIT (OUTPATIENT)
Dept: INFECTIOUS DISEASES | Facility: MEDICAL CENTER | Age: 64
End: 2024-09-06
Attending: NURSE PRACTITIONER
Payer: MEDICARE

## 2024-09-06 VITALS
HEIGHT: 67 IN | DIASTOLIC BLOOD PRESSURE: 64 MMHG | WEIGHT: 186 LBS | BODY MASS INDEX: 29.19 KG/M2 | OXYGEN SATURATION: 96 % | TEMPERATURE: 97.3 F | HEART RATE: 124 BPM | SYSTOLIC BLOOD PRESSURE: 104 MMHG | RESPIRATION RATE: 20 BRPM

## 2024-09-06 DIAGNOSIS — N39.0 URINARY TRACT INFECTION ASSOCIATED WITH CATHETERIZATION OF URINARY TRACT, UNSPECIFIED INDWELLING URINARY CATHETER TYPE, INITIAL ENCOUNTER (HCC): ICD-10-CM

## 2024-09-06 DIAGNOSIS — T83.511A URINARY TRACT INFECTION ASSOCIATED WITH CATHETERIZATION OF URINARY TRACT, UNSPECIFIED INDWELLING URINARY CATHETER TYPE, INITIAL ENCOUNTER (HCC): ICD-10-CM

## 2024-09-06 DIAGNOSIS — A49.9 ESBL (EXTENDED SPECTRUM BETA-LACTAMASE) PRODUCING BACTERIA INFECTION: ICD-10-CM

## 2024-09-06 DIAGNOSIS — Z16.12 ESBL (EXTENDED SPECTRUM BETA-LACTAMASE) PRODUCING BACTERIA INFECTION: ICD-10-CM

## 2024-09-06 DIAGNOSIS — R78.81 BACTEREMIA: ICD-10-CM

## 2024-09-06 PROCEDURE — 3078F DIAST BP <80 MM HG: CPT | Performed by: NURSE PRACTITIONER

## 2024-09-06 PROCEDURE — 3074F SYST BP LT 130 MM HG: CPT | Performed by: NURSE PRACTITIONER

## 2024-09-06 PROCEDURE — 99215 OFFICE O/P EST HI 40 MIN: CPT | Performed by: NURSE PRACTITIONER

## 2024-09-06 PROCEDURE — 99212 OFFICE O/P EST SF 10 MIN: CPT | Performed by: NURSE PRACTITIONER

## 2024-09-06 ASSESSMENT — ENCOUNTER SYMPTOMS
BLURRED VISION: 0
COUGH: 0
VOMITING: 0
HEADACHES: 0
DIZZINESS: 0
WHEEZING: 0
ABDOMINAL PAIN: 0
WEIGHT LOSS: 0
CHILLS: 0
DOUBLE VISION: 0
SHORTNESS OF BREATH: 0
FOCAL WEAKNESS: 1
NERVOUS/ANXIOUS: 0
PALPITATIONS: 0
BRUISES/BLEEDS EASILY: 0
FEVER: 0
MYALGIAS: 0
NAUSEA: 0
SPUTUM PRODUCTION: 0

## 2024-09-06 ASSESSMENT — FIBROSIS 4 INDEX: FIB4 SCORE: 1.47

## 2024-09-06 NOTE — PROGRESS NOTES
INFECTIOUS  DISEASE  OUTPATIENT CLINIC  NOTE   Subjective   Primary care provider: Manuel Dubose M.D..     Reason for Follow Up:   Follow-up for   1. Urinary tract infection associated with catheterization of urinary tract, unspecified indwelling urinary catheter type, initial encounter (HCC)  CBC WITH DIFFERENTIAL    Comp Metabolic Panel    Nursing Communication    URINE CULTURE(NEW)      2. ESBL (extended spectrum beta-lactamase) producing bacteria infection        3. Bacteremia            HPI: Patient previously seen and treated by ID team as inpatient during hospital admission.   Nydia Finch is a 64 y.o. female with a history of CVA, expressive aphasia and right-sided hemiparesis, and atrophic right kidney admitted on 8/17/2024 secondary to dislodged nephrostomy tube. She has a history of pyelonephritis with prior cultures positive for ESBL E. coli. Hospital course complicated by sepsis and blood cultures positive for E. coli, ESBL. Blood cultures on 8/22+ ESBL E. Coli (resistant to fluoroquinolone, susceptible to Bactrim). Urine culture from Olmedo catheter+ Candida glabrata.  Not specifically treating Candida glabrata as patient clinically improving on meropenem. S/P cystoscopy, transurethral resection of bladder and right ureteral stent placement on 8/25/2024. 14-day course of antibiotics from 8/25 with stop date of 09/08/2024.     09/06/24- Today Patient reports feeling ok.  Denies feeling generally ill, fevers/chills, general malaise, headache, n/v/d.  Patient does have complaints of lower pelvic pain.  Reports increasing bladder spasms.  Concerns for catheter associated UTI.  Nursing communication for Olmedo catheter exchange.  Obtain urine culture after catheter is exchanged.  Repeat labs today CBC/CMP today for close monitoring.  Pending follow-up with outpatient urology to discuss right nephrectomy.  In the setting of atrophic right kidney along with ureteral stent can possibly become a nidus of  infection.  Recommend removal of ureteral stent and nephrectomy completed ASAP further decrease chances of recurrence of infection.  Most recent labs reviewed from 8/27/2024, leukocytosis 16.3, mild decrease compared to previous lab at 19.1 but still elevated, stable anemia 8.3/26.0, CMP with mild hypocalcemia 8.1, GFR 96, creatinine 0.7.  Repeat labs today CBC/CMP for close monitoring.       Current Antimicrobials: IV meropenem 500 mg every 6 hours, end date 9/8/2024  Previous Antimicrobials:    Other Current Medications:  Home Medications    Medication Sig Taking? Last Dose Authorizing Provider   meropenem (MERREM) 500 MG Recon Soln Infuse 500 mg into a venous catheter every 6 hours for 11 days. Yes Taking Cat Mcwilliams M.D.   pregabalin (LYRICA) 150 MG Cap Take 150 mg by mouth every day. Yes Taking Physician Outpatient   mirtazapine (REMERON) 15 MG Tab Take 15 mg by mouth at bedtime. Yes Taking Physician Outpatient   nystatin (MYCOSTATIN) 687233 UNIT/GM Cream topical cream Apply 1 Application topically 3 times a day. 10 days Yes Taking Physician Outpatient   magnesium hydroxide (MILK OF MAGNESIA) 400 MG/5ML Suspension Take 30 mL by mouth 1 time a day as needed. Yes Taking Physician Outpatient   metoprolol tartrate (LOPRESSOR) 25 MG Tab Take 1 Tablet by mouth 2 times a day. Yes Taking Beverly Vasquez M.D.   tamsulosin (FLOMAX) 0.4 MG capsule Take 1 Capsule by mouth 1/2 hour after breakfast. Yes Taking Beverly Vasquez M.D.   atorvastatin (LIPITOR) 40 MG Tab Take 40 mg by mouth every evening. Yes Taking Physician Outpatient   omeprazole (PRILOSEC) 20 MG delayed-release capsule Take 20 mg by mouth every day. Yes Taking Physician Outpatient   DULoxetine (CYMBALTA) 60 MG Cap DR Particles delayed-release capsule Take 60 mg by mouth 2 times a day. Yes Taking Physician Outpatient        PMH:  Past Medical History:   Diagnosis Date    Aphagia 1994    Brain aneurysm     Chronic pain     Nephrolithiasis     Right hemiplegia  (HCC) 1994    Stroke ([facility])      Past Surgical History:   Procedure Laterality Date    [place] CYSTOSCOPY,INSERT URETERAL STENT Right 8/25/2024    Procedure: TURBT, CYSTOSCOPY, WITH RIGHT URETERAL STENT INSERTION;  Surgeon: Ed Robles M.D.;  Location: SURGERY [facility];  Service: Urology    [place] CYSTOSCOPY,INSERT URETERAL STENT Right 1/26/2024    Procedure: CYSTOSCOPY;  Surgeon: Mik Rm M.D.;  Location: SURGERY [facility];  Service: Urology    CYSTOSCOPY N/A 7/4/2019    Procedure: CYSTOSCOPY;  Surgeon: Monico Killian M.D.;  Location: SURGERY [facility];  Service: Urology    STENT PLACEMENT Right 7/4/2019    Procedure: STENT PLACEMENT;  Surgeon: Monico Killian M.D.;  Location: SURGERY [facility];  Service: Urology    URETEROSCOPY Right 7/4/2019    Procedure: URETEROSCOPY;  Surgeon: Monico Killian M.D.;  Location: SURGERY [facility];  Service: Urology    CYSTOSCOPY STENT PLACEMENT  2/13/2018    Procedure: CYSTOSCOPY STENT PLACEMENT;  Surgeon: Monico Killian M.D.;  Location: SURGERY [facility];  Service: Urology    GASTRIC RESECTION  2012    Duodenal Switch    OTHER      OTHER NEUROLOGICAL SURG       No family history on file.  Social History     Socioeconomic History    Marital status:      Spouse name: Not on file    Number of children: Not on file    Years of education: Not on file    Highest education level: Not on file   Occupational History    Not on file   Tobacco Use    Smoking status: Never    Smokeless tobacco: Never   Substance and Sexual Activity    Alcohol use: No    Drug use: Not on file     Comment: Marijauna    Sexual activity: Not on file   Other Topics Concern    Not on file   Social History Narrative    Not on file     Social Determinants of Health     Financial Resource Strain: Low Risk  (11/10/2022)    Received from [employer] Contract[facility]    Financial Resource Strain     Difficulty of Paying Living Expenses: Not on file     Access to  Reliable Phone: Not on file   Food Insecurity: No Food Insecurity (7/26/2024)    Hunger Vital Sign     Worried About Running Out of Food in the Last Year: Never true     Ran Out of Food in the Last Year: Never true   Transportation Needs: No Transportation Needs (7/26/2024)    PRAPARE - Transportation     Lack of Transportation (Medical): No     Lack of Transportation (Non-Medical): No   Physical Activity: Not on file   Stress: Not on file   Social Connections: Not on file   Intimate Partner Violence: Not At Risk (7/26/2024)    Humiliation, Afraid, Rape, and Kick questionnaire     Fear of Current or Ex-Partner: No     Emotionally Abused: No     Physically Abused: No     Sexually Abused: No   Housing Stability: Low Risk  (7/26/2024)    Housing Stability Vital Sign     Unable to Pay for Housing in the Last Year: No     Number of Places Lived in the Last Year: 1     Unstable Housing in the Last Year: No           Allergies/Intolerances:  Allergies   Allergen Reactions    Piperacillin Sod-Tazobactam So Rash     Gave her head to toe drug rash at [facility] 1/25/2024      Sulfamethoxazole W-Trimethoprim Itching     Rash ,itching    Ceftriaxone Itching     Generalized redness       ROS:   Review of Systems   Constitutional:  Positive for malaise/fatigue. Negative for chills, fever and weight loss.   HENT:  Negative for congestion and hearing loss.    Eyes:  Negative for blurred vision and double vision.   Respiratory:  Negative for cough, sputum production, shortness of breath and wheezing.    Cardiovascular:  Negative for chest pain and palpitations.   Gastrointestinal:  Negative for abdominal pain, nausea and vomiting.   Genitourinary:  Positive for dysuria, frequency and urgency.   Musculoskeletal:  Negative for myalgias.   Skin:  Negative for itching and rash.   Neurological:  Positive for focal weakness (Right-sided weakness). Negative for dizziness and headaches.   Endo/Heme/Allergies:  Does not  "bruise/bleed easily.   Psychiatric/Behavioral:  The patient is not nervous/anxious.       ROS was reviewed and were negative except as above.    Objective    Most Recent Vital Signs:  /64 (BP Location: Left arm, Patient Position: Sitting, BP Cuff Size: Adult)   Pulse (!) 124   Temp 36.3 °C (97.3 °F) (Temporal)   Resp 20   Ht 1.702 m (5' 7\")   Wt 84.4 kg (186 lb)   LMP  (LMP Unknown)   SpO2 96%   BMI 29.13 kg/m²     Physical Exam:  Physical Exam  Vitals and nursing note reviewed.   Constitutional:       General: She is not in acute distress.     Appearance: She is not ill-appearing.      Comments: Wheelchair   HENT:      Head: Normocephalic and atraumatic.      Nose: Nose normal.      Mouth/Throat:      Mouth: Mucous membranes are moist.   Eyes:      Pupils: Pupils are equal, round, and reactive to light.   Cardiovascular:      Rate and Rhythm: Normal rate and regular rhythm.   Pulmonary:      Effort: Pulmonary effort is normal. No respiratory distress.      Breath sounds: Normal breath sounds. No stridor.   Abdominal:      Tenderness: There is abdominal tenderness. There is no right CVA tenderness or left CVA tenderness.   Genitourinary:     Comments: Olmedo catheter  Musculoskeletal:      Cervical back: Neck supple.      Right lower leg: No edema.      Left lower leg: No edema.      Comments: Bilateral knee scars   Skin:     General: Skin is warm and dry.      Capillary Refill: Capillary refill takes less than 2 seconds.      Coloration: Skin is not jaundiced or pale.   Neurological:      Mental Status: She is alert and oriented to person, place, and time.      Motor: Weakness (Right-sided weakness) present.   Psychiatric:         Mood and Affect: Mood normal.         Behavior: Behavior normal.          Pertinent Lab/Imaging Results:  [unfilled]  @CMP@  WBC   Date/Time Value Ref Range Status   08/27/2024 12:59 AM 16.3 (H) 4.8 - 10.8 K/uL Final     RBC   Date/Time Value Ref Range Status   08/27/2024 " "12:59 AM 2.96 (L) 4.20 - 5.40 M/uL Final     Hemoglobin   Date/Time Value Ref Range Status   08/27/2024 12:59 AM 8.3 (L) 12.0 - 16.0 g/dL Final     Hematocrit   Date/Time Value Ref Range Status   08/27/2024 12:59 AM 26.0 (L) 37.0 - 47.0 % Final     MCV   Date/Time Value Ref Range Status   08/27/2024 12:59 AM 87.8 81.4 - 97.8 fL Final     MCH   Date/Time Value Ref Range Status   08/27/2024 12:59 AM 28.0 27.0 - 33.0 pg Final     MCHC   Date/Time Value Ref Range Status   08/27/2024 12:59 AM 31.9 (L) 32.2 - 35.5 g/dL Final     MPV   Date/Time Value Ref Range Status   08/27/2024 12:59 AM 11.6 9.0 - 12.9 fL Final      Sodium   Date/Time Value Ref Range Status   08/28/2024 08:07  135 - 145 mmol/L Final     Potassium   Date/Time Value Ref Range Status   08/28/2024 08:07 AM 4.2 3.6 - 5.5 mmol/L Final     Chloride   Date/Time Value Ref Range Status   08/28/2024 08:07  96 - 112 mmol/L Final     Co2   Date/Time Value Ref Range Status   08/28/2024 08:07 AM 17 (L) 20 - 33 mmol/L Final     Glucose   Date/Time Value Ref Range Status   08/28/2024 08:07 AM 60 (L) 65 - 99 mg/dL Final     Bun   Date/Time Value Ref Range Status   08/28/2024 08:07 AM 11 8 - 22 mg/dL Final     Creatinine   Date/Time Value Ref Range Status   08/28/2024 08:07 AM 0.70 0.50 - 1.40 mg/dL Final     Alkaline Phosphatase   Date/Time Value Ref Range Status   08/23/2024 01:51  (H) 30 - 99 U/L Final     AST(SGOT)   Date/Time Value Ref Range Status   08/23/2024 01:51 AM 11 (L) 12 - 45 U/L Final     ALT(SGPT)   Date/Time Value Ref Range Status   08/23/2024 01:51 AM <5 2 - 50 U/L Final     Total Bilirubin   Date/Time Value Ref Range Status   08/23/2024 01:51 AM 0.4 0.1 - 1.5 mg/dL Final      No results found for: \"CPKTOTAL\"       Blood Culture   Date Value Ref Range Status   02/19/2018 No growth after 5 days of incubation.  Final       Blood Culture   Date Value Ref Range Status   02/19/2018 No growth after 5 days of incubation.  Final    BLOOD " CULTURE  Order: [id number]   Status: Edited Result - FINAL       Visible to patient: No (inaccessible in MyChart)       Next appt: None    Specimen Information: Peripheral; Blood   0 Result Notes      Component 13 d ago   Significant Indicator POS Positive (POS)   Source BLD   Site PERIPHERAL   Culture Result Growth detected by Bactec instrument. 08/23/2024  03:47 Abnormal    Culture Result  Abnormal   Escherichia coli ESBL  See previous culture for sensitivity report.  Extended Spectrum Beta-lactamase (ESBL) isolated.  ESBL's may be clinically resistant to therapy with  Penicillins,Cephalosporins or Aztreonam despite  apparent in vitro susceptibility to some of these agents.  The patient requires contact isolation.  Please contact pharmacy or an Infectious Disease Specialist  if you have any questions about appropriate therapy.    Culture Result  Abnormal   Coagulase-negative Staphylococcus species  Possible Contaminant  POSSIBLE CONTAMINANT: Isolated from one set only, please  correlate with clinical condition. Contact the Microbiology  department within 48 hr if identification and susceptibility  are needed.    Resulting Agency M              Specimen Collected: 08/22/24 12:24 PM Last Resulted: 08/25/24  9:37 AM            ntains abnormal data BLOOD CULTURE  Order: [id number]   Status: Final result       Visible to patient: No (inaccessible in MyChart)       Next appt: None    Specimen Information: Peripheral; Blood   0 Result Notes      Component 13 d ago   Significant Indicator POS Positive (POS)   Source BLD   Site PERIPHERAL   Culture Result Growth detected by Bactec instrument. 08/23/2024  00:24 Abnormal    Culture Result  Abnormal   Escherichia coli ESBL  Extended Spectrum Beta-lactamase (ESBL) isolated.  ESBL's may be clinically resistant to therapy with  Penicillins,Cephalosporins or Aztreonam despite  apparent in vitro susceptibility to some of these agents.  The patient requires contact isolation.  Please  contact pharmacy or an Infectious Disease Specialist  if you have any questions about appropriate therapy.    Resulting Agency [facility]        Susceptibility     Escherichia coli esbl     ANISH     Ampicillin >16 mcg/mL Resistant     Ampicillin/sulbactam 16/8 mcg/mL Intermediate     Cefazolin >16 mcg/mL Resistant     Cefepime >16 mcg/mL Resistant     Ceftriaxone >32 mcg/mL Resistant     Cefuroxime >16 mcg/mL Resistant     Ciprofloxacin >2 mcg/mL Resistant     Ertapenem <=0.5 mcg/mL Sensitive     Gentamicin >8 mcg/mL Resistant     Levofloxacin >4 mcg/mL Resistant     Minocycline <=4 mcg/mL Sensitive     Moxifloxacin >4 mcg/mL Resistant     Pip/Tazobactam 16 mcg/mL Sensitive     Tigecycline <=2 mcg/mL Sensitive     Tobramycin >8 mcg/mL Resistant     Trimeth/Sulfa <=0.5/9.5 m... Sensitive                  Specimen Collected: 08/22/24 12:24 PM Last Resulted: 08/25/24  9:34 AM          ntains abnormal data Urine Culture  Order: [id number]   Status: Final result       Visible to patient: No (inaccessible in MyChart)       Next appt: None    Specimen Information: Urine, Garcia Cath   0 Result Notes      Component 13 d ago   Significant Indicator POS Positive (POS)   Source UR   Site URINE, GARCIA CATH   Culture Result - Abnormal    Culture Result  Abnormal   Candida glabrata  >100,000 cfu/mL    Resulting Agency [facility]              Specimen Collected: 08/22/24  1:03 PM Last Resulted: 08/24/24  9:57 AM            Impression/Assessment      1. Urinary tract infection associated with catheterization of urinary tract, unspecified indwelling urinary catheter type, initial encounter (HCC)  CBC WITH DIFFERENTIAL    Comp Metabolic Panel    Nursing Communication    URINE CULTURE(NEW)      2. ESBL (extended spectrum beta-lactamase) producing bacteria infection        3. Bacteremia          Nydia Finch is a 64 y.o. female with a history of CVA, expressive aphasia and right-sided hemiparesis, and atrophic right kidney admitted on 8/17/2024 secondary  to dislodged nephrostomy tube. She has a history of pyelonephritis with prior cultures positive for ESBL E. coli. Hospital course complicated by sepsis and blood cultures positive for E. coli, ESBL. Blood cultures on 8/22+ ESBL E. Coli (resistant to fluoroquinolone, susceptible to Bactrim). Urine culture from Olmedo catheter+ Candida glabrata.  Not specifically treating Candida glabrata as patient clinically improving on meropenem. S/P cystoscopy, transurethral resection of bladder and right ureteral stent placement on 8/25/2024. 14-day course of antibiotics from 8/25 with stop date of 09/08/2024.     09/06/24- Today Patient reports feeling ok.  Denies feeling generally ill, fevers/chills, general malaise, headache, n/v/d.  Patient does have complaints of lower pelvic pain.  Reports increasing bladder spasms.  Concerns for catheter associated UTI.  Nursing communication for Olmedo catheter exchange.  Obtain urine culture after catheter is exchanged.  Repeat labs today CBC/CMP today for close monitoring.  Pending follow-up with outpatient urology to discuss right nephrectomy.  In the setting of atrophic right kidney along with ureteral stent can possibly become a nidus of infection.  Recommend removal of ureteral stent and nephrectomy completed ASAP further decrease chances of recurrence of infection.  Most recent labs reviewed from 8/27/2024, leukocytosis 16.3, mild decrease compared to previous lab at 19.1 but still elevated, stable anemia 8.3/26.0, CMP with mild hypocalcemia 8.1, GFR 96, creatinine 0.7.  Repeat labs today CBC/CMP for close monitoring.     - This infection posses a threat to life and bodily function  PLAN:   - Continue IV meropenem 500 mg every 6 hours, 2-week course with end date of 9/8/2024  - Okay to remove midline after last IV infusion  - Concerns for catheter associated UTI.  Nursing communication for Olmedo catheter exchange.  Obtain urine culture after catheter is exchanged.    - Repeat Labs  CBC/CMP today for monitoring of side effects, medication toxicity, medication interactions, and for infection  - Medication education provided and S/S of side effects discussed   - Recommend routine follow up with nephrology.  Pending surgical date for right nephrectomy  - Continue wound care to right kidney nephrostomy tube  - Recommended to start po probiotic      Return visit: 1 week. Follow up with primary care physician for chronic medical problems    I have performed a physical exam,  updated ROS and plan today. I have reviewed previous images, labs, and provider notes.      MASHA Mendoza.    All Patients should seek medical re-evaluation or report to the ER for new, increasing or worsening symptoms. In some circumstances medical conditions can change from the initial evaluation and may require emergent medical re-evaluation. This includes but is not limited to chest pain, shortness of breath, atypical abdominal pain, atypical headache, ALOC, fever >101, low blood pressure, high respiratory rate (above 30), low oxygen saturation (below 90%), acute delirium, abnormal bleeding, inability to tolerate any intake, weakness on one side of the body, any worsened or concerning conditions.    Please note that this dictation was created using voice recognition software. I have worked with technical experts from [employer] to optimize the interface.  I have made every reasonable attempt to correct obvious errors, but there may be errors of grammar and possibly content that I did not discover before finalizing the note.

## 2024-09-07 ENCOUNTER — HOSPITAL ENCOUNTER (OUTPATIENT)
Facility: MEDICAL CENTER | Age: 64
End: 2024-09-07
Attending: FAMILY MEDICINE
Payer: COMMERCIAL

## 2024-09-10 ENCOUNTER — HOSPITAL ENCOUNTER (OUTPATIENT)
Facility: MEDICAL CENTER | Age: 64
End: 2024-09-10
Attending: FAMILY MEDICINE
Payer: COMMERCIAL

## 2024-09-12 ASSESSMENT — ENCOUNTER SYMPTOMS
PALPITATIONS: 0
FOCAL WEAKNESS: 1
NAUSEA: 0
FEVER: 0
BRUISES/BLEEDS EASILY: 0
ABDOMINAL PAIN: 0
VOMITING: 0
WHEEZING: 0
HEADACHES: 0
SPUTUM PRODUCTION: 0
MYALGIAS: 0
DIZZINESS: 0
COUGH: 0
WEIGHT LOSS: 0
CHILLS: 0
BLURRED VISION: 0
DOUBLE VISION: 0
NERVOUS/ANXIOUS: 0
SHORTNESS OF BREATH: 0

## 2024-09-12 NOTE — PROGRESS NOTES
INFECTIOUS  DISEASE  OUTPATIENT CLINIC  NOTE   Subjective   Primary care provider: Manuel Dubose M.D..     Reason for Follow Up:   Follow-up for   1. Urinary tract infection associated with catheterization of urinary tract, unspecified indwelling urinary catheter type, initial encounter ([facility])        2. ESBL (extended spectrum beta-lactamase) producing bacteria infection        3. Bacteremia        4. Candidal UTI (urinary tract infection)  fluconazole (DIFLUCAN) 100 MG Tab            HPI: Patient previously seen and treated by ID team as inpatient during hospital admission.   Nydia Finch is a 64 y.o. female with a history of CVA, expressive aphasia and right-sided hemiparesis, and atrophic right kidney admitted on 8/17/2024 secondary to dislodged nephrostomy tube. She has a history of pyelonephritis with prior cultures positive for ESBL E. coli. Hospital course complicated by sepsis and blood cultures positive for E. coli, ESBL. Blood cultures on 8/22+ ESBL E. Coli (resistant to fluoroquinolone, susceptible to Bactrim). Urine culture from Olmedo catheter+ Candida glabrata.  Not specifically treating Candida glabrata as patient clinically improving on meropenem. S/P cystoscopy, transurethral resection of bladder and right ureteral stent placement on 8/25/2024. 14-day course of antibiotics from 8/25 with stop date of 09/08/2024.     09/06/24- Today Patient reports feeling ok.  Denies feeling generally ill, fevers/chills, general malaise, headache, n/v/d.  Patient does have complaints of lower pelvic pain.  Reports increasing bladder spasms.  Concerns for catheter associated UTI.  Nursing communication for Olmedo catheter exchange.  Obtain urine culture after catheter is exchanged.  Repeat labs today CBC/CMP today for close monitoring.  Pending follow-up with outpatient urology to discuss right nephrectomy.  In the setting of atrophic right kidney along with ureteral stent can possibly become a nidus of  infection.  Recommend removal of ureteral stent and nephrectomy completed ASAP further decrease chances of recurrence of infection.  Most recent labs reviewed from 8/27/2024, leukocytosis 16.3, mild decrease compared to previous lab at 19.1 but still elevated, stable anemia 8.3/26.0, CMP with mild hypocalcemia 8.1, GFR 96, creatinine 0.7.  Repeat labs today CBC/CMP for close monitoring.     9/13/24-patient following up after completion of IV meropenem which ended on 9/8/2024.  Previously there was concerns of catheter associated UTI.  Additional urine cultures were also ordered by MD Tarango.  Urine culture positive for Candida albicans.  Microbiology to send for susceptibilities.  In the meantime we will start p.o. fluconazole 200 mg daily for 5 days, sent to pharmacy in [place].  Short course and decrease dose due to QTc is 496.  Patient requesting IJ central line to be removed.  Unfortunately we do not have the supplies to remove this line here safely in clinic so I recommended the patient proceed to ER to have catheter removed.  Patient continues to be symptomatic from UTI with dysuria, bladder spasms and discomfort.  Denies flank pain.  Most recent labs reviewed from 9/9/2024 scanned into media tab, WBC 7.19, anemia improving 10.2/32.3, platelets elevated 533, low albumin 2.6, LFTs WNL, mild hypocalcemia 8.3, GFR 84, creatinine 0.74.  Due to patient's financial funds she is being discharged from skilled nursing facility today and transported back to her home in [place].  Unable to follow-up.  Will call patient with results of susceptibilities of Candida albicans if p.o. fluconazole is not susceptible.  Patient already has scheduled follow-up with urology [place] in 1 month.  If she becomes symptomatic for UTI again she will either follow-up with urology [place], myself or ED.    Current Antimicrobials: Start short course of p.o. fluconazole 200 mg daily for 5 days  Previous Antimicrobials:  Meropenem    Other Current Medications:  Home Medications    Medication Sig Taking? Last Dose Authorizing Provider   fluconazole (DIFLUCAN) 100 MG Tab Take 2 Tablets by mouth every day for 5 days. Yes  SARAH Mendoza   pregabalin (LYRICA) 150 MG Cap Take 150 mg by mouth every day. Yes Taking Physician Outpatient   nystatin (MYCOSTATIN) 543189 UNIT/GM Cream topical cream Apply 1 Application topically 3 times a day. 10 days Yes Taking Physician Outpatient   atorvastatin (LIPITOR) 40 MG Tab Take 40 mg by mouth every evening. Yes Taking Physician Outpatient   DULoxetine (CYMBALTA) 60 MG Cap DR Particles delayed-release capsule Take 60 mg by mouth 2 times a day. Yes Taking Physician Outpatient   mirtazapine (REMERON) 15 MG Tab Take 15 mg by mouth at bedtime.  Unknown Physician Outpatient   magnesium hydroxide (MILK OF MAGNESIA) 400 MG/5ML Suspension Take 30 mL by mouth 1 time a day as needed.  Patient not taking: Reported on 9/13/2024  Not Taking Physician Outpatient   metoprolol tartrate (LOPRESSOR) 25 MG Tab Take 1 Tablet by mouth 2 times a day.  Patient not taking: Reported on 9/13/2024  Not Taking Beverly Vasquez M.D.   tamsulosin (FLOMAX) 0.4 MG capsule Take 1 Capsule by mouth 1/2 hour after breakfast.  Unknown Beverly Vasquez M.D.   omeprazole (PRILOSEC) 20 MG delayed-release capsule Take 20 mg by mouth every day.  Patient not taking: Reported on 9/13/2024  Unknown Physician Outpatient        PMH:  Past Medical History:   Diagnosis Date    Aphagia 1994    Brain aneurysm     Chronic pain     Nephrolithiasis     Right hemiplegia (HCC) 1994    Stroke (HCC)      Past Surgical History:   Procedure Laterality Date    HI CYSTOSCOPY,INSERT URETERAL STENT Right 8/25/2024    Procedure: TURBT, CYSTOSCOPY, WITH RIGHT URETERAL STENT INSERTION;  Surgeon: Ed Robles M.D.;  Location: SURGERY [facility];  Service: Urology    HI CYSTOSCOPY,INSERT URETERAL STENT Right 1/26/2024    Procedure: CYSTOSCOPY;  Surgeon: Mik  MAN Rm M.D.;  Location: SURGERY [facility];  Service: Urology    CYSTOSCOPY N/A 7/4/2019    Procedure: CYSTOSCOPY;  Surgeon: Monico Killian M.D.;  Location: SURGERY [facility];  Service: Urology    STENT PLACEMENT Right 7/4/2019    Procedure: STENT PLACEMENT;  Surgeon: Monico Killian M.D.;  Location: SURGERY [facility];  Service: Urology    URETEROSCOPY Right 7/4/2019    Procedure: URETEROSCOPY;  Surgeon: Monico Killian M.D.;  Location: SURGERY [facility];  Service: Urology    CYSTOSCOPY STENT PLACEMENT  2/13/2018    Procedure: CYSTOSCOPY STENT PLACEMENT;  Surgeon: Monico Killian M.D.;  Location: SURGERY [facility];  Service: Urology    GASTRIC RESECTION  2012    Duodenal Switch    OTHER      OTHER NEUROLOGICAL SURG       History reviewed. No pertinent family history.  Social History     Socioeconomic History    Marital status:      Spouse name: Not on file    Number of children: Not on file    Years of education: Not on file    Highest education level: Not on file   Occupational History    Not on file   Tobacco Use    Smoking status: Never    Smokeless tobacco: Never   Substance and Sexual Activity    Alcohol use: No    Drug use: Not on file     Comment: Marijauna    Sexual activity: Not on file   Other Topics Concern    Not on file   Social History Narrative    Not on file     Social Determinants of Health     Financial Resource Strain: Low Risk  (11/10/2022)    Received from [facility]    Financial Resource Strain     Difficulty of Paying Living Expenses: Not on file     Access to Reliable Phone: Not on file   Food Insecurity: No Food Insecurity (7/26/2024)    Hunger Vital Sign     Worried About Running Out of Food in the Last Year: Never true     Ran Out of Food in the Last Year: Never true   Transportation Needs: No Transportation Needs (7/26/2024)    PRAPARE - Transportation     Lack of Transportation (Medical): No     Lack of Transportation  "(Non-Medical): No   Physical Activity: Not on file   Stress: Not on file   Social Connections: Not on file   Intimate Partner Violence: Not At Risk (7/26/2024)    Humiliation, Afraid, Rape, and Kick questionnaire     Fear of Current or Ex-Partner: No     Emotionally Abused: No     Physically Abused: No     Sexually Abused: No   Housing Stability: Low Risk  (7/26/2024)    Housing Stability Vital Sign     Unable to Pay for Housing in the Last Year: No     Number of Places Lived in the Last Year: 1     Unstable Housing in the Last Year: No           Allergies/Intolerances:  Allergies   Allergen Reactions    Piperacillin Sod-Tazobactam So Rash     Gave her head to toe drug rash at [facility] 1/25/2024      Sulfamethoxazole W-Trimethoprim Itching     Rash ,itching    Ceftriaxone Itching     Generalized redness       ROS:   Review of Systems   Constitutional:  Negative for chills, fever, malaise/fatigue and weight loss.   HENT:  Negative for congestion and hearing loss.    Eyes:  Negative for blurred vision and double vision.   Respiratory:  Negative for cough, sputum production, shortness of breath and wheezing.    Cardiovascular:  Negative for chest pain and palpitations.   Gastrointestinal:  Negative for abdominal pain, nausea and vomiting.   Genitourinary:  Positive for dysuria, frequency and urgency.   Musculoskeletal:  Negative for myalgias.   Skin:  Negative for itching and rash.   Neurological:  Positive for focal weakness (Right-sided weakness). Negative for dizziness and headaches.   Endo/Heme/Allergies:  Does not bruise/bleed easily.   Psychiatric/Behavioral:  The patient is not nervous/anxious.       ROS was reviewed and were negative except as above.    Objective    Most Recent Vital Signs:  /80 (BP Location: Left arm, Patient Position: Sitting, BP Cuff Size: Adult)   Pulse (!) 113   Resp 15   Ht 1.651 m (5' 5\")   Wt 76.2 kg (168 lb)   LMP  (LMP Unknown)   SpO2 97%   BMI 27.96 kg/m² "     Physical Exam:  Physical Exam  Vitals and nursing note reviewed.   Constitutional:       General: She is not in acute distress.     Appearance: She is not ill-appearing.      Comments: Wheelchair   HENT:      Head: Normocephalic and atraumatic.      Nose: Nose normal.      Mouth/Throat:      Mouth: Mucous membranes are moist.   Eyes:      Pupils: Pupils are equal, round, and reactive to light.   Neck:      Comments: Left IJ central line.  Cardiovascular:      Rate and Rhythm: Normal rate and regular rhythm.   Pulmonary:      Effort: Pulmonary effort is normal. No respiratory distress.      Breath sounds: Normal breath sounds. No stridor.   Abdominal:      Tenderness: There is abdominal tenderness. There is no right CVA tenderness or left CVA tenderness.   Genitourinary:     Comments: Olmedo catheter  Musculoskeletal:      Cervical back: Neck supple.      Right lower leg: No edema.      Left lower leg: No edema.      Comments: Bilateral knee scars   Skin:     General: Skin is warm and dry.      Capillary Refill: Capillary refill takes less than 2 seconds.      Coloration: Skin is not jaundiced or pale.   Neurological:      Mental Status: She is alert and oriented to person, place, and time.      Motor: Weakness (Right-sided weakness) present.   Psychiatric:         Mood and Affect: Mood normal.         Behavior: Behavior normal.          Pertinent Lab/Imaging Results:  [unfilled]  @CMP@  WBC   Date/Time Value Ref Range Status   08/27/2024 12:59 AM 16.3 (H) 4.8 - 10.8 K/uL Final     RBC   Date/Time Value Ref Range Status   08/27/2024 12:59 AM 2.96 (L) 4.20 - 5.40 M/uL Final     Hemoglobin   Date/Time Value Ref Range Status   08/27/2024 12:59 AM 8.3 (L) 12.0 - 16.0 g/dL Final     Hematocrit   Date/Time Value Ref Range Status   08/27/2024 12:59 AM 26.0 (L) 37.0 - 47.0 % Final     MCV   Date/Time Value Ref Range Status   08/27/2024 12:59 AM 87.8 81.4 - 97.8 fL Final     MCH   Date/Time Value Ref Range Status  "  08/27/2024 12:59 AM 28.0 27.0 - 33.0 pg Final     MCHC   Date/Time Value Ref Range Status   08/27/2024 12:59 AM 31.9 (L) 32.2 - 35.5 g/dL Final     MPV   Date/Time Value Ref Range Status   08/27/2024 12:59 AM 11.6 9.0 - 12.9 fL Final      Sodium   Date/Time Value Ref Range Status   08/28/2024 08:07  135 - 145 mmol/L Final     Potassium   Date/Time Value Ref Range Status   08/28/2024 08:07 AM 4.2 3.6 - 5.5 mmol/L Final     Chloride   Date/Time Value Ref Range Status   08/28/2024 08:07  96 - 112 mmol/L Final     Co2   Date/Time Value Ref Range Status   08/28/2024 08:07 AM 17 (L) 20 - 33 mmol/L Final     Glucose   Date/Time Value Ref Range Status   08/28/2024 08:07 AM 60 (L) 65 - 99 mg/dL Final     Bun   Date/Time Value Ref Range Status   08/28/2024 08:07 AM 11 8 - 22 mg/dL Final     Creatinine   Date/Time Value Ref Range Status   08/28/2024 08:07 AM 0.70 0.50 - 1.40 mg/dL Final     Alkaline Phosphatase   Date/Time Value Ref Range Status   08/23/2024 01:51  (H) 30 - 99 U/L Final     AST(SGOT)   Date/Time Value Ref Range Status   08/23/2024 01:51 AM 11 (L) 12 - 45 U/L Final     ALT(SGPT)   Date/Time Value Ref Range Status   08/23/2024 01:51 AM <5 2 - 50 U/L Final     Total Bilirubin   Date/Time Value Ref Range Status   08/23/2024 01:51 AM 0.4 0.1 - 1.5 mg/dL Final      No results found for: \"CPKTOTAL\"       Blood Culture   Date Value Ref Range Status   02/19/2018 No growth after 5 days of incubation.  Final       Blood Culture   Date Value Ref Range Status   02/19/2018 No growth after 5 days of incubation.  Final    BLOOD CULTURE  Order: [id number]   Status: Edited Result - FINAL       Visible to patient: No (inaccessible in MyChart)       Next appt: None    Specimen Information: Peripheral; Blood   0 Result Notes      Component 13 d ago   Significant Indicator POS Positive (POS)   Source BLD   Site PERIPHERAL   Culture Result Growth detected by Bactec instrument. 08/23/2024  03:47 Abnormal    Culture " Result  Abnormal   Escherichia coli ESBL  See previous culture for sensitivity report.  Extended Spectrum Beta-lactamase (ESBL) isolated.  ESBL's may be clinically resistant to therapy with  Penicillins,Cephalosporins or Aztreonam despite  apparent in vitro susceptibility to some of these agents.  The patient requires contact isolation.  Please contact pharmacy or an Infectious Disease Specialist  if you have any questions about appropriate therapy.    Culture Result  Abnormal   Coagulase-negative Staphylococcus species  Possible Contaminant  POSSIBLE CONTAMINANT: Isolated from one set only, please  correlate with clinical condition. Contact the Microbiology  department within 48 hr if identification and susceptibility  are needed.    Resulting Agency M              Specimen Collected: 08/22/24 12:24 PM Last Resulted: 08/25/24  9:37 AM            ntains abnormal data BLOOD CULTURE  Order: [id number]   Status: Final result       Visible to patient: No (inaccessible in MyChart)       Next appt: None    Specimen Information: Peripheral; Blood   0 Result Notes      Component 13 d ago   Significant Indicator POS Positive (POS)   Source BLD   Site PERIPHERAL   Culture Result Growth detected by Bactec instrument. 08/23/2024  00:24 Abnormal    Culture Result  Abnormal   Escherichia coli ESBL  Extended Spectrum Beta-lactamase (ESBL) isolated.  ESBL's may be clinically resistant to therapy with  Penicillins,Cephalosporins or Aztreonam despite  apparent in vitro susceptibility to some of these agents.  The patient requires contact isolation.  Please contact pharmacy or an Infectious Disease Specialist  if you have any questions about appropriate therapy.    Resulting Agency M        Susceptibility     Escherichia coli esbl     ANISH     Ampicillin >16 mcg/mL Resistant     Ampicillin/sulbactam 16/8 mcg/mL Intermediate     Cefazolin >16 mcg/mL Resistant     Cefepime >16 mcg/mL Resistant     Ceftriaxone >32 mcg/mL Resistant      Cefuroxime >16 mcg/mL Resistant     Ciprofloxacin >2 mcg/mL Resistant     Ertapenem <=0.5 mcg/mL Sensitive     Gentamicin >8 mcg/mL Resistant     Levofloxacin >4 mcg/mL Resistant     Minocycline <=4 mcg/mL Sensitive     Moxifloxacin >4 mcg/mL Resistant     Pip/Tazobactam 16 mcg/mL Sensitive     Tigecycline <=2 mcg/mL Sensitive     Tobramycin >8 mcg/mL Resistant     Trimeth/Sulfa <=0.5/9.5 m... Sensitive                  Specimen Collected: 08/22/24 12:24 PM Last Resulted: 08/25/24  9:34 AM          ntains abnormal data Urine Culture  Order: [id number]   Status: Final result       Visible to patient: No (inaccessible in MyChart)       Next appt: None    Specimen Information: Urine, Garcia Cath   0 Result Notes      Component 13 d ago   Significant Indicator POS Positive (POS)   Source UR   Site URINE, GARCIA CATH   Culture Result - Abnormal    Culture Result  Abnormal   Candida glabrata  >100,000 cfu/mL    Resulting Agency M              Specimen Collected: 08/22/24  1:03 PM Last Resulted: 08/24/24  9:57 AM          ntains abnormal data URINE CULTURE(NEW)  Order: [id number]   Status: Preliminary result       Visible to patient: No (not released)       Next appt: 09/13/2024 at 11:00 AM in Infectious Diseases (Jaspal Grey A.P.R.N.)    Specimen Information: Urine   0 Result Notes      Component 2 d ago   Significant Indicator POS Positive (POS) P   Source UR P   Site - P   Culture Result - Abnormal  P   Culture Result      Abnormal   Candida albicans  10-50,000 cfu/mL  P      Resulting Agency [facility]              Specimen Collected: 09/10/24  1:00 PM Last Resulted: 09/12/24 12:04 AM            KG  Order: [id number]   Status: Final result       Visible to patient: No (inaccessible in MyChart)       Next appt: None    0 Result Notes      Component 3 wk ago   Report [facility]    Test Date:  2024-08-22  Pt Name:    ANGELINA ESCOBAR               Department: 61  MRN:        [id number]                      Room:        S629  Gender:     Female                       Technician: NEVAEH  :        1960                   Requested By:TAWANDA HENSON  Order #:    [id number]                    Reading MD: Cam George MD    Measurements  Intervals                                Axis  Rate:       156                          P:          -26  NC:         116                          QRS:        -58  QRSD:       95                           T:          80  QT:         308  QTc:        496    Interpretive Statements  Sinus tachycardia  Left anterior fascicular block  Repolarization abnormality, prob rate related  Electronically Signed On 2024 14:31:06 PDT by Cam George MD   Resulting Agency [facility]                 URINE CULTURE(NEW)  Order: [id number]   Status: Final result       Visible to patient: No (inaccessible in MyChart)       Next appt: None    Specimen Information: Urine   0 Result Notes      Component 3 d ago   Significant Indicator POS Positive (POS)   Source UR   Site -   Culture Result - Abnormal    Culture Result  Abnormal   Candida albicans  10-50,000 cfu/mL    Resulting Agency M              Specimen Collected: 09/10/24  1:00 PM Last Resulted: 24  6:51 PM            Impression/Assessment      1. Urinary tract infection associated with catheterization of urinary tract, unspecified indwelling urinary catheter type, initial encounter ([facility])        2. ESBL (extended spectrum beta-lactamase) producing bacteria infection        3. Bacteremia        4. Candidal UTI (urinary tract infection)  fluconazole (DIFLUCAN) 100 MG Tab          Nydia Finch is a 64 y.o. female with a history of CVA, expressive aphasia and right-sided hemiparesis, and atrophic right kidney admitted on 2024 secondary to dislodged nephrostomy tube. She has a history of pyelonephritis with prior cultures positive for ESBL E. coli. Hospital course complicated by sepsis and blood cultures positive for E. coli, ESBL. Blood cultures on + ESBL E.  Coli (resistant to fluoroquinolone, susceptible to Bactrim). Urine culture from Olmedo catheter+ Candida glabrata.  Not specifically treating Candida glabrata as patient clinically improving on meropenem. S/P cystoscopy, transurethral resection of bladder and right ureteral stent placement on 8/25/2024. 14-day course of antibiotics from 8/25 with stop date of 09/08/2024.     9/13/24-patient following up after completion of IV meropenem which ended on 9/8/2024.  Previously there was concerns of catheter associated UTI.  Additional urine cultures were also ordered by MD Tarango.  Urine culture positive for Candida albicans.  Microbiology to send for susceptibilities.  In the meantime we will start p.o. fluconazole 200 mg daily for 5 days, sent to pharmacy in [place].  Short course and decrease dose due to QTc is 496.  Patient requesting IJ central line to be removed.  Unfortunately we do not have the supplies to remove this line here safely in clinic so I recommended the patient proceed to ER to have catheter removed.  Patient continues to be symptomatic from UTI with dysuria, bladder spasms and discomfort.  Denies flank pain.  Most recent labs reviewed from 9/9/2024 scanned into media tab, WBC 7.19, anemia improving 10.2/32.3, platelets elevated 533, low albumin 2.6, LFTs WNL, mild hypocalcemia 8.3, GFR 84, creatinine 0.74.  Due to patient's financial funds she is being discharged from skilled nursing facility today and transported back to her home in [place].  Unable to follow-up.  Will call patient with results of susceptibilities of Candida albicans if p.o. fluconazole is not susceptible.  Patient already has scheduled follow-up with urology [place] in 1 month.  If she becomes symptomatic for UTI again she will either follow-up with urology [place], myself or ED.    - This infection posses a threat to life and bodily function  PLAN:   - Completed 14-day course of IV antibiotics on 9/8/2024.  No signs or  symptoms of recurrence of uremia infection  - Urine culture positive for Candida albicans.  Pending susceptibilities.  In the meantime we will start p.o. fluconazole 200 mg daily for 5 days.  Short course due to decreased QTc 496.  - Patient requesting IJ central line to be removed here in outpatient clinic.  Unfortunately we do not have the supplies to remove this line safely so recommend patient proceed to the ED for central line IJ removal  - Medication education provided and S/S of side effects discussed   - Recommend routine follow up with nephrology.  Pending surgical date for right nephrectomy      Return visit: PRN. Follow up with primary care physician for chronic medical problems    I have performed a physical exam,  updated ROS and plan today. I have reviewed previous images, labs, and provider notes.      MAN Mendoza.P.R.N.    All Patients should seek medical re-evaluation or report to the ER for new, increasing or worsening symptoms. In some circumstances medical conditions can change from the initial evaluation and may require emergent medical re-evaluation. This includes but is not limited to chest pain, shortness of breath, atypical abdominal pain, atypical headache, ALOC, fever >101, low blood pressure, high respiratory rate (above 30), low oxygen saturation (below 90%), acute delirium, abnormal bleeding, inability to tolerate any intake, weakness on one side of the body, any worsened or concerning conditions.    Please note that this dictation was created using voice recognition software. I have worked with technical experts from [employer] to optimize the interface.  I have made every reasonable attempt to correct obvious errors, but there may be errors of grammar and possibly content that I did not discover before finalizing the note.

## 2024-09-13 ENCOUNTER — HOSPITAL ENCOUNTER (EMERGENCY)
Facility: MEDICAL CENTER | Age: 64
End: 2024-09-13
Attending: EMERGENCY MEDICINE
Payer: MEDICARE

## 2024-09-13 ENCOUNTER — OFFICE VISIT (OUTPATIENT)
Dept: INFECTIOUS DISEASES | Facility: MEDICAL CENTER | Age: 64
End: 2024-09-13
Attending: NURSE PRACTITIONER
Payer: MEDICARE

## 2024-09-13 VITALS
HEART RATE: 113 BPM | RESPIRATION RATE: 15 BRPM | OXYGEN SATURATION: 97 % | BODY MASS INDEX: 27.99 KG/M2 | HEIGHT: 65 IN | SYSTOLIC BLOOD PRESSURE: 118 MMHG | WEIGHT: 168 LBS | DIASTOLIC BLOOD PRESSURE: 80 MMHG

## 2024-09-13 VITALS
HEART RATE: 99 BPM | BODY MASS INDEX: 27.96 KG/M2 | OXYGEN SATURATION: 95 % | SYSTOLIC BLOOD PRESSURE: 124 MMHG | TEMPERATURE: 97.7 F | WEIGHT: 168 LBS | RESPIRATION RATE: 16 BRPM | DIASTOLIC BLOOD PRESSURE: 79 MMHG

## 2024-09-13 DIAGNOSIS — Z95.828 CENTRAL VENOUS CATHETER IN PLACE ON ADMISSION: ICD-10-CM

## 2024-09-13 DIAGNOSIS — T83.511A URINARY TRACT INFECTION ASSOCIATED WITH CATHETERIZATION OF URINARY TRACT, UNSPECIFIED INDWELLING URINARY CATHETER TYPE, INITIAL ENCOUNTER (HCC): ICD-10-CM

## 2024-09-13 DIAGNOSIS — Z16.12 ESBL (EXTENDED SPECTRUM BETA-LACTAMASE) PRODUCING BACTERIA INFECTION: ICD-10-CM

## 2024-09-13 DIAGNOSIS — B37.49 CANDIDAL UTI (URINARY TRACT INFECTION): ICD-10-CM

## 2024-09-13 DIAGNOSIS — R78.81 BACTEREMIA: ICD-10-CM

## 2024-09-13 DIAGNOSIS — N39.0 URINARY TRACT INFECTION ASSOCIATED WITH CATHETERIZATION OF URINARY TRACT, UNSPECIFIED INDWELLING URINARY CATHETER TYPE, INITIAL ENCOUNTER (HCC): ICD-10-CM

## 2024-09-13 DIAGNOSIS — A49.9 ESBL (EXTENDED SPECTRUM BETA-LACTAMASE) PRODUCING BACTERIA INFECTION: ICD-10-CM

## 2024-09-13 PROCEDURE — 3074F SYST BP LT 130 MM HG: CPT | Performed by: NURSE PRACTITIONER

## 2024-09-13 PROCEDURE — 3079F DIAST BP 80-89 MM HG: CPT | Performed by: NURSE PRACTITIONER

## 2024-09-13 PROCEDURE — 99214 OFFICE O/P EST MOD 30 MIN: CPT | Performed by: NURSE PRACTITIONER

## 2024-09-13 PROCEDURE — 99212 OFFICE O/P EST SF 10 MIN: CPT | Performed by: NURSE PRACTITIONER

## 2024-09-13 PROCEDURE — 99284 EMERGENCY DEPT VISIT MOD MDM: CPT

## 2024-09-13 RX ORDER — FLUCONAZOLE 100 MG/1
200 TABLET ORAL DAILY
Qty: 10 TABLET | Refills: 0 | Status: SHIPPED | OUTPATIENT
Start: 2024-09-14 | End: 2024-09-19

## 2024-09-13 ASSESSMENT — FIBROSIS 4 INDEX
FIB4 SCORE: 1.47
FIB4 SCORE: 1.47

## 2024-09-13 NOTE — ED PROVIDER NOTES
ED PHYSICIAN NOTE    CHIEF COMPLAINT  Chief Complaint   Patient presents with    Other     Pt here to have L IJ central line removed.  Placed here 1 month ago       EXTERNAL RECORDS REVIEWED  Outpatient Notes patient seen by infectious disease 9/6.  Had laboratory data done.  Family presents this to me.  Normal WBC.  Patient has completed IV antibiotics as of 9/8.    HPI/ROS    OUTSIDE HISTORIAN(S):  Caregiver reports they need central line out for him to take her back to his home.    Nydia Finch is a 64 y.o. female who presents for removal of left IJ central line.  Patient has been receiving IV antibiotics, but has completed IV antibiotics and  no longer needs the line.  They will told to come to the ER for removal.  Patient reports she has been in usual state of health.  Denies fevers, chills, body aches, dysuria, abnormal urine.  She has followed up with urology and plans to continue with follow-up because of ureteral stents.  Heart rate was noted to be elevated in triage.  Caregiver reports it is always elevated when she has to shuffle around but it will normalize.    PAST MEDICAL HISTORY  Past Medical History:   Diagnosis Date    Aphagia 1994    Brain aneurysm     Chronic pain     Nephrolithiasis     Right hemiplegia (HCC) 1994    Stroke (HCC)        SOCIAL HISTORY  Social History     Tobacco Use    Smoking status: Never    Smokeless tobacco: Never   Substance Use Topics    Alcohol use: No       CURRENT MEDICATIONS  Home Medications    **Home medications have not yet been reviewed for this encounter**         ALLERGIES  Allergies   Allergen Reactions    Piperacillin Sod-Tazobactam So Rash     Gave her head to toe drug rash at [facility] 1/25/2024      Sulfamethoxazole W-Trimethoprim Itching     Rash ,itching    Ceftriaxone Itching     Generalized redness       PHYSICAL EXAM  VITAL SIGNS: BP (!) 148/88   Pulse (!) 114   Temp 36.5 °C (97.7 °F) (Temporal)   Resp 16   Wt 76.2 kg (168 lb)   LMP   (LMP Unknown)   SpO2 96%   BMI 27.96 kg/m²    Constitutional: Awake and alert.  Pale appearing  HENT: Normal inspection  Eyes: Normal inspection  Neck: IJ catheter in place clean, dry and intact  Cardiovascular: Normal heart rate on examination  Thorax & Lungs: No respiratory distress  Extremities: Well perfused  Neurologic: Grossly normal   Psychiatric: Normal for situation          COURSE & MEDICAL DECISION MAKING      INITIAL ASSESSMENT, COURSE AND PLAN  Care Narrative: Patient presents requesting central venous catheter removal.  She is no longer receiving antibiotics.  It is appropriate to remove this at this time.  She had reassuring laboratory data just a couple days ago.  Her vital signs are normal.  No alarming findings on exam.  The central line was removed with the tip intact.  All sutures were removed.  Patient tolerated this procedure well.  She was advised to continue with plan for outpatient follow-up.  Return to the ER for any concerning symptoms.      DISPOSITION AND DISCUSSIONS    Escalation of care considered, and ultimately not performed:blood analysis, but patient just had blood work done 3 days ago.  This was normal    FINAL IMPRESSION  1.  Removal of left central venous catheter    This dictation was created using voice recognition software. The accuracy of the dictation is limited to the abilities of the software. I expect there may be some errors of grammar and possibly content. The nursing notes were reviewed and certain aspects of this information were incorporated into this note.    Electronically signed by: Jay Quezada M.D., 9/13/2024

## 2024-09-13 NOTE — DISCHARGE INSTRUCTIONS
Return to the emergency department for any fevers, body aches, abnormal urination, back pain flank pain or other concerning symptoms.

## 2024-09-13 NOTE — ED TRIAGE NOTES
Chief Complaint   Patient presents with    Other     Pt here to have L IJ central line removed.  Placed here 1 month ago

## 2024-09-18 LAB — TEST NAME 95000: NORMAL

## 2024-12-05 NOTE — ANESTHESIA POSTPROCEDURE EVALUATION
Patient: Nydia Finch    Procedure Summary       Date: 08/25/24 Room / Location: [place] [address] / SURGERY [facility]    Anesthesia Start: 1015 Anesthesia Stop: 1136    Procedure: TURBT, CYSTOSCOPY, WITH RIGHT URETERAL STENT INSERTION (Right: Ureter) Diagnosis: (HYDRONEPHROSIS RIGHT)    Surgeons: Ed Robles M.D. Responsible Provider: Maurilio Mckeon M.D.    Anesthesia Type: general ASA Status: 3            Final Anesthesia Type: general  Last vitals  BP   Blood Pressure: (!) 142/81    Temp   36.6 °C (97.8 °F)    Pulse   97   Resp   20    SpO2   96 %      Anesthesia Post Evaluation    Patient location during evaluation: PACU  Patient participation: complete - patient participated  Level of consciousness: awake and alert    Airway patency: patent  Anesthetic complications: no  Cardiovascular status: hemodynamically stable  Respiratory status: acceptable  Hydration status: euvolemic    PONV: none          No notable events documented.     Nurse Pain Score: 0 (NPRS)           Statement Selected

## 2025-05-09 NOTE — CARE PLAN
Problem: Skin Integrity  Goal: Skin integrity is maintained or improved  Description: Target End Date:  Prior to discharge or change in level of care    Document interventions on Skin Risk/Juan David flowsheet groups and corresponding LDA    1.  Assess and monitor skin integrity, appearance and/or temperature  2.  Assess risk factors for impaired skin integrity and/or pressures ulcers  3.  Implement precautions to protect skin integrity in collaboration with interdisciplinary team  4.  Implement pressure ulcer prevention protocol if at risk for skin breakdown  5.  Confirm wound care consult if at risk for skin breakdown  6.  Ensure patient use of pressure relieving devices  (Low air loss bed, waffle overlay, heel protectors, ROHO cushion, etc)  Outcome: Progressing     Problem: Fall Risk  Goal: Patient will remain free from falls  Description: Target End Date:  Prior to discharge or change in level of care    Document interventions on the [facility] Fall Risk Assessment    1.  Assess for fall risk factors  2.  Implement fall precautions  Outcome: Progressing     Problem: Communication  Goal: The ability to communicate needs accurately and effectively will improve  Description: Target End Date:  End of day 1    1.  Assess ability to communicate and understand  2.  Provide augmentative or alternative methods of communication devices  3.  Use /language line as appropriate  4.  Collaborate with Speech Therapy as needed  Outcome: Progressing     Problem: Hemodynamics  Goal: Patient's hemodynamics, fluid balance and neurologic status will be stable or improve  Description: Target End Date:  Prior to discharge or change in level of care    Document on Assessment and I/O flowsheet templates    1.  Monitor vital signs, pulse oximetry and cardiac monitor per provider order and/or policy  2.  Maintain blood pressure per provider order  3.  Hemodynamic monitoring per provider order  4.  Manage IV fluids and IV  Pt called about her rx for estradiol. Stated she only received a 30 day supply and she normally gets a 90 day supply with 3 refills to fill for the year.  I called the pharmacy and the pharmacist said that it was only written for a 30 day supply.  Pharmacist stated that a 90 day supply would be 90 grams not 3 grams as written.  Please send refill for pt a 90 day supply   infusions  5.  Monitor intake and output  6.  Daily weights per unit policy or provider order  7.  Assess peripheral pulses and capillary refill  8.  Assess color and body temperature  9.  Position patient for maximum circulation/cardiac output  10. Monitor for signs/symptoms of excessive bleeding  11. Assess mental status, restlessness and changes in level of consciousness  12. Monitor temperature and report fever or hypothermia to provider immediately. Consideration of targeted temperature management.  Outcome: Progressing   The patient is Stable - Low risk of patient condition declining or worsening    Shift Goals  Clinical Goals: VSS, skin integrity  Patient Goals: to get better  Family Goals: ELIZABETH    Progress made toward(s) clinical / shift goals:  VSS, safety    Patient is not progressing towards the following goals:

## (undated) DEVICE — MEDICINE CUP STERILE 2 OZ - (100/CA)

## (undated) DEVICE — SODIUM CHL. IRRIGATION 0.9% 3000ML (4EA/CA 65CA/PF)

## (undated) DEVICE — GOWN SURGEONS X-LARGE - DISP. (30/CA)

## (undated) DEVICE — CATHETER URETHRAL FOLEY SILICONE OD20 FR 10 ML (10EA/CA)

## (undated) DEVICE — BAG URODRAIN WITH TUBING - (20/CA)

## (undated) DEVICE — SYRINGE TOOMEY (50EA/CA)

## (undated) DEVICE — JELLY, KY 2 0Z STERILE

## (undated) DEVICE — SUCTION INSTRUMENT YANKAUER BULBOUS TIP W/O VENT (50EA/CA)

## (undated) DEVICE — WATER IRRIG. STER. 1500 ML - (9/CA)

## (undated) DEVICE — BAG DRAINAGE LINGEMAN CYSTO FOR GE/OEC 2600/2800 TABLES (20EA/CA)

## (undated) DEVICE — SET LEADWIRE 5 LEAD BEDSIDE DISPOSABLE ECG (1SET OF 5/EA)

## (undated) DEVICE — CONTAINER SPECIMEN BAG OR - STERILE 4 OZ W/LID (100EA/CA)

## (undated) DEVICE — WATER IRRIGATION STERILE 1000ML (12EA/CA)

## (undated) DEVICE — SODIUM CHL IRRIGATION 0.9% 1000ML (12EA/CA)

## (undated) DEVICE — COVER FOOT UNIVERSAL DISP. - (25EA/CA)

## (undated) DEVICE — SLEEVE VASO CALF MED - (10PR/CA)

## (undated) DEVICE — TUBE CONNECT SUCTION CLEAR 120 X 1/4" (50EA/CA)"

## (undated) DEVICE — LACTATED RINGERS INJ 1000 ML - (14EA/CA 60CA/PF)

## (undated) DEVICE — GLOVE BIOGEL PI INDICATOR SZ 6.5 SURGICAL PF LF - (50/BX 4BX/CA)

## (undated) DEVICE — SYRINGE 20 ML LL (50EA/BX 4BX/CA)

## (undated) DEVICE — HEAD HOLDER JUNIOR/ADULT

## (undated) DEVICE — TUBE CONNECTING SUCTION - CLEAR PLASTIC STERILE 72 IN (50EA/CA)

## (undated) DEVICE — GLOVE BIOGEL SZ 7 SURGICAL PF LTX - (50PR/BX 4BX/CA)

## (undated) DEVICE — PROTECTOR ULNA NERVE - (36PR/CA)

## (undated) DEVICE — PACK CYSTOSCOPY - (14/CA)

## (undated) DEVICE — WIRE GUIDE SENSOR DUAL FLEX - 5/BX

## (undated) DEVICE — SLEEVE, VASO, THIGH, MED

## (undated) DEVICE — GLOVE SZ 6.5 BIOGEL PI MICRO - PF LF (50PR/BX)

## (undated) DEVICE — ELECTRODE BIPOLAR REGULAR CUTTING LOOP URO 24/26 FR (6EA/PK)

## (undated) DEVICE — GOWN SURGICAL X-LARGE ULTRA - FILM-REINFORCED (20/CA)

## (undated) DEVICE — SENSOR SPO2 NEO LNCS ADHESIVE (20/BX) SEE USER NOTES

## (undated) DEVICE — MASK ANESTHESIA ADULT  - (100/CA)

## (undated) DEVICE — CONNECTOR HOSE NEPTUNE FOR CYSTO ROOM

## (undated) DEVICE — PACK CYSTO III (2EA/CA)

## (undated) DEVICE — PACK SINGLE BASIN - (6/CA)

## (undated) DEVICE — SET IRRIGATION CYSTOSCOPY Y-TYPE L81 IN (20EA/CA)

## (undated) DEVICE — WATER IRRIG. STER 3000 ML - (4/CA)

## (undated) DEVICE — TUBING CLEARLINK DUO-VENT - C-FLO (48EA/CA)

## (undated) DEVICE — KIT ANESTHESIA W/CIRCUIT & 3/LT BAG W/FILTER (20EA/CA)

## (undated) DEVICE — ELECTRODE DUAL RETURN W/ CORD - (50/PK)

## (undated) DEVICE — SENSOR OXIMETER ADULT SPO2 RD SET (20EA/BX)

## (undated) DEVICE — SYRINGE DISP. 12 CC LL - (100/BX)

## (undated) DEVICE — ZIPWIRE .038 ANGLED BENTSON TIP (5EA/BX)

## (undated) DEVICE — TOWEL STOP TIMEOUT SAFETY FLAG (40EA/CA)

## (undated) DEVICE — CATHETER URETHRAL OPEN END AXXCESS (10EA/BX)

## (undated) DEVICE — BAG DRAINAGE URINARY CLOSED 2000ML (20EA/CA)

## (undated) DEVICE — GLOVE BIOGEL PI ORTHO SZ 6 SURGICAL PF LF (40PR/BX)

## (undated) DEVICE — GLOVE BIOGEL PI INDICATOR SZ 7.0 SURGICAL PF LF - (50/BX 4BX/CA)

## (undated) DEVICE — GLOVE SZ 7.5 BIOGEL PI MICRO - PF LF (50PR/BX)

## (undated) DEVICE — TOWELS CLOTH SURGICAL - (4/PK 20PK/CA)

## (undated) DEVICE — KIT ROOM DECONTAMINATION

## (undated) DEVICE — SET EXTENSION WITH 2 PORTS (48EA/CA) ***PART #2C8610 IS A SUBSTITUTE*****

## (undated) DEVICE — MASK AIRWAY SIZE 3 UNIQUE SILICON (10/BX)

## (undated) DEVICE — HUMID-VENT HEAT AND MOISTURE EXCHANGE- (50/BX)

## (undated) DEVICE — CATHETER URET OPEN END 6FR (10EA/BX)

## (undated) DEVICE — NEPTUNE 4 PORT MANIFOLD - (20/PK)

## (undated) DEVICE — GLOVE BIOGEL PI INDICATOR SZ 8.0 SURGICAL PF LF -(50/BX 4BX/CA)

## (undated) DEVICE — SPONGE GAUZESTER 4 X 4 4PLY - (128PK/CA)

## (undated) DEVICE — GOWN WARMING STANDARD FLEX - (30/CA)

## (undated) DEVICE — CANISTER SUCTION 3000ML MECHANICAL FILTER AUTO SHUTOFF MEDI-VAC NONSTERILE LF DISP (40EA/CA)

## (undated) DEVICE — TRAY SURESTEP FOLEY TEMP SENSING 16FR (10EA/CA) ORDER  #18764 FOR TEMP FOLEY ONLY

## (undated) DEVICE — CATHETER URETHRAL FOLEY SILICONE OD16 FR 10 ML (10EA/CA)

## (undated) DEVICE — PACK CYSTOSCOPY III - (8/CA)

## (undated) DEVICE — GLOVE BIOGEL SZ 7.5 SURGICAL PF LTX - (50PR/BX 4BX/CA)

## (undated) DEVICE — ELECTRODE 850 FOAM ADHESIVE - HYDROGEL RADIOTRNSPRNT (50/PK)

## (undated) DEVICE — CANISTER SUCTION RIGID RED 1500CC (40EA/CA)

## (undated) DEVICE — BAG SPONGE COUNT 10.25 X 32 - BLUE (250/CA)

## (undated) DEVICE — TUBE E-T HI-LO CUFF 7.5MM (10EA/PK)

## (undated) DEVICE — STENT UROLOGICAL POLARIS 6X24  ULTRA: Type: IMPLANTABLE DEVICE | Site: URETER | Status: NON-FUNCTIONAL

## (undated) DEVICE — CATHETER ANGLED KUMPE 4F 40CM .035 (5EA/BX)"

## (undated) DEVICE — COVER LIGHT HANDLE ALC PLUS DISP (18EA/BX)